# Patient Record
Sex: FEMALE | Race: WHITE | NOT HISPANIC OR LATINO | Employment: OTHER | URBAN - METROPOLITAN AREA
[De-identification: names, ages, dates, MRNs, and addresses within clinical notes are randomized per-mention and may not be internally consistent; named-entity substitution may affect disease eponyms.]

---

## 2017-01-02 ENCOUNTER — TELEPHONE (OUTPATIENT)
Dept: EDUCATION SERVICES | Facility: CLINIC | Age: 67
End: 2017-01-02

## 2017-01-02 DIAGNOSIS — E13.9 LADA (LATENT AUTOIMMUNE DIABETES MELLITUS IN ADULTS) (H): Primary | ICD-10-CM

## 2017-01-05 ENCOUNTER — VIRTUAL VISIT (OUTPATIENT)
Dept: EDUCATION SERVICES | Facility: CLINIC | Age: 67
End: 2017-01-05
Payer: COMMERCIAL

## 2017-01-05 DIAGNOSIS — E13.9 LADA (LATENT AUTOIMMUNE DIABETES MELLITUS IN ADULTS) (H): Primary | ICD-10-CM

## 2017-01-05 PROCEDURE — 99207 ZZC NO CHARGE LOS: CPT

## 2017-01-05 NOTE — PROGRESS NOTES
See my chart message for details.  1/5/2017    Paty Cooper RN/SRI  [place] Diabetes Educator

## 2017-01-27 DIAGNOSIS — N95.2 ATROPHIC VAGINITIS: Primary | ICD-10-CM

## 2017-01-27 RX ORDER — ESTRADIOL 0.1 MG/G
2 CREAM VAGINAL
Qty: 42.5 G | Refills: 11 | Status: SHIPPED | OUTPATIENT
Start: 2017-01-30 | End: 2017-03-13

## 2017-01-31 DIAGNOSIS — R39.15 URGENCY OF URINATION: Primary | ICD-10-CM

## 2017-02-01 ENCOUNTER — TELEPHONE (OUTPATIENT)
Dept: UROLOGY | Facility: CLINIC | Age: 67
End: 2017-02-01

## 2017-02-03 ENCOUNTER — TELEPHONE (OUTPATIENT)
Dept: EDUCATION SERVICES | Facility: CLINIC | Age: 67
End: 2017-02-03

## 2017-02-03 ENCOUNTER — ALLIED HEALTH/NURSE VISIT (OUTPATIENT)
Dept: EDUCATION SERVICES | Facility: CLINIC | Age: 67
End: 2017-02-03
Payer: COMMERCIAL

## 2017-02-03 DIAGNOSIS — L65.9 HAIR LOSS: Primary | ICD-10-CM

## 2017-02-03 DIAGNOSIS — E10.8 TYPE 1 DIABETES MELLITUS WITH COMPLICATIONS (H): Primary | ICD-10-CM

## 2017-02-03 PROCEDURE — G0108 DIAB MANAGE TRN  PER INDIV: HCPCS

## 2017-02-03 PROCEDURE — 99207 ZZC NO CHARGE LOS: CPT

## 2017-02-03 NOTE — PATIENT INSTRUCTIONS
My Diabetes Care Goals:    Taking Medication: I changed the settings to be 1 unit/18 gms of carbs, sensitivity to 68 units (106)  Tresiba 18 units daily.    Follow up:  Call or email me or my chart me to look at your numbers.  We will see how you do then.       Bring blood glucose meter and logbook with you to all doctor and follow-up appointments.     [place] [facility] for the [facility] Area:  For Your Diabetes Education and Nutrition Appointments Call:  [phone]   For Diabetes Education or Nutrition Related Questions:   Phone: [phone]  E-mail: [email or web address]  Fax: [phone]   If you need a medication refill please contact your pharmacy. Please allow 3 business days for your refills to be completed.    Instructions for emailing the Diabetes Educators    If you need to communicate a non-urgent message to a Diabetes Educator via email, please send to [email or web address].    Please follow the following email guidelines:    Subject line: Secure: your clinic name (example: Secure: [place])  In the email please include: First name, middle initial, last name and date of birth.    We will be in touch with you within one (1) business day.

## 2017-02-03 NOTE — TELEPHONE ENCOUNTER
Met with Latoya today.  Her BG are higher that should be. Anywhere from .  I went over her foods and she is clearly needing more insulin.  Did increase her Tresiba U200 to 18 (from 16)  Per her Accuchek connect meter programed for her to take 1 unit/18 gms of carbs and her sensitivity from 106 to 68.  I figured she uses about 25 units TDD, then used the formula for sensitivity and carb ratio.    We did discuss insulin pump therapy and would like to discuss with you    She has the following questions  1. She is hungary ALL of the time, never satisfied.  ? Due to BG being too high?  She read up on GLP-1, I know not recommended for T1 but would that be of benefit for the hunger?    2.and she is having hair loss and upset about this.    I told her I would send you this message.    Any help or ideas, would love some help.  Thank you    Paty Cooper RN/SRI  [place] Diabetes Educator

## 2017-02-03 NOTE — MR AVS SNAPSHOT
After Visit Summary   2/3/2017    Latoya Rodríguez    MRN: [id number]           Patient Information     Date Of Birth          1950        Visit Information        Provider Department      2/3/2017 3:00 PM AN DIABETIC ED RESOURCE [facility]        Care Instructions    My Diabetes Care Goals:    Taking Medication: I changed the settings to be 1 unit/18 gms of carbs, sensitivity to 68 units (106)  Tresiba 18 units daily.    Follow up:  Call or email me or my chart me to look at your numbers.  We will see how you do then.       Bring blood glucose meter and logbook with you to all doctor and follow-up appointments.     [place] [facility] for the [facility] Area:  For Your Diabetes Education and Nutrition Appointments Call:  [phone]   For Diabetes Education or Nutrition Related Questions:   Phone: [phone]  E-mail: [email or web address]  Fax: [phone]   If you need a medication refill please contact your pharmacy. Please allow 3 business days for your refills to be completed.    Instructions for emailing the Diabetes Educators    If you need to communicate a non-urgent message to a Diabetes Educator via email, please send to [email or web address].    Please follow the following email guidelines:    Subject line: Secure: your clinic name (example: Secure: [place])  In the email please include: First name, middle initial, last name and date of birth.    We will be in touch with you within one (1) business day.           Follow-ups after your visit        Your next 10 appointments already scheduled     Mar 13, 2017  4:30 PM   Return Visit with Edward Aguero MD   [facility]    [address] [place] [address] [phone]              Who to contact     If you have questions or need follow up information about today's clinic visit or your schedule please contact  [facility] directly at [phone].  Normal or non-critical lab and imaging results will be communicated to you by MyChart, letter or phone within 4 business days after the clinic has received the results. If you do not hear from us within 7 days, please contact the clinic through [employer] or phone. If you have a critical or abnormal lab result, we will notify you by phone as soon as possible.  Submit refill requests through [employer] or call your pharmacy and they will forward the refill request to us. Please allow 3 business days for your refill to be completed.          Additional Information About Your Visit        [employer] Information     [employer] gives you secure access to your electronic health record. If you see a primary care provider, you can also send messages to your care team and make appointments. If you have questions, please call your primary care clinic.  If you do not have a primary care provider, please call [phone] and they will assist you.        Care EveryWhere ID     This is your Care EveryWhere ID. This could be used by other organizations to access your [facility] medical records  [id number]         Blood Pressure from Last 3 Encounters:   11/11/16 115/70   09/13/16 100/60   08/29/16 101/59    Weight from Last 3 Encounters:   11/11/16 59.194 kg (130 lb 8 oz)   09/13/16 54.477 kg (120 lb 1.6 oz)   08/29/16 54.931 kg (121 lb 1.6 oz)              Today, you had the following     No orders found for display         Today's Medication Changes          These changes are accurate as of: 2/3/17  4:10 PM.  If you have any questions, ask your nurse or doctor.               These medicines have changed or have updated prescriptions.        Dose/Directions    * insulin aspart 100 UNIT/ML injection   Commonly known as:  NovoLOG FLEXPEN   This may have changed:  additional instructions   Used for:  Type 2 diabetes mellitus with hyperglycemia (H)        Inject 1 unit per 15 grams of  carbohydrate (3-4 units per meal) or as directed   Quantity:  1 Month   Refills:  1       * insulin aspart 100 UNIT/ML injection   Commonly known as:  NOVOPEN ECHO   This may have changed:  Another medication with the same name was changed. Make sure you understand how and when to take each.   Used for:  Type 1 diabetes mellitus without complication (H)        Dose:  3 Units   Inject 3 Units Subcutaneous 3 times daily (with meals)   Quantity:  15 mL   Refills:  3       * Notice:  This list has 2 medication(s) that are the same as other medications prescribed for you. Read the directions carefully, and ask your doctor or other care provider to review them with you.             Primary Care Provider Office Phone # Fax #    Pricila Naranjo [place], URVASHI [facility] [phone]       [facility] [address]  [place] [address]        Thank you!     Thank you for choosing [facility]  for your care. Our goal is always to provide you with excellent care. Hearing back from our patients is one way we can continue to improve our services. Please take a few minutes to complete the written survey that you may receive in the mail after your visit with us. Thank you!             Your Updated Medication List - Protect others around you: Learn how to safely use, store and throw away your medicines at www.disposemymeds.org.          This list is accurate as of: 2/3/17  4:10 PM.  Always use your most recent med list.                   Brand Name Dispense Instructions for use    ACCU-CHEK MICKY PLUS test strip   Generic drug:  blood glucose monitoring     400 each    TEST BLOOD SUGARS FOUR TIMES DAILY OR AS DIRECTED       alendronate 70 MG tablet    FOSAMAX    12 tablet    Take 1 tablet (70 mg) by mouth every 7 days Take with over 8 ounces water and stay upright for at least 30 minutes after dose.  Take at least 60 minutes before breakfast       atorvastatin 10 MG tablet    LIPITOR    90 tablet     TAKE 1 TABLET(10 MG) BY MOUTH DAILY       blood glucose monitoring lancets     3 Box    Use to test blood sugar 4 times daily or as directed.       calcium carbonate-vitamin D 500-400 MG-UNIT Tabs per tablet     180 tablet    Take 1 tablet by mouth 2 times daily       [employer] LEG CRAMPS PAIN RELIEF PO      Take 2 tablets by mouth as needed       DAILY MULTIVITAMIN PO      Take  by mouth.       estradiol 0.1 MG/GM cream    ESTRACE VAGINAL    42.5 g    Place 2 g vaginally twice a week       * insulin aspart 100 UNIT/ML injection    NovoLOG FLEXPEN    1 Month    Inject 1 unit per 15 grams of carbohydrate (3-4 units per meal) or as directed       * insulin aspart 100 UNIT/ML injection    NOVOPEN ECHO    15 mL    Inject 3 Units Subcutaneous 3 times daily (with meals)       insulin degludec 200 UNIT/ML pen    TRESIBA    18 mL    Inject 16 Units Subcutaneous daily       insulin pen needle 31G X 8 MM    B-D U/F    100 each    FOR USE DAILY WITH NOVOLOG AND LANTUS  FOUR TIMES DAILY or AS DIRECTED       KETO-DIASTIX Strp     1 each    1 strip by In Vitro route as needed       mirabegron 50 MG 24 hr tablet    MYRBETRIQ    90 tablet    Take 1 tablet (50 mg) by mouth daily       solifenacin 10 MG tablet    VESICARE    90 tablet    Take 1 tablet (10 mg) by mouth daily       VITAMIN D3 PO      Take by mouth daily       * Notice:  This list has 2 medication(s) that are the same as other medications prescribed for you. Read the directions carefully, and ask your doctor or other care provider to review them with you.

## 2017-02-03 NOTE — PROGRESS NOTES
Diabetes Self Management Training: Follow-up Visit    Latoya Rodríguez presents today for education, evaluation of glucose control and modification of medication(s) related to Type 1 diabetes.    She is accompanied by self    Patient's diabetes management related comments/concerns: having too many high bg    Patient would like this visit to be focused around the following diabetes-related behaviors and goals: Monitoring, Taking Medication and Problem Solving    ASSESSMENT:  Patient Problem List reviewed for relevant medical history and current medical status.    Current Diabetes Management per Patient:  Taking diabetes medications?   yes:     Diabetes Medication(s)     Insulin Sig    insulin degludec (TRESIBA) 200 UNIT/ML pen Inject 16 Units Subcutaneous daily    insulin aspart (NOVOPEN ECHO) 100 UNIT/ML Cartridge Inject 3 Units Subcutaneous 3 times daily (with meals)    insulin aspart (NOVOLOG FLEXPEN) 100 UNIT/ML soln Inject 1 unit per 15 grams of carbohydrate (3-4 units per meal) or as directed     Patient taking differently:  Inject 1 unit per 25 grams of carbohydrate (3-4 units per meal) or as directed      , Injectable Medications: NovoLog Insulin  Takes 1unit/15-25 gms of carbs, with 106 sensitivity.  Per her meter.  Will be changing her to 1 unit/18 gms of carbs and sensitivity 68.  Tresiba U200 will increase to 18 units daily    *Abbreviated insulin dose documentation key: Insulin Trade Name (wcfvhbvod-yonrd-otwbvv-bedtime) - i.e. Humalog 5-5-5-0 (Humalog 5 units at breakfast, 5 units at lunch, and 5 units at dinner).    Patient glucose self monitoring as follows: four times daily.   BG meter: Accu-Chek Shayla Connect meter  BG results: see blood glucose log attached to this encounter     BG values are: Not in goal  Patient's most recent A1C      7.1   9/16/2016    Nutrition:  Patient eats 3 meals per day        Cultural/Zoroastrianism diet restrictions: No     Biggest Challenge to Healthy Eating: none    Physical  "Activity:    Type: walks when she can    Diabetes Complications:  Not discussed today.    Vitals:  There were no vitals taken for this visit.  Estimated body mass index is 21.72 kg/(m^2) as calculated from the following:    Height as of 11/11/16: 1.651 m (5' 5\").    Weight as of 11/11/16: 59.194 kg (130 lb 8 oz).   Last 3 BP:   BP Readings from Last 3 Encounters:   11/11/16 115/70   09/13/16 100/60   08/29/16 101/59       History   Smoking status     Never Smoker    Smokeless tobacco     Never Used       Labs:  A1C      7.1   9/16/2016  GLC       63   9/16/2016  LDL      103   9/16/2016  HDL CHOLESTEROL   Date Value Ref Range Status   09/16/2016 62 >49 mg/dL Final   ]  GFR ESTIMATE   Date Value Ref Range Status   09/16/2016 >90  Non  GFR Calc   >60 mL/min/1.7m2 Final     GFR ESTIMATE IF BLACK   Date Value Ref Range Status   09/16/2016 >90   GFR Calc   >60 mL/min/1.7m2 Final     CR     0.65   9/16/2016  No results found for this basename: microalbumin    Health Beliefs and Attitudes:   Patient Activation Measure Survey Score:  LEX Score (Last Two) 6/24/2016   LEX Raw Score 43   Activation Score 68.5   LEX Level 4       Stage of Change: ACTION (Actively working towards change)    Progress toward meeting diabetes-related behavioral goals:    GOALS % Met Goal   Healthy Eating  100   Physical Activity  50   Monitoring  100   Medication Taking  100   Problem Solving  100   Healthy Coping     Risk Reduction           Diabetes knowledge and skills assessment:     Patient is knowledgeable in diabetes management concepts related to: Healthy Eating, Being Active, Monitoring, Taking Medication and Problem Solving    Patient needs further education on the following diabetes management concepts: Healthy Eating, Being Active, Monitoring and Taking Medication    Barriers to Learning Assessment: No Barriers identified    Based on learning assessment above, most appropriate setting for further diabetes " education would be: Individual setting.    INTERVENTION:    Education provided today on:  AADE Self-Care Behaviors:  Monitoring: purpose, proper technique, log and interpret results, individual blood glucose targets and frequency of monitoring  Taking Medication: action of prescribed medication, drawing up, administering and storing injectable diabetes medications, proper site selection and rotation for injections, side effects of prescribed medications and when to take medications  Problem Solving: high blood glucose - causes, signs/symptoms, treatment and prevention, low blood glucose - causes, signs/symptoms, treatment and prevention, carrying a carbohydrate source at all times and when to call health care provider    Opportunities for ongoing education and support in diabetes-self management were discussed.    Pt verbalized understanding of concepts discussed and recommendations provided today.       Education Materials Provided:  Pump information    PLAN:  See Patient Instructions for co-developed, patient-stated behavior change goals.  AVS printed and provided to patient today.    FOLLOW-UP:  Follow-up with PCP recommended.  Follow-up with endocrinology recommended.  Chart routed to referring provider.    Ongoing plan for education and support: Follow-up visit with diabetes educator in [place]    Paty Cooper RN/SRI [place] Diabetes Educator    Time Spent: 60 minutes  Encounter Type: Individual    Any diabetes medication dose changes were made via the CDE Protocol and Collaborative Practice Agreement with the patient's primary care provider and endocrinology provider. A copy of this encounter was shared with the provider.

## 2017-02-03 NOTE — TELEPHONE ENCOUNTER
Will do, that is what i told her about her hair loss and her hunger.  Thank you for your feedback, at least my hunches some times are good.  You are the best.  Thank you    Paty Cooper RN/SRI  [place] Diabetes Educator

## 2017-02-09 DIAGNOSIS — L65.9 HAIR LOSS: ICD-10-CM

## 2017-02-09 LAB
T4 FREE SERPL-MCNC: 1.53 NG/DL (ref 0.76–1.46)
TSH SERPL DL<=0.05 MIU/L-ACNC: 1.69 MU/L (ref 0.4–4)

## 2017-02-09 PROCEDURE — 84439 ASSAY OF FREE THYROXINE: CPT | Performed by: INTERNAL MEDICINE

## 2017-02-09 PROCEDURE — 86376 MICROSOMAL ANTIBODY EACH: CPT | Performed by: INTERNAL MEDICINE

## 2017-02-09 PROCEDURE — 36415 COLL VENOUS BLD VENIPUNCTURE: CPT | Performed by: INTERNAL MEDICINE

## 2017-02-09 PROCEDURE — 84443 ASSAY THYROID STIM HORMONE: CPT | Performed by: INTERNAL MEDICINE

## 2017-02-10 LAB — THYROPEROXIDASE AB SERPL-ACNC: <10 IU/ML

## 2017-03-12 NOTE — TELEPHONE ENCOUNTER
[facility] Prior Authorization Team   Phone: [phone]  Fax: [phone]      PA Initiation- pa not needed    Medication: estradiol (ESTRACE VAGINAL) 0.1 MG/GM cream- PA NOT NEEDED  Insurance Company: [employer] - Phone [phone] Fax [phone]  Pharmacy Filling the Rx: [facility] [id number] [place] [address] - [address] AT [facility] [address]  Filling Pharmacy Phone: [phone]  Filling Pharmacy Fax: [phone]  Start Date: 3/12/2017    Called pharmacy and patient has high deductible of $277. She will need to meet this before insurance will  co-pays. Thanks.

## 2017-03-13 ENCOUNTER — OFFICE VISIT (OUTPATIENT)
Dept: ENDOCRINOLOGY | Facility: CLINIC | Age: 67
End: 2017-03-13
Payer: COMMERCIAL

## 2017-03-13 VITALS
HEIGHT: 65 IN | WEIGHT: 134 LBS | DIASTOLIC BLOOD PRESSURE: 60 MMHG | HEART RATE: 65 BPM | BODY MASS INDEX: 22.33 KG/M2 | SYSTOLIC BLOOD PRESSURE: 98 MMHG

## 2017-03-13 DIAGNOSIS — R94.6 THYROID FUNCTION TEST ABNORMAL: ICD-10-CM

## 2017-03-13 DIAGNOSIS — E13.9 LADA (LATENT AUTOIMMUNE DIABETES MELLITUS IN ADULTS) (H): ICD-10-CM

## 2017-03-13 DIAGNOSIS — E10.9 TYPE 1 DIABETES MELLITUS WITHOUT COMPLICATION (H): Primary | ICD-10-CM

## 2017-03-13 LAB
HBA1C MFR BLD: 6 % (ref 0–5.7)
T3 SERPL-MCNC: 82 NG/DL (ref 60–181)
T4 FREE SERPL-MCNC: 1.4 NG/DL (ref 0.76–1.46)
TSH SERPL DL<=0.05 MIU/L-ACNC: 1.26 MU/L (ref 0.4–4)

## 2017-03-13 PROCEDURE — 84480 ASSAY TRIIODOTHYRONINE (T3): CPT | Performed by: INTERNAL MEDICINE

## 2017-03-13 PROCEDURE — 36415 COLL VENOUS BLD VENIPUNCTURE: CPT | Performed by: INTERNAL MEDICINE

## 2017-03-13 PROCEDURE — 84439 ASSAY OF FREE THYROXINE: CPT | Performed by: INTERNAL MEDICINE

## 2017-03-13 PROCEDURE — 84443 ASSAY THYROID STIM HORMONE: CPT | Performed by: INTERNAL MEDICINE

## 2017-03-13 PROCEDURE — 83036 HEMOGLOBIN GLYCOSYLATED A1C: CPT | Performed by: INTERNAL MEDICINE

## 2017-03-13 PROCEDURE — 99213 OFFICE O/P EST LOW 20 MIN: CPT | Performed by: INTERNAL MEDICINE

## 2017-03-13 NOTE — NURSING NOTE
"Latoya Rodríguez's goals for this visit include: Follow up Diabetes  She requests these members of her care team be copied on today's visit information: YES    PCP: Pricila Avila    Referring Provider:  Lisset Lovett PA-C  [address]  [place] [address]    Chief Complaint   Patient presents with     Diabetes       Initial Wt 60.8 kg (134 lb)  BMI 22.3 kg/m2 Estimated body mass index is 22.3 kg/(m^2) as calculated from the following:    Height as of 11/11/16: 1.651 m (5' 5\").    Weight as of this encounter: 60.8 kg (134 lb).  Medication Reconciliation: complete    Do you need any medication refills at today's visit? NO    "

## 2017-03-13 NOTE — PROGRESS NOTES
Endocrinology Note         Latoya is a 66 year old female presents today for type 1 diabetes.    HPI  Ms. Rodríguez is a 66 years old woman with hx of type 1 diabetes in June 2016. She is here for follow up. Last seen 11/2016.    She was diagnosed with type 1 diabetes when she continued losing weight unintentionally. She was found to have an A1c >14. She was initially diagnosed with type 2 diabetes and was put on insulin and Metformin.  She was later found to be TORRES antibody positive and c peptide negative.  She has since been working closely with a diabetes educator and has learned carb counting, and has generally adapted well to the diagnosis.      Interim history:  A1C is 6.0 % today which is significantly improved. She is currently taking Tresiba U-200 at 18 units at 10 pm, Novolog 1/18 gram.I reviewed her glucose meter, she has been testing 4 times a day with an overall average of 101 (SD 52) mg/dL over the couple of months. She did have frequent hypoglycemia every day in the morning. She did have hypoglycemia unawareness as she did not have symptoms of sweating or dizziness.     She is concerned that she gained weight back and lost a lot of hair since the diagnosis of diabetes although it has been slow down. She would like her weight to be around 125 lbs. She feels hungry all the time.     TFT on 2/9/2017 showed TSH 1.69, FT4 1.53 with negative TPO ab.    Past Medical History  Past Medical History   Diagnosis Date     Cataract      Type 2 diabetes mellitus with hyperglycemia (H) 6/24/2016   Type 1 diabetes, dx 6/2016  Hyperlipidemia  Osteoporosis     Allergies  No Known Allergies  Medications  Current Outpatient Prescriptions   Medication Sig Dispense Refill     estradiol (ESTRACE VAGINAL) 0.1 MG/GM cream Place 2 g vaginally twice a week 42.5 g 11     insulin degludec (TRESIBA) 200 UNIT/ML pen Inject 16 Units Subcutaneous daily 18 mL 3     mirabegron (MYRBETRIQ) 50 MG 24 hr tablet Take 1 tablet (50 mg) by  mouth daily 90 tablet 3     solifenacin (VESICARE) 10 MG tablet Take 1 tablet (10 mg) by mouth daily 90 tablet 4     atorvastatin (LIPITOR) 10 MG tablet TAKE 1 TABLET(10 MG) BY MOUTH DAILY 90 tablet 3     insulin aspart (NOVOPEN ECHO) 100 UNIT/ML Cartridge Inject 3 Units Subcutaneous 3 times daily (with meals) 15 mL 3     insulin pen needle (B-D U/F) 31G X 8 MM FOR USE DAILY WITH NOVOLOG AND LANTUS  FOUR TIMES DAILY or AS DIRECTED 100 each 11     ACCU-CHEK MICKY PLUS test strip TEST BLOOD SUGARS FOUR TIMES DAILY OR AS DIRECTED 400 each 1     alendronate (FOSAMAX) 70 MG tablet Take 1 tablet (70 mg) by mouth every 7 days Take with over 8 ounces water and stay upright for at least 30 minutes after dose.  Take at least 60 minutes before breakfast 12 tablet 3     calcium carbonate-vitamin D 500-400 MG-UNIT TABS tablt Take 1 tablet by mouth 2 times daily 180 tablet 3     Cholecalciferol (VITAMIN D3 PO) Take by mouth daily       Urine Glucose-Ketones Test (KETO-DIASTIX) STRP 1 strip by In Vitro route as needed 1 each 11     insulin aspart (NOVOLOG FLEXPEN) 100 UNIT/ML soln Inject 1 unit per 15 grams of carbohydrate (3-4 units per meal) or as directed (Patient taking differently: Inject 1 unit per 25 grams of carbohydrate (3-4 units per meal) or as directed) 1 Month 1     blood glucose monitoring (ACCU-CHEK FASTCLIX) lancets Use to test blood sugar 4 times daily or as directed. 3 Box prn     Homeopathic Products ([employer] LEG CRAMPS PAIN RELIEF PO) Take 2 tablets by mouth as needed       Multiple Vitamin (DAILY MULTIVITAMIN PO) Take  by mouth.       Family History  family history includes Brain Cancer in her maternal uncle; Breast Cancer in her mother; Coronary Artery Disease in her maternal uncle and maternal uncle; Hyperlipidemia in her mother; Leukemia in her father; Other Cancer in her father; Skin Cancer in her father; Stomach Cancer in her maternal aunt. There is no history of DIABETES, Hypertension, CEREBROVASCULAR  "DISEASE, Colon Cancer, Thyroid Disease, Depression, or Anxiety Disorder.   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    ROS  Constitutional: good energy, weight is up   Eyes: no vision change, diplopia or red eyes   Neck: no difficulty swallowing, no choking, no neck pain, no neck swelling  Cardiovascular: no chest pain, palpitations  Respiratory: no dyspnea, cough, shortness of breath or wheezing   GI: no nausea, vomiting, diarrhea or constipation, no abdominal pain   : no change in urine, no dysuria or hematuria  Musculoskeletal: no joint or muscle pain or swelling   Integumentary: +hair loss  Neuro: no loss of strength or sensation, no numbness or tingling, no tremor, no dizziness, no headache   Endo: no polyuria or polydipsia, no temperature intolerance   Heme/Lymph: no concerning bumps, no bleeding problems   Allergy: no environmental allergies   Psych: no depression or anxiety, no sleep problems.    Physical Exam  BP 98/60  Pulse 65  Ht 1.651 m (5' 5\")  Wt 60.8 kg (134 lb)  BMI 22.3 kg/m2  Body mass index is 22.3 kg/(m^2).  Constitutional: no distress, comfortable, pleasant   Eyes: anicteric  Musculoskeletal: no edema   Skin:  no jaundice   Neurological: normal gait, no tremor on outstretched hands bilaterally  Psychological: appropriate mood     RESULTS  Lab Results   Component Value Date    A1C 6.0 03/13/2017    A1C 7.1 09/16/2016    A1C 14.6 06/24/2016       ENDO DIABETES Latest Ref Rng 9/16/2016   GLUCOSE 70 - 99 mg/dL 63 (L)   CHOLESTEROL <200 mg/dL 174   LDL CHOLESTEROL, CALCULATED <100 mg/dL 103 (H)   HDL CHOLESTEROL >49 mg/dL 62   NON HDL CHOLESTEROL <130 mg/dL 112   TRIGLYCERIDES <150 mg/dL 43   CREATININE 0.52 - 1.04 mg/dL 0.65   POTASSIUM 3.4 - 5.3 " mmol/L 3.7   ALT 0 - 50 U/L 26   AST 0 - 45 U/L 21     ENDO THYROID LABS-[facility] Latest Ref Rng & Units 2/9/2017   TSH 0.40 - 4.00 mU/L 1.69   T4 FREE 0.76 - 1.46 ng/dL 1.53 (H)   THYR PEROXIDASE ALEX <35 IU/mL <10         ASSESSMENT:    Ms. Rodríguez is a 66 years old woman with hx of type 1 diabetes in June 2016. She is here for follow up.    1. Type 1 diabetes: diagnosed in June 2016. She is adapting quite well.  A1c is down to 6.0%. She has been having frequent hypoglycemia unawareness.   I will reduce Tresiba U-200 to 12 units daily  I will change Novolog to 1 unit per 25 gram.   Test 4 times daily and add correction if needed.  Do NOT correct between meals.     2) hair loss: unclear but likely from significant weight loss. TFT showed slightly high FT4 but normal TSH. Will repeat lab today.    PLAN:   - I will reduce Tresiba U-200 to 12 units daily  - change Novolog to 1 unit per 25 gram of CHO for all meal   - repeat TFT today  - RTC 3-4 months    ENDO THYROID LABS-[facility] Latest Ref Rng & Units 3/13/2017   TSH 0.40 - 4.00 mU/L 1.26   T4 FREE 0.76 - 1.46 ng/dL 1.40   TRIIODOTHYRONINE(T3) 60 - 181 ng/dL 82     Edward Aguero MD     Division of Diabetes and Endocrinology  Department of Medicine  [phone]

## 2017-03-13 NOTE — PATIENT INSTRUCTIONS
Reduce Tresiba to 12 units  Reduce Novolog to 1 unit per 25 gram  Send blood sugars in 1-2 weeks via My Chart.     Please allow 7-10 business days for your lab results. You will be notified by phone, letter, or My Chart after the provider has reviewed them.  Thank you.       Sending blood sugars to your provider at [facility]:  We want to help you with your diabetes management, which often requires frequent adjustments to your therapy. For your convenience, we have several ways to send your blood sugars to your doctor for review.    - Send message directly to your doctor through My Chart.  Please ask the rooming staff if you would like to sign up for My Chart.  This is a fast and confidential way to send your information and communicate directly with your provider.   - Record readings and fax to [phone].  We have a template for you to use for your convenience.  - If you have a [employer] pump, upload to [employer] and notify your provider of your username and password.   - Stop by the clinic with your meter for download.   - My Chart or call Linda Diego, Diabetes Educator at [phone]  - Call the clinic and speak to one of the endocrine nurses to relay information on the telephone.  Tasha Mckenna, or Lorrie at [phone].   -    Please call the on-call Endocrinologist at the [facility] for after       hours/weekend needs at [phone] Option #4.    Please note that you do not need to FAST if you are just having an A1C drawn. Please remember to ALWAYS bring your glucose meter with to your appointment. This data is very important for the management of your care.    Thank you!  Your [facility] Care Team

## 2017-03-13 NOTE — LETTER
3/13/2017      RE: Latoya Rodríguez  [address]  [place] [address]     Dear Colleague,    Thank you for referring your patient, Latoya Rodríguez, to the [facility]. Please see a copy of my visit note below.         Endocrinology Note         Latoya is a 66 year old female presents today for type 1 diabetes.    HPI  Ms. Rodríguez is a 66 years old woman with hx of type 1 diabetes in June 2016. She is here for follow up. Last seen 11/2016.    She was diagnosed with type 1 diabetes when she continued losing weight unintentionally. She was found to have an A1c >14. She was initially diagnosed with type 2 diabetes and was put on insulin and Metformin.  She was later found to be TORRES antibody positive and c peptide negative.  She has since been working closely with a diabetes educator and has learned carb counting, and has generally adapted well to the diagnosis.      Interim history:  A1C is 6.0 % today which is significantly improved. She is currently taking Tresiba U-200 at 18 units at 10 pm, Novolog 1/18 gram.I reviewed her glucose meter, she has been testing 4 times a day with an overall average of 101 (SD 52) mg/dL over the couple of months. She did have frequent hypoglycemia every day in the morning. She did have hypoglycemia unawareness as she did not have symptoms of sweating or dizziness.     She is concerned that she gained weight back and lost a lot of hair since the diagnosis of diabetes although it has been slow down. She would like her weight to be around 125 lbs. She feels hungry all the time.     TFT on 2/9/2017 showed TSH 1.69, FT4 1.53 with negative TPO ab.    Past Medical History  Past Medical History   Diagnosis Date     Cataract      Type 2 diabetes mellitus with hyperglycemia (H) 6/24/2016   Type 1 diabetes, dx 6/2016  Hyperlipidemia  Osteoporosis     Allergies  No Known Allergies  Medications  Current Outpatient Prescriptions   Medication Sig Dispense Refill      estradiol (ESTRACE VAGINAL) 0.1 MG/GM cream Place 2 g vaginally twice a week 42.5 g 11     insulin degludec (TRESIBA) 200 UNIT/ML pen Inject 16 Units Subcutaneous daily 18 mL 3     mirabegron (MYRBETRIQ) 50 MG 24 hr tablet Take 1 tablet (50 mg) by mouth daily 90 tablet 3     solifenacin (VESICARE) 10 MG tablet Take 1 tablet (10 mg) by mouth daily 90 tablet 4     atorvastatin (LIPITOR) 10 MG tablet TAKE 1 TABLET(10 MG) BY MOUTH DAILY 90 tablet 3     insulin aspart (NOVOPEN ECHO) 100 UNIT/ML Cartridge Inject 3 Units Subcutaneous 3 times daily (with meals) 15 mL 3     insulin pen needle (B-D U/F) 31G X 8 MM FOR USE DAILY WITH NOVOLOG AND LANTUS  FOUR TIMES DAILY or AS DIRECTED 100 each 11     ACCU-CHEK MICKY PLUS test strip TEST BLOOD SUGARS FOUR TIMES DAILY OR AS DIRECTED 400 each 1     alendronate (FOSAMAX) 70 MG tablet Take 1 tablet (70 mg) by mouth every 7 days Take with over 8 ounces water and stay upright for at least 30 minutes after dose.  Take at least 60 minutes before breakfast 12 tablet 3     calcium carbonate-vitamin D 500-400 MG-UNIT TABS tablt Take 1 tablet by mouth 2 times daily 180 tablet 3     Cholecalciferol (VITAMIN D3 PO) Take by mouth daily       Urine Glucose-Ketones Test (KETO-DIASTIX) STRP 1 strip by In Vitro route as needed 1 each 11     insulin aspart (NOVOLOG FLEXPEN) 100 UNIT/ML soln Inject 1 unit per 15 grams of carbohydrate (3-4 units per meal) or as directed (Patient taking differently: Inject 1 unit per 25 grams of carbohydrate (3-4 units per meal) or as directed) 1 Month 1     blood glucose monitoring (ACCU-CHEK FASTCLIX) lancets Use to test blood sugar 4 times daily or as directed. 3 Box prn     Homeopathic Products ([employer] LEG CRAMPS PAIN RELIEF PO) Take 2 tablets by mouth as needed       Multiple Vitamin (DAILY MULTIVITAMIN PO) Take  by mouth.       Family History  family history includes Brain Cancer in her maternal uncle; Breast Cancer in her mother; Coronary Artery Disease in her  "maternal uncle and maternal uncle; Hyperlipidemia in her mother; Leukemia in her father; Other Cancer in her father; Skin Cancer in her father; Stomach Cancer in her maternal aunt. There is no history of DIABETES, Hypertension, CEREBROVASCULAR DISEASE, Colon Cancer, Thyroid Disease, Depression, or Anxiety Disorder.   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    ROS  Constitutional: good energy, weight is up   Eyes: no vision change, diplopia or red eyes   Neck: no difficulty swallowing, no choking, no neck pain, no neck swelling  Cardiovascular: no chest pain, palpitations  Respiratory: no dyspnea, cough, shortness of breath or wheezing   GI: no nausea, vomiting, diarrhea or constipation, no abdominal pain   : no change in urine, no dysuria or hematuria  Musculoskeletal: no joint or muscle pain or swelling   Integumentary: +hair loss  Neuro: no loss of strength or sensation, no numbness or tingling, no tremor, no dizziness, no headache   Endo: no polyuria or polydipsia, no temperature intolerance   Heme/Lymph: no concerning bumps, no bleeding problems   Allergy: no environmental allergies   Psych: no depression or anxiety, no sleep problems.    Physical Exam  BP 98/60  Pulse 65  Ht 1.651 m (5' 5\")  Wt 60.8 kg (134 lb)  BMI 22.3 kg/m2  Body mass index is 22.3 kg/(m^2).  Constitutional: no distress, comfortable, pleasant   Eyes: anicteric  Musculoskeletal: no edema   Skin:  no jaundice   Neurological: normal gait, no tremor on outstretched hands bilaterally  Psychological: appropriate mood     RESULTS  Lab Results   Component Value Date    A1C 6.0 03/13/2017    A1C 7.1 09/16/2016    A1C 14.6 06/24/2016       ENDO DIABETES Latest Ref Rng 9/16/2016   GLUCOSE 70 - 99 mg/dL 63 " (L)   CHOLESTEROL <200 mg/dL 174   LDL CHOLESTEROL, CALCULATED <100 mg/dL 103 (H)   HDL CHOLESTEROL >49 mg/dL 62   NON HDL CHOLESTEROL <130 mg/dL 112   TRIGLYCERIDES <150 mg/dL 43   CREATININE 0.52 - 1.04 mg/dL 0.65   POTASSIUM 3.4 - 5.3 mmol/L 3.7   ALT 0 - 50 U/L 26   AST 0 - 45 U/L 21     ENDO THYROID LABS-[facility] Latest Ref Rng & Units 2/9/2017   TSH 0.40 - 4.00 mU/L 1.69   T4 FREE 0.76 - 1.46 ng/dL 1.53 (H)   THYR PEROXIDASE ALEX <35 IU/mL <10         ASSESSMENT:    Ms. Rodríguez is a 66 years old woman with hx of type 1 diabetes in June 2016. She is here for follow up.    1. Type 1 diabetes: diagnosed in June 2016. She is adapting quite well.  A1c is down to 6.0%. She has been having frequent hypoglycemia unawareness.   I will reduce Tresiba U-200 to 12 units daily  I will change Novolog to 1 unit per 25 gram.   Test 4 times daily and add correction if needed.  Do NOT correct between meals.     2) hair loss: unclear but likely from significant weight loss. TFT showed slightly high FT4 but normal TSH. Will repeat lab today.    PLAN:   - I will reduce Tresiba U-200 to 12 units daily  - change Novolog to 1 unit per 25 gram of CHO for all meal   - repeat TFT today  - RTC 3-4 months    ENDO THYROID LABS-[facility] Latest Ref Rng & Units 3/13/2017   TSH 0.40 - 4.00 mU/L 1.26   T4 FREE 0.76 - 1.46 ng/dL 1.40   TRIIODOTHYRONINE(T3) 60 - 181 ng/dL 82     Edward Aguero MD     Division of Diabetes and Endocrinology  Department of Medicine  [phone]      Again, thank you for allowing me to participate in the care of your patient.      Sincerely,    Edward Aguero MD

## 2017-03-13 NOTE — MR AVS SNAPSHOT
After Visit Summary   3/13/2017    Latoya Rodríguez    MRN: [id number]           Patient Information     Date Of Birth          1950        Visit Information        Provider Department      3/13/2017 4:15 PM Edward Aguero MD; MG ENDO NURSE 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     Please allow 7-10 business days for your lab results. You will be notified by phone, letter, or My Chart after the provider has reviewed them.  Thank you.       Sending blood sugars to your provider at [facility]:  We want to help you with your diabetes management, which often requires frequent adjustments to your therapy. For your convenience, we have several ways to send your blood sugars to your doctor for review.    - Send message directly to your doctor through My Chart.  Please ask the rooming staff if you would like to sign up for My Chart.  This is a fast and confidential way to send your information and communicate directly with your provider.   - Record readings and fax to [phone].  We have a template for you to use for your convenience.  - If you have a Medtronic pump, upload to [employer] and notify your provider of your username and password.   - Stop by the clinic with your meter for download.   - My Chart or call Linda Diego Diabetes Educator at [phone]  - Call the clinic and speak to one of the endocrine nurses to relay information on the telephone.  Tasha Mckenna, or Lorrie at [phone].   -    Please call the on-call Endocrinologist at the [facility] for after       hours/weekend needs at [phone] Option #4.    Please note that you do not need to FAST if you are  just having an A1C drawn. Please remember to ALWAYS bring your glucose meter with to your appointment. This data is very important for the management of your care.    Thank you!  Your [place] [facility] Team              Follow-ups after your visit        Your next 10 appointments already scheduled     Jul 17, 2017  7:45 AM CDT   Return Visit with Edward Aguero MD, MG ENDO NURSE   [facility]    [address] [place] [id number] [phone]              Future tests that were ordered for you today     Open Standing Orders        Priority Remaining Interval Expires Ordered    Hemoglobin A1c POCT Routine 3/4 Q 3 Mo 3/13/2018 3/13/2017            Who to contact     If you have questions or need follow up information about today's clinic visit or your schedule please contact [facility] directly at [phone].  Normal or non-critical lab and imaging results will be communicated to you by [employer]An[employer], letter or phone within 4 business days after the clinic has received the results. If you do not hear from us within 7 days, please contact the clinic through [employer] or phone. If you have a critical or abnormal lab result, we will notify you by phone as soon as possible.  Submit refill requests through [employer] or call your pharmacy and they will forward the refill request to us. Please allow 3 business days for your refill to be completed.          Additional Information About Your Visit        BitAnimatehart Information     [employer] gives you secure access to your electronic health record. If you see a primary care provider, you can also send messages to your care team and make appointments. If you have questions, please call your primary care clinic.  If you do not have a primary care provider, please call [phone] and they will assist you.      [employer] is an electronic gateway that provides easy, online access to your medical  "records. With [employer], you can request a clinic appointment, read your test results, renew a prescription or communicate with your care team.     To access your existing account, please contact your [facility] or call [phone] for assistance.        Care EveryWhere ID     This is your Care EveryWhere ID. This could be used by other organizations to access your [place] medical records  [id number]        Your Vitals Were     Pulse Height BMI (Body Mass Index)             65 1.651 m (5' 5\") 22.3 kg/m2          Blood Pressure from Last 3 Encounters:   03/13/17 98/60   11/11/16 115/70   09/13/16 100/60    Weight from Last 3 Encounters:   03/13/17 60.8 kg (134 lb)   11/11/16 59.2 kg (130 lb 8 oz)   09/13/16 54.5 kg (120 lb 1.6 oz)              We Performed the Following     Hemoglobin A1c POCT          Today's Medication Changes          These changes are accurate as of: 3/13/17  5:07 PM.  If you have any questions, ask your nurse or doctor.               These medicines have changed or have updated prescriptions.        Dose/Directions    * insulin aspart 100 UNIT/ML injection   Commonly known as:  NovoLOG FLEXPEN   This may have changed:  additional instructions   Used for:  Type 2 diabetes mellitus with hyperglycemia (H)   Changed by:  Catrachtia Chavez [facility]        Inject 1 unit per 15 grams of carbohydrate (3-4 units per meal) or as directed   Quantity:  1 Month   Refills:  1       * insulin aspart 100 UNIT/ML injection   Commonly known as:  NOVOPEN ECHO   This may have changed:  Another medication with the same name was changed. Make sure you understand how and when to take each.   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 Read the directions carefully, and ask your doctor or other care provider to review them with you.      Stop taking these medicines if you haven't already. Please contact your care team if you have questions.     estradiol 0.1 MG/GM cream   Commonly known as:  ESTRACE VAGINAL   Stopped by:  Edward Aguero MD                Where to get your medicines      Some of these will need a paper prescription and others can be bought over the counter.  Ask your nurse if you have questions.     Bring a paper prescription for each of these medications     insulin degludec 200 UNIT/ML pen                Primary Care Provider Office Phone # Fax #    Pricila JuarezURVASHI [facility] [phone]       [facility] [address]  [place] [address]        Thank you!     Thank you for choosing [facility]  for your care. Our goal is always to provide you with excellent care. Hearing back from our patients is one way we can continue to improve our services. Please take a few minutes to complete the written survey that you may receive in the mail after your visit with us. Thank you!             Your Updated Medication List - Protect others around you: Learn how to safely use, store and throw away your medicines at www.disposemymeds.org.          This list is accurate as of: 3/13/17  5:07 PM.  Always use your most recent med list.                   Brand Name Dispense Instructions for use    ACCU-CHEK MICKY PLUS test strip   Generic drug:  blood glucose monitoring     400 each    TEST BLOOD SUGARS FOUR TIMES DAILY OR AS DIRECTED       alendronate 70 MG tablet    FOSAMAX    12 tablet     Take 1 tablet (70 mg) by mouth every 7 days Take with over 8 ounces water and stay upright for at least 30 minutes after dose.  Take at least 60 minutes before breakfast       atorvastatin 10 MG tablet    LIPITOR    90 tablet    TAKE 1 TABLET(10 MG) BY MOUTH DAILY       blood glucose monitoring lancets     3 Box    Use to test blood sugar 4 times daily or as directed.       calcium carbonate-vitamin D 500-400 MG-UNIT Tabs per tablet     180 tablet    Take 1 tablet by mouth 2 times daily       * insulin aspart 100 UNIT/ML injection    NovoLOG FLEXPEN    1 Month    Inject 1 unit per 15 grams of carbohydrate (3-4 units per meal) or as directed       * insulin aspart 100 UNIT/ML injection    NOVOPEN ECHO    15 mL    Inject 3 Units Subcutaneous 3 times daily (with meals)       insulin degludec 200 UNIT/ML pen    TRESIBA    18 mL    Inject 12 Units Subcutaneous daily       insulin pen needle 31G X 8 MM    B-D U/F    100 each    FOR USE DAILY WITH NOVOLOG AND LANTUS  FOUR TIMES DAILY or AS DIRECTED       KETO-DIASTIX Strp     1 each    1 strip by In Vitro route as needed       mirabegron 50 MG 24 hr tablet    MYRBETRIQ    90 tablet    Take 1 tablet (50 mg) by mouth daily       solifenacin 10 MG tablet    VESICARE    90 tablet    Take 1 tablet (10 mg) by mouth daily       * Notice:  This list has 2 medication(s) that are the same as other medications prescribed for you. Read the directions carefully, and ask your doctor or other care provider to review them with you.

## 2017-03-14 NOTE — TELEPHONE ENCOUNTER
Left generic message with request for patient to return call back to clinic. When patient returns call, will discuss message below from prior authorization team.    Jess Hadley  General Surgery - Urology RN

## 2017-03-14 NOTE — TELEPHONE ENCOUNTER
"Received voicemail from patient requesting a return call to [phone].     Returned call to patient who is aware that PA is not needed and the deductible is $277. Patient stated, \"I am not going to pay $277 dollars for a medication that I don't really see a benefit with.\" Patient reports using 3-4 tubes of estrace over the course of time and has not noticed any benefit. Offered to look for more affordable alternatives and/or assist patient in scheduling follow up with Dr. Zhang. Patient wishes to have alternatives researched and informed patient that a message will be sent to Dr. Zhang for recommendations. Patient aware that Dr. Zhang is out of the office, but we will follow up with patient as soon as more information is known. Patient verbalized understanding and is comfortable with plan.    Jess Hadley  General Surgery - Urology RN    "

## 2017-03-15 NOTE — TELEPHONE ENCOUNTER
Received message from Dr. Zhang stating that patient could try premarin cream or vagifem if they are more affordable, otherwise, patient could disregard the cream all together.     Called and spoke with [facility] and premarin and vagifem tend to be more expensive than estrace cream.    Called and spoke to patient who is aware of the above information. Patient prefers to skip the cream for now and will call with any questions or concerns in the future. Patient plans to contact the clinic to schedule follow up with Dr. Zhang in the near future.     Jess Hadley  General Surgery - Urology RN

## 2017-04-05 ENCOUNTER — TELEPHONE (OUTPATIENT)
Dept: PEDIATRICS | Facility: CLINIC | Age: 67
End: 2017-04-05

## 2017-04-05 NOTE — TELEPHONE ENCOUNTER
4/5/2017    Call Regarding Preventive Health Screening Colonoscopy    Attempt 1    Message on voicemail     Comments:       Outreach   CC

## 2017-04-17 DIAGNOSIS — R39.15 URGENCY OF URINATION: ICD-10-CM

## 2017-04-17 DIAGNOSIS — N39.41 URGE INCONTINENCE: ICD-10-CM

## 2017-04-17 DIAGNOSIS — N95.2 ATROPHIC VAGINITIS: ICD-10-CM

## 2017-04-17 DIAGNOSIS — R35.0 URINARY FREQUENCY: ICD-10-CM

## 2017-04-17 RX ORDER — MIRABEGRON 50 MG/1
50 TABLET, EXTENDED RELEASE ORAL DAILY
Qty: 90 TABLET | Refills: 1 | Status: SHIPPED | OUTPATIENT
Start: 2017-04-17 | End: 2017-12-06

## 2017-04-17 RX ORDER — ESTRADIOL 0.1 MG/G
2 CREAM VAGINAL
Qty: 42.5 G | Refills: 3 | Status: SHIPPED | OUTPATIENT
Start: 2017-04-17 | End: 2017-12-06

## 2017-04-17 RX ORDER — SOLIFENACIN SUCCINATE 10 MG/1
10 TABLET, FILM COATED ORAL DAILY
Qty: 90 TABLET | Refills: 1 | Status: SHIPPED | OUTPATIENT
Start: 2017-04-17 | End: 2017-12-06

## 2017-04-17 NOTE — TELEPHONE ENCOUNTER
"Called and spoke to patient who stated, \"I was wondering if I could get the prescriptions for myrbetriq, vesicare, and estrace cream sent to me or my pharmacy.\" Patient aware that she originally declined the estrace cream prescription but may have changed her mind. Informed patient that the paper prescriptions could be mailed to the patient's home. Prescriptions to be signed by provider and mailed to patient's home.    Jess Hadley  General Surgery - Urology RN            "

## 2017-04-17 NOTE — TELEPHONE ENCOUNTER
Attempted to reach patient to discuss medication request below, but line was busy. Will continue to try to reach patient.     Jess Hadley  General Surgery - Urology RN

## 2017-04-17 NOTE — TELEPHONE ENCOUNTER
[facility] Call Center    Phone Message    Name of Caller: Latoya    Phone Number: Home number on file [phone] (home)    Best time to return call: any    May a detailed message be left on voicemail: yes    Relation to patient: Self    Reason for Call: Medication Refill Request    Has the patient contacted the pharmacy for the refill? Yes   Name of medication being requested: in med tab, also needs a cream from Dr Zhang.  Do not see it on medication list.   Provider who prescribed the medication: Vicente   Pharmacy: [address]  [id number]  (mail order)    Date medication is needed: asap   Please call patient if care team needs more information     Action Taken: Message routed to:  Adult Clinics: Urology p [phone]

## 2017-06-16 NOTE — TELEPHONE ENCOUNTER
6/16/2017    Call Regarding Preventive Health Screening Colonoscopy    Attempt 2    Message     Comments: per patient states that she will call back to schedule colonoscopy screening after checking her schedule.      Outreach   meme

## 2017-06-23 ENCOUNTER — OFFICE VISIT (OUTPATIENT)
Dept: PEDIATRICS | Facility: CLINIC | Age: 67
End: 2017-06-23
Payer: COMMERCIAL

## 2017-06-23 VITALS
DIASTOLIC BLOOD PRESSURE: 60 MMHG | OXYGEN SATURATION: 99 % | TEMPERATURE: 97.7 F | SYSTOLIC BLOOD PRESSURE: 115 MMHG | HEART RATE: 67 BPM | BODY MASS INDEX: 22.42 KG/M2 | WEIGHT: 134.7 LBS

## 2017-06-23 DIAGNOSIS — E78.5 HYPERLIPIDEMIA WITH TARGET LDL LESS THAN 100: ICD-10-CM

## 2017-06-23 DIAGNOSIS — Z13.29 SCREENING FOR THYROID DISORDER: ICD-10-CM

## 2017-06-23 DIAGNOSIS — Z00.00 ENCOUNTER FOR ROUTINE ADULT HEALTH EXAMINATION WITHOUT ABNORMAL FINDINGS: Primary | ICD-10-CM

## 2017-06-23 DIAGNOSIS — L65.9 HAIR LOSS: ICD-10-CM

## 2017-06-23 DIAGNOSIS — Z12.11 SCREENING FOR COLON CANCER: ICD-10-CM

## 2017-06-23 DIAGNOSIS — E10.9 TYPE 1 DIABETES MELLITUS WITHOUT COMPLICATION (H): ICD-10-CM

## 2017-06-23 DIAGNOSIS — Z71.89 ADVANCED DIRECTIVES, COUNSELING/DISCUSSION: ICD-10-CM

## 2017-06-23 DIAGNOSIS — Z12.31 ENCOUNTER FOR SCREENING MAMMOGRAM FOR BREAST CANCER: ICD-10-CM

## 2017-06-23 PROCEDURE — 99397 PER PM REEVAL EST PAT 65+ YR: CPT | Performed by: NURSE PRACTITIONER

## 2017-06-23 PROCEDURE — 99207 C FOOT EXAM  NO CHARGE: CPT | Mod: 25 | Performed by: NURSE PRACTITIONER

## 2017-06-23 NOTE — PROGRESS NOTES
SUBJECTIVE:     CC: Latoya Rodríguez is an 66 year old woman who presents for preventive health visit.     Physical   Annual:     Getting at least 3 servings of Calcium per day::  Yes    Bi-annual eye exam::  Yes    Dental care twice a year::  Yes    Sleep apnea or symptoms of sleep apnea::  None    Diet::  Diabetic, Carbohydrate counting and Vegetarian/vegan    Frequency of exercise::  None    Taking medications regularly::  Yes    Medication side effects::  None    Additional concerns today::  YES      Today's PHQ-2 Score: Answers for HPI/ROS submitted by the patient on 6/20/2017   PHQ-2 Score: 0    PHQ-2 ( 1999 [employer]) 6/20/2017   Q1: Little interest or pleasure in doing things Not at all   Q2: Feeling down, depressed or hopeless Not at all   PHQ-2 Score 0       Abuse: Current or Past(Physical, Sexual or Emotional)- No  Do you feel safe in your environment - Yes    Social History   Substance Use Topics     Smoking status: Never Smoker     Smokeless tobacco: Never Used     Alcohol use Yes      Comment: 1 to 2 beers or glasses of wine 1 to 2 times per month     The patient does not drink >3 drinks per day nor >7 drinks per week.    Recent Labs   Lab Test  09/16/16   0711  06/24/16   0941   CHOL  174  265*   HDL  62  43*   LDL  103*  195*   TRIG  43  136   NHDL  112  222*       Reviewed orders with patient.  Reviewed health maintenance and updated orders accordingly - Yes    Mammo Decision Support:  Patient over age 50, mutual decision to screen reflected in health maintenance.    Pertinent mammograms are reviewed under the imaging tab.  History of abnormal Pap smear: NO - age 65 - see link Cervical Cytology Screening Guidelines    Reviewed and updated as needed this visit by clinical staff  Tobacco  Allergies  Meds  Med Hx  Surg Hx  Fam Hx  Soc Hx        Reviewed and updated as needed this visit by Provider        Past Medical History:   Diagnosis Date     Cataract      Type 2 diabetes mellitus with  hyperglycemia (H) 6/24/2016      Past Surgical History:   Procedure Laterality Date     EYE SURGERY  9/04, 5/11    Retinal detachment, Cataract (both eyes)     VASCULAR SURGERY      Varicose Veins       ROS:  CONSTITUTIONAL:NEGATIVE for fever, chills, change in weight  INTEGUMENTARY/SKIN: NEGATIVE for worrisome rashes, moles or lesions  EYES: NEGATIVE for vision changes or irritation  ENT: NEGATIVE for ear, mouth and throat problems  RESP:NEGATIVE for significant cough or SOB  BREAST: NEGATIVE for masses, tenderness or discharge  CV: NEGATIVE for chest pain, palpitations or peripheral edema  GI: NEGATIVE for nausea, abdominal pain, heartburn, or change in bowel habits   menopausal female: amenorrhea, no unusual urinary symptoms and no unusual vaginal symptoms  MUSCULOSKELETAL:NEGATIVE for significant arthralgias or myalgia  NEURO: NEGATIVE for weakness, dizziness or paresthesias  ENDOCRINE: NEGATIVE for temperature intolerance, skin/hair changes and POSITIVE  for HX diabetes  HEME/ALLERGY/IMMUNE: NEGATIVE for bleeding problems  PSYCHIATRIC: NEGATIVE for changes in mood or affect     BP Readings from Last 3 Encounters:   06/23/17 115/60   03/13/17 98/60   11/11/16 115/70    Wt Readings from Last 3 Encounters:   06/23/17 134 lb 11.2 oz (61.1 kg)   03/13/17 134 lb (60.8 kg)   11/11/16 130 lb 8 oz (59.2 kg)                  Patient Active Problem List   Diagnosis     Urgency of urination     Urinary frequency     Urge incontinence     CARDIOVASCULAR SCREENING; LDL GOAL LESS THAN 160     Hyperlipidemia with target LDL less than 100     Osteoporosis     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)     Type 1 diabetes mellitus without complication (H)     Age-related osteoporosis without current pathological fracture     Past Surgical History:   Procedure Laterality Date     EYE SURGERY  9/04, 5/11    Retinal detachment, Cataract (both eyes)     VASCULAR SURGERY      Varicose Veins       Social History    Substance Use Topics     Smoking status: Never Smoker     Smokeless tobacco: Never Used     Alcohol use Yes      Comment: 1 to 2 beers or glasses of wine 1 to 2 times per month     Family History   Problem Relation Age of Onset     Hyperlipidemia Mother      Breast Cancer Mother      Other Cancer Father      Leukemia Father      Skin Cancer Father      Coronary Artery Disease Maternal Uncle      Brain Cancer Maternal Uncle      Coronary Artery Disease Maternal Uncle      Stomach Cancer Maternal Aunt      DIABETES No family hx of      Hypertension No family hx of      CEREBROVASCULAR DISEASE No family hx of      Colon Cancer No family hx of      Thyroid Disease No family hx of      Depression No family hx of      Anxiety Disorder No family hx of          Current Outpatient Prescriptions   Medication Sig Dispense Refill     mirabegron (MYRBETRIQ) 50 MG 24 hr tablet Take 1 tablet (50 mg) by mouth daily 90 tablet 1     solifenacin (VESICARE) 10 MG tablet Take 1 tablet (10 mg) by mouth daily 90 tablet 1     estradiol (ESTRACE VAGINAL) 0.1 MG/GM cream Place 2 g vaginally twice a week Apply dime-sized amount daily for the first 2 weeks, then place twice a week thereafter. 42.5 g 3     insulin degludec (TRESIBA) 200 UNIT/ML pen Inject 12 Units Subcutaneous daily 18 mL 3     atorvastatin (LIPITOR) 10 MG tablet TAKE 1 TABLET(10 MG) BY MOUTH DAILY 90 tablet 3     insulin aspart (NOVOPEN ECHO) 100 UNIT/ML Cartridge Inject 3 Units Subcutaneous 3 times daily (with meals) 15 mL 3     insulin pen needle (B-D U/F) 31G X 8 MM FOR USE DAILY WITH NOVOLOG AND LANTUS  FOUR TIMES DAILY or AS DIRECTED 100 each 11     ACCU-CHEK MICKY PLUS test strip TEST BLOOD SUGARS FOUR TIMES DAILY OR AS DIRECTED 400 each 1     alendronate (FOSAMAX) 70 MG tablet Take 1 tablet (70 mg) by mouth every 7 days Take with over 8 ounces water and stay upright for at least 30 minutes after dose.  Take at least 60 minutes before breakfast 12 tablet 3     calcium  carbonate-vitamin D 500-400 MG-UNIT TABS tablt Take 1 tablet by mouth 2 times daily 180 tablet 3     Urine Glucose-Ketones Test (KETO-DIASTIX) STRP 1 strip by In Vitro route as needed 1 each 11     insulin aspart (NOVOLOG FLEXPEN) 100 UNIT/ML soln Inject 1 unit per 15 grams of carbohydrate (3-4 units per meal) or as directed (Patient taking differently: Inject 1 unit per 25 grams of carbohydrate (3-4 units per meal) or as directed) 1 Month 1     blood glucose monitoring (ACCU-CHEK FASTCLIX) lancets Use to test blood sugar 4 times daily or as directed. 3 Box prn     No Known Allergies  OBJECTIVE:     /60 (BP Location: Right arm, Patient Position: Sitting, Cuff Size: Adult Regular)  Pulse 67  Temp 97.7  F (36.5  C) (Temporal)  Wt 134 lb 11.2 oz (61.1 kg)  SpO2 99%  Breastfeeding? No  BMI 22.42 kg/m2   Wt Readings from Last 4 Encounters:   06/23/17 134 lb 11.2 oz (61.1 kg)   03/13/17 134 lb (60.8 kg)   11/11/16 130 lb 8 oz (59.2 kg)   09/13/16 120 lb 1.6 oz (54.5 kg)       EXAM:  GENERAL APPEARANCE: healthy, alert and no distress  EYES: Eyes grossly normal to inspection, PERRL and conjunctivae and sclerae normal  HENT: ear canals and TM's normal, nose and mouth without ulcers or lesions, oropharynx clear and oral mucous membranes moist  NECK: no adenopathy, no asymmetry, masses, or scars and thyroid normal to palpation  RESP: lungs clear to auscultation - no rales, rhonchi or wheezes  BREAST: normal without masses, tenderness or nipple discharge and no palpable axillary masses or adenopathy  CV: regular rate and rhythm, normal S1 S2, no S3 or S4, no murmur, click or rub, no peripheral edema and peripheral pulses strong  ABDOMEN: soft, nontender, no hepatosplenomegaly, no masses and bowel sounds normal   (female): normal female external genitalia, normal urethral meatus, vaginal mucosal atrophy noted, normal cervix, adnexae, and uterus without masses or abnormal discharge  MS: no musculoskeletal defects  are noted and gait is age appropriate without ataxia  normal DP and PT pulses, no trophic changes or ulcerative lesions, normal sensory exam and normal monofilament exam  SKIN: no suspicious lesions or rashes  NEURO: Normal strength and tone, sensory exam grossly normal, mentation intact and speech normal  PSYCH: mentation appears normal and affect normal/bright    ASSESSMENT/PLAN:     Latoya was seen today for physical.    Diagnoses and all orders for this visit:    Encounter for routine adult health examination without abnormal findings  -     **Lipid panel reflex to direct LDL FUTURE anytime; Future    Hyperlipidemia with target LDL less than 100  -     **Lipid panel reflex to direct LDL FUTURE anytime; Future    Type 1 diabetes mellitus without complication (H)  -     **Albumin Random Urine Quant FUTURE anytime; Future  -     FOOT EXAM  -     NUTRITION REFERRAL  FOLLOW UP WITH SPECIALIST :Endocrinology    Advanced directives, counseling/discussion  Advance Care Planning: ACP Review of Chart / Resources Provided:  Reviewed chart for advance care plan.  Latoya Rodríguez has been provided information and resources to begin or update their advance care plan.  Added by Pricila Avila      Screening for colon cancer  -     GASTROENTEROLOGY ADULT REF PROCEDURE ONLY    Encounter for screening mammogram for breast cancer  -     MA Screening Digital Bilateral; Future    Hair loss  -     Thyroid peroxidase antibody; Future  -     TSH; Future  -     T4 free; Future    Screening for thyroid disorder  -     Thyroid peroxidase antibody; Future  -     TSH; Future  -     T4 free; Future      PLAN:   Patient needs to follow up in if no improvement,or sooner if worsening of symptoms or other symptoms develop.  FUTURE LABS:       - Schedule a fasting blood draw   Will follow up and/or notify patient of  results via My Chart to determine further need for followup  Follow up office visit in one year for annual health maintenance  "exam, sooner PRN.      COUNSELING:  Reviewed preventive health counseling, as reflected in patient instructions  Special attention given to:        Regular exercise       Healthy diet/nutrition       Vision screening       Aspirin Prophylaxsis       Osteoporosis Prevention/Bone Health       Colon cancer screening       Consider Hep C screening for patients born between 1945 and 1965       The 10-year ASCVD risk score (Emily ALVARES Jr, et al., 2013) is: 8.6%    Values used to calculate the score:      Age: 66 years      Sex: Female      Is Non- : No      Diabetic: Yes      Tobacco smoker: No      Systolic Blood Pressure: 115 mmHg      Is BP treated: No      HDL Cholesterol: 62 mg/dL      Total Cholesterol: 174 mg/dL         reports that she has never smoked. She has never used smokeless tobacco.    Estimated body mass index is 22.3 kg/(m^2) as calculated from the following:    Height as of 3/13/17: 5' 5\" (1.651 m).    Weight as of 3/13/17: 134 lb (60.8 kg).       Counseling Resources:  ATP IV Guidelines  Pooled Cohorts Equation Calculator  Breast Cancer Risk Calculator  FRAX Risk Assessment  ICSI Preventive Guidelines  Dietary Guidelines for Americans, 2010  USDA's MyPlate  ASA Prophylaxis  Lung CA Screening    Pricila Avila, URVASHI CNP  M [facility]  "

## 2017-06-23 NOTE — MR AVS SNAPSHOT
After Visit Summary   6/23/2017    Latoya Rodríguez    MRN: [id number]           Patient Information     Date Of Birth          1950        Visit Information        Provider Department      6/23/2017 2:20 PM Pricila Avila APRN CNP [facility]        Today's Diagnoses     Encounter for routine adult health examination without abnormal findings    -  1    Hyperlipidemia with target LDL less than 100        Type 1 diabetes mellitus without complication (H)        Advanced directives, counseling/discussion        Screening for colon cancer        Encounter for screening mammogram for breast cancer        Hair loss        Screening for thyroid disorder          Care Instructions    PLAN:   1.   Symptomatic therapy suggested: Continue current medications.    2.  Orders Placed This Encounter   Procedures     FOOT EXAM     MA Screening Digital Bilateral     **Lipid panel reflex to direct LDL FUTURE anytime     **Albumin Random Urine Quant FUTURE anytime     Thyroid peroxidase antibody     TSH     T4 free     GASTROENTEROLOGY ADULT REF PROCEDURE ONLY     NUTRITION REFERRAL       3. Patient needs to follow up in if no improvement,or sooner if worsening of symptoms or other symptoms develop.  FUTURE LABS:       - Schedule a fasting blood draw   Will follow up and/or notify patient of  results via My Chart to determine further need for followup  Follow up office visit in one year for annual health maintenance exam, sooner PRN.    Preventive Health Recommendations  Female Ages 65 +    Yearly exam:     See your health care provider every year in order to  o Review health changes.   o Discuss preventive care.    o Review your medicines if your doctor has prescribed any.      You no longer need a yearly Pap test unless you've had an abnormal Pap test in the past 10 years. If you have vaginal symptoms, such as bleeding or discharge, be sure to talk with your provider about a Pap  test.      Every 1 to 2 years, have a mammogram.  If you are over 69, talk with your health care provider about whether or not you want to continue having screening mammograms.      Every 10 years, have a colonoscopy. Or, have a yearly FIT test (stool test). These exams will check for colon cancer.       Have a cholesterol test every 5 years, or more often if your doctor advises it.       Have a diabetes test (fasting glucose) every three years. If you are at risk for diabetes, you should have this test more often.       At age 65, have a bone density scan (DEXA) to check for osteoporosis (brittle bone disease).    Shots:    Get a flu shot each year.    Get a tetanus shot every 10 years.    Talk to your doctor about your pneumonia vaccines. There are now two you should receive - Pneumovax (PPSV 23) and Prevnar (PCV 13).    Talk to your doctor about the shingles vaccine.    Talk to your doctor about the hepatitis B vaccine.    Nutrition:     Eat at least 5 servings of fruits and vegetables each day.      Eat whole-grain bread, whole-wheat pasta and brown rice instead of white grains and rice.      Talk to your provider about Calcium and Vitamin D.     Lifestyle    Exercise at least 150 minutes a week (30 minutes a day, 5 days a week). This will help you control your weight and prevent disease.      Limit alcohol to one drink per day.      No smoking.       Wear sunscreen to prevent skin cancer.       See your dentist twice a year for an exam and cleaning.      See your eye doctor every 1 to 2 years to screen for conditions such as glaucoma, macular degeneration, cataracts, etc   It was a pleasure seeing you today at the [facility]. Thank you for allowing us to care for you today. We truly hope we provided you with the excellent service you deserve. Please let us know if there is anything else we can do for you so we can be sure you are leaving completley satisfied with your care  experience.       General information about your clinic   Clinic Hours Lab Hours (Appointments are required)   Mon-Thurs: 7:30 AM - 7 PM Mon-Thurs: 7:30 AM - 7 PM   Fri: 7:30 AM - 5 PM Fri: 7:30 AM - 5 PM        After Hours Nurse Advise & Appts:  [facility] Nurse Advisors: [phone]  [facility] On Call: to make appointments anytime: [phone] On Call Physician: call [phone] and answering service will page the on call physician.        For urgent appointments, please call [phone] and ask for the triage nurse or your care team clinic nurse.  How to contact my care team:  MyChart: [email or web address]   Phone: [phone]   Fax: [phone]       [facility]:   Phone: [phone]  Hours: 8:00 AM - 6:00 PM  Medication Refills:  Call your pharmacy and they will forward the refill to us. Please allow 3 business days for your refills to be completed.       Normal or non-critical lab and imaging results will be communicated to you by MyChart, letter or phone within 7 days.  If you do not hear from us within 10 days, please call the clinic. If you have a critical or abnormal lab result, we will notify you by phone as soon as possible.       We now have PWIC (Pediatric Walk in Care)  Monday-Friday from 7:30-4. Simply walk in and be seen for your urgent needs like cough, fever, rash, diarrhea or vomiting, pink eye, UTI. No appointments needed. Ask one of the team for more information      -Your Care Team:    Dr. Matthew Hernandez - Internal Medicine  Dr. Farzana Benites - Internal Medicine/Pediatrics   Dr. Leonidas Hollingsworth - Family Medicine  Dr. Mona Myers - Pediatrics  Dr. Sushma Sewell - Pediatrics  Pricila Avila CNP - Family Practice Nurse Practitioner            Follow-ups after your visit        Additional Services     GASTROENTEROLOGY ADULT REF PROCEDURE ONLY           NUTRITION REFERRAL       Your provider has referred you to: [facility]: [facility] [phone]    [email or web address]    Please be aware that coverage of these services is subject to the terms and limitations of your health insurance plan.  Call member services at your health plan with any benefit or coverage questions.      Please bring the following with you to your appointment:    (1) This referral request  (2) Any documents given to you regarding this referral  (3) Any specific questions you have about diet and/or food choices                  Your next 10 appointments already scheduled     Jul 17, 2017  7:45 AM CDT   Return Visit with Edward Aguero MD, MG ENDO NURSE   [facility] ([facility])    [id number] [place] [id number] [phone]              Future tests that were ordered for you today     Open Future Orders        Priority Expected Expires Ordered    Thyroid peroxidase antibody Routine  12/23/2017 6/23/2017    TSH Routine  12/23/2017 6/23/2017    T4 free Routine  12/23/2017 6/23/2017    MA Screening Digital Bilateral Routine  6/23/2018 6/23/2017    **Lipid panel reflex to direct LDL FUTURE anytime Routine 6/23/2017 6/23/2018 6/23/2017    **Albumin Random Urine Quant FUTURE anytime Routine 6/23/2017 6/23/2018 6/23/2017            Who to contact     If you have questions or need follow up information about today's clinic visit or your schedule please contact [facility] directly at [phone].  Normal or non-critical lab and imaging results will be communicated to you by MyChart, letter or phone within 4 business days after the clinic has received the results. If you do not hear from us within 7 days, please contact the clinic through MyChart or phone. If you have a critical or abnormal lab result, we will notify you by phone as soon as possible.  Submit refill requests through [employer] or call your pharmacy and they will forward the refill request to us. Please allow 3 business days for  your refill to be completed.          Additional Information About Your Visit        [employer] Information     afterBOT gives you secure access to your electronic health record. If you see a primary care provider, you can also send messages to your care team and make appointments. If you have questions, please call your primary care clinic.  If you do not have a primary care provider, please call [phone] and they will assist you.      afterBOT is an electronic gateway that provides easy, online access to your medical records. With afterBOT, you can request a clinic appointment, read your test results, renew a prescription or communicate with your care team.     To access your existing account, please contact your [facility] or call [phone] for assistance.        Care EveryWhere ID     This is your Care EveryWhere ID. This could be used by other organizations to access your [place] medical records  [id number]        Your Vitals Were     Pulse Temperature Pulse Oximetry Breastfeeding? BMI (Body Mass Index)       67 97.7  F (36.5  C) (Temporal) 99% No 22.42 kg/m2        Blood Pressure from Last 3 Encounters:   06/23/17 115/60   03/13/17 98/60   11/11/16 115/70    Weight from Last 3 Encounters:   06/23/17 134 lb 11.2 oz (61.1 kg)   03/13/17 134 lb (60.8 kg)   11/11/16 130 lb 8 oz (59.2 kg)              We Performed the Following     FOOT EXAM     GASTROENTEROLOGY ADULT REF PROCEDURE ONLY     NUTRITION REFERRAL          Today's Medication Changes          These changes are accurate as of: 6/23/17  2:58 PM.  If you have any questions, ask your nurse or doctor.               These medicines have changed or have updated prescriptions.        Dose/Directions    * insulin aspart 100 UNIT/ML injection   Commonly known as:  NovoLOG FLEXPEN   This may have changed:  additional instructions   Used for:  Type 2 diabetes mellitus with hyperglycemia (H)   Changed by:  Catrachita Chavez,  RPH        Inject 1 unit per 15 grams of carbohydrate (3-4 units per meal) or as directed   Quantity:  1 Month   Refills:  1       * insulin aspart 100 UNIT/ML injection   Commonly known as:  NOVOPEN ECHO   This may have changed:  Another medication with the same name was changed. Make sure you understand how and when to take each.   Used for:  Type 1 diabetes mellitus without complication (H)        Dose:  3 Units   Inject 3 Units Subcutaneous 3 times daily (with meals)   Quantity:  15 mL   Refills:  3       * Notice:  This list has 2 medication(s) that are the same as other medications prescribed for you. Read the directions carefully, and ask your doctor or other care provider to review them with you.             Primary Care Provider Office Phone # Fax #    Pricila Naranjo Austin, APRN [facility] [phone]       [facility] [address]  [place] [address]        Equal Access to Services     ALTAF EDUARDO : Hadii dio montgomery hadasho Soomaali, waaxda luqadaha, qaybta kaalmada adeegyada, becka crane hayneena brewer . So [facility] [phone].    ATENCIÓN: Si habla español, tiene a tejada disposición servicios gratuitos de asistencia lingüística. Llame al [phone].    We comply with applicable federal civil rights laws and [place] laws. We do not discriminate on the basis of race, color, national origin, age, disability sex, sexual orientation or gender identity.            Thank you!     Thank you for choosing [facility]  for your care. Our goal is always to provide you with excellent care. Hearing back from our patients is one way we can continue to improve our services. Please take a few minutes to complete the written survey that you may receive in the mail after your visit with us. Thank you!             Your Updated Medication List - Protect others around you: Learn how to safely use, store and throw away your medicines at www.disposemymeds.org.           This list is accurate as of: 6/23/17  2:58 PM.  Always use your most recent med list.                   Brand Name Dispense Instructions for use Diagnosis    ACCU-CHEK MICKY PLUS test strip   Generic drug:  blood glucose monitoring     400 each    TEST BLOOD SUGARS FOUR TIMES DAILY OR AS DIRECTED    Type 1 diabetes mellitus without complication (H)       alendronate 70 MG tablet    FOSAMAX    12 tablet    Take 1 tablet (70 mg) by mouth every 7 days Take with over 8 ounces water and stay upright for at least 30 minutes after dose.  Take at least 60 minutes before breakfast    Age-related osteoporosis without current pathological fracture       atorvastatin 10 MG tablet    LIPITOR    90 tablet    TAKE 1 TABLET(10 MG) BY MOUTH DAILY    Hyperlipidemia with target LDL less than 100       blood glucose monitoring lancets     3 Box    Use to test blood sugar 4 times daily or as directed.    Type 2 diabetes mellitus with hyperglycemia (H)       calcium carbonate-vitamin D 500-400 MG-UNIT Tabs per tablet     180 tablet    Take 1 tablet by mouth 2 times daily    Age-related osteoporosis without current pathological fracture       estradiol 0.1 MG/GM cream    ESTRACE VAGINAL    42.5 g    Place 2 g vaginally twice a week Apply dime-sized amount daily for the first 2 weeks, then place twice a week thereafter.    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)       insulin pen needle 31G X 8 MM    B-D U/F    100 each     FOR USE DAILY WITH NOVOLOG AND LANTUS  FOUR TIMES DAILY or AS DIRECTED    Type I diabetes mellitus, well controlled (H)       KETO-DIASTIX Strp     1 each    1 strip by In Vitro route as needed    Type 2 diabetes mellitus with hyperglycemia (H)       mirabegron 50 MG 24 hr tablet    MYRBETRIQ    90 tablet    Take 1 tablet (50 mg) by mouth daily    Urinary frequency, Urgency of urination, Urge incontinence       solifenacin 10 MG tablet    VESICARE    90 tablet    Take 1 tablet (10 mg) by mouth daily    Urge incontinence, Urgency of urination, Urinary frequency       * Notice:  This list has 2 medication(s) that are the same as other medications prescribed for you. Read the directions carefully, and ask your doctor or other care provider to review them with you.

## 2017-06-23 NOTE — NURSING NOTE
"Chief Complaint   Patient presents with     Physical     AFE       Initial /60 (BP Location: Right arm, Patient Position: Sitting, Cuff Size: Adult Regular)  Pulse 67  Temp 97.7  F (36.5  C) (Temporal)  Wt 134 lb 11.2 oz (61.1 kg)  SpO2 99%  Breastfeeding? No  BMI 22.42 kg/m2 Estimated body mass index is 22.42 kg/(m^2) as calculated from the following:    Height as of 3/13/17: 5' 5\" (1.651 m).    Weight as of this encounter: 134 lb 11.2 oz (61.1 kg).  Medication Reconciliation: complete      PENELOPE Lopez      "

## 2017-06-23 NOTE — PATIENT INSTRUCTIONS
PLAN:   1.   Symptomatic therapy suggested: Continue current medications.    2.  Orders Placed This Encounter   Procedures     FOOT EXAM     MA Screening Digital Bilateral     **Lipid panel reflex to direct LDL FUTURE anytime     **Albumin Random Urine Quant FUTURE anytime     Thyroid peroxidase antibody     TSH     T4 free     GASTROENTEROLOGY ADULT REF PROCEDURE ONLY     NUTRITION REFERRAL       3. Patient needs to follow up in if no improvement,or sooner if worsening of symptoms or other symptoms develop.  FUTURE LABS:       - Schedule a fasting blood draw   Will follow up and/or notify patient of  results via My Chart to determine further need for followup  Follow up office visit in one year for annual health maintenance exam, sooner PRN.    Preventive Health Recommendations  Female Ages 65 +    Yearly exam:     See your health care provider every year in order to  o Review health changes.   o Discuss preventive care.    o Review your medicines if your doctor has prescribed any.      You no longer need a yearly Pap test unless you've had an abnormal Pap test in the past 10 years. If you have vaginal symptoms, such as bleeding or discharge, be sure to talk with your provider about a Pap test.      Every 1 to 2 years, have a mammogram.  If you are over 69, talk with your health care provider about whether or not you want to continue having screening mammograms.      Every 10 years, have a colonoscopy. Or, have a yearly FIT test (stool test). These exams will check for colon cancer.       Have a cholesterol test every 5 years, or more often if your doctor advises it.       Have a diabetes test (fasting glucose) every three years. If you are at risk for diabetes, you should have this test more often.       At age 65, have a bone density scan (DEXA) to check for osteoporosis (brittle bone disease).    Shots:    Get a flu shot each year.    Get a tetanus shot every 10 years.    Talk to your doctor about your pneumonia  vaccines. There are now two you should receive - Pneumovax (PPSV 23) and Prevnar (PCV 13).    Talk to your doctor about the shingles vaccine.    Talk to your doctor about the hepatitis B vaccine.    Nutrition:     Eat at least 5 servings of fruits and vegetables each day.      Eat whole-grain bread, whole-wheat pasta and brown rice instead of white grains and rice.      Talk to your provider about Calcium and Vitamin D.     Lifestyle    Exercise at least 150 minutes a week (30 minutes a day, 5 days a week). This will help you control your weight and prevent disease.      Limit alcohol to one drink per day.      No smoking.       Wear sunscreen to prevent skin cancer.       See your dentist twice a year for an exam and cleaning.      See your eye doctor every 1 to 2 years to screen for conditions such as glaucoma, macular degeneration, cataracts, etc   It was a pleasure seeing you today at the [facility]. Thank you for allowing us to care for you today. We truly hope we provided you with the excellent service you deserve. Please let us know if there is anything else we can do for you so we can be sure you are leaving completley satisfied with your care experience.       General information about your clinic   Clinic Hours Lab Hours (Appointments are required)   Mon-Thurs: 7:30 AM - 7 PM Mon-Thurs: 7:30 AM - 7 PM   Fri: 7:30 AM - 5 PM Fri: 7:30 AM - 5 PM        After Hours Nurse Advise & Appts:  [facility] Nurse Advisors: [phone]  [facility] On Call: to make appointments anytime: [phone] On Call Physician: call [phone] and answering service will page the on call physician.        For urgent appointments, please call [phone] and ask for the triage nurse or your care team clinic nurse.  How to contact my care team:  MyChart: [email or web address]   Phone: [phone]   Fax: [phone]       [facility]:   Phone: [phone]  Hours: 8:00 AM - 6:00 PM   Medication Refills:  Call your pharmacy and they will forward the refill to us. Please allow 3 business days for your refills to be completed.       Normal or non-critical lab and imaging results will be communicated to you by MyChart, letter or phone within 7 days.  If you do not hear from us within 10 days, please call the clinic. If you have a critical or abnormal lab result, we will notify you by phone as soon as possible.       We now have PWIC (Pediatric Walk in Care)  Monday-Friday from 7:30-4. Simply walk in and be seen for your urgent needs like cough, fever, rash, diarrhea or vomiting, pink eye, UTI. No appointments needed. Ask one of the team for more information      -Your Care Team:    Dr. Matthew Hernandez - Internal Medicine  Dr. Farzana Benites - Internal Medicine/Pediatrics   Dr. Leonidas Hollingsworth - Family Medicine  Dr. Mona Myers - Pediatrics  Dr. Sushma Sewell - Pediatrics  Pricila Avila CNP - Family Practice Nurse Practitioner

## 2017-07-03 ENCOUNTER — RADIANT APPOINTMENT (OUTPATIENT)
Dept: MAMMOGRAPHY | Facility: CLINIC | Age: 67
End: 2017-07-03
Attending: NURSE PRACTITIONER
Payer: COMMERCIAL

## 2017-07-03 DIAGNOSIS — L65.9 HAIR LOSS: ICD-10-CM

## 2017-07-03 DIAGNOSIS — E10.9 TYPE 1 DIABETES MELLITUS WITHOUT COMPLICATION (H): ICD-10-CM

## 2017-07-03 DIAGNOSIS — E78.5 HYPERLIPIDEMIA WITH TARGET LDL LESS THAN 100: ICD-10-CM

## 2017-07-03 DIAGNOSIS — Z12.31 ENCOUNTER FOR SCREENING MAMMOGRAM FOR BREAST CANCER: ICD-10-CM

## 2017-07-03 DIAGNOSIS — Z00.00 ENCOUNTER FOR ROUTINE ADULT HEALTH EXAMINATION WITHOUT ABNORMAL FINDINGS: ICD-10-CM

## 2017-07-03 DIAGNOSIS — Z13.29 SCREENING FOR THYROID DISORDER: ICD-10-CM

## 2017-07-03 LAB
CHOLEST SERPL-MCNC: 158 MG/DL
CREAT UR-MCNC: 68 MG/DL
HDLC SERPL-MCNC: 72 MG/DL
LDLC SERPL CALC-MCNC: 81 MG/DL
MICROALBUMIN UR-MCNC: 6 MG/L
MICROALBUMIN/CREAT UR: 9.43 MG/G CR (ref 0–25)
NONHDLC SERPL-MCNC: 86 MG/DL
T4 FREE SERPL-MCNC: 1.33 NG/DL (ref 0.76–1.46)
TRIGL SERPL-MCNC: 26 MG/DL
TSH SERPL DL<=0.05 MIU/L-ACNC: 1.77 MU/L (ref 0.4–4)

## 2017-07-03 PROCEDURE — 84439 ASSAY OF FREE THYROXINE: CPT | Performed by: NURSE PRACTITIONER

## 2017-07-03 PROCEDURE — 82043 UR ALBUMIN QUANTITATIVE: CPT | Performed by: NURSE PRACTITIONER

## 2017-07-03 PROCEDURE — 36415 COLL VENOUS BLD VENIPUNCTURE: CPT | Performed by: NURSE PRACTITIONER

## 2017-07-03 PROCEDURE — G0202 SCR MAMMO BI INCL CAD: HCPCS | Performed by: STUDENT IN AN ORGANIZED HEALTH CARE EDUCATION/TRAINING PROGRAM

## 2017-07-03 PROCEDURE — 80061 LIPID PANEL: CPT | Performed by: NURSE PRACTITIONER

## 2017-07-03 PROCEDURE — 84443 ASSAY THYROID STIM HORMONE: CPT | Performed by: NURSE PRACTITIONER

## 2017-07-03 PROCEDURE — 86376 MICROSOMAL ANTIBODY EACH: CPT | Performed by: NURSE PRACTITIONER

## 2017-07-05 LAB — THYROPEROXIDASE AB SERPL-ACNC: <10 IU/ML

## 2017-07-17 ENCOUNTER — OFFICE VISIT (OUTPATIENT)
Dept: ENDOCRINOLOGY | Facility: CLINIC | Age: 67
End: 2017-07-17
Payer: COMMERCIAL

## 2017-07-17 VITALS
DIASTOLIC BLOOD PRESSURE: 68 MMHG | HEIGHT: 65 IN | HEART RATE: 70 BPM | BODY MASS INDEX: 22.96 KG/M2 | WEIGHT: 137.79 LBS | SYSTOLIC BLOOD PRESSURE: 108 MMHG

## 2017-07-17 DIAGNOSIS — E10.9 TYPE 1 DIABETES MELLITUS WITHOUT COMPLICATION (H): ICD-10-CM

## 2017-07-17 DIAGNOSIS — R53.83 OTHER FATIGUE: Primary | ICD-10-CM

## 2017-07-17 LAB
CORTIS SERPL-MCNC: 13 UG/DL (ref 4–22)
HBA1C MFR BLD: 6.6 % (ref 0–5.7)

## 2017-07-17 PROCEDURE — 99213 OFFICE O/P EST LOW 20 MIN: CPT | Performed by: INTERNAL MEDICINE

## 2017-07-17 PROCEDURE — 83036 HEMOGLOBIN GLYCOSYLATED A1C: CPT | Performed by: INTERNAL MEDICINE

## 2017-07-17 PROCEDURE — 82533 TOTAL CORTISOL: CPT | Performed by: INTERNAL MEDICINE

## 2017-07-17 PROCEDURE — 36415 COLL VENOUS BLD VENIPUNCTURE: CPT | Performed by: INTERNAL MEDICINE

## 2017-07-17 NOTE — LETTER
7/17/2017      RE: Latoya Rodríguez  [address]  [facility] [address]     Dear Colleague,    Thank you for referring your patient, Latoya Rodríguez, to the [facility]. Please see a copy of my visit note below.         Endocrinology Note         Latoya is a 66 year old female presents today for type 1 diabetes.    HPI  Ms. Rodríguez is a 66 years old woman with hx of type 1 diabetes in June 2016. She is here for follow up. Last seen 3/2017.    She was diagnosed with type 1 diabetes when she continued losing weight unintentionally. She was found to have an A1c >14. She was initially diagnosed with type 2 diabetes and was put on insulin and Metformin.  She was later found to be TORRES antibody positive and c peptide negative.  She has since been working closely with a diabetes educator and has learned carb counting, and has generally adapted well to the diagnosis.      Interim history:  A1C is 6.6% today which is up slightly. I reduced Tresiba U-200 at 12 units at 10 pm, Novolog 1/25 gram CHO. I reviewed her glucose meter, she has been testing 4 times a day with an overall average of 131 (SD 67) mg/dL over the couple of months. Her lowest blood sugar was in 60s usually in the morning. However, she did have less frequent hypoglycemia but still have unawareness.     Her main concern is weight gain. She stated that she gained about 15-20 pounds after the diagnosis. Her highest weight was 180s about 2 years ago. She was intentionally losing weight with diet and exercise but regained after the diagnosis of type 1 diabetes. She would like her weight to be around 125 pounds. She feels hungry all the time.     TFT on 7/10/2017 showed TSH 1.77, FT4 1.33 with negative TPO ab.    Past Medical History  Past Medical History:   Diagnosis Date     Cataract      Type 2 diabetes mellitus with hyperglycemia (H) 6/24/2016   Type 1 diabetes, dx 6/2016  Hyperlipidemia  Osteoporosis     Allergies  No Known  Allergies     Medications  Current Outpatient Prescriptions   Medication Sig Dispense Refill     mirabegron (MYRBETRIQ) 50 MG 24 hr tablet Take 1 tablet (50 mg) by mouth daily 90 tablet 1     solifenacin (VESICARE) 10 MG tablet Take 1 tablet (10 mg) by mouth daily 90 tablet 1     estradiol (ESTRACE VAGINAL) 0.1 MG/GM cream Place 2 g vaginally twice a week Apply dime-sized amount daily for the first 2 weeks, then place twice a week thereafter. 42.5 g 3     insulin degludec (TRESIBA) 200 UNIT/ML pen Inject 12 Units Subcutaneous daily 18 mL 3     atorvastatin (LIPITOR) 10 MG tablet TAKE 1 TABLET(10 MG) BY MOUTH DAILY 90 tablet 3     insulin aspart (NOVOPEN ECHO) 100 UNIT/ML Cartridge Inject 3 Units Subcutaneous 3 times daily (with meals) 15 mL 3     insulin pen needle (B-D U/F) 31G X 8 MM FOR USE DAILY WITH NOVOLOG AND LANTUS  FOUR TIMES DAILY or AS DIRECTED 100 each 11     ACCU-CHEK MICKY PLUS test strip TEST BLOOD SUGARS FOUR TIMES DAILY OR AS DIRECTED 400 each 1     alendronate (FOSAMAX) 70 MG tablet Take 1 tablet (70 mg) by mouth every 7 days Take with over 8 ounces water and stay upright for at least 30 minutes after dose.  Take at least 60 minutes before breakfast 12 tablet 3     calcium carbonate-vitamin D 500-400 MG-UNIT TABS tablt Take 1 tablet by mouth 2 times daily 180 tablet 3     Urine Glucose-Ketones Test (KETO-DIASTIX) STRP 1 strip by In Vitro route as needed 1 each 11     insulin aspart (NOVOLOG FLEXPEN) 100 UNIT/ML soln Inject 1 unit per 15 grams of carbohydrate (3-4 units per meal) or as directed (Patient taking differently: Inject 1 unit per 25 grams of carbohydrate (3-4 units per meal) or as directed) 1 Month 1     blood glucose monitoring (ACCU-CHEK FASTCLIX) lancets Use to test blood sugar 4 times daily or as directed. 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    ROS  Constitutional: low energy, +fatigue,  weight is up to 138 lbs. She would like her weight to be around 125 lbs.  Eyes: no vision change, diplopia or red eyes   Neck: no difficulty swallowing, no choking, no neck pain, no neck swelling  Cardiovascular: no chest pain, palpitations  Respiratory: no dyspnea, cough, shortness of breath or wheezing   GI: no nausea, vomiting, diarrhea or constipation, no abdominal pain   : no change in urine, no dysuria or hematuria  Musculoskeletal: no joint or muscle pain or swelling   Integumentary: +hair loss  Neuro: no loss of strength or sensation, no numbness or tingling, no tremor, no dizziness, no headache   Endo: no polyuria or polydipsia, no temperature intolerance   Heme/Lymph: no concerning bumps, no bleeding problems   Allergy: no environmental allergies   Psych: frustrate about weight, no sleep problems.    Physical Exam  /68  Pulse 70  Ht 1.651 m (5' 5\")  Wt 62.5 kg (137 lb 12.6 oz)  BMI 22.93 kg/m2  Body mass index is 22.93 kg/(m^2).  Constitutional: no distress, comfortable, pleasant   Eyes: anicteric  Musculoskeletal: no edema   Skin:  no jaundice   Neurological: normal gait, no tremor on outstretched hands bilaterally  Psychological: appropriate mood     RESULTS  Lab Results   Component Value Date    A1C 6.6 07/17/2017    A1C 6.0 03/13/2017    A1C 7.1 09/16/2016    A1C 14.6 06/24/2016     ENDO DIABETES Latest Ref Rng 9/16/2016   GLUCOSE 70 - 99 mg/dL 63 (L)   CHOLESTEROL <200 mg/dL 174   LDL CHOLESTEROL, CALCULATED <100 mg/dL 103 (H)   HDL CHOLESTEROL >49 mg/dL 62   NON HDL CHOLESTEROL <130 mg/dL 112   TRIGLYCERIDES <150 mg/dL 43   CREATININE 0.52 - 1.04 mg/dL 0.65   POTASSIUM 3.4 - 5.3 mmol/L 3.7   ALT 0 - 50 U/L 26   AST 0 - 45 U/L 21       ASSESSMENT:    Ms. Rodríguez is a 66 years old woman with hx of type 1 " diabetes in June 2016. She is here for follow up.    1. Type 1 diabetes: diagnosed in June 2016. She is adapting quite well.  A1c is 6.6% today. She has had less hypoglycemia but still has hypoglycemia unawareness.   I will continue Tresiba U-200 12 units daily  I will continue Novolog 1 unit per 25 gram.   Test 4 times daily and add correction if needed.  Do NOT correct between meals.     2) extreme hunger and fatigue: unclear etiology. Less likely adrenal insufficiency but will check random cortisol this AM    3) hair loss: now stable. Multiple TFTs are normal.    PLAN:   - I will continue Tresiba U-200 12 units daily  - continue Novolog 1 unit per 25 gram of CHO for all meal   - check random AM cortisol  - RTC 4 months     Ref. Range 7/17/2017 08:39   Cortisol Serum Latest Ref Range: 4 - 22 ug/dL 13.0     Normal AM cortisol. [facility] message sent    Edward Aguero MD     Division of Diabetes and Endocrinology  Department of Medicine  [phone]    Again, thank you for allowing me to participate in the care of your patient.      Sincerely,    Edwadr Aguero MD

## 2017-07-17 NOTE — MR AVS SNAPSHOT
After Visit Summary   7/17/2017    Latoya Rodríguez    MRN: [id number]           Patient Information     Date Of Birth          1950        Visit Information        Provider Department      7/17/2017 7:45 AM Edward Aguero MD; MG ANN GARC[facility]        Today's Diagnoses     Other fatigue    -  1    Type 1 diabetes mellitus without complication (H)          Care Instructions      Sending blood sugars to your provider at [place]:  We want to help you with your diabetes management, which often requires frequent adjustments to your therapy. For your convenience, we have several ways to send your blood sugars to your doctor for review.    - Send message directly to your doctor through My Chart.  Please ask the rooming staff if you would like to sign up for My Chart.  This is a fast and confidential way to send your information and communicate directly with your provider.   - Record readings and fax to [phone].  We have a template for you to use for your convenience.  - If you have a Medtronic pump, upload to [email or web address] and notify your provider of your username and password.   - Stop by the clinic with your meter for download.   - My Chart or call Linda Diego, Diabetes Educator at [phone]  - Call the clinic and speak to one of the endocrine nurses to relay information on the telephone.  Tasha Mckenna or Lorrie at [phone].   -    Please call the on-call Endocrinologist at the [place] for after       hours/weekend needs at [phone] Option #4.    Please note that you do not need to FAST if you are just having an A1C drawn. Please remember to ALWAYS bring your glucose meter with to your appointment. This data is very important for the management of your care.    Thank you!  Your [place] Diabetes Care Team                Follow-ups after your visit        Your next 10 appointments already scheduled     Aug 18, 2017  1:00 PM CDT   [facility] Visit with   [facility] Provider   [facility]    [address] [place] [id number] [phone]            Aug 18, 2017  2:00 PM CDT   New Visit with Shannan Watters   [facility]    [address] [place] [id number] [phone]            Nov 27, 2017  7:45 AM CST   Return Visit with Edward Aguero MD, MG ENDO NURSE   [facility]    [address] [place] [address] [id number] [phone]              Future tests that were ordered for you today     Open Future Orders        Priority Expected Expires Ordered    Cortisol Routine 7/17/2017 7/17/2018 7/17/2017            Who to contact     If you have questions or need follow up information about today's clinic visit or your schedule please contact [facility] directly at [phone].  Normal or non-critical lab and imaging results will be communicated to you by [employer] [facility], letter or phone within 4 business days after the clinic has received the results. If you do not hear from us within 7 days, please contact the clinic through [employer] [facility] or phone. If you have a critical or abnormal lab result, we will notify you by phone as soon as possible.  Submit refill requests through [employer] or call your pharmacy and they will forward the refill request to us. Please allow 3 business days for your refill to be completed.          Additional Information About Your Visit        [employer] [facility] Information     [employer] gives you secure access to your electronic health record. If you see a primary care provider, you can also send messages to your care team and make appointments. If you have questions, please call your primary care clinic.  If you do not have a primary care provider, please call [phone] and they will assist you.      [employer] is an electronic gateway that provides easy, online access to your  "medical records. With [facility], you can request a clinic appointment, read your test results, renew a prescription or communicate with your care team.     To access your existing account, please contact your [facility] or call [phone] for assistance.        Care EveryWhere ID     This is your Care EveryWhere ID. This could be used by other organizations to access your [facility] medical records  [id number]        Your Vitals Were     Pulse Height BMI (Body Mass Index)             70 1.651 m (5' 5\") 22.93 kg/m2          Blood Pressure from Last 3 Encounters:   07/17/17 108/68   06/23/17 115/60   03/13/17 98/60    Weight from Last 3 Encounters:   07/17/17 62.5 kg (137 lb 12.6 oz)   06/23/17 61.1 kg (134 lb 11.2 oz)   03/13/17 60.8 kg (134 lb)              We Performed the Following     Hemoglobin A1c POCT          Today's Medication Changes          These changes are accurate as of: 7/17/17  8:35 AM.  If you have any questions, ask your nurse or doctor.               These medicines have changed or have updated prescriptions.        Dose/Directions    * insulin aspart 100 UNIT/ML injection   Commonly known as:  NovoLOG FLEXPEN   This may have changed:  additional instructions   Used for:  Type 2 diabetes mellitus with hyperglycemia (H)   Changed by:  Catr[facility]        Inject 1 unit per 15 grams of carbohydrate (3-4 units per meal) or as directed   Quantity:  1 Month   Refills:  1       * insulin aspart 100 UNIT/ML injection   Commonly known as:  NOVOPEN ECHO   This may have changed:  Another medication with the same name was changed. Make sure you understand how and when to take each.   Used for:  Type 1 diabetes mellitus without complication (H)        Dose:  3 Units   Inject 3 Units Subcutaneous 3 times daily (with meals)   Quantity:  15 mL   Refills:  3       * Notice:  This list has 2 medication(s) that are the same as other medications prescribed for you. Read " the directions carefully, and ask your doctor or other care provider to review them with you.             Primary Care Provider Office Phone # Fax #    URVASHI [facility] [phone]       [facility] [address]  [place] [address]        Equal Access to Services     ALTAF EDUARDO : Hadii aad ku hadasho Soomaali, waaxda luqadaha, qaybta kaalmada adeegyada, waxay idiin hayaan adediego khedenmorales laeddy . So [facility] [phone].    ATENCIÓN: Si habla español, tiene a tejada disposición servicios gratuitos de asistencia lingüística. Cesar al [phone].    We comply with applicable federal civil rights laws and [place] laws. We do not discriminate on the basis of race, color, national origin, age, disability sex, sexual orientation or gender identity.            Thank you!     Thank you for choosing [facility]  for your care. Our goal is always to provide you with excellent care. Hearing back from our patients is one way we can continue to improve our services. Please take a few minutes to complete the written survey that you may receive in the mail after your visit with us. Thank you!             Your Updated Medication List - Protect others around you: Learn how to safely use, store and throw away your medicines at www.disposemymeds.org.          This list is accurate as of: 7/17/17  8:35 AM.  Always use your most recent med list.                   Brand Name Dispense Instructions for use Diagnosis    ACCU-CHEK MICKY PLUS test strip   Generic drug:  blood glucose monitoring     400 each    TEST BLOOD SUGARS FOUR TIMES DAILY OR AS DIRECTED    Type 1 diabetes mellitus without complication (H)       alendronate 70 MG tablet    FOSAMAX    12 tablet    Take 1 tablet (70 mg) by mouth every 7 days Take with over 8 ounces water and stay upright for at least 30 minutes after dose.  Take at least 60 minutes before breakfast    Age-related osteoporosis without current  pathological fracture       atorvastatin 10 MG tablet    LIPITOR    90 tablet    TAKE 1 TABLET(10 MG) BY MOUTH DAILY    Hyperlipidemia with target LDL less than 100       blood glucose monitoring lancets     3 Box    Use to test blood sugar 4 times daily or as directed.    Type 2 diabetes mellitus with hyperglycemia (H)       calcium carbonate-vitamin D 500-400 MG-UNIT Tabs per tablet     180 tablet    Take 1 tablet by mouth 2 times daily    Age-related osteoporosis without current pathological fracture       estradiol 0.1 MG/GM cream    ESTRACE VAGINAL    42.5 g    Place 2 g vaginally twice a week Apply dime-sized amount daily for the first 2 weeks, then place twice a week thereafter.    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)       insulin pen needle 31G X 8 MM    B-D U/F    100 each    FOR USE DAILY WITH NOVOLOG AND LANTUS  FOUR TIMES DAILY or AS DIRECTED    Type I diabetes mellitus, well controlled (H)       KETO-DIASTIX Strp     1 each    1 strip by In Vitro route as needed    Type 2 diabetes mellitus with hyperglycemia (H)       mirabegron 50 MG 24 hr tablet    MYRBETRIQ    90 tablet    Take 1 tablet (50 mg) by mouth daily    Urinary frequency, Urgency of urination, Urge incontinence       solifenacin 10 MG tablet    VESICARE    90 tablet    Take 1 tablet (10 mg) by mouth daily    Urge incontinence, Urgency of urination, Urinary frequency       * Notice:  This list has 2 medication(s) that are  the same as other medications prescribed for you. Read the directions carefully, and ask your doctor or other care provider to review them with you.

## 2017-07-17 NOTE — NURSING NOTE
"Latoya Rodríguez's goals for this visit include: Follow up Diabetes  She requests these members of her care team be copied on today's visit information: YES    PCP: Pricila Avila    Referring Provider:  No referring provider defined for this encounter.    Chief Complaint   Patient presents with     Diabetes       Initial Ht 1.651 m (5' 5\")  Wt 62.5 kg (137 lb 12.6 oz)  BMI 22.93 kg/m2 Estimated body mass index is 22.93 kg/(m^2) as calculated from the following:    Height as of this encounter: 1.651 m (5' 5\").    Weight as of this encounter: 62.5 kg (137 lb 12.6 oz).  Medication Reconciliation: complete    Do you need any medication refills at today's visit? NO    "

## 2017-07-17 NOTE — PATIENT INSTRUCTIONS
Sending blood sugars to your provider at [place]:  We want to help you with your diabetes management, which often requires frequent adjustments to your therapy. For your convenience, we have several ways to send your blood sugars to your doctor for review.    - Send message directly to your doctor through My Chart.  Please ask the rooming staff if you would like to sign up for My Chart.  This is a fast and confidential way to send your information and communicate directly with your provider.   - Record readings and fax to [phone].  We have a template for you to use for your convenience.  - If you have a Medtronic pump, upload to [email or web address] and notify your provider of your username and password.   - Stop by the clinic with your meter for download.   - My Chart or call Linda Diego, Diabetes Educator at [phone]  - Call the clinic and speak to one of the endocrine nurses to relay information on the telephone.  Tasha Mckenna, or Lorrie at [phone].   -    Please call the on-call Endocrinologist at the [facility] for after       hours/weekend needs at [phone] Option #4.    Please note that you do not need to FAST if you are just having an A1C drawn. Please remember to ALWAYS bring your glucose meter with to your appointment. This data is very important for the management of your care.    Thank you!  Your [place] Diabetes Care Team

## 2017-07-17 NOTE — PROGRESS NOTES
Endocrinology Note         Latoya is a 66 year old female presents today for type 1 diabetes.    HPI  Ms. Rodríguez is a 66 years old woman with hx of type 1 diabetes in June 2016. She is here for follow up. Last seen 3/2017.    She was diagnosed with type 1 diabetes when she continued losing weight unintentionally. She was found to have an A1c >14. She was initially diagnosed with type 2 diabetes and was put on insulin and Metformin.  She was later found to be TORRES antibody positive and c peptide negative.  She has since been working closely with a diabetes educator and has learned carb counting, and has generally adapted well to the diagnosis.      Interim history:  A1C is 6.6% today which is up slightly. I reduced Tresiba U-200 at 12 units at 10 pm, Novolog 1/25 gram CHO. I reviewed her glucose meter, she has been testing 4 times a day with an overall average of 131 (SD 67) mg/dL over the couple of months. Her lowest blood sugar was in 60s usually in the morning. However, she did have less frequent hypoglycemia but still have unawareness.     Her main concern is weight gain. She stated that she gained about 15-20 pounds after the diagnosis. Her highest weight was 180s about 2 years ago. She was intentionally losing weight with diet and exercise but regained after the diagnosis of type 1 diabetes. She would like her weight to be around 125 pounds. She feels hungry all the time.     TFT on 7/10/2017 showed TSH 1.77, FT4 1.33 with negative TPO ab.    Past Medical History  Past Medical History:   Diagnosis Date     Cataract      Type 2 diabetes mellitus with hyperglycemia (H) 6/24/2016   Type 1 diabetes, dx 6/2016  Hyperlipidemia  Osteoporosis     Allergies  No Known Allergies     Medications  Current Outpatient Prescriptions   Medication Sig Dispense Refill     mirabegron (MYRBETRIQ) 50 MG 24 hr tablet Take 1 tablet (50 mg) by mouth daily 90 tablet 1     solifenacin (VESICARE) 10 MG tablet Take 1 tablet (10 mg) by  mouth daily 90 tablet 1     estradiol (ESTRACE VAGINAL) 0.1 MG/GM cream Place 2 g vaginally twice a week Apply dime-sized amount daily for the first 2 weeks, then place twice a week thereafter. 42.5 g 3     insulin degludec (TRESIBA) 200 UNIT/ML pen Inject 12 Units Subcutaneous daily 18 mL 3     atorvastatin (LIPITOR) 10 MG tablet TAKE 1 TABLET(10 MG) BY MOUTH DAILY 90 tablet 3     insulin aspart (NOVOPEN ECHO) 100 UNIT/ML Cartridge Inject 3 Units Subcutaneous 3 times daily (with meals) 15 mL 3     insulin pen needle (B-D U/F) 31G X 8 MM FOR USE DAILY WITH NOVOLOG AND LANTUS  FOUR TIMES DAILY or AS DIRECTED 100 each 11     ACCU-CHEK MICKY PLUS test strip TEST BLOOD SUGARS FOUR TIMES DAILY OR AS DIRECTED 400 each 1     alendronate (FOSAMAX) 70 MG tablet Take 1 tablet (70 mg) by mouth every 7 days Take with over 8 ounces water and stay upright for at least 30 minutes after dose.  Take at least 60 minutes before breakfast 12 tablet 3     calcium carbonate-vitamin D 500-400 MG-UNIT TABS tablt Take 1 tablet by mouth 2 times daily 180 tablet 3     Urine Glucose-Ketones Test (KETO-DIASTIX) STRP 1 strip by In Vitro route as needed 1 each 11     insulin aspart (NOVOLOG FLEXPEN) 100 UNIT/ML soln Inject 1 unit per 15 grams of carbohydrate (3-4 units per meal) or as directed (Patient taking differently: Inject 1 unit per 25 grams of carbohydrate (3-4 units per meal) or as directed) 1 Month 1     blood glucose monitoring (ACCU-CHEK FASTCLIX) lancets Use to test blood sugar 4 times daily or as directed. 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    ROS  Constitutional: low energy, +fatigue,  weight is up to 138 lbs. She would like her weight to be around 125  "lbs.  Eyes: no vision change, diplopia or red eyes   Neck: no difficulty swallowing, no choking, no neck pain, no neck swelling  Cardiovascular: no chest pain, palpitations  Respiratory: no dyspnea, cough, shortness of breath or wheezing   GI: no nausea, vomiting, diarrhea or constipation, no abdominal pain   : no change in urine, no dysuria or hematuria  Musculoskeletal: no joint or muscle pain or swelling   Integumentary: +hair loss  Neuro: no loss of strength or sensation, no numbness or tingling, no tremor, no dizziness, no headache   Endo: no polyuria or polydipsia, no temperature intolerance   Heme/Lymph: no concerning bumps, no bleeding problems   Allergy: no environmental allergies   Psych: frustrate about weight, no sleep problems.    Physical Exam  /68  Pulse 70  Ht 1.651 m (5' 5\")  Wt 62.5 kg (137 lb 12.6 oz)  BMI 22.93 kg/m2  Body mass index is 22.93 kg/(m^2).  Constitutional: no distress, comfortable, pleasant   Eyes: anicteric  Musculoskeletal: no edema   Skin:  no jaundice   Neurological: normal gait, no tremor on outstretched hands bilaterally  Psychological: appropriate mood     RESULTS  Lab Results   Component Value Date    A1C 6.6 07/17/2017    A1C 6.0 03/13/2017    A1C 7.1 09/16/2016    A1C 14.6 06/24/2016     ENDO DIABETES Latest Ref Rng 9/16/2016   GLUCOSE 70 - 99 mg/dL 63 (L)   CHOLESTEROL <200 mg/dL 174   LDL CHOLESTEROL, CALCULATED <100 mg/dL 103 (H)   HDL CHOLESTEROL >49 mg/dL 62   NON HDL CHOLESTEROL <130 mg/dL 112   TRIGLYCERIDES <150 mg/dL 43   CREATININE 0.52 - 1.04 mg/dL 0.65   POTASSIUM 3.4 - 5.3 mmol/L 3.7   ALT 0 - 50 U/L 26   AST 0 - 45 U/L 21       ASSESSMENT:    Ms. Rodríguez is a 66 years old woman with hx of type 1 diabetes in June 2016. She is here for follow up.    1. Type 1 diabetes: diagnosed in June 2016. She is adapting quite well.  A1c is 6.6% today. She has had less hypoglycemia but still has hypoglycemia unawareness.   I will continue Tresiba U-200 12 units " daily  I will continue Novolog 1 unit per 25 gram.   Test 4 times daily and add correction if needed.  Do NOT correct between meals.     2) extreme hunger and fatigue: unclear etiology. Less likely adrenal insufficiency but will check random cortisol this AM    3) hair loss: now stable. Multiple TFTs are normal.    PLAN:   - I will continue Tresiba U-200 12 units daily  - continue Novolog 1 unit per 25 gram of CHO for all meal   - check random AM cortisol  - RTC 4 months     Ref. Range 7/17/2017 08:39   Cortisol Serum Latest Ref Range: 4 - 22 ug/dL 13.0     Normal AM cortisol. gogamingo message sent    Edward Aguero MD     Division of Diabetes and Endocrinology  Department of Medicine  [phone]

## 2017-08-12 DIAGNOSIS — E10.9 TYPE I DIABETES MELLITUS, WELL CONTROLLED (H): ICD-10-CM

## 2017-08-14 RX ORDER — PEN NEEDLE, DIABETIC 31 GX5/16"
NEEDLE, DISPOSABLE MISCELLANEOUS
Qty: 100 EACH | Refills: 3 | Status: SHIPPED | OUTPATIENT
Start: 2017-08-14 | End: 2017-11-27

## 2017-08-21 DIAGNOSIS — E10.9 TYPE 1 DIABETES MELLITUS WITHOUT COMPLICATION (H): Primary | ICD-10-CM

## 2017-08-22 RX ORDER — LANCETS
EACH MISCELLANEOUS
Qty: 306 EACH | Refills: 0 | Status: SHIPPED | OUTPATIENT
Start: 2017-08-22 | End: 2017-10-29

## 2017-08-22 NOTE — TELEPHONE ENCOUNTER
Pending Prescriptions:                       Disp   Refills    blood glucose monitoring (ACCU-CHEK FASTC*306 ea*0            Sig: USE TO TEST BLOOD SUGARS FOUR TIMES DAILY OR AS           DIRECTED    Last Office Visit with [facility], [facility] or [facility] prescribing provider: 6/23/17  Future Office visit:       Routing refill request to provider for review/approval because:  Drug not active on patient's medication list

## 2017-08-23 DIAGNOSIS — M81.0 AGE-RELATED OSTEOPOROSIS WITHOUT CURRENT PATHOLOGICAL FRACTURE: ICD-10-CM

## 2017-08-24 RX ORDER — ALENDRONATE SODIUM 70 MG/1
TABLET ORAL
Qty: 12 TABLET | Refills: 0 | Status: SHIPPED | OUTPATIENT
Start: 2017-08-24 | End: 2017-12-02

## 2017-08-24 NOTE — TELEPHONE ENCOUNTER
alendronate (FOSAMAX) 70 MG tablet   Last Written Prescription Date: 9/13/2016  Last Fill Quantity: 12, # refills: 3  Last Office Visit with [facility] or [facility] prescribing provider: 6/23/2017       DEXA Scan:  Last order of DX HIP/PELVIS/SPINE was found on 6/24/2016 from [facility] Appointment on 6/24/2016     No order of DX HIP/PELVIS/SPINE W LAT FRACTION ANALYSIS is found.       Creatinine   Date Value Ref Range Status   09/16/2016 0.65 0.52 - 1.04 mg/dL Final     Refilled per [facility] protocol.    Annamaria Feldman RN

## 2017-09-14 DIAGNOSIS — E11.65 TYPE 2 DIABETES MELLITUS WITH HYPERGLYCEMIA (H): ICD-10-CM

## 2017-09-14 RX ORDER — BLOOD SUGAR DIAGNOSTIC
STRIP MISCELLANEOUS
Qty: 200 STRIP | Refills: 0 | Status: SHIPPED | OUTPATIENT
Start: 2017-09-14 | End: 2017-10-29

## 2017-09-14 NOTE — TELEPHONE ENCOUNTER
ACCU-CHEK MICKY PLUS test strip  Last Written Prescription Date: 9/27/2016  Last Fill Quantity: 400,  # refills: 1   Last Office Visit with [facility], P or [facility] prescribing provider: 6/23/2017                                         Next 5 appointments (look out 90 days)     Nov 27, 2017  7:45 AM CST   Return Visit with Edward Aguero MD, MG ENDO NURSE   [facility]    [address] [place] [id number] [phone]                 Refilled per [facility] protocol.    Annamaria Feldman RN

## 2017-10-27 ENCOUNTER — TELEPHONE (OUTPATIENT)
Dept: EDUCATION SERVICES | Facility: CLINIC | Age: 67
End: 2017-10-27

## 2017-10-27 ENCOUNTER — TELEPHONE (OUTPATIENT)
Dept: SURGERY | Facility: CLINIC | Age: 67
End: 2017-10-27

## 2017-10-27 ENCOUNTER — TELEPHONE (OUTPATIENT)
Dept: ENDOCRINOLOGY | Facility: CLINIC | Age: 67
End: 2017-10-27

## 2017-10-27 DIAGNOSIS — E10.9 TYPE 1 DIABETES MELLITUS WITHOUT COMPLICATION (H): Primary | ICD-10-CM

## 2017-10-27 RX ORDER — BLOOD SUGAR DIAGNOSTIC
STRIP MISCELLANEOUS
Qty: 100 EACH | Refills: 0 | Status: SHIPPED | OUTPATIENT
Start: 2017-10-27 | End: 2020-03-16

## 2017-10-27 NOTE — TELEPHONE ENCOUNTER
[facility] Call Center    Phone Message    Name of Caller: Latoya    Phone Number: Home number on file [phone] (home)    Best time to return call: Any    May a detailed message be left on voicemail: yes    Relation to patient: Self    Reason for Call: Latoya called and said she was having issues with her insulin pen.  She said the pen seems to be functioning ok now but would like to know what to do if it happens again and she can't reach anybody or get it functioning again.  Requesting a call to discuss.  Thank you.    Action Taken: [id number]

## 2017-10-27 NOTE — TELEPHONE ENCOUNTER
Left message for patient to return call.    Tasha Harley LPN  Adult Endocrinology  [facility]

## 2017-10-27 NOTE — TELEPHONE ENCOUNTER
Eduardo from [facility] calling regarding Rx for Novopen Echo. States that pt is having box of this sent to clinic and plans to pick it up from here. Box will come with return box and label to send back defective product. Pt should be contacted when this arrives so she can send defective product back. Zoila COLLINS LPN

## 2017-10-27 NOTE — TELEPHONE ENCOUNTER
Patient says insulin pen is not working so now sure what to do.  Not sure how to do insulin injections.    PHONE CALL TO PATIENT: Tried to call both work and mobile number without answer.  Left message on cell phone with contact number- wanted to know more about what happened and if able to get working and told her to let us know if better time/number to be reached.    Aida Tello RD, LD, CDE

## 2017-10-27 NOTE — TELEPHONE ENCOUNTER
Patient called Novolog (Echo) last evening for assistance as pharmacy was unable to assist patient. Patient will be receiving replacement pen (mailed to clinic). They were able to resolve issue and pen is currently working.     Advised patient that writer submitted prescription for syringes in event pen fails over weekend. Briefly instructed patient on how to draw from cartridge. Informed patient to have pharmacist review syringe.     On-call number given to patient if she does not like syringes; prescription for Humalog Jr can be sent to pharmacy.    Patient agreeable with plan.    Tasha Harley LPN  Adult Endocrinology  [facility]

## 2017-10-27 NOTE — TELEPHONE ENCOUNTER
10.27.17  Insulin pen is now working, but wants to talk to someone about it and if it doesn't work again.  [phone]

## 2017-10-29 DIAGNOSIS — E11.65 TYPE 2 DIABETES MELLITUS WITH HYPERGLYCEMIA (H): ICD-10-CM

## 2017-10-29 DIAGNOSIS — E10.9 TYPE 1 DIABETES MELLITUS WITHOUT COMPLICATION (H): ICD-10-CM

## 2017-10-30 ENCOUNTER — OFFICE VISIT (OUTPATIENT)
Dept: NURSING | Facility: CLINIC | Age: 67
End: 2017-10-30
Payer: COMMERCIAL

## 2017-10-30 DIAGNOSIS — E11.9 DIABETES MELLITUS WITHOUT COMPLICATION (H): Primary | ICD-10-CM

## 2017-10-30 PROCEDURE — G0108 DIAB MANAGE TRN  PER INDIV: HCPCS

## 2017-10-30 RX ORDER — BLOOD SUGAR DIAGNOSTIC
STRIP MISCELLANEOUS
Qty: 200 STRIP | Refills: 4 | Status: SHIPPED | OUTPATIENT
Start: 2017-10-30 | End: 2018-06-20

## 2017-10-30 RX ORDER — LANCETS
EACH MISCELLANEOUS
Qty: 306 EACH | Refills: 2 | Status: SHIPPED | OUTPATIENT
Start: 2017-10-30 | End: 2018-06-03

## 2017-10-30 NOTE — TELEPHONE ENCOUNTER
ACCU-CHEK MICKY PLUS test strip 200 strip 0 9/14/2017  No      Sig: TEST BLOOD SUGARS FOUR TIMES DAILY OR AS DIRECTED     blood glucose monitoring (ACCU-CHEK FASTCLIX) lancets 306 each 0 8/22/2017  No      Sig: USE TO TEST BLOOD SUGARS FOUR TIMES DAILY OR AS DIRECTED       Last Office Visit with [facility], P or [facility] prescribing provider: 6/23/17                                         Next 5 appointments (look out 90 days)     Nov 27, 2017  7:45 AM CST   Return Visit with Edward Aguero MD, MG ENDO NURSE   [facility]    [id number] [address] [place] [id number] [phone]                    Medication filled  per protocol.      Alma Delia Vail RN, [facility]

## 2017-10-30 NOTE — PATIENT INSTRUCTIONS
"1. Increase Tresiba from 12 to 13 units.   2. Keep Breakfast ratio at 1u:25g carbs. Change Lunch and Dinner ratio to 1u:20g carbs.   3. If Bedtime bg > 200 (starting at 201) give yourself 1 unit of Novolog.   4. Call reps for the Tandem/T Slim pump and the Dexcom Repr and ask for a \"Benefits Investigation\".     Linda Diego, RN, BSN, CDE   [facility]  "

## 2017-10-30 NOTE — MR AVS SNAPSHOT
"              After Visit Summary   10/30/2017    Latoya Rodríguez    MRN: [id number]           Patient Information     Date Of Birth          1950        Visit Information        Provider Department      10/30/2017 3:00 PM Provider, [facility]        Care Instructions    1. Increase Tresiba from 12 to 13 units.   2. Keep Breakfast ratio at 1u:25g carbs. Change Lunch and Dinner ratio to 1u:20g carbs.   3. If Bedtime bg > 200 (starting at 201) give yourself 1 unit of Novolog.   4. Call reps for the Tandem/T Slim pump and the Dexcom Repr and ask for a \"Benefits Investigation\".     Linda Diego, RN, BSN, CDE   [facility]          Follow-ups after your visit        Your next 10 appointments already scheduled     Nov 27, 2017  7:45 AM CST   Return Visit with Edward Aguero MD,  ENDO NURSE   [facility]    [address] [place] [address] [phone]              Who to contact     If you have questions or need follow up information about today's clinic visit or your schedule please contact [facility] directly at [phone].  Normal or non-critical lab and imaging results will be communicated to you by [employer], letter or phone within 4 business days after the clinic has received the results. If you do not hear from us within 7 days, please contact the clinic through [employer] or phone. If you have a critical or abnormal lab result, we will notify you by phone as soon as possible.  Submit refill requests through [employer] or call your pharmacy and they will forward the refill request to us. Please allow 3 business days for your refill to be completed.          Additional Information About Your Visit        [employer] Information     [employer] gives you secure access to your electronic health record. If you see a primary care provider, you can also send messages to your care team " and make appointments. If you have questions, please call your primary care clinic.  If you do not have a primary care provider, please call [phone] and they will assist you.      [employer] is an electronic gateway that provides easy, online access to your medical records. With [employer], you can request a clinic appointment, read your test results, renew a prescription or communicate with your care team.     To access your existing account, please contact your [facility] or call [phone] for assistance.        Care EveryWhere ID     This is your Care EveryWhere ID. This could be used by other organizations to access your [facility] medical records  [id number]         Blood Pressure from Last 3 Encounters:   07/17/17 108/68   06/23/17 115/60   03/13/17 98/60    Weight from Last 3 Encounters:   07/17/17 62.5 kg (137 lb 12.6 oz)   06/23/17 61.1 kg (134 lb 11.2 oz)   03/13/17 60.8 kg (134 lb)              Today, you had the following     No orders found for display         Today's Medication Changes          These changes are accurate as of: 10/30/17  4:51 PM.  If you have any questions, ask your nurse or doctor.               These medicines have changed or have updated prescriptions.        Dose/Directions    * insulin aspart 100 UNIT/ML injection   Commonly known as:  NovoLOG FLEXPEN   This may have changed:  additional instructions   Used for:  Type 2 diabetes mellitus with hyperglycemia (H)        Inject 1 unit per 15 grams of carbohydrate (3-4 units per meal) or as directed   Quantity:  1 Month   Refills:  1       * insulin aspart 100 UNIT/ML injection   Commonly known as:  NOVOPEN ECHO   This may have changed:  Another medication with the same name was changed. Make sure you understand how and when to take each.   Used for:  Type 1 diabetes mellitus without complication (H)        Dose:  3 Units   Inject 3 Units Subcutaneous 3 times daily (with meals)   Quantity:  15 mL    Refills:  3       * Notice:  This list has 2 medication(s) that are the same as other medications prescribed for you. Read the directions carefully, and ask your doctor or other care provider to review them with you.             Primary Care Provider Office Phone # Fax #    URVASHI Hunter [facility] [phone] [address]  [place] [address]        Equal Access to Services     [facility] Center[facility] : Hadii aad ku hadasho Soomaali, waaxda luqadaha, qaybta kaalmada adeegyada, waxay idiin hayaan adeeg kharash la'aan . So [facility] [phone].    ATENCIÓN: Si carolela espsherita, tiene a tejada disposición servicios gratuitos de asistencia lingüística. Llame al [phone].    We comply with applicable federal civil rights laws and [place] laws. We do not discriminate on the basis of race, color, national origin, age, disability, sex, sexual orientation, or gender identity.            Thank you!     Thank you for choosing [facility]  for your care. Our goal is always to provide you with excellent care. Hearing back from our patients is one way we can continue to improve our services. Please take a few minutes to complete the written survey that you may receive in the mail after your visit with us. Thank you!             Your Updated Medication List - Protect others around you: Learn how to safely use, store and throw away your medicines at www.disposemymeds.org.          This list is accurate as of: 10/30/17  4:51 PM.  Always use your most recent med list.                   Brand Name Dispense Instructions for use Diagnosis    * ACCU-CHEK MICKY PLUS test strip   Generic drug:  blood glucose monitoring     400 each    TEST BLOOD SUGARS FOUR TIMES DAILY OR AS DIRECTED    Type 1 diabetes mellitus without complication (H)       * ACCU-CHEK MICKY PLUS test strip   Generic drug:  blood glucose monitoring     200 strip    TEST BLOOD SUGARS FOUR TIMES DAILY OR AS DIRECTED    Type 2  "diabetes mellitus with hyperglycemia (H)       alendronate 70 MG tablet    FOSAMAX    12 tablet    SEE NOTES    Age-related osteoporosis without current pathological fracture       atorvastatin 10 MG tablet    LIPITOR    90 tablet    TAKE 1 TABLET(10 MG) BY MOUTH DAILY    Hyperlipidemia with target LDL less than 100       B-D U/F 31G X 8 MM   Generic drug:  insulin pen needle     100 each    USE WITH NOVOLOG AND LANTUS(FIVE TIMES DAILY)    Type I diabetes mellitus, well controlled (H)       blood glucose monitoring lancets     306 each    USE TO TEST BLOOD SUGARS FOUR TIMES DAILY OR AS DIRECTED    Type 1 diabetes mellitus without complication (H)       calcium carbonate-vitamin D 500-400 MG-UNIT Tabs per tablet     180 tablet    Take 1 tablet by mouth 2 times daily    Age-related osteoporosis without current pathological fracture       estradiol 0.1 MG/GM cream    ESTRACE VAGINAL    42.5 g    Place 2 g vaginally twice a week Apply dime-sized amount daily for the first 2 weeks, then place twice a week thereafter.    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    Type 1 diabetes mellitus without complication (H)       KETO-DIASTIX Strp     1 each    1 strip by In Vitro route as needed    Type 2 diabetes mellitus with hyperglycemia (H)       mirabegron 50 MG 24 hr tablet "    MYRBETRIQ    90 tablet    Take 1 tablet (50 mg) by mouth daily    Urinary frequency, Urgency of urination, Urge incontinence       solifenacin 10 MG tablet    VESICARE    90 tablet    Take 1 tablet (10 mg) by mouth daily    Urge incontinence, Urgency of urination, Urinary frequency       * Notice:  This list has 4 medication(s) that are the same as other medications prescribed for you. Read the directions carefully, and ask your doctor or other care provider to review them with you.

## 2017-10-31 ENCOUNTER — TELEPHONE (OUTPATIENT)
Dept: ENDOCRINOLOGY | Facility: CLINIC | Age: 67
End: 2017-10-31

## 2017-10-31 NOTE — TELEPHONE ENCOUNTER
Pt calling with a medication question. I met with pt yesterday. Pt was advised to increase Tresiba dose from 12 to 13 units. Pt statesTresiba pen does 2 units increments. Wondering if she should increase dose to 14 units. Pt advised that will be fine. Advised if begins to have multiple readings in the 60's, 70's and 80's, please call clinic to advise. Pt verbaliz understanding.    Linda Diego, RN, BSN, CDE   [facility]

## 2017-11-01 NOTE — TELEPHONE ENCOUNTER
Looks like patient also had spoken to someone with endocrinology on 10/27/17 to resolve issue and spoke with Linda Diego yesterday regarding insulin changes.  Did not see any documentation regarding continuing pen use issues.  No additional follow up appears needed and will close encounter.    Aida Tello RD, LD, CDE

## 2017-11-02 NOTE — PROGRESS NOTES
"  Diabetes Self Management Training: Individual Review Visit    Latoya Rodríguez presents today for education on insulin pumps and glucose sensors and evaluation of glucose control related to Type 1 diabetes.    She is accompanied by self    Patient's diabetes management related comments/concerns: none    Patient's emotional response to diabetes: expresses readiness to learn    Patient would like this visit to be focused around the following diabetes-related behaviors and goals: insulin pumps and glucose sensors.     ASSESSMENT:  Patient Problem List and Family Medical History reviewed for relevant medical history, current medical status, and diabetes risk factors.    Current Diabetes Management per Patient:  Taking diabetes medications?  Yes:  See below  Diabetes Medication(s)     Insulin Sig    insulin degludec (TRESIBA) 200 UNIT/ML pen Inject 12 Units Subcutaneous daily    insulin aspart (NOVOPEN ECHO) 100 UNIT/ML Cartridge Inject 3 Units Subcutaneous 3 times daily (with meals)    insulin aspart (NOVOLOG FLEXPEN) 100 UNIT/ML soln Inject 1 unit per 15 grams of carbohydrate (3-4 units per meal) or as directed     Patient taking differently:  Inject 1 unit per 25 grams of carbohydrate (3-4 units per meal) or as directed        Patient glucose self monitoring as follows: four times daily.   BG meter: Accu-Chek Shayla meter  BG results: Average bg per meter download = 220. Lowest reading = 84, highest reading = 502.    BG values are: In goal  Patient's most recent   Lab Results   Component Value Date    A1C 6.6 07/17/2017    is meeting goal of <7.0    Vitals:  There were no vitals taken for this visit.  Estimated body mass index is 22.93 kg/(m^2) as calculated from the following:    Height as of 7/17/17: 1.651 m (5' 5\").    Weight as of 7/17/17: 62.5 kg (137 lb 12.6 oz).   Last 3 BP:   BP Readings from Last 3 Encounters:   07/17/17 108/68   06/23/17 115/60   03/13/17 98/60       History   Smoking Status     Never Smoker "   Smokeless Tobacco     Never Used       Labs:  Lab Results   Component Value Date    A1C 6.6 07/17/2017     Lab Results   Component Value Date    GLC 63 09/16/2016     Lab Results   Component Value Date    LDL 81 07/03/2017     HDL Cholesterol   Date Value Ref Range Status   07/03/2017 72 >49 mg/dL Final   ]  GFR Estimate   Date Value Ref Range Status   09/16/2016 >90  Non  GFR Calc   >60 mL/min/1.7m2 Final     GFR Estimate If Black   Date Value Ref Range Status   09/16/2016 >90   GFR Calc   >60 mL/min/1.7m2 Final     Lab Results   Component Value Date    CR 0.65 09/16/2016     No results found for: MICROALBUMIN    Socio/Economic Considerations:    Support system: not assessed    Health Beliefs and Attitudes:   Patient Activation Measure Survey Score:  LEX Score (Last Two) 6/24/2016   LEX Raw Score 43   Activation Score 68.5   LEX Level 4       Stage of Change: ACTION (Actively working towards change)      Diabetes knowledge and skills assessment:     Patient is knowledgeable in diabetes management concepts related to: Healthy Eating, Being Active, Monitoring, Taking Medication, Problem Solving, Reducing Risks and Healthy Coping    Patient needs further education on the following diabetes management concepts: none    Barriers to Learning Assessment: No Barriers identified    Based on learning assessment above, most appropriate setting for further diabetes education would be: Individual setting.    INTERVENTION:   Education provided today on:  Insulin pump and continuous glucose sensor options. Discussed the basics of how insulin pumps and glucose sensors work.  Medication: bg download report reviewed. Insulin dose changes made.      Opportunities for ongoing education and support in diabetes-self management were discussed.    Pt verbalized understanding of concepts discussed and recommendations provided today.       Education Materials Provided:  Brochures on the T Slim pump and  "Dexcom sensor. These are the only pump and sensor options that are covered under Medicare. The Medtronic sensor is not covered under Medicare nor is the OmniPod insulin pump.     PLAN:  Patient encouraged to call the respective reps (contact info given) for both devices and request a \"benefits investigation\". Pt encouraged to call me when and if she decides to move forward with an order.  Increase Tresiba dose from 12 to 13  Units. Keep B ratio at 1:25. Change L and D ratio from 1:25 to 1:20.       FOLLOW-UP:.  Keep f/u appt with endo    Ongoing plan for education and support: call cde with questions or concerns if they arise.     Time Spent: 60 minutes  Encounter Type: Individual    Linda Diego, RN, BSN, CDE   [facility]  "

## 2017-11-19 DIAGNOSIS — E78.5 HYPERLIPIDEMIA WITH TARGET LDL LESS THAN 100: ICD-10-CM

## 2017-11-21 RX ORDER — ATORVASTATIN CALCIUM 10 MG/1
TABLET, FILM COATED ORAL
Qty: 90 TABLET | Refills: 1 | Status: SHIPPED | OUTPATIENT
Start: 2017-11-21 | End: 2018-06-17

## 2017-11-21 NOTE — TELEPHONE ENCOUNTER
atorvastatin (LIPITOR) 10 MG tablet  Last Written Prescription Date: 12/1/2016  Last Fill Quantity: 90, # refills: 3  Last Office Visit with [facility] or [facility] prescribing provider: 6/23/2017  Next 5 appointments (look out 90 days)     Nov 27, 2017  7:45 AM CST   Return Visit with Edward Aguero MD, MG ENDO NURSE   [facility]    [address] [place] [id number]   [phone]                   Lab Results   Component Value Date    CHOL 158 07/03/2017     Lab Results   Component Value Date    HDL 72 07/03/2017     Lab Results   Component Value Date    LDL 81 07/03/2017     Lab Results   Component Value Date    TRIG 26 07/03/2017     No results found for: CHOLHDLRATIO    Refilled per [facility] protocol.    Annamaria Feldman RN

## 2017-11-27 ENCOUNTER — OFFICE VISIT (OUTPATIENT)
Dept: ENDOCRINOLOGY | Facility: CLINIC | Age: 67
End: 2017-11-27
Payer: COMMERCIAL

## 2017-11-27 VITALS
BODY MASS INDEX: 24.79 KG/M2 | WEIGHT: 148.81 LBS | HEART RATE: 72 BPM | SYSTOLIC BLOOD PRESSURE: 123 MMHG | HEIGHT: 65 IN | DIASTOLIC BLOOD PRESSURE: 74 MMHG

## 2017-11-27 DIAGNOSIS — E10.9 TYPE 1 DIABETES MELLITUS WITHOUT COMPLICATION (H): ICD-10-CM

## 2017-11-27 DIAGNOSIS — E13.9 LADA (LATENT AUTOIMMUNE DIABETES MELLITUS IN ADULTS) (H): ICD-10-CM

## 2017-11-27 DIAGNOSIS — Z79.4 TYPE 2 DIABETES MELLITUS WITH HYPERGLYCEMIA, WITH LONG-TERM CURRENT USE OF INSULIN (H): ICD-10-CM

## 2017-11-27 DIAGNOSIS — Z23 FLU VACCINE NEED: Primary | ICD-10-CM

## 2017-11-27 DIAGNOSIS — E11.65 TYPE 2 DIABETES MELLITUS WITH HYPERGLYCEMIA, WITH LONG-TERM CURRENT USE OF INSULIN (H): ICD-10-CM

## 2017-11-27 DIAGNOSIS — E10.9 TYPE I DIABETES MELLITUS, WELL CONTROLLED (H): ICD-10-CM

## 2017-11-27 LAB — HBA1C MFR BLD: 9.3 % (ref 0–5.7)

## 2017-11-27 PROCEDURE — 83036 HEMOGLOBIN GLYCOSYLATED A1C: CPT | Performed by: INTERNAL MEDICINE

## 2017-11-27 PROCEDURE — 36415 COLL VENOUS BLD VENIPUNCTURE: CPT | Performed by: INTERNAL MEDICINE

## 2017-11-27 PROCEDURE — 99214 OFFICE O/P EST MOD 30 MIN: CPT | Mod: 25 | Performed by: INTERNAL MEDICINE

## 2017-11-27 PROCEDURE — 90662 IIV NO PRSV INCREASED AG IM: CPT | Performed by: INTERNAL MEDICINE

## 2017-11-27 PROCEDURE — 90471 IMMUNIZATION ADMIN: CPT | Performed by: INTERNAL MEDICINE

## 2017-11-27 NOTE — PATIENT INSTRUCTIONS
- increase Tresiba to 18 units daily  - Novolog 1 unit for 20 gram of carb at breakfast, lunch and dinner  - send blood glucose thru PayTango message in 2 weeks    If you have any questions, please do not hesitate to call [facility] at [phone] and ask for Endocrinology clinic.    Sincerely,    Edward Aguero MD  Endocrinology      Sending blood sugars to your provider at [facility]:  We want to help you with your diabetes management, which often requires frequent adjustments to your therapy. For your convenience, we have several ways to send your blood sugars to your doctor for review.    - Send message directly to your doctor through My Chart.  Please ask the rooming staff if you would like to sign up for My Chart.  This is a fast and confidential way to send your information and communicate directly with your provider.   - Record readings and fax to [phone].  We have a template for you to use for your convenience.  - Stop by the clinic with your meter for download if advised by provider.   - My Chart or call Linda Diego, Diabetes Educator at [phone]  - Call the clinic and speak to one of the endocrine nurses to relay information on the telephone.  Tasha Mckenna, or Lorrie at [phone].   -    Please call the on-call Endocrinologist at the [facility] for after       hours/weekend needs at [phone] Option #4.    Please note that you do not need to FAST if you are just having an A1C drawn. Please remember to ALWAYS bring your glucose meter with to your appointment. This data is very important for the management of your care.    Thank you!  Your [facility] Diabetes Care Team

## 2017-11-27 NOTE — MR AVS SNAPSHOT
After Visit Summary   11/27/2017    Latoya Rodríguez    MRN: [id number]           Patient Information     Date Of Birth          1950        Visit Information        Provider Department      11/27/2017 7:45 AM Edward Aguero MD; MG ENDO NURSE [facility]        Today's Diagnoses     Flu vaccine need    -  1    Type 1 diabetes mellitus without complication (H)          Care Instructions    - increase Tresiba to 18 units daily  - Novolog 1 unit for 20 gram of carb at breakfast, lunch and dinner  - send blood glucose thru Bridestory message in 2 weeks    If you have any questions, please do not hesitate to call [facility] at [phone] and ask for Endocrinology clinic.    Sincerely,    Edward Aguero MD  Endocrinology      Sending blood sugars to your provider at [facility]:  We want to help you with your diabetes management, which often requires frequent adjustments to your therapy. For your convenience, we have several ways to send your blood sugars to your doctor for review.    - Send message directly to your doctor through My Chart.  Please ask the rooming staff if you would like to sign up for My Chart.  This is a fast and confidential way to send your information and communicate directly with your provider.   - Record readings and fax to [phone].  We have a template for you to use for your convenience.  - Stop by the clinic with your meter for download if advised by provider.   - My Chart or call Linda Diego, Diabetes Educator at [phone]  - Call the clinic and speak to one of the endocrine nurses to relay information on the telephone.  Tasha Mckenna, or Lorrie at [phone].   -    Please call the on-call Endocrinologist at the [facility] for after       hours/weekend needs at [phone] Option #4.    Please note that you do not need to FAST if you are just having an A1C drawn. Please remember to ALWAYS bring your  glucose meter with to your appointment. This data is very important for the management of your care.    Thank you!  Your [facility] Diabetes Care Team                  Follow-ups after your visit        Follow-up notes from your care team     Return in about 4 months (around 3/27/2018).      Your next 10 appointments already scheduled     Mar 19, 2018  8:15 AM CDT   Return Visit with Edward Aguero MD, MG ENDO NURSE   [facility]    [address] [place] [id number] [phone]              Who to contact     If you have questions or need follow up information about today's clinic visit or your schedule please contact [facility] directly at [phone].  Normal or non-critical lab and imaging results will be communicated to you by MyChart, letter or phone within 4 business days after the clinic has received the results. If you do not hear from us within 7 days, please contact the clinic through orderTalkhart or phone. If you have a critical or abnormal lab result, we will notify you by phone as soon as possible.  Submit refill requests through Ciplex or call your pharmacy and they will forward the refill request to us. Please allow 3 business days for your refill to be completed.          Additional Information About Your Visit        Ciplex Information     Ciplex gives you secure access to your electronic health record. If you see a primary care provider, you can also send messages to your care team and make appointments. If you have questions, please call your primary care clinic.  If you do not have a primary care provider, please call [phone] and they will assist you.      Ciplex is an electronic gateway that provides easy, online access to your medical records. With Ciplex, you can request a clinic appointment, read your test results, renew a prescription or communicate with your care team.     To access your  "existing account, please contact your [facility] or call [phone] for assistance.        Care EveryWhere ID     This is your Care EveryWhere ID. This could be used by other organizations to access your [facility] medical records  [id number]        Your Vitals Were     Pulse Height BMI (Body Mass Index)             72 1.651 m (5' 5\") 24.76 kg/m2          Blood Pressure from Last 3 Encounters:   11/27/17 123/74   07/17/17 108/68   06/23/17 115/60    Weight from Last 3 Encounters:   11/27/17 67.5 kg (148 lb 13 oz)   07/17/17 62.5 kg (137 lb 12.6 oz)   06/23/17 61.1 kg (134 lb 11.2 oz)              We Performed the Following     HC FLU VACCINE, INCREASED ANTIGEN, PRESV FREE     Hemoglobin A1c POCT          Today's Medication Changes          These changes are accurate as of: 11/27/17  8:52 AM.  If you have any questions, ask your nurse or doctor.               These medicines have changed or have updated prescriptions.        Dose/Directions    * insulin aspart 100 UNIT/ML injection   Commonly known as:  NovoLOG FLEXPEN   This may have changed:  additional instructions   Used for:  Type 2 diabetes mellitus with hyperglycemia (H)   Changed by:  Catrachita Chavez [facility]        Inject 1 unit per 15 grams of carbohydrate (3-4 units per meal) or as directed   Quantity:  1 Month   Refills:  1       * insulin aspart 100 UNIT/ML injection   Commonly known as:  NOVOPEN ECHO   This may have changed:  Another medication with the same name was changed. Make sure you understand how and when to take each.   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 Read the directions carefully, and ask your doctor or other care provider to review them with you.             Primary Care Provider Office Phone # Fax #    Pricila Avila, URVASHI [facility] [phone]       [address]  [place] [address]        Equal Access to Services     [facility] Campus[facility]AM[facility] : Hadii aad ku hadasho Soomaali, waaxda luqadaha, qaybta kaalmada adeegyada, waxay idiin hayaan adeeg kharash la'aan . So [facility] [phone].    ATENCIÓN: Si carolela espsherita, tiene a tejada disposición servicios gratuitos de asistencia lingüística. Llame al [phone].    We comply with applicable federal civil rights laws and [place] laws. We do not discriminate on the basis of race, color, national origin, age, disability, sex, sexual orientation, or gender identity.            Thank you!     Thank you for choosing [facility]  for your care. Our goal is always to provide you with excellent care. Hearing back from our patients is one way we can continue to improve our services. Please take a few minutes to complete the written survey that you may receive in the mail after your visit with us. Thank you!             Your Updated Medication List - Protect others around you: Learn how to safely use, store and throw away your medicines at www.disposemymeds.org.          This list is accurate as of: 11/27/17  8:52 AM.  Always use your most recent med list.                   Brand Name Dispense Instructions for use Diagnosis    * ACCU-CHEK MICKY PLUS test strip   Generic drug:  blood glucose monitoring     400 each    TEST BLOOD SUGARS FOUR TIMES DAILY OR AS DIRECTED    Type 1 diabetes mellitus without complication (H)       * ACCU-CHEK MICKY PLUS test strip   Generic drug:  blood glucose monitoring     200 strip    TEST BLOOD SUGARS FOUR  "TIMES DAILY OR AS DIRECTED    Type 2 diabetes mellitus with hyperglycemia (H)       alendronate 70 MG tablet    FOSAMAX    12 tablet    SEE NOTES    Age-related osteoporosis without current pathological fracture       atorvastatin 10 MG tablet    LIPITOR    90 tablet    TAKE 1 TABLET(10 MG) BY MOUTH DAILY    Hyperlipidemia with target LDL less than 100       B-D U/F 31G X 8 MM   Generic drug:  insulin pen needle     100 each    USE WITH NOVOLOG AND LANTUS(FIVE TIMES DAILY)    Type I diabetes mellitus, well controlled (H)       blood glucose monitoring lancets     306 each    USE TO TEST BLOOD SUGARS FOUR TIMES DAILY OR AS DIRECTED    Type 1 diabetes mellitus without complication (H)       calcium carbonate-vitamin D 500-400 MG-UNIT Tabs per tablet     180 tablet    Take 1 tablet by mouth 2 times daily    Age-related osteoporosis without current pathological fracture       estradiol 0.1 MG/GM cream    ESTRACE VAGINAL    42.5 g    Place 2 g vaginally twice a week Apply dime-sized amount daily for the first 2 weeks, then place twice a week thereafter.    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    Type 1 diabetes mellitus without complication (H)       KETO-DIASTIX Strp     1 each    1 strip by In Vitro route as needed    Type 2 diabetes mellitus with hyperglycemia (H) "       mirabegron 50 MG 24 hr tablet    MYRBETRIQ    90 tablet    Take 1 tablet (50 mg) by mouth daily    Urinary frequency, Urgency of urination, Urge incontinence       solifenacin 10 MG tablet    VESICARE    90 tablet    Take 1 tablet (10 mg) by mouth daily    Urge incontinence, Urgency of urination, Urinary frequency       * Notice:  This list has 4 medication(s) that are the same as other medications prescribed for you. Read the directions carefully, and ask your doctor or other care provider to review them with you.

## 2017-11-27 NOTE — NURSING NOTE
"Latoya Rodríguez's goals for this visit include: Follow up Diabetes  She requests these members of her care team be copied on today's visit information: YES    PCP: Pricila Avila    Referring Provider:  No referring provider defined for this encounter.    Chief Complaint   Patient presents with     Diabetes       Initial Ht 1.651 m (5' 5\")  Wt 67.5 kg (148 lb 13 oz)  BMI 24.76 kg/m2 Estimated body mass index is 24.76 kg/(m^2) as calculated from the following:    Height as of this encounter: 1.651 m (5' 5\").    Weight as of this encounter: 67.5 kg (148 lb 13 oz).  Medication Reconciliation: complete    Do you need any medication refills at today's visit? NO    "

## 2017-11-27 NOTE — PROGRESS NOTES
Endocrinology Note         Latoya is a 67 year old female presents today for type 1 diabetes.    HPI  Ms. Latoya Rodríguez is a 67 years old woman with hx of type 1 diabetes in June 2016. She is here for follow up. Last seen 7/2017.    She was diagnosed with type 1 diabetes when she continued losing weight unintentionally. She was found to have an A1c >14. She was initially diagnosed with type 2 diabetes and was put on insulin and Metformin.  She was later found to be TORRES antibody positive and c peptide negative.  She has since been working closely with a diabetes educator and has learned carb counting, and has generally adapted well to the diagnosis.      Interim history:  A1C is up to 9.3% today. She is currently on Tresiba U-200 at 14 units at bedtime, Novolog 1 unit per 25 gram CHO for BF and 1 unit per 20 gram CHO for lunch and dinner. I reviewed her glucose meter, she has been testing 4 times a day with an overall average of 1240 (SD 98) mg/dL over the couple of months. Her lowest blood sugar was in 60s randomly and much less than before. Most of her glucoses were in 200-300 before meal.    Her main concern is weight gain. She said appetite has increased significantly.  She feels hungry all the time.She is vegetarian. She mainly eats fish, vegetable and fruit. she is very careful about her diet She gained about 15-20 pounds after the diagnosis. Her highest weight was 180s about 2 years ago. She was intentionally losing weight with diet and exercise but regained after the diagnosis of type 1 diabetes. She would like her weight to be around 125 pounds. AM cortisol was 13. TFT on 7/10/2017 showed TSH 1.77, FT4 1.33 with negative TPO ab.    Past Medical History  Past Medical History:   Diagnosis Date     Cataract      Type 2 diabetes mellitus with hyperglycemia (H) 6/24/2016   Type 1 diabetes, dx 6/2016  Hyperlipidemia  Osteoporosis     Allergies  No Known Allergies     Medications  Current Outpatient  "Prescriptions   Medication Sig Dispense Refill     atorvastatin (LIPITOR) 10 MG tablet TAKE 1 TABLET(10 MG) BY MOUTH DAILY 90 tablet 1     ACCU-CHEK MICKY PLUS test strip TEST BLOOD SUGARS FOUR TIMES DAILY OR AS DIRECTED 200 strip 4     blood glucose monitoring (ACCU-CHEK FASTCLIX) lancets USE TO TEST BLOOD SUGARS FOUR TIMES DAILY OR AS DIRECTED 306 each 2     insulin syringe-needle U-100 (B-D INSULIN SYRINGE HALF-UNIT) 31G X 5/16\" 0.3 ML Use 3 syringes daily or as directed. 100 each 0     alendronate (FOSAMAX) 70 MG tablet SEE NOTES 12 tablet 0     B-D U/F 31G X 8 MM insulin pen needle USE WITH NOVOLOG AND LANTUS(FIVE TIMES DAILY) 100 each 3     mirabegron (MYRBETRIQ) 50 MG 24 hr tablet Take 1 tablet (50 mg) by mouth daily 90 tablet 1     solifenacin (VESICARE) 10 MG tablet Take 1 tablet (10 mg) by mouth daily 90 tablet 1     estradiol (ESTRACE VAGINAL) 0.1 MG/GM cream Place 2 g vaginally twice a week Apply dime-sized amount daily for the first 2 weeks, then place twice a week thereafter. 42.5 g 3     insulin degludec (TRESIBA) 200 UNIT/ML pen Inject 12 Units Subcutaneous daily (Patient taking differently: Inject 14 Units Subcutaneous daily ) 18 mL 3     insulin aspart (NOVOPEN ECHO) 100 UNIT/ML Cartridge Inject 3 Units Subcutaneous 3 times daily (with meals) 15 mL 3     ACCU-CHEK MICKY PLUS test strip TEST BLOOD SUGARS FOUR TIMES DAILY OR AS DIRECTED 400 each 1     calcium carbonate-vitamin D 500-400 MG-UNIT TABS tablt Take 1 tablet by mouth 2 times daily 180 tablet 3     Urine Glucose-Ketones Test (KETO-DIASTIX) STRP 1 strip by In Vitro route as needed 1 each 11     insulin aspart (NOVOLOG FLEXPEN) 100 UNIT/ML soln Inject 1 unit per 15 grams of carbohydrate (3-4 units per meal) or as directed (Patient taking differently: Inject 1 unit per 25 grams of carbohydrate (3-4 units per meal) or as directed) 1 Month 1     Family History  No thyroid disease or type 1 diabetes in the family    Social History  Social History " "  Substance Use Topics     Smoking status: Never Smoker     Smokeless tobacco: Never Used     Alcohol use Yes      Comment: 1 to 2 beers or glasses of wine 1 to 2 times per month   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    ROS  Constitutional: low energy,  weight is up to 148 lbs. She would like to lose weight, +increased appetite.  Eyes: no vision change, diplopia or red eyes   Neck: no difficulty swallowing, no choking, no neck pain, no neck swelling  Cardiovascular: no chest pain, palpitations  Respiratory: no dyspnea, cough, shortness of breath or wheezing   GI: no nausea, vomiting, diarrhea or constipation, no abdominal pain   : no change in urine, no dysuria or hematuria  Musculoskeletal: no joint or muscle pain or swelling   Integumentary: +hair loss  Neuro: no loss of strength or sensation, no numbness or tingling, no tremor, no dizziness, no headache   Endo: no polyuria or polydipsia, no temperature intolerance   Heme/Lymph: no concerning bumps, no bleeding problems   Allergy: no environmental allergies   Psych: frustrate about weight, no sleep problems.    Physical Exam  /74  Pulse 72  Ht 1.651 m (5' 5\")  Wt 67.5 kg (148 lb 13 oz)  BMI 24.76 kg/m2  Body mass index is 24.76 kg/(m^2).  Constitutional: no distress, comfortable, pleasant   Eyes: anicteric  Musculoskeletal: no edema   Skin:  no jaundice   Neurological: normal gait, no tremor on outstretched hands bilaterally  Psychological: appropriate mood     RESULTS    Lab Results   Component Value Date    A1C 9.3 11/27/2017    A1C 6.6 07/17/2017    A1C 6.0 03/13/2017    A1C 7.1 09/16/2016    A1C 14.6 06/24/2016     ENDO DIABETES Latest Ref Rng & Units 7/3/2017   CHOLESTEROL <200 mg/dL 158   LDL CHOLESTEROL, CALCULATED <100 mg/dL 81   HDL CHOLESTEROL >49 mg/dL 72   NON HDL CHOLESTEROL <130 mg/dL 86   TRIGLYCERIDES <150 mg/dL 26   ALBUMIN URINE MG/L mg/L 6   ALBUMIN URINE MG/G CR 0 - 25 mg/g Cr 9.43        Ref. Range 7/17/2017 08:39   Cortisol Serum Latest Ref " Range: 4 - 22 ug/dL 13.0     ASSESSMENT:    Ms. Rodríguez is a 67 years old woman with hx of type 1 diabetes in June 2016. She is here for follow up.    1. Type 1 diabetes: diagnosed in June 2016. She is adapting quite well.  A1c is up to 9.3% today which is likely that she is out of honeymoon period.  I will increase Tresiba U-200 18 units daily  I will increase Novolog 1 unit per 20 gram CHO for all meal  She will send message thru [employer] in 2 weeks    2) DM complications:  Retinopathy:will remind her for eye exam at the next visit  Nephropathy: negative urine microalbumin 7/3/17  Neuropathy: none    PLAN:   I will increase Tresiba U-200 18 units daily  I will increase Novolog 1 unit per 20 gram CHO for all meal  RTC 4 months      Edward Aguero MD     Division of Diabetes and Endocrinology  Department of Medicine  [phone]

## 2017-11-27 NOTE — LETTER
11/27/2017      RE: Latoya Rodríguez  [address]  [place] [address]     Dear Colleague,    Thank you for referring your patient, Latoya Rodríguez, to the [facility]. Please see a copy of my visit note below.         Endocrinology Note         Latoya is a 67 year old female presents today for type 1 diabetes.    HPI  Ms. Latoya Rodríguez is a 67 years old woman with hx of type 1 diabetes in June 2016. She is here for follow up. Last seen 7/2017.    She was diagnosed with type 1 diabetes when she continued losing weight unintentionally. She was found to have an A1c >14. She was initially diagnosed with type 2 diabetes and was put on insulin and Metformin.  She was later found to be TORRES antibody positive and c peptide negative.  She has since been working closely with a diabetes educator and has learned carb counting, and has generally adapted well to the diagnosis.      Interim history:  A1C is up to 9.3% today. She is currently on Tresiba U-200 at 14 units at bedtime, Novolog 1 unit per 25 gram CHO for BF and 1 unit per 20 gram CHO for lunch and dinner. I reviewed her glucose meter, she has been testing 4 times a day with an overall average of 1240 (SD 98) mg/dL over the couple of months. Her lowest blood sugar was in 60s randomly and much less than before. Most of her glucoses were in 200-300 before meal.    Her main concern is weight gain. She said appetite has increased significantly.  She feels hungry all the time.She is vegetarian. She mainly eats fish, vegetable and fruit. she is very careful about her diet She gained about 15-20 pounds after the diagnosis. Her highest weight was 180s about 2 years ago. She was intentionally losing weight with diet and exercise but regained after the diagnosis of type 1 diabetes. She would like her weight to be around 125 pounds. AM cortisol was 13. TFT on 7/10/2017 showed TSH 1.77, FT4 1.33 with negative TPO ab.    Past Medical History  Past  "Medical History:   Diagnosis Date     Cataract      Type 2 diabetes mellitus with hyperglycemia (H) 6/24/2016   Type 1 diabetes, dx 6/2016  Hyperlipidemia  Osteoporosis     Allergies  No Known Allergies     Medications  Current Outpatient Prescriptions   Medication Sig Dispense Refill     atorvastatin (LIPITOR) 10 MG tablet TAKE 1 TABLET(10 MG) BY MOUTH DAILY 90 tablet 1     ACCU-CHEK MICKY PLUS test strip TEST BLOOD SUGARS FOUR TIMES DAILY OR AS DIRECTED 200 strip 4     blood glucose monitoring (ACCU-CHEK FASTCLIX) lancets USE TO TEST BLOOD SUGARS FOUR TIMES DAILY OR AS DIRECTED 306 each 2     insulin syringe-needle U-100 (B-D INSULIN SYRINGE HALF-UNIT) 31G X 5/16\" 0.3 ML Use 3 syringes daily or as directed. 100 each 0     alendronate (FOSAMAX) 70 MG tablet SEE NOTES 12 tablet 0     B-D U/F 31G X 8 MM insulin pen needle USE WITH NOVOLOG AND LANTUS(FIVE TIMES DAILY) 100 each 3     mirabegron (MYRBETRIQ) 50 MG 24 hr tablet Take 1 tablet (50 mg) by mouth daily 90 tablet 1     solifenacin (VESICARE) 10 MG tablet Take 1 tablet (10 mg) by mouth daily 90 tablet 1     estradiol (ESTRACE VAGINAL) 0.1 MG/GM cream Place 2 g vaginally twice a week Apply dime-sized amount daily for the first 2 weeks, then place twice a week thereafter. 42.5 g 3     insulin degludec (TRESIBA) 200 UNIT/ML pen Inject 12 Units Subcutaneous daily (Patient taking differently: Inject 14 Units Subcutaneous daily ) 18 mL 3     insulin aspart (NOVOPEN ECHO) 100 UNIT/ML Cartridge Inject 3 Units Subcutaneous 3 times daily (with meals) 15 mL 3     ACCU-CHEK MICKY PLUS test strip TEST BLOOD SUGARS FOUR TIMES DAILY OR AS DIRECTED 400 each 1     calcium carbonate-vitamin D 500-400 MG-UNIT TABS tablt Take 1 tablet by mouth 2 times daily 180 tablet 3     Urine Glucose-Ketones Test (KETO-DIASTIX) STRP 1 strip by In Vitro route as needed 1 each 11     insulin aspart (NOVOLOG FLEXPEN) 100 UNIT/ML soln Inject 1 unit per 15 grams of carbohydrate (3-4 units per meal) " "or as directed (Patient taking differently: Inject 1 unit per 25 grams of carbohydrate (3-4 units per meal) or as directed) 1 Month 1     Family History  No thyroid disease or type 1 diabetes in the family    Social History  Social History   Substance Use Topics     Smoking status: Never Smoker     Smokeless tobacco: Never Used     Alcohol use Yes      Comment: 1 to 2 beers or glasses of wine 1 to 2 times per month   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    ROS  Constitutional: low energy,  weight is up to 148 lbs. She would like to lose weight, +increased appetite.  Eyes: no vision change, diplopia or red eyes   Neck: no difficulty swallowing, no choking, no neck pain, no neck swelling  Cardiovascular: no chest pain, palpitations  Respiratory: no dyspnea, cough, shortness of breath or wheezing   GI: no nausea, vomiting, diarrhea or constipation, no abdominal pain   : no change in urine, no dysuria or hematuria  Musculoskeletal: no joint or muscle pain or swelling   Integumentary: +hair loss  Neuro: no loss of strength or sensation, no numbness or tingling, no tremor, no dizziness, no headache   Endo: no polyuria or polydipsia, no temperature intolerance   Heme/Lymph: no concerning bumps, no bleeding problems   Allergy: no environmental allergies   Psych: frustrate about weight, no sleep problems.    Physical Exam  /74  Pulse 72  Ht 1.651 m (5' 5\")  Wt 67.5 kg (148 lb 13 oz)  BMI 24.76 kg/m2  Body mass index is 24.76 kg/(m^2).  Constitutional: no distress, comfortable, pleasant   Eyes: anicteric  Musculoskeletal: no edema   Skin:  no jaundice   Neurological: normal gait, no tremor on outstretched hands bilaterally  Psychological: appropriate mood     RESULTS    Lab Results   Component Value Date    A1C 9.3 11/27/2017    A1C 6.6 07/17/2017    A1C 6.0 03/13/2017    A1C 7.1 09/16/2016    A1C 14.6 06/24/2016     ENDO DIABETES Latest Ref Rng & Units 7/3/2017   CHOLESTEROL <200 mg/dL 158   LDL CHOLESTEROL, CALCULATED <100 " mg/dL 81   HDL CHOLESTEROL >49 mg/dL 72   NON HDL CHOLESTEROL <130 mg/dL 86   TRIGLYCERIDES <150 mg/dL 26   ALBUMIN URINE MG/L mg/L 6   ALBUMIN URINE MG/G CR 0 - 25 mg/g Cr 9.43        Ref. Range 7/17/2017 08:39   Cortisol Serum Latest Ref Range: 4 - 22 ug/dL 13.0     ASSESSMENT:    Ms. Rodríguez is a 67 years old woman with hx of type 1 diabetes in June 2016. She is here for follow up.    1. Type 1 diabetes: diagnosed in June 2016. She is adapting quite well.  A1c is up to 9.3% today which is likely that she is out of honeymoon period.  I will increase Tresiba U-200 18 units daily  I will increase Novolog 1 unit per 20 gram CHO for all meal  She will send message thru [employer] in 2 weeks    2) DM complications:  Retinopathy:will remind her for eye exam at the next visit  Nephropathy: negative urine microalbumin 7/3/17  Neuropathy: none    PLAN:   I will increase Tresiba U-200 18 units daily  I will increase Novolog 1 unit per 20 gram CHO for all meal  RTC 4 months      Edward Aguero MD     Division of Diabetes and Endocrinology  Department of Medicine  [phone]    Again, thank you for allowing me to participate in the care of your patient.      Sincerely,    Edward Aguero MD

## 2017-12-02 DIAGNOSIS — M81.0 AGE-RELATED OSTEOPOROSIS WITHOUT CURRENT PATHOLOGICAL FRACTURE: ICD-10-CM

## 2017-12-04 RX ORDER — ALENDRONATE SODIUM 70 MG/1
TABLET ORAL
Qty: 12 TABLET | Refills: 3 | Status: SHIPPED | OUTPATIENT
Start: 2017-12-04 | End: 2018-10-25

## 2017-12-04 NOTE — TELEPHONE ENCOUNTER
Routing refill request to provider for review/approval because:  Bisphosphonates [id number] 7:54 AM   Normal Serum Creatinine on file within past 12 months     Creatinine   Date Value Ref Range Status   09/16/2016 0.65 0.52 - 1.04 mg/dL Final   ]      Last office visit:  6/23/17        Alma Delia Vial RN,   [facility]

## 2017-12-06 ENCOUNTER — TELEPHONE (OUTPATIENT)
Dept: UROLOGY | Facility: CLINIC | Age: 67
End: 2017-12-06

## 2017-12-06 DIAGNOSIS — R39.15 URGENCY OF URINATION: ICD-10-CM

## 2017-12-06 DIAGNOSIS — N39.41 URGE INCONTINENCE: ICD-10-CM

## 2017-12-06 DIAGNOSIS — N95.2 ATROPHIC VAGINITIS: ICD-10-CM

## 2017-12-06 DIAGNOSIS — R35.0 URINARY FREQUENCY: ICD-10-CM

## 2017-12-06 RX ORDER — MIRABEGRON 50 MG/1
50 TABLET, EXTENDED RELEASE ORAL DAILY
Qty: 90 TABLET | Refills: 0 | Status: SHIPPED | OUTPATIENT
Start: 2017-12-06 | End: 2018-04-23

## 2017-12-06 RX ORDER — ESTRADIOL 0.1 MG/G
2 CREAM VAGINAL
Qty: 42.5 G | Refills: 0 | Status: SHIPPED | OUTPATIENT
Start: 2017-12-07 | End: 2019-02-11

## 2017-12-06 RX ORDER — SOLIFENACIN SUCCINATE 10 MG/1
10 TABLET, FILM COATED ORAL DAILY
Qty: 90 TABLET | Refills: 0 | Status: SHIPPED | OUTPATIENT
Start: 2017-12-06 | End: 2018-04-23

## 2017-12-06 NOTE — TELEPHONE ENCOUNTER
[facility] Call Center    Phone Message    Name of Caller: RAPHAEL NARVAEZ    Phone Number: Cell number on file:    Telephone Information:   Mobile [phone]       Best time to return call: ANY    May a detailed message be left on voicemail: yes    Relation to patient: Self    Reason for Call: Medication Refill Request    Has the patient contacted the pharmacy for the refill? No - Patient has f/u appt with Dr Zhang on 1.22.18. Will need refills before the end of this year.  Mirabegron, solfenacin, estrace cream. Patient uses mail order pharmacy.   Action Taken: Message routed to:  Adult Clinics: Urology p [phone]

## 2017-12-06 NOTE — TELEPHONE ENCOUNTER
Chart reviewed. Patient's last office visit was 12/5/16 with plan for patient to continue vesicare, myrbetriq, and estrace. Patient has upcoming appointment with Dr. Zhang scheduled for 1/22/18. Okay to refill per protocol as patient has an upcoming appointment.    Returned call to patient but no answer. Left generic message with request for patient to return call back to clinic. When patient returns call, will encourage her to keep upcoming appointment with Dr. Zhang and that a refill of the requested medications can be sent. Will verify preferred pharmacy.    Jess Hadley RN, BSN

## 2017-12-12 ENCOUNTER — TELEPHONE (OUTPATIENT)
Dept: ENDOCRINOLOGY | Facility: CLINIC | Age: 67
End: 2017-12-12

## 2017-12-12 NOTE — TELEPHONE ENCOUNTER
"Spoke with pt. Advised bg log reviewed and would like to recommend dose change with meal-time novolog insulin. Continue to do a 1:20 ratio for all meals but add use of a sliding scale. Pt verbalized she has used a sliding scale in the past and is comfortable doing this. Suggested SS: add 1u:50>150mg/dl. As follows:  151-200 +1u  201-250 +2  251-300 +3  >300 +4  If bedtime bg > 200: take 1 unit correction \"novolog\" in addition to daily dose of 18 units Tresiba. Pt will fax in bg readngs in 2 weeks for review.     Linda Diego, RN, BSN, CDE   [facility]    "

## 2017-12-18 ENCOUNTER — TRANSFERRED RECORDS (OUTPATIENT)
Dept: HEALTH INFORMATION MANAGEMENT | Facility: CLINIC | Age: 67
End: 2017-12-18

## 2018-01-02 ENCOUNTER — TELEPHONE (OUTPATIENT)
Dept: ENDOCRINOLOGY | Facility: CLINIC | Age: 68
End: 2018-01-02

## 2018-01-02 NOTE — TELEPHONE ENCOUNTER
Spoke with pt. Advised bg log was not received on Dec 28th or today. Pt attempted to send thru [employer] portal both times. Pt advised there might be a software glitch as this has happened in the past. Pt asked if she would like to fax in readings to me directly. Pt stated that would be fine. Given fax number: [phone]. [facility]     Linda Diego, RN, BSN, CDE   [facility].

## 2018-01-22 ENCOUNTER — TELEPHONE (OUTPATIENT)
Dept: UROLOGY | Facility: CLINIC | Age: 68
End: 2018-01-22

## 2018-01-22 NOTE — TELEPHONE ENCOUNTER
This CMA left message for pt to call clinic back @ [phone], to see if they would like to move up appt due to possible weather.     Amorrteri Russell CMA

## 2018-01-22 NOTE — TELEPHONE ENCOUNTER
Patient returned call to call center and rescheduled appointment with Dr. Zhang for 3/15/18 at 8:30am.    Jess Hadley RN, BSN

## 2018-01-29 ENCOUNTER — MEDICAL CORRESPONDENCE (OUTPATIENT)
Dept: HEALTH INFORMATION MANAGEMENT | Facility: CLINIC | Age: 68
End: 2018-01-29

## 2018-01-29 ENCOUNTER — TELEPHONE (OUTPATIENT)
Dept: ENDOCRINOLOGY | Facility: CLINIC | Age: 68
End: 2018-01-29

## 2018-01-29 NOTE — TELEPHONE ENCOUNTER
Pt faxed in bg report. Left vm advising pt to do the following - change target bg to  - all day (12a-12a). Currently target bg is programmed as:  12am:   6am:   12pm:   7pm: .   Also confirmed pt is to be taking 20 units tresiba daily.     Linda Diego RN, BSN, CDE   [facility]

## 2018-02-12 ENCOUNTER — TELEPHONE (OUTPATIENT)
Dept: ENDOCRINOLOGY | Facility: CLINIC | Age: 68
End: 2018-02-12

## 2018-02-12 DIAGNOSIS — E13.9 LADA (LATENT AUTOIMMUNE DIABETES MELLITUS IN ADULTS) (H): ICD-10-CM

## 2018-02-12 NOTE — TELEPHONE ENCOUNTER
Patient unable to locate logbook in [facility] site. Blood sugars as follows:    Date Pre-B Post-B Pre-L Post-L Pre-D Post-D  HS/NOC   2/5/18 92  161  136  276   2/6/18 198  125  112  211   2/7/18 201 65 220  286  153   2/8/18 327 42,100 113  127  199   2/9/18 181  88  121  233   2/10/18 208  108  132  170   2/11/18 170  85  347  59,45,71   .        235        156    Current Diabetes Regimen:  -Tresiba 20 units @HS  -Novolog 1 unit per 20 gram carb + Sliding Scale: 151 to 200 +1  201 to 250 +2  251 to 300 +3  301 + 4     Of note: Patient ate 2 eggs with one slice of 12 grain bread on 2/7 and 2/8 for breakfast.      Will forward to Dr. Aguero to review.     Tasha Harley LPN  Adult Endocrinology  [facility]

## 2018-02-12 NOTE — TELEPHONE ENCOUNTER
Dr. Aguero is requesting 1-2 weeks of blood glucose numbers from patient. Patient will fax numbers directly to endocrine clinic.  Lorrie Tracey LPN

## 2018-02-13 NOTE — TELEPHONE ENCOUNTER
Patient notified of recommended change and is agreeable with plan.    Per Laci Message:  I recommend to the following regimen     -Tresiba 20 units @HS   - Novolog 1 unit per 25 gram carb for breakfast and 1 unit per 25 gram CHO for lunch and dinner.   - continue Sliding Scale: 151 to 200 +1  201 to 250 +2  251 to 300 +3  301 + 4     Thanks,   Edward Harley LPN  Adult Endocrinology  [facility]

## 2018-02-20 NOTE — TELEPHONE ENCOUNTER
Refill requested from [facility] for Tresiba 20 units at HS.    Last Office Visit: 11/27/17  Future Appt: 3/26/18    Medication not on Endocrine Refill Protocol. Will route to  for review.    Tasha Harley LPN  Adult Endocrinology  [facility]

## 2018-02-26 ENCOUNTER — TELEPHONE (OUTPATIENT)
Dept: ENDOCRINOLOGY | Facility: CLINIC | Age: 68
End: 2018-02-26

## 2018-02-26 DIAGNOSIS — E10.9 TYPE 1 DIABETES MELLITUS WITHOUT COMPLICATION (H): Primary | ICD-10-CM

## 2018-02-27 NOTE — TELEPHONE ENCOUNTER
"Cheated and ate a cinnamon roll in the afternoon. Blood sugar before dinner reads \"Hi\" x2. Urine ketones negative. She takes Tresiba 20 units daily and Novolog about 10-15 units daily with a slide of 1/50>150.    Instructed patient to take 5 units now (basing off of sensitivity of about 50). Repeat glucose in 1 hour and again in 2 hours to make sure it is coming down. She is going to eat a piece of salmon tonight but no carbs which I said was ok to do.    She is going to set her alarm for 2 am and check to make sure she has not gone too low. I told her a goal glucose for tonight of between 150-350 is ok.      She will call clinic in the morning if needs a more urgent appointment or further issues arise.       "

## 2018-03-07 ENCOUNTER — TELEPHONE (OUTPATIENT)
Dept: ENDOCRINOLOGY | Facility: CLINIC | Age: 68
End: 2018-03-07

## 2018-03-07 NOTE — TELEPHONE ENCOUNTER
Left pt a vm advising her I have reviewed bg logs. Advised to increase Tresiba from 20 to 22 units. Keep Novolog doses the same. Continue to focus on accurate carb counting so as to avoid significant high or low bg levels. Please call clinic with questions or concerns.     Linda Diego, RN, BSN, CDE   [facility]

## 2018-03-15 ENCOUNTER — OFFICE VISIT (OUTPATIENT)
Dept: UROLOGY | Facility: CLINIC | Age: 68
End: 2018-03-15
Payer: COMMERCIAL

## 2018-03-15 ENCOUNTER — TELEPHONE (OUTPATIENT)
Dept: UROLOGY | Facility: CLINIC | Age: 68
End: 2018-03-15

## 2018-03-15 VITALS
RESPIRATION RATE: 18 BRPM | HEART RATE: 71 BPM | OXYGEN SATURATION: 96 % | TEMPERATURE: 97.1 F | SYSTOLIC BLOOD PRESSURE: 138 MMHG | DIASTOLIC BLOOD PRESSURE: 74 MMHG

## 2018-03-15 DIAGNOSIS — R35.0 FREQUENCY OF URINATION: ICD-10-CM

## 2018-03-15 DIAGNOSIS — N39.41 URGE INCONTINENCE OF URINE: Primary | ICD-10-CM

## 2018-03-15 DIAGNOSIS — N95.2 ATROPHIC VAGINITIS: ICD-10-CM

## 2018-03-15 DIAGNOSIS — R39.15 URINARY URGENCY: ICD-10-CM

## 2018-03-15 PROCEDURE — 99214 OFFICE O/P EST MOD 30 MIN: CPT | Performed by: UROLOGY

## 2018-03-15 ASSESSMENT — PAIN SCALES - GENERAL: PAINLEVEL: NO PAIN (0)

## 2018-03-15 NOTE — MR AVS SNAPSHOT
After Visit Summary   3/15/2018    Latoya Rodríguez    MRN: [id number]           Patient Information     Date Of Birth          1950        Visit Information        Provider Department      3/15/2018 8:30 AM Alberto Zhang MD [facility]        Today's Diagnoses     Urge incontinence of urine    -  1    Atrophic vaginitis        Urinary urgency        Frequency of urination          Care Instructions    [phone] to call if you havent heard from us in 1 week to schedule PTNS               Follow-ups after your visit        Follow-up notes from your care team     Return in about 3 months (around 6/15/2018).      Your next 10 appointments already scheduled     Mar 26, 2018  3:30 PM CDT   (Arrive by 3:15 PM)   Return Visit with Edward Aguero MD, MG ENDO NURSE   [facility] ([facility])    [id number] [place] [id number] [phone]              Who to contact     If you have questions or need follow up information about today's clinic visit or your schedule please contact [facility] directly at [phone].  Normal or non-critical lab and imaging results will be communicated to you by MyChart, letter or phone within 4 business days after the clinic has received the results. If you do not hear from us within 7 days, please contact the clinic through [employer] or phone. If you have a critical or abnormal lab result, we will notify you by phone as soon as possible.  Submit refill requests through [employer] or call your pharmacy and they will forward the refill request to us. Please allow 3 business days for your refill to be completed.          Additional Information About Your Visit        MyChart Information     [employer] gives you secure access to your electronic health record. If you see a primary care provider, you can also send messages to your care team and make appointments. If you have questions,  please call your primary care clinic.  If you do not have a primary care provider, please call [phone] and they will assist you.      [employer] is an electronic gateway that provides easy, online access to your medical records. With [employer], you can request a clinic appointment, read your test results, renew a prescription or communicate with your care team.     To access your existing account, please contact your [facility] or call [phone] for assistance.        Care EveryWhere ID     This is your Care EveryWhere ID. This could be used by other organizations to access your [facility] medical records  [id number]        Your Vitals Were     Pulse Temperature Respirations Pulse Oximetry          71 97.1  F (36.2  C) (Oral) 18 96%         Blood Pressure from Last 3 Encounters:   03/15/18 138/74   11/27/17 123/74   07/17/17 108/68    Weight from Last 3 Encounters:   11/27/17 67.5 kg (148 lb 13 oz)   07/17/17 62.5 kg (137 lb 12.6 oz)   06/23/17 61.1 kg (134 lb 11.2 oz)              Today, you had the following     No orders found for display       Primary Care Provider Office Phone # Fax #    Pricila Juarezgael Avila, URVASHI [facility] [phone]       [address]  [place] [address]        Equal Access to Services     ADRIANA EDUARDO : Hadii aad ku hadasho Soomaali, waaxda luqadaha, qaybta kaalmada adeegyada, waxjuanjose crane hayneena brewer . So [facility] [phone].    ATENCIÓN: Si habla español, tiene a tejada disposición servicios gratuitos de asistencia lingüística. Llame al [phone].    We comply with applicable federal civil rights laws and [place] laws. We do not discriminate on the basis of race, color, national origin, age, disability, sex, sexual orientation, or gender identity.            Thank you!     Thank you for choosing [facility]  for your care. Our goal is always to provide you with excellent care. Hearing back from our  patients is one way we can continue to improve our services. Please take a few minutes to complete the written survey that you may receive in the mail after your visit with us. Thank you!             Your Updated Medication List - Protect others around you: Learn how to safely use, store and throw away your medicines at www.disposemymeds.org.          This list is accurate as of 3/15/18  9:03 AM.  Always use your most recent med list.                   Brand Name Dispense Instructions for use Diagnosis    * ACCU-CHEK MICKY PLUS test strip   Generic drug:  blood glucose monitoring     400 each    TEST BLOOD SUGARS FOUR TIMES DAILY OR AS DIRECTED    Type 1 diabetes mellitus without complication (H)       * ACCU-CHEK MICKY PLUS test strip   Generic drug:  blood glucose monitoring     200 strip    TEST BLOOD SUGARS FOUR TIMES DAILY OR AS DIRECTED    Type 2 diabetes mellitus with hyperglycemia (H)       alendronate 70 MG tablet    FOSAMAX    12 tablet    SEE NOTES    Age-related osteoporosis without current pathological fracture       atorvastatin 10 MG tablet    LIPITOR    90 tablet    TAKE 1 TABLET(10 MG) BY MOUTH DAILY    Hyperlipidemia with target LDL less than 100       blood glucose monitoring lancets     306 each    USE TO TEST BLOOD SUGARS FOUR TIMES DAILY OR AS DIRECTED    Type 1 diabetes mellitus without complication (H)       calcium carbonate-vitamin D 500-400 MG-UNIT Tabs per tablet     180 tablet    Take 1 tablet by mouth 2 times daily    Age-related osteoporosis without current pathological fracture       estradiol 0.1 MG/GM cream    ESTRACE VAGINAL    42.5 g    Place 2 g vaginally twice a week Apply dime-sized amount daily for the first 2 weeks, then place twice a week thereafter.    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    Type 1 diabetes mellitus without complication (H)       KETO-DIASTIX Strp     1 each    1 strip by In Vitro route as needed    Type 2 diabetes mellitus with hyperglycemia (H)       mirabegron 50 MG 24 hr tablet    MYRBETRIQ    90 tablet    Take 1 tablet (50 mg) by mouth daily    Urinary frequency, Urgency of urination, Urge incontinence       solifenacin 10 MG tablet    VESICARE    90 tablet    Take 1 tablet (10 mg) by mouth daily    Urge incontinence, Urgency of urination, Urinary frequency       * Notice:  This list has 2 medication(s) that are the same as other medications prescribed for you. Read the directions carefully, and ask your doctor or other care provider to review them with you.      "

## 2018-03-15 NOTE — TELEPHONE ENCOUNTER
Financial Counselor Review:    Procedure to be performed (include CPT code):Yes 83962    Diagnosis code (include ICD-10 code): Urinanry Urgency  R39.15    Medication order (include J code):NA    Medication dose and frequency:N/A    Cosmetic procedure/medication:NA    Coverage information only:YES    Coverage and patient financial responsibility information:YES    Does patient need to be contacted by Financial Counselor:YES    Note: Do not use abbreviations and route encounter to  urology shannon Russell CMA

## 2018-03-15 NOTE — NURSING NOTE
"Latoya Rodríguez's goals for this visit include:   Chief Complaint   Patient presents with     RECHECK     yearly f/u urgency and frequency       She requests these members of her care team be copied on today's visit information: yes, PCP    PCP: Pricila Avila    Referring Provider:  Alberto Zhang MD  [address]  [place] [address]    Chief Complaint   Patient presents with     RECHECK     yearly f/u urgency and frequency       Initial /74 (Cuff Size: Adult Regular)  Pulse 71  Temp 97.1  F (36.2  C) (Oral)  Resp 18  SpO2 96% Estimated body mass index is 24.76 kg/(m^2) as calculated from the following:    Height as of 11/27/17: 1.651 m (5' 5\").    Weight as of 11/27/17: 67.5 kg (148 lb 13 oz).  Medication Reconciliation: complete    Do you need any medication refills at today's visit? None    Jess Hadley RN, BSN      "

## 2018-03-15 NOTE — PROGRESS NOTES
Reason for visit: F/u on therapy for urgency and frequency   Clinical Data: Ms. Rodríguez is a 68 y/o female with the above. She was started on oxybutynin 15 mg xl as well as estrace cream and referred to pelvic floor PT. She did better on the anticholinergic however continued to have a lot of urgency and some urge incontinence. She was then switched to Detrol which did not work, then Sanctura which did but was too expensive and now is on Vesicare and Myrbetriq and that is working better than anything else but she continues to have some mild leakage every day.  She is also using estrogen cream.  She was also diagnosed with type I DM in June of 2016 and had been drinking a lot of water due to excessive thirst which resulted in frequency.  This has improved since drinking less.    /74 (Cuff Size: Adult Regular)  Pulse 71  Temp 97.1  F (36.2  C) (Oral)  Resp 18  SpO2 96%    ASSESSMENT and PLAN: This is a 67 year old female with urinary urgency, frequency and urge incontinence with improvement on anticholinergic and she notices it occurs mostly when up and walking around or getting up from a sitting position. She was doing well with the Myrbetriq and the Vesicare. She also has stress incontinence but is not bothered enough to proceed with any intervention.   We also discussed options are to try PTNS, interstim, and botox or even to try periurethral bulking. She would like to consider PTNS  -continue Vesicare 10 mg.  -continue Myrbetriq 50 mg    -continue estrace  -start PTNS  -f/u with me after she has been on PTNS for at least 6 weeks.    Thank you for allowing me to participate in the care of Ms. Latoya Rodríguez and I will keep you updated on her progress.   Alberto Zhang MD     25 min spent with patient,  >50% in discussion and coordination of care.

## 2018-03-23 NOTE — TELEPHONE ENCOUNTER
These are the things we need to add to our PA before they will look at it:    1. Insurance came back saying we can only ask for the first 12 treatments so see if they do work for the patient,.  2.There other request is that we need the patients Voiding diary showing continued finding of OBS despite medical treatment  3. beneficiary has documented a willingness to attend in office treatment sessions, to comply with behavioral therapies, and to continue to keep voiding diaries including documentation of behavioral therapy compliance     Can you help me out on getting this all put together so I can sent in? They send this last night to me and told me that they need it by the end of the day today 03/23/18

## 2018-03-26 ENCOUNTER — OFFICE VISIT (OUTPATIENT)
Dept: ENDOCRINOLOGY | Facility: CLINIC | Age: 68
End: 2018-03-26
Payer: COMMERCIAL

## 2018-03-26 VITALS
DIASTOLIC BLOOD PRESSURE: 76 MMHG | SYSTOLIC BLOOD PRESSURE: 122 MMHG | HEIGHT: 65 IN | WEIGHT: 155.87 LBS | HEART RATE: 68 BPM | BODY MASS INDEX: 25.97 KG/M2 | OXYGEN SATURATION: 97 %

## 2018-03-26 DIAGNOSIS — E10.9 TYPE 1 DIABETES MELLITUS WITHOUT COMPLICATION (H): ICD-10-CM

## 2018-03-26 LAB — HBA1C MFR BLD: 8.3 % (ref 0–5.7)

## 2018-03-26 PROCEDURE — 83036 HEMOGLOBIN GLYCOSYLATED A1C: CPT | Performed by: INTERNAL MEDICINE

## 2018-03-26 PROCEDURE — 36415 COLL VENOUS BLD VENIPUNCTURE: CPT | Performed by: INTERNAL MEDICINE

## 2018-03-26 PROCEDURE — 99214 OFFICE O/P EST MOD 30 MIN: CPT | Performed by: INTERNAL MEDICINE

## 2018-03-26 NOTE — PROGRESS NOTES
Endocrinology Note         Latoya is a 67 year old female presents today for type 1 diabetes.    HPI  Ms. Latoya Rodríguez is a 67 years old woman with hx of type 1 diabetes in June 2016. She is here for follow up. Last seen 11/2017.    She was diagnosed with type 1 diabetes when she continued losing weight unintentionally. She was found to have an A1c >14. She was initially diagnosed with type 2 diabetes and was put on insulin and Metformin.  She was later found to be TORRES antibody positive and c peptide negative.  She has since been working closely with a diabetes educator and has learned carb counting, and has generally adapted well to the diagnosis.      Interim history:  A1C is 8.3 today. She is currently on Tresiba U-200 at 22 units at bedtime, Novolog 1 unit per 25 gram CHO and sensitivity 1 unit per 65 above 150 mg/dl. I reviewed her glucose meter, she has been testing 4 times a day with an overall average of 175 (SD 86) mg/dL. Her lowest blood sugar was in 41 and the highest BG was 478. She used [employer] Expert meter. She did have high fluctuation of glucoses. Most of postprandial glucoses were in 200-300 before meal. She eats extra snack between meal often particularly at night. She did not cover insulin for snack.     Her main concern is weight gain. She said appetite has increased significantly. She has had craving on carbohydrate, peanut butter and cheese. She mainly eats fish, vegetable and fruit. She is very careful about her diet She gained about 15-20 pounds after the diagnosis. Her highest weight was 180s about 2 years ago. She was intentionally losing weight with diet and exercise but regained after the diagnosis of type 1 diabetes. She would like her weight to be around 125 pounds. AM cortisol was 13. TFT on 7/10/2017 showed TSH 1.77, FT4 1.33 with negative TPO ab.    Past Medical History  Past Medical History:   Diagnosis Date     Cataract      Type 2 diabetes mellitus with hyperglycemia (H)  "6/24/2016   Type 1 diabetes, dx 6/2016  Hyperlipidemia  Osteoporosis     Allergies  No Known Allergies     Medications  Current Outpatient Prescriptions   Medication Sig Dispense Refill     insulin degludec (TRESIBA) 200 UNIT/ML pen Inject 18 Units Subcutaneous daily 9 mL 3     estradiol (ESTRACE VAGINAL) 0.1 MG/GM cream Place 2 g vaginally twice a week Apply dime-sized amount daily for the first 2 weeks, then place twice a week thereafter. 42.5 g 0     mirabegron (MYRBETRIQ) 50 MG 24 hr tablet Take 1 tablet (50 mg) by mouth daily 90 tablet 0     solifenacin (VESICARE) 10 MG tablet Take 1 tablet (10 mg) by mouth daily 90 tablet 0     alendronate (FOSAMAX) 70 MG tablet SEE NOTES 12 tablet 3     insulin pen needle (B-D U/F) 31G X 8 MM USE WITH NOVOLOG AND LANTUS(FIVE TIMES DAILY) 450 each 3     insulin aspart (NOVOLOG FLEXPEN) 100 UNIT/ML injection Inject 1 unit per 20 grams of carbohydrate (3-4 units per meal) or as directed 9 mL 3     atorvastatin (LIPITOR) 10 MG tablet TAKE 1 TABLET(10 MG) BY MOUTH DAILY 90 tablet 1     ACCU-CHEK MICKY PLUS test strip TEST BLOOD SUGARS FOUR TIMES DAILY OR AS DIRECTED 200 strip 4     blood glucose monitoring (ACCU-CHEK FASTCLIX) lancets USE TO TEST BLOOD SUGARS FOUR TIMES DAILY OR AS DIRECTED 306 each 2     insulin syringe-needle U-100 (B-D INSULIN SYRINGE HALF-UNIT) 31G X 5/16\" 0.3 ML Use 3 syringes daily or as directed. 100 each 0     ACCU-CHEK MICKY PLUS test strip TEST BLOOD SUGARS FOUR TIMES DAILY OR AS DIRECTED 400 each 1     calcium carbonate-vitamin D 500-400 MG-UNIT TABS tablt Take 1 tablet by mouth 2 times daily 180 tablet 3     Urine Glucose-Ketones Test (KETO-DIASTIX) STRP 1 strip by In Vitro route as needed 1 each 11     Family History  No thyroid disease or type 1 diabetes in the family    Social History  Social History   Substance Use Topics     Smoking status: Never Smoker     Smokeless tobacco: Never Used     Alcohol use Yes      Comment: 1 to 2 beers or glasses of " "wine 1 to 2 times per month   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    ROS  Constitutional: weight is up to 150 lbs. She would like to lose weight, +increased craving  Eyes: no vision change, diplopia or red eyes   Neck: no difficulty swallowing, no choking, no neck pain, no neck swelling  Cardiovascular: no chest pain, palpitations  Respiratory: no dyspnea, cough, shortness of breath or wheezing   GI: no nausea, vomiting, diarrhea or constipation, no abdominal pain   : no change in urine, no dysuria or hematuria  Musculoskeletal: no joint or muscle pain or swelling   Integumentary: no complaints  Neuro: no loss of strength or sensation, no numbness or tingling, no tremor, no dizziness, no headache   Endo: no polyuria or polydipsia, no temperature intolerance   Heme/Lymph: no concerning bumps, no bleeding problems   Allergy: no environmental allergies   Psych: frustrate about weight, no sleep problems.    Physical Exam  /76  Pulse 68  Ht 1.651 m (5' 5\")  Wt 70.7 kg (155 lb 13.8 oz)  SpO2 97%  BMI 25.94 kg/m2  Body mass index is 25.94 kg/(m^2).  Constitutional: no distress, comfortable, pleasant   Eyes: anicteric  Musculoskeletal: no edema   Skin:  no jaundice   Neurological: normal gait, no tremor on outstretched hands bilaterally  Psychological: appropriate mood     RESULTS  Lab Results   Component Value Date    A1C 8.3 03/26/2018    A1C 9.3 11/27/2017    A1C 6.6 07/17/2017    A1C 6.0 03/13/2017    A1C 7.1 09/16/2016     ENDO DIABETES Latest Ref Rng & Units 7/3/2017   CHOLESTEROL <200 mg/dL 158   LDL CHOLESTEROL, CALCULATED <100 mg/dL 81   HDL CHOLESTEROL >49 mg/dL 72   NON HDL CHOLESTEROL <130 mg/dL 86   TRIGLYCERIDES <150 mg/dL 26   ALBUMIN URINE MG/L mg/L 6   ALBUMIN URINE MG/G CR 0 - 25 mg/g Cr 9.43        Ref. Range 7/17/2017 08:39   Cortisol Serum Latest Ref Range: 4 - 22 ug/dL 13.0     ASSESSMENT:    Ms. Rodríguez is a 67 years old woman with hx of type 1 diabetes in June 2016. She is here for follow " up.    1. Type 1 diabetes: diagnosed in June 2016. She is adapting quite well.  A1c is improving to 8.3% today but she still has fluctuation of BG with some hypoglycemia and postprandial hyperglycemia.  continue Tresiba U-200 22 units daily  increase Novolog 1 unit per 20 gram CHO for all meal  Sensitivity 1 unit per 55 mg/dl above 150  She will send message thru [employer] in 2 weeks    2) DM complications:  Retinopathy: normal exam done recently.   Nephropathy: negative urine microalbumin 7/3/17  Neuropathy: none    PLAN:   continue Tresiba U-200 22 units daily  increase Novolog 1 unit per 20 gram CHO for all meal  Sensitivity 1 unit per 55 mg/dl above 150  RTC 4-5 months      Edward Aguero MD     Division of Diabetes and Endocrinology  Department of Medicine  [phone]

## 2018-03-26 NOTE — MR AVS SNAPSHOT
After Visit Summary   3/26/2018    Latoya Rodríguez    MRN: [id number]           Patient Information     Date Of Birth          1950        Visit Information        Provider Department      3/26/2018 3:30 PM Edward Aguero MD; MG JUAREZ NURSE [facility]        Today's Diagnoses     Type 1 diabetes mellitus without complication (H)          Care Instructions    - Please take a look at this website for resources and recipes [email or web address]    If you have any questions, please do not hesitate to call [facility] at [phone] and ask for Endocrinology clinic.    Sincerely,    Edward Aguero MD  Endocrinology      [facility]-Department of Endocrinology  Linda Diego RN, Diabetes Educator: [phone]  Clinic Nurses Clarisa Cordova: [phone]  Clinic Fax: [phone]  On-Call Endocrine at [facility] (after hours/weekends): [phone] option 4  Scheduling Line: [phone]    Appointment Reminders:  * Please bring meter with for staff to download  * If you are due ONLY for an A1C, it is scheduled with the nurse and will be done in clinic. You do not need to schedule a lab appointment. Fasting is not required for an A1C.  * Refill request should be submitted to your pharmacy. They will contact clinic for approval.                    Follow-ups after your visit        Follow-up notes from your care team     Return in about 4 months (around 7/26/2018).      Your next 10 appointments already scheduled     Aug 13, 2018  7:45 AM CDT   Return Visit with Edward Aguero MD, MG ENDO NURSE   [facility] ([facility]    [address] [address] [place] [id number] [phone]              Future tests that were ordered for you today     Open Standing Orders        Priority Remaining Interval Expires Ordered    Hemoglobin A1c POCT Routine 4/4 Q 3 MO  "3/26/2019 3/26/2018            Who to contact     If you have questions or need follow up information about today's clinic visit or your schedule please contact [facility] directly at [phone].  Normal or non-critical lab and imaging results will be communicated to you by [employer], letter or phone within 4 business days after the clinic has received the results. If you do not hear from us within 7 days, please contact the clinic through [employer] or phone. If you have a critical or abnormal lab result, we will notify you by phone as soon as possible.  Submit refill requests through [employer] or call your pharmacy and they will forward the refill request to us. Please allow 3 business days for your refill to be completed.          Additional Information About Your Visit        [employer] Information     [employer] gives you secure access to your electronic health record. If you see a primary care provider, you can also send messages to your care team and make appointments. If you have questions, please call your primary care clinic.  If you do not have a primary care provider, please call [phone] and they will assist you.      [employer] is an electronic gateway that provides easy, online access to your medical records. With [employer], you can request a clinic appointment, read your test results, renew a prescription or communicate with your care team.     To access your existing account, please contact your [facility] or call [phone] for assistance.        Care EveryWhere ID     This is your Care EveryWhere ID. This could be used by other organizations to access your [facility] medical records  [id number]        Your Vitals Were     Pulse Height Pulse Oximetry BMI (Body Mass Index)          68 1.651 m (5' 5\") 97% 25.94 kg/m2         Blood Pressure from Last 3 Encounters:   03/26/18 122/76   03/15/18 138/74   11/27/17 123/74    Weight from Last 3 Encounters:   03/26/18 " 70.7 kg (155 lb 13.8 oz)   11/27/17 67.5 kg (148 lb 13 oz)   07/17/17 62.5 kg (137 lb 12.6 oz)              We Performed the Following     Hemoglobin A1c POCT          Today's Medication Changes          These changes are accurate as of 3/26/18  4:07 PM.  If you have any questions, ask your nurse or doctor.               These medicines have changed or have updated prescriptions.        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 : Hadii aad ku hadasho Soregina, waaxda luqadaha, qaybta kaalmada adeegyada, waxay abdoulin hayaan cas brewer . So [facility] [phone].    ATENCIÓN: Si habla español, tiene a tejada disposición servicios gratuitos de asistencia lingüística. Cesar al [phone].    We comply with applicable federal civil rights laws and [place] laws. We do not discriminate on the basis of race, color, national origin, age, disability, sex, sexual orientation, or gender identity.            Thank you!     Thank you for choosing [facility]  for your care. Our goal is always to provide you with excellent care. Hearing back from our patients is one way we can continue to improve our services. Please take a few minutes to complete the written survey that you may receive in the mail after your visit with us. Thank you!             Your Updated Medication List - Protect others around you: Learn how to safely use, store and throw away your medicines at www.disposemymeds.org.          This list is accurate as of 3/26/18  4:07 PM.  Always  "use your most recent med list.                   Brand Name Dispense Instructions for use Diagnosis    * ACCU-CHEK MICKY PLUS test strip   Generic drug:  blood glucose monitoring     400 each    TEST BLOOD SUGARS FOUR TIMES DAILY OR AS DIRECTED    Type 1 diabetes mellitus without complication (H)       * ACCU-CHEK MICKY PLUS test strip   Generic drug:  blood glucose monitoring     200 strip    TEST BLOOD SUGARS FOUR TIMES DAILY OR AS DIRECTED    Type 2 diabetes mellitus with hyperglycemia (H)       alendronate 70 MG tablet    FOSAMAX    12 tablet    SEE NOTES    Age-related osteoporosis without current pathological fracture       atorvastatin 10 MG tablet    LIPITOR    90 tablet    TAKE 1 TABLET(10 MG) BY MOUTH DAILY    Hyperlipidemia with target LDL less than 100       blood glucose monitoring lancets     306 each    USE TO TEST BLOOD SUGARS FOUR TIMES DAILY OR AS DIRECTED    Type 1 diabetes mellitus without complication (H)       calcium carbonate-vitamin D 500-400 MG-UNIT Tabs per tablet     180 tablet    Take 1 tablet by mouth 2 times daily    Age-related osteoporosis without current pathological fracture       estradiol 0.1 MG/GM cream    ESTRACE VAGINAL    42.5 g    Place 2 g vaginally twice a week Apply dime-sized amount daily for the first 2 weeks, then place twice a week thereafter.    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    Type 1 diabetes mellitus without complication (H)       KETO-DIASTIX Strp     1 each    1 strip by In Vitro route as needed    Type 2 diabetes mellitus with hyperglycemia (H)       mirabegron 50 MG 24 hr tablet    MYRBETRIQ    90 tablet    Take 1 tablet (50 mg) by mouth daily    Urinary frequency, Urgency of urination, Urge incontinence       MULTIVITAMIN ADULT PO      Take 1 tablet by mouth daily        solifenacin 10 MG tablet    VESICARE    90 tablet    Take 1 tablet (10 mg) by mouth daily    Urge incontinence, Urgency of urination, Urinary frequency       * Notice:  This list has 2 medication(s) that are the same as other medications prescribed for you. Read the directions carefully, and ask your doctor or other care provider to review them with you.

## 2018-03-26 NOTE — PATIENT INSTRUCTIONS
- Please take a look at this website for resources and recipes [email or web address]    If you have any questions, please do not hesitate to call [facility] at [phone] and ask for Endocrinology clinic.    Sincerely,    Edward Aguero MD  Endocrinology      [facility]-Department of Endocrinology  Linda Diego RN, Diabetes Educator: [phone]  Clinic Nurses Clarisa Cordova: [phone]  Clinic Fax: [phone]  On-Call Endocrine at [facility] (after hours/weekends): [phone] option 4  Scheduling Line: [phone]    Appointment Reminders:  * Please bring meter with for staff to download  * If you are due ONLY for an A1C, it is scheduled with the nurse and will be done in clinic. You do not need to schedule a lab appointment. Fasting is not required for an A1C.  * Refill request should be submitted to your pharmacy. They will contact clinic for approval.

## 2018-03-26 NOTE — LETTER
3/26/2018         RE: Latoya Rodríguez  [address]  [place] [address]        Dear Colleague,    Thank you for referring your patient, Latoya Rodríguez, to the [facility]. Please see a copy of my visit note below.         Endocrinology Note         Latoya is a 67 year old female presents today for type 1 diabetes.    HPI  Ms. Latoya Rodríguez is a 67 years old woman with hx of type 1 diabetes in June 2016. She is here for follow up. Last seen 11/2017.    She was diagnosed with type 1 diabetes when she continued losing weight unintentionally. She was found to have an A1c >14. She was initially diagnosed with type 2 diabetes and was put on insulin and Metformin.  She was later found to be TORRES antibody positive and c peptide negative.  She has since been working closely with a diabetes educator and has learned carb counting, and has generally adapted well to the diagnosis.      Interim history:  A1C is 8.3 today. She is currently on Tresiba U-200 at 22 units at bedtime, Novolog 1 unit per 25 gram CHO and sensitivity 1 unit per 65 above 150 mg/dl. I reviewed her glucose meter, she has been testing 4 times a day with an overall average of 175 (SD 86) mg/dL. Her lowest blood sugar was in 41 and the highest BG was 478. She used Accuchek Expert meter. She did have high fluctuation of glucoses. Most of postprandial glucoses were in 200-300 before meal. She eats extra snack between meal often particularly at night. She did not cover insulin for snack.     Her main concern is weight gain. She said appetite has increased significantly. She has had craving on carbohydrate, peanut butter and cheese. She mainly eats fish, vegetable and fruit. She is very careful about her diet She gained about 15-20 pounds after the diagnosis. Her highest weight was 180s about 2 years ago. She was intentionally losing weight with diet and exercise but regained after the diagnosis of type 1 diabetes. She would like  "her weight to be around 125 pounds. AM cortisol was 13. TFT on 7/10/2017 showed TSH 1.77, FT4 1.33 with negative TPO ab.    Past Medical History  Past Medical History:   Diagnosis Date     Cataract      Type 2 diabetes mellitus with hyperglycemia (H) 6/24/2016   Type 1 diabetes, dx 6/2016  Hyperlipidemia  Osteoporosis     Allergies  No Known Allergies     Medications  Current Outpatient Prescriptions   Medication Sig Dispense Refill     insulin degludec (TRESIBA) 200 UNIT/ML pen Inject 18 Units Subcutaneous daily 9 mL 3     estradiol (ESTRACE VAGINAL) 0.1 MG/GM cream Place 2 g vaginally twice a week Apply dime-sized amount daily for the first 2 weeks, then place twice a week thereafter. 42.5 g 0     mirabegron (MYRBETRIQ) 50 MG 24 hr tablet Take 1 tablet (50 mg) by mouth daily 90 tablet 0     solifenacin (VESICARE) 10 MG tablet Take 1 tablet (10 mg) by mouth daily 90 tablet 0     alendronate (FOSAMAX) 70 MG tablet SEE NOTES 12 tablet 3     insulin pen needle (B-D U/F) 31G X 8 MM USE WITH NOVOLOG AND LANTUS(FIVE TIMES DAILY) 450 each 3     insulin aspart (NOVOLOG FLEXPEN) 100 UNIT/ML injection Inject 1 unit per 20 grams of carbohydrate (3-4 units per meal) or as directed 9 mL 3     atorvastatin (LIPITOR) 10 MG tablet TAKE 1 TABLET(10 MG) BY MOUTH DAILY 90 tablet 1     ACCU-CHEK MICKY PLUS test strip TEST BLOOD SUGARS FOUR TIMES DAILY OR AS DIRECTED 200 strip 4     blood glucose monitoring (ACCU-CHEK FASTCLIX) lancets USE TO TEST BLOOD SUGARS FOUR TIMES DAILY OR AS DIRECTED 306 each 2     insulin syringe-needle U-100 (B-D INSULIN SYRINGE HALF-UNIT) 31G X 5/16\" 0.3 ML Use 3 syringes daily or as directed. 100 each 0     ACCU-CHEK MICKY PLUS test strip TEST BLOOD SUGARS FOUR TIMES DAILY OR AS DIRECTED 400 each 1     calcium carbonate-vitamin D 500-400 MG-UNIT TABS tablt Take 1 tablet by mouth 2 times daily 180 tablet 3     Urine Glucose-Ketones Test (KETO-DIASTIX) STRP 1 strip by In Vitro route as needed 1 each 11 " "    Family History  No thyroid disease or type 1 diabetes in the family    Social History  Social History   Substance Use Topics     Smoking status: Never Smoker     Smokeless tobacco: Never Used     Alcohol use Yes      Comment: 1 to 2 beers or glasses of wine 1 to 2 times per month   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    ROS  Constitutional: weight is up to 150 lbs. She would like to lose weight, +increased craving  Eyes: no vision change, diplopia or red eyes   Neck: no difficulty swallowing, no choking, no neck pain, no neck swelling  Cardiovascular: no chest pain, palpitations  Respiratory: no dyspnea, cough, shortness of breath or wheezing   GI: no nausea, vomiting, diarrhea or constipation, no abdominal pain   : no change in urine, no dysuria or hematuria  Musculoskeletal: no joint or muscle pain or swelling   Integumentary: no complaints  Neuro: no loss of strength or sensation, no numbness or tingling, no tremor, no dizziness, no headache   Endo: no polyuria or polydipsia, no temperature intolerance   Heme/Lymph: no concerning bumps, no bleeding problems   Allergy: no environmental allergies   Psych: frustrate about weight, no sleep problems.    Physical Exam  /76  Pulse 68  Ht 1.651 m (5' 5\")  Wt 70.7 kg (155 lb 13.8 oz)  SpO2 97%  BMI 25.94 kg/m2  Body mass index is 25.94 kg/(m^2).  Constitutional: no distress, comfortable, pleasant   Eyes: anicteric  Musculoskeletal: no edema   Skin:  no jaundice   Neurological: normal gait, no tremor on outstretched hands bilaterally  Psychological: appropriate mood     RESULTS  Lab Results   Component Value Date    A1C 8.3 03/26/2018    A1C 9.3 11/27/2017    A1C 6.6 07/17/2017    A1C 6.0 03/13/2017    A1C 7.1 09/16/2016     ENDO DIABETES Latest Ref Rng & Units 7/3/2017   CHOLESTEROL <200 mg/dL 158   LDL CHOLESTEROL, CALCULATED <100 mg/dL 81   HDL CHOLESTEROL >49 mg/dL 72   NON HDL CHOLESTEROL <130 mg/dL 86   TRIGLYCERIDES <150 mg/dL 26   ALBUMIN URINE MG/L mg/L 6 "   ALBUMIN URINE MG/G CR 0 - 25 mg/g Cr 9.43        Ref. Range 7/17/2017 08:39   Cortisol Serum Latest Ref Range: 4 - 22 ug/dL 13.0     ASSESSMENT:    Ms. Rodríguez is a 67 years old woman with hx of type 1 diabetes in June 2016. She is here for follow up.    1. Type 1 diabetes: diagnosed in June 2016. She is adapting quite well.  A1c is improving to 8.3% today but she still has fluctuation of BG with some hypoglycemia and postprandial hyperglycemia.  continue Tresiba U-200 22 units daily  increase Novolog 1 unit per 20 gram CHO for all meal  Sensitivity 1 unit per 55 mg/dl above 150  She will send message thru [employer] in 2 weeks    2) DM complications:  Retinopathy: normal exam done recently.   Nephropathy: negative urine microalbumin 7/3/17  Neuropathy: none    PLAN:   continue Tresiba U-200 22 units daily  increase Novolog 1 unit per 20 gram CHO for all meal  Sensitivity 1 unit per 55 mg/dl above 150  RTC 4-5 months      Edward Aguero MD     Division of Diabetes and Endocrinology  Department of Medicine  [phone]

## 2018-03-26 NOTE — NURSING NOTE
"Latoya Rodríguez's goals for this visit include: Follow up Diabetes  She requests these members of her care team be copied on today's visit information: YES    PCP: Pricila Avila    Referring Provider:  No referring provider defined for this encounter.    Chief Complaint   Patient presents with     Diabetes       Initial Ht 1.651 m (5' 5\")  Wt 70.7 kg (155 lb 13.8 oz)  BMI 25.94 kg/m2 Estimated body mass index is 25.94 kg/(m^2) as calculated from the following:    Height as of this encounter: 1.651 m (5' 5\").    Weight as of this encounter: 70.7 kg (155 lb 13.8 oz).  Medication Reconciliation: complete    Do you need any medication refills at today's visit? NO    "

## 2018-03-27 NOTE — TELEPHONE ENCOUNTER
Received return call from patient who is aware that the financial counseling team is in the process of reviewing the request for PTNS. Patient aware of the need to complete a bladder diary.  Patient address verified and bladder diary mailed to patient. Patient aware to mail completed form back to clinic. Informed patient that the insurance company also needs documented willingness to attend in office treatment sessions, but writer has sent a message to the financially counseling team for clarification on what is needed. Informed patient that she will be contacted with additional information. Patient verbalized understanding and was comfortable with plan.    Jess Hadley RN, BSN

## 2018-03-27 NOTE — TELEPHONE ENCOUNTER
Attempted to reach patient by phone, but no answer. Left generic message with request for patient to return call to clinic. When patient returns call, will discuss the information below from the financial counseling team. If patient has completed a bladder diary, will encourage patient to mail or fax the diary to clinic. If the bladder diary has not been completed, will send patient a diary.     Message sent to financial counseling team for clarification of #3 listed in note below.    Jess Hadley RN, BSN

## 2018-04-09 NOTE — TELEPHONE ENCOUNTER
Patient returned call and is aware that the requested medications can be refilled and that it is encouraged for her to keep her follow up with Dr. Zhang as scheduled. Patient verbalized understanding. Medications refilled per protocol. Patient would like paper copy of prescriptions mailed to her home. Patient address verified and prescriptions mailed to patient's address on file.    Jess Hadley RN, BSN     mildly reduced LV function

## 2018-04-17 ENCOUNTER — TELEPHONE (OUTPATIENT)
Dept: ENDOCRINOLOGY | Facility: CLINIC | Age: 68
End: 2018-04-17

## 2018-04-17 NOTE — TELEPHONE ENCOUNTER
Bg report faxed to clinic by pt. Reviewed. Per Dr Leon, no need for insulin dose changes at this time.     Linda Diego RN, BSN, CDE   [facility]

## 2018-04-23 ENCOUNTER — MYC MEDICAL ADVICE (OUTPATIENT)
Dept: ENDOCRINOLOGY | Facility: CLINIC | Age: 68
End: 2018-04-23

## 2018-04-23 ENCOUNTER — MYC REFILL (OUTPATIENT)
Dept: UROLOGY | Facility: CLINIC | Age: 68
End: 2018-04-23

## 2018-04-23 DIAGNOSIS — N39.41 URGE INCONTINENCE: ICD-10-CM

## 2018-04-23 DIAGNOSIS — R35.0 URINARY FREQUENCY: ICD-10-CM

## 2018-04-23 DIAGNOSIS — R39.15 URGENCY OF URINATION: ICD-10-CM

## 2018-04-23 RX ORDER — MIRABEGRON 50 MG/1
50 TABLET, EXTENDED RELEASE ORAL DAILY
Qty: 90 TABLET | Refills: 3 | Status: SHIPPED | OUTPATIENT
Start: 2018-04-23 | End: 2019-02-11

## 2018-04-23 RX ORDER — SOLIFENACIN SUCCINATE 10 MG/1
10 TABLET, FILM COATED ORAL DAILY
Qty: 90 TABLET | Refills: 3 | Status: SHIPPED | OUTPATIENT
Start: 2018-04-23 | End: 2019-02-11

## 2018-04-23 NOTE — TELEPHONE ENCOUNTER
Message from [employer]:  Nicky Martinez LPN Mon Apr 23, 2018 2:32 PM        ----- Message -----   From: Latoya Rodríguez   Sent: 4/23/2018 1:05 PM   To: Urology [facility] Triage-  Subject: Medication Renewal Request     Original authorizing provider: MD Latoya Jaramillo would like a refill of the following medications:  mirabegron (MYRBETRIQ) 50 MG 24 hr tablet [Alberto Zhang MD]  solifenacin (VESICARE) 10 MG tablet [Alberto Zhang MD]    Preferred pharmacy: Other - Fax prescriptions to [facility] [phone]    Comment:

## 2018-04-23 NOTE — TELEPHONE ENCOUNTER
Chart reviewed. Patient's last office visit 3/15/18 with Dr. Zhang. Refill for myrbetriq and vesicare approved per written order from Dr. Zhang. Called and spoke to patient who requests for the paper prescriptions to be mailed to her home. Patient address verified and prescriptions mailed.    Jess Hadley RN, BSN

## 2018-05-07 NOTE — TELEPHONE ENCOUNTER
Patient can write a letter so we can submit it with voiding diary and clinic notes. This PA will need to be restarted

## 2018-05-13 ENCOUNTER — MYC MEDICAL ADVICE (OUTPATIENT)
Dept: ENDOCRINOLOGY | Facility: CLINIC | Age: 68
End: 2018-05-13

## 2018-05-13 DIAGNOSIS — E13.9 LADA (LATENT AUTOIMMUNE DIABETES MELLITUS IN ADULTS) (H): ICD-10-CM

## 2018-05-14 NOTE — TELEPHONE ENCOUNTER
Rx for Tresiba sent to pharmacy, per pt's request. Updated to show pt is taking 22 units daily.    Linda Diego, RN, BSN, CDE   [facility]

## 2018-05-15 NOTE — TELEPHONE ENCOUNTER
Attempted to reach patient by phone, but no answer. Left generic message with request for patient to return call to clinic. When patient returns call, will inform her that we have received information from financial counseling. Per financial counseling, patient should complete bladder diaries and write a letter stating her willingness to complete weekly PTNS treatments in clinic. Once completed, will request for patient to fax to clinic or bring to clinic so a prior authorization for PTNS can be completed.    Jess Hadley RN, BSN

## 2018-05-15 NOTE — TELEPHONE ENCOUNTER
"Received return call from patient who reports that she did receive the bladder diaries and stated, \"I think I am going to put things on hold for now.\" Informed patient that if she'd like to proceed with PTNS in the future, insurance is requiring the bladder diaries to be completed as well as a letter stating that she would complete weekly treatments in clinic. Patient verbalized understanding and will contact the clinic with any questions or concerns in the future.    Jess Hadley RN, BSN    "

## 2018-06-03 DIAGNOSIS — E10.9 TYPE 1 DIABETES MELLITUS WITHOUT COMPLICATION (H): ICD-10-CM

## 2018-06-05 RX ORDER — LANCETS
EACH MISCELLANEOUS
Qty: 306 EACH | Refills: 0 | Status: SHIPPED | OUTPATIENT
Start: 2018-06-05 | End: 2018-08-27

## 2018-06-05 NOTE — TELEPHONE ENCOUNTER
blood glucose monitoring (ACCU-CHEK FASTCLIX) lancets 306 each 2 10/30/2017     Sig: USE TO TEST BLOOD SUGARS FOUR TIMES DAILY OR AS DIRECTED    Class: E-Prescribe      Last OV with Endo: 3/26/2018    Next 5 appointments (look out 90 days)     Jul 27, 2018  1:30 PM CDT   [facility]    [address] [place] [id number] [phone]            Aug 13, 2018  7:45 AM CDT   Return Visit with Edward Aguero MD, MG ENDO NURSE   [facility]    [address] [place] [id number] [phone]                Diabetic Supplies Protocol Failed6/3 2:47 PM   Recent (6 mo) or future (30 days) visit within the authorizing provider's specialty    Patient is 18 years of age or older     Annamaria Feldman RN

## 2018-06-17 DIAGNOSIS — E78.5 HYPERLIPIDEMIA WITH TARGET LDL LESS THAN 100: ICD-10-CM

## 2018-06-18 RX ORDER — ATORVASTATIN CALCIUM 10 MG/1
TABLET, FILM COATED ORAL
Qty: 90 TABLET | Refills: 0 | Status: SHIPPED | OUTPATIENT
Start: 2018-06-18 | End: 2018-07-27

## 2018-06-18 NOTE — TELEPHONE ENCOUNTER
atorvastatin (LIPITOR) 10 MG tablet 90 tablet 1 11/21/2017  No   Sig: TAKE 1 TABLET(10 MG) BY MOUTH DAILY     Last OV with Pricila Avila CNP: 6/23/2017    Next 5 appointments (look out 90 days)     Jul 27, 2018  1:30 PM CDT   PHYSICAL with URVASHI Hunter CNP   [facility]    [id number] [place] [id number] [phone]            Aug 13, 2018  7:45 AM CDT   Return Visit with Edward Aguero MD, MG ENDO NURSE   [facility]    [address] [place] [id number] [phone]                LDL Cholesterol Calculated   Date Value Ref Range Status   07/03/2017 81 <100 mg/dL Final     Comment:     Desirable:       <100 mg/dl     Creatinine   Date Value Ref Range Status   09/16/2016 0.65 0.52 - 1.04 mg/dL Final     Statins Protocol Passed6/17 4:59 PM   LDL on file in past 12 months    No abnormal creatine kinase in past 12 months    Recent (12 mo) or future (30 days) visit within the authorizing provider's specialty    Patient is age 18 or older    No active pregnancy on record    No positive pregnancy test in past 12 months     Refilled per [facility] protocol.    Annamaria Feldman RN

## 2018-06-20 DIAGNOSIS — E11.65 TYPE 2 DIABETES MELLITUS WITH HYPERGLYCEMIA (H): ICD-10-CM

## 2018-06-21 RX ORDER — BLOOD SUGAR DIAGNOSTIC
STRIP MISCELLANEOUS
Qty: 360 STRIP | Refills: 1 | Status: SHIPPED | OUTPATIENT
Start: 2018-06-21 | End: 2018-11-29

## 2018-06-21 NOTE — TELEPHONE ENCOUNTER
ACCU-CHEK MICKY PLUS test strip 200 strip 4 10/30/2017  No   Sig: TEST BLOOD SUGARS FOUR TIMES DAILY OR AS DIRECTED     Last OV with Endo: 3/26/2018    Next 5 appointments (look out 90 days)     Jul 27, 2018  1:30 PM CDT   PHYSICAL with URVASHI Hunter CNP   [facility])    [address] [place] [id number]   [phone]            Aug 13, 2018  7:45 AM CDT   Return Visit with Edward Aguero MD, MG ENDO NURSE   [facility])    [address] [place] [id number] [phone]                Diabetic Supplies Protocol Failed6/20 12:46 PM   Recent (6 mo) or future (30 days) visit within the authorizing provider's specialty    Patient is 18 years of age or older     Annamaria Feldman RN

## 2018-07-06 DIAGNOSIS — Z79.4 TYPE 2 DIABETES MELLITUS WITH HYPERGLYCEMIA, WITH LONG-TERM CURRENT USE OF INSULIN (H): ICD-10-CM

## 2018-07-06 DIAGNOSIS — E11.65 TYPE 2 DIABETES MELLITUS WITH HYPERGLYCEMIA, WITH LONG-TERM CURRENT USE OF INSULIN (H): ICD-10-CM

## 2018-07-06 NOTE — TELEPHONE ENCOUNTER
Patient requesting updated prescription for Novolog so can receive 90 day supply.    Patient confirmed dose of 1 unit per 20 gm carb for breakfast and lunch, 1 unit per 18 gm carb for dinner.    Prescription completed.    Tasha Harley LPN  Adult Endocrinology  [facility]

## 2018-07-09 ENCOUNTER — MEDICAL CORRESPONDENCE (OUTPATIENT)
Dept: HEALTH INFORMATION MANAGEMENT | Facility: CLINIC | Age: 68
End: 2018-07-09

## 2018-07-25 ENCOUNTER — TELEPHONE (OUTPATIENT)
Dept: ENDOCRINOLOGY | Facility: CLINIC | Age: 68
End: 2018-07-25

## 2018-07-25 NOTE — TELEPHONE ENCOUNTER
Left vm for pt on home phone. Advised bg log sheet reviewed. No need for insulin dose changes but please take insulin for snacks using a 1:25 ratio, same as for meals. Pt reminded of appt with Dr Leon in Aug.     Linda Diego, RN, BSN, CDE   [facility]

## 2018-07-27 ENCOUNTER — OFFICE VISIT (OUTPATIENT)
Dept: PEDIATRICS | Facility: CLINIC | Age: 68
End: 2018-07-27
Payer: COMMERCIAL

## 2018-07-27 VITALS
SYSTOLIC BLOOD PRESSURE: 118 MMHG | WEIGHT: 159.1 LBS | DIASTOLIC BLOOD PRESSURE: 61 MMHG | TEMPERATURE: 96.5 F | HEART RATE: 82 BPM | BODY MASS INDEX: 26.48 KG/M2 | OXYGEN SATURATION: 97 %

## 2018-07-27 DIAGNOSIS — Z12.31 ENCOUNTER FOR SCREENING MAMMOGRAM FOR BREAST CANCER: ICD-10-CM

## 2018-07-27 DIAGNOSIS — E78.5 HYPERLIPIDEMIA WITH TARGET LDL LESS THAN 100: ICD-10-CM

## 2018-07-27 DIAGNOSIS — M81.0 OSTEOPOROSIS WITHOUT CURRENT PATHOLOGICAL FRACTURE, UNSPECIFIED OSTEOPOROSIS TYPE: ICD-10-CM

## 2018-07-27 DIAGNOSIS — Z00.00 ENCOUNTER FOR ROUTINE ADULT HEALTH EXAMINATION WITHOUT ABNORMAL FINDINGS: Primary | ICD-10-CM

## 2018-07-27 DIAGNOSIS — Z78.0 POSTMENOPAUSAL STATUS: ICD-10-CM

## 2018-07-27 DIAGNOSIS — Z23 NEED FOR PROPHYLACTIC VACCINATION AGAINST STREPTOCOCCUS PNEUMONIAE (PNEUMOCOCCUS): ICD-10-CM

## 2018-07-27 DIAGNOSIS — Z13.29 SCREENING FOR THYROID DISORDER: ICD-10-CM

## 2018-07-27 DIAGNOSIS — Z12.11 SCREENING FOR COLON CANCER: ICD-10-CM

## 2018-07-27 DIAGNOSIS — M65.30 TRIGGER FINGER, ACQUIRED: ICD-10-CM

## 2018-07-27 DIAGNOSIS — E13.9 LADA (LATENT AUTOIMMUNE DIABETES MELLITUS IN ADULTS) (H): ICD-10-CM

## 2018-07-27 PROCEDURE — G0009 ADMIN PNEUMOCOCCAL VACCINE: HCPCS | Performed by: NURSE PRACTITIONER

## 2018-07-27 PROCEDURE — 90732 PPSV23 VACC 2 YRS+ SUBQ/IM: CPT | Performed by: NURSE PRACTITIONER

## 2018-07-27 PROCEDURE — G0439 PPPS, SUBSEQ VISIT: HCPCS | Performed by: NURSE PRACTITIONER

## 2018-07-27 RX ORDER — ATORVASTATIN CALCIUM 10 MG/1
TABLET, FILM COATED ORAL
Qty: 90 TABLET | Refills: 1 | Status: SHIPPED | OUTPATIENT
Start: 2018-07-27 | End: 2019-03-16

## 2018-07-27 NOTE — NURSING NOTE
Screening Questionnaire for Adult Immunization    Are you sick today?   No   Do you have allergies to medications, food, a vaccine component or latex?   No   Have you ever had a serious reaction after receiving a vaccination?   No   Do you have a long-term health problem with heart disease, lung disease, asthma, kidney disease, metabolic disease (e.g. diabetes), anemia, or other blood disorder?   Yes   Do you have cancer, leukemia, HIV/AIDS, or any other immune system problem?   No   In the past 3 months, have you taken medications that affect  your immune system, such as prednisone, other steroids, or anticancer drugs; drugs for the treatment of rheumatoid arthritis, Crohn s disease, or psoriasis; or have you had radiation treatments?   No   Have you had a seizure, or a brain or other nervous system problem?   No   During the past year, have you received a transfusion of blood or blood     products, or been given immune (gamma) globulin or antiviral drug?   No   For women: Are you pregnant or is there a chance you could become        pregnant during the next month?   No   Have you received any vaccinations in the past 4 weeks?   No     Immunization questionnaire was positive for at least one answer.  Notified of diabetes.        Per orders of URVASHI Gutierrez CNP, injection of pneumonia 23 given by Mary Aguilar. Patient instructed to remain in clinic for 15 minutes afterwards, and to report any adverse reaction to me immediately.       Screening performed by Mary Aguilar on 7/27/2018 at 2:15 PM.

## 2018-07-27 NOTE — PATIENT INSTRUCTIONS
PLAN:   1.   Symptomatic therapy suggested: Continue current medications.  2.  Orders Placed This Encounter   Medications     atorvastatin (LIPITOR) 10 MG tablet     Sig: TAKE 1 TABLET(10 MG) BY MOUTH DAILY     Dispense:  90 tablet     Refill:  1     Orders Placed This Encounter   Procedures     MA Screen Bilateral w/Yony     DX Hip/Pelvis/Spine     Pneumococcal vaccine 23 valent PPSV23  (Pneumovax) [13442]     ADMIN MEDICARE: Pneumococcal Vaccine ()     **Comprehensive metabolic panel FUTURE anytime     Lipid panel reflex to direct LDL Fasting     Albumin Random Urine Quantitative with Creat Ratio     **TSH with free T4 reflex FUTURE anytime     Thyroid peroxidase antibody     GASTROENTEROLOGY ADULT REF PROCEDURE ONLY [facility] [phone]; No Provider Preference     ORTHOPEDICS ADULT REFERRAL       3. Patient needs to follow up in if no improvement,or sooner if worsening of symptoms or other symptoms develop.  FURTHER TESTING:       - DXA (bone density) scan       - mammogram  CONSULTATION/REFERRAL to Colonoscopy   FUTURE LABS:       - Schedule a fasting blood draw   Will follow up and/or notify patient of  results via My Chart to determine further need for followup  Follow up office visit in one year for annual health maintenance exam, sooner PRN.    Preventive Health Recommendations    Female Ages 65 +    Yearly exam:     See your health care provider every year in order to  o Review health changes.   o Discuss preventive care.    o Review your medicines if your doctor has prescribed any.      You no longer need a yearly Pap test unless you've had an abnormal Pap test in the past 10 years. If you have vaginal symptoms, such as bleeding or discharge, be sure to talk with your provider about a Pap test.      Every 1 to 2 years, have a mammogram.  If you are over 69, talk with your health care provider about whether or not you want to continue having screening mammograms.      Every 10 years, have a  colonoscopy. Or, have a yearly FIT test (stool test). These exams will check for colon cancer.       Have a cholesterol test every 5 years, or more often if your doctor advises it.       Have a diabetes test (fasting glucose) every three years. If you are at risk for diabetes, you should have this test more often.       At age 65, have a bone density scan (DEXA) to check for osteoporosis (brittle bone disease).    Shots:    Get a flu shot each year.    Get a tetanus shot every 10 years.    Talk to your doctor about your pneumonia vaccines. There are now two you should receive - Pneumovax (PPSV 23) and Prevnar (PCV 13).    Talk to your pharmacist about the shingles vaccine.    Talk to your doctor about the hepatitis B vaccine.    Nutrition:     Eat at least 5 servings of fruits and vegetables each day.      Eat whole-grain bread, whole-wheat pasta and brown rice instead of white grains and rice.      Get adequate Calcium and Vitamin D.     Lifestyle    Exercise at least 150 minutes a week (30 minutes a day, 5 days a week). This will help you control your weight and prevent disease.      Limit alcohol to one drink per day.      No smoking.       Wear sunscreen to prevent skin cancer.       See your dentist twice a year for an exam and cleaning.      See your eye doctor every 1 to 2 years to screen for conditions such as glaucoma, macular degeneration and cataracts.  It was a pleasure seeing you today at the [facility]. Thank you for allowing us to care for you today. We truly hope we provided you with the excellent service you deserve. Please let us know if there is anything else we can do for you so we can be sure you are leaving completley satisfied with your care experience.       General information about your clinic   Clinic Hours Lab Hours (Appointments are required)   Mon-Thurs: 7:30 AM - 7 PM Mon-Thurs: 7:30 AM - 7 PM   Fri: 7:30 AM - 5 PM Fri: 7:30 AM - 5 PM        After Hours  Nurse Advise & Appts:  [facility]: [phone]  Annie On Call: to make appointments anytime: [phone] On Call Physician: call [phone] and answering service will page the on call physician.        For urgent appointments, please call [phone] and ask for the triage nurse or your care team clinic nurse.  How to contact my care team:  MyChart: [email or web address]   Phone: [phone]   Fax: [phone]       [facility]:   Phone: [phone]  Hours: 8:00 AM - 6:00 PM  Medication Refills:  Call your pharmacy and they will forward the refill to us. Please allow 3 business days for your refills to be completed.       Normal or non-critical lab and imaging results will be communicated to you by MyChart, letter or phone within 7 days.  If you do not hear from us within 10 days, please call the clinic. If you have a critical or abnormal lab result, we will notify you by phone as soon as possible.       We now have PWIC (Pediatric Walk in Care)  Monday-Friday from 7:30-4. Simply walk in and be seen for your urgent needs like cough, fever, rash, diarrhea or vomiting, pink eye, UTI. No appointments needed. Ask one of the team for more information      -Your Care Team:    Dr. Farzana Benites - Internal Medicine/Pediatrics   Dr. Leonidas Hollingsworth - Family Medicine  Dr. Mona Myers - Pediatrics  Dr. Sushma Sewell - Pediatrics  Pricila Avila CNP - Family Practice Nurse Practitioner

## 2018-07-27 NOTE — NURSING NOTE
"Chief Complaint   Patient presents with     Wellness Visit     Physical       Initial /61 (BP Location: Right arm, Patient Position: Sitting, Cuff Size: Adult Regular)  Pulse 82  Temp 96.5  F (35.8  C) (Temporal)  Wt 159 lb 1.6 oz (72.2 kg)  SpO2 97%  Breastfeeding? No  BMI 26.48 kg/m2 Estimated body mass index is 26.48 kg/(m^2) as calculated from the following:    Height as of 3/26/18: 5' 5\" (1.651 m).    Weight as of this encounter: 159 lb 1.6 oz (72.2 kg).  Medication Reconciliation: complete      PENELOPE Lopez      "

## 2018-07-27 NOTE — MR AVS SNAPSHOT
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   Symptomatic therapy suggested: Continue current medications.  2.  Orders Placed This Encounter   Medications     atorvastatin (LIPITOR) 10 MG tablet     Sig: TAKE 1 TABLET(10 MG) BY MOUTH DAILY     Dispense:  90 tablet     Refill:  1     Orders Placed This Encounter   Procedures     MA Screen Bilateral w/Yony     DX Hip/Pelvis/Spine     Pneumococcal vaccine 23 valent PPSV23  (Pneumovax) [01256]     ADMIN MEDICARE: Pneumococcal Vaccine ()     **Comprehensive metabolic panel FUTURE anytime     Lipid panel reflex to direct LDL Fasting     Albumin Random Urine Quantitative with Creat Ratio     **TSH with free T4 reflex FUTURE anytime     Thyroid peroxidase antibody     GASTROENTEROLOGY ADULT REF PROCEDURE ONLY [facility] [phone]; No Provider Preference     ORTHOPEDICS ADULT REFERRAL       3. Patient needs to follow up in if no improvement,or sooner if worsening of symptoms or other symptoms develop.  FURTHER TESTING:       - DXA (bone density) scan       - mammogram  CONSULTATION/REFERRAL to  Colonoscopy   FUTURE LABS:       - Schedule a fasting blood draw   Will follow up and/or notify patient of  results via My Chart to determine further need for followup  Follow up office visit in one year for annual health maintenance exam, sooner PRN.    Preventive Health Recommendations    Female Ages 65 +    Yearly exam:     See your health care provider every year in order to  o Review health changes.   o Discuss preventive care.    o Review your medicines if your doctor has prescribed any.      You no longer need a yearly Pap test unless you've had an abnormal Pap test in the past 10 years. If you have vaginal symptoms, such as bleeding or discharge, be sure to talk with your provider about a Pap test.      Every 1 to 2 years, have a mammogram.  If you are over 69, talk with your health care provider about whether or not you want to continue having screening mammograms.      Every 10 years, have a colonoscopy. Or, have a yearly FIT test (stool test). These exams will check for colon cancer.       Have a cholesterol test every 5 years, or more often if your doctor advises it.       Have a diabetes test (fasting glucose) every three years. If you are at risk for diabetes, you should have this test more often.       At age 65, have a bone density scan (DEXA) to check for osteoporosis (brittle bone disease).    Shots:    Get a flu shot each year.    Get a tetanus shot every 10 years.    Talk to your doctor about your pneumonia vaccines. There are now two you should receive - Pneumovax (PPSV 23) and Prevnar (PCV 13).    Talk to your pharmacist about the shingles vaccine.    Talk to your doctor about the hepatitis B vaccine.    Nutrition:     Eat at least 5 servings of fruits and vegetables each day.      Eat whole-grain bread, whole-wheat pasta and brown rice instead of white grains and rice.      Get adequate Calcium and Vitamin D.     Lifestyle    Exercise at least 150 minutes a week (30 minutes a day, 5 days a  week). This will help you control your weight and prevent disease.      Limit alcohol to one drink per day.      No smoking.       Wear sunscreen to prevent skin cancer.       See your dentist twice a year for an exam and cleaning.      See your eye doctor every 1 to 2 years to screen for conditions such as glaucoma, macular degeneration and cataracts.  It was a pleasure seeing you today at the [facility]. Thank you for allowing us to care for you today. We truly hope we provided you with the excellent service you deserve. Please let us know if there is anything else we can do for you so we can be sure you are leaving completley satisfied with your care experience.       General information about your clinic   Clinic Hours Lab Hours (Appointments are required)   Mon-Thurs: 7:30 AM - 7 PM Mon-Thurs: 7:30 AM - 7 PM   Fri: 7:30 AM - 5 PM Fri: 7:30 AM - 5 PM        After Hours Nurse Advise & Appts:  [facility]: [phone]  Annie On Call: to make appointments anytime: [phone] On Call Physician: call [phone] and answering service will page the on call physician.        For urgent appointments, please call [phone] and ask for the triage nurse or your care team clinic nurse.  How to contact my care team:  [place]: [email or web address]   Phone: [phone]   Fax: [phone]       [facility]:   Phone: [phone]  Hours: 8:00 AM - 6:00 PM  Medication Refills:  Call your pharmacy and they will forward the refill to us. Please allow 3 business days for your refills to be completed.       Normal or non-critical lab and imaging results will be communicated to you by MyChart, letter or phone within 7 days.  If you do not hear from us within 10 days, please call the clinic. If you have a critical or abnormal lab result, we will notify you by phone as soon as possible.       We now have PWIC (Pediatric Walk in Care)  Monday-Friday from 7:30-4. Simply  walk in and be seen for your urgent needs like cough, fever, rash, diarrhea or vomiting, pink eye, UTI. No appointments needed. Ask one of the team for more information      -Your Care Team:    Dr. Farzana Benites - Internal Medicine/Pediatrics   Dr. Leonidas Hollingsworth - Family Medicine  Dr. Mona Myers - Pediatrics  Dr. Sushma Sewell - Pediatrics  Pricila Avila CNP - Family Practice Nurse Practitioner                         Follow-ups after your visit        Additional Services     GASTROENTEROLOGY ADULT REF PROCEDURE ONLY [facility] [phone]; No Provider Preference           ORTHOPEDICS ADULT REFERRAL       Your provider has referred you to: [facility]: [facility] [phone]   [email or web address]    Please be aware that coverage of these services is subject to the terms and limitations of your health insurance plan.  Call member services at your health plan with any benefit or coverage questions.      Please bring the following to your appointment:    >>   Any x-rays, CTs or MRIs which have been performed.  Contact the facility where they were done to arrange for  prior to your scheduled appointment.    >>   List of current medications   >>   This referral request   >>   Any documents/labs given to you for this referral                  Your next 10 appointments already scheduled     Aug 13, 2018  7:45 AM CDT   Return Visit with Edward Aguero MD, MG ENDO NURSE   [facility] ([facility])    [address] [place] [id number] [phone]              Future tests that were ordered for you today     Open Future Orders        Priority Expected Expires Ordered    Thyroid peroxidase antibody Routine  7/27/2019 7/27/2018    DX Hip/Pelvis/Spine Routine  7/27/2019 7/27/2018    **TSH with free T4 reflex FUTURE anytime Routine  7/27/2018 7/27/2019 7/27/2018    **Comprehensive metabolic panel FUTURE anytime Routine 7/27/2018 7/27/2019 7/27/2018    Lipid panel reflex to direct LDL Fasting Routine 7/27/2018 7/27/2019 7/27/2018    Albumin Random Urine Quantitative with Creat Ratio Routine 7/27/2018 7/27/2019 7/27/2018    MA Screen Bilateral w/Yony Routine  7/27/2019 7/27/2018            Who to contact     If you have questions or need follow up information about today's clinic visit or your schedule please contact [facility] directly at [phone].  Normal or non-critical lab and imaging results will be communicated to you by Fizhart, letter or phone within 4 business days after the clinic has received the results. If you do not hear from us within 7 days, please contact the clinic through Fizhart or phone. If you have a critical or abnormal lab result, we will notify you by phone as soon as possible.  Submit refill requests through Mapiliary or call your pharmacy and they will forward the refill request to us. Please allow 3 business days for your refill to be completed.          Additional Information About Your Visit        FizharRent My Items Information     Mapiliary gives you secure access to your electronic health record. If you see a primary care provider, you can also send messages to your care team and make appointments. If you have questions, please call your primary care clinic.  If you do not have a primary care provider, please call [phone] and they will assist you.      Mapiliary is an electronic gateway that provides easy, online access to your medical records. With Mapiliary, you can request a clinic appointment, read your test results, renew a prescription or communicate with your care team.     To access your existing account, please contact your [facility] or call [phone] for assistance.        Care EveryWhere ID     This is your Care EveryWhere ID. This could be used by other  organizations to access your [place] medical records  [id number]        Your Vitals Were     Pulse Temperature Pulse Oximetry Breastfeeding? BMI (Body Mass Index)       82 96.5  F (35.8  C) (Temporal) 97% No 26.48 kg/m2        Blood Pressure from Last 3 Encounters:   07/27/18 118/61   03/26/18 122/76   03/15/18 138/74    Weight from Last 3 Encounters:   07/27/18 159 lb 1.6 oz (72.2 kg)   03/26/18 155 lb 13.8 oz (70.7 kg)   11/27/17 148 lb 13 oz (67.5 kg)              We Performed the Following     ADMIN MEDICARE: Pneumococcal Vaccine ()     GASTROENTEROLOGY ADULT REF PROCEDURE ONLY [facility] [phone]; No Provider Preference     ORTHOPEDICS ADULT REFERRAL     Pneumococcal vaccine 23 valent PPSV23  (Pneumovax) [49002]          Where to get your medicines      These medications were sent to [facility] [address] [place] [address] - [address] AT [facility] [address], [facility] [address]     Phone:  [phone]     atorvastatin 10 MG tablet          Primary Care Provider Office Phone # Fax #    Pricila URVASHI [facility] [phone]       [address]  [place] [address]        Equal Access to Services     ALTAF EDUARDO : Hadii aad ku hadasho Soomaali, waaxda luqadaha, qaybta kaalmada adeegyada, becka brewer . So [facility] [phone].    ATENCIÓN: Si habla español, tiene a tejada disposición servicios gratuitos de asistencia lingüística. Cesar al [phone].    We comply with applicable federal civil rights laws and [place] laws. We do not discriminate on the basis of race, color, national origin, age, disability, sex, sexual orientation, or gender identity.            Thank you!     Thank you for choosing [facility]  for your care. Our goal is always to provide you with excellent care. Hearing back from our patients is one way we can continue to improve our services. Please take a few  minutes to complete the written survey that you may receive in the mail after your visit with us. Thank you!             Your Updated Medication List - Protect others around you: Learn how to safely use, store and throw away your medicines at www.disposemymeds.org.          This list is accurate as of 7/27/18  2:06 PM.  Always use your most recent med list.                   Brand Name Dispense Instructions for use Diagnosis    * ACCU-CHEK MICKY PLUS test strip   Generic drug:  blood glucose monitoring     400 each    TEST BLOOD SUGARS FOUR TIMES DAILY OR AS DIRECTED    Type 1 diabetes mellitus without complication (H)       * ACCU-CHEK MICKY PLUS test strip   Generic drug:  blood glucose monitoring     360 strip    TEST BLOOD SUGARS FOUR TIMES DAILY OR AS DIRECTED    Type 2 diabetes mellitus with hyperglycemia (H)       alendronate 70 MG tablet    FOSAMAX    12 tablet    SEE NOTES    Age-related osteoporosis without current pathological fracture       atorvastatin 10 MG tablet    LIPITOR    90 tablet    TAKE 1 TABLET(10 MG) BY MOUTH DAILY    Hyperlipidemia with target LDL less than 100       blood glucose monitoring lancets     306 each    USE TO TEST BLOOD SUGARS FOUR TIMES DAILY OR AS DIRECTED    Type 1 diabetes mellitus without complication (H)       calcium carbonate-vitamin D 500-400 MG-UNIT Tabs per tablet     180 tablet    Take 1 tablet by mouth 2 times daily    Age-related osteoporosis without current pathological fracture       estradiol 0.1 MG/GM cream    ESTRACE VAGINAL    42.5 g    Place 2 g vaginally twice a week Apply dime-sized amount daily for the first 2 weeks, then place twice a week thereafter.    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    Type 1 diabetes mellitus without complication (H)       KETO-DIASTIX Strp     1 each    1 strip by In Vitro route as needed    Type 2 diabetes mellitus with hyperglycemia (H)       mirabegron 50 MG 24 hr tablet    MYRBETRIQ    90 tablet    Take 1 tablet (50 mg) by mouth daily    Urinary frequency, Urgency of urination, Urge incontinence       MULTIVITAMIN ADULT PO      Take 1 tablet by mouth daily        solifenacin 10 MG tablet    VESICARE    90 tablet    Take 1 tablet (10 mg) by mouth daily    Urge incontinence, Urgency of urination, Urinary frequency       * Notice:  This list has 2 medication(s) that are the same as other medications prescribed for you. Read the directions carefully, and ask your doctor or other care provider to review them with you.      "

## 2018-07-27 NOTE — PROGRESS NOTES
SUBJECTIVE:   Latoya Rodríguez is a 67 year old female who presents for Preventive Visit.    Are you in the first 12 months of your Medicare Part B coverage?  No    Healthy Habits:    Do you get at least three servings of calcium containing foods daily (dairy, green leafy vegetables, etc.)? yes    Amount of exercise or daily activities, outside of work: 2-3 day(s) per week    Problems taking medications regularly No    Medication side effects: No    Have you had an eye exam in the past two years? yes    Do you see a dentist twice per year? yes    Do you have sleep apnea, excessive snoring or daytime drowsiness?no      Ability to successfully perform activities of daily living: Yes, no assistance needed    Home safety:  none identified     Hearing impairment: No    Fall risk:  Fallen 2 or more times in the past year?: No  Any fall with injury in the past year?: No        COGNITIVE SCREEN  1) Repeat 3 items (Leader, Season, Table)    2) Clock draw: NORMAL  3) 3 item recall: Recalls 3 objects  Results: 3 items recalled: COGNITIVE IMPAIRMENT LESS LIKELY    Mini-CogTM Copyright YASIR Ni. Licensed by the author for use in [facility]; reprinted with permission (soob@[facility]). All rights reserved.        Reviewed and updated as needed this visit by clinical staff  Tobacco  Allergies  Meds  Med Hx  Surg Hx  Fam Hx  Soc Hx        Reviewed and updated as needed this visit by Provider        Social History   Substance Use Topics     Smoking status: Never Smoker     Smokeless tobacco: Never Used     Alcohol use Yes      Comment: 1 to 2 beers or glasses of wine 1 to 2 times per month       If you drink alcohol do you typically have >3 drinks per day or >7 drinks per week? No                        Today's PHQ-2 Score:   PHQ-2 ( 1999 [employer]) 7/27/2018 6/23/2017   Q1: Little interest or pleasure in doing things 0 0   Q2: Feeling down, depressed or hopeless 0 0   PHQ-2 Score 0 0   Q1: Little interest or pleasure  in doing things - -   Q2: Feeling down, depressed or hopeless - -   PHQ-2 Score - -       Do you feel safe in your environment - Yes    Do you have a Health Care Directive?: No: Advance care planning reviewed with patient; information given to patient to review.    Current providers sharing in care for this patient include:   Patient Care Team:  Pricila Avila APRN CNP as PCP - General (Family Practice)  Amy Cooper, RN as Registered Nurse  Celestino Cuevas MD as Resident (Student in organized health care education/training program)    The following health maintenance items are reviewed in Epic and correct as of today:  Health Maintenance   Topic Date Due     COLON CANCER SCREEN (SYSTEM ASSIGNED)  09/15/2000     PNEUMOCOCCAL (2 of 2 - PPSV23) 06/24/2017     CREATININE Q1 YEAR  09/16/2017     EYE EXAM Q1 YEAR  04/03/2018     FOOT EXAM Q1 YEAR  06/23/2018     FALL RISK ASSESSMENT  06/23/2018     PHQ-2 Q1 YR  06/23/2018     LIPID MONITORING Q1 YEAR  07/03/2018     MICROALBUMIN Q1 YEAR  07/03/2018     INFLUENZA VACCINE (1) 09/01/2018     A1C Q6 MO  09/26/2018     TSH W/ FREE T4 REFLEX Q2 YEAR  07/03/2019     MAMMO SCREEN Q2 YR (SYSTEM ASSIGNED)  07/03/2019     ADVANCE DIRECTIVE PLANNING Q5 YRS  06/23/2022     TETANUS IMMUNIZATION (SYSTEM ASSIGNED)  09/13/2026     DEXA SCAN SCREENING (SYSTEM ASSIGNED)  Completed     HEPATITIS C SCREENING  Completed     Labs reviewed in EPIC  BP Readings from Last 3 Encounters:   07/27/18 118/61   03/26/18 122/76   03/15/18 138/74    Wt Readings from Last 3 Encounters:   07/27/18 159 lb 1.6 oz (72.2 kg)   03/26/18 155 lb 13.8 oz (70.7 kg)   11/27/17 148 lb 13 oz (67.5 kg)                  Patient Active Problem List   Diagnosis     Urgency of urination     Urinary frequency     Urge incontinence     CARDIOVASCULAR SCREENING; LDL GOAL LESS THAN 160     Hyperlipidemia with target LDL less than 100     Osteoporosis     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)      Type 1 diabetes mellitus without complication (H)     Age-related osteoporosis without current pathological fracture     Past Surgical History:   Procedure Laterality Date     EYE SURGERY  9/04, 5/11    Retinal detachment, Cataract (both eyes)     VASCULAR SURGERY      Varicose Veins       Social History   Substance Use Topics     Smoking status: Never Smoker     Smokeless tobacco: Never Used     Alcohol use Yes      Comment: 1 to 2 beers or glasses of wine 1 to 2 times per month     Family History   Problem Relation Age of Onset     Hyperlipidemia Mother      Breast Cancer Mother      Other Cancer Father      Leukemia Father      Skin Cancer Father      Coronary Artery Disease Maternal Uncle      Brain Cancer Maternal Uncle      Coronary Artery Disease Maternal Uncle      Stomach Cancer Maternal Aunt      Diabetes No family hx of      Hypertension No family hx of      Cerebrovascular Disease No family hx of      Colon Cancer No family hx of      Thyroid Disease No family hx of      Depression No family hx of      Anxiety Disorder No family hx of          Current Outpatient Prescriptions   Medication Sig Dispense Refill     ACCU-CHEK MICKY PLUS test strip TEST BLOOD SUGARS FOUR TIMES DAILY OR AS DIRECTED 360 strip 1     ACCU-CHEK MICKY PLUS test strip TEST BLOOD SUGARS FOUR TIMES DAILY OR AS DIRECTED 400 each 1     alendronate (FOSAMAX) 70 MG tablet SEE NOTES 12 tablet 3     atorvastatin (LIPITOR) 10 MG tablet TAKE 1 TABLET(10 MG) BY MOUTH DAILY 90 tablet 0     blood glucose monitoring (ACCU-CHEK FASTCLIX) lancets USE TO TEST BLOOD SUGARS FOUR TIMES DAILY OR AS DIRECTED 306 each 0     calcium carbonate-vitamin D 500-400 MG-UNIT TABS tablt Take 1 tablet by mouth 2 times daily 180 tablet 3     estradiol (ESTRACE VAGINAL) 0.1 MG/GM cream Place 2 g vaginally twice a week Apply dime-sized amount daily for the first 2 weeks, then place twice a week thereafter. 42.5 g 0     insulin aspart (NOVOLOG FLEXPEN) 100 UNIT/ML  "injection Inject 1 unit per 20 grams of carbs for breakfast and lunch, 1 per 18 for dinner (3-4 units per meal) or as directed 15 mL 3     insulin degludec (TRESIBA) 200 UNIT/ML pen Inject 24 Units Subcutaneous daily 30 mL 3     insulin pen needle (B-D U/F) 31G X 8 MM USE WITH NOVOLOG AND LANTUS(FIVE TIMES DAILY) 450 each 3     insulin syringe-needle U-100 (B-D INSULIN SYRINGE HALF-UNIT) 31G X 5/16\" 0.3 ML Use 3 syringes daily or as directed. 100 each 0     mirabegron (MYRBETRIQ) 50 MG 24 hr tablet Take 1 tablet (50 mg) by mouth daily 90 tablet 3     Multiple Vitamins-Minerals (MULTIVITAMIN ADULT PO) Take 1 tablet by mouth daily       solifenacin (VESICARE) 10 MG tablet Take 1 tablet (10 mg) by mouth daily 90 tablet 3     Urine Glucose-Ketones Test (KETO-DIASTIX) STRP 1 strip by In Vitro route as needed 1 each 11     No Known Allergies  Recent Labs   Lab Test 03/26/18 11/27/17 07/17/17 07/03/17   0724  03/13/17   1710   09/16/16   0711   06/24/16   0941   A1C  8.3  9.3  6.6   --    --    < >  7.1*   < >   --    LDL   --    --    --   81   --    --   103*   --   195*   HDL   --    --    --   72   --    --   62   --   43*   TRIG   --    --    --   26   --    --   43   --   136   ALT   --    --    --    --    --    --   26   --   19   CR   --    --    --    --    --    --   0.65   --   0.52   GFRESTIMATED   --    --    --    --    --    --   >90  Non  GFR Calc     --   >90  Non  GFR Calc     GFRESTBLACK   --    --    --    --    --    --   >90   GFR Calc     --   >90   GFR Calc     POTASSIUM   --    --    --    --    --    --   3.7   --   3.7   TSH   --    --    --   1.77  1.26   < >   --    --    --     < > = values in this interval not displayed.        Pneumonia Vaccine:Adults age 65+ who received Pneumovax (PPSV23) at 65 years or older: Should be given PCV13 > 1 year after their most recent PPSV23  Mammogram Screening: Patient over age 50, mutual " "decision to screen reflected in health maintenance.    ROS:  CONSTITUTIONAL:POSITIVE  for weight gain and NEGATIVE  for sweats  INTEGUMENTARY/SKIN: NEGATIVE for worrisome rashes, moles or lesions  EYES: NEGATIVE for vision changes or irritation  ENT: NEGATIVE for ear, mouth and throat problems  RESP:NEGATIVE for significant cough or SOB  BREAST: NEGATIVE for masses, tenderness or discharge  CV: NEGATIVE for chest pain, palpitations or peripheral edema  GI: POSITIVE for heartburn or reflux and NEGATIVE for abdominal pain epigastric, nausea, vomiting and weight loss   menopausal female: amenorrhea, no unusual urinary symptoms and no unusual vaginal symptoms  MUSCULOSKELETAL:POSITIVE  For 3rd and 4 th  fingers locking on both hands  and NEGATIVE for joint swelling  and joint warmth   NEURO: NEGATIVE for weakness, dizziness or paresthesias  ENDOCRINE: NEGATIVE for temperature intolerance, skin/hair changes and POSITIVE  for HX diabetes  HEME/ALLERGY/IMMUNE: NEGATIVE for bleeding problems   PSYCHIATRIC: NEGATIVE for changes in mood or affect     OBJECTIVE:   /61 (BP Location: Right arm, Patient Position: Sitting, Cuff Size: Adult Regular)  Pulse 82  Temp 96.5  F (35.8  C) (Temporal)  Wt 159 lb 1.6 oz (72.2 kg)  SpO2 97%  Breastfeeding? No  BMI 26.48 kg/m2 Estimated body mass index is 25.94 kg/(m^2) as calculated from the following:    Height as of 3/26/18: 5' 5\" (1.651 m).    Weight as of 3/26/18: 155 lb 13.8 oz (70.7 kg).   Wt Readings from Last 4 Encounters:   07/27/18 159 lb 1.6 oz (72.2 kg)   03/26/18 155 lb 13.8 oz (70.7 kg)   11/27/17 148 lb 13 oz (67.5 kg)   07/17/17 137 lb 12.6 oz (62.5 kg)       EXAM:   GENERAL APPEARANCE: healthy, alert and no distress  EYES: Eyes grossly normal to inspection and conjunctivae and sclerae normal  HENT: ear canals and TM's normal, nose and mouth without ulcers or lesions, oropharynx clear and oral mucous membranes moist  NECK: no adenopathy, no asymmetry, masses, or " scars and thyroid normal to palpation  RESP: lungs clear to auscultation - no rales, rhonchi or wheezes  BREAST: normal without masses, tenderness or nipple discharge and no palpable axillary masses or adenopathy  CV: regular rates and rhythm, no murmur, click or rub, no peripheral edema and peripheral pulses strong  ABDOMEN: soft, nontender, no hepatosplenomegaly, no masses and bowel sounds normal   (female): normal female external genitalia, normal urethral meatus, vaginal mucosal atrophy noted, normal cervix, adnexae, and uterus without masses or abnormal discharge  MS: no musculoskeletal defects are noted and gait is age appropriate without ataxia  EXTREM: Locking crepitence at flexor tendon of 3rd and 4th  Finger bilaterally   Arthritic appearing  SKIN: no suspicious lesions or rashes  NEURO: Normal strength and tone, sensory exam grossly normal, mentation intact and speech normal  PSYCH: mentation appears normal and affect normal/bright    Diagnostic Test Results:  Pending orders and results       ASSESSMENT / PLAN:   Latoya was seen today for wellness visit and physical.    Diagnoses and all orders for this visit:    Encounter for routine adult health examination without abnormal findings  -     **Comprehensive metabolic panel FUTURE anytime; Future  -     Lipid panel reflex to direct LDL Fasting; Future  -     Albumin Random Urine Quantitative with Creat Ratio; Future    Hyperlipidemia with target LDL less than 100  -     Lipid panel reflex to direct LDL Fasting; Future  -     atorvastatin (LIPITOR) 10 MG tablet; TAKE 1 TABLET(10 MG) BY MOUTH DAILY  Continue current medications.    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)  -     **Comprehensive metabolic panel FUTURE anytime; Future  -     Albumin Random Urine Quantitative with Creat Ratio; Future  FOLLOW UP WITH SPECIALIST :Endocrinology    Need  for prophylactic vaccination against Streptococcus pneumoniae (pneumococcus)  -     Pneumococcal vaccine 23 valent PPSV23  (Pneumovax) [17307]  -     ADMIN MEDICARE: Pneumococcal Vaccine ()    Screening for colon cancer  -     GASTROENTEROLOGY ADULT REF PROCEDURE ONLY [facility] [phone]; No Provider Preference    Screening for thyroid disorder  -     **TSH with free T4 reflex FUTURE anytime; Future  -     Thyroid peroxidase antibody; Future    Trigger finger, acquired  -     ORTHOPEDICS ADULT REFERRAL    Osteoporosis without current pathological fracture, unspecified osteoporosis type    Postmenopausal status  -     DX Hip/Pelvis/Spine; Future      PLAN:   Continue current medications.  Patient needs to follow up in if no improvement,or sooner if worsening of symptoms or other symptoms develop.  FURTHER TESTING:       - DXA (bone density) scan       - mammogram  CONSULTATION/REFERRAL to Colonoscopy   FUTURE LABS:       - Schedule a fasting blood draw   Will follow up and/or notify patient of  results via My Chart to determine further need for followup  Follow up office visit in one year for annual health maintenance exam, sooner PRN.    End of Life Planning:  Patient currently has an advanced directive: No.  I have verified the patient's ablity to prepare an advanced directive/make health care decisions.  Literature was provided to assist patient in preparing an advanced directive.    COUNSELING:  Reviewed preventive health counseling, as reflected in patient instructions  Special attention given to:       Regular exercise       Healthy diet/nutrition       Vision screening       Hepatitis C screening       HIV screening for high risk patient       Colon cancer screening       Osteoporosis Prevention/Bone Health       The 10-year ASCVD risk score (Emily ALVARES Jr, et al., 2013) is: 9.2%    Values used to calculate the score:      Age: 67 years      Sex: Female      Is Non- : No     "  Diabetic: Yes      Tobacco smoker: No      Systolic Blood Pressure: 118 mmHg      Is BP treated: No      HDL Cholesterol: 72 mg/dL      Total Cholesterol: 158 mg/dL    BP Readings from Last 1 Encounters:   03/26/18 122/76     Estimated body mass index is 25.94 kg/(m^2) as calculated from the following:    Height as of 3/26/18: 5' 5\" (1.651 m).    Weight as of 3/26/18: 155 lb 13.8 oz (70.7 kg).           reports that she has never smoked. She has never used smokeless tobacco.      Appropriate preventive services were discussed with this patient, including applicable screening as appropriate for cardiovascular disease, diabetes, osteopenia/osteoporosis, and glaucoma.  As appropriate for age/gender, discussed screening for colorectal cancer, prostate cancer, breast cancer, and cervical cancer. Checklist reviewing preventive services available has been given to the patient.    Reviewed patients plan of care and provided an AVS. The Intermediate Care Plan ( asthma action plan, low back pain action plan, and migraine action plan) for Latoya meets the Care Plan requirement. This Care Plan has been established and reviewed with the Patient.    Counseling Resources:  ATP IV Guidelines  Pooled Cohorts Equation Calculator  Breast Cancer Risk Calculator  FRAX Risk Assessment  ICSI Preventive Guidelines  Dietary Guidelines for Americans, 2010  USDA's MyPlate  ASA Prophylaxis  Lung CA Screening    URVASHI [facility]  "

## 2018-07-31 ENCOUNTER — MEDICAL CORRESPONDENCE (OUTPATIENT)
Dept: HEALTH INFORMATION MANAGEMENT | Facility: CLINIC | Age: 68
End: 2018-07-31

## 2018-08-01 ENCOUNTER — TELEPHONE (OUTPATIENT)
Dept: ENDOCRINOLOGY | Facility: CLINIC | Age: 68
End: 2018-08-01

## 2018-08-01 NOTE — TELEPHONE ENCOUNTER
Pt returned call. Discussed current  insulin doses. They are as follows:   Tresiba u200: 24 units daily  Novolo:20 B, 1:20 L, 1:15 D, 1:25 Snacks  Sliding Scale: 1u:55 >120  Meter download shows fairly consistent pattern of post-dinner hyperglycemia. Pt advised to change dinner ratio from 1:15 to 1:12. Please send in more readings in 2 weeks, Pt praised for being pro-active with her diabetes care. Log sheet will be scanned into chart.      Linda Diego RN, BSN, CDE   [facility]

## 2018-08-24 ENCOUNTER — RADIANT APPOINTMENT (OUTPATIENT)
Dept: MAMMOGRAPHY | Facility: CLINIC | Age: 68
End: 2018-08-24
Attending: NURSE PRACTITIONER
Payer: COMMERCIAL

## 2018-08-24 ENCOUNTER — RADIANT APPOINTMENT (OUTPATIENT)
Dept: BONE DENSITY | Facility: CLINIC | Age: 68
End: 2018-08-24
Attending: NURSE PRACTITIONER
Payer: COMMERCIAL

## 2018-08-24 DIAGNOSIS — Z12.31 ENCOUNTER FOR SCREENING MAMMOGRAM FOR BREAST CANCER: ICD-10-CM

## 2018-08-24 DIAGNOSIS — Z78.0 POSTMENOPAUSAL STATUS: ICD-10-CM

## 2018-08-24 PROCEDURE — 77067 SCR MAMMO BI INCL CAD: CPT | Performed by: RADIOLOGY

## 2018-08-24 PROCEDURE — 77080 DXA BONE DENSITY AXIAL: CPT | Performed by: RADIOLOGY

## 2018-08-24 PROCEDURE — 77063 BREAST TOMOSYNTHESIS BI: CPT | Performed by: RADIOLOGY

## 2018-08-24 NOTE — PROGRESS NOTES
Concetta Rodríguez,    Attached are your test results.  Your Dexa Bone Density test showed:    Osteopenia - Some loss of bone mineral density is indicated but appears to be improved from last bone density   Will continue Fosamax for total of 5 years and then discontinue     Exercise and Calcium/Vitamin D supplements are generally recommended.    The bone density test shows osteopenia. This is an intermediate category that is in between normal and osteoporosis.  People with osteopenia should work on taking in 2744-0656 mg of calcium with vitamin D daily. They should also be getting daily weight bearing exercise (walking works)     Please contact us if you have any questions.    Pricila Avila, CNP

## 2018-08-27 DIAGNOSIS — E10.9 TYPE 1 DIABETES MELLITUS WITHOUT COMPLICATION (H): ICD-10-CM

## 2018-08-27 RX ORDER — LANCETS
EACH MISCELLANEOUS
Qty: 306 EACH | Refills: 0 | Status: SHIPPED | OUTPATIENT
Start: 2018-08-27 | End: 2018-10-25

## 2018-08-27 NOTE — TELEPHONE ENCOUNTER
Faxed refill request received from [facility] [facility].   Medication Requested: Accu-Chek fastclix lancets  Directions: use to test blood sugars four times daily or as directed   Quantity: 306  Last Office Visit: 3/26/18  Next Appointment Scheduled for: 12/10/18

## 2018-09-16 DIAGNOSIS — E78.5 HYPERLIPIDEMIA WITH TARGET LDL LESS THAN 100: ICD-10-CM

## 2018-09-17 ENCOUNTER — MEDICAL CORRESPONDENCE (OUTPATIENT)
Dept: HEALTH INFORMATION MANAGEMENT | Facility: CLINIC | Age: 68
End: 2018-09-17

## 2018-09-18 ENCOUNTER — TELEPHONE (OUTPATIENT)
Dept: ENDOCRINOLOGY | Facility: CLINIC | Age: 68
End: 2018-09-18

## 2018-09-18 RX ORDER — ATORVASTATIN CALCIUM 10 MG/1
TABLET, FILM COATED ORAL
Qty: 90 TABLET | Refills: 0 | OUTPATIENT
Start: 2018-09-18

## 2018-09-18 NOTE — TELEPHONE ENCOUNTER
atorvastatin (LIPITOR) 10 MG tablet 90 tablet 1 7/27/2018  No   Sig: TAKE 1 TABLET(10 MG) BY MOUTH DAILY       Statins Protocol Failed9/16 10:20 AM   LDL on file in past 12 months    No abnormal creatine kinase in past 12 months    Recent (12 mo) or future (30 days) visit within the authorizing provider's specialty    Patient is age 18 or older    No active pregnancy on record    No positive pregnancy test in past 12 months     Pharmacy   [id number] - [place], [place] - [address] AT [facility] [address]     Our records indicate duplicate request. Same requesting pharmacy. Annamaria Feldman RN;

## 2018-09-18 NOTE — TELEPHONE ENCOUNTER
Left  for pt advising her I have reviewed her bg log. Dose change recommendations made. Ave bg per download: 257. Elevated fasting bg levels. Post B and D hyperglycemia noted.   - Increase Tresiba from 24 to 28 units daily  - Change B and L ratio from 1:20 to 1:18. Keep D same at 1:12. Change Snack ratio from 1:25 to 1:20.    Call clinic if questions or concerns arise.     Linda Diego, RN, BSN, CDE   [facility]

## 2018-09-21 ENCOUNTER — MYC MEDICAL ADVICE (OUTPATIENT)
Dept: ENDOCRINOLOGY | Facility: CLINIC | Age: 68
End: 2018-09-21

## 2018-09-21 DIAGNOSIS — E13.9 LADA (LATENT AUTOIMMUNE DIABETES MELLITUS IN ADULTS) (H): ICD-10-CM

## 2018-09-24 DIAGNOSIS — Z79.4 TYPE 2 DIABETES MELLITUS WITH HYPERGLYCEMIA, WITH LONG-TERM CURRENT USE OF INSULIN (H): ICD-10-CM

## 2018-09-24 DIAGNOSIS — E13.9 LADA (LATENT AUTOIMMUNE DIABETES MELLITUS IN ADULTS) (H): ICD-10-CM

## 2018-09-24 DIAGNOSIS — E11.65 TYPE 2 DIABETES MELLITUS WITH HYPERGLYCEMIA, WITH LONG-TERM CURRENT USE OF INSULIN (H): ICD-10-CM

## 2018-10-25 DIAGNOSIS — M81.0 AGE-RELATED OSTEOPOROSIS WITHOUT CURRENT PATHOLOGICAL FRACTURE: ICD-10-CM

## 2018-10-25 DIAGNOSIS — E10.9 TYPE 1 DIABETES MELLITUS WITHOUT COMPLICATION (H): ICD-10-CM

## 2018-10-25 RX ORDER — LANCETS
EACH MISCELLANEOUS
Qty: 306 EACH | Refills: 1 | Status: SHIPPED | OUTPATIENT
Start: 2018-10-25 | End: 2019-03-21

## 2018-10-25 NOTE — TELEPHONE ENCOUNTER
Alendronate 70 mg      Last Written Prescription Date:  12/4/17  Last Fill Quantity: 12,   # refills: 3  Last Office Visit: 7/27/18  Future Office visit:    Next 5 appointments (look out 90 days)     Oct 31, 2018  7:30 AM CDT   [facility]urse [facility] with MG [facility] VISITS   [facility]    [address] [place] [id number]   [phone]            Dec 10, 2018  7:45 AM CST   Return Visit with Edward Aguero MD, MG ENDO NURSE   [facility]    [address] [place] [id number] [phone]                   Routing refill request to provider for review/approval because:  Creatinine not up to date.    Isabel Francis RN   [facility]

## 2018-10-26 RX ORDER — ALENDRONATE SODIUM 70 MG/1
TABLET ORAL
Qty: 12 TABLET | Refills: 0 | Status: SHIPPED | OUTPATIENT
Start: 2018-10-26 | End: 2019-01-16

## 2018-10-31 ENCOUNTER — ALLIED HEALTH/NURSE VISIT (OUTPATIENT)
Dept: PEDIATRICS | Facility: CLINIC | Age: 68
End: 2018-10-31
Payer: COMMERCIAL

## 2018-10-31 DIAGNOSIS — Z23 NEED FOR PROPHYLACTIC VACCINATION AND INOCULATION AGAINST INFLUENZA: Primary | ICD-10-CM

## 2018-10-31 PROCEDURE — 99207 ZZC NO CHARGE NURSE ONLY: CPT

## 2018-10-31 PROCEDURE — 90471 IMMUNIZATION ADMIN: CPT

## 2018-10-31 PROCEDURE — 90662 IIV NO PRSV INCREASED AG IM: CPT

## 2018-10-31 NOTE — PROGRESS NOTES

## 2018-10-31 NOTE — MR AVS SNAPSHOT
After Visit Summary   10/31/2018    Latoya Rodríguez    MRN: [id number]           Patient Information     Date Of Birth          1950        Visit Information        Provider Department      10/31/2018 7:30 AM MG RN VISITS [facility] [place]        Today's Diagnoses     Need for prophylactic vaccination and inoculation against influenza    -  1       Follow-ups after your visit        Your next 10 appointments already scheduled     Dec 10, 2018  7:45 AM CST   Return Visit with Edward Aguero MD, MG ENDO NURSE   [facility] [place] ([facility] [place])    [id number] [place] [id number] [phone]              Who to contact     If you have questions or need follow up information about today's clinic visit or your schedule please contact [facility] directly at [phone].  Normal or non-critical lab and imaging results will be communicated to you by [employer]Sp[employer], letter or phone within 4 business days after the clinic has received the results. If you do not hear from us within 7 days, please contact the clinic through [employer] or phone. If you have a critical or abnormal lab result, we will notify you by phone as soon as possible.  Submit refill requests through [employer] or call your pharmacy and they will forward the refill request to us. Please allow 3 business days for your refill to be completed.          Additional Information About Your Visit        MyChart Information     [employer] gives you secure access to your electronic health record. If you see a primary care provider, you can also send messages to your care team and make appointments. If you have questions, please call your primary care clinic.  If you do not have a primary care provider, please call [phone] and they will assist you.      [employer] is an electronic gateway that provides easy, online access to your medical records. With [employer], you can request a clinic  appointment, read your test results, renew a prescription or communicate with your care team.     To access your existing account, please contact your [facility] or call [phone] for assistance.        Care EveryWhere ID     This is your Care EveryWhere ID. This could be used by other organizations to access your [place] medical records  [id number]         Blood Pressure from Last 3 Encounters:   07/27/18 118/61   03/26/18 122/76   03/15/18 138/74    Weight from Last 3 Encounters:   07/27/18 159 lb 1.6 oz (72.2 kg)   03/26/18 155 lb 13.8 oz (70.7 kg)   11/27/17 148 lb 13 oz (67.5 kg)              We Performed the Following     FLU VACCINE, INCREASED ANTIGEN, PRESV FREE, AGE 65+ [95818]     Vaccine Administration, Initial [26118]        Primary Care Provider Office Phone # Fax #    Pricila Juarezgael Avila, APRN [facility] [phone]       [address]  [place] [address]        Equal Access to Services     [facility]: Hadii aad ku hadasho Soomaali, waaxda luqadaha, qaybta kaalmada adeegyada, waxay abdoulin hayneena brewer . So [facility] [phone].    ATENCIÓN: Si habla español, tiene a tejada disposición servicios gratuitos de asistencia lingüística. Llame al [phone].    We comply with applicable federal civil rights laws and [place] laws. We do not discriminate on the basis of race, color, national origin, age, disability, sex, sexual orientation, or gender identity.            Thank you!     Thank you for choosing [facility]  for your care. Our goal is always to provide you with excellent care. Hearing back from our patients is one way we can continue to improve our services. Please take a few minutes to complete the written survey that you may receive in the mail after your visit with us. Thank you!             Your Updated Medication List - Protect others around you: Learn how to safely use, store and throw away your  medicines at www.disposemymeds.org.          This list is accurate as of 10/31/18  7:50 AM.  Always use your most recent med list.                   Brand Name Dispense Instructions for use Diagnosis    * ACCU-CHEK MICKY PLUS test strip   Generic drug:  blood glucose monitoring     400 each    TEST BLOOD SUGARS FOUR TIMES DAILY OR AS DIRECTED    Type 1 diabetes mellitus without complication (H)       * ACCU-CHEK MICKY PLUS test strip   Generic drug:  blood glucose monitoring     360 strip    TEST BLOOD SUGARS FOUR TIMES DAILY OR AS DIRECTED    Type 2 diabetes mellitus with hyperglycemia (H)       alendronate 70 MG tablet    FOSAMAX    12 tablet    SEE NOTES    Age-related osteoporosis without current pathological fracture       atorvastatin 10 MG tablet    LIPITOR    90 tablet    TAKE 1 TABLET(10 MG) BY MOUTH DAILY    Hyperlipidemia with target LDL less than 100       blood glucose monitoring lancets     306 each    Use to test blood sugar 4 times daily or as directed.    Type 1 diabetes mellitus without complication (H)       calcium carbonate-vitamin D 500-400 MG-UNIT Tabs per tablet     180 tablet    Take 1 tablet by mouth 2 times daily    Age-related osteoporosis without current pathological fracture       estradiol 0.1 MG/GM cream    ESTRACE VAGINAL    42.5 g    Place 2 g vaginally twice a week Apply dime-sized amount daily for the first 2 weeks, then place twice a week thereafter.    Atrophic vaginitis       insulin aspart 100 UNIT/ML injection    NovoLOG FLEXPEN    30 mL    Take as directed with meals and snacks. Total daily dose approx 30 units.    Type 2 diabetes mellitus with hyperglycemia, with long-term current use of insulin (H)       insulin degludec 200 UNIT/ML pen    TRESIBA    30 mL    Inject 32 units subcutaneous daily    Type 2 diabetes mellitus with hyperglycemia, with long-term current use of insulin (H)       insulin pen needle 31G X 8 MM    B-D U/F    450 each    USE WITH NOVOLOG AND  "LANTUS(FIVE TIMES DAILY)    Type I diabetes mellitus, well controlled (H)       insulin syringe-needle U-100 31G X 5/16\" 0.3 ML    B-D INSULIN SYRINGE HALF-UNIT    100 each    Use 3 syringes daily or as directed.    Type 1 diabetes mellitus without complication (H)       KETO-DIASTIX Strp     1 each    1 strip by In Vitro route as needed    Type 2 diabetes mellitus with hyperglycemia (H)       mirabegron 50 MG 24 hr tablet    MYRBETRIQ    90 tablet    Take 1 tablet (50 mg) by mouth daily    Urinary frequency, Urgency of urination, Urge incontinence       MULTIVITAMIN ADULT PO      Take 1 tablet by mouth daily        solifenacin 10 MG tablet    VESICARE    90 tablet    Take 1 tablet (10 mg) by mouth daily    Urge incontinence, Urgency of urination, Urinary frequency       * Notice:  This list has 2 medication(s) that are the same as other medications prescribed for you. Read the directions carefully, and ask your doctor or other care provider to review them with you.      "

## 2018-10-31 NOTE — NURSING NOTE
Latoya Rodríguez comes into clinic today at the request of Madhavi Avila  Ordering Provider for flu shot.      This service provided today was under the supervising provider of the day Dr. Benites, who was available if needed.    Abad Simmons

## 2018-11-08 ENCOUNTER — TELEPHONE (OUTPATIENT)
Dept: PEDIATRICS | Facility: CLINIC | Age: 68
End: 2018-11-08

## 2018-11-08 NOTE — TELEPHONE ENCOUNTER
Left message for patient to return clinic call regarding colonoscopy order placed this summer.  Clinic number given either to schedule or to update our charts if done at another location.    Outreach letter created and sent.    Call Center OKAY TO SCHEDULE.    Thanks,   Jody Elaine  Primary Care   [facility] [place]

## 2018-11-12 ENCOUNTER — TELEPHONE (OUTPATIENT)
Dept: ENDOCRINOLOGY | Facility: CLINIC | Age: 68
End: 2018-11-12

## 2018-11-12 NOTE — TELEPHONE ENCOUNTER
Pt faxed in bg log. Having occasional hypoglycemia during the day with no set pattern. Occasional post dinner hyperglycmeia.  Advised to decrease Tresiba dose from 28 to 26 units. Increase D ratio from 1:12 to 1:10. Keep att wth Endo in Dec. Pt verbalized understanding of all the above.      Linda Diego, RN, BSN, CDE   [facility]

## 2018-11-29 DIAGNOSIS — E11.65 TYPE 2 DIABETES MELLITUS WITH HYPERGLYCEMIA (H): ICD-10-CM

## 2018-12-10 ENCOUNTER — TELEPHONE (OUTPATIENT)
Dept: ENDOCRINOLOGY | Facility: CLINIC | Age: 68
End: 2018-12-10

## 2018-12-10 ENCOUNTER — OFFICE VISIT (OUTPATIENT)
Dept: ENDOCRINOLOGY | Facility: CLINIC | Age: 68
End: 2018-12-10
Payer: COMMERCIAL

## 2018-12-10 VITALS
HEART RATE: 105 BPM | DIASTOLIC BLOOD PRESSURE: 62 MMHG | BODY MASS INDEX: 28.31 KG/M2 | OXYGEN SATURATION: 97 % | WEIGHT: 170.1 LBS | SYSTOLIC BLOOD PRESSURE: 124 MMHG

## 2018-12-10 DIAGNOSIS — M81.0 AGE-RELATED OSTEOPOROSIS WITHOUT CURRENT PATHOLOGICAL FRACTURE: ICD-10-CM

## 2018-12-10 DIAGNOSIS — E10.9 TYPE 1 DIABETES MELLITUS WITHOUT COMPLICATION (H): Primary | ICD-10-CM

## 2018-12-10 LAB
CREAT SERPL-MCNC: 0.7 MG/DL (ref 0.52–1.04)
CREAT UR-MCNC: 16 MG/DL
GFR SERPL CREATININE-BSD FRML MDRD: 83 ML/MIN/1.7M2
HBA1C MFR BLD: 9.1 % (ref 0–5.6)
LDLC SERPL DIRECT ASSAY-MCNC: 91 MG/DL
MICROALBUMIN UR-MCNC: <5 MG/L
MICROALBUMIN/CREAT UR: NORMAL MG/G CR (ref 0–25)

## 2018-12-10 PROCEDURE — 36415 COLL VENOUS BLD VENIPUNCTURE: CPT | Performed by: INTERNAL MEDICINE

## 2018-12-10 PROCEDURE — 82043 UR ALBUMIN QUANTITATIVE: CPT | Performed by: INTERNAL MEDICINE

## 2018-12-10 PROCEDURE — 83721 ASSAY OF BLOOD LIPOPROTEIN: CPT | Performed by: INTERNAL MEDICINE

## 2018-12-10 PROCEDURE — 99214 OFFICE O/P EST MOD 30 MIN: CPT | Performed by: INTERNAL MEDICINE

## 2018-12-10 PROCEDURE — 82565 ASSAY OF CREATININE: CPT | Performed by: INTERNAL MEDICINE

## 2018-12-10 PROCEDURE — 83036 HEMOGLOBIN GLYCOSYLATED A1C: CPT | Performed by: INTERNAL MEDICINE

## 2018-12-10 RX ORDER — ALENDRONATE SODIUM 70 MG/1
TABLET ORAL
Qty: 12 TABLET | Refills: 3 | Status: CANCELLED | OUTPATIENT
Start: 2018-12-10

## 2018-12-10 NOTE — TELEPHONE ENCOUNTER
----- Message from Annie Hawkins sent at 12/10/2018 12:23 PM CST -----      Topic: Procedural Question      Are there any diabetes classes offered? ([facility] has a class. I thought that [facility] would have something similar.)      Thanks,   Latoya

## 2018-12-10 NOTE — NURSING NOTE
Latoya Rodríguez's goals for this visit include: follow up diabetes  She requests these members of her care team be copied on today's visit information: Yes    PCP: Pricila Avila    Referring Provider:  No referring provider defined for this encounter.    /62 (BP Location: Left arm, Patient Position: Sitting, Cuff Size: Adult Regular)   Pulse 105   Wt 77.2 kg (170 lb 1.6 oz)   SpO2 97%   BMI 28.31 kg/m      Do you need any medication refills at today's visit? Yes

## 2018-12-10 NOTE — TELEPHONE ENCOUNTER
"\"Patient was here today and wanted classes through [facility]. Do you know the number to [facility] diabetes educator scheduling line? Can you provide her with that?     Thank you,   Clarisa (Routing comment)\"    Called patient and left the numbers below    To schedule an appointment with a diabetes educator or to learn more about diabetes care available at [facility], please call [phone].    To make an appointment with a diabetes educator or to learn more about diabetes care available at [facility], please call [phone].    And to request an appointment online, she can go to [email or web address] and search for Diabetes Care.      Kady    "

## 2018-12-10 NOTE — LETTER
12/10/2018         RE: Latoya Rodríguez  [address]  [place] [address]        Dear Colleague,    Thank you for referring your patient, Latoya Rodríguez, to the [facility]. Please see a copy of my visit note below.         Endocrinology Note         Latoya is a 68 year old female presents today for type 1 diabetes.    HPI  MsDaniel Rodríguez is a 68 years old woman with hx of type 1 diabetes in June 2016. She is here for follow up. Last seen 3/2018.    She was diagnosed with type 1 diabetes when she continued losing weight unintentionally. She was found to have an A1c >14. She was initially diagnosed with type 2 diabetes and was put on insulin and Metformin.  She was later found to be TORRES antibody positive and c peptide negative.  She has since been working closely with a diabetes educator and has learned carb counting, and has generally adapted well to the diagnosis.      Interim history:  A1C is up to 9.1 today. She is currently on Tresiba U-200 at 24 units at bedtime, Novolog 1 unit per 20 gram of carb for BF and lunch and 1 unit per 10 gram at dinner and additional Novolog sensitivity 1 unit per 55 above 150 mg/dl. I reviewed her glucose meter, she has been testing 4 times a day with an overall average of 223 () mg/dL. Her lowest blood sugar was in 47 and the highest BG was HI. She used [employer] Expert meter. She did have high fluctuation of glucoses. Most of postprandial glucoses were in 200-300 before dinner in particularly. She stated that she had extra snack between meal often which she did not cover insulin for snack.     Her main concern today is still weight gain. She gained 25 lbs since last visit but she said appetite has increased significantly after diagnosis of diabetes. She has had craving on carbohydrate, peanut butter and cheese.  She is very careful about her diet. She gained about 50 pounds after the diagnosis. She was intentionally losing weight with  "diet and exercise but regained after the diagnosis of type 1 diabetes. She would like her weight to be around 125 pounds. AM cortisol was 13. TFT on 7/10/2017 showed TSH 1.77, FT4 1.33 with negative TPO Ab.    Past Medical History  Past Medical History:   Diagnosis Date     Cataract      Type 2 diabetes mellitus with hyperglycemia (H) 6/24/2016   Type 1 diabetes, dx 6/2016  Hyperlipidemia  Osteoporosis     Allergies  No Known Allergies     Medications  Current Outpatient Medications   Medication Sig Dispense Refill     ACCU-CHEK MICKY PLUS test strip TEST BLOOD SUGARS FOUR TIMES DAILY OR AS DIRECTED 400 each 1     alendronate (FOSAMAX) 70 MG tablet SEE NOTES 12 tablet 0     atorvastatin (LIPITOR) 10 MG tablet TAKE 1 TABLET(10 MG) BY MOUTH DAILY 90 tablet 1     blood glucose monitoring (ACCU-CHEK MICKY PLUS) test strip TEST BLOOD SUGARS FOUR TIMES DAILY OR AS DIRECTED 350 strip 0     blood glucose monitoring (ACCU-CHEK FASTCLIX) lancets Use to test blood sugar 4 times daily or as directed. 306 each 1     calcium carbonate-vitamin D 500-400 MG-UNIT TABS tablt Take 1 tablet by mouth 2 times daily 180 tablet 3     estradiol (ESTRACE VAGINAL) 0.1 MG/GM cream Place 2 g vaginally twice a week Apply dime-sized amount daily for the first 2 weeks, then place twice a week thereafter. 42.5 g 0     insulin aspart (NOVOLOG FLEXPEN) 100 UNIT/ML injection Take as directed with meals and snacks. Total daily dose approx 30 units. 30 mL 3     insulin degludec (TRESIBA) 200 UNIT/ML pen Inject 32 units subcutaneous daily (Patient taking differently: Inject 24 Units Subcutaneous daily Inject 32 units subcutaneous daily) 30 mL 3     insulin pen needle (B-D U/F) 31G X 8 MM USE WITH NOVOLOG AND LANTUS(FIVE TIMES DAILY) 450 each 3     insulin syringe-needle U-100 (B-D INSULIN SYRINGE HALF-UNIT) 31G X 5/16\" 0.3 ML Use 3 syringes daily or as directed. 100 each 0     mirabegron (MYRBETRIQ) 50 MG 24 hr tablet Take 1 tablet (50 mg) by mouth daily " 90 tablet 3     Multiple Vitamins-Minerals (MULTIVITAMIN ADULT PO) Take 1 tablet by mouth daily       solifenacin (VESICARE) 10 MG tablet Take 1 tablet (10 mg) by mouth daily 90 tablet 3     Urine Glucose-Ketones Test (KETO-DIASTIX) STRP 1 strip by In Vitro route as needed 1 each 11     Family History  No thyroid disease or type 1 diabetes in the family    Social History  Social History     Tobacco Use     Smoking status: Never Smoker     Smokeless tobacco: Never Used   Substance Use Topics     Alcohol use: Yes     Comment: 1 to 2 beers or glasses of wine 1 to 2 times per month     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    ROS  Constitutional: weight is up to 170 lbs. She would like to lose weight, +increased craving, increased snacking  Eyes: no vision change, diplopia or red eyes   Neck: no difficulty swallowing, no choking, no neck pain, no neck swelling  Cardiovascular: no chest pain, palpitations  Respiratory: no dyspnea, cough, shortness of breath or wheezing   GI: no nausea, vomiting, diarrhea or constipation, no abdominal pain   : no change in urine, no dysuria or hematuria  Musculoskeletal: no joint or muscle pain or swelling   Integumentary: no complaints  Neuro: no loss of strength or sensation, no numbness or tingling, no tremor, no dizziness, no headache   Endo: no polyuria or polydipsia, no temperature intolerance   Heme/Lymph: no concerning bumps, no bleeding problems   Allergy: no environmental allergies   Psych: frustrate about weight gain, no sleep problems.    Physical Exam  /62 (BP Location: Left arm, Patient Position: Sitting, Cuff Size: Adult Regular)   Pulse 105   Wt 77.2 kg (170 lb 1.6 oz)   SpO2 97%   BMI 28.31 kg/m     Body mass index is 28.31 kg/m .  Constitutional: no distress, comfortable, pleasant   Eyes: anicteric  Thyroid: not enlarged  CVS: RRR, normal S1,S2, no murmur  Lungs: CTA B/L  Abd: soft, NT, ND, no hepatomegaly  Musculoskeletal: no edema   Skin:  no jaundice    Neurological: normal gait, 2+patella reflex, intact sensation per monofilament bilaterally, no tremor on outstretched hands bilaterally  Psychological: appropriate mood     RESULTS  Lab Results   Component Value Date    A1C 9.1 12/10/2018    A1C 8.3 03/26/2018    A1C 9.3 11/27/2017    A1C 6.6 07/17/2017    A1C 6.0 03/13/2017     ENDO DIABETES Latest Ref Rng & Units 7/3/2017   CHOLESTEROL <200 mg/dL 158   LDL CHOLESTEROL, CALCULATED <100 mg/dL 81   HDL CHOLESTEROL >49 mg/dL 72   NON HDL CHOLESTEROL <130 mg/dL 86   TRIGLYCERIDES <150 mg/dL 26   ALBUMIN URINE MG/L mg/L 6   ALBUMIN URINE MG/G CR 0 - 25 mg/g Cr 9.43        Ref. Range 7/17/2017 08:39   Cortisol Serum Latest Ref Range: 4 - 22 ug/dL 13.0     ASSESSMENT:    Ms. Rodríguez is a 68 years old woman with hx of type 1 diabetes in June 2016. She is here for follow up.    1. Type 1 diabetes: diagnosed in June 2016. She is adapting quite well.  A1c is up to 9.1% today but she still has fluctuation of BG with some hypoglycemia and postprandial hyperglycemia which is mostly likely explained by extra snack without insulin coverage.  She will advised to cover snack with Novolog  She will try to minimize high carb snack  continue Tresiba U-200 24 units daily  Novolog 1 unit per 20 gram CHO for BF and lunch, and 1 unit per 10 gram of   Sensitivity 1 unit per 55 mg/dl above 150  She will send message thru [employer] in 3-4 weeks  Discussed insulin pump and sensor today --- she is not interested in both technology at this time    2) DM complications:  Retinopathy: normal exam done 2018  Nephropathy: negative urine microalbumin 7/3/17  Neuropathy: none per exam today 10/10/18    PLAN:   continue Tresiba U-200 22 units daily  increase Novolog 1 unit per 20 gram CHO for all meal  Sensitivity 1 unit per 55 mg/dl above 150  RTC 4-5 months      Edward Aguero MD     Division of Diabetes and Endocrinology  Department of Medicine  [phone]    Again, thank  you for allowing me to participate in the care of your patient.        Sincerely,        Edward Aguero MD

## 2018-12-10 NOTE — PATIENT INSTRUCTIONS
- cover insulin with your carbohydrate snack  - take Tresiba 24 units daily  - Novolog 1 unit per 20 gram of carb for breakfast and lunch  - Novolog 1 unit per 10 gram of carb for dinner  - Additional Correction factor 1 unit per 55 mg/dl of glucose above 150 mg/dl at meal  - please send glucose log (number) next 3-4 weeks    If you have any questions, please do not hesitate to call [facility] at [phone] and ask for Endocrinology clinic.    Sincerely,    Edward Aguero MD  Endocrinology

## 2018-12-10 NOTE — PROGRESS NOTES
Endocrinology Note         Latoya is a 68 year old female presents today for type 1 diabetes.    HPI  Ms. Latoya Rodríguez is a 68 years old woman with hx of type 1 diabetes in June 2016. She is here for follow up. Last seen 3/2018.    She was diagnosed with type 1 diabetes when she continued losing weight unintentionally. She was found to have an A1c >14. She was initially diagnosed with type 2 diabetes and was put on insulin and Metformin.  She was later found to be TORRES antibody positive and c peptide negative.  She has since been working closely with a diabetes educator and has learned carb counting, and has generally adapted well to the diagnosis.      Interim history:  A1C is up to 9.1 today. She is currently on Tresiba U-200 at 24 units at bedtime, Novolog 1 unit per 20 gram of carb for BF and lunch and 1 unit per 10 gram at dinner and additional Novolog sensitivity 1 unit per 55 above 150 mg/dl. I reviewed her glucose meter, she has been testing 4 times a day with an overall average of 223 () mg/dL. Her lowest blood sugar was in 47 and the highest BG was HI. She used Accuchek Expert meter. She did have high fluctuation of glucoses. Most of postprandial glucoses were in 200-300 before dinner in particularly. She stated that she had extra snack between meal often which she did not cover insulin for snack.     Her main concern today is still weight gain. She gained 25 lbs since last visit but she said appetite has increased significantly after diagnosis of diabetes. She has had craving on carbohydrate, peanut butter and cheese.  She is very careful about her diet. She gained about 50 pounds after the diagnosis. She was intentionally losing weight with diet and exercise but regained after the diagnosis of type 1 diabetes. She would like her weight to be around 125 pounds. AM cortisol was 13. TFT on 7/10/2017 showed TSH 1.77, FT4 1.33 with negative TPO Ab.    Past Medical History  Past Medical History:  "  Diagnosis Date     Cataract      Type 2 diabetes mellitus with hyperglycemia (H) 6/24/2016   Type 1 diabetes, dx 6/2016  Hyperlipidemia  Osteoporosis     Allergies  No Known Allergies     Medications  Current Outpatient Medications   Medication Sig Dispense Refill     ACCU-CHEK MICKY PLUS test strip TEST BLOOD SUGARS FOUR TIMES DAILY OR AS DIRECTED 400 each 1     alendronate (FOSAMAX) 70 MG tablet SEE NOTES 12 tablet 0     atorvastatin (LIPITOR) 10 MG tablet TAKE 1 TABLET(10 MG) BY MOUTH DAILY 90 tablet 1     blood glucose monitoring (ACCU-CHEK MICKY PLUS) test strip TEST BLOOD SUGARS FOUR TIMES DAILY OR AS DIRECTED 350 strip 0     blood glucose monitoring (ACCU-CHEK FASTCLIX) lancets Use to test blood sugar 4 times daily or as directed. 306 each 1     calcium carbonate-vitamin D 500-400 MG-UNIT TABS tablt Take 1 tablet by mouth 2 times daily 180 tablet 3     estradiol (ESTRACE VAGINAL) 0.1 MG/GM cream Place 2 g vaginally twice a week Apply dime-sized amount daily for the first 2 weeks, then place twice a week thereafter. 42.5 g 0     insulin aspart (NOVOLOG FLEXPEN) 100 UNIT/ML injection Take as directed with meals and snacks. Total daily dose approx 30 units. 30 mL 3     insulin degludec (TRESIBA) 200 UNIT/ML pen Inject 32 units subcutaneous daily (Patient taking differently: Inject 24 Units Subcutaneous daily Inject 32 units subcutaneous daily) 30 mL 3     insulin pen needle (B-D U/F) 31G X 8 MM USE WITH NOVOLOG AND LANTUS(FIVE TIMES DAILY) 450 each 3     insulin syringe-needle U-100 (B-D INSULIN SYRINGE HALF-UNIT) 31G X 5/16\" 0.3 ML Use 3 syringes daily or as directed. 100 each 0     mirabegron (MYRBETRIQ) 50 MG 24 hr tablet Take 1 tablet (50 mg) by mouth daily 90 tablet 3     Multiple Vitamins-Minerals (MULTIVITAMIN ADULT PO) Take 1 tablet by mouth daily       solifenacin (VESICARE) 10 MG tablet Take 1 tablet (10 mg) by mouth daily 90 tablet 3     Urine Glucose-Ketones Test (KETO-DIASTIX) STRP 1 strip by In " Vitro route as needed 1 each 11     Family History  No thyroid disease or type 1 diabetes in the family    Social History  Social History     Tobacco Use     Smoking status: Never Smoker     Smokeless tobacco: Never Used   Substance Use Topics     Alcohol use: Yes     Comment: 1 to 2 beers or glasses of wine 1 to 2 times per month     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    ROS  Constitutional: weight is up to 170 lbs. She would like to lose weight, +increased craving, increased snacking  Eyes: no vision change, diplopia or red eyes   Neck: no difficulty swallowing, no choking, no neck pain, no neck swelling  Cardiovascular: no chest pain, palpitations  Respiratory: no dyspnea, cough, shortness of breath or wheezing   GI: no nausea, vomiting, diarrhea or constipation, no abdominal pain   : no change in urine, no dysuria or hematuria  Musculoskeletal: no joint or muscle pain or swelling   Integumentary: no complaints  Neuro: no loss of strength or sensation, no numbness or tingling, no tremor, no dizziness, no headache   Endo: no polyuria or polydipsia, no temperature intolerance   Heme/Lymph: no concerning bumps, no bleeding problems   Allergy: no environmental allergies   Psych: frustrate about weight gain, no sleep problems.    Physical Exam  /62 (BP Location: Left arm, Patient Position: Sitting, Cuff Size: Adult Regular)   Pulse 105   Wt 77.2 kg (170 lb 1.6 oz)   SpO2 97%   BMI 28.31 kg/m    Body mass index is 28.31 kg/m .  Constitutional: no distress, comfortable, pleasant   Eyes: anicteric  Thyroid: not enlarged  CVS: RRR, normal S1,S2, no murmur  Lungs: CTA B/L  Abd: soft, NT, ND, no hepatomegaly  Musculoskeletal: no edema   Skin:  no jaundice   Neurological: normal gait, 2+patella reflex, intact sensation per monofilament bilaterally, no tremor on outstretched hands bilaterally  Psychological: appropriate mood     RESULTS  Lab Results   Component Value Date    A1C 9.1 12/10/2018    A1C 8.3  03/26/2018    A1C 9.3 11/27/2017    A1C 6.6 07/17/2017    A1C 6.0 03/13/2017     ENDO DIABETES Latest Ref Rng & Units 7/3/2017   CHOLESTEROL <200 mg/dL 158   LDL CHOLESTEROL, CALCULATED <100 mg/dL 81   HDL CHOLESTEROL >49 mg/dL 72   NON HDL CHOLESTEROL <130 mg/dL 86   TRIGLYCERIDES <150 mg/dL 26   ALBUMIN URINE MG/L mg/L 6   ALBUMIN URINE MG/G CR 0 - 25 mg/g Cr 9.43        Ref. Range 7/17/2017 08:39   Cortisol Serum Latest Ref Range: 4 - 22 ug/dL 13.0     ASSESSMENT:    Ms. Rodríguez is a 68 years old woman with hx of type 1 diabetes in June 2016. She is here for follow up.    1. Type 1 diabetes: diagnosed in June 2016. She is adapting quite well.  A1c is up to 9.1% today but she still has fluctuation of BG with some hypoglycemia and postprandial hyperglycemia which is mostly likely explained by extra snack without insulin coverage.  She will advised to cover snack with Novolog  She will try to minimize high carb snack  continue Tresiba U-200 24 units daily  Novolog 1 unit per 20 gram CHO for BF and lunch, and 1 unit per 10 gram of   Sensitivity 1 unit per 55 mg/dl above 150  She will send message thru [employer] in 3-4 weeks  Discussed insulin pump and sensor today --- she is not interested in both technology at this time    2) DM complications:  Retinopathy: normal exam done 2018  Nephropathy: negative urine microalbumin 7/3/17  Neuropathy: none per exam today 10/10/18    PLAN:   continue Tresiba U-200 22 units daily  increase Novolog 1 unit per 20 gram CHO for all meal  Sensitivity 1 unit per 55 mg/dl above 150  RTC 4-5 months      Edward Aguero MD     Division of Diabetes and Endocrinology  Department of Medicine  [phone]

## 2018-12-12 ENCOUNTER — TELEPHONE (OUTPATIENT)
Dept: EDUCATION SERVICES | Facility: CLINIC | Age: 68
End: 2018-12-12

## 2018-12-12 NOTE — TELEPHONE ENCOUNTER
Patient called clinic back stating she deleted the message with the information and asked us to call her again and leave another message. Writer called and left the information below.     Heike Adams, [facility]  Adult Endocrinology  [facility]

## 2018-12-12 NOTE — TELEPHONE ENCOUNTER
[facility] Call Center    Phone Message    May a detailed message be left on voicemail: no    Reason for Call: Other: Pt requesting call back regarding her insulin monitor. Please call back. [phone]     Action Taken: Message routed to:  Adult Clinics: Endocrinology p [id number]

## 2018-12-12 NOTE — TELEPHONE ENCOUNTER
Will forward to SRI Hodge.    Clarisa Nunez, RN  Endocrine Care Coordinator  [facility]

## 2018-12-13 NOTE — TELEPHONE ENCOUNTER
Lft vm for pt. Advised HCP Activation Code for Connect meter is: [id number].  Please call clinic at, [phone], if you feel you need help setting up daniel in phone.     Linda Diego RN, BSN, CDE   [facility]

## 2018-12-20 NOTE — TELEPHONE ENCOUNTER
Spoke with patient.  She is unable to schedule a colonoscopy because she can not get a .    Jody Elaine  Primary Care   [facility] [place]

## 2019-01-16 DIAGNOSIS — M81.0 AGE-RELATED OSTEOPOROSIS WITHOUT CURRENT PATHOLOGICAL FRACTURE: ICD-10-CM

## 2019-01-21 RX ORDER — ALENDRONATE SODIUM 70 MG/1
TABLET ORAL
Qty: 12 TABLET | Refills: 1 | Status: SHIPPED | OUTPATIENT
Start: 2019-01-21 | End: 2019-05-01

## 2019-02-11 ENCOUNTER — OFFICE VISIT (OUTPATIENT)
Dept: UROLOGY | Facility: CLINIC | Age: 69
End: 2019-02-11
Payer: COMMERCIAL

## 2019-02-11 VITALS — DIASTOLIC BLOOD PRESSURE: 84 MMHG | HEART RATE: 72 BPM | SYSTOLIC BLOOD PRESSURE: 145 MMHG | OXYGEN SATURATION: 98 %

## 2019-02-11 DIAGNOSIS — R35.0 URINARY FREQUENCY: ICD-10-CM

## 2019-02-11 DIAGNOSIS — N95.2 ATROPHIC VAGINITIS: ICD-10-CM

## 2019-02-11 DIAGNOSIS — N32.81 OVERACTIVE BLADDER: ICD-10-CM

## 2019-02-11 DIAGNOSIS — R39.15 URGENCY OF URINATION: ICD-10-CM

## 2019-02-11 DIAGNOSIS — N39.41 URGE INCONTINENCE: Primary | ICD-10-CM

## 2019-02-11 LAB
ALBUMIN UR-MCNC: NEGATIVE MG/DL
APPEARANCE UR: CLEAR
BACTERIA #/AREA URNS HPF: ABNORMAL /HPF
BILIRUB UR QL STRIP: NEGATIVE
COLOR UR AUTO: ABNORMAL
GLUCOSE UR STRIP-MCNC: NEGATIVE MG/DL
HGB UR QL STRIP: NEGATIVE
KETONES UR STRIP-MCNC: NEGATIVE MG/DL
LEUKOCYTE ESTERASE UR QL STRIP: ABNORMAL
NITRATE UR QL: NEGATIVE
PH UR STRIP: 7 PH (ref 5–7)
RBC #/AREA URNS AUTO: ABNORMAL /HPF
SOURCE: ABNORMAL
SP GR UR STRIP: 1 (ref 1–1.03)
UROBILINOGEN UR STRIP-ACNC: NORMAL EU/DL (ref 0.2–1)
UROBILINOGEN UR STRIP-MCNC: NORMAL MG/DL (ref 0–2)
WBC #/AREA URNS AUTO: ABNORMAL /HPF

## 2019-02-11 PROCEDURE — 99213 OFFICE O/P EST LOW 20 MIN: CPT | Performed by: UROLOGY

## 2019-02-11 PROCEDURE — 81001 URINALYSIS AUTO W/SCOPE: CPT | Performed by: UROLOGY

## 2019-02-11 RX ORDER — MIRABEGRON 50 MG/1
50 TABLET, EXTENDED RELEASE ORAL DAILY
Qty: 90 TABLET | Refills: 3 | Status: SHIPPED | OUTPATIENT
Start: 2019-02-11 | End: 2020-08-17

## 2019-02-11 RX ORDER — ESTRADIOL 0.1 MG/G
2 CREAM VAGINAL
Qty: 42.5 G | Refills: 0 | Status: SHIPPED | OUTPATIENT
Start: 2019-02-11 | End: 2019-05-01

## 2019-02-11 RX ORDER — SOLIFENACIN SUCCINATE 10 MG/1
10 TABLET, FILM COATED ORAL DAILY
Qty: 90 TABLET | Refills: 3 | Status: SHIPPED | OUTPATIENT
Start: 2019-02-11 | End: 2020-02-03

## 2019-02-11 RX ORDER — SOLIFENACIN SUCCINATE 10 MG/1
10 TABLET, FILM COATED ORAL DAILY
Qty: 90 TABLET | Refills: 3 | Status: SHIPPED | OUTPATIENT
Start: 2019-02-11 | End: 2019-02-11

## 2019-02-11 RX ORDER — ESTRADIOL 0.1 MG/G
2 CREAM VAGINAL
Qty: 42.5 G | Refills: 0 | Status: SHIPPED | OUTPATIENT
Start: 2019-02-11 | End: 2019-02-11

## 2019-02-11 RX ORDER — MIRABEGRON 50 MG/1
50 TABLET, EXTENDED RELEASE ORAL DAILY
Qty: 90 TABLET | Refills: 3 | Status: SHIPPED | OUTPATIENT
Start: 2019-02-11 | End: 2019-02-11

## 2019-02-11 ASSESSMENT — PAIN SCALES - GENERAL: PAINLEVEL: NO PAIN (0)

## 2019-02-11 NOTE — LETTER
Patient:  Latoya Rodríguez  :   1950  MRN:     [id number]      Ms.Linda STEVO Rodríguez  [address] [facility] [address]      2019    Dear ,    We are writing to inform you of your recent test results which are essentially normal.  Resulted Orders   UA reflex to Microscopic and Culture   Result Value Ref Range    Color Urine Straw     Appearance Urine Clear     Glucose Urine Negative NEG^Negative mg/dL    Bilirubin Urine Negative NEG^Negative    Ketones Urine Negative NEG^Negative mg/dL    Specific Gravity Urine 1.005 1.003 - 1.035    Blood Urine Negative NEG^Negative    pH Urine 7.0 5.0 - 7.0 pH    Protein Albumin Urine Negative NEG^Negative mg/dL    Urobilinogen mg/dL Normal 0.0 - 2.0 mg/dL    Nitrite Urine Negative NEG^Negative    Leukocyte Esterase Urine Trace (A) NEG^Negative    Source Midstream Urine    Urobilinogen qualitative urine   Result Value Ref Range    Urobilinogen Urine Canceled, Test credited 0.2 - 1.0 EU/dL      Comment:      Duplicate request     Please contact the [facility] at [phone] with any questions or concerns.    Sincerely,    Dr. Zhang's Office

## 2019-02-11 NOTE — PROGRESS NOTES
Reason for visit: F/u on therapy for urgency and frequency   Clinical Data: Ms. Rodríguez is a 67 y/o female with the above. She was started on oxybutynin 15 mg xl as well as estrace cream and referred to pelvic floor PT. She did better on the anticholinergic however continued to have a lot of urgency and some urge incontinence. She was then switched to Detrol which did not work, then Sanctura which did but was too expensive and now is on Vesicare and Myrbetriq and that is working better than anything else.  For the most part she feels like she is able to control her incontinence.  This only occurs rarely.  She is also using estrogen cream.    /84   Pulse 72   SpO2 98%     ASSESSMENT and PLAN: This is a 68 year old female with urinary urgency, frequency and urge incontinence with improvement on anticholinergic and she notices it occurs mostly when up and walking around or getting up from a sitting position. She was doing well with the Myrbetriq and the Vesicare. She also has stress incontinence but is not bothered enough to proceed with any intervention.   We had previously discussed options including PTNS, interstim, and botox or even to try periurethral bulking.  She wanted to try PTNS but not at this time.  -continue Vesicare 10 mg.  -continue Myrbetriq 50 mg    -continue estrace  -consider PTNS in the future  -f/u with me in a year    Thank you for allowing me to participate in the care of Ms. Latoya Rodríguez and I will keep you updated on her progress.   Alberto Zhang MD

## 2019-02-11 NOTE — NURSING NOTE
Latoya Rodríguez's goals for this visit include:   Chief Complaint   Patient presents with     RECHECK     1 year follow up       She requests these members of her care team be copied on today's visit information: yes    PCP: Pricila Avila    Referring Provider:  No referring provider defined for this encounter.    /84   Pulse 72   SpO2 98%     Do you need any medication refills at today's visit? No    Morena Roca, CMA      post void residual  0ml

## 2019-02-12 ENCOUNTER — TELEPHONE (OUTPATIENT)
Dept: UROLOGY | Facility: CLINIC | Age: 69
End: 2019-02-12

## 2019-02-12 NOTE — TELEPHONE ENCOUNTER
Prior Authorization Retail Medication Request    Medication/Dose: Vesicare 10mg tablets  ICD code (if different than what is on RX):    Previously Tried and Failed:    Rationale:      Insurance Name:    Insurance ID:        Pharmacy Information (if different than what is on RX)  Name:    Phone:

## 2019-02-13 DIAGNOSIS — E10.9 TYPE I DIABETES MELLITUS, WELL CONTROLLED (H): ICD-10-CM

## 2019-02-15 ENCOUNTER — TELEPHONE (OUTPATIENT)
Dept: UROLOGY | Facility: CLINIC | Age: 69
End: 2019-02-15

## 2019-02-15 NOTE — TELEPHONE ENCOUNTER
[facility] Call Center    Phone Message    May a detailed message be left on voicemail: yes    Reason for Call: Pt states she was given a pamphlet awhile ago on a procedure that Dr. Zhang and pt discussed. Pt does not know the name of the procedure but is wondering if she can get a new pamphlet or the name of procedure so she can contact her insurance about this procedure. Please advise.     Action Taken: Message routed to:  Adult Clinics: Urology p [id number]

## 2019-02-16 NOTE — TELEPHONE ENCOUNTER
Central Prior Authorization Team   Phone: [phone]      PA Initiation    Medication: solifenacin (VESICARE) 10 MG tablet-PA Initiated  Insurance Company: [employer] - Phone [phone] Fax [phone]  Pharmacy Filling the Rx: [id number] - [place], [place] - [address] AT [facility] [address]  Filling Pharmacy Phone: [phone]  Filling Pharmacy Fax: [phone]  Start Date: 2/15/2019

## 2019-02-16 NOTE — TELEPHONE ENCOUNTER
Answered additional question and faxed back to [facility].

## 2019-02-18 NOTE — TELEPHONE ENCOUNTER
PRIOR AUTHORIZATION DENIED    Medication: solifenacin (VESICARE) 10 MG tablet-DENIED    Denial Date: 2/16/2019    Denial Rational: Preferred alternative: Toviaz ER.             Appeal Information:

## 2019-02-18 NOTE — TELEPHONE ENCOUNTER
Returned call to pt and advised PTNS was the procedure discussed during last visit. Pt stated understanding and will call insurance to check coverage.    Danni Brown LPN

## 2019-02-18 NOTE — TELEPHONE ENCOUNTER
Routing message to Dr. Zhang asking for recommendations. Pt is aware of denial and is open to trying a different medication if need be. Informed pt message would be sent to Dr. Zhang and patient will be contacted once update is available.    Danni Brown LPN

## 2019-02-20 DIAGNOSIS — N32.81 OVERACTIVE BLADDER: Primary | ICD-10-CM

## 2019-02-20 RX ORDER — FESOTERODINE FUMARATE 8 MG/1
8 TABLET, FILM COATED, EXTENDED RELEASE ORAL DAILY
Qty: 90 TABLET | Refills: 3 | Status: SHIPPED | OUTPATIENT
Start: 2019-02-20 | End: 2019-03-04

## 2019-02-20 NOTE — TELEPHONE ENCOUNTER
Dr. Zhang recommended pt try Toviaz. Rx has been ordered. Called pt to update. Pt stated understanding. Will  rx and call clinic with any concerns.    Danni Brown LPN

## 2019-03-01 ENCOUNTER — MYC MEDICAL ADVICE (OUTPATIENT)
Dept: UROLOGY | Facility: CLINIC | Age: 69
End: 2019-03-01

## 2019-03-01 DIAGNOSIS — N32.81 OVERACTIVE BLADDER: ICD-10-CM

## 2019-03-04 ENCOUNTER — TELEPHONE (OUTPATIENT)
Dept: UROLOGY | Facility: CLINIC | Age: 69
End: 2019-03-04

## 2019-03-04 RX ORDER — FESOTERODINE FUMARATE 8 MG/1
8 TABLET, FILM COATED, EXTENDED RELEASE ORAL DAILY
Qty: 90 TABLET | Refills: 3 | Status: SHIPPED | OUTPATIENT
Start: 2019-03-04 | End: 2020-02-03

## 2019-03-04 NOTE — TELEPHONE ENCOUNTER
Received message from Dr. Zhang who reports that either Toviaz or Vesicare is an appropriate medication and we would be happy to provide a prescription for whichever medication patient prefers. My chart message sent to patient.     Jess Hadley RN, BSN

## 2019-03-04 NOTE — TELEPHONE ENCOUNTER
Financial Counselor Review:    Procedure to be performed (include CPT code):Yes, CPT code: 42435 PTNS (Percutaneous Tibial Nerve Stimulation) to be done in clinic weekly for 12 weeks then every 4 weeks after for maintenance    Diagnosis code (include ICD-10 code): Urge incontinence N39.41    Coverage and patient financial responsibility information:YES    Does patient need to be contacted by Financial Counselor:NO, please send updates to Adult Urology pool.      Jess Hadley RN, BSN

## 2019-03-05 DIAGNOSIS — E11.65 TYPE 2 DIABETES MELLITUS WITH HYPERGLYCEMIA (H): ICD-10-CM

## 2019-03-05 NOTE — TELEPHONE ENCOUNTER
Faxed refill request received from   [id number] - [place], [place] - [address] [facility] [address]  [place] [address]  Phone: [phone] Fax: [phone].   Medication Requested: Accu-chek test strips  Directions: test 4 times daily as directed  Quantity: 350   Last Office Visit: 12/10/18  Next Appointment Scheduled for: 3/18/19    Heike Adams CMA  Adult Endocrinology  [facility]

## 2019-03-16 DIAGNOSIS — E78.5 HYPERLIPIDEMIA WITH TARGET LDL LESS THAN 100: ICD-10-CM

## 2019-03-18 ENCOUNTER — OFFICE VISIT (OUTPATIENT)
Dept: ENDOCRINOLOGY | Facility: CLINIC | Age: 69
End: 2019-03-18
Payer: COMMERCIAL

## 2019-03-18 VITALS
HEART RATE: 84 BPM | DIASTOLIC BLOOD PRESSURE: 75 MMHG | WEIGHT: 172.9 LBS | SYSTOLIC BLOOD PRESSURE: 114 MMHG | HEIGHT: 65 IN | OXYGEN SATURATION: 97 % | BODY MASS INDEX: 28.81 KG/M2

## 2019-03-18 DIAGNOSIS — E10.9 TYPE 1 DIABETES MELLITUS WITHOUT COMPLICATION (H): ICD-10-CM

## 2019-03-18 LAB — HBA1C MFR BLD: 9.2 % (ref 0–5.6)

## 2019-03-18 PROCEDURE — 83036 HEMOGLOBIN GLYCOSYLATED A1C: CPT | Performed by: INTERNAL MEDICINE

## 2019-03-18 PROCEDURE — 36415 COLL VENOUS BLD VENIPUNCTURE: CPT | Performed by: INTERNAL MEDICINE

## 2019-03-18 PROCEDURE — 99214 OFFICE O/P EST MOD 30 MIN: CPT | Performed by: INTERNAL MEDICINE

## 2019-03-18 ASSESSMENT — MIFFLIN-ST. JEOR: SCORE: 1312.65

## 2019-03-18 NOTE — PATIENT INSTRUCTIONS
increase Tresiba U-200 at 26 units daily  Take Novolog 1 unit per 15 gram for breakfast and lunch  Take Novolog 1 unit per 10 gram for dinner  Sensitivity 1 unit per 50 mg/dl above 150    See you back in 3-4 months    If you have any questions, please do not hesitate to call [facility] at [phone] and ask for Endocrinology clinic.    Sincerely,    Edward Aguero MD  Endocrinology

## 2019-03-18 NOTE — PROGRESS NOTES
Endocrinology Note         Latoya is a 68 year old female presents today for type 1 diabetes.    HPI  Ms. Latoya Rodríguez is a 68 years old woman with hx of type 1 diabetes in June 2016. She is here for follow up. Last seen 12/2018.    She was diagnosed with type 1 diabetes when she continued losing weight unintentionally. She was found to have an A1c >14. She was initially diagnosed with type 2 diabetes and was put on insulin and Metformin.  She was later found to be TORRES antibody positive and c peptide negative.  She has since been working closely with a diabetes educator and has learned carb counting, and has generally adapted well to the diagnosis.      Interim history:  A1C is up to 9.2 today. She is currently on Tresiba U-200 at 24 units at bedtime, Novolog 1 unit per 20 gram of carb for BF and lunch and 1 unit per 10 gram at dinner and additional Novolog sensitivity 1 unit per 55 above 150 mg/dl. I reviewed her glucose meter, she has been testing 4 times a day with an overall average of 236 () mg/dL. Her lowest blood sugar was in 54 and the highest BG was 443. She used Accuchek Expert meter. She did have high fluctuation of glucoses. Most of postprandial glucoses were in 200-300 after lunch and dinner in particularly. She stated that she had extra snack between meal often at work lately.    DM complications:  Retinopathy: normal exam done March 2018  Nephropathy: negative urine microalbumin 12/2018  Neuropathy: none per exam 10/10/18    LDL: 91  Hypertension: controlled    Past Medical History  Past Medical History:   Diagnosis Date     Cataract      Type 2 diabetes mellitus with hyperglycemia (H) 6/24/2016   Type 1 diabetes, diagnosed 6/2016  Hyperlipidemia  Osteoporosis     Allergies  No Known Allergies     Medications  Current Outpatient Medications   Medication Sig Dispense Refill     ACCU-CHEK MICKY PLUS test strip TEST BLOOD SUGARS FOUR TIMES DAILY OR AS DIRECTED 400 each 1     alendronate  "(FOSAMAX) 70 MG tablet SEE NOTES 12 tablet 1     atorvastatin (LIPITOR) 10 MG tablet TAKE 1 TABLET(10 MG) BY MOUTH DAILY 90 tablet 1     blood glucose (ACCU-CHEK MICKY PLUS) test strip TEST BLOOD SUGARS FOUR TIMES DAILY OR AS DIRECTED 350 strip 2     blood glucose monitoring (ACCU-CHEK FASTCLIX) lancets Use to test blood sugar 4 times daily or as directed. 306 each 1     calcium carbonate-vitamin D 500-400 MG-UNIT TABS tablt Take 1 tablet by mouth 2 times daily 180 tablet 3     estradiol (ESTRACE VAGINAL) 0.1 MG/GM vaginal cream Place 2 g vaginally twice a week Apply dime-sized amount daily for the first 2 weeks, then place twice a week thereafter. 42.5 g 0     Fesoterodine Fumarate (TOVIAZ) 8 MG TB24 Take 1 tablet (8 mg) by mouth daily 90 tablet 3     insulin aspart (NOVOLOG FLEXPEN) 100 UNIT/ML injection Take as directed with meals and snacks. Total daily dose approx 30 units. 30 mL 3     insulin degludec (TRESIBA) 200 UNIT/ML pen Inject 32 units subcutaneous daily (Patient taking differently: Inject 24 Units Subcutaneous daily Inject 32 units subcutaneous daily) 30 mL 3     insulin pen needle (B-D U/F) 31G X 8 MM miscellaneous USE WITH NOVOLOG AND LANTUS(FIVE TIMES DAILY) 500 each 3     insulin syringe-needle U-100 (B-D INSULIN SYRINGE HALF-UNIT) 31G X 5/16\" 0.3 ML Use 3 syringes daily or as directed. 100 each 0     mirabegron (MYRBETRIQ) 50 MG 24 hr tablet Take 1 tablet (50 mg) by mouth daily 90 tablet 3     Multiple Vitamins-Minerals (MULTIVITAMIN ADULT PO) Take 1 tablet by mouth daily       solifenacin (VESICARE) 10 MG tablet Take 1 tablet (10 mg) by mouth daily 90 tablet 3     Urine Glucose-Ketones Test (KETO-DIASTIX) STRP 1 strip by In Vitro route as needed 1 each 11     Family History  No thyroid disease or type 1 diabetes in the family    Social History  Social History     Tobacco Use     Smoking status: Never Smoker     Smokeless tobacco: Never Used   Substance Use Topics     Alcohol use: Yes     Comment: " "1 to 2 beers or glasses of wine 1 to 2 times per month     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    ROS  Constitutional: weight is up to 170 lbs. She would like to lose weight, +increased craving, increased snacking  Eyes: no vision change, diplopia or red eyes   Neck: no difficulty swallowing, no choking, no neck pain, no neck swelling  Cardiovascular: no chest pain, palpitations  Respiratory: no dyspnea, cough, shortness of breath or wheezing   GI: no nausea, vomiting, diarrhea or constipation, no abdominal pain   : no change in urine, no dysuria or hematuria  Musculoskeletal: no joint or muscle pain or swelling   Integumentary: no complaints  Neuro: no loss of strength or sensation, no numbness or tingling, no tremor, no dizziness, no headache   Endo: no polyuria or polydipsia, no temperature intolerance   Heme/Lymph: no concerning bumps, no bleeding problems   Allergy: no environmental allergies   Psych: no sleep problems.    Physical Exam  /75 (BP Location: Left arm, Patient Position: Sitting, Cuff Size: Adult Regular)   Pulse 84   Ht 1.647 m (5' 4.84\")   Wt 78.4 kg (172 lb 14.4 oz)   SpO2 97%   BMI 28.91 kg/m    Body mass index is 28.91 kg/m .  Constitutional: no distress, comfortable, pleasant   Eyes: anicteric  Thyroid: not enlarged  CVS: RRR, normal S1,S2, no murmur  Lungs: CTA B/L  Abd: soft, NT, ND, no hepatomegaly  Musculoskeletal: no edema   Skin:  no jaundice   Neurological: normal gait, 2+patella reflex, intact sensation per monofilament bilaterally 10/2018, no tremor on outstretched hands bilaterally  Psychological: appropriate mood     RESULTS    Lab Results   Component Value Date    A1C 9.2 03/18/2019    A1C 9.1 12/10/2018    A1C 8.3 03/26/2018    A1C 9.3 11/27/2017    A1C 6.6 07/17/2017     ENDO DIABETES Latest Ref Rng & Units 12/10/2018   LDL CHOLESTEROL DIRECT <100 mg/dL 91   HDL CHOLESTEROL >49 mg/dL    NON HDL CHOLESTEROL <130 mg/dL    TRIGLYCERIDES <150 mg/dL    ALBUMIN URINE " MG/L mg/L <5   ALBUMIN URINE MG/G CR 0 - 25 mg/g Cr Unable to calculate due to low value   CREATININE 0.52 - 1.04 mg/dL 0.70      Ref. Range 7/17/2017 08:39   Cortisol Serum Latest Ref Range: 4 - 22 ug/dL 13.0     ASSESSMENT:    Ms. Rodríguez is a 68 years old woman with hx of type 1 diabetes in June 2016. She is here for follow up.    1. Type 1 diabetes: diagnosed in June 2016. A1c is up to 9.2% today -- she has fluctuation of BG with rare hypoglycemia and postprandial hyperglycemia which is mostly likely explained by extra snack   She will try to minimize high carb snack  Increase Tresiba U-200 to 26 units daily  Increase Novolog 1 unit per 15 gram CHO for BF and lunch, and 1 unit per 10 gram of CHO for dinner  Change sensitivity 1 unit per 50 mg/dl above 150  She will send message thru [employer] in 3-4 weeks  Discussed insulin pump and sensor today --- she is not interested in both technology at this time    2) DM complications:  Retinopathy: normal exam done 2018  Nephropathy: negative urine microalbumin 12/2018  Neuropathy: none per exam 10/10/18    PLAN:   -increase Tresiba U-200 26 units daily  -change Novolog 1 unit per 15 gram CHO for BF and lunch, and 1 unit per 10 gram of CHO for dinner  -change sensitivity 1 unit per 50 mg/dl above 150  RTC 3-4 months      Edward Aguero MD     Division of Diabetes and Endocrinology  Department of Medicine  [phone]

## 2019-03-18 NOTE — LETTER
3/18/2019         RE: Latoya Rodríguez  [address]  [place] [address]        Dear Colleague,    Thank you for referring your patient, Latoya Rodríguez, to the [facility]. Please see a copy of my visit note below.         Endocrinology Note         Latoya is a 68 year old female presents today for type 1 diabetes.    HPI  MsDaniel Rodríguez is a 68 years old woman with hx of type 1 diabetes in June 2016. She is here for follow up. Last seen 12/2018.    She was diagnosed with type 1 diabetes when she continued losing weight unintentionally. She was found to have an A1c >14. She was initially diagnosed with type 2 diabetes and was put on insulin and Metformin.  She was later found to be TORRES antibody positive and c peptide negative.  She has since been working closely with a diabetes educator and has learned carb counting, and has generally adapted well to the diagnosis.      Interim history:  A1C is up to 9.2 today. She is currently on Tresiba U-200 at 24 units at bedtime, Novolog 1 unit per 20 gram of carb for BF and lunch and 1 unit per 10 gram at dinner and additional Novolog sensitivity 1 unit per 55 above 150 mg/dl. I reviewed her glucose meter, she has been testing 4 times a day with an overall average of 236 () mg/dL. Her lowest blood sugar was in 54 and the highest BG was 443. She used Accuchek Expert meter. She did have high fluctuation of glucoses. Most of postprandial glucoses were in 200-300 after lunch and dinner in particularly. She stated that she had extra snack between meal often at work lately.    DM complications:  Retinopathy: normal exam done March 2018  Nephropathy: negative urine microalbumin 12/2018  Neuropathy: none per exam 10/10/18    LDL: 91  Hypertension: controlled    Past Medical History  Past Medical History:   Diagnosis Date     Cataract      Type 2 diabetes mellitus with hyperglycemia (H) 6/24/2016   Type 1 diabetes, diagnosed  "6/2016  Hyperlipidemia  Osteoporosis     Allergies  No Known Allergies     Medications  Current Outpatient Medications   Medication Sig Dispense Refill     ACCU-CHEK MICKY PLUS test strip TEST BLOOD SUGARS FOUR TIMES DAILY OR AS DIRECTED 400 each 1     alendronate (FOSAMAX) 70 MG tablet SEE NOTES 12 tablet 1     atorvastatin (LIPITOR) 10 MG tablet TAKE 1 TABLET(10 MG) BY MOUTH DAILY 90 tablet 1     blood glucose (ACCU-CHEK MICKY PLUS) test strip TEST BLOOD SUGARS FOUR TIMES DAILY OR AS DIRECTED 350 strip 2     blood glucose monitoring (ACCU-CHEK FASTCLIX) lancets Use to test blood sugar 4 times daily or as directed. 306 each 1     calcium carbonate-vitamin D 500-400 MG-UNIT TABS tablt Take 1 tablet by mouth 2 times daily 180 tablet 3     estradiol (ESTRACE VAGINAL) 0.1 MG/GM vaginal cream Place 2 g vaginally twice a week Apply dime-sized amount daily for the first 2 weeks, then place twice a week thereafter. 42.5 g 0     Fesoterodine Fumarate (TOVIAZ) 8 MG TB24 Take 1 tablet (8 mg) by mouth daily 90 tablet 3     insulin aspart (NOVOLOG FLEXPEN) 100 UNIT/ML injection Take as directed with meals and snacks. Total daily dose approx 30 units. 30 mL 3     insulin degludec (TRESIBA) 200 UNIT/ML pen Inject 32 units subcutaneous daily (Patient taking differently: Inject 24 Units Subcutaneous daily Inject 32 units subcutaneous daily) 30 mL 3     insulin pen needle (B-D U/F) 31G X 8 MM miscellaneous USE WITH NOVOLOG AND LANTUS(FIVE TIMES DAILY) 500 each 3     insulin syringe-needle U-100 (B-D INSULIN SYRINGE HALF-UNIT) 31G X 5/16\" 0.3 ML Use 3 syringes daily or as directed. 100 each 0     mirabegron (MYRBETRIQ) 50 MG 24 hr tablet Take 1 tablet (50 mg) by mouth daily 90 tablet 3     Multiple Vitamins-Minerals (MULTIVITAMIN ADULT PO) Take 1 tablet by mouth daily       solifenacin (VESICARE) 10 MG tablet Take 1 tablet (10 mg) by mouth daily 90 tablet 3     Urine Glucose-Ketones Test (KETO-DIASTIX) STRP 1 strip by In Vitro route " "as needed 1 each 11     Family History  No thyroid disease or type 1 diabetes in the family    Social History  Social History     Tobacco Use     Smoking status: Never Smoker     Smokeless tobacco: Never Used   Substance Use Topics     Alcohol use: Yes     Comment: 1 to 2 beers or glasses of wine 1 to 2 times per month     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    ROS  Constitutional: weight is up to 170 lbs. She would like to lose weight, +increased craving, increased snacking  Eyes: no vision change, diplopia or red eyes   Neck: no difficulty swallowing, no choking, no neck pain, no neck swelling  Cardiovascular: no chest pain, palpitations  Respiratory: no dyspnea, cough, shortness of breath or wheezing   GI: no nausea, vomiting, diarrhea or constipation, no abdominal pain   : no change in urine, no dysuria or hematuria  Musculoskeletal: no joint or muscle pain or swelling   Integumentary: no complaints  Neuro: no loss of strength or sensation, no numbness or tingling, no tremor, no dizziness, no headache   Endo: no polyuria or polydipsia, no temperature intolerance   Heme/Lymph: no concerning bumps, no bleeding problems   Allergy: no environmental allergies   Psych: no sleep problems.    Physical Exam  /75 (BP Location: Left arm, Patient Position: Sitting, Cuff Size: Adult Regular)   Pulse 84   Ht 1.647 m (5' 4.84\")   Wt 78.4 kg (172 lb 14.4 oz)   SpO2 97%   BMI 28.91 kg/m     Body mass index is 28.91 kg/m .  Constitutional: no distress, comfortable, pleasant   Eyes: anicteric  Thyroid: not enlarged  CVS: RRR, normal S1,S2, no murmur  Lungs: CTA B/L  Abd: soft, NT, ND, no hepatomegaly  Musculoskeletal: no edema   Skin:  no jaundice   Neurological: normal gait, 2+patella reflex, intact sensation per monofilament bilaterally 10/2018, no tremor on outstretched hands bilaterally  Psychological: appropriate mood     RESULTS    Lab Results   Component Value Date    A1C 9.2 03/18/2019    A1C 9.1 12/10/2018 "    A1C 8.3 03/26/2018    A1C 9.3 11/27/2017    A1C 6.6 07/17/2017     ENDO DIABETES Latest Ref Rng & Units 12/10/2018   LDL CHOLESTEROL DIRECT <100 mg/dL 91   HDL CHOLESTEROL >49 mg/dL    NON HDL CHOLESTEROL <130 mg/dL    TRIGLYCERIDES <150 mg/dL    ALBUMIN URINE MG/L mg/L <5   ALBUMIN URINE MG/G CR 0 - 25 mg/g Cr Unable to calculate due to low value   CREATININE 0.52 - 1.04 mg/dL 0.70      Ref. Range 7/17/2017 08:39   Cortisol Serum Latest Ref Range: 4 - 22 ug/dL 13.0     ASSESSMENT:    Ms. Rodríguez is a 68 years old woman with hx of type 1 diabetes in June 2016. She is here for follow up.    1. Type 1 diabetes: diagnosed in June 2016. A1c is up to 9.2% today -- she has fluctuation of BG with rare hypoglycemia and postprandial hyperglycemia which is mostly likely explained by extra snack   She will try to minimize high carb snack  Increase Tresiba U-200 to 26 units daily  Increase Novolog 1 unit per 15 gram CHO for BF and lunch, and 1 unit per 10 gram of CHO for dinner  Change sensitivity 1 unit per 50 mg/dl above 150  She will send message thru [employer] in 3-4 weeks  Discussed insulin pump and sensor today --- she is not interested in both technology at this time    2) DM complications:  Retinopathy: normal exam done 2018  Nephropathy: negative urine microalbumin 12/2018  Neuropathy: none per exam 10/10/18    PLAN:   -increase Tresiba U-200 26 units daily  -change Novolog 1 unit per 15 gram CHO for BF and lunch, and 1 unit per 10 gram of CHO for dinner  -change sensitivity 1 unit per 50 mg/dl above 150  RTC 3-4 months      Edward Aguero MD     Division of Diabetes and Endocrinology  Department of Medicine  [phone]

## 2019-03-18 NOTE — NURSING NOTE
"Latoya Rodríguez's goals for this visit include:   Chief Complaint   Patient presents with     Diabetes     She requests these members of her care team be copied on today's visit information: Yes    PCP: Pricila Avila    Referring Provider:  Pricila Avila, APRN CNP  [address]  [place] [address]    /75 (BP Location: Left arm, Patient Position: Sitting, Cuff Size: Adult Regular)   Pulse 84   Ht 1.647 m (5' 4.84\")   Wt 78.4 kg (172 lb 14.4 oz)   SpO2 97%   BMI 28.91 kg/m      Do you need any medication refills at today's visit? No  "

## 2019-03-19 ENCOUNTER — TRANSFERRED RECORDS (OUTPATIENT)
Dept: HEALTH INFORMATION MANAGEMENT | Facility: CLINIC | Age: 69
End: 2019-03-19

## 2019-03-19 RX ORDER — ATORVASTATIN CALCIUM 10 MG/1
TABLET, FILM COATED ORAL
Qty: 90 TABLET | Refills: 1 | Status: SHIPPED | OUTPATIENT
Start: 2019-03-19 | End: 2019-08-02

## 2019-03-21 DIAGNOSIS — E10.9 TYPE 1 DIABETES MELLITUS WITHOUT COMPLICATION (H): ICD-10-CM

## 2019-03-21 RX ORDER — LANCETS
EACH MISCELLANEOUS
Qty: 306 EACH | Refills: 3 | Status: SHIPPED | OUTPATIENT
Start: 2019-03-21 | End: 2020-03-02

## 2019-03-21 NOTE — TELEPHONE ENCOUNTER
Faxed refill request received from: [facility]  Medication Requested: Accu-Chek Fastclix Lancets 102's  Directions:Test 4 times daily as directed  Quantity:306  Last Office Visit: 03/18/2019  Next Appointment Scheduled for: 07/12/2019  Last refill: 01/05/2019    Carlo Otto  Procedure , [place]  Peds Specialty and Adult Endocrinology

## 2019-03-22 ENCOUNTER — MYC MEDICAL ADVICE (OUTPATIENT)
Dept: UROLOGY | Facility: CLINIC | Age: 69
End: 2019-03-22

## 2019-03-26 ENCOUNTER — TELEPHONE (OUTPATIENT)
Dept: ORTHOPEDICS | Facility: CLINIC | Age: 69
End: 2019-03-26

## 2019-03-26 ENCOUNTER — OFFICE VISIT (OUTPATIENT)
Dept: ORTHOPEDICS | Facility: CLINIC | Age: 69
End: 2019-03-26
Payer: COMMERCIAL

## 2019-03-26 VITALS — HEIGHT: 65 IN | BODY MASS INDEX: 28.66 KG/M2 | WEIGHT: 172 LBS

## 2019-03-26 DIAGNOSIS — M65.30 TRIGGER FINGER, ACQUIRED: Primary | ICD-10-CM

## 2019-03-26 PROCEDURE — 99202 OFFICE O/P NEW SF 15 MIN: CPT | Performed by: PLASTIC SURGERY

## 2019-03-26 ASSESSMENT — MIFFLIN-ST. JEOR: SCORE: 1308.53

## 2019-03-26 NOTE — PROGRESS NOTES
REFERRING PROVIDER:  Pricila Avila NP      PRESENTING COMPLAINT:  Consultation for trigger finger.      HISTORY OF PRESENTING COMPLAINT:  Ms. Rodríguez is 68 years old.  She is right-hand dominant.  Both her long and ring fingers of both hands have been triggering for over 2 years.  She has never had them treated.  Here to have it looked at.  They trigger daily.      PAST MEDICAL HISTORY:  Diabetes.      PAST SURGICAL HISTORY:  Vein stripping.      MEDICATIONS:  Fosamax, Lipitor, insulin.      ALLERGIES:  Nil.      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      REVIEW OF SYSTEMS:  Denies chest pain, shortness of breath, MI, CVA, DVT and PE.      PHYSICAL EXAMINATION:  Vital signs are stable.  She is afebrile, in no obvious distress.  She is 5 feet 5 inches, 172 pounds, BMI 28 kg/m2.  On examination of her fingers, long and ring fingers bilaterally have palpable nodules and triggering.      ASSESSMENT AND PLAN:  Based on above findings, diagnosis of trigger fingers was made.  Discussed with the patient her findings.  Given the fact this has been going on for 2 years, this most likely represents a chronic synovitis with a palpable nodule that is scar.  I think A1 pulley releases is indicated.  I explained to her how this would be done, showed her where the scars would be, explained to her the risks, benefits and alternatives including pain, infection, bleeding, scarring, asymmetry, seromas, hematomas, wound breakdown, wound dehiscence, stiffness, chronic tenosynovitis, requirement of further surgeries, infections, stiffness, CRPS, DVT, PE, MI, CVA and death.  She understood them all and wants to proceed.  We will schedule her as soon as possible.  All questions were answered.  She was happy with the visit.      Total time spent with the patient was 20 minutes, more than half was counseling.      cc:   Pricila Avila NP    [address]  [address]    [place]  [address]

## 2019-03-26 NOTE — LETTER
3/26/2019         RE: Latoya Rodríguez  [address]  [facility] [address]        Dear Colleague,    Thank you for referring your patient, Latoya Rodríguez, to the [facility]. Please see a copy of my visit note below.    REFERRING PROVIDER:  Pricila Avila NP      PRESENTING COMPLAINT:  Consultation for trigger finger.      HISTORY OF PRESENTING COMPLAINT:  Ms. Rodríguez is 68 years old.  She is right-hand dominant.  Both her long and ring fingers of both hands have been triggering for over 2 years.  She has never had them treated.  Here to have it looked at.  They trigger daily.      PAST MEDICAL HISTORY:  Diabetes.      PAST SURGICAL HISTORY:  Vein stripping.      MEDICATIONS:  Fosamax, Lipitor, insulin.      ALLERGIES:  Nil.      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      REVIEW OF SYSTEMS:  Denies chest pain, shortness of breath, MI, CVA, DVT and PE.      PHYSICAL EXAMINATION:  Vital signs are stable.  She is afebrile, in no obvious distress.  She is 5 feet 5 inches, 172 pounds, BMI 28 kg/m2.  On examination of her fingers, long and ring fingers bilaterally have palpable nodules and triggering.      ASSESSMENT AND PLAN:  Based on above findings, diagnosis of trigger fingers was made.  Discussed with the patient her findings.  Given the fact this has been going on for 2 years, this most likely represents a chronic synovitis with a palpable nodule that is scar.  I think A1 pulley releases is indicated.  I explained to her how this would be done, showed her where the scars would be, explained to her the risks, benefits and alternatives including pain, infection, bleeding, scarring, asymmetry, seromas, hematomas, wound breakdown, wound dehiscence, stiffness, chronic tenosynovitis, requirement of further surgeries, infections, stiffness, CRPS, DVT, PE, MI, CVA and death.  She understood them all and wants to proceed.  We will schedule her  as soon as possible.  All questions were answered.  She was happy with the visit.      Total time spent with the patient was 20 minutes, more than half was counseling.      cc:   Pricila Avila NP    [address], [place] [address]         Again, thank you for allowing me to participate in the care of your patient.        Sincerely,        RADHA Snadoval MD

## 2019-03-26 NOTE — TELEPHONE ENCOUNTER
PA is required through [employer]- Does this patient have a bladder/voiding  diary completed? I couldn't find one and I know this will be required documentation for approval

## 2019-03-29 NOTE — TELEPHONE ENCOUNTER
Attempted to reach patient by phone, but no answer. Left generic message with request for patient to return call to clinic. When patient returns call, will discuss if she has completed a bladder diary recently and if not, will discuss mailing bladder diary to patient. Will also discuss if patient is able to drop completed diary off at clinic.    Jess Hadley RN, BSN

## 2019-04-01 NOTE — TELEPHONE ENCOUNTER
Procedure: Trigger release bilateral long and ring fingers  Facility: [facility]  Length: 30 minute(s)  Surgeon: Jaime PHIPPS  Anesthesia: Local  BMI: There is no height or weight on file to calculate BMI. (If over 43 for general or 45 for MAC cannot be scheduled at [facility])   Pre-op Appointments needed: None  Post-op appointments needed: 2.5 weeks provider only   needed:  No  Surgery packet/instructions given to patient?  Yes Mailed    Jeramie David RN

## 2019-04-01 NOTE — PATIENT INSTRUCTIONS
[facility]  [address] [place] [id number]  Phone [phone]  Fax [phone]    SURGICAL INFORMATION & INSTRUCTIONS  Dr. Sameer Sandoval  Name of Surgery: Trigger finger (A1 pulley) release bilateral long and ring fingers    Date of Surgery: 5/10/19    If you need to reschedule/schedule your surgery, please contact Giana, our surgery scheduler at [place], at [phone].    Arrival Time: 8:00 am    Time of Surgery:  9:00 am    Please arrive early so that we can prepare you for surgery. If you arrive later than your scheduled arrival time, your surgery may be cancelled.  Please note that scheduled times may change, but you will always be notified if there is a change.      Location of Surgery:     ? [facility]  [address] [address] [place] [id number]  2nd floor check-in  Phone [phone]  Fax [phone]  [email or web address]    Prior to surgery    ? Call your insurance company  Ask if you need pre-approval for your surgery.  If pre-approval is needed, please call our surgery scheduler for assistance with the pre-approval process.   If you do not have insurance, please let us know.     ? Pre-Op Phone Call  You will receive a pre-op phone call 1-3 days before surgery to review your eating and drinking restrictions, review medications, and confirm surgery times.      ? 7-10 days BEFORE surgery  ? Stop taking aspirin, Plavix or aspirin products 10 days before surgery or as directed by your doctor.  ? Stop taking non-steroidal anti-inflammatory medications (naproxen/Aleve, ibuprofen/Advil/Motrin, celecoxib/Celebrex, meloxicam/Mobic) 1 week before surgery or as directed by your surgeon.  This will reduce the risk of bleeding during surgery.  ? Stop taking fish oil (Omega-3-fatty acid) 1 week before surgery.  ? It is OK to take acetaminophen (Tylenol) up until 2 hours prior to surgery.  ? Take prescription medications as directed by your doctor. Discuss  which medications to take or hold prior to your surgery, with your primary care doctor.  ? If you have diabetes, ask your primary care doctor or endocrinologist how you should take your medication(s).    ? 24 hours BEFORE surgery  Stop drinking alcohol (beer, wine, liquor) at least 24-hours before and after your surgery.     ? Evening BEFORE surgery  - Take a shower   to help wash away bacteria (germs) from your skin.  It s normal to have bacteria on your skin and skin protects us from these germs.  When you have surgery, we cut the skin.  Sometimes germs get into the cuts and cause infection (illness caused by germs).  By following the showering instructions and using the special soap, you will lower the number of germs on your skin.  This decreases your chance of an infection.    - Buy or get 8 ounces of antiseptic surgical soap called 4% CHG.  Common brands of this soap are Hibiclens and Exidine.    - You can find it this soap at your local pharmacy, clinic or retail store.  If you have trouble finding it, ask your pharmacist to help you find the right substitute.    - Do not shave within 12 inches of your incision (surgical cut) area for at least 3 days before surgery.  Shaving can make small cuts in the skin. This puts you at a higher risk of infection.    Items you will need for each shower:   - Newly washed towel  - 4 ounces of one of the recommended soaps    Follow these instructions, the evening before surgery  - Wash your hair and body with your regular shampoo and soap. Make sure you rinse the shampoo and soap from your hair and body.  - Using clean hands, apply about 2 ounces of soap gently on your skin from the neck to your toes. Use on your groin area last. Do not use this soap on your face or head. If you get any soap in your eyes, ears or mouth, rinse right away.  - Once the soap has been on your skin for at least 1 minute, rinse off completely and repeat washing with the surgical soap one more time.   - Rinse well and dry off using a clean towel.  If you feel any tingling, itching or other irritation, rinse right away. It is normal to feel some coolness on the skin after using the antiseptic soap. Your skin may feel a bit dry after the shower, but do not use any lotions, creams or moisturizers. Do not use hair spray or other products in your hair.  - Dress in freshly washed clothes or pajamas. Use fresh pillowcases and sheets on your bed.    ? Day of Surgery  - Take another shower          Follow these instructions:      - Wash your hair and body with your regular shampoo and soap. Make sure you rinse the shampoo and soap from your hair and body.  - Using clean hands, apply about 2 ounces of soap gently on your skin from the neck to your toes. Use on your groin area last. Do not use this soap on your face or head. If you get any soap in your eyes, ears or mouth, rinse right away.  - Once the soap has been on your skin for at least 1 minute, rinse off completely and repeat washing with the surgical soap one more time.  - Rinse well and dry off using a clean towel.  If you feel any tingling, itching or other irritation, rinse right away. It is normal to feel some coolness on the skin after using the antiseptic soap. Your skin may feel a bit dry after the shower, but do not use any lotions, creams or moisturizers. Do not use hair spray or other products in your hair.  - Dress in freshly washed clothes.  - Do not wear deodorant, cologne, lotion, makeup, nail polish or jewelry to surgery.    ? If there is any change in your health PRIOR to your surgery, please contact your surgeon's office.  Such as a fever, body aches, fatigue, any flu-like symptoms, rash, or any new injury to related body part.    ? Arrange for someone to drive you home after surgery.    will need to be a responsible adult (18 years or older) that will provide transportation to and from surgery and stay in the waiting room during your  surgery. You may not drive yourself or take public transportation to and from surgery.    ? Arrange for someone to stay with you for 24 hours after you go home.   This person must be a responsible adult (18 years or older).    ? Bring these items to the hospital/surgery center:   ? Insurance card(s)  ? Money for parking and co-pays, if needed  ? A list of all the medications you take, including vitamins, minerals, herbs and over the counter medications.    ? A copy of your Advance Health Care Directive, if you have one.  This tells us what treatment(s) you would or would not want, and who would make health care decisions, if you could no longer speak for yourself.    ? A case for glasses, contact lenses, hearing aids or dentures.   ? Your inhaler or CPAP machine, if you use these at home    ? Leave extra cash, jewelry and other valuables at home.       ? Other information:   Sleep Apnea: Let your nurse know if you have a history of sleep apnea, only if you are having surgery at the [facility].    When you arrive for surgery  When you get to the surgery center/hospital, you will:  - Check in. If you are under age 18, you must be with a parent or legal guardian.  - Sign consent forms, if you haven t already. These forms state that you know the risks and benefits of surgery. When you sign the forms, you give us permission to do the surgery. Do not sign them unless you understand what will happen during and after your surgery. If you have any questions about your surgery, ask to speak with your doctor before you sign the forms. If you don t understand the answers, ask again.  - Receive a copy of the Patient s Bill of Rights. If you do not receive a copy, please ask for one.  - Change into hospital clothes. Your belongings will be placed in a bag. We will return them to you after surgery.  - Meet with the anesthesia provider. He or she will tell you what kind of anesthesia (medicine) will be used to keep  you comfortable during surgery.  - Remember: it s okay to remind doctors and nurses to wash their hands before touching you.  - In most cases, your surgeon will use a marker to write his or her initials on the surgery site. This ensures that the exact site is operated on.  - For safety reasons, we will ask you the same questions many times. For example, we may ask your name and birth date over and over again.  - Friends and family can stay with you until it s time for surgery. While you re in surgery, they will be in the waiting area. Please note that cell phones are not allowed in some patient care areas.  - If you have questions about what will happen in the operating room, talk to your care team.  - You will meet with an anesthesiologist, before your surgery.  He or she may reference types of anesthesia commonly used for surgeries:   o General:  This involves the use of an IV for injection of medication and anesthesia. You are put into a sleep and have a breathing tube to assist you with breathing.  o Sedation:  You are asleep, but not so deply that you need a breathing tube.   o Local or Regional: a nerve is injected to numb the surgical area.  o Spinal: you are numbed from the waist down with medicine injected into your back.  o Femoral Nerve Block:  Anesthesia injected into the groin of leg which you are having surgery on.      After surgery  We will move you to a recovery room, where we will watch you closely. If you have any pain or discomfort, tell your nurse. He or she will try to make you comfortable.    - If you are staying overnight, we will move you to your hospital room after you are awake.  - If you are going home, we will move you to another room. Friends and family may be able to join you. The length of time you spend in recovery depend on the type of medicine you received, your medical condition, the type of surgery you had, or your response to the anesthesia given during your procedure.  - When  you are discharged from the recovery room, the nurses will review instructions with you and your caregiver.  - Please wash your hands every time you touch the wound or change bandages or dressings.  - Do not submerge the wound in water.  You may not use a bathtub or hot tub until the wound is closed. The wait time frame is generally 2-3 weeks, but any open area can be a source of incoming bacteria, so it is better to be on the safe side and avoid water submersion until your wound is fully healed.  Keep all dressings clean and dry.   - If you had surgery on your arm or leg, elevate it as much as possible to help reduce swelling.  - You may put ice on the surgical area for comfort, keeping ice on area for up to 20 minutes then off for 40 minutes.  You may do this the first few days after surgery to help reduce pain and swelling.   - Many surgical wounds will have small white strips of tape on them called steri-strips. These are to help support the incision and surrounding skin. Do not remove these. The edges will curl and fall off on their own, typically within 7-10 days with normal showering and hygiene.   - Drink at least 8-10 glasses of liquid each day to help your body heal.  - Keep your lungs clear by coughing and taking deep breaths every couple hours.  This is especially important the first 48 hours after surgery.    - Notify your doctor if you have any of the following:   o Fever of 101 F or higher  o Numbness and/or tingling  o Increased pain, redness or swelling  o Drainage from wound  o Prolonged or uncontrolled bleeding  o Difficulty with movement    Follow-up Appointments, in Clinic  If you don't already have an appointment scheduled, please call to set up an appointment at [phone].      ? Post-Op appointments with provider (2.5 weeks post op)    Dealing with pain  A nurse will check your comfort level often during your stay. He or she will work with you to manage your pain.  It s expected that you  will have pain after surgery.  Our goal is to reduce or minimize pain by:   - Remember pain is real. There are many ways to control pain. We can help you decide what works best for you.  - Ask for pain medicine when you need it.  Don t try to  tough it out  this can make you feel worse. Always take your medicine as ordered.  - Medicine doesn t work the same for everyone. If your medicine isn t working, tell your nurse. There may be other medicines or treatments we can try.  - Medication Refills.  If you need refills on your pain medication, please call the clinic as soon as possible.  It may take 72-business hours to obtain a refill.  Refills must be picked up at check-in 2, [facility] or mailed to a pharmacy of your choice.    - It is expected that you will wean off the pain medications in a timely manner.   - Constipation is a common side effect of pain medication, decreased activity and anesthesia from surgery.  Take a stool softener as prescribed by your doctor at the time of discharge.  You may also use over the counter medications as needed.  Be sure to increase your fiber (fruits and vegetables) and your water intake.      Please call the clinic at [phone], if you experience any problems or have questions.  If you are having an emergency, always call [phone] or seek immediate evaluation at the Emergency Room.    Thank you for selecting the [facility] for your care!  ---------------------------------------------

## 2019-04-02 NOTE — TELEPHONE ENCOUNTER
Date Scheduled: 5-10-19  Facility: [facility]  Surgeon: Dr. Sandoval   Post-op appointment scheduled:   Next 5 appointments (look out 90 days)    May 28, 2019  4:20 PM CDT  (Arrive by 3:50 PM)  Return Visit with RADHA Sandoval MD  [facility] [address] [place] [id number] [phone]           scheduled?: No  Surgery packet/instructions confirmed received?  Yes  Special Considerations:   Giana Chou, Surgery Scheduling Coordinator

## 2019-04-03 ENCOUNTER — MYC MEDICAL ADVICE (OUTPATIENT)
Dept: ENDOCRINOLOGY | Facility: CLINIC | Age: 69
End: 2019-04-03

## 2019-04-03 DIAGNOSIS — E10.9 TYPE I DIABETES MELLITUS, WELL CONTROLLED (H): ICD-10-CM

## 2019-04-03 DIAGNOSIS — E10.9 TYPE 1 DIABETES MELLITUS (H): Primary | ICD-10-CM

## 2019-04-03 NOTE — TELEPHONE ENCOUNTER
Patient is on Tresiba U-200.     Will forward to [facility] to review in the absence of Dr. Aguero.    Tasha Harley LPN  Diabetes Clinic Coordinator   Adult Endocrinology and Pediatric Specialty Clinics  [facility]

## 2019-04-04 NOTE — TELEPHONE ENCOUNTER
Received message from Dr. Zhang who reports that if patient hasn't already tried oxybutynin patient could be prescribed oxybutynin XL 15mg daily in place of the Toviaz.    My chart message sent to patient updating her on the above information.    Jess Hadley RN, BSN

## 2019-04-09 NOTE — TELEPHONE ENCOUNTER
Spoke to patient regarding this PA and recommendation to complete bladder diary. Bladder diary mailed to patient's home and patient will work on completing. Per patient, as of April 1, 2019, she now has [facility] for insurance. Informed patient that once her chart shows her updated insurance, this will be sent to the financial counselors to submit PA for PTNS through to [facility]. Patient was transferred to the call center to update insurance information.    Jess Hadley RN, BSN

## 2019-04-09 NOTE — TELEPHONE ENCOUNTER
Called and spoke to patient who reports that she read the my chart message. Per patient, as of April 1, 2019 her insurance changed to [facility] and she will be contacting [facility] to get details about coverage for vesicare, toviaz, and myrbetriq. Patient will send a message or call with any questions or if a new prescription needs to be sent.    Jess Hadley RN, BSN

## 2019-04-10 ENCOUNTER — MYC MEDICAL ADVICE (OUTPATIENT)
Dept: UROLOGY | Facility: CLINIC | Age: 69
End: 2019-04-10

## 2019-04-17 NOTE — TELEPHONE ENCOUNTER
Spoke to Mary Anne in [facility] for seniors insurance, checked CPT code: 01573 with ICD10: N39.41 no pa or pre-d needed on this plan. No visit limit on this plan, based on medical necessity. This is a covered benefit and will have a $250 co-payment per visit since this is considered a out patient hospital benefit. Patient has a maximum out of pocket $3000. Call ref. [id number]

## 2019-04-18 NOTE — TELEPHONE ENCOUNTER
Called and spoke to patient who is aware of note below and information from financial counseling regarding PTNS treatments. Patient verbalized understanding and reports that she will think about it and contact the clinic at a later date to schedule if she would like to proceed with PTNS.     Jess Hadley RN, BSN

## 2019-05-01 DIAGNOSIS — M81.0 AGE-RELATED OSTEOPOROSIS WITHOUT CURRENT PATHOLOGICAL FRACTURE: ICD-10-CM

## 2019-05-01 DIAGNOSIS — N95.2 ATROPHIC VAGINITIS: ICD-10-CM

## 2019-05-01 RX ORDER — ESTRADIOL 0.1 MG/G
2 CREAM VAGINAL
Qty: 42.5 G | Refills: 3 | Status: SHIPPED | OUTPATIENT
Start: 2019-05-02 | End: 2020-02-05

## 2019-05-01 NOTE — TELEPHONE ENCOUNTER
Faxed refill request from: [facility]  Medication request: Estradiol 0.1mg/g vaginal cream      Pt's last office visit: 2/11/19  Next scheduled office visit: None    Rx pended and routed to [facility] for review and approval if appropriate.

## 2019-05-01 NOTE — TELEPHONE ENCOUNTER
Chart reviewed and refill approved per refill protocol. Prescription sent to [facility].     Jess Hadley RN, BSN

## 2019-05-02 RX ORDER — ALENDRONATE SODIUM 70 MG/1
70 TABLET ORAL
Qty: 12 TABLET | Refills: 0 | Status: SHIPPED | OUTPATIENT
Start: 2019-05-02 | End: 2019-08-02

## 2019-05-09 ENCOUNTER — TELEPHONE (OUTPATIENT)
Dept: ENDOCRINOLOGY | Facility: CLINIC | Age: 69
End: 2019-05-09

## 2019-05-09 NOTE — TELEPHONE ENCOUNTER
PA needed for Accu check Shayla plus  Test  strips that go with the  Accu check Shayla connect  Meter. This  meter connects with her phone  to enter her carbs eaten  in  to get an accurate dose of  Insulin to inject.  She has fluctuating  Blood sugars from 40- 400 and  needs this daniel for correct dosing insulin dosing. She is a type 1 diabetic   She has used  One touch  And other accuchecks, Ned brand that do not calculate insulin doses  for her .

## 2019-05-10 ENCOUNTER — HOSPITAL ENCOUNTER (OUTPATIENT)
Facility: AMBULATORY SURGERY CENTER | Age: 69
Discharge: HOME | End: 2019-05-10
Attending: PLASTIC SURGERY | Admitting: PLASTIC SURGERY
Payer: COMMERCIAL

## 2019-05-10 VITALS
DIASTOLIC BLOOD PRESSURE: 88 MMHG | OXYGEN SATURATION: 99 % | SYSTOLIC BLOOD PRESSURE: 148 MMHG | TEMPERATURE: 98.3 F | RESPIRATION RATE: 16 BRPM

## 2019-05-10 DIAGNOSIS — Z98.890 S/P TRIGGER FINGER RELEASE: Primary | ICD-10-CM

## 2019-05-10 PROCEDURE — G8918 PT W/O PREOP ORDER IV AB PRO: HCPCS

## 2019-05-10 PROCEDURE — 26055 INCISE FINGER TENDON SHEATH: CPT | Mod: F7

## 2019-05-10 PROCEDURE — 26055 INCISE FINGER TENDON SHEATH: CPT | Mod: F7 | Performed by: PLASTIC SURGERY

## 2019-05-10 PROCEDURE — G8907 PT DOC NO EVENTS ON DISCHARG: HCPCS

## 2019-05-10 RX ORDER — ONDANSETRON 4 MG/1
4-8 TABLET, ORALLY DISINTEGRATING ORAL EVERY 8 HOURS PRN
Qty: 4 TABLET | Refills: 0 | Status: SHIPPED | OUTPATIENT
Start: 2019-05-10 | End: 2020-08-17

## 2019-05-10 RX ORDER — AMOXICILLIN 250 MG
1-2 CAPSULE ORAL 2 TIMES DAILY
Qty: 30 TABLET | Refills: 0 | Status: SHIPPED | OUTPATIENT
Start: 2019-05-10 | End: 2020-08-17

## 2019-05-10 RX ORDER — HYDROCODONE BITARTRATE AND ACETAMINOPHEN 5; 325 MG/1; MG/1
1-2 TABLET ORAL EVERY 6 HOURS PRN
Qty: 8 TABLET | Refills: 0 | Status: SHIPPED | OUTPATIENT
Start: 2019-05-10 | End: 2020-08-17

## 2019-05-10 NOTE — OP NOTE
Procedure Date: 05/10/2019      PREOPERATIVE DIAGNOSIS:  Bilateral long and ring finger triggering.      POSTOPERATIVE DIAGNOSIS:  Bilateral long and ring finger triggering.      PROCEDURES:  Right side and left side, long finger and ring finger open A1 pulley releases.      SURGEON:  Sameer Sandoval MD.      ANESTHESIA:  Local.      COMPLICATIONS:  Nil.      DRAINS:  Nil.      SPECIMENS:  Nil.      BLOOD LOSS:  5 mL.      DESCRIPTION OF PROCEDURE:  After informed consent was obtained from the patient, the proper site and procedure was ascertained with her and she was appropriately marked and taken to the operating room.  She was placed in supine position with the knees comfortably flexed, pillows underneath them.  Forearm tourniquets were applied bilaterally.  Both hands were prepped and draped in standard surgical fashion.  Sterile gloves were placed over the hand to cover the nailbeds.  Appropriate markings were made over the A1 pulley in an axial direction.  Lidocaine 1% with 1:100,000 epinephrine mixed in a 1:1 ratio with 0.5% plain Marcaine was injected in all 4 fingers.  I waited 5 minutes for the medication to take effect.  I then carried out the similar procedures for all of the fingers.  I went ahead and exsanguinated the arms, elevated the tourniquet to 250 mmHg pressure, made my incisions, dissected down to the underlying tendon sheath, identified the A1 pulley, used fresh 15 blade to open the A1 pulley and then used scissors to open it proximally and distally, double checked that there was no more clicking.  This was done for all 4 fingers.  Tourniquets were allowed to deflate.  Ensured hemostasis and then closed all wounds with 4-0 nylon suture in an interrupted horizontal mattress fashion.  Sterile dressings, wrap and Coban were placed.  She tolerated the procedure well.  All counts were correct at the end of the case.  The patient was sent to Recovery Room in stable condition.         RADHA SIMPSON  MD CRISTINA             D: 05/10/2019   T: 05/10/2019   MT: PILAR      Name:     RAPHAEL NARVAEZ   MRN:      [id number]        Account:        [id number]   :      1950           Procedure Date: 05/10/2019      Document: [id number]

## 2019-05-10 NOTE — DISCHARGE INSTRUCTIONS
TRIGGER RELEASE POST-OPERATIVE INSTRUCTIONS    Instructions       Have someone drive you home after surgery and help you at home for 1-2      days.      Get plenty of rest.      Follow balanced diet.      Decreased activity may promote constipation, so you may want to add      more raw fruit to your diet, and be sure to increase fluid intake.      Take pain medication as prescribed. Do not take aspirin or any products      containing aspirin.      Do not drink alcohol when taking pain medications.      Even when not taking pain medications, no alcohol for 3 weeks as it      causes fluid retention.      If you are taking vitamins with iron, resume these as tolerated.      Do not smoke, as smoking delays healing and increases the risk of      complications.    Activities      Do not drive until you are no longer taking any pain medications      (narcotics).      Start walking as soon as possible, this helps to reduce swelling and       lowers the chance of blood clots.      Resume normal activities gradually.      Return to work in 1-2 days, depending upon extent of surgery      No strenuous work or stress on wound for 2-3 weeks.    Incision Care      Keep dressings on for 2-3 days. Then remove and keep open. May use Band Aids if desired. Keep dry for 3 days then may get wet. No soaks for 3 weeks. May shower with dressings with plastic bag in place starting tomorrow.       Avoid exposing scars to sun for at least 12 months.      Always use a strong sunblock, if sun exposure is unavoidable (SPF 50 or      greater).      Inspect daily for signs of infection.      No tub soaking, bathing, or swimming while sutures or drains are in place.      Keep area clean and dry for first 24 hours.     What to Expect      Some swelling and bruising.      May have slight bleeding from incision.  Apply 4 x 4 gauze with slight pressure to       control bleeding.      Skin grafts and flaps may take several weeks or months to heal; a  support garment or      bandage may be necessary for up to a year.    Appearance      Final results of surgery may take a year or more.    Follow-Up Care      If external sutures have been used, they will be removed in 5-14 days.    Please note my office will call you 1-2 business days after your procedure to check up on how you're doing and to schedule your post-operative appointment.        When to Call      If you have increased swelling or bruising.      If swelling and redness persist after a few days.      If you have increased redness along the incision.      If you have severe or increased pain not relieved by medication.      If you have any side effects to medications; such as, rash, nausea,      headache, vomiting.      If you have an oral temperature over 100.4 degrees.      If you have any yellowish or greenish drainage from the incisions or      notice a foul odor.      If you have bleeding from the incisions that is difficult to control with      light pressure.      If you have loss of feeling or motion.    For Medical Questions, Please Call:      [phone], Monday - Friday, 8 a.m. - 4:30 p.m.      After hours and on weekends, call Hospital Paging at [phone] and      ask for the Plastic Surgeon on call.

## 2019-05-10 NOTE — BRIEF OP NOTE
[facility] Brief Operative Note    Pre-operative diagnosis: Trigger fingers   Post-operative diagnosis As above   Procedure: Procedure(s):  RELEASE TRIGGER FINGER, BILATERAL LONG AND RING FINGERS   Surgeon(s): Surgeon(s) and Role:     * RADHA Sandoval MD - Primary   Estimated blood loss: 5 cc   Specimens: None   Findings: NA

## 2019-05-21 ENCOUNTER — OFFICE VISIT (OUTPATIENT)
Dept: ORTHOPEDICS | Facility: CLINIC | Age: 69
End: 2019-05-21
Payer: COMMERCIAL

## 2019-05-21 DIAGNOSIS — M65.30 TRIGGER FINGER, ACQUIRED: Primary | ICD-10-CM

## 2019-05-21 PROCEDURE — 99024 POSTOP FOLLOW-UP VISIT: CPT | Performed by: PLASTIC SURGERY

## 2019-05-21 RX ORDER — CEPHALEXIN 500 MG/1
500 CAPSULE ORAL 3 TIMES DAILY
Qty: 21 CAPSULE | Refills: 0 | Status: SHIPPED | OUTPATIENT
Start: 2019-05-21 | End: 2019-08-02

## 2019-05-21 NOTE — PROGRESS NOTES
POSTOPERATIVE VISIT      PRESENTING COMPLAINT:  Postoperative visit, status post bilateral long and ring finger trigger release done 05/10/2019.      HISTORY OF PRESENT COMPLAINT:  Ms. Rodríguez is 68 years old.  She is a couple weeks out from surgery, overall doing okay.  Has been noticing a little bit of swelling and tenderness around the surgical sites recently but not drinking anymore.      PHYSICAL EXAMINATION:  On exam vital signs are stable.  She is afebrile in no obvious distress.  She does not have any obvious cellulitis.  Sutures in place.  Incisions are closed.  They are a little swollen.  Range of motion is complete.      ASSESSMENT AND PLAN:  Based on above findings, a diagnosis of bilateral long and ring finger trigger releases were made.  There is a bit of swelling and tenderness in the area.  I am going to put her on a week's course of antibiotics just to be on the safe side.  I have given her 500 mg t.i.d. of Keflex.  I will see back in a week's time.  Sutures removed.  All questions were answered.  She was happy with the visit.

## 2019-05-21 NOTE — NURSING NOTE
Latoya Rodríguez's chief complaint for this visit includes:  Chief Complaint   Patient presents with     Follow Up     Incision check .      PCP: Pricila Avila    Referring Provider:  Referred Self, MD  No address on file    There were no vitals taken for this visit.  Data Unavailable

## 2019-05-21 NOTE — LETTER
5/21/2019         RE: Latoya Rodríguez  [address]  [place] [address]        Dear Colleague,    Thank you for referring your patient, Latoya Rodríguez, to the [facility]. Please see a copy of my visit note below.    POSTOPERATIVE VISIT      PRESENTING COMPLAINT:  Postoperative visit, status post bilateral long and ring finger trigger release done 05/10/2019.      HISTORY OF PRESENT COMPLAINT:  Ms. Rodríguez is 68 years old.  She is a couple weeks out from surgery, overall doing okay.  Has been noticing a little bit of swelling and tenderness around the surgical sites recently but not drinking anymore.      PHYSICAL EXAMINATION:  On exam vital signs are stable.  She is afebrile in no obvious distress.  She does not have any obvious cellulitis.  Sutures in place.  Incisions are closed.  They are a little swollen.  Range of motion is complete.      ASSESSMENT AND PLAN:  Based on above findings, a diagnosis of bilateral long and ring finger trigger releases were made.  There is a bit of swelling and tenderness in the area.  I am going to put her on a week's course of antibiotics just to be on the safe side.  I have given her 500 mg t.i.d. of Keflex.  I will see back in a week's time.  Sutures removed.  All questions were answered.  She was happy with the visit.         Again, thank you for allowing me to participate in the care of your patient.        Sincerely,        RADHA Sandoval MD

## 2019-05-28 ENCOUNTER — OFFICE VISIT (OUTPATIENT)
Dept: ORTHOPEDICS | Facility: CLINIC | Age: 69
End: 2019-05-28
Payer: COMMERCIAL

## 2019-05-28 VITALS — HEIGHT: 65 IN | WEIGHT: 172 LBS | BODY MASS INDEX: 28.66 KG/M2

## 2019-05-28 DIAGNOSIS — M65.30 TRIGGER FINGER, ACQUIRED: Primary | ICD-10-CM

## 2019-05-28 PROCEDURE — 99024 POSTOP FOLLOW-UP VISIT: CPT | Performed by: PLASTIC SURGERY

## 2019-05-28 ASSESSMENT — MIFFLIN-ST. JEOR: SCORE: 1308.53

## 2019-05-28 ASSESSMENT — PAIN SCALES - GENERAL: PAINLEVEL: MODERATE PAIN (5)

## 2019-05-28 NOTE — PROGRESS NOTES
PRESENTING COMPLAINT:  Postoperative visit, status post bilateral long and ring finger trigger release done 05/10/2019.      HISTORY OF PRESENTING COMPLAINT:  Ms. Rodríguez is 68 years old.  She is 3 weeks out from surgery, completely healed, happy with the results, no issues.      PHYSICAL EXAMINATION:  Vital signs are stable.  She is afebrile, in no obvious distress.  Everything is healing in well.  A couple of the scars are a little dry but no evidence of infection.      ASSESSMENT AND PLAN:  Based on above findings, a diagnosis of multiple trigger finger releases was made.  Advised aggressive moisturization. I will see her back on a p.r.n. basis.

## 2019-05-28 NOTE — PATIENT INSTRUCTIONS
Thanks for coming today.  [facility]  [address] [place] [address]    To schedule future appointments in Ortho Clinic, you may call [phone].    To schedule ordered imaging by your provider:   Call Central Imaging [phone]    To schedule an injection ordered by your provider:  Call Central Imaging Injection scheduling line: [phone]  [email or web address] available online at:  [email or web address]    Please call if any further questions or concerns ([phone]).  Clinic hours 8 am to 5 pm.    Return to clinic (call) if symptoms worsen or fail to improve.

## 2019-05-28 NOTE — PROGRESS NOTES
"Latoya Rodríguez's chief complaint for this visit includes:  Chief Complaint   Patient presents with     Follow Up     Follow up s/p bilateral long and ring trigger release     PCP: Pricila Avila    Referring Provider:  No referring provider defined for this encounter.    Ht 1.647 m (5' 4.84\")   Wt 78 kg (172 lb)   BMI 28.76 kg/m    Moderate Pain (5)       "

## 2019-05-28 NOTE — LETTER
"    5/28/2019         RE: Latoya Rodríguez  [address] [facility] [address]        Dear Colleague,    Thank you for referring your patient, Latoya Rodríguez, to the [email or web address] Please see a copy of my visit note below.    Latoya Rodríguez's chief complaint for this visit includes:  Chief Complaint   Patient presents with     Follow Up     Follow up s/p bilateral long and ring trigger release     PCP: Pricila Avila    Referring Provider:  No referring provider defined for this encounter.    Ht 1.647 m (5' 4.84\")   Wt 78 kg (172 lb)   BMI 28.76 kg/m     Moderate Pain (5)         PRESENTING COMPLAINT:  Postoperative visit, status post bilateral long and ring finger trigger release done 05/10/2019.      HISTORY OF PRESENTING COMPLAINT:  Ms. Rodríguez is 68 years old.  She is 3 weeks out from surgery, completely healed, happy with the results, no issues.      PHYSICAL EXAMINATION:  Vital signs are stable.  She is afebrile, in no obvious distress.  Everything is healing in well.  A couple of the scars are a little dry but no evidence of infection.      ASSESSMENT AND PLAN:  Based on above findings, a diagnosis of multiple trigger finger releases was made.  Advised aggressive moisturization. I will see her back on a p.r.n. basis.         Again, thank you for allowing me to participate in the care of your patient.        Sincerely,        RADHA Sandoval MD    "

## 2019-06-06 NOTE — TELEPHONE ENCOUNTER
Received response from PA team as follows:    The Central PA Team does not process any PAs or paperwork for Medicare Part B. We only process claims processed through prescription benefits. You will need to work directly with the pharmacy for Medicare Part B claims. Thank you.

## 2019-06-06 NOTE — TELEPHONE ENCOUNTER
Form received from [employer]. Form completed and faxed back to [employer] to review.       Contacted [facility] to review. They have been running the prescription through patient's Medicare Part D, not B. They would need patient to contact pharmacy to provide information. If and when they do run it through Part B, they will likely need further documentation from clinic due to testing frequency and Medicare requirements. Pharmacy technician wanted to further discuss with Pharmacist tomorrow and will update clinic. Also advised pharmacy technician that PA form was completed for Part B but uncertain if it will be approved.       Attempted to contact patient to review. Left voicemail for patient to contact our office.       Clarisa Nunez RN  Endocrine Care Coordinator  [facility]

## 2019-06-06 NOTE — TELEPHONE ENCOUNTER
Reviewed with patient. She [facility] that [facility] and she goes to [facility]. Patient states that she talked to [facility] last night who transferred her to [facility] and she was told a form does need to be completed.     Will send to [place], with PA team, to review.       Clarisa Nunez RN  Endocrine Care Coordinator  [facility]

## 2019-06-06 NOTE — TELEPHONE ENCOUNTER
Central Prior Authorization Team   Phone: [phone]    Prior Authorization Not Needed per Insurance    Medication: Accu-Chek test strips-PA Not Needed  Insurance Company:    Expected CoPay:      Pharmacy Filling the Rx: [facility] [id number] [place], [place] - [address] AT [address] Notified:  Yes   Patient Notified:  No    This is a Medicare patient. We do not process PAs for claims that will bill under Medicare Part B. This will be handled by the pharmacy.

## 2019-06-06 NOTE — TELEPHONE ENCOUNTER
Left voicemail for patient to contact our office.     Clarisa Nunez RN  Endocrine Care Coordinator  [facility]

## 2019-06-06 NOTE — TELEPHONE ENCOUNTER
Patient updated.       Clarisa Nunez, RN  Endocrine Care Coordinator  [facility]

## 2019-06-17 ENCOUNTER — DOCUMENTATION ONLY (OUTPATIENT)
Dept: CARE COORDINATION | Facility: CLINIC | Age: 69
End: 2019-06-17

## 2019-07-12 ENCOUNTER — OFFICE VISIT (OUTPATIENT)
Dept: ENDOCRINOLOGY | Facility: CLINIC | Age: 69
End: 2019-07-12
Payer: COMMERCIAL

## 2019-07-12 VITALS
SYSTOLIC BLOOD PRESSURE: 121 MMHG | BODY MASS INDEX: 28.43 KG/M2 | HEART RATE: 85 BPM | DIASTOLIC BLOOD PRESSURE: 82 MMHG | WEIGHT: 170 LBS

## 2019-07-12 DIAGNOSIS — E10.9 TYPE 1 DIABETES MELLITUS WITHOUT COMPLICATION (H): Primary | ICD-10-CM

## 2019-07-12 LAB — HBA1C MFR BLD: 8.7 % (ref 4.3–6)

## 2019-07-12 ASSESSMENT — PAIN SCALES - GENERAL: PAINLEVEL: NO PAIN (0)

## 2019-07-12 NOTE — PATIENT INSTRUCTIONS
- Increase Lantus 26 units daily  - Cover snack with 1 unit per 15 gram   - Novolog 1 unit per 15 gram CHO for BF and lunch, and 1 unit per 10 gram of CHO for dinner    If you have any questions, please do not hesitate to call [phone] and ask for Endocrinology clinic.      After clinic hours, please contact [phone] and ask for Endocrinologist-on call    Sincerely,    Edward Aguero MD  Endocrinology

## 2019-07-12 NOTE — LETTER
7/12/2019       RE: Latoya Rodríguez  [address] [facility] [address]     Dear Colleague,    Thank you for referring your patient, Latoya Rodríguez, to the [facility] ENDOCRINOLOGY at [facility]. Please see a copy of my visit note below.         Endocrinology Note         Latoya is a 68 year old female presents today for type 1 diabetes.    HPI  Ms. Latoya Rodríguez is a 68 years old woman with hx of type 1 diabetes in June 2016. She is here for follow up.    She was diagnosed with type 1 diabetes when she continued losing weight unintentionally. She was found to have an A1c >14. She was initially diagnosed with type 2 diabetes and was put on insulin and Metformin.  She was later found to be TORRES antibody positive and c peptide negative.  She has since been working closely with a diabetes educator and has learned carb counting, and has generally adapted well to the diagnosis.      Interim history:  A1C is down to 8.7 today. She is currently on Lantus 26 units at bedtime, Novolog 1 unit per 15 gram of carb for BF and lunch and 1 unit per 10 gram at dinner and additional Novolog sensitivity 1 unit per 50 above 150 mg/dl. I reviewed her glucose meter, she has been testing 4 times a day with an overall average of 234 () mg/dL. Her lowest blood sugar was in 54 and the highest BG was 549. She used Acc[employer]k Expert meter. She did have high fluctuation of glucoses. Most of postprandial glucoses were in 200-300 after lunch and dinner in particularly. She stated that she had extra snack at bedtime without insulin covering.    DM complications:  Retinopathy: normal exam done March 2019  Nephropathy: negative urine microalbumin 12/2018  Neuropathy: none per exam 10/10/18    LDL: 91  Hypertension: controlled    Past Medical History  Past Medical History:   Diagnosis Date     Cataract      Type 2 diabetes mellitus with hyperglycemia (H) 6/24/2016   Type 1 diabetes, diagnosed  "6/2016  Hyperlipidemia  Osteoporosis     Allergies  No Known Allergies     Medications  Current Outpatient Medications   Medication Sig Dispense Refill     ACCU-CHEK MICKY PLUS test strip TEST BLOOD SUGARS FOUR TIMES DAILY OR AS DIRECTED 400 each 1     alendronate (FOSAMAX) 70 MG tablet Take 1 tablet (70 mg) by mouth every 7 days 12 tablet 0     atorvastatin (LIPITOR) 10 MG tablet TAKE 1 TABLET(10 MG) BY MOUTH DAILY 90 tablet 1     blood glucose (ACCU-CHEK MICKY PLUS) test strip TEST BLOOD SUGARS FOUR TIMES DAILY OR AS DIRECTED 350 strip 2     blood glucose monitoring (ACCU-CHEK FASTCLIX) lancets Use to test blood sugar 4 times daily or as directed. 306 each 3     calcium carbonate-vitamin D 500-400 MG-UNIT TABS tablt Take 1 tablet by mouth 2 times daily 180 tablet 3     estradiol (ESTRACE VAGINAL) 0.1 MG/GM vaginal cream Place 2 g vaginally twice a week Apply dime-sized amount daily for the first 2 weeks, then place twice a week thereafter. 42.5 g 3     Fesoterodine Fumarate (TOVIAZ) 8 MG TB24 Take 1 tablet (8 mg) by mouth daily 90 tablet 3     insulin glargine (LANTUS SOLOSTAR PEN) 100 UNIT/ML pen Take 26 units daily. 15 mL 3     insulin pen needle (B-D U/F) 31G X 8 MM miscellaneous USE WITH NOVOLOG AND LANTUS(FIVE TIMES DAILY) 500 each 3     insulin syringe-needle U-100 (B-D INSULIN SYRINGE HALF-UNIT) 31G X 5/16\" 0.3 ML Use 3 syringes daily or as directed. 100 each 0     mirabegron (MYRBETRIQ) 50 MG 24 hr tablet Take 1 tablet (50 mg) by mouth daily 90 tablet 3     Multiple Vitamins-Minerals (MULTIVITAMIN ADULT PO) Take 1 tablet by mouth daily       solifenacin (VESICARE) 10 MG tablet Take 1 tablet (10 mg) by mouth daily 90 tablet 3     Urine Glucose-Ketones Test (KETO-DIASTIX) STRP 1 strip by In Vitro route as needed 1 each 11     HYDROcodone-acetaminophen (NORCO) 5-325 MG tablet Take 1-2 tablets by mouth every 6 hours as needed for moderate to severe pain (Patient not taking: Reported on 7/12/2019) 8 tablet 0 "     insulin aspart (NOVOLOG FLEXPEN) 100 UNIT/ML injection Take as directed with meals and snacks. Total daily dose approx 30 units. 30 mL 3     insulin degludec (TRESIBA) 200 UNIT/ML pen Inject 32 units subcutaneous daily (Patient taking differently: Inject 24 Units Subcutaneous daily Inject 32 units subcutaneous daily) 30 mL 3     ondansetron (ZOFRAN-ODT) 4 MG ODT tab Take 1-2 tablets (4-8 mg) by mouth every 8 hours as needed for nausea (Patient not taking: Reported on 7/12/2019) 4 tablet 0     senna-docusate (SENOKOT-S/PERICOLACE) 8.6-50 MG tablet Take 1-2 tablets by mouth 2 times daily (Patient not taking: Reported on 7/12/2019) 30 tablet 0     Family History  No thyroid disease or type 1 diabetes in the family    Social History  Social History     Tobacco Use     Smoking status: Never Smoker     Smokeless tobacco: Never Used   Substance Use Topics     Alcohol use: Yes     Comment: 1 to 2 beers or glasses of wine 1 to 2 times per month     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    ROS  Constitutional: weight is up to 170 lbs. She would like to lose weight, +increased craving, increased snacking  Eyes: no vision change, diplopia or red eyes   Neck: no difficulty swallowing, no choking, no neck pain, no neck swelling  Cardiovascular: no chest pain, palpitations  Respiratory: no dyspnea, cough, shortness of breath or wheezing   GI: no nausea, vomiting, diarrhea or constipation, no abdominal pain   : no change in urine, no dysuria or hematuria  Musculoskeletal: no joint or muscle pain or swelling   Integumentary: no complaints  Neuro: no loss of strength or sensation, no numbness or tingling, no tremor, no dizziness, no headache   Endo: no polyuria or polydipsia, no temperature intolerance   Heme/Lymph: no concerning bumps, no bleeding problems   Allergy: no environmental allergies   Psych: no sleep problems.    Physical Exam  /82   Pulse 85   Wt 77.1 kg (170 lb)   BMI 28.43 kg/m     Body mass index is  28.43 kg/m .  Constitutional: no distress, comfortable, pleasant   Eyes: anicteric  Thyroid: not enlarged  CVS: RRR, normal S1,S2, no murmur  Lungs: CTA B/L  Abd: soft, NT, ND, no hepatomegaly  Musculoskeletal: no edema   Skin:  no jaundice   Neurological: normal gait, 2+patella reflex, intact sensation per monofilament bilaterally 10/2018, no tremor on outstretched hands bilaterally  Psychological: appropriate mood     RESULTS  A1c 8.7 7/12/19  Lab Results   Component Value Date    A1C 9.2 03/18/2019    A1C 9.1 12/10/2018    A1C 8.3 03/26/2018    A1C 9.3 11/27/2017    A1C 6.6 07/17/2017       ENDO DIABETES Latest Ref Rng & Units 12/10/2018   LDL CHOLESTEROL DIRECT <100 mg/dL 91   HDL CHOLESTEROL >49 mg/dL    NON HDL CHOLESTEROL <130 mg/dL    TRIGLYCERIDES <150 mg/dL    ALBUMIN URINE MG/L mg/L <5   ALBUMIN URINE MG/G CR 0 - 25 mg/g Cr Unable to calculate due to low value   CREATININE 0.52 - 1.04 mg/dL 0.70      Ref. Range 7/17/2017 08:39   Cortisol Serum Latest Ref Range: 4 - 22 ug/dL 13.0     ASSESSMENT:    Ms. Rodríguez is a 68 years old woman with hx of type 1 diabetes in June 2016. She is here for follow up.    1. Type 1 diabetes: diagnosed in June 2016. A1c is up down to 8.7% today -- she has fluctuation of BG with rare hypoglycemia and postprandial hyperglycemia which is mostly likely explained by extra snack at bedtime without covering  Increase Lantus to 28 units daily  Continue Novolog 1 unit per 15 gram CHO for BF and lunch, and 1 unit per 10 gram of CHO for dinner  Continue sensitivity 1 unit per 50 mg/dl above 150  She will send message thru [employer] in 3-4 weeks  Discussed insulin pump and sensor today --- she is not interested in both technology at this time    2) DM complications:  Retinopathy: normal exam done march 2019  Nephropathy: negative urine microalbumin 12/2018  Neuropathy: none per exam 10/10/18    PLAN:   Increase Lantus to 28 units daily  Continue Novolog 1 unit per 15 gram CHO for BF and  lunch, and 1 unit per 10 gram of CHO for dinner  Continue sensitivity 1 unit per 50 mg/dl above 150  RTC 3-4 months      Edward Aguero MD     Division of Diabetes and Endocrinology  Department of Medicine  [phone]

## 2019-07-12 NOTE — PROGRESS NOTES
Endocrinology Note         Latoya is a 68 year old female presents today for type 1 diabetes.    HPI  Ms. Latoya Rodríguez is a 68 years old woman with hx of type 1 diabetes in June 2016. She is here for follow up.    She was diagnosed with type 1 diabetes when she continued losing weight unintentionally. She was found to have an A1c >14. She was initially diagnosed with type 2 diabetes and was put on insulin and Metformin.  She was later found to be TORRES antibody positive and c peptide negative.  She has since been working closely with a diabetes educator and has learned carb counting, and has generally adapted well to the diagnosis.      Interim history:  A1C is down to 8.7 today. She is currently on Lantus 26 units at bedtime, Novolog 1 unit per 15 gram of carb for BF and lunch and 1 unit per 10 gram at dinner and additional Novolog sensitivity 1 unit per 50 above 150 mg/dl. I reviewed her glucose meter, she has been testing 4 times a day with an overall average of 234 () mg/dL. Her lowest blood sugar was in 54 and the highest BG was 549. She used [employer] Expert meter. She did have high fluctuation of glucoses. Most of postprandial glucoses were in 200-300 after lunch and dinner in particularly. She stated that she had extra snack at bedtime without insulin covering.    DM complications:  Retinopathy: normal exam done March 2019  Nephropathy: negative urine microalbumin 12/2018  Neuropathy: none per exam 10/10/18    LDL: 91  Hypertension: controlled    Past Medical History  Past Medical History:   Diagnosis Date     Cataract      Type 2 diabetes mellitus with hyperglycemia (H) 6/24/2016   Type 1 diabetes, diagnosed 6/2016  Hyperlipidemia  Osteoporosis     Allergies  No Known Allergies     Medications  Current Outpatient Medications   Medication Sig Dispense Refill     ACCU-CHEK MICKY PLUS test strip TEST BLOOD SUGARS FOUR TIMES DAILY OR AS DIRECTED 400 each 1     alendronate (FOSAMAX) 70 MG tablet Take 1  "tablet (70 mg) by mouth every 7 days 12 tablet 0     atorvastatin (LIPITOR) 10 MG tablet TAKE 1 TABLET(10 MG) BY MOUTH DAILY 90 tablet 1     blood glucose (ACCU-CHEK MICKY PLUS) test strip TEST BLOOD SUGARS FOUR TIMES DAILY OR AS DIRECTED 350 strip 2     blood glucose monitoring (ACCU-CHEK FASTCLIX) lancets Use to test blood sugar 4 times daily or as directed. 306 each 3     calcium carbonate-vitamin D 500-400 MG-UNIT TABS tablt Take 1 tablet by mouth 2 times daily 180 tablet 3     estradiol (ESTRACE VAGINAL) 0.1 MG/GM vaginal cream Place 2 g vaginally twice a week Apply dime-sized amount daily for the first 2 weeks, then place twice a week thereafter. 42.5 g 3     Fesoterodine Fumarate (TOVIAZ) 8 MG TB24 Take 1 tablet (8 mg) by mouth daily 90 tablet 3     insulin glargine (LANTUS SOLOSTAR PEN) 100 UNIT/ML pen Take 26 units daily. 15 mL 3     insulin pen needle (B-D U/F) 31G X 8 MM miscellaneous USE WITH NOVOLOG AND LANTUS(FIVE TIMES DAILY) 500 each 3     insulin syringe-needle U-100 (B-D INSULIN SYRINGE HALF-UNIT) 31G X 5/16\" 0.3 ML Use 3 syringes daily or as directed. 100 each 0     mirabegron (MYRBETRIQ) 50 MG 24 hr tablet Take 1 tablet (50 mg) by mouth daily 90 tablet 3     Multiple Vitamins-Minerals (MULTIVITAMIN ADULT PO) Take 1 tablet by mouth daily       solifenacin (VESICARE) 10 MG tablet Take 1 tablet (10 mg) by mouth daily 90 tablet 3     Urine Glucose-Ketones Test (KETO-DIASTIX) STRP 1 strip by In Vitro route as needed 1 each 11     HYDROcodone-acetaminophen (NORCO) 5-325 MG tablet Take 1-2 tablets by mouth every 6 hours as needed for moderate to severe pain (Patient not taking: Reported on 7/12/2019) 8 tablet 0     insulin aspart (NOVOLOG FLEXPEN) 100 UNIT/ML injection Take as directed with meals and snacks. Total daily dose approx 30 units. 30 mL 3     insulin degludec (TRESIBA) 200 UNIT/ML pen Inject 32 units subcutaneous daily (Patient taking differently: Inject 24 Units Subcutaneous daily Inject 32 " units subcutaneous daily) 30 mL 3     ondansetron (ZOFRAN-ODT) 4 MG ODT tab Take 1-2 tablets (4-8 mg) by mouth every 8 hours as needed for nausea (Patient not taking: Reported on 7/12/2019) 4 tablet 0     senna-docusate (SENOKOT-S/PERICOLACE) 8.6-50 MG tablet Take 1-2 tablets by mouth 2 times daily (Patient not taking: Reported on 7/12/2019) 30 tablet 0     Family History  No thyroid disease or type 1 diabetes in the family    Social History  Social History     Tobacco Use     Smoking status: Never Smoker     Smokeless tobacco: Never Used   Substance Use Topics     Alcohol use: Yes     Comment: 1 to 2 beers or glasses of wine 1 to 2 times per month     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    ROS  Constitutional: weight is up to 170 lbs. She would like to lose weight, +increased craving, increased snacking  Eyes: no vision change, diplopia or red eyes   Neck: no difficulty swallowing, no choking, no neck pain, no neck swelling  Cardiovascular: no chest pain, palpitations  Respiratory: no dyspnea, cough, shortness of breath or wheezing   GI: no nausea, vomiting, diarrhea or constipation, no abdominal pain   : no change in urine, no dysuria or hematuria  Musculoskeletal: no joint or muscle pain or swelling   Integumentary: no complaints  Neuro: no loss of strength or sensation, no numbness or tingling, no tremor, no dizziness, no headache   Endo: no polyuria or polydipsia, no temperature intolerance   Heme/Lymph: no concerning bumps, no bleeding problems   Allergy: no environmental allergies   Psych: no sleep problems.    Physical Exam  /82   Pulse 85   Wt 77.1 kg (170 lb)   BMI 28.43 kg/m    Body mass index is 28.43 kg/m .  Constitutional: no distress, comfortable, pleasant   Eyes: anicteric  Thyroid: not enlarged  CVS: RRR, normal S1,S2, no murmur  Lungs: CTA B/L  Abd: soft, NT, ND, no hepatomegaly  Musculoskeletal: no edema   Skin:  no jaundice   Neurological: normal gait, 2+patella reflex, intact  sensation per monofilament bilaterally 10/2018, no tremor on outstretched hands bilaterally  Psychological: appropriate mood     RESULTS  A1c 8.7 7/12/19  Lab Results   Component Value Date    A1C 9.2 03/18/2019    A1C 9.1 12/10/2018    A1C 8.3 03/26/2018    A1C 9.3 11/27/2017    A1C 6.6 07/17/2017       ENDO DIABETES Latest Ref Rng & Units 12/10/2018   LDL CHOLESTEROL DIRECT <100 mg/dL 91   HDL CHOLESTEROL >49 mg/dL    NON HDL CHOLESTEROL <130 mg/dL    TRIGLYCERIDES <150 mg/dL    ALBUMIN URINE MG/L mg/L <5   ALBUMIN URINE MG/G CR 0 - 25 mg/g Cr Unable to calculate due to low value   CREATININE 0.52 - 1.04 mg/dL 0.70      Ref. Range 7/17/2017 08:39   Cortisol Serum Latest Ref Range: 4 - 22 ug/dL 13.0     ASSESSMENT:    Ms. Rodríguez is a 68 years old woman with hx of type 1 diabetes in June 2016. She is here for follow up.    1. Type 1 diabetes: diagnosed in June 2016. A1c is up down to 8.7% today -- she has fluctuation of BG with rare hypoglycemia and postprandial hyperglycemia which is mostly likely explained by extra snack at bedtime without covering  Increase Lantus to 28 units daily  Continue Novolog 1 unit per 15 gram CHO for BF and lunch, and 1 unit per 10 gram of CHO for dinner  Continue sensitivity 1 unit per 50 mg/dl above 150  She will send message thru [employer] in 3-4 weeks  Discussed insulin pump and sensor today --- she is not interested in both technology at this time    2) DM complications:  Retinopathy: normal exam done march 2019  Nephropathy: negative urine microalbumin 12/2018  Neuropathy: none per exam 10/10/18    PLAN:   Increase Lantus to 28 units daily  Continue Novolog 1 unit per 15 gram CHO for BF and lunch, and 1 unit per 10 gram of CHO for dinner  Continue sensitivity 1 unit per 50 mg/dl above 150  RTC 3-4 months      Edward Aguero MD     Division of Diabetes and Endocrinology  Department of Medicine  [phone]

## 2019-07-18 ENCOUNTER — TELEPHONE (OUTPATIENT)
Dept: SURGERY | Facility: CLINIC | Age: 69
End: 2019-07-18

## 2019-07-18 NOTE — TELEPHONE ENCOUNTER
[facility] Call Center    Phone Message    May a detailed message be left on voicemail: yes    Reason for Call: Symptoms or Concerns     If patient has red-flag symptoms, warm transfer to triage line    Current symptom or concern: Patient states she has a stitch that hasnt come out yet and it has become irritated and tender.     Symptoms have been present for:  2 week(s)          Action Taken: Message routed to:  Adult Clinics: Orthopedics p [phone]

## 2019-07-18 NOTE — TELEPHONE ENCOUNTER
S/p Bilateral long and ring trigger finger release, sx: 5/10/19  Pt was seen on 5/21/19 and put on Keflex for possible incision infection, seen on 5/28/19: incisions healing well, no sxs of infection.     Pt states at her last visit, one of the suture pieces did not come out, but was told that it would work its way out on its own. She is now 10 weeks post op and she says that the suture has not come out and the area is a little red and irritated, painful. Has not changed in last couple weeks. I advised coming in to discuss with Dr. Sandoval, but going in to UC if symptoms start getting worse. She is scheduled to come in on Tuesday next week.    Jeramie David RN

## 2019-07-30 ASSESSMENT — ACTIVITIES OF DAILY LIVING (ADL): CURRENT_FUNCTION: NO ASSISTANCE NEEDED

## 2019-08-02 ENCOUNTER — OFFICE VISIT (OUTPATIENT)
Dept: PEDIATRICS | Facility: CLINIC | Age: 69
End: 2019-08-02
Payer: COMMERCIAL

## 2019-08-02 VITALS
BODY MASS INDEX: 28.07 KG/M2 | SYSTOLIC BLOOD PRESSURE: 110 MMHG | HEIGHT: 65 IN | HEART RATE: 64 BPM | WEIGHT: 168.5 LBS | OXYGEN SATURATION: 98 % | TEMPERATURE: 97.6 F | DIASTOLIC BLOOD PRESSURE: 60 MMHG

## 2019-08-02 DIAGNOSIS — Z12.39 ENCOUNTER FOR SCREENING BREAST EXAMINATION: ICD-10-CM

## 2019-08-02 DIAGNOSIS — E10.9 TYPE 1 DIABETES MELLITUS WITHOUT COMPLICATION (H): ICD-10-CM

## 2019-08-02 DIAGNOSIS — Z00.00 ENCOUNTER FOR MEDICARE ANNUAL WELLNESS EXAM: Primary | ICD-10-CM

## 2019-08-02 DIAGNOSIS — E78.5 HYPERLIPIDEMIA WITH TARGET LDL LESS THAN 100: ICD-10-CM

## 2019-08-02 DIAGNOSIS — M81.0 AGE-RELATED OSTEOPOROSIS WITHOUT CURRENT PATHOLOGICAL FRACTURE: ICD-10-CM

## 2019-08-02 DIAGNOSIS — E78.5 HYPERLIPIDEMIA WITH TARGET LDL LESS THAN 100: Primary | ICD-10-CM

## 2019-08-02 DIAGNOSIS — Z13.29 SCREENING FOR THYROID DISORDER: ICD-10-CM

## 2019-08-02 LAB
ALBUMIN SERPL-MCNC: 3.7 G/DL (ref 3.4–5)
ALP SERPL-CCNC: 97 U/L (ref 40–150)
ALT SERPL W P-5'-P-CCNC: 24 U/L (ref 0–50)
ANION GAP SERPL CALCULATED.3IONS-SCNC: 8 MMOL/L (ref 3–14)
AST SERPL W P-5'-P-CCNC: 16 U/L (ref 0–45)
BILIRUB SERPL-MCNC: 0.7 MG/DL (ref 0.2–1.3)
BUN SERPL-MCNC: 16 MG/DL (ref 7–30)
CALCIUM SERPL-MCNC: 9 MG/DL (ref 8.5–10.1)
CHLORIDE SERPL-SCNC: 103 MMOL/L (ref 94–109)
CHOLEST SERPL-MCNC: 186 MG/DL
CO2 SERPL-SCNC: 24 MMOL/L (ref 20–32)
CREAT SERPL-MCNC: 0.62 MG/DL (ref 0.52–1.04)
GFR SERPL CREATININE-BSD FRML MDRD: >90 ML/MIN/{1.73_M2}
GLUCOSE SERPL-MCNC: 289 MG/DL (ref 70–99)
HDLC SERPL-MCNC: 76 MG/DL
LDLC SERPL CALC-MCNC: 99 MG/DL
NONHDLC SERPL-MCNC: 110 MG/DL
POTASSIUM SERPL-SCNC: 4 MMOL/L (ref 3.4–5.3)
PROT SERPL-MCNC: 6.7 G/DL (ref 6.8–8.8)
SODIUM SERPL-SCNC: 135 MMOL/L (ref 133–144)
TRIGL SERPL-MCNC: 57 MG/DL
TSH SERPL DL<=0.005 MIU/L-ACNC: 2.01 MU/L (ref 0.4–4)

## 2019-08-02 PROCEDURE — 80053 COMPREHEN METABOLIC PANEL: CPT | Performed by: NURSE PRACTITIONER

## 2019-08-02 PROCEDURE — G0438 PPPS, INITIAL VISIT: HCPCS | Performed by: NURSE PRACTITIONER

## 2019-08-02 PROCEDURE — 84443 ASSAY THYROID STIM HORMONE: CPT | Performed by: NURSE PRACTITIONER

## 2019-08-02 PROCEDURE — 80061 LIPID PANEL: CPT | Performed by: NURSE PRACTITIONER

## 2019-08-02 PROCEDURE — 36415 COLL VENOUS BLD VENIPUNCTURE: CPT | Performed by: NURSE PRACTITIONER

## 2019-08-02 RX ORDER — ATORVASTATIN CALCIUM 10 MG/1
10 TABLET, FILM COATED ORAL DAILY
Qty: 90 TABLET | Refills: 3 | Status: SHIPPED | OUTPATIENT
Start: 2019-08-02 | End: 2020-07-13

## 2019-08-02 RX ORDER — ALENDRONATE SODIUM 70 MG/1
70 TABLET ORAL
Qty: 12 TABLET | Refills: 0 | Status: CANCELLED | OUTPATIENT
Start: 2019-08-02

## 2019-08-02 ASSESSMENT — ACTIVITIES OF DAILY LIVING (ADL): CURRENT_FUNCTION: NO ASSISTANCE NEEDED

## 2019-08-02 ASSESSMENT — MIFFLIN-ST. JEOR: SCORE: 1295.19

## 2019-08-02 NOTE — NURSING NOTE
"Chief Complaint   Patient presents with     Physical     AFE     Wellness Visit       Initial /60 (BP Location: Right arm, Patient Position: Sitting, Cuff Size: Adult Regular)   Pulse 64   Temp 97.6  F (36.4  C) (Oral)   Ht 1.651 m (5' 5\")   Wt 76.4 kg (168 lb 8 oz)   SpO2 98%   Breastfeeding? No   BMI 28.04 kg/m   Estimated body mass index is 28.04 kg/m  as calculated from the following:    Height as of this encounter: 1.651 m (5' 5\").    Weight as of this encounter: 76.4 kg (168 lb 8 oz).  Medication Reconciliation: complete      PENELOPE Lopez      "

## 2019-08-02 NOTE — PATIENT INSTRUCTIONS
PLAN:   1.   Symptomatic therapy suggested: will stop the Fosamax this year after 3 years of treatment and will recheck DEXA next year.  Continue current medication regimen unchanged.    2.  Orders Placed This Encounter   Procedures     MA Screening Digital Bilateral     Lipid panel reflex to direct LDL Fasting     Comprehensive metabolic panel     TSH with free T4 reflex     Albumin Random Urine Quantitative with Creat Ratio     Orders Placed This Encounter   Medications     atorvastatin (LIPITOR) 10 MG tablet     Sig: Take 1 tablet (10 mg) by mouth daily     Dispense:  90 tablet     Refill:  3       3. Patient needs to follow up in if no improvement,or sooner if worsening of symptoms or other symptoms develop.  FURTHER TESTING:       - mammogram  Work on weight loss  Regular exercise  Will follow up and/or notify patient of  results via My Chart to determine further need for followup  Follow up office visit in one year for annual health maintenance exam, sooner PRN.    Patient Education   Personalized Prevention Plan  You are due for the preventive services outlined below.  Your care team is available to assist you in scheduling these services.  If you have already completed any of these items, please share that information with your care team to update in your medical record.  Health Maintenance Due   Topic Date Due     Colonscopy  09/15/1960     Zoster (Shingles) Vaccine (2 of 3) 11/08/2016     Basic Metabolic Panel  09/16/2017     Cholesterol Lab  07/03/2018     FALL RISK ASSESSMENT  07/27/2019     Thyroid Function Lab  07/03/2019     Annual Wellness Visit  07/27/2019

## 2019-08-02 NOTE — PROGRESS NOTES
"SUBJECTIVE:   Latoya Rodríguez is a 68 year old female who presents for Preventive Visit.    Are you in the first 12 months of your Medicare coverage?  No    Healthy Habits:     In general, how would you rate your overall health?  Good    Frequency of exercise:  2-3 days/week    Duration of exercise:  30-45 minutes    Do you usually eat at least 4 servings of fruit and vegetables a day, include whole grains    & fiber and avoid regularly eating high fat or \"junk\" foods?  Yes    Taking medications regularly:  Yes    Medication side effects:  None    Ability to successfully perform activities of daily living:  No assistance needed    Home Safety:  Throw rugs in the hallway and lack of grab bars in the bathroom    Hearing Impairment:  No hearing concerns    In the past 6 months, have you been bothered by leaking of urine? Yes    In general, how would you rate your overall mental or emotional health?  Good      PHQ-2 Total Score: 0    Additional concerns today:  No    Do you feel safe in your environment? Yes    Do you have a Health Care Directive? No: Advance care planning was reviewed with patient; patient declined at this time.    Fall risk  Fallen 2 or more times in the past year?: No  Any fall with injury in the past year?: No    Cognitive Screening   1) Repeat 3 items (Leader, Season, Table)    2) Clock draw: NORMAL  3) 3 item recall: Recalls 3 objects  Results: 3 items recalled: COGNITIVE IMPAIRMENT LESS LIKELY    Mini-CogTM Copyright S Last. Licensed by the author for use in [facility]; reprinted with permission (jose@.[facility]). All rights reserved.      Do you have sleep apnea, excessive snoring or daytime drowsiness?: no    Reviewed and updated as needed this visit by clinical staff  Allergies  Meds         Reviewed and updated as needed this visit by Provider        Social History     Tobacco Use     Smoking status: Never Smoker     Smokeless tobacco: Never Used   Substance Use Topics     " Alcohol use: Yes     Comment: 1 to 2 beers or glasses of wine 1 to 2 times per month     If you drink alcohol do you typically have >3 drinks per day or >7 drinks per week? No    Current providers sharing in care for this patient include:   Patient Care Team:  Pricila Avila APRN CNP as PCP - General (Family Practice)  Amy Cooper, RN as Registered Nurse    The following health maintenance items are reviewed in Epic and correct as of today:  Health Maintenance   Topic Date Due     COLONOSCOPY  09/15/1960     ZOSTER IMMUNIZATION (2 of 3) 11/08/2016     BMP  09/16/2017     LIPID  07/03/2018     FALL RISK ASSESSMENT  07/27/2019     TSH W/FREE T4 REFLEX  07/03/2019     MEDICARE ANNUAL WELLNESS VISIT  07/27/2019     INFLUENZA VACCINE (1) 09/01/2019     MICROALBUMIN  12/10/2019     A1C  01/12/2020     DIABETIC FOOT EXAM  03/18/2020     EYE EXAM  03/19/2020     MAMMO SCREENING  08/24/2020     ADVANCE CARE PLANNING  06/23/2022     DTAP/TDAP/TD IMMUNIZATION (2 - Td) 09/13/2026     DEXA  Completed     HEPATITIS C SCREENING  Completed     PHQ-2  Completed     PNEUMOCOCCAL IMMUNIZATION 65+ LOW/MEDIUM RISK  Completed     IPV IMMUNIZATION  Aged Out     MENINGITIS IMMUNIZATION  Aged Out     Lab work is in process  Labs reviewed in EPIC  BP Readings from Last 3 Encounters:   08/02/19 110/60   07/12/19 121/82   05/10/19 148/88    Wt Readings from Last 3 Encounters:   08/02/19 76.4 kg (168 lb 8 oz)   07/12/19 77.1 kg (170 lb)   05/28/19 78 kg (172 lb)              Patient Active Problem List   Diagnosis     Urgency of urination     Urinary frequency     Urge incontinence     Hyperlipidemia with target LDL less than 100     Osteoporosis     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)     Type 1 diabetes mellitus without complication (H)     Age-related osteoporosis without current pathological fracture     Past Surgical History:   Procedure Laterality Date     EYE SURGERY  9/04, 5/11    Retinal detachment,  Cataract (both eyes)     RELEASE TRIGGER FINGER Bilateral 5/10/2019    Procedure: RELEASE TRIGGER FINGER, BILATERAL LONG AND RING FINGERS;  Surgeon: RADHA Sandoval MD;  Location: [facility] OR     VASCULAR SURGERY      Varicose Veins       Social History     Tobacco Use     Smoking status: Never Smoker     Smokeless tobacco: Never Used   Substance Use Topics     Alcohol use: Yes     Comment: 1 to 2 beers or glasses of wine 1 to 2 times per month     Family History   Problem Relation Age of Onset     Hyperlipidemia Mother      Breast Cancer Mother      Other Cancer Father      Leukemia Father      Skin Cancer Father      Coronary Artery Disease Maternal Uncle      Brain Cancer Maternal Uncle      Coronary Artery Disease Maternal Uncle      Stomach Cancer Maternal Aunt      Diabetes No family hx of      Hypertension No family hx of      Cerebrovascular Disease No family hx of      Colon Cancer No family hx of      Thyroid Disease No family hx of      Depression No family hx of      Anxiety Disorder No family hx of          Current Outpatient Medications   Medication Sig Dispense Refill     ACCU-CHEK MICKY PLUS test strip TEST BLOOD SUGARS FOUR TIMES DAILY OR AS DIRECTED 400 each 1     alendronate (FOSAMAX) 70 MG tablet Take 1 tablet (70 mg) by mouth every 7 days 12 tablet 0     atorvastatin (LIPITOR) 10 MG tablet TAKE 1 TABLET(10 MG) BY MOUTH DAILY 90 tablet 1     blood glucose (ACCU-CHEK MICKY PLUS) test strip TEST BLOOD SUGARS FOUR TIMES DAILY OR AS DIRECTED 350 strip 2     blood glucose monitoring (ACCU-CHEK FASTCLIX) lancets Use to test blood sugar 4 times daily or as directed. 306 each 3     calcium carbonate-vitamin D 500-400 MG-UNIT TABS tablt Take 1 tablet by mouth 2 times daily 180 tablet 3     estradiol (ESTRACE VAGINAL) 0.1 MG/GM vaginal cream Place 2 g vaginally twice a week Apply dime-sized amount daily for the first 2 weeks, then place twice a week thereafter. 42.5 g 3     Fesoterodine Fumarate (TOVIAZ)  "8 MG TB24 Take 1 tablet (8 mg) by mouth daily 90 tablet 3     insulin aspart (NOVOLOG FLEXPEN) 100 UNIT/ML injection Take as directed with meals and snacks. Total daily dose approx 30 units. 30 mL 3     insulin degludec (TRESIBA) 200 UNIT/ML pen Inject 32 units subcutaneous daily (Patient taking differently: Inject 24 Units Subcutaneous daily Inject 32 units subcutaneous daily) 30 mL 3     insulin glargine (LANTUS SOLOSTAR PEN) 100 UNIT/ML pen Take 26 units daily. 15 mL 3     insulin pen needle (B-D U/F) 31G X 8 MM miscellaneous USE WITH NOVOLOG AND LANTUS(FIVE TIMES DAILY) 500 each 3     insulin syringe-needle U-100 (B-D INSULIN SYRINGE HALF-UNIT) 31G X 5/16\" 0.3 ML Use 3 syringes daily or as directed. 100 each 0     mirabegron (MYRBETRIQ) 50 MG 24 hr tablet Take 1 tablet (50 mg) by mouth daily 90 tablet 3     Multiple Vitamins-Minerals (MULTIVITAMIN ADULT PO) Take 1 tablet by mouth daily       solifenacin (VESICARE) 10 MG tablet Take 1 tablet (10 mg) by mouth daily 90 tablet 3     Urine Glucose-Ketones Test (KETO-DIASTIX) STRP 1 strip by In Vitro route as needed 1 each 11     HYDROcodone-acetaminophen (NORCO) 5-325 MG tablet Take 1-2 tablets by mouth every 6 hours as needed for moderate to severe pain (Patient not taking: Reported on 7/12/2019) 8 tablet 0     ondansetron (ZOFRAN-ODT) 4 MG ODT tab Take 1-2 tablets (4-8 mg) by mouth every 8 hours as needed for nausea (Patient not taking: Reported on 7/12/2019) 4 tablet 0     senna-docusate (SENOKOT-S/PERICOLACE) 8.6-50 MG tablet Take 1-2 tablets by mouth 2 times daily (Patient not taking: Reported on 7/12/2019) 30 tablet 0     No Known Allergies  Pneumonia Vaccine:Up to date    Mammogram Screening: Mammogram Screening: Patient over age 50, mutual decision to screen reflected in health maintenance.    Review of Systems  CONSTITUTIONAL:POSITIVE  for weight gain and struggle with weight gain  and NEGATIVE  for anorexia and sweats  INTEGUMENTARY/SKIN: NEGATIVE for " "worrisome rashes, moles or lesions  EYES: NEGATIVE for vision changes or irritation  ENT: NEGATIVE for ear, mouth and throat problems  RESP:NEGATIVE for significant cough or SOB  BREAST: NEGATIVE for masses, tenderness or discharge  CV: NEGATIVE for chest pain, palpitations or peripheral edema and POSITIVE for varicose veins  GI: NEGATIVE for nausea, abdominal pain, heartburn, or change in bowel habits   menopausal female: amenorrhea, no unusual urinary symptoms and no unusual vaginal symptoms  MUSCULOSKELETAL:NEGATIVE for significant arthralgias or myalgia  NEURO: NEGATIVE for weakness, dizziness or paresthesias  ENDOCRINE: NEGATIVE for temperature intolerance, skin/hair changes and POSITIVE  for HX diabetes  HEME/ALLERGY/IMMUNE: NEGATIVE for allergies and anemia  PSYCHIATRIC: NEGATIVE for changes in mood or affect     OBJECTIVE:   /60 (BP Location: Right arm, Patient Position: Sitting, Cuff Size: Adult Regular)   Pulse 64   Temp 97.6  F (36.4  C) (Oral)   Ht 1.651 m (5' 5\")   Wt 76.4 kg (168 lb 8 oz)   SpO2 98%   Breastfeeding? No   BMI 28.04 kg/m   Estimated body mass index is 28.43 kg/m  as calculated from the following:    Height as of 5/28/19: 1.647 m (5' 4.84\").    Weight as of 7/12/19: 77.1 kg (170 lb).   Wt Readings from Last 4 Encounters:   08/02/19 76.4 kg (168 lb 8 oz)   07/12/19 77.1 kg (170 lb)   05/28/19 78 kg (172 lb)   03/26/19 78 kg (172 lb)       Physical Exam  GENERAL APPEARANCE: healthy, alert and no distress  EYES: Eyes grossly normal to inspection, PERRL and conjunctivae and sclerae normal  HENT: ear canals and TM's normal, nose and mouth without ulcers or lesions, oropharynx clear and oral mucous membranes moist  NECK: no adenopathy, no asymmetry, masses, or scars and thyroid normal to palpation  RESP: lungs clear to auscultation - no rales, rhonchi or wheezes  BREAST: normal without masses, tenderness or nipple discharge and no palpable axillary masses or adenopathy  CV: " regular rates and rhythm, no murmur, click or rub, no peripheral edema and peripheral pulses strong  ABDOMEN: soft, nontender, no hepatosplenomegaly, no masses and bowel sounds normal   (female): deferred  MS: no musculoskeletal defects are noted and gait is age appropriate without ataxia  SKIN: no suspicious lesions or rashes  NEURO: Normal strength and tone, sensory exam grossly normal, mentation intact and speech normal  PSYCH: mentation appears normal and affect normal/bright    Diagnostic Test Results:  Results for orders placed or performed in visit on 08/02/19   Lipid panel reflex to direct LDL Fasting   Result Value Ref Range    Cholesterol 186 <200 mg/dL    Triglycerides 57 <150 mg/dL    HDL Cholesterol 76 >49 mg/dL    LDL Cholesterol Calculated 99 <100 mg/dL    Non HDL Cholesterol 110 <130 mg/dL   Comprehensive metabolic panel   Result Value Ref Range    Sodium 135 133 - 144 mmol/L    Potassium 4.0 3.4 - 5.3 mmol/L    Chloride 103 94 - 109 mmol/L    Carbon Dioxide 24 20 - 32 mmol/L    Anion Gap 8 3 - 14 mmol/L    Glucose 289 (H) 70 - 99 mg/dL    Urea Nitrogen 16 7 - 30 mg/dL    Creatinine 0.62 0.52 - 1.04 mg/dL    GFR Estimate >90 >60 mL/min/[1.73_m2]    GFR Estimate If Black >90 >60 mL/min/[1.73_m2]    Calcium 9.0 8.5 - 10.1 mg/dL    Bilirubin Total 0.7 0.2 - 1.3 mg/dL    Albumin 3.7 3.4 - 5.0 g/dL    Protein Total 6.7 (L) 6.8 - 8.8 g/dL    Alkaline Phosphatase 97 40 - 150 U/L    ALT 24 0 - 50 U/L    AST 16 0 - 45 U/L   TSH with free T4 reflex   Result Value Ref Range    TSH 2.01 0.40 - 4.00 mU/L   Albumin Random Urine Quantitative with Creat Ratio   Result Value Ref Range    Creatinine Urine Canceled, Test credited mg/dL    Albumin Urine mg/L Canceled, Test credited mg/L    Albumin Urine mg/g Cr Canceled, Test credited 0 - 25 mg/g Cr       ASSESSMENT / PLAN:   Latoya was seen today for physical and wellness visit.    Diagnoses and all orders for this visit:    Encounter for Medicare annual wellness  exam  -     Lipid panel reflex to direct LDL Fasting  -     Comprehensive metabolic panel  -     TSH with free T4 reflex  -     Albumin Random Urine Quantitative with Creat Ratio  -     MA Screening Digital Bilateral; Future    Hyperlipidemia with target LDL less than 100  -     Lipid panel reflex to direct LDL Fasting  -     Comprehensive metabolic panel  -     TSH with free T4 reflex  -     atorvastatin (LIPITOR) 10 MG tablet; Take 1 tablet (10 mg) by mouth daily    Type 1 diabetes mellitus without complication (H)  -     Albumin Random Urine Quantitative with Creat Ratio  FOLLOW UP WITH SPECIALIST :Endocrinology    Screening for thyroid disorder  -     TSH with free T4 reflex    Age-related osteoporosis without current pathological fracture  Symptomatic therapy suggested: will stop the Fosamax this year after 3 years of treatment and will recheck DEXA next year.    Encounter for screening breast examination  -     MA Screening Digital Bilateral; Future    PLAN:   1.   Symptomatic therapy suggested: will stop the Fosamax this year after 3 years of treatment and will recheck DEXA next year.  Continue current medication regimen unchanged.    2.  Orders Placed This Encounter   Procedures     MA Screening Digital Bilateral     Lipid panel reflex to direct LDL Fasting     Comprehensive metabolic panel     TSH with free T4 reflex     Albumin Random Urine Quantitative with Creat Ratio     Orders Placed This Encounter   Medications     atorvastatin (LIPITOR) 10 MG tablet     Sig: Take 1 tablet (10 mg) by mouth daily     Dispense:  90 tablet     Refill:  3       3. Patient needs to follow up in if no improvement,or sooner if worsening of symptoms or other symptoms develop.  FURTHER TESTING:       - mammogram  Work on weight loss  Regular exercise  Will follow up and/or notify patient of  results via My Chart to determine further need for followup  Follow up office visit in one year for annual health maintenance exam,  "sooner PRN.      End of Life Planning:  Patient currently has an advanced directive: No.  I have verified the patient's ablity to prepare an advanced directive/make health care decisions.  Literature was provided to assist patient in preparing an advanced directive.    COUNSELING:  Reviewed preventive health counseling, as reflected in patient instructions  Special attention given to:       Regular exercise       Healthy diet/nutrition       Vision screening       Osteoporosis Prevention/Bone Health       Colon cancer screening       Hepatitis C screening       The 10-year ASCVD risk score (Emily ALVARES Jr., et al., 2013) is: 9.7%    Values used to calculate the score:      Age: 68 years      Sex: Female      Is Non- : No      Diabetic: Yes      Tobacco smoker: No      Systolic Blood Pressure: 110 mmHg      Is BP treated: No      HDL Cholesterol: 76 mg/dL      Total Cholesterol: 186 mg/dL       Advanced Planning     Estimated body mass index is 28.43 kg/m  as calculated from the following:    Height as of 5/28/19: 1.647 m (5' 4.84\").    Weight as of 7/12/19: 77.1 kg (170 lb).         reports that she has never smoked. She has never used smokeless tobacco.      Appropriate preventive services were discussed with this patient, including applicable screening as appropriate for cardiovascular disease, diabetes, osteopenia/osteoporosis, and glaucoma.  As appropriate for age/gender, discussed screening for colorectal cancer, prostate cancer, breast cancer, and cervical cancer. Checklist reviewing preventive services available has been given to the patient.    Reviewed patients plan of care and provided an AVS. The Intermediate Care Plan ( asthma action plan, low back pain action plan, and migraine action plan) for Latoya meets the Care Plan requirement. This Care Plan has been established and reviewed with the Patient.    Counseling Resources:  ATP IV Guidelines  Pooled Cohorts Equation " Calculator  Breast Cancer Risk Calculator  FRAX Risk Assessment  ICSI Preventive Guidelines  Dietary Guidelines for Americans, 2010  USDA's MyPlate  ASA Prophylaxis  Lung CA Screening    URVASHI Hunter CNP  [facility]    Identified Health Risks:

## 2019-08-02 NOTE — RESULT ENCOUNTER NOTE
Concetta Rodríguez,    Attached are your test results.  -Cholesterol levels are at your goal levels.  ADVISE: continuing your medication, a regular exercise program with at least 150 minutes of aerobic exercise per week, and eating a low saturated fat/low carbohydrate diet.  Also, you should recheck this fasting cholesterol panel in 12 months.  -Liver and gallbladder tests (ALT,AST, Alk phos,bilirubin) are normal.  -Kidney function (GFR) is normal.  -Sodium is normal.  -Potassium is normal.  -Calcium is normal.  -Glucose is elevated due to your diabetes.  -TSH (thyroid stimulating hormone) level is normal which indicates normal thyroid function.   Please contact us if you have any questions.    Pricila Avila, CNP

## 2019-08-03 LAB
CREAT UR-MCNC: NORMAL MG/DL
MICROALBUMIN UR-MCNC: NORMAL MG/L
MICROALBUMIN/CREAT UR: NORMAL MG/G CR (ref 0–25)

## 2019-08-26 DIAGNOSIS — E10.9 TYPE 1 DIABETES MELLITUS WITHOUT COMPLICATION (H): Primary | ICD-10-CM

## 2019-10-03 ENCOUNTER — HEALTH MAINTENANCE LETTER (OUTPATIENT)
Age: 69
End: 2019-10-03

## 2019-10-11 DIAGNOSIS — E11.65 TYPE 2 DIABETES MELLITUS WITH HYPERGLYCEMIA (H): ICD-10-CM

## 2019-10-21 ENCOUNTER — TELEPHONE (OUTPATIENT)
Dept: ENDOCRINOLOGY | Facility: CLINIC | Age: 69
End: 2019-10-21

## 2019-10-21 NOTE — TELEPHONE ENCOUNTER
"Cde spoke with pt. States symptoms of vomiting, diarrhea and general malaise stated arrung 6-6:30am. Symptoms stopped about 1 hr ago. Pt sipping on 7 Up all day. Has mandi felt like eating, Has not taken Novolog. Bg levels \"running the 200's\" all day.     Pt advised to continue sipping on 7 Up but also sip on water. Try to drink every 15 minutes. No need to take Novolog this evening unless able to eat.Take prescribed dose Lantus (28 units) tonight.     [facility]un clinic tomorrow if symptoms persist. Pt verbalized understanding.      Linda Diego, RN, BSN, CDE   [facility]  "

## 2019-10-21 NOTE — TELEPHONE ENCOUNTER
Patient last seen at [facility]. Will switch back to [facility] after 11/15/19 appt.    Patient reports since 6:30 this morning she has had 3 episodes of vomiting and 5 episodes of diarrhea. No fever. Has not tolerated any solid food. Sipping on diet 7up. She has not had any Novolog today. Morning IU=950. BG at time of lpar=439. Babysat grandchildren over weekend who had GI illness. Patient states she has not had illness like this since being diagnosed so thought she should update clinic.    Informed patient to take Lantus at  as usual. Patient believes she is on Novolog 1 unit per 50>150.     Will forward to SRI Hodge for further recommendations.    Tasha Harley LPN  Diabetes Clinic Coordinator   Adult Endocrinology and Pediatric Specialty Clinics  [facility]

## 2019-10-21 NOTE — TELEPHONE ENCOUNTER
[facility] Call Center    Phone Message    May a detailed message be left on voicemail: yes    Reason for Call: Symptoms or Concerns     If patient has red-flag symptoms, warm transfer to triage line    Current symptom or concern: She states she is having vomiting and a lot of diarrhea. She has not done an insulin shot because she has not been able to eat anything.     Symptoms have been present for: 12 hour(s)    Has patient previously been seen for this? No    Please advise.       Action Taken: Message routed to:  Adult Clinics: Endocrinology p [phone]

## 2019-11-15 ENCOUNTER — OFFICE VISIT (OUTPATIENT)
Dept: ENDOCRINOLOGY | Facility: CLINIC | Age: 69
End: 2019-11-15
Payer: COMMERCIAL

## 2019-11-15 VITALS
SYSTOLIC BLOOD PRESSURE: 131 MMHG | HEIGHT: 65 IN | DIASTOLIC BLOOD PRESSURE: 80 MMHG | BODY MASS INDEX: 27.44 KG/M2 | HEART RATE: 82 BPM | WEIGHT: 164.7 LBS

## 2019-11-15 DIAGNOSIS — E10.9 TYPE 1 DIABETES MELLITUS WITHOUT COMPLICATION (H): Primary | ICD-10-CM

## 2019-11-15 LAB — HBA1C MFR BLD: 8 % (ref 4.3–6)

## 2019-11-15 ASSESSMENT — MIFFLIN-ST. JEOR: SCORE: 1272.95

## 2019-11-15 ASSESSMENT — PAIN SCALES - GENERAL: PAINLEVEL: NO PAIN (0)

## 2019-11-15 NOTE — PATIENT INSTRUCTIONS
- continue current plan  - return to [place] in 4 months    If you have any questions, please do not hesitate to call clinic line at [phone] and ask for Endocrinology clinic.  If you need to fax, please fax to clinic fax number at [phone]    After clinic hours or weekends, please contact [phone] and ask for Endocrinologist-on call      Sincerely,    Edward Aguero MD  Endocrinology

## 2019-11-15 NOTE — LETTER
11/15/2019       RE: Latoya Rodríguez  [address] [facility] [address]     Dear Colleague,    Thank you for referring your patient, Latoya Rodríguez, to the [facility] ENDOCRINOLOGY at [facility]. Please see a copy of my visit note below.         Endocrinology Note         Latoya is a 69 year old female presents today for type 1 diabetes.    HPI  Ms. Latoya Rodríguez is a 69 years old woman with hx of type 1 diabetes in June 2016. She is here for follow up.    She was diagnosed with type 1 diabetes when she continued losing weight unintentionally. She was found to have an A1c >14. She was initially diagnosed with type 2 diabetes and was put on insulin and Metformin.  She was later found to be TORRES antibody positive and c peptide negative.  She has since been working closely with a diabetes educator and has learned carb counting, and has generally adapted well to the diagnosis.      Interim history:  A1c is down to 8.0% today. She is currently on Lantus 28 units at bedtime, Novolog 1 unit per 15 gram of carb for BF and lunch and 1 unit per 10 gram at dinner and additional Novolog sensitivity 1 unit per 50 above 150 mg/dl. I reviewed her glucose meter, she has been testing 4 times a day with an overall average of 180 (SD 89) mg/dL. Her lowest blood sugar was in 44 and the highest BG was 464. She used Accuchek Expert meter. She did not have frequent hypoglycemia. Most of postprandial glucoses were in 200-300 after lunch and dinner in particularly. She stated that she had extra snack often at work.    She will be retiring by the end of December.     DM complications:  Retinopathy: normal exam done in March 2019  Nephropathy: negative urine microalbumin 12/2018  Neuropathy: none per exam 10/10/18    LDL: 99, currently on Lipitor 10 mg daily.  Hypertension: controlled    Past Medical History  Past Medical History:   Diagnosis Date     Cataract      Type 2 diabetes mellitus with  "hyperglycemia (H) 6/24/2016   Type 1 diabetes, diagnosed 6/2016  Hyperlipidemia  Osteoporosis     Allergies  No Known Allergies     Medications  Current Outpatient Medications   Medication Sig Dispense Refill     ACCU-CHEK MICKY PLUS test strip TEST BLOOD SUGARS FOUR TIMES DAILY OR AS DIRECTED 400 each 1     atorvastatin (LIPITOR) 10 MG tablet Take 1 tablet (10 mg) by mouth daily 90 tablet 3     blood glucose (ACCU-CHEK MICKY PLUS) test strip TEST BLOOD SUGARS FIVE TIMES DAILY OR AS DIRECTED 450 strip 2     blood glucose monitoring (ACCU-CHEK FASTCLIX) lancets Use to test blood sugar 4 times daily or as directed. 306 each 3     calcium carbonate-vitamin D 500-400 MG-UNIT TABS tablt Take 1 tablet by mouth 2 times daily 180 tablet 3     estradiol (ESTRACE VAGINAL) 0.1 MG/GM vaginal cream Place 2 g vaginally twice a week Apply dime-sized amount daily for the first 2 weeks, then place twice a week thereafter. 42.5 g 3     Fesoterodine Fumarate (TOVIAZ) 8 MG TB24 Take 1 tablet (8 mg) by mouth daily 90 tablet 3     HYDROcodone-acetaminophen (NORCO) 5-325 MG tablet Take 1-2 tablets by mouth every 6 hours as needed for moderate to severe pain (Patient not taking: Reported on 7/12/2019) 8 tablet 0     insulin aspart (NOVOLOG FLEXPEN) 100 UNIT/ML injection Take as directed with meals and snacks. Total daily dose approx 30 units. 30 mL 3     insulin degludec (TRESIBA) 200 UNIT/ML pen Inject 32 units subcutaneous daily (Patient taking differently: Inject 24 Units Subcutaneous daily Inject 32 units subcutaneous daily) 30 mL 3     insulin glargine (LANTUS SOLOSTAR) 100 UNIT/ML pen Take 28 units daily. 15 mL 3     insulin pen needle (B-D U/F) 31G X 8 MM miscellaneous USE WITH NOVOLOG AND LANTUS(FIVE TIMES DAILY) 500 each 3     insulin syringe-needle U-100 (B-D INSULIN SYRINGE HALF-UNIT) 31G X 5/16\" 0.3 ML Use 3 syringes daily or as directed. 100 each 0     mirabegron (MYRBETRIQ) 50 MG 24 hr tablet Take 1 tablet (50 mg) by mouth " "daily 90 tablet 3     Multiple Vitamins-Minerals (MULTIVITAMIN ADULT PO) Take 1 tablet by mouth daily       ondansetron (ZOFRAN-ODT) 4 MG ODT tab Take 1-2 tablets (4-8 mg) by mouth every 8 hours as needed for nausea (Patient not taking: Reported on 7/12/2019) 4 tablet 0     senna-docusate (SENOKOT-S/PERICOLACE) 8.6-50 MG tablet Take 1-2 tablets by mouth 2 times daily (Patient not taking: Reported on 7/12/2019) 30 tablet 0     solifenacin (VESICARE) 10 MG tablet Take 1 tablet (10 mg) by mouth daily 90 tablet 3     Urine Glucose-Ketones Test (KETO-DIASTIX) STRP 1 strip by In Vitro route as needed 1 each 11     Family History  No thyroid disease or type 1 diabetes in the family    Social History  Social History     Tobacco Use     Smoking status: Never Smoker     Smokeless tobacco: Never Used   Substance Use Topics     Alcohol use: Yes     Comment: 1 to 2 beers or glasses of wine 1 to 2 times per month     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    ROS  Constitutional: weight is down 6 lbs in 6 months, +increased craving, increased snacking  Eyes: no vision change, diplopia or red eyes   Neck: no difficulty swallowing, no choking, no neck pain, no neck swelling  Cardiovascular: no chest pain, palpitations  Respiratory: no dyspnea, cough, shortness of breath or wheezing   GI: no nausea, vomiting, diarrhea or constipation, no abdominal pain   : no change in urine, no dysuria or hematuria  Musculoskeletal: no joint or muscle pain or swelling   Integumentary: no complaints  Neuro: no loss of strength or sensation, no numbness or tingling, no tremor, no dizziness, no headache   Endo: no polyuria or polydipsia, no temperature intolerance   Heme/Lymph: no concerning bumps, no bleeding problems   Allergy: no environmental allergies   Psych: no sleep problems.    Physical Exam  /80   Pulse 82   Ht 1.651 m (5' 5\")   Wt 74.7 kg (164 lb 11.2 oz)   BMI 27.41 kg/m     Body mass index is 27.41 kg/m .  Constitutional: no " distress, comfortable, pleasant   Eyes: anicteric  Thyroid: not enlarged  CVS: RRR, normal S1,S2, no murmur  Lungs: CTA B/L  Abd: soft, NT, ND, no hepatomegaly  Musculoskeletal: no edema   Skin:  no jaundice   Neurological: normal gait, 2+patella reflex, intact sensation per monofilament bilaterally exam dated 11/2019, no tremor on outstretched hands bilaterally  Psychological: appropriate mood     RESULTS  A1c 8.0 11/15/2019  A1c 8.7 7/12/19  Lab Results   Component Value Date    A1C 9.2 03/18/2019    A1C 9.1 12/10/2018    A1C 8.3 03/26/2018    A1C 9.3 11/27/2017    A1C 6.6 07/17/2017     ENDO DIABETES Latest Ref Rng & Units 8/2/2019   CHOLESTEROL <200 mg/dL 186   LDL CHOLESTEROL, CALCULATED <100 mg/dL 99   LDL CHOLESTEROL DIRECT <100 mg/dL    HDL CHOLESTEROL >49 mg/dL 76   NON HDL CHOLESTEROL <130 mg/dL 110   TRIGLYCERIDES <150 mg/dL 57   ALBUMIN URINE MG/L mg/L Canceled, Test credited   ALBUMIN URINE MG/G CR 0 - 25 mg/g Cr Canceled, Test credited   CREATININE 0.52 - 1.04 mg/dL 0.62   POTASSIUM 3.4 - 5.3 mmol/L 4.0   ALT 0 - 50 U/L 24   AST 0 - 45 U/L 16     DXA 8/24/2018  Lumbar spine T-score in region of L2-L4 excluding L1= -2.3   L1-4 percent change: 12.5%      HIPS:  Mean total hip T-score: -1.0  Mean total hip percent change:4.2%      Left femoral neck T-score = -1.7  Right femoral neck T-score= -1.8      COMPARISON TO PRIOR:  Percent change in the spine and hips is significant accounting to the  precision errors for this facility.      FRAX:  10 year probability of major osteoporotic fracture: 10.9%  10 year probability of hip fracture: 1.7%  The 10 year probability of fracture may be lower than reported if the patient has received treatment. FRAX data should be disregarded in patient's taking bisphosphonates.                                               IMPRESSION:  Osteopenia    ASSESSMENT:    Ms. Rodríguez is a 69 years old woman with hx of type 1 diabetes in June 2016. She is here for follow up.    1. Type  1 diabetes: diagnosed in June 2016. A1c is 8.0% today -- she has fluctuation of BG with rare hypoglycemia and postprandial hyperglycemia which is mostly likely explained by extra snack   continue Lantus 28 units daily  Continue Novolog 1 unit per 15 gram CHO for BF and lunch, and 1 unit per 10 gram of CHO for dinner  Continue sensitivity 1 unit per 50 mg/dl above 150  She will work on healthier diet and exercise  Discussed insulin pump and sensor today --- she is not interested in both technology at this time    2) DM complications:  Retinopathy: normal exam done in March 2019  Nephropathy: negative urine microalbumin 12/2018  Neuropathy: none per exam 10/10/18    3) Hyperlipidemia: LDL 99, HDL 76, TG 57, total cholesterol 186 on 8/2/2019. Continue Lipitor 10 mg daily.    4) Osteopenia: she follows up with PCP. Currently on calcium and vitamin D    PLAN:   Continue Lantus 28 units daily  Continue Novolog 1 unit per 15 gram CHO for BF and lunch, and 1 unit per 10 gram of CHO for dinner  Continue sensitivity 1 unit per 50 mg/dl above 150  RTC 4 months    Edward Aguero MD     Division of Diabetes and Endocrinology  Department of Medicine  [phone]

## 2019-11-15 NOTE — PROGRESS NOTES
Endocrinology Note         Latoya is a 69 year old female presents today for type 1 diabetes.    HPI  Ms. Latoya Rodríguez is a 69 years old woman with hx of type 1 diabetes in June 2016. She is here for follow up.    She was diagnosed with type 1 diabetes when she continued losing weight unintentionally. She was found to have an A1c >14. She was initially diagnosed with type 2 diabetes and was put on insulin and Metformin.  She was later found to be TORRES antibody positive and c peptide negative.  She has since been working closely with a diabetes educator and has learned carb counting, and has generally adapted well to the diagnosis.      Interim history:  A1c is down to 8.0% today. She is currently on Lantus 28 units at bedtime, Novolog 1 unit per 15 gram of carb for BF and lunch and 1 unit per 10 gram at dinner and additional Novolog sensitivity 1 unit per 50 above 150 mg/dl. I reviewed her glucose meter, she has been testing 4 times a day with an overall average of 180 (SD 89) mg/dL. Her lowest blood sugar was in 44 and the highest BG was 464. She used Accuchek Expert meter. She did not have frequent hypoglycemia. Most of postprandial glucoses were in 200-300 after lunch and dinner in particularly. She stated that she had extra snack often at work.    She will be retiring by the end of December.     DM complications:  Retinopathy: normal exam done in March 2019  Nephropathy: negative urine microalbumin 12/2018  Neuropathy: none per exam 10/10/18    LDL: 99, currently on Lipitor 10 mg daily.  Hypertension: controlled    Past Medical History  Past Medical History:   Diagnosis Date     Cataract      Type 2 diabetes mellitus with hyperglycemia (H) 6/24/2016   Type 1 diabetes, diagnosed 6/2016  Hyperlipidemia  Osteoporosis     Allergies  No Known Allergies     Medications  Current Outpatient Medications   Medication Sig Dispense Refill     ACCU-CHEK MICKY PLUS test strip TEST BLOOD SUGARS FOUR TIMES DAILY OR AS  "DIRECTED 400 each 1     atorvastatin (LIPITOR) 10 MG tablet Take 1 tablet (10 mg) by mouth daily 90 tablet 3     blood glucose (ACCU-CHEK MICKY PLUS) test strip TEST BLOOD SUGARS FIVE TIMES DAILY OR AS DIRECTED 450 strip 2     blood glucose monitoring (ACCU-CHEK FASTCLIX) lancets Use to test blood sugar 4 times daily or as directed. 306 each 3     calcium carbonate-vitamin D 500-400 MG-UNIT TABS tablt Take 1 tablet by mouth 2 times daily 180 tablet 3     estradiol (ESTRACE VAGINAL) 0.1 MG/GM vaginal cream Place 2 g vaginally twice a week Apply dime-sized amount daily for the first 2 weeks, then place twice a week thereafter. 42.5 g 3     Fesoterodine Fumarate (TOVIAZ) 8 MG TB24 Take 1 tablet (8 mg) by mouth daily 90 tablet 3     HYDROcodone-acetaminophen (NORCO) 5-325 MG tablet Take 1-2 tablets by mouth every 6 hours as needed for moderate to severe pain (Patient not taking: Reported on 7/12/2019) 8 tablet 0     insulin aspart (NOVOLOG FLEXPEN) 100 UNIT/ML injection Take as directed with meals and snacks. Total daily dose approx 30 units. 30 mL 3     insulin degludec (TRESIBA) 200 UNIT/ML pen Inject 32 units subcutaneous daily (Patient taking differently: Inject 24 Units Subcutaneous daily Inject 32 units subcutaneous daily) 30 mL 3     insulin glargine (LANTUS SOLOSTAR) 100 UNIT/ML pen Take 28 units daily. 15 mL 3     insulin pen needle (B-D U/F) 31G X 8 MM miscellaneous USE WITH NOVOLOG AND LANTUS(FIVE TIMES DAILY) 500 each 3     insulin syringe-needle U-100 (B-D INSULIN SYRINGE HALF-UNIT) 31G X 5/16\" 0.3 ML Use 3 syringes daily or as directed. 100 each 0     mirabegron (MYRBETRIQ) 50 MG 24 hr tablet Take 1 tablet (50 mg) by mouth daily 90 tablet 3     Multiple Vitamins-Minerals (MULTIVITAMIN ADULT PO) Take 1 tablet by mouth daily       ondansetron (ZOFRAN-ODT) 4 MG ODT tab Take 1-2 tablets (4-8 mg) by mouth every 8 hours as needed for nausea (Patient not taking: Reported on 7/12/2019) 4 tablet 0     " "senna-docusate (SENOKOT-S/PERICOLACE) 8.6-50 MG tablet Take 1-2 tablets by mouth 2 times daily (Patient not taking: Reported on 7/12/2019) 30 tablet 0     solifenacin (VESICARE) 10 MG tablet Take 1 tablet (10 mg) by mouth daily 90 tablet 3     Urine Glucose-Ketones Test (KETO-DIASTIX) STRP 1 strip by In Vitro route as needed 1 each 11     Family History  No thyroid disease or type 1 diabetes in the family    Social History  Social History     Tobacco Use     Smoking status: Never Smoker     Smokeless tobacco: Never Used   Substance Use Topics     Alcohol use: Yes     Comment: 1 to 2 beers or glasses of wine 1 to 2 times per month     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    ROS  Constitutional: weight is down 6 lbs in 6 months, +increased craving, increased snacking  Eyes: no vision change, diplopia or red eyes   Neck: no difficulty swallowing, no choking, no neck pain, no neck swelling  Cardiovascular: no chest pain, palpitations  Respiratory: no dyspnea, cough, shortness of breath or wheezing   GI: no nausea, vomiting, diarrhea or constipation, no abdominal pain   : no change in urine, no dysuria or hematuria  Musculoskeletal: no joint or muscle pain or swelling   Integumentary: no complaints  Neuro: no loss of strength or sensation, no numbness or tingling, no tremor, no dizziness, no headache   Endo: no polyuria or polydipsia, no temperature intolerance   Heme/Lymph: no concerning bumps, no bleeding problems   Allergy: no environmental allergies   Psych: no sleep problems.    Physical Exam  /80   Pulse 82   Ht 1.651 m (5' 5\")   Wt 74.7 kg (164 lb 11.2 oz)   BMI 27.41 kg/m    Body mass index is 27.41 kg/m .  Constitutional: no distress, comfortable, pleasant   Eyes: anicteric  Thyroid: not enlarged  CVS: RRR, normal S1,S2, no murmur  Lungs: CTA B/L  Abd: soft, NT, ND, no hepatomegaly  Musculoskeletal: no edema   Skin:  no jaundice   Neurological: normal gait, 2+patella reflex, intact sensation per " monofilament bilaterally exam dated 11/2019, no tremor on outstretched hands bilaterally  Psychological: appropriate mood     RESULTS  A1c 8.0 11/15/2019  A1c 8.7 7/12/19  Lab Results   Component Value Date    A1C 9.2 03/18/2019    A1C 9.1 12/10/2018    A1C 8.3 03/26/2018    A1C 9.3 11/27/2017    A1C 6.6 07/17/2017     ENDO DIABETES Latest Ref Rng & Units 8/2/2019   CHOLESTEROL <200 mg/dL 186   LDL CHOLESTEROL, CALCULATED <100 mg/dL 99   LDL CHOLESTEROL DIRECT <100 mg/dL    HDL CHOLESTEROL >49 mg/dL 76   NON HDL CHOLESTEROL <130 mg/dL 110   TRIGLYCERIDES <150 mg/dL 57   ALBUMIN URINE MG/L mg/L Canceled, Test credited   ALBUMIN URINE MG/G CR 0 - 25 mg/g Cr Canceled, Test credited   CREATININE 0.52 - 1.04 mg/dL 0.62   POTASSIUM 3.4 - 5.3 mmol/L 4.0   ALT 0 - 50 U/L 24   AST 0 - 45 U/L 16     DXA 8/24/2018  Lumbar spine T-score in region of L2-L4 excluding L1= -2.3   L1-4 percent change: 12.5%      HIPS:  Mean total hip T-score: -1.0  Mean total hip percent change:4.2%      Left femoral neck T-score = -1.7  Right femoral neck T-score= -1.8      COMPARISON TO PRIOR:  Percent change in the spine and hips is significant accounting to the  precision errors for this facility.      FRAX:  10 year probability of major osteoporotic fracture: 10.9%  10 year probability of hip fracture: 1.7%  The 10 year probability of fracture may be lower than reported if the patient has received treatment. FRAX data should be disregarded in patient's taking bisphosphonates.                                               IMPRESSION:  Osteopenia    ASSESSMENT:    Ms. Rodríguez is a 69 years old woman with hx of type 1 diabetes in June 2016. She is here for follow up.    1. Type 1 diabetes: diagnosed in June 2016. A1c is 8.0% today -- she has fluctuation of BG with rare hypoglycemia and postprandial hyperglycemia which is mostly likely explained by extra snack   continue Lantus 28 units daily  Continue Novolog 1 unit per 15 gram CHO for BF and  lunch, and 1 unit per 10 gram of CHO for dinner  Continue sensitivity 1 unit per 50 mg/dl above 150  She will work on healthier diet and exercise  Discussed insulin pump and sensor today --- she is not interested in both technology at this time    2) DM complications:  Retinopathy: normal exam done in March 2019  Nephropathy: negative urine microalbumin 12/2018  Neuropathy: none per exam 10/10/18    3) Hyperlipidemia: LDL 99, HDL 76, TG 57, total cholesterol 186 on 8/2/2019. Continue Lipitor 10 mg daily.    4) Osteopenia: she follows up with PCP. Currently on calcium and vitamin D    PLAN:   Continue Lantus 28 units daily  Continue Novolog 1 unit per 15 gram CHO for BF and lunch, and 1 unit per 10 gram of CHO for dinner  Continue sensitivity 1 unit per 50 mg/dl above 150  RTC 4 months    Edward Aguero MD     Division of Diabetes and Endocrinology  Department of Medicine  [phone]

## 2020-02-03 ENCOUNTER — OFFICE VISIT (OUTPATIENT)
Dept: UROLOGY | Facility: CLINIC | Age: 70
End: 2020-02-03
Payer: COMMERCIAL

## 2020-02-03 VITALS
HEART RATE: 86 BPM | RESPIRATION RATE: 18 BRPM | OXYGEN SATURATION: 100 % | SYSTOLIC BLOOD PRESSURE: 124 MMHG | DIASTOLIC BLOOD PRESSURE: 72 MMHG

## 2020-02-03 DIAGNOSIS — N32.81 OVERACTIVE BLADDER: ICD-10-CM

## 2020-02-03 DIAGNOSIS — N95.2 ATROPHIC VAGINITIS: ICD-10-CM

## 2020-02-03 DIAGNOSIS — R39.15 URINARY URGENCY: ICD-10-CM

## 2020-02-03 DIAGNOSIS — R39.15 URGENCY OF URINATION: ICD-10-CM

## 2020-02-03 DIAGNOSIS — N39.41 URGE INCONTINENCE: Primary | ICD-10-CM

## 2020-02-03 DIAGNOSIS — R35.0 URINARY FREQUENCY: ICD-10-CM

## 2020-02-03 LAB
ALBUMIN UR-MCNC: NEGATIVE MG/DL
APPEARANCE UR: CLEAR
BILIRUB UR QL STRIP: NEGATIVE
COLOR UR AUTO: NORMAL
GLUCOSE UR STRIP-MCNC: NEGATIVE MG/DL
HGB UR QL STRIP: NEGATIVE
KETONES UR STRIP-MCNC: NEGATIVE MG/DL
LEUKOCYTE ESTERASE UR QL STRIP: NEGATIVE
NITRATE UR QL: NEGATIVE
PH UR STRIP: 6 PH (ref 5–7)
SOURCE: NORMAL
SP GR UR STRIP: 1 (ref 1–1.03)
UROBILINOGEN UR STRIP-MCNC: NORMAL MG/DL (ref 0–2)

## 2020-02-03 PROCEDURE — 99214 OFFICE O/P EST MOD 30 MIN: CPT | Performed by: UROLOGY

## 2020-02-03 PROCEDURE — 81003 URINALYSIS AUTO W/O SCOPE: CPT | Performed by: UROLOGY

## 2020-02-03 RX ORDER — FESOTERODINE FUMARATE 8 MG/1
8 TABLET, FILM COATED, EXTENDED RELEASE ORAL DAILY
Qty: 90 TABLET | Refills: 3 | Status: SHIPPED | OUTPATIENT
Start: 2020-02-03 | End: 2021-02-08

## 2020-02-03 RX ORDER — MIRABEGRON 50 MG/1
50 TABLET, EXTENDED RELEASE ORAL DAILY
Qty: 90 TABLET | Refills: 3 | Status: SHIPPED | OUTPATIENT
Start: 2020-02-03 | End: 2020-05-04

## 2020-02-03 ASSESSMENT — PAIN SCALES - GENERAL: PAINLEVEL: NO PAIN (0)

## 2020-02-03 NOTE — NURSING NOTE
Latoya Rodríguez's goals for this visit include:   Chief Complaint   Patient presents with     RECHECK     yearly follow up, urinary urgency, urge incontinence, overactive bladder       She requests these members of her care team be copied on today's visit information: yes, PCP    PCP: [facility] Avila    Referring Provider:  No referring provider defined for this encounter.    /72 (Cuff Size: Adult Regular)   Pulse 86   Resp 18   SpO2 100%     Do you need any medication refills at today's visit? None    post void residual: 35mL      Jess Hadley RN, BSN

## 2020-02-03 NOTE — PROGRESS NOTES
Reason for visit: F/u on therapy for urgency and frequency   Clinical Data: Ms. Rodríguez is a 70 y/o female with the above. She was started on oxybutynin 15 mg xl as well as estrace cream and referred to pelvic floor PT. She did better on the anticholinergic however continued to have a lot of urgency and some urge incontinence. She was then switched to Detrol which did not work, then Sanctura which did but was too expensive and tried Vesicare but then Toviaz was less expensive and using Myrbetriq and that is working better than anything else.  For the most part she feels like she is able to control her incontinence.  This only occurs rarely.  She is also using estrogen cream.    /72 (Cuff Size: Adult Regular)   Pulse 86   Resp 18   SpO2 100%   NAD  A/O X 3  EOMI  No increased resp effort  Skin warm to touch  Moving all extremities    Reviewed PMhx, SHx, medications, and Allergies.    ASSESSMENT and PLAN: This is a 69 year old female with urinary urgency, frequency and urge incontinence with improvement on anticholinergic and she notices it occurs mostly when up and walking around or getting up from a sitting position. She was doing well with the Myrbetriq and the Toviaz. She also has stress incontinence but is not bothered enough to proceed with any intervention.   We had previously discussed options including PTNS, interstim, and botox or even to try periurethral bulking.  She wanted to try PTNS but she found this to be expensive  -continue Toviaz 8 mg.  -continue Myrbetriq 50 mg    -continue estrace  -f/u with DEA Flores in a year    Thank you for allowing me to participate in the care of Ms. Latoya Rodríguez and I will keep you updated on her progress.   Alberto Zhang MD

## 2020-02-17 ENCOUNTER — TELEPHONE (OUTPATIENT)
Dept: ENDOCRINOLOGY | Facility: CLINIC | Age: 70
End: 2020-02-17

## 2020-02-17 DIAGNOSIS — Z79.4 TYPE 2 DIABETES MELLITUS WITH HYPERGLYCEMIA, WITH LONG-TERM CURRENT USE OF INSULIN (H): ICD-10-CM

## 2020-02-17 DIAGNOSIS — E11.65 TYPE 2 DIABETES MELLITUS WITH HYPERGLYCEMIA, WITH LONG-TERM CURRENT USE OF INSULIN (H): ICD-10-CM

## 2020-02-28 ENCOUNTER — TELEPHONE (OUTPATIENT)
Dept: EDUCATION SERVICES | Facility: CLINIC | Age: 70
End: 2020-02-28

## 2020-02-28 NOTE — TELEPHONE ENCOUNTER
[facility] Call Center    Phone Message    May a detailed message be left on voicemail: yes     Reason for Call: Symptoms or Concerns     If patient has red-flag symptoms, warm transfer to triage line    Current symptom or concern: blood sugar low    Symptoms have been present for:  3 week(s)    Has patient previously been seen for this? Yes    By  Edward Aguero MD/ Linda Diego    Date: 11/15/19    Are there any new or worsening symptoms? No      Action Taken: Message routed to:  Adult Clinics: Endocrinology p [id number]    Travel Screening: Not Applicable

## 2020-02-28 NOTE — TELEPHONE ENCOUNTER
Cde returned pt's call. No answer. Left vm. Cde advised patient to reduce Lantus dose from 25 to 28 units, if she is not able to call clinic before 5pm today, then call clinic on Monday to discuss low bg concerns. If able to call clinic today, before 5pm, please call [phone] to speak with diabetes educator.     Linda Diego, RN, BSN, CDE   [facility]

## 2020-03-02 DIAGNOSIS — E10.9 TYPE 1 DIABETES MELLITUS WITHOUT COMPLICATION (H): ICD-10-CM

## 2020-03-02 RX ORDER — LANCETS
EACH MISCELLANEOUS
Qty: 306 EACH | Refills: 3 | Status: SHIPPED | OUTPATIENT
Start: 2020-03-02 | End: 2021-02-21

## 2020-03-02 NOTE — TELEPHONE ENCOUNTER
Patient left voicemail requesting refill on her lancets.     Chart reviewed, refill previously completed under [facility]. Will send to Dr. Aguero to sign first prescription.       Clarisa Nunez RN  Endocrine Care Coordinator  [facility]

## 2020-03-15 NOTE — PROGRESS NOTES
Endocrinology Note         Latoya is a 69 year old female presents today for type 1 diabetes.    HPI  Ms. Latoya Rodríguez is a 69 years old woman with hx of type 1 diabetes in June 2016. She is here for follow up.    She was diagnosed with type 1 diabetes when she continued losing weight unintentionally. She was found to have an A1c >14. She was initially diagnosed with type 2 diabetes and was put on insulin and Metformin.  She was later found to be TORRES antibody positive and c peptide negative.  She has since been working closely with a diabetes educator and has learned carb counting, and has generally adapted well to the diagnosis.      Interim history:  A1c is 8.1% today. She is currently on Lantus 28 units at bedtime, Novolog 1 unit per 15 gram of carb for BF and lunch and 1 unit per 10 gram at dinner and additional Novolog sensitivity 1 unit per 50 above 150 mg/dl. I reviewed her glucose meter, she has been testing 4 times a day with an overall average of 210 () mg/dL. Her lowest blood sugar was in 37 and the highest BG was 528. She used Accuchek Expert meter. She did have frequent hypoglycemia about 2-3 times per week. Most of postprandial glucoses were in 200-300 after lunch and dinner in particularly.     She is now retired in December 2019.     DM complications:  Retinopathy: normal exam done in March 2019  Nephropathy: negative urine microalbumin 12/2018  Neuropathy: none per exam 10/10/18    LDL: 99, 8/2/2019. on Lipitor 10 mg daily.  Hypertension: controlled    Past Medical History  Past Medical History:   Diagnosis Date     Cataract      Type 2 diabetes mellitus with hyperglycemia (H) 6/24/2016   Type 1 diabetes, diagnosed 6/2016  Hyperlipidemia  Osteoporosis     Allergies  No Known Allergies     Medications  Current Outpatient Medications   Medication Sig Dispense Refill     ACCU-CHEK MICKY PLUS test strip TEST BLOOD SUGARS FOUR TIMES DAILY OR AS DIRECTED 400 each 1     atorvastatin (LIPITOR) 10  "MG tablet Take 1 tablet (10 mg) by mouth daily 90 tablet 3     blood glucose (ACCU-CHEK MICKY PLUS) test strip TEST BLOOD SUGARS FIVE TIMES DAILY OR AS DIRECTED 450 strip 2     blood glucose monitoring (ACCU-CHEK FASTCLIX) lancets Use to test blood sugar 4 times daily or as directed. 306 each 3     calcium carbonate-vitamin D 500-400 MG-UNIT TABS tablt Take 1 tablet by mouth 2 times daily 180 tablet 3     COMPOUNDED NON-CONTROLLED SUBSTANCE (CMPD RX) - PHARMACY TO MIX COMPOUNDED MEDICATION Estriol 0.1% cream (1mg/gram) in HRT base. Place 1 gram vaginally daily for 2 weeks, then vaginally twice weekly. 30 g 6     estradiol (ESTRACE VAGINAL) 0.1 MG/GM vaginal cream Place 2 g vaginally twice a week Apply dime-sized amount daily for the first 2 weeks, then place twice a week thereafter. 42.5 g 3     Fesoterodine Fumarate (TOVIAZ) 8 MG TB24 Take 1 tablet (8 mg) by mouth daily 90 tablet 3     HYDROcodone-acetaminophen (NORCO) 5-325 MG tablet Take 1-2 tablets by mouth every 6 hours as needed for moderate to severe pain 8 tablet 0     insulin aspart (NOVOLOG FLEXPEN) 100 UNIT/ML pen 1 unit per 15 gram of carb for breakfast and lunch, and 1 unit per 10 grams for dinner.Plus 1 unit per 50 mg/dl above 150. Total daily dose approx 30 units. 30 mL 3     insulin degludec (TRESIBA) 200 UNIT/ML pen Inject 32 units subcutaneous daily (Patient taking differently: Inject 24 Units Subcutaneous daily Inject 32 units subcutaneous daily) 30 mL 3     insulin glargine (LANTUS SOLOSTAR) 100 UNIT/ML pen Take 28 units daily. 15 mL 3     insulin pen needle (B-D U/F) 31G X 8 MM miscellaneous USE WITH NOVOLOG AND LANTUS(FIVE TIMES DAILY) 500 each 3     insulin syringe-needle U-100 (B-D INSULIN SYRINGE HALF-UNIT) 31G X 5/16\" 0.3 ML Use 3 syringes daily or as directed. 100 each 0     mirabegron (MYRBETRIQ) 50 MG 24 hr tablet Take 1 tablet (50 mg) by mouth daily 90 tablet 3     mirabegron (MYRBETRIQ) 50 MG 24 hr tablet Take 1 tablet (50 mg) by mouth " daily 90 tablet 3     Multiple Vitamins-Minerals (MULTIVITAMIN ADULT PO) Take 1 tablet by mouth daily       ondansetron (ZOFRAN-ODT) 4 MG ODT tab Take 1-2 tablets (4-8 mg) by mouth every 8 hours as needed for nausea 4 tablet 0     senna-docusate (SENOKOT-S/PERICOLACE) 8.6-50 MG tablet Take 1-2 tablets by mouth 2 times daily 30 tablet 0     Urine Glucose-Ketones Test (KETO-DIASTIX) STRP 1 strip by In Vitro route as needed 1 each 11     Family History  No thyroid disease or type 1 diabetes in the family    Social History  Social History     Tobacco Use     Smoking status: Never Smoker     Smokeless tobacco: Never Used   Substance Use Topics     Alcohol use: Yes     Comment: 1 to 2 beers or glasses of wine 1 to 2 times per month     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    ROS  Constitutional: weight is up1 lbs in 4 months  Eyes: no vision change, diplopia or red eyes   Neck: no difficulty swallowing, no choking, no neck pain, no neck swelling  Cardiovascular: no chest pain, palpitations  Respiratory: no dyspnea, cough, shortness of breath or wheezing   GI: no nausea, vomiting, diarrhea or constipation, no abdominal pain   : no change in urine, no dysuria or hematuria  Musculoskeletal: no joint or muscle pain or swelling   Integumentary: no complaints  Neuro: no loss of strength or sensation, no numbness or tingling, no tremor, no dizziness, no headache   Endo: no polyuria or polydipsia, no temperature intolerance   Heme/Lymph: no concerning bumps, no bleeding problems   Allergy: no environmental allergies   Psych: no sleep problems.    Physical Exam  /77 (BP Location: Left arm, Patient Position: Sitting, Cuff Size: Adult Regular)   Pulse 129   Wt 75.1 kg (165 lb 8 oz)   SpO2 96%   BMI 27.54 kg/m    Body mass index is 27.54 kg/m .  Constitutional: no distress, comfortable, pleasant   Eyes: anicteric  Musculoskeletal: no edema   Skin:  no jaundice   Neurological: normal gait, 2+patella reflex, intact  sensation per monofilament bilaterally exam dated 11/2019, no tremor on outstretched hands bilaterally  Psychological: appropriate mood     RESULTS  A1c 8.0 11/15/2019  A1c 8.7 7/12/19  Lab Results   Component Value Date    A1C 9.2 03/18/2019    A1C 9.1 12/10/2018    A1C 8.3 03/26/2018    A1C 9.3 11/27/2017    A1C 6.6 07/17/2017     ENDO DIABETES Latest Ref Rng & Units 8/2/2019   CHOLESTEROL <200 mg/dL 186   LDL CHOLESTEROL, CALCULATED <100 mg/dL 99   LDL CHOLESTEROL DIRECT <100 mg/dL    HDL CHOLESTEROL >49 mg/dL 76   NON HDL CHOLESTEROL <130 mg/dL 110   TRIGLYCERIDES <150 mg/dL 57   ALBUMIN URINE MG/L mg/L Canceled, Test credited   ALBUMIN URINE MG/G CR 0 - 25 mg/g Cr Canceled, Test credited   CREATININE 0.52 - 1.04 mg/dL 0.62   POTASSIUM 3.4 - 5.3 mmol/L 4.0   ALT 0 - 50 U/L 24   AST 0 - 45 U/L 16     DXA 8/24/2018  Lumbar spine T-score in region of L2-L4 excluding L1= -2.3   L1-4 percent change: 12.5%      HIPS:  Mean total hip T-score: -1.0  Mean total hip percent change:4.2%      Left femoral neck T-score = -1.7  Right femoral neck T-score= -1.8      COMPARISON TO PRIOR:  Percent change in the spine and hips is significant accounting to the  precision errors for this facility.      FRAX:  10 year probability of major osteoporotic fracture: 10.9%  10 year probability of hip fracture: 1.7%  The 10 year probability of fracture may be lower than reported if the patient has received treatment. FRAX data should be disregarded in patient's taking bisphosphonates.                                               IMPRESSION:  Osteopenia    ASSESSMENT:    Ms. Rodríguez is a 69 years old woman with hx of type 1 diabetes in June 2016. She is here for follow up.    1. Type 1 diabetes: diagnosed in June 2016. A1c is 8.1% today -- she has fluctuation of BG with nocturnal hypoglycemia and postprandial hyperglycemia.    Plan:  Reduce Lantus to 24 units and move to 6-7 pm.   Continue Novolog 1 unit per 15 gram CHO for BF and lunch,  and 1 unit per 10 gram of CHO for dinner  Continue sensitivity 1 unit per 50 mg/dl above 150  She will work on healthier diet and exercise  Discussed glucose sensor today --- she is interested in Dexcom and will start the process    2) DM complications:  Retinopathy: normal exam done in March 2019 -- she is due this year  Nephropathy: negative urine microalbumin 12/2018 -- due this year  Neuropathy: none per exam 11/2019    3) Hyperlipidemia: LDL 99, HDL 76, TG 57, total cholesterol 186 on 8/2/2019. Continue Lipitor 10 mg daily.    4) Osteopenia: she follows up with PCP. Currently on calcium and vitamin D    PLAN:   Continue Lantus 28 units daily  Continue Novolog 1 unit per 15 gram CHO for BF and lunch, and 1 unit per 10 gram of CHO for dinner  Continue sensitivity 1 unit per 50 mg/dl above 150  Lab at next visit   RTC 4 months    Edward Aguero MD     Division of Diabetes and Endocrinology  Department of Medicine  [phone]

## 2020-03-16 ENCOUNTER — OFFICE VISIT (OUTPATIENT)
Dept: ENDOCRINOLOGY | Facility: CLINIC | Age: 70
End: 2020-03-16
Payer: COMMERCIAL

## 2020-03-16 VITALS
DIASTOLIC BLOOD PRESSURE: 77 MMHG | OXYGEN SATURATION: 96 % | SYSTOLIC BLOOD PRESSURE: 139 MMHG | BODY MASS INDEX: 27.54 KG/M2 | HEART RATE: 129 BPM | WEIGHT: 165.5 LBS

## 2020-03-16 DIAGNOSIS — E10.9 TYPE 1 DIABETES MELLITUS WITHOUT COMPLICATION (H): ICD-10-CM

## 2020-03-16 LAB
CREAT UR-MCNC: 38 MG/DL
MICROALBUMIN UR-MCNC: <5 MG/L
MICROALBUMIN/CREAT UR: NORMAL MG/G CR (ref 0–25)

## 2020-03-16 PROCEDURE — 36415 COLL VENOUS BLD VENIPUNCTURE: CPT | Performed by: INTERNAL MEDICINE

## 2020-03-16 PROCEDURE — 83036 HEMOGLOBIN GLYCOSYLATED A1C: CPT | Performed by: INTERNAL MEDICINE

## 2020-03-16 PROCEDURE — 82043 UR ALBUMIN QUANTITATIVE: CPT | Performed by: INTERNAL MEDICINE

## 2020-03-16 PROCEDURE — 99214 OFFICE O/P EST MOD 30 MIN: CPT | Performed by: INTERNAL MEDICINE

## 2020-03-16 NOTE — PATIENT INSTRUCTIONS
- take Lantus 24 units at 6-7 pm  - continue current Novolog at meal time  - please let me know how your glucose is in the next few weeks  - please consider Dexcom sensor  - return in 3-4 months  -lab today (mircroalbumin)  If you have any questions, please do not hesitate to call [facility] at [phone] and ask for Endocrinology clinic.    Sincerely,    Edward Aguero MD  Endocrinology    [facility]-Department of Endocrinology  Linda Diego RN, Diabetes Educator: [phone]  Tasha Harley LPN Diabetes Clinic Coordinator: [phone]  Clinic Line:[phone]  Clinic Fax: [phone]  On-Call Endocrine Provider at the [facility] (after hours/weekends): [phone] option 4  Scheduling Line: [phone]    Appointment Reminders:  * Please bring meter and/or CGM device with for staff to download  * If you are due ONLY for an A1C, it is scheduled with the nurse and will be done in clinic. You do not need to schedule a lab appointment. Fasting is not required for an A1C.  * Refill request should be submitted to your pharmacy. They will contact clinic for approval.

## 2020-03-16 NOTE — NURSING NOTE
Latoya Rodríguez's goals for this visit include:   Chief Complaint   Patient presents with     Diabetes     She requests these members of her care team be copied on today's visit information: Yes    PCP: Pricila Avila    Referring Provider:  Pricila Avila, APRN CNP  [address]  [place] [address]    /77 (BP Location: Left arm, Patient Position: Sitting, Cuff Size: Adult Regular)   Pulse 129   Wt 75.1 kg (165 lb 8 oz)   SpO2 96%   BMI 27.54 kg/m      Do you need any medication refills at today's visit? No

## 2020-03-16 NOTE — LETTER
3/16/2020         RE: Latoya Rodríguez  [address]  [place] [address]        Dear Colleague,    Thank you for referring your patient, Latoya Rodríguez, to the [facility]. Please see a copy of my visit note below.         Endocrinology Note         Latoya is a 69 year old female presents today for type 1 diabetes.    HPI  MsDaniel Rodríguez is a 69 years old woman with hx of type 1 diabetes in June 2016. She is here for follow up.    She was diagnosed with type 1 diabetes when she continued losing weight unintentionally. She was found to have an A1c >14. She was initially diagnosed with type 2 diabetes and was put on insulin and Metformin.  She was later found to be TORRES antibody positive and c peptide negative.  She has since been working closely with a diabetes educator and has learned carb counting, and has generally adapted well to the diagnosis.      Interim history:  A1c is 8.1% today. She is currently on Lantus 28 units at bedtime, Novolog 1 unit per 15 gram of carb for BF and lunch and 1 unit per 10 gram at dinner and additional Novolog sensitivity 1 unit per 50 above 150 mg/dl. I reviewed her glucose meter, she has been testing 4 times a day with an overall average of 210 () mg/dL. Her lowest blood sugar was in 37 and the highest BG was 528. She used Accuchek Expert meter. She did have frequent hypoglycemia about 2-3 times per week. Most of postprandial glucoses were in 200-300 after lunch and dinner in particularly.     She is now retired in December 2019.     DM complications:  Retinopathy: normal exam done in March 2019  Nephropathy: negative urine microalbumin 12/2018  Neuropathy: none per exam 10/10/18    LDL: 99, 8/2/2019. on Lipitor 10 mg daily.  Hypertension: controlled    Past Medical History  Past Medical History:   Diagnosis Date     Cataract      Type 2 diabetes mellitus with hyperglycemia (H) 6/24/2016   Type 1 diabetes, diagnosed  6/2016  Hyperlipidemia  Osteoporosis     Allergies  No Known Allergies     Medications  Current Outpatient Medications   Medication Sig Dispense Refill     ACCU-CHEK MICKY PLUS test strip TEST BLOOD SUGARS FOUR TIMES DAILY OR AS DIRECTED 400 each 1     atorvastatin (LIPITOR) 10 MG tablet Take 1 tablet (10 mg) by mouth daily 90 tablet 3     blood glucose (ACCU-CHEK MICKY PLUS) test strip TEST BLOOD SUGARS FIVE TIMES DAILY OR AS DIRECTED 450 strip 2     blood glucose monitoring (ACCU-CHEK FASTCLIX) lancets Use to test blood sugar 4 times daily or as directed. 306 each 3     calcium carbonate-vitamin D 500-400 MG-UNIT TABS tablt Take 1 tablet by mouth 2 times daily 180 tablet 3     COMPOUNDED NON-CONTROLLED SUBSTANCE (CMPD RX) - PHARMACY TO MIX COMPOUNDED MEDICATION Estriol 0.1% cream (1mg/gram) in HRT base. Place 1 gram vaginally daily for 2 weeks, then vaginally twice weekly. 30 g 6     estradiol (ESTRACE VAGINAL) 0.1 MG/GM vaginal cream Place 2 g vaginally twice a week Apply dime-sized amount daily for the first 2 weeks, then place twice a week thereafter. 42.5 g 3     Fesoterodine Fumarate (TOVIAZ) 8 MG TB24 Take 1 tablet (8 mg) by mouth daily 90 tablet 3     HYDROcodone-acetaminophen (NORCO) 5-325 MG tablet Take 1-2 tablets by mouth every 6 hours as needed for moderate to severe pain 8 tablet 0     insulin aspart (NOVOLOG FLEXPEN) 100 UNIT/ML pen 1 unit per 15 gram of carb for breakfast and lunch, and 1 unit per 10 grams for dinner.Plus 1 unit per 50 mg/dl above 150. Total daily dose approx 30 units. 30 mL 3     insulin degludec (TRESIBA) 200 UNIT/ML pen Inject 32 units subcutaneous daily (Patient taking differently: Inject 24 Units Subcutaneous daily Inject 32 units subcutaneous daily) 30 mL 3     insulin glargine (LANTUS SOLOSTAR) 100 UNIT/ML pen Take 28 units daily. 15 mL 3     insulin pen needle (B-D U/F) 31G X 8 MM miscellaneous USE WITH NOVOLOG AND LANTUS(FIVE TIMES DAILY) 500 each 3     insulin  "syringe-needle U-100 (B-D INSULIN SYRINGE HALF-UNIT) 31G X 5/16\" 0.3 ML Use 3 syringes daily or as directed. 100 each 0     mirabegron (MYRBETRIQ) 50 MG 24 hr tablet Take 1 tablet (50 mg) by mouth daily 90 tablet 3     mirabegron (MYRBETRIQ) 50 MG 24 hr tablet Take 1 tablet (50 mg) by mouth daily 90 tablet 3     Multiple Vitamins-Minerals (MULTIVITAMIN ADULT PO) Take 1 tablet by mouth daily       ondansetron (ZOFRAN-ODT) 4 MG ODT tab Take 1-2 tablets (4-8 mg) by mouth every 8 hours as needed for nausea 4 tablet 0     senna-docusate (SENOKOT-S/PERICOLACE) 8.6-50 MG tablet Take 1-2 tablets by mouth 2 times daily 30 tablet 0     Urine Glucose-Ketones Test (KETO-DIASTIX) STRP 1 strip by In Vitro route as needed 1 each 11     Family History  No thyroid disease or type 1 diabetes in the family    Social History  Social History     Tobacco Use     Smoking status: Never Smoker     Smokeless tobacco: Never Used   Substance Use Topics     Alcohol use: Yes     Comment: 1 to 2 beers or glasses of wine 1 to 2 times per month     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    ROS  Constitutional: weight is up1 lbs in 4 months  Eyes: no vision change, diplopia or red eyes   Neck: no difficulty swallowing, no choking, no neck pain, no neck swelling  Cardiovascular: no chest pain, palpitations  Respiratory: no dyspnea, cough, shortness of breath or wheezing   GI: no nausea, vomiting, diarrhea or constipation, no abdominal pain   : no change in urine, no dysuria or hematuria  Musculoskeletal: no joint or muscle pain or swelling   Integumentary: no complaints  Neuro: no loss of strength or sensation, no numbness or tingling, no tremor, no dizziness, no headache   Endo: no polyuria or polydipsia, no temperature intolerance   Heme/Lymph: no concerning bumps, no bleeding problems   Allergy: no environmental allergies   Psych: no sleep problems.    Physical Exam  /77 (BP Location: Left arm, Patient Position: Sitting, Cuff Size: Adult " Regular)   Pulse 129   Wt 75.1 kg (165 lb 8 oz)   SpO2 96%   BMI 27.54 kg/m    Body mass index is 27.54 kg/m .  Constitutional: no distress, comfortable, pleasant   Eyes: anicteric  Musculoskeletal: no edema   Skin:  no jaundice   Neurological: normal gait, 2+patella reflex, intact sensation per monofilament bilaterally exam dated 11/2019, no tremor on outstretched hands bilaterally  Psychological: appropriate mood     RESULTS  A1c 8.0 11/15/2019  A1c 8.7 7/12/19  Lab Results   Component Value Date    A1C 9.2 03/18/2019    A1C 9.1 12/10/2018    A1C 8.3 03/26/2018    A1C 9.3 11/27/2017    A1C 6.6 07/17/2017     ENDO DIABETES Latest Ref Rng & Units 8/2/2019   CHOLESTEROL <200 mg/dL 186   LDL CHOLESTEROL, CALCULATED <100 mg/dL 99   LDL CHOLESTEROL DIRECT <100 mg/dL    HDL CHOLESTEROL >49 mg/dL 76   NON HDL CHOLESTEROL <130 mg/dL 110   TRIGLYCERIDES <150 mg/dL 57   ALBUMIN URINE MG/L mg/L Canceled, Test credited   ALBUMIN URINE MG/G CR 0 - 25 mg/g Cr Canceled, Test credited   CREATININE 0.52 - 1.04 mg/dL 0.62   POTASSIUM 3.4 - 5.3 mmol/L 4.0   ALT 0 - 50 U/L 24   AST 0 - 45 U/L 16     DXA 8/24/2018  Lumbar spine T-score in region of L2-L4 excluding L1= -2.3   L1-4 percent change: 12.5%      HIPS:  Mean total hip T-score: -1.0  Mean total hip percent change:4.2%      Left femoral neck T-score = -1.7  Right femoral neck T-score= -1.8      COMPARISON TO PRIOR:  Percent change in the spine and hips is significant accounting to the  precision errors for this facility.      FRAX:  10 year probability of major osteoporotic fracture: 10.9%  10 year probability of hip fracture: 1.7%  The 10 year probability of fracture may be lower than reported if the patient has received treatment. FRAX data should be disregarded in patient's taking bisphosphonates.                                               IMPRESSION:  Osteopenia    ASSESSMENT:    Ms. Rodríguez is a 69 years old woman with hx of type 1 diabetes in June 2016. She is here  for follow up.    1. Type 1 diabetes: diagnosed in June 2016. A1c is 8.1% today -- she has fluctuation of BG with nocturnal hypoglycemia and postprandial hyperglycemia.    Plan:  Reduce Lantus to 24 units and move to 6-7 pm.   Continue Novolog 1 unit per 15 gram CHO for BF and lunch, and 1 unit per 10 gram of CHO for dinner  Continue sensitivity 1 unit per 50 mg/dl above 150  She will work on healthier diet and exercise  Discussed glucose sensor today --- she is interested in Dexcom and will start the process    2) DM complications:  Retinopathy: normal exam done in March 2019 -- she is due this year  Nephropathy: negative urine microalbumin 12/2018 -- due this year  Neuropathy: none per exam 11/2019    3) Hyperlipidemia: LDL 99, HDL 76, TG 57, total cholesterol 186 on 8/2/2019. Continue Lipitor 10 mg daily.    4) Osteopenia: she follows up with PCP. Currently on calcium and vitamin D    PLAN:   Continue Lantus 28 units daily  Continue Novolog 1 unit per 15 gram CHO for BF and lunch, and 1 unit per 10 gram of CHO for dinner  Continue sensitivity 1 unit per 50 mg/dl above 150  Lab at next visit   RTC 4 months    Edward Aguero MD     Division of Diabetes and Endocrinology  Department of Medicine  [phone]

## 2020-04-08 ENCOUNTER — TELEPHONE (OUTPATIENT)
Dept: UROLOGY | Facility: CLINIC | Age: 70
End: 2020-04-08

## 2020-04-08 NOTE — TELEPHONE ENCOUNTER
Returned call to patient. Patient would like Dr. Zhang to prescribe oxybutynin XL 15mg now that insurance has changed. Patient would like it sent to [facility]. Patient will call her insurance to see if this will be covered before a note is sent to Dr. Zhang and will call clinic back. Patient had no further questions.    Sandra Shultz LPN

## 2020-04-08 NOTE — TELEPHONE ENCOUNTER
[facility] Call Center    Phone Message    May a detailed message be left on voicemail: yes     Reason for Call: Other: pt looking to switch medications back to her previous medication , she had to switch due to insurance and would like to go back now, please advise with her     Action Taken: Message routed to:  Adult Clinics: Urology p [id number]    Travel Screening: Not Applicable

## 2020-04-20 DIAGNOSIS — E11.65 TYPE 2 DIABETES MELLITUS WITH HYPERGLYCEMIA (H): ICD-10-CM

## 2020-04-21 RX ORDER — BLOOD SUGAR DIAGNOSTIC
STRIP MISCELLANEOUS
Qty: 500 EACH | Refills: 3 | Status: SHIPPED | OUTPATIENT
Start: 2020-04-21 | End: 2021-02-08

## 2020-04-22 NOTE — TELEPHONE ENCOUNTER
Called and spoke to patient. Per patient, she decided to stay on the toviaz instead of changing to the oxybutynin medication. Informed patient to contact the clinic with any questions or concerns in the future. Patient verbalized understanding.    Jess Hadley RN, BSN

## 2020-04-23 ENCOUNTER — MYC MEDICAL ADVICE (OUTPATIENT)
Dept: ENDOCRINOLOGY | Facility: CLINIC | Age: 70
End: 2020-04-23

## 2020-04-23 DIAGNOSIS — E10.9 TYPE I DIABETES MELLITUS, WELL CONTROLLED (H): ICD-10-CM

## 2020-04-23 RX ORDER — PEN NEEDLE, DIABETIC 31 GX5/16"
NEEDLE, DISPOSABLE MISCELLANEOUS
Qty: 500 EACH | Refills: 3 | Status: SHIPPED | OUTPATIENT
Start: 2020-04-23 | End: 2021-12-11

## 2020-05-04 ENCOUNTER — MYC REFILL (OUTPATIENT)
Dept: UROLOGY | Facility: CLINIC | Age: 70
End: 2020-05-04

## 2020-05-04 DIAGNOSIS — R35.0 URINARY FREQUENCY: ICD-10-CM

## 2020-05-04 DIAGNOSIS — N39.41 URGE INCONTINENCE: ICD-10-CM

## 2020-05-04 DIAGNOSIS — R39.15 URINARY URGENCY: ICD-10-CM

## 2020-05-05 RX ORDER — MIRABEGRON 50 MG/1
50 TABLET, EXTENDED RELEASE ORAL DAILY
Qty: 90 TABLET | Refills: 3 | Status: SHIPPED | OUTPATIENT
Start: 2020-05-05 | End: 2021-02-08

## 2020-05-11 ENCOUNTER — TRANSFERRED RECORDS (OUTPATIENT)
Dept: HEALTH INFORMATION MANAGEMENT | Facility: CLINIC | Age: 70
End: 2020-05-11

## 2020-05-11 LAB — RETINOPATHY: NEGATIVE

## 2020-07-13 ENCOUNTER — VIRTUAL VISIT (OUTPATIENT)
Dept: ENDOCRINOLOGY | Facility: CLINIC | Age: 70
End: 2020-07-13
Payer: COMMERCIAL

## 2020-07-13 DIAGNOSIS — E11.65 TYPE 2 DIABETES MELLITUS WITH HYPERGLYCEMIA, WITH LONG-TERM CURRENT USE OF INSULIN (H): ICD-10-CM

## 2020-07-13 DIAGNOSIS — E10.9 TYPE 1 DIABETES MELLITUS WITHOUT COMPLICATION (H): ICD-10-CM

## 2020-07-13 DIAGNOSIS — E78.5 HYPERLIPIDEMIA WITH TARGET LDL LESS THAN 100: ICD-10-CM

## 2020-07-13 DIAGNOSIS — Z79.4 TYPE 2 DIABETES MELLITUS WITH HYPERGLYCEMIA, WITH LONG-TERM CURRENT USE OF INSULIN (H): ICD-10-CM

## 2020-07-13 DIAGNOSIS — E10.9 TYPE I DIABETES MELLITUS, WELL CONTROLLED (H): Primary | ICD-10-CM

## 2020-07-13 PROCEDURE — 99214 OFFICE O/P EST MOD 30 MIN: CPT | Mod: 95 | Performed by: INTERNAL MEDICINE

## 2020-07-13 RX ORDER — ATORVASTATIN CALCIUM 10 MG/1
10 TABLET, FILM COATED ORAL DAILY
Qty: 90 TABLET | Refills: 3 | Status: SHIPPED | OUTPATIENT
Start: 2020-07-13 | End: 2021-07-15

## 2020-07-13 NOTE — PATIENT INSTRUCTIONS
- please send [employer] message on blood glucose in 2 weeks  - schedule lab this year  - return in 3-4 months    If you have any questions, please do not hesitate to call [facility] at [phone] and ask for Endocrinology clinic.    Sincerely,    Edward Aguero MD  Endocrinology

## 2020-07-13 NOTE — PROGRESS NOTES
"Latoya Rodríguez is a 69 year old female who is being evaluated via a billable video visit.      The patient has been notified of following:     \"This video visit will be conducted via a call between you and your physician/provider. We have found that certain health care needs can be provided without the need for an in-person physical exam.  This service lets us provide the care you need with a video conversation.  If a prescription is necessary we can send it directly to your pharmacy.  If lab work is needed we can place an order for that and you can then stop by our lab to have the test done at a later time.    Video visits are billed at different rates depending on your insurance coverage.  Please reach out to your insurance provider with any questions.    If during the course of the call the physician/provider feels a video visit is not appropriate, you will not be charged for this service.\"    Patient has given verbal consent for Video visit? Yes  How would you like to obtain your AVS? [employer]  Patient would like the video invitation sent by: [employer]  Will anyone else be joining your video visit? No  {If patient encounters technical issues they should call [phone] :[id number]}      Video-Visit Details    Type of service:  Video Visit    Video Start Time: {video visit start/end time:152948}  Video End Time: {video visit start/end time:152948}    Originating Location (pt. Location): {video visit patient location:922885::\"Home\"}    Distant Location (provider location):  [facility]     Platform used for Video Visit: {Virtual Visit Platforms:820396::\"[employer] (acquired by [employer] in 2014)\"}             Endocrinology Note         Latoya is a 69 year old female who has VDO visit today for type 1 diabetes.    HPI  Ms. Latoya Rodríguez is a 69 years old woman with hx of type 1 diabetes who has VDO visitfor follow up.    She was diagnosed with type 1 diabetes when she continued losing weight unintentionally. She was found to have an A1c " >14. She was initially diagnosed with type 2 diabetes and was put on insulin and Metformin.  She was later found to be TORRES antibody positive and c peptide negative.  She has since been working closely with a diabetes educator and has learned carb counting, and has generally adapted well to the diagnosis.      Interim history:  Last A1c was 8.1% (11/15/2019). She is currently on Lantus 28 units at bedtime, Novolog 1 unit per 15 gram of carb for BF and lunch and 1 unit per 10 gram at dinner and additional Novolog sensitivity 1 unit per 50 above 150 mg/dl. I reviewed her glucose meter as below. She has been testing 4 times a day with an overall average of 251 () mg/dL. Her lowest blood sugar was in 37 and the highest BG was 528. She used Accuchek Expert meter. She did have frequent hypoglycemia about 2-3 times per week. Most of postprandial glucoses were in 200-300 after lunch and dinner in particularly.         DM complications:  Retinopathy: normal exam done in March 2019  Nephropathy: negative urine microalbumin 12/2018  Neuropathy: none per exam 10/10/18    LDL: 99, 8/2/2019. on Lipitor 10 mg daily.  Hypertension: controlled    Past Medical History  Past Medical History:   Diagnosis Date     Cataract      Type 2 diabetes mellitus with hyperglycemia (H) 6/24/2016   Type 1 diabetes, diagnosed 6/2016  Hyperlipidemia  Osteoporosis     Allergies  No Known Allergies     Medications  Current Outpatient Medications   Medication Sig Dispense Refill     ACCU-CHEK MICKY PLUS test strip TEST BLOOD SUGARS FOUR TIMES DAILY OR AS DIRECTED 400 each 1     atorvastatin (LIPITOR) 10 MG tablet Take 1 tablet (10 mg) by mouth daily 90 tablet 3     blood glucose (ACCU-CHEK MICKY PLUS) test strip Use to test blood sugars 5 times daily or as directed. *Use brand compatible with patients insurance and device 500 each 3     blood glucose monitoring (ACCU-CHEK FASTCLIX) lancets Use to test blood sugar 4 times daily or as directed. 306  each 3     calcium carbonate-vitamin D 500-400 MG-UNIT TABS tablt Take 1 tablet by mouth 2 times daily 180 tablet 3     COMPOUNDED NON-CONTROLLED SUBSTANCE (CMPD RX) - PHARMACY TO MIX COMPOUNDED MEDICATION Estriol 0.1% cream (1mg/gram) in HRT base. Place 1 gram vaginally daily for 2 weeks, then vaginally twice weekly. 30 g 6     estradiol (ESTRACE VAGINAL) 0.1 MG/GM vaginal cream Place 2 g vaginally twice a week Apply dime-sized amount daily for the first 2 weeks, then place twice a week thereafter. 42.5 g 3     Fesoterodine Fumarate (TOVIAZ) 8 MG TB24 Take 1 tablet (8 mg) by mouth daily 90 tablet 3     HYDROcodone-acetaminophen (NORCO) 5-325 MG tablet Take 1-2 tablets by mouth every 6 hours as needed for moderate to severe pain 8 tablet 0     insulin aspart (NOVOLOG FLEXPEN) 100 UNIT/ML pen 1 unit per 15 gram of carb for breakfast and lunch, and 1 unit per 10 grams for dinner.Plus 1 unit per 50 mg/dl above 150. Total daily dose approx 30 units. 30 mL 3     insulin glargine (LANTUS SOLOSTAR) 100 UNIT/ML pen Take 24 units at 6-7 pm 15 mL 3     insulin pen needle (B-D U/F) 31G X 8 MM miscellaneous USE WITH NOVOLOG AND LANTUS(FIVE TIMES DAILY) 500 each 3     mirabegron (MYRBETRIQ) 50 MG 24 hr tablet Take 1 tablet (50 mg) by mouth daily 90 tablet 3     mirabegron (MYRBETRIQ) 50 MG 24 hr tablet Take 1 tablet (50 mg) by mouth daily 90 tablet 3     Multiple Vitamins-Minerals (MULTIVITAMIN ADULT PO) Take 1 tablet by mouth daily       ondansetron (ZOFRAN-ODT) 4 MG ODT tab Take 1-2 tablets (4-8 mg) by mouth every 8 hours as needed for nausea 4 tablet 0     senna-docusate (SENOKOT-S/PERICOLACE) 8.6-50 MG tablet Take 1-2 tablets by mouth 2 times daily 30 tablet 0     Urine Glucose-Ketones Test (KETO-DIASTIX) STRP 1 strip by In Vitro route as needed 1 each 11     Family History  No thyroid disease or type 1 diabetes in the family    Social History  Social History     Tobacco Use     Smoking status: Never Smoker     Smokeless  tobacco: Never Used   Substance Use Topics     Alcohol use: Yes     Comment: 1 to 2 beers or glasses of wine 1 to 2 times per month     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    ROS  Constitutional: weight is up1 lbs in 4 months  Eyes: no vision change, diplopia or red eyes   Neck: no difficulty swallowing, no choking, no neck pain, no neck swelling  Cardiovascular: no chest pain, palpitations  Respiratory: no dyspnea, cough, shortness of breath or wheezing   GI: no nausea, vomiting, diarrhea or constipation, no abdominal pain   : no change in urine, no dysuria or hematuria  Musculoskeletal: no joint or muscle pain or swelling   Integumentary: no complaints  Neuro: no loss of strength or sensation, no numbness or tingling, no tremor, no dizziness, no headache   Endo: no polyuria or polydipsia, no temperature intolerance   Heme/Lymph: no concerning bumps, no bleeding problems   Allergy: no environmental allergies   Psych: no sleep problems.    Physical Exam  There were no vitals taken for this visit.  There is no height or weight on file to calculate BMI.  Constitutional: no distress, comfortable, pleasant   Eyes: anicteric  Musculoskeletal: no edema   Skin:  no jaundice   Neurological: normal gait, 2+patella reflex, intact sensation per monofilament bilaterally exam dated 11/2019, no tremor on outstretched hands bilaterally  Psychological: appropriate mood     RESULTS  A1c 8.0 11/15/2019  A1c 8.7 7/12/19  Lab Results   Component Value Date    A1C 9.2 03/18/2019    A1C 9.1 12/10/2018    A1C 8.3 03/26/2018    A1C 9.3 11/27/2017    A1C 6.6 07/17/2017     ENDO DIABETES Latest Ref Rng & Units 8/2/2019   CHOLESTEROL <200 mg/dL 186   LDL CHOLESTEROL, CALCULATED <100 mg/dL 99   LDL CHOLESTEROL DIRECT <100 mg/dL    HDL CHOLESTEROL >49 mg/dL 76   NON HDL CHOLESTEROL <130 mg/dL 110   TRIGLYCERIDES <150 mg/dL 57   ALBUMIN URINE MG/L mg/L Canceled, Test credited   ALBUMIN URINE MG/G CR 0 - 25 mg/g Cr Canceled, Test credited    CREATININE 0.52 - 1.04 mg/dL 0.62   POTASSIUM 3.4 - 5.3 mmol/L 4.0   ALT 0 - 50 U/L 24   AST 0 - 45 U/L 16     DXA 8/24/2018  Lumbar spine T-score in region of L2-L4 excluding L1= -2.3   L1-4 percent change: 12.5%      HIPS:  Mean total hip T-score: -1.0  Mean total hip percent change:4.2%      Left femoral neck T-score = -1.7  Right femoral neck T-score= -1.8      COMPARISON TO PRIOR:  Percent change in the spine and hips is significant accounting to the  precision errors for this facility.      FRAX:  10 year probability of major osteoporotic fracture: 10.9%  10 year probability of hip fracture: 1.7%  The 10 year probability of fracture may be lower than reported if the patient has received treatment. FRAX data should be disregarded in patient's taking bisphosphonates.                                               IMPRESSION:  Osteopenia    ASSESSMENT:    Ms. Rodríguez is a 69 years old woman with hx of type 1 diabetes in June 2016 who has VDO visit for follow up.    1. Type 1 diabetes: diagnosed in June 2016. A1c is 8.1% today -- she has fluctuation of BG with nocturnal hypoglycemia and postprandial hyperglycemia.    Plan:  Reduce Lantus to 24 units and move to 6-7 pm.   Continue Novolog 1 unit per 15 gram CHO for BF and lunch, and 1 unit per 10 gram of CHO for dinner  Continue sensitivity 1 unit per 50 mg/dl above 150  She will work on healthier diet and exercise  Discussed glucose sensor today --- she is interested in Dexcom and will start the process    2) DM complications:  Retinopathy: normal exam done in March 2019 -- she is due this year  Nephropathy: negative urine microalbumin 12/2018 -- due this year  Neuropathy: none per exam 11/2019    3) Hyperlipidemia: LDL 99, HDL 76, TG 57, total cholesterol 186 on 8/2/2019. Continue Lipitor 10 mg daily.    4) Osteopenia: she follows up with PCP. Currently on calcium and vitamin D    PLAN:   Continue Lantus 28 units daily  Continue Novolog 1 unit per 15 gram CHO for  BF and lunch, and 1 unit per 10 gram of CHO for dinner  Continue sensitivity 1 unit per 50 mg/dl above 150  Lab at next visit   RTC 4 months    Edward Aguero MD  Division of Diabetes and Endocrinology  Department of Medicine  [phone]

## 2020-07-13 NOTE — PROGRESS NOTES
"Latoya Rodríguez is a 69 year old female who is being evaluated via a billable video visit.      The patient has been notified of following:     \"This video visit will be conducted via a call between you and your physician/provider. We have found that certain health care needs can be provided without the need for an in-person physical exam.  This service lets us provide the care you need with a video conversation.  If a prescription is necessary we can send it directly to your pharmacy.  If lab work is needed we can place an order for that and you can then stop by our lab to have the test done at a later time.    Video visits are billed at different rates depending on your insurance coverage.  Please reach out to your insurance provider with any questions.    If during the course of the call the physician/provider feels a video visit is not appropriate, you will not be charged for this service.\"    Patient has given verbal consent for Video visit? Yes  How would you like to obtain your AVS? [employer]  Patient would like the video invitation sent by: [employer]  Will anyone else be joining your video visit? No        Video-Visit Details    Type of service:  Video Visit    Video Start Time: 9:30 AM  Video End Time:  9:46 AM    Originating Location (pt. Location): Home    Distant Location (provider location):  [facility]     Platform used for Video Visit: [facility]             Endocrinology Note         Latoya is a 69 year old female who has VDO visit today for type 1 diabetes.    HPI  Ms. Latoya Rodríguez is a 69 years old woman with hx of type 1 diabetes who has VDO visitfor follow up.    She was diagnosed with type 1 diabetes when she continued losing weight unintentionally. She was found to have an A1c >14. She was initially diagnosed with type 2 diabetes and was put on insulin and Metformin.  She was later found to be TORRES antibody positive and c peptide negative.  She has since been working closely with a " diabetes educator and has learned carb counting, and has generally adapted well to the diagnosis.      Interim history:  Last A1c was 8.1% in March 2020. She is currently on Lantus 24 units at bedtime, Novolog 1 unit per 15 gram of carb for BF and lunch and 1 unit per 10 gram at dinner and additional Novolog sensitivity 1 unit per 50 above 150 mg/dl. I reviewed her glucose meter as below. She has been testing 4 times a day with an overall average of 251 () mg/dL. Her lowest blood sugar was in 37 and the highest BG was 528. She used Acc[employer]hek Expert meter. She has high fasting glucose and some postprandial glucoses were in 200 after lunch and dinner in particularly.         DM complications:  Retinopathy: normal exam done at [place] [facility] in 5/2020  Nephropathy: negative urine microalbumin 3/16/2020  Neuropathy: intact sensation per monofilament bilaterally exam dated 11/2019,    LDL: 99, 8/2/2019. on Lipitor 10 mg daily.  Hypertension: controlled    Past Medical History  Past Medical History:   Diagnosis Date     Cataract      Type 2 diabetes mellitus with hyperglycemia (H) 6/24/2016   Type 1 diabetes, diagnosed 6/2016  Hyperlipidemia  Osteoporosis     Allergies  No Known Allergies     Medications  Current Outpatient Medications   Medication Sig Dispense Refill     ACCU-CHEK MICYK PLUS test strip TEST BLOOD SUGARS FOUR TIMES DAILY OR AS DIRECTED 400 each 1     atorvastatin (LIPITOR) 10 MG tablet Take 1 tablet (10 mg) by mouth daily 90 tablet 3     blood glucose (ACCU-CHEK MICKY PLUS) test strip Use to test blood sugars 5 times daily or as directed. *Use brand compatible with patients insurance and device 500 each 3     blood glucose monitoring (ACCU-CHEK FASTCLIX) lancets Use to test blood sugar 4 times daily or as directed. 306 each 3     calcium carbonate-vitamin D 500-400 MG-UNIT TABS tablt Take 1 tablet by mouth 2 times daily 180 tablet 3     COMPOUNDED NON-CONTROLLED SUBSTANCE (CMPD RX) -  PHARMACY TO MIX COMPOUNDED MEDICATION Estriol 0.1% cream (1mg/gram) in HRT base. Place 1 gram vaginally daily for 2 weeks, then vaginally twice weekly. 30 g 6     estradiol (ESTRACE VAGINAL) 0.1 MG/GM vaginal cream Place 2 g vaginally twice a week Apply dime-sized amount daily for the first 2 weeks, then place twice a week thereafter. 42.5 g 3     Fesoterodine Fumarate (TOVIAZ) 8 MG TB24 Take 1 tablet (8 mg) by mouth daily 90 tablet 3     HYDROcodone-acetaminophen (NORCO) 5-325 MG tablet Take 1-2 tablets by mouth every 6 hours as needed for moderate to severe pain 8 tablet 0     insulin aspart (NOVOLOG FLEXPEN) 100 UNIT/ML pen 1 unit per 15 gram of carb for breakfast and lunch, and 1 unit per 10 grams for dinner.Plus 1 unit per 50 mg/dl above 150. Total daily dose approx 30 units. 30 mL 3     insulin glargine (LANTUS SOLOSTAR) 100 UNIT/ML pen Take 24 units at 6-7 pm 15 mL 3     insulin pen needle (B-D U/F) 31G X 8 MM miscellaneous USE WITH NOVOLOG AND LANTUS(FIVE TIMES DAILY) 500 each 3     mirabegron (MYRBETRIQ) 50 MG 24 hr tablet Take 1 tablet (50 mg) by mouth daily 90 tablet 3     mirabegron (MYRBETRIQ) 50 MG 24 hr tablet Take 1 tablet (50 mg) by mouth daily 90 tablet 3     Multiple Vitamins-Minerals (MULTIVITAMIN ADULT PO) Take 1 tablet by mouth daily       ondansetron (ZOFRAN-ODT) 4 MG ODT tab Take 1-2 tablets (4-8 mg) by mouth every 8 hours as needed for nausea 4 tablet 0     senna-docusate (SENOKOT-S/PERICOLACE) 8.6-50 MG tablet Take 1-2 tablets by mouth 2 times daily 30 tablet 0     Urine Glucose-Ketones Test (KETO-DIASTIX) STRP 1 strip by In Vitro route as needed 1 each 11     Family History  No thyroid disease or type 1 diabetes in the family    Social History  Social History     Tobacco Use     Smoking status: Never Smoker     Smokeless tobacco: Never Used   Substance Use Topics     Alcohol use: Yes     Comment: 1 to 2 beers or glasses of wine 1 to 2 times per month     Drug use: No   retired last  year  Lives alone.    ROS  10 points ROS were negative otherwise mentioned in HPI      Physical Exam  Constitutional: no distress, comfortable, pleasant   Psychological: appropriate mood     RESULTS  A1c 8.0 11/15/2019  A1c 8.7 7/12/19  Lab Results   Component Value Date    A1C 9.2 03/18/2019    A1C 9.1 12/10/2018    A1C 8.3 03/26/2018    A1C 9.3 11/27/2017    A1C 6.6 07/17/2017     ENDO DIABETES Latest Ref Rng & Units 8/2/2019   CHOLESTEROL <200 mg/dL 186   LDL CHOLESTEROL, CALCULATED <100 mg/dL 99   LDL CHOLESTEROL DIRECT <100 mg/dL    HDL CHOLESTEROL >49 mg/dL 76   NON HDL CHOLESTEROL <130 mg/dL 110   TRIGLYCERIDES <150 mg/dL 57   ALBUMIN URINE MG/L mg/L Canceled, Test credited   ALBUMIN URINE MG/G CR 0 - 25 mg/g Cr Canceled, Test credited   CREATININE 0.52 - 1.04 mg/dL 0.62   POTASSIUM 3.4 - 5.3 mmol/L 4.0   ALT 0 - 50 U/L 24   AST 0 - 45 U/L 16     DXA 8/24/2018  Lumbar spine T-score in region of L2-L4 excluding L1= -2.3   L1-4 percent change: 12.5%      HIPS:  Mean total hip T-score: -1.0  Mean total hip percent change:4.2%      Left femoral neck T-score = -1.7  Right femoral neck T-score= -1.8      COMPARISON TO PRIOR:  Percent change in the spine and hips is significant accounting to the  precision errors for this facility.      FRAX:  10 year probability of major osteoporotic fracture: 10.9%  10 year probability of hip fracture: 1.7%  The 10 year probability of fracture may be lower than reported if the patient has received treatment. FRAX data should be disregarded in patient's taking bisphosphonates.                                               IMPRESSION:  Osteopenia    ASSESSMENT:    Ms. Rodríguez is a 69 years old woman with hx of type 1 diabetes in June 2016 who has VDO visit for follow up.    1. Type 1 diabetes: diagnosed in June 2016. Last A1c was 8.1% in March-- mostly high fasting glucose and some postprandial glucoses    Plan:  Increase Lantus to 24 units and move to 6-7 pm.   Continue Novolog 1  unit per 15 gram CHO for BF and lunch, and 1 unit per 10 gram of CHO for dinner  Continue sensitivity 1 unit per 50 mg/dl above 150  She will work on healthier diet and exercise  Discussed glucose sensor today --- she is interested in Dexcom but has not decided yet. She will also check with insurance    2) DM complications:  Retinopathy: normal exam done at [place] [facility] in 5/2020  Nephropathy: negative urine microalbumin 3/2019  Neuropathy: none per exam 11/2019    3) Hyperlipidemia: LDL 99, HDL 76, TG 57, total cholesterol 186 on 8/2/2019. Continue Lipitor 10 mg daily.  Will check lab this year.    4) Osteopenia: she follows up with PCP. Currently on calcium and vitamin D    PLAN:   Increase Lantus 28 units daily  Continue Novolog 1 unit per 15 gram CHO for BF and lunch, and 1 unit per 10 gram of CHO for dinner  Continue sensitivity 1 unit per 50 mg/dl above 150  Send aWherehart message next 2 weeks  Lab this year  RTC 4 months    Edward Aguero MD  Division of Diabetes and Endocrinology  Department of Medicine  [phone]

## 2020-07-13 NOTE — LETTER
7/13/2020         RE: Latoya Rodríguez  [address]  [place] [address]        Dear Colleague,    Thank you for referring your patient, Latoya Rodríguez, to the [facility]. Please see a copy of my visit note below.    Latoya Rodríguez is a 69 year old female who is being evaluated via a billable video visit.            Video-Visit Details    Type of service:  Video Visit    Video Start Time: 9:30 AM  Video End Time:  9:46 AM    Originating Location (pt. Location): Home    Distant Location (provider location):  [facility]     Platform used for Video Visit: [employer]             Endocrinology Note         Latoya is a 69 year old female who has VDO visit today for type 1 diabetes.    HPI  Ms. Latoya Rodríguez is a 69 years old woman with hx of type 1 diabetes who has VDO visitfor follow up.    She was diagnosed with type 1 diabetes when she continued losing weight unintentionally. She was found to have an A1c >14. She was initially diagnosed with type 2 diabetes and was put on insulin and Metformin.  She was later found to be TORRES antibody positive and c peptide negative.  She has since been working closely with a diabetes educator and has learned carb counting, and has generally adapted well to the diagnosis.      Interim history:  Last A1c was 8.1% in March 2020. She is currently on Lantus 24 units at bedtime, Novolog 1 unit per 15 gram of carb for BF and lunch and 1 unit per 10 gram at dinner and additional Novolog sensitivity 1 unit per 50 above 150 mg/dl. I reviewed her glucose meter as below. She has been testing 4 times a day with an overall average of 251 () mg/dL. Her lowest blood sugar was in 37 and the highest BG was 528. She used [employer] Expert meter. She has high fasting glucose and some postprandial glucoses were in 200 after lunch and dinner in particularly.         DM complications:  Retinopathy: normal exam done at [place] [facility] in  5/2020  Nephropathy: negative urine microalbumin 3/16/2020  Neuropathy: intact sensation per monofilament bilaterally exam dated 11/2019,    LDL: 99, 8/2/2019. on Lipitor 10 mg daily.  Hypertension: controlled    Past Medical History  Past Medical History:   Diagnosis Date     Cataract      Type 2 diabetes mellitus with hyperglycemia (H) 6/24/2016   Type 1 diabetes, diagnosed 6/2016  Hyperlipidemia  Osteoporosis     Allergies  No Known Allergies     Medications  Current Outpatient Medications   Medication Sig Dispense Refill     ACCU-CHEK MICKY PLUS test strip TEST BLOOD SUGARS FOUR TIMES DAILY OR AS DIRECTED 400 each 1     atorvastatin (LIPITOR) 10 MG tablet Take 1 tablet (10 mg) by mouth daily 90 tablet 3     blood glucose (ACCU-CHEK MICKY PLUS) test strip Use to test blood sugars 5 times daily or as directed. *Use brand compatible with patients insurance and device 500 each 3     blood glucose monitoring (ACCU-CHEK FASTCLIX) lancets Use to test blood sugar 4 times daily or as directed. 306 each 3     calcium carbonate-vitamin D 500-400 MG-UNIT TABS tablt Take 1 tablet by mouth 2 times daily 180 tablet 3     COMPOUNDED NON-CONTROLLED SUBSTANCE (CMPD RX) - PHARMACY TO MIX COMPOUNDED MEDICATION Estriol 0.1% cream (1mg/gram) in HRT base. Place 1 gram vaginally daily for 2 weeks, then vaginally twice weekly. 30 g 6     estradiol (ESTRACE VAGINAL) 0.1 MG/GM vaginal cream Place 2 g vaginally twice a week Apply dime-sized amount daily for the first 2 weeks, then place twice a week thereafter. 42.5 g 3     Fesoterodine Fumarate (TOVIAZ) 8 MG TB24 Take 1 tablet (8 mg) by mouth daily 90 tablet 3     HYDROcodone-acetaminophen (NORCO) 5-325 MG tablet Take 1-2 tablets by mouth every 6 hours as needed for moderate to severe pain 8 tablet 0     insulin aspart (NOVOLOG FLEXPEN) 100 UNIT/ML pen 1 unit per 15 gram of carb for breakfast and lunch, and 1 unit per 10 grams for dinner.Plus 1 unit per 50 mg/dl above 150. Total daily  dose approx 30 units. 30 mL 3     insulin glargine (LANTUS SOLOSTAR) 100 UNIT/ML pen Take 24 units at 6-7 pm 15 mL 3     insulin pen needle (B-D U/F) 31G X 8 MM miscellaneous USE WITH NOVOLOG AND LANTUS(FIVE TIMES DAILY) 500 each 3     mirabegron (MYRBETRIQ) 50 MG 24 hr tablet Take 1 tablet (50 mg) by mouth daily 90 tablet 3     mirabegron (MYRBETRIQ) 50 MG 24 hr tablet Take 1 tablet (50 mg) by mouth daily 90 tablet 3     Multiple Vitamins-Minerals (MULTIVITAMIN ADULT PO) Take 1 tablet by mouth daily       ondansetron (ZOFRAN-ODT) 4 MG ODT tab Take 1-2 tablets (4-8 mg) by mouth every 8 hours as needed for nausea 4 tablet 0     senna-docusate (SENOKOT-S/PERICOLACE) 8.6-50 MG tablet Take 1-2 tablets by mouth 2 times daily 30 tablet 0     Urine Glucose-Ketones Test (KETO-DIASTIX) STRP 1 strip by In Vitro route as needed 1 each 11     Family History  No thyroid disease or type 1 diabetes in the family    Social History  Social History     Tobacco Use     Smoking status: Never Smoker     Smokeless tobacco: Never Used   Substance Use Topics     Alcohol use: Yes     Comment: 1 to 2 beers or glasses of wine 1 to 2 times per month     Drug use: No   retired last year  Lives alone.    ROS  10 points ROS were negative otherwise mentioned in HPI      Physical Exam  Constitutional: no distress, comfortable, pleasant   Psychological: appropriate mood     RESULTS  A1c 8.0 11/15/2019  A1c 8.7 7/12/19  Lab Results   Component Value Date    A1C 9.2 03/18/2019    A1C 9.1 12/10/2018    A1C 8.3 03/26/2018    A1C 9.3 11/27/2017    A1C 6.6 07/17/2017     ENDO DIABETES Latest Ref Rng & Units 8/2/2019   CHOLESTEROL <200 mg/dL 186   LDL CHOLESTEROL, CALCULATED <100 mg/dL 99   LDL CHOLESTEROL DIRECT <100 mg/dL    HDL CHOLESTEROL >49 mg/dL 76   NON HDL CHOLESTEROL <130 mg/dL 110   TRIGLYCERIDES <150 mg/dL 57   ALBUMIN URINE MG/L mg/L Canceled, Test credited   ALBUMIN URINE MG/G CR 0 - 25 mg/g Cr Canceled, Test credited   CREATININE 0.52 - 1.04  mg/dL 0.62   POTASSIUM 3.4 - 5.3 mmol/L 4.0   ALT 0 - 50 U/L 24   AST 0 - 45 U/L 16     DXA 8/24/2018  Lumbar spine T-score in region of L2-L4 excluding L1= -2.3   L1-4 percent change: 12.5%      HIPS:  Mean total hip T-score: -1.0  Mean total hip percent change:4.2%      Left femoral neck T-score = -1.7  Right femoral neck T-score= -1.8      COMPARISON TO PRIOR:  Percent change in the spine and hips is significant accounting to the  precision errors for this facility.      FRAX:  10 year probability of major osteoporotic fracture: 10.9%  10 year probability of hip fracture: 1.7%  The 10 year probability of fracture may be lower than reported if the patient has received treatment. FRAX data should be disregarded in patient's taking bisphosphonates.                                               IMPRESSION:  Osteopenia    ASSESSMENT:    Ms. Rodríguez is a 69 years old woman with hx of type 1 diabetes in June 2016 who has VDO visit for follow up.    1. Type 1 diabetes: diagnosed in June 2016. Last A1c was 8.1% in March-- mostly high fasting glucose and some postprandial glucoses    Plan:  Increase Lantus to 24 units and move to 6-7 pm.   Continue Novolog 1 unit per 15 gram CHO for BF and lunch, and 1 unit per 10 gram of CHO for dinner  Continue sensitivity 1 unit per 50 mg/dl above 150  She will work on healthier diet and exercise  Discussed glucose sensor today --- she is interested in Dexcom but has not decided yet. She will also check with insurance    2) DM complications:  Retinopathy: normal exam done at [place] [facility] in 5/2020  Nephropathy: negative urine microalbumin 3/2019  Neuropathy: none per exam 11/2019    3) Hyperlipidemia: LDL 99, HDL 76, TG 57, total cholesterol 186 on 8/2/2019. Continue Lipitor 10 mg daily.  Will check lab this year.    4) Osteopenia: she follows up with PCP. Currently on calcium and vitamin D    PLAN:   Increase Lantus 28 units daily  Continue Novolog 1 unit per 15 gram CHO for  BF and lunch, and 1 unit per 10 gram of CHO for dinner  Continue sensitivity 1 unit per 50 mg/dl above 150  Send myChart message next 2 weeks  Lab this year  RTC 4 months    Edward Aguero MD  Division of Diabetes and Endocrinology  Department of Medicine  [phone]

## 2020-07-20 DIAGNOSIS — E10.9 TYPE 1 DIABETES MELLITUS WITHOUT COMPLICATION (H): ICD-10-CM

## 2020-07-20 DIAGNOSIS — E78.5 HYPERLIPIDEMIA WITH TARGET LDL LESS THAN 100: ICD-10-CM

## 2020-07-20 LAB
CHOLEST SERPL-MCNC: 189 MG/DL
CREAT SERPL-MCNC: 0.67 MG/DL (ref 0.52–1.04)
GFR SERPL CREATININE-BSD FRML MDRD: 89 ML/MIN/{1.73_M2}
HBA1C MFR BLD: 7.8 % (ref 0–5.6)
HDLC SERPL-MCNC: 71 MG/DL
LDLC SERPL CALC-MCNC: 107 MG/DL
NONHDLC SERPL-MCNC: 118 MG/DL
TRIGL SERPL-MCNC: 56 MG/DL

## 2020-07-20 PROCEDURE — 82565 ASSAY OF CREATININE: CPT | Performed by: INTERNAL MEDICINE

## 2020-07-20 PROCEDURE — 83036 HEMOGLOBIN GLYCOSYLATED A1C: CPT

## 2020-07-20 PROCEDURE — 80061 LIPID PANEL: CPT | Performed by: INTERNAL MEDICINE

## 2020-07-20 PROCEDURE — 36415 COLL VENOUS BLD VENIPUNCTURE: CPT

## 2020-07-21 ENCOUNTER — TELEPHONE (OUTPATIENT)
Dept: ENDOCRINOLOGY | Facility: CLINIC | Age: 70
End: 2020-07-21

## 2020-07-21 DIAGNOSIS — E10.9 TYPE 1 DIABETES MELLITUS WITHOUT COMPLICATION (H): ICD-10-CM

## 2020-07-21 NOTE — TELEPHONE ENCOUNTER
Plan:   Increase Lantus 30 units daily  Novolog 1 unit per 15 gram carbohydrate for BF   Novolog 1 unit per 12 gram for carbohydrate at lunch  Novolog 1 unit per 10 gram of carbohydrate for dinner    Communicate with patient via [employer] message    Edward Aguero MD  Division of Diabetes and Endocrinology  Department of Medicine

## 2020-08-03 ENCOUNTER — TELEPHONE (OUTPATIENT)
Dept: UROLOGY | Facility: CLINIC | Age: 70
End: 2020-08-03

## 2020-08-03 NOTE — TELEPHONE ENCOUNTER
8/3 Provided phone number [phone] to scheduled in about 1 year (around 2/3/2021) for with Sandra Garibay.     Estrella Melendez   Procedure    Ortho/Sports Med/Pod/Ent/Eye/Surgical Specialties  [facility]   [phone]

## 2020-08-04 ENCOUNTER — OFFICE VISIT (OUTPATIENT)
Dept: PEDIATRICS | Facility: CLINIC | Age: 70
End: 2020-08-04
Payer: COMMERCIAL

## 2020-08-04 VITALS
HEART RATE: 84 BPM | TEMPERATURE: 98.4 F | DIASTOLIC BLOOD PRESSURE: 60 MMHG | WEIGHT: 157.3 LBS | BODY MASS INDEX: 26.21 KG/M2 | HEIGHT: 65 IN | SYSTOLIC BLOOD PRESSURE: 120 MMHG | OXYGEN SATURATION: 99 %

## 2020-08-04 DIAGNOSIS — E78.5 HYPERLIPIDEMIA WITH TARGET LDL LESS THAN 100: ICD-10-CM

## 2020-08-04 DIAGNOSIS — Z00.00 ROUTINE GENERAL MEDICAL EXAMINATION AT A HEALTH CARE FACILITY: Primary | ICD-10-CM

## 2020-08-04 DIAGNOSIS — Z12.31 ENCOUNTER FOR SCREENING MAMMOGRAM FOR BREAST CANCER: ICD-10-CM

## 2020-08-04 DIAGNOSIS — G56.01 CARPAL TUNNEL SYNDROME, RIGHT: ICD-10-CM

## 2020-08-04 DIAGNOSIS — E28.39 MENOPAUSE OVARIAN FAILURE: ICD-10-CM

## 2020-08-04 DIAGNOSIS — Z13.0 SCREENING FOR DISORDER OF BLOOD AND BLOOD-FORMING ORGANS: ICD-10-CM

## 2020-08-04 DIAGNOSIS — Z13.29 SCREENING FOR THYROID DISORDER: ICD-10-CM

## 2020-08-04 DIAGNOSIS — Z00.00 ENCOUNTER FOR MEDICARE ANNUAL WELLNESS EXAM: ICD-10-CM

## 2020-08-04 DIAGNOSIS — Z12.11 SCREEN FOR COLON CANCER: ICD-10-CM

## 2020-08-04 PROCEDURE — G0439 PPPS, SUBSEQ VISIT: HCPCS | Performed by: NURSE PRACTITIONER

## 2020-08-04 ASSESSMENT — MIFFLIN-ST. JEOR: SCORE: 1239.39

## 2020-08-04 NOTE — PATIENT INSTRUCTIONS
PLAN:   1.   Symptomatic therapy suggested: Continue current medication regimen unchanged.  Increase calcium to 1000mg and 800iu Vit D  Can try wrist splint at night for carpal tunnel like symptoms  2.  Orders Placed This Encounter   Procedures     *MA Screening Digital Bilateral     DX Hip/Pelvis/Spine     Comprehensive metabolic panel     TSH with free T4 reflex     CBC with platelets     GASTROENTEROLOGY ADULT REF PROCEDURE ONLY       3. Patient needs to follow up in if no improvement,or sooner if worsening of symptoms or other symptoms develop.  FURTHER TESTING:       - DXA (bone density) scan       - mammogram  CONSULTATION/REFERRAL to Colonoscopy   Will follow up and/or notify patient of  results via My Chart to determine further need for followup  Follow up office visit in one year for annual health maintenance exam, sooner PRN.    Patient Education   Personalized Prevention Plan  You are due for the preventive services outlined below.  Your care team is available to assist you in scheduling these services.  If you have already completed any of these items, please share that information with your care team to update in your medical record.  Health Maintenance Due   Topic Date Due     Colorectal Cancer Screening  09/15/1960     Zoster (Shingles) Vaccine (2 of 3) 11/08/2016     PHQ-2  01/01/2020     Diabetic Foot Exam  03/18/2020     Basic Metabolic Panel  08/02/2020     FALL RISK ASSESSMENT  08/02/2020     Annual Wellness Visit  08/02/2020     Mammogram  08/24/2020        Patient Education     Carpal Tunnel Syndrome    Carpal tunnel syndrome is a painful condition of the wrist and arm. It is caused by pressure on the median nerve. The median nerve is one of the nerves that give feeling and movement to the hand. It passes through a tunnel in the wrist called the carpal tunnel. This tunnel is made up of bones and ligaments. Narrowing of this tunnel or swelling of the tissues inside the tunnel puts pressure on  the median nerve. This causes numbness, pins and needles, or electric shooting pains in your hand and forearm. Often the pain is worse at night and may wake you when you are asleep.  Carpal tunnel syndrome may occur during pregnancy and with use of birth control pills. It is more common in workers who must often bend their wrists. It is also common in people who work with power tools that cause strong vibrations.  Home care    Rest the painful wrist. Avoid repeated bending of the wrist back and forth. This puts pressure on the median nerve. Avoid using power tools with strong vibrations.    If you were given a splint, wear it at night while you sleep. You may also wear it during the day for comfort.    Move your fingers and wrists often to prevent stiffness.    Elevate your arms on pillows when you lie down.    Try using the unaffected hand more.    Try not to hold your wrists in a bent, downward position.    Sometimes changes in the work place may ease symptoms. If you type most of the day, it may help to change the position of your keyboard or add a wrist support. Your wrist should be in a neutral position and not bent back when typing.    You may use over-the-counter pain medicine to treat pain and inflammation, unless another medicine was prescribed. Anti-inflammatory pain medicines, such as ibuprofen or naproxen may be more effective than acetaminophen, which treats pain, but not inflammation. If you have chronic liver or kidney disease or ever had a stomach ulcer or gastrointestinal bleeding, talk with your healthcare provider before using these medicines.    Opioid pain medicine will only give temporary relief and does not treat the problem. If pain continues, you may need a shot of a steroid drug into your wrist.    If the above methods fail, you may need surgery. This will open the carpal tunnel and release the pressure on the trapped nerve.  Follow-up care  Follow up with your healthcare provider, or as  advised. If X-rays were taken, you will be notified of any new findings that may affect your care.  When to seek medical advice  Call your healthcare provider right away if any of these occur:    Pain not improving with the above treatment    Fingers or hand become cold, blue, numb, or tingly    Your whole arm becomes swollen or weak  Date Last Reviewed: 5/1/2018 2000-2019 [employer]. [address] [place] [address]. All rights reserved. This information is not intended as a substitute for professional medical care. Always follow your healthcare professional's instructions.           Orders Placed This Encounter   Procedures     *MA Screening Digital Bilateral     DX Hip/Pelvis/Spine     Comprehensive metabolic panel     TSH with free T4 reflex     CBC with platelets     GASTROENTEROLOGY ADULT REF PROCEDURE ONLY

## 2020-08-04 NOTE — NURSING NOTE
"Chief Complaint   Patient presents with     Physical     Wellness Visit       Initial /60 (BP Location: Right arm, Patient Position: Sitting, Cuff Size: Adult Regular)   Pulse 84   Temp 98.4  F (36.9  C) (Temporal)   Ht 1.651 m (5' 5\")   Wt 71.4 kg (157 lb 4.8 oz)   SpO2 99%   Breastfeeding No   BMI 26.18 kg/m   Estimated body mass index is 26.18 kg/m  as calculated from the following:    Height as of this encounter: 1.651 m (5' 5\").    Weight as of this encounter: 71.4 kg (157 lb 4.8 oz).  Medication Reconciliation: complete      PENELOPE Lopez      "

## 2020-08-04 NOTE — PROGRESS NOTES
"  SUBJECTIVE:   Latoya Rodríguez is a 69 year old female who presents for Preventive Visit.    Are you in the first 12 months of your Medicare Part B coverage?  No    Physical Health:    In general, how would you rate your overall physical health? good    Outside of work, how many days during the week do you exercise? 6-7 days/week walking daily    Outside of work, approximately how many minutes a day do you exercise?30-45 minutes    If you drink alcohol do you typically have >3 drinks per day or >7 drinks per week? No    Do you usually eat at least 4 servings of fruit and vegetables a day, include whole grains & fiber and avoid regularly eating high fat or \"junk\" foods? Yes    Do you have any problems taking medications regularly?  No    Do you have any side effects from medications? none    Needs assistance for the following daily activities: no assistance needed    Which of the following safety concerns are present in your home?  none identified     Hearing impairment: No    In the past 6 months, have you been bothered by leaking of urine? yes    Mental Health:    In general, how would you rate your overall mental or emotional health? good  PHQ-2 Score:      Do you feel safe in your environment? Yes    Have you ever done Advance Care Planning? (For example, a Health Directive, POLST, or a discussion with a medical provider or your loved ones about your wishes): No, advance care planning information given to patient to review.  Patient plans to discuss their wishes with loved ones or provider.      Additional concerns to address?  No    Fall risk:  Fallen 2 or more times in the past year?: No  Any fall with injury in the past year?: No    Cognitive Screenin) Repeat 3 items (Leader, Season, Table)    2) Clock draw: NORMAL  3) 3 item recall: Recalls 3 objects  Results: 3 items recalled: COGNITIVE IMPAIRMENT LESS LIKELY    Mini-CogTM Copyright YASIR Ni. Licensed by the author for use in [facility]; " reprinted with permission (soob@.[facility]). All rights reserved.      Do you have sleep apnea, excessive snoring or daytime drowsiness?: no    Reviewed and updated as needed this visit by clinical staff         Reviewed and updated as needed this visit by Provider        Social History     Tobacco Use     Smoking status: Never Smoker     Smokeless tobacco: Never Used   Substance Use Topics     Alcohol use: Yes     Comment: 1 to 2 beers or glasses of wine 1 to 2 times per month                           Current providers sharing in care for this patient include:   Patient Care Team:  Pricila Avila APRN CNP as PCP - General (Family Practice)  Amy Cooper, RN as Registered Nurse  Pricila Avila APRN CNP as Assigned PCP    The following health maintenance items are reviewed in Epic and correct as of today:  Health Maintenance   Topic Date Due     COLORECTAL CANCER SCREENING  09/15/1960     HEPATITIS B IMMUNIZATION (1 of 3 - Risk 3-dose series) 09/15/1969     ZOSTER IMMUNIZATION (2 of 3) 11/08/2016     DIABETIC FOOT EXAM  03/18/2020     BMP  08/02/2020     MAMMO SCREENING  08/24/2020     INFLUENZA VACCINE (1) 09/01/2020     A1C  01/20/2021     MICROALBUMIN  03/16/2021     EYE EXAM  05/11/2021     LIPID  07/20/2021     MEDICARE ANNUAL WELLNESS VISIT  08/04/2021     FALL RISK ASSESSMENT  08/04/2021     ADVANCE CARE PLANNING  08/04/2025     DTAP/TDAP/TD IMMUNIZATION (2 - Td) 09/13/2026     DEXA  Completed     HEPATITIS C SCREENING  Completed     PHQ-2  Completed     PNEUMOCOCCAL IMMUNIZATION 65+ LOW/MEDIUM RISK  Completed     IPV IMMUNIZATION  Aged Out     MENINGITIS IMMUNIZATION  Aged Out     Lab work is in process  Labs reviewed in EPIC  BP Readings from Last 3 Encounters:   08/04/20 120/60   03/16/20 139/77   02/03/20 124/72    Wt Readings from Last 3 Encounters:   08/04/20 71.4 kg (157 lb 4.8 oz)   03/16/20 75.1 kg (165 lb 8 oz)   11/15/19 74.7 kg (164 lb 11.2 oz)                  Patient Active Problem  List   Diagnosis     Urgency of urination     Urinary frequency     Urge incontinence     Hyperlipidemia with target LDL less than 100     Osteoporosis     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)     Type 1 diabetes mellitus without complication (H)     Age-related osteoporosis without current pathological fracture     Past Surgical History:   Procedure Laterality Date     EYE SURGERY  9/04, 5/11    Retinal detachment, Cataract (both eyes)     RELEASE TRIGGER FINGER Bilateral 5/10/2019    Procedure: RELEASE TRIGGER FINGER, BILATERAL LONG AND RING FINGERS;  Surgeon: RADHA Sandoval MD;  Location: [facility] OR     VASCULAR SURGERY      Varicose Veins       Social History     Tobacco Use     Smoking status: Never Smoker     Smokeless tobacco: Never Used   Substance Use Topics     Alcohol use: Yes     Comment: 1 to 2 beers or glasses of wine 1 to 2 times per month     Family History   Problem Relation Age of Onset     Hyperlipidemia Mother      Breast Cancer Mother      Other Cancer Father      Leukemia Father      Skin Cancer Father      Coronary Artery Disease Maternal Uncle      Brain Cancer Maternal Uncle      Coronary Artery Disease Maternal Uncle      Stomach Cancer Maternal Aunt      Diabetes No family hx of      Hypertension No family hx of      Cerebrovascular Disease No family hx of      Colon Cancer No family hx of      Thyroid Disease No family hx of      Depression No family hx of      Anxiety Disorder No family hx of          Current Outpatient Medications   Medication Sig Dispense Refill     ACCU-CHEK MICKY PLUS test strip TEST BLOOD SUGARS FOUR TIMES DAILY OR AS DIRECTED 400 each 1     atorvastatin (LIPITOR) 10 MG tablet Take 1 tablet (10 mg) by mouth daily 90 tablet 3     blood glucose monitoring (ACCU-CHEK FASTCLIX) lancets Use to test blood sugar 4 times daily or as directed. 306 each 3     calcium carbonate-vitamin D 500-400 MG-UNIT TABS tablt Take 1 tablet by mouth 2 times daily 180  tablet 3     estradiol (ESTRACE VAGINAL) 0.1 MG/GM vaginal cream Place 2 g vaginally twice a week Apply dime-sized amount daily for the first 2 weeks, then place twice a week thereafter. 42.5 g 3     Fesoterodine Fumarate (TOVIAZ) 8 MG TB24 Take 1 tablet (8 mg) by mouth daily 90 tablet 3     insulin aspart (NOVOLOG FLEXPEN) 100 UNIT/ML pen 1 unit per 15 gram of carb for breakfast and lunch, and 1 unit per 10 grams for dinner.Plus 1 unit per 50 mg/dl above 150. Total daily dose approx 30 units. 30 mL 3     insulin glargine (LANTUS SOLOSTAR) 100 UNIT/ML pen Take 30 units at 6-7 pm 45 mL 3     insulin pen needle (B-D U/F) 31G X 8 MM miscellaneous USE WITH NOVOLOG AND LANTUS(FIVE TIMES DAILY) 500 each 3     mirabegron (MYRBETRIQ) 50 MG 24 hr tablet Take 1 tablet (50 mg) by mouth daily 90 tablet 3     Multiple Vitamins-Minerals (MULTIVITAMIN ADULT PO) Take 1 tablet by mouth daily       Urine Glucose-Ketones Test (KETO-DIASTIX) STRP 1 strip by In Vitro route as needed 1 each 11     blood glucose (ACCU-CHEK MICKY PLUS) test strip Use to test blood sugars 5 times daily or as directed. *Use brand compatible with patients insurance and device 500 each 3     No Known Allergies  Pneumonia Vaccine:Up to date    Mammogram Screening: Mammogram Screening: Patient over age 50, mutual decision to screen reflected in health maintenance.    ROS:  CONSTITUTIONAL:NEGATIVE for fever, chills, change in weight  INTEGUMENTARY/SKIN: NEGATIVE for worrisome rashes, moles or lesions  EYES: NEGATIVE for vision changes or irritation  ENT: NEGATIVE for ear, mouth and throat problems  RESP:NEGATIVE for significant cough or SOB  BREAST: NEGATIVE for masses, tenderness or discharge  CV: NEGATIVE for chest pain, palpitations or peripheral edema  GI: NEGATIVE for nausea, abdominal pain, heartburn, or change in bowel habits   menopausal female: amenorrhea, no unusual urinary symptoms and no unusual vaginal symptoms  MUSCULOSKELETAL:POSITIVE  for  "arthralgias but does not her from being active. Right hip has been hurting her for several months. Will consider xray but does not do that now. Also legs will bother her intermittently at night  And intermittent symptoms of numbness in the right arm at night and using her elbow wrap and that has helped.  NEGATIVE for joint swelling  and joint warmth   NEURO: NEGATIVE for weakness, dizziness or paresthesias  ENDOCRINE: NEGATIVE for temperature intolerance, skin/hair changes  HEME/ALLERGY/IMMUNE: NEGATIVE for bleeding problems  PSYCHIATRIC: NEGATIVE for changes in mood or affect. Does have some intermittent difficult time sleeping as has gotten up 3 times at night and needs to follow up with Dr Zhang       OBJECTIVE:   /60 (BP Location: Right arm, Patient Position: Sitting, Cuff Size: Adult Regular)   Pulse 84   Temp 98.4  F (36.9  C) (Temporal)   Ht 1.651 m (5' 5\")   Wt 71.4 kg (157 lb 4.8 oz)   SpO2 99%   Breastfeeding No   BMI 26.18 kg/m   Estimated body mass index is 27.54 kg/m  as calculated from the following:    Height as of 11/15/19: 1.651 m (5' 5\").    Weight as of 3/16/20: 75.1 kg (165 lb 8 oz).   Wt Readings from Last 4 Encounters:   08/04/20 71.4 kg (157 lb 4.8 oz)   03/16/20 75.1 kg (165 lb 8 oz)   11/15/19 74.7 kg (164 lb 11.2 oz)   08/02/19 76.4 kg (168 lb 8 oz)       EXAM:   GENERAL APPEARANCE: healthy, alert and no distress  EYES: Eyes grossly normal to inspection, PERRL and conjunctivae and sclerae normal  HENT: ear canals and TM's normal, nose and mouth without ulcers or lesions, oropharynx clear and oral mucous membranes moist  NECK: no adenopathy, no asymmetry, masses, or scars and thyroid normal to palpation  RESP: lungs clear to auscultation - no rales, rhonchi or wheezes  BREAST: normal without masses, tenderness or nipple discharge and no palpable axillary masses or adenopathy  CV: regular rates and rhythm, no murmur, click or rub, no peripheral edema and peripheral pulses " strong  ABDOMEN: soft, nontender, no hepatosplenomegaly, no masses and bowel sounds normal   (female): deferred  MS: no musculoskeletal defects are noted and gait is age appropriate without ataxia  SKIN: no suspicious lesions or rashes  NEURO: Normal strength and tone, sensory exam grossly normal, mentation intact and speech normal  PSYCH: mentation appears normal and affect normal/bright    Diagnostic Test Results:  Labs reviewed in Epic  Pending orders and results       ASSESSMENT / PLAN:   Latoya was seen today for physical and wellness visit.    Diagnoses and all orders for this visit:    Routine general medical examination at a health care facility  -     Comprehensive metabolic panel; Future  -     TSH with free T4 reflex; Future  -     CBC with platelets; Future    Encounter for Medicare annual wellness exam    Screening for thyroid disorder  -     TSH with free T4 reflex; Future    Hyperlipidemia with target LDL less than 100  -     Comprehensive metabolic panel; Future    Screen for colon cancer  -     GASTROENTEROLOGY ADULT REF PROCEDURE ONLY; Future    Encounter for screening mammogram for breast cancer  -     *MA Screening Digital Bilateral; Future    Menopause ovarian failure  -     DX Hip/Pelvis/Spine; Future    Screening for disorder of blood and blood-forming organs  -     CBC with platelets; Future    Carpal tunnel syndrome, right  Discussed anatomy and pathophysiology of carpal tunnel. Discussed avoidance of exasserbating activities and use of braces.  If ineffective, consider hand therapy and, if necessary EMG/NCS in preparation for surgical intervention    PLAN:   1.   Symptomatic therapy suggested: Continue current medication regimen unchanged.  Increase calcium to 1000mg and 800iu Vit D  Can try wrist splint at night for carpal tunnel like symptoms  2.  Orders Placed This Encounter   Procedures     *MA Screening Digital Bilateral     DX Hip/Pelvis/Spine     Comprehensive metabolic panel      "TSH with free T4 reflex     CBC with platelets     GASTROENTEROLOGY ADULT REF PROCEDURE ONLY       3. Patient needs to follow up in if no improvement,or sooner if worsening of symptoms or other symptoms develop.  FURTHER TESTING:       - DXA (bone density) scan       - mammogram  CONSULTATION/REFERRAL to Colonoscopy   Will follow up and/or notify patient of  results via My Chart to determine further need for followup  Follow up office visit in one year for annual health maintenance exam, sooner PRN.      COUNSELING:  Reviewed preventive health counseling, as reflected in patient instructions  Special attention given to:       Regular exercise       Healthy diet/nutrition       Vision screening       Osteoporosis Prevention/Bone Health       Colon cancer screening       Hepatitis C screening       The 10-year ASCVD risk score (Emily ALVARES Jr., et al., 2013) is: 13.1%    Values used to calculate the score:      Age: 69 years      Sex: Female      Is Non- : No      Diabetic: Yes      Tobacco smoker: No      Systolic Blood Pressure: 120 mmHg      Is BP treated: No      HDL Cholesterol: 71 mg/dL      Total Cholesterol: 189 mg/dL       Advanced Planning     Estimated body mass index is 26.18 kg/m  as calculated from the following:    Height as of this encounter: 1.651 m (5' 5\").    Weight as of this encounter: 71.4 kg (157 lb 4.8 oz).         reports that she has never smoked. She has never used smokeless tobacco.      Appropriate preventive services were discussed with this patient, including applicable screening as appropriate for cardiovascular disease, diabetes, osteopenia/osteoporosis, and glaucoma.  As appropriate for age/gender, discussed screening for colorectal cancer, prostate cancer, breast cancer, and cervical cancer. Checklist reviewing preventive services available has been given to the patient.    Reviewed patients plan of care and provided an AVS. The Intermediate Care Plan ( asthma " action plan, low back pain action plan, and migraine action plan) for Latoya meets the Care Plan requirement. This Care Plan has been established and reviewed with the Patient.    Counseling Resources:  ATP IV Guidelines  Pooled Cohorts Equation Calculator  Breast Cancer Risk Calculator  FRAX Risk Assessment  ICSI Preventive Guidelines  Dietary Guidelines for Americans, 2010  USDA's MyPlate  ASA Prophylaxis  Lung CA Screening    URVASHI Hunter [facility]

## 2020-08-06 ENCOUNTER — MYC MEDICAL ADVICE (OUTPATIENT)
Dept: UROLOGY | Facility: CLINIC | Age: 70
End: 2020-08-06

## 2020-08-11 NOTE — TELEPHONE ENCOUNTER
Received message from Dr. Zhang who recommends for patient to come in to clinic for a nurse visit for PVR. Per Dr. Zhang, if patient is emptying well, a follow up visit should be scheduled to discuss further and virtual visit is okay.    My chart message sent to patient.    Jess Hadley RN, BSN

## 2020-08-17 ENCOUNTER — ALLIED HEALTH/NURSE VISIT (OUTPATIENT)
Dept: NURSING | Facility: CLINIC | Age: 70
End: 2020-08-17
Payer: COMMERCIAL

## 2020-08-17 DIAGNOSIS — R39.15 URGENCY OF URINATION: ICD-10-CM

## 2020-08-17 DIAGNOSIS — N39.41 URGE INCONTINENCE: Primary | ICD-10-CM

## 2020-08-17 NOTE — TELEPHONE ENCOUNTER
Received note below per Dr. Zhang after patients PVR was 169 ml.    Alberto Zhang MD Babcock, Rachel, LPN               Let's have her stop one or the other.  Whichever she thinks helps less.          Sent mychart to patient.    Sandra Shultz LPN

## 2020-08-17 NOTE — PROGRESS NOTES
Latoya STEVO Rodríguez comes into clinic today at the request of Dr. Zhang Ordering Provider for PVR.    post void residual = 169 ml    This service provided today was under the supervising provider of the day Dr. Zhang, who was available if needed.    Sandra Shultz LPN

## 2020-08-28 ENCOUNTER — ANCILLARY PROCEDURE (OUTPATIENT)
Dept: BONE DENSITY | Facility: CLINIC | Age: 70
End: 2020-08-28
Attending: NURSE PRACTITIONER
Payer: COMMERCIAL

## 2020-08-28 ENCOUNTER — ANCILLARY PROCEDURE (OUTPATIENT)
Dept: MAMMOGRAPHY | Facility: CLINIC | Age: 70
End: 2020-08-28
Attending: NURSE PRACTITIONER
Payer: COMMERCIAL

## 2020-08-28 DIAGNOSIS — M81.8 OTHER OSTEOPOROSIS WITHOUT CURRENT PATHOLOGICAL FRACTURE: ICD-10-CM

## 2020-08-28 DIAGNOSIS — Z12.31 ENCOUNTER FOR SCREENING MAMMOGRAM FOR BREAST CANCER: ICD-10-CM

## 2020-08-28 DIAGNOSIS — E28.39 MENOPAUSE OVARIAN FAILURE: ICD-10-CM

## 2020-08-28 PROCEDURE — 77067 SCR MAMMO BI INCL CAD: CPT | Performed by: STUDENT IN AN ORGANIZED HEALTH CARE EDUCATION/TRAINING PROGRAM

## 2020-08-28 PROCEDURE — 77063 BREAST TOMOSYNTHESIS BI: CPT | Performed by: STUDENT IN AN ORGANIZED HEALTH CARE EDUCATION/TRAINING PROGRAM

## 2020-08-28 PROCEDURE — 77081 DXA BONE DENSITY APPENDICULR: CPT | Mod: 59 | Performed by: RADIOLOGY

## 2020-08-28 PROCEDURE — 77080 DXA BONE DENSITY AXIAL: CPT | Performed by: RADIOLOGY

## 2020-08-28 NOTE — RESULT ENCOUNTER NOTE
Concetta Rodríguez,    Attached are your test results.  Your Dexa Bone Density test showed:  Osteopenia - Some loss of bone mineral density is indicated.  Exercise and Calcium/Vitamin D supplements are generally recommended.    The bone density test shows osteopenia. This is an intermediate category that is in between normal and osteoporosis.  People with osteopenia should work on taking in 8756-5042 mg of calcium with vitamin D daily. They should also be getting daily weight bearing exercise (walking works)     Please contact us if you have any questions.    Pricila Avila, CNP

## 2020-10-19 ENCOUNTER — VIRTUAL VISIT (OUTPATIENT)
Dept: ENDOCRINOLOGY | Facility: CLINIC | Age: 70
End: 2020-10-19
Payer: COMMERCIAL

## 2020-10-19 ENCOUNTER — TELEPHONE (OUTPATIENT)
Dept: ENDOCRINOLOGY | Facility: CLINIC | Age: 70
End: 2020-10-19

## 2020-10-19 DIAGNOSIS — M85.80 OSTEOPENIA, UNSPECIFIED LOCATION: ICD-10-CM

## 2020-10-19 DIAGNOSIS — E10.9 TYPE 1 DIABETES MELLITUS WITHOUT COMPLICATION (H): Primary | ICD-10-CM

## 2020-10-19 PROCEDURE — 99214 OFFICE O/P EST MOD 30 MIN: CPT | Mod: 95 | Performed by: INTERNAL MEDICINE

## 2020-10-19 NOTE — LETTER
"    10/19/2020         RE: Latoya Rodríguez  [address] [facility] [address]        Dear Colleague,    Thank you for referring your patient, Latoya Rodríguez, to the [facility]. Please see a copy of my visit note below.    Patient blood sugar in HiConversiont message from 10/18/2020.    Latoya Rodríguez is a 70 year old female who is being evaluated via a billable video visit.      The patient has been notified of following:     \"This video visit will be conducted via a call between you and your physician/provider. We have found that certain health care needs can be provided without the need for an in-person physical exam.  This service lets us provide the care you need with a video conversation.  If a prescription is necessary we can send it directly to your pharmacy.  If lab work is needed we can place an order for that and you can then stop by our lab to have the test done at a later time.    Video visits are billed at different rates depending on your insurance coverage.  Please reach out to your insurance provider with any questions.    If during the course of the call the physician/provider feels a video visit is not appropriate, you will not be charged for this service.\"    Patient has given verbal consent for Video visit? Yes  How would you like to obtain your AVS? Around [employer]  If you are dropped from the video visit, the video invite should be resent to: Text to cell phone: [phone]  Will anyone else be joining your video visit? No        Video-Visit Details    Type of service:  Video Visit    Video Start Time: 10:13 AM  Video End Time: 10:43 am    Originating Location (pt. Location): Home    Distant Location (provider location):  [facility]     Platform used for Video Visit: Dany            Endocrinology Medical Student/ Resident / Fellow note    Chief complaint:  Latoya is a 70 year old female seen in follow up of Type 1 Diabetes.  HISTORY OF " "PRESENT ILLNESS    Latoya is a 70-year-old retiree with a history of type 1 diabetes who has a visit today for a follow-up.  She says that she was diagnosed 4-1/2 years ago on a routine physical where she was found to have high blood glucose levels.  At that time she was diagnosed and treated as a type II diabetic but it was later determined that she had positive TORRES antibodies and low C-peptide.      Her current regimen consist of  Novolog 1:13 g carbohydrate coverage with breakfast, lunch, and dinner, Novolog correction 1:50 if BG is >150 at meal time, Novolog correction 1:50 if BG is >200 at bedtime, and Lantus 26 units at 6pm.      Patient Blood Glucose data 10/5-10/18 was shared with provider through message. Average  (SD 87). 12% hypoglycemia readings, 39% within range, 49% above range of readings. Several hypoglycemia readings 10/11 following high values on 10/10. Additional lows on Monday 10/12 following breakfast and bedtime.  Additional highs on Tuesday 10/13 and 1 low at bedtime on Wednesday 10/14 following a high value at dinner.    She walks her dog 4 to 5 miles a day and keeps busy with yard work.  She is a pescatarian and eats fish 1-2 times per week.  For breakfast she eats 2 eggs with cheese, for lunch she eats salad with eggs and cheese, for dinner she will eat a black bean burger with coleslaw for example. Latoya reports that sometimes she has rasian bran cereal to change it up, but knows this has more carbohydrates and adjusts insulin accordingly. She also sometimes consumes a nighttime snack out of hunger rather than hypoglycemia and administers insulin accordingly.   She does not smoke or use tobacco products and reports consuming 1 beer once a month with a friend.  She is able to feel symptoms of hypoglycemia when this happens she describes feeling dizzy, lightheaded, and \"almost like being drunk\".  If theses episodes happen at night then she is unable to sleep, and feels a light cold " sweat.  She is then able to recognize these episodes in eat a snack until she feels better.  She reports feeling well overall but wakes up 3-4 times a night to urinate.  She says she has lost 11 pounds since her last physical and often weighs herself.    Past Medical History  Type 1 diabetes, diagnosed 6/2016  Hyperlipidemia  Osteoporosis     Medications  Current Outpatient Medications   Medication Sig Dispense Refill     ACCU-CHEK MICKY PLUS test strip TEST BLOOD SUGARS FOUR TIMES DAILY OR AS DIRECTED 400 each 1     atorvastatin (LIPITOR) 10 MG tablet Take 1 tablet (10 mg) by mouth daily 90 tablet 3     blood glucose (ACCU-CHEK MICKY PLUS) test strip Use to test blood sugars 5 times daily or as directed. *Use brand compatible with patients insurance and device 500 each 3     blood glucose monitoring (ACCU-CHEK FASTCLIX) lancets Use to test blood sugar 4 times daily or as directed. 306 each 3     calcium carbonate-vitamin D 500-400 MG-UNIT TABS tablt Take 1 tablet by mouth 2 times daily 180 tablet 3     estradiol (ESTRACE VAGINAL) 0.1 MG/GM vaginal cream Place 2 g vaginally twice a week Apply dime-sized amount daily for the first 2 weeks, then place twice a week thereafter. 42.5 g 3     Fesoterodine Fumarate (TOVIAZ) 8 MG TB24 Take 1 tablet (8 mg) by mouth daily 90 tablet 3     insulin aspart (NOVOLOG FLEXPEN) 100 UNIT/ML pen 1 unit per 15 gram of carb for breakfast and lunch, and 1 unit per 10 grams for dinner.Plus 1 unit per 50 mg/dl above 150. Total daily dose approx 30 units. 30 mL 3     insulin glargine (LANTUS SOLOSTAR) 100 UNIT/ML pen Take 30 units at 6-7 pm 45 mL 3     insulin pen needle (B-D U/F) 31G X 8 MM miscellaneous USE WITH NOVOLOG AND LANTUS(FIVE TIMES DAILY) 500 each 3     mirabegron (MYRBETRIQ) 50 MG 24 hr tablet Take 1 tablet (50 mg) by mouth daily 90 tablet 3     Multiple Vitamins-Minerals (MULTIVITAMIN ADULT PO) Take 1 tablet by mouth daily       Urine Glucose-Ketones Test (KETO-DIASTIX) STRP 1  strip by In Vitro route as needed 1 each 11     Allergies  No Known Allergies      Family History  family history includes Brain Cancer in her maternal uncle; Breast Cancer in her mother; Coronary Artery Disease in her maternal uncle and maternal uncle; Hyperlipidemia in her mother; Leukemia in her father; Other Cancer in her father; Skin Cancer in her father; Stomach Cancer in her maternal aunt.  No thyroid disease or type 1 diabetes in the family    Social History  Social History     Socioeconomic History     Marital status: Single     Spouse name: Not on file     Number of children: Not on file     Years of education: Not on file     Highest education level: Not on file   Occupational History     Not on file   Social Needs     Financial resource strain: Not on file     Food insecurity     Worry: Not on file     Inability: Not on file     Transportation needs     Medical: Not on file     Non-medical: Not on file   Tobacco Use     Smoking status: Never Smoker     Smokeless tobacco: Never Used   Substance and Sexual Activity     Alcohol use: Yes     Comment: 1 to 2 beers or glasses of wine 1 to 2 times per month     Drug use: No     Sexual activity: Never   Lifestyle     Physical activity     Days per week: Not on file     Minutes per session: Not on file     Stress: Not on file   Relationships     Social connections     Talks on phone: Not on file     Gets together: Not on file     Attends Hoahaoism service: Not on file     Active member of club or organization: Not on file     Attends meetings of clubs or organizations: Not on file     Relationship status: Not on file     Intimate partner violence     Fear of current or ex partner: Not on file     Emotionally abused: Not on file     Physically abused: Not on file     Forced sexual activity: Not on file   Other Topics Concern     Parent/sibling w/ CABG, MI or angioplasty before 65F 55M? No   Social History Narrative     Not on file       REVIEW OF SYSTEMS  Skin:  negative  Eyes: negative  Ears/Nose/Throat: negative  Respiratory: No shortness of breath, dyspnea on exertion, cough, or hemoptysis  Cardiovascular: negative  Gastrointestinal: negative  Genitourinary: incontinence that is followed by her urologist, Dr. Zhang. She reported being on toviaz, but stopped this medication because she has not noticed a difference. Latoya stated she will follow up with Dr. Zhang.   Musculoskeletal: negative  Neurologic: negative  Psychiatric: negative  Hematologic/Lymphatic/Immunologic: negative  Endocrine: negative    Physical Exam  Limited due to VDO visit  GENERAL :  In no apparent distress   RESP: Patient able to speak in complete sentences.  NEURO: awake, alert, responds appropriately to questions.    Remainder of physical exam not completed due to COVID-19 precautions.  This was a telemedicine visit.    DATA REVIEW  Lab Results   Component Value Date    A1C 7.8 07/20/2020    A1C 9.2 03/18/2019    A1C 9.1 12/10/2018    A1C 8.3 03/26/2018    A1C 9.3 11/27/2017     DXA 8/28/2020  Lumbar spine T-score in region of L2-L4 = -2.4   L1-4 percent change: Not significant%      HIPS:  Mean total hip T-score: -1  Mean total hip percent change: Not significant%      Left femoral neck T-score = -1.9  Right femoral neck T-score = -1.6      Radius 33% T-score = -1.6     FRAX:  10 year probability of major osteoporotic fracture: 11.3%  10 year probability of hip fracture: 2.1%  The 10 year probability of fracture may be lower than reported if the  patient has received treatment. FRAX data should be disregarded in patient's taking bisphosphonates.                                                                 IMPRESSION:    Low bone mass (osteopenia).    ASSESSMENT/PLAN:   Latoya is a 70-year-old retiree with a history of type 1 diabetes managed with an insulin basal bolus regimen.    1.  Type 1 diabetes    Latoya's blood glucose readings over the last 2 weeks were reviewed.  She has episodes of  hypoglycemia following prior hyperglycemia and correction at mealtime.  She reports that she has been trying to get better at carb counting so it is likely that her frequent lows were due to carb correction.  She has not noted morning highs and her snacks are due to her being hungry and not hypoglycemic.  Latoya would benefit from continuous glucose monitoring that will alert her when her blood sugar goes low.  She still remains hesitant about this option but it something we will continue to revisit.  We will continue her current regimen today and follow-up in 2 weeks.    Recommendations:  -recommend Dexcom continuous glucose monitor -- patient is hesitant to try at this time  -Continue Lantus 26 units at bedtime, Novolog 1:13g carb coverage with meals and snacks  -Continue 1:50 Novolog correction if over 150 at breakfast, lunch and dinner, continue 1:50 Novolog correction if BG is >200 at bedtime  -Recheck hemoglobin A1c      2) DM complications:  Retinopathy: normal exam - no DR May 2020   Nephropathy: negative urine microalbumin 3/2020   Neuropathy: none per exam 11/2019     3) Hyperlipidemia: , HDL 71, TG 56, total cholesterol 189 on 7/20/2020. Continue Lipitor 10 mg daily.     4) Osteopenia: DXA 2020 showed the lowest T-score of -1.9 at the left femoral neck. She follows up with PCP. Currently on calcium and vitamin D    -Follow-up in 3 months with Dr. Edward Treviño, MS3    I was present with medical student who participated in the service and in the documentation of the note. I have verified the history and personally performed limited physical exam and medical decision making. I agree with the assessment and plan of care as documented in the note. Medical student acts as a scribe.    Edward Aguero MD  Division of Diabetes and Endocrinology  Department of Medicine  [phone]

## 2020-10-19 NOTE — PROGRESS NOTES
"Patient blood sugar in v2 Ratingshart message from 10/18/2020.    Latoya Rodríguez is a 70 year old female who is being evaluated via a billable video visit.      The patient has been notified of following:     \"This video visit will be conducted via a call between you and your physician/provider. We have found that certain health care needs can be provided without the need for an in-person physical exam.  This service lets us provide the care you need with a video conversation.  If a prescription is necessary we can send it directly to your pharmacy.  If lab work is needed we can place an order for that and you can then stop by our lab to have the test done at a later time.    Video visits are billed at different rates depending on your insurance coverage.  Please reach out to your insurance provider with any questions.    If during the course of the call the physician/provider feels a video visit is not appropriate, you will not be charged for this service.\"    Patient has given verbal consent for Video visit? Yes  How would you like to obtain your AVS? [employer]  If you are dropped from the video visit, the video invite should be resent to: Text to cell phone: [phone]  Will anyone else be joining your video visit? No        Video-Visit Details    Type of service:  Video Visit    Video Start Time: 10:13 AM  Video End Time: 10:43 am    Originating Location (pt. Location): Home    Distant Location (provider location):  [facility]     Platform used for Video Visit: Dany          "

## 2020-10-19 NOTE — PATIENT INSTRUCTIONS
A1c this time  Return in 3-4 months.    If you have any questions, please do not hesitate to call [facility] at [phone] and ask for Endocrinology clinic.    Sincerely,    Edward Aguero MD  Endocrinology

## 2020-10-19 NOTE — PROGRESS NOTES
Endocrinology Medical Student/ Resident / Fellow note    Chief complaint:  Latoya is a 70 year old female seen in follow up of Type 1 Diabetes.  HISTORY OF PRESENT ILLNESS    Latoya is a 70-year-old retiree with a history of type 1 diabetes who has a visit today for a follow-up.  She says that she was diagnosed 4-1/2 years ago on a routine physical where she was found to have high blood glucose levels.  At that time she was diagnosed and treated as a type II diabetic but it was later determined that she had positive TORRES antibodies and low C-peptide.      Her current regimen consist of  Novolog 1:13 g carbohydrate coverage with breakfast, lunch, and dinner, Novolog correction 1:50 if BG is >150 at meal time, Novolog correction 1:50 if BG is >200 at bedtime, and Lantus 26 units at 6pm.      Patient Blood Glucose data 10/5-10/18 was shared with provider through message. Average  (SD 87). 12% hypoglycemia readings, 39% within range, 49% above range of readings. Several hypoglycemia readings 10/11 following high values on 10/10. Additional lows on Monday 10/12 following breakfast and bedtime.  Additional highs on Tuesday 10/13 and 1 low at bedtime on Wednesday 10/14 following a high value at dinner.    She walks her dog 4 to 5 miles a day and keeps busy with yard work.  She is a pescatarian and eats fish 1-2 times per week.  For breakfast she eats 2 eggs with cheese, for lunch she eats salad with eggs and cheese, for dinner she will eat a black bean burger with coleslaw for example. Latoya reports that sometimes she has rasian bran cereal to change it up, but knows this has more carbohydrates and adjusts insulin accordingly. She also sometimes consumes a nighttime snack out of hunger rather than hypoglycemia and administers insulin accordingly.   She does not smoke or use tobacco products and reports consuming 1 beer once a month with a friend.  She is able to feel symptoms of hypoglycemia when this happens she  "describes feeling dizzy, lightheaded, and \"almost like being drunk\".  If theses episodes happen at night then she is unable to sleep, and feels a light cold sweat.  She is then able to recognize these episodes in eat a snack until she feels better.  She reports feeling well overall but wakes up 3-4 times a night to urinate.  She says she has lost 11 pounds since her last physical and often weighs herself.    Past Medical History  Type 1 diabetes, diagnosed 6/2016  Hyperlipidemia  Osteoporosis     Medications  Current Outpatient Medications   Medication Sig Dispense Refill     ACCU-CHEK MICKY PLUS test strip TEST BLOOD SUGARS FOUR TIMES DAILY OR AS DIRECTED 400 each 1     atorvastatin (LIPITOR) 10 MG tablet Take 1 tablet (10 mg) by mouth daily 90 tablet 3     blood glucose (ACCU-CHEK MICKY PLUS) test strip Use to test blood sugars 5 times daily or as directed. *Use brand compatible with patients insurance and device 500 each 3     blood glucose monitoring (ACCU-CHEK FASTCLIX) lancets Use to test blood sugar 4 times daily or as directed. 306 each 3     calcium carbonate-vitamin D 500-400 MG-UNIT TABS tablt Take 1 tablet by mouth 2 times daily 180 tablet 3     estradiol (ESTRACE VAGINAL) 0.1 MG/GM vaginal cream Place 2 g vaginally twice a week Apply dime-sized amount daily for the first 2 weeks, then place twice a week thereafter. 42.5 g 3     Fesoterodine Fumarate (TOVIAZ) 8 MG TB24 Take 1 tablet (8 mg) by mouth daily 90 tablet 3     insulin aspart (NOVOLOG FLEXPEN) 100 UNIT/ML pen 1 unit per 15 gram of carb for breakfast and lunch, and 1 unit per 10 grams for dinner.Plus 1 unit per 50 mg/dl above 150. Total daily dose approx 30 units. 30 mL 3     insulin glargine (LANTUS SOLOSTAR) 100 UNIT/ML pen Take 30 units at 6-7 pm 45 mL 3     insulin pen needle (B-D U/F) 31G X 8 MM miscellaneous USE WITH NOVOLOG AND LANTUS(FIVE TIMES DAILY) 500 each 3     mirabegron (MYRBETRIQ) 50 MG 24 hr tablet Take 1 tablet (50 mg) by mouth " daily 90 tablet 3     Multiple Vitamins-Minerals (MULTIVITAMIN ADULT PO) Take 1 tablet by mouth daily       Urine Glucose-Ketones Test (KETO-DIASTIX) STRP 1 strip by In Vitro route as needed 1 each 11     Allergies  No Known Allergies      Family History  family history includes Brain Cancer in her maternal uncle; Breast Cancer in her mother; Coronary Artery Disease in her maternal uncle and maternal uncle; Hyperlipidemia in her mother; Leukemia in her father; Other Cancer in her father; Skin Cancer in her father; Stomach Cancer in her maternal aunt.  No thyroid disease or type 1 diabetes in the family    Social History  Social History     Socioeconomic History     Marital status: Single     Spouse name: Not on file     Number of children: Not on file     Years of education: Not on file     Highest education level: Not on file   Occupational History     Not on file   Social Needs     Financial resource strain: Not on file     Food insecurity     Worry: Not on file     Inability: Not on file     Transportation needs     Medical: Not on file     Non-medical: Not on file   Tobacco Use     Smoking status: Never Smoker     Smokeless tobacco: Never Used   Substance and Sexual Activity     Alcohol use: Yes     Comment: 1 to 2 beers or glasses of wine 1 to 2 times per month     Drug use: No     Sexual activity: Never   Lifestyle     Physical activity     Days per week: Not on file     Minutes per session: Not on file     Stress: Not on file   Relationships     Social connections     Talks on phone: Not on file     Gets together: Not on file     Attends Sikh service: Not on file     Active member of club or organization: Not on file     Attends meetings of clubs or organizations: Not on file     Relationship status: Not on file     Intimate partner violence     Fear of current or ex partner: Not on file     Emotionally abused: Not on file     Physically abused: Not on file     Forced sexual activity: Not on file   Other  Topics Concern     Parent/sibling w/ CABG, MI or angioplasty before 65F 55M? No   Social History Narrative     Not on file       REVIEW OF SYSTEMS  Skin: negative  Eyes: negative  Ears/Nose/Throat: negative  Respiratory: No shortness of breath, dyspnea on exertion, cough, or hemoptysis  Cardiovascular: negative  Gastrointestinal: negative  Genitourinary: incontinence that is followed by her urologist, Dr. Zhang. She reported being on toviaz, but stopped this medication because she has not noticed a difference. Latoya stated she will follow up with Dr. Zhang.   Musculoskeletal: negative  Neurologic: negative  Psychiatric: negative  Hematologic/Lymphatic/Immunologic: negative  Endocrine: negative    Physical Exam  Limited due to VDO visit  GENERAL :  In no apparent distress   RESP: Patient able to speak in complete sentences.  NEURO: awake, alert, responds appropriately to questions.    Remainder of physical exam not completed due to COVID-19 precautions.  This was a telemedicine visit.    DATA REVIEW  Lab Results   Component Value Date    A1C 7.8 07/20/2020    A1C 9.2 03/18/2019    A1C 9.1 12/10/2018    A1C 8.3 03/26/2018    A1C 9.3 11/27/2017     DXA 8/28/2020  Lumbar spine T-score in region of L2-L4 = -2.4   L1-4 percent change: Not significant%      HIPS:  Mean total hip T-score: -1  Mean total hip percent change: Not significant%      Left femoral neck T-score = -1.9  Right femoral neck T-score = -1.6      Radius 33% T-score = -1.6     FRAX:  10 year probability of major osteoporotic fracture: 11.3%  10 year probability of hip fracture: 2.1%  The 10 year probability of fracture may be lower than reported if the  patient has received treatment. FRAX data should be disregarded in patient's taking bisphosphonates.                                                                 IMPRESSION:    Low bone mass (osteopenia).    ASSESSMENT/PLAN:   Latoya is a 70-year-old retiree with a history of type 1 diabetes managed with  an insulin basal bolus regimen.    1.  Type 1 diabetes    Latoya's blood glucose readings over the last 2 weeks were reviewed.  She has episodes of hypoglycemia following prior hyperglycemia and correction at mealtime.  She reports that she has been trying to get better at carb counting so it is likely that her frequent lows were due to carb correction.  She has not noted morning highs and her snacks are due to her being hungry and not hypoglycemic.  Latoya would benefit from continuous glucose monitoring that will alert her when her blood sugar goes low.  She still remains hesitant about this option but it something we will continue to revisit.  We will continue her current regimen today and follow-up in 2 weeks.    Recommendations:  -recommend Dexcom continuous glucose monitor -- patient is hesitant to try at this time  -Continue Lantus 26 units at bedtime, Novolog 1:13g carb coverage with meals and snacks  -Continue 1:50 Novolog correction if over 150 at breakfast, lunch and dinner, continue 1:50 Novolog correction if BG is >200 at bedtime  -Recheck hemoglobin A1c      2) DM complications:  Retinopathy: normal exam - no DR May 2020   Nephropathy: negative urine microalbumin 3/2020   Neuropathy: none per exam 11/2019     3) Hyperlipidemia: , HDL 71, TG 56, total cholesterol 189 on 7/20/2020. Continue Lipitor 10 mg daily.     4) Osteopenia: DXA 2020 showed the lowest T-score of -1.9 at the left femoral neck. She follows up with PCP. Currently on calcium and vitamin D    -Follow-up in 3 months with Dr. Edward Treviño, MS3    I was present with medical student who participated in the service and in the documentation of the note. I have verified the history and personally performed limited physical exam and medical decision making. I agree with the assessment and plan of care as documented in the note. Medical student acts as a scribe.    Edward Aguero MD  Division of  Diabetes and Endocrinology  Department of Medicine  [phone]

## 2020-10-19 NOTE — TELEPHONE ENCOUNTER
1st Attempt: LVM for Latoya to call back to schedule their Diabetes follow up appointment with Dr. Leon.  Latoya is due to be seen in 3-4 months.  I asked the patient to call [phone] to schedule this appointment.     Heike Adams, [facility]  Adult Endocrinology  [facility]

## 2020-10-20 ENCOUNTER — DOCUMENTATION ONLY (OUTPATIENT)
Dept: LAB | Facility: CLINIC | Age: 70
End: 2020-10-20

## 2020-10-20 NOTE — PROGRESS NOTES
Hi, patient has lab appointment 10/21/2020 with no orders, can you please place orders.    Thank You Cassandra DUMONT

## 2020-10-21 DIAGNOSIS — M85.80 OSTEOPENIA, UNSPECIFIED LOCATION: ICD-10-CM

## 2020-10-21 DIAGNOSIS — E10.9 TYPE 1 DIABETES MELLITUS WITHOUT COMPLICATION (H): ICD-10-CM

## 2020-10-21 LAB — HBA1C MFR BLD: 7.2 % (ref 0–5.6)

## 2020-10-21 PROCEDURE — 36415 COLL VENOUS BLD VENIPUNCTURE: CPT | Performed by: INTERNAL MEDICINE

## 2020-10-21 PROCEDURE — 82306 VITAMIN D 25 HYDROXY: CPT | Performed by: INTERNAL MEDICINE

## 2020-10-21 PROCEDURE — 83036 HEMOGLOBIN GLYCOSYLATED A1C: CPT | Performed by: INTERNAL MEDICINE

## 2020-10-22 LAB — DEPRECATED CALCIDIOL+CALCIFEROL SERPL-MC: 45 UG/L (ref 20–75)

## 2020-11-03 ENCOUNTER — TELEPHONE (OUTPATIENT)
Dept: UROLOGY | Facility: CLINIC | Age: 70
End: 2020-11-03

## 2020-11-03 NOTE — TELEPHONE ENCOUNTER
2nd attempt. Called patient to schedule annual follow up with Snadra Garibay in urology.  Patient is declining to schedule at this time.  She states the medications she was prescribed by Dr Zhang were not working and she has stopped taking them.  Asked patient if she would like to be scheduled sooner to discuss medication concerns.  Pt states she would like to think about it and will call back if she decides to schedule.      Sherrell Seo  Surgical Specialties Procedure   [facility] [place]  11/3/2020 10:56 AM

## 2021-01-11 ENCOUNTER — MYC MEDICAL ADVICE (OUTPATIENT)
Dept: UROLOGY | Facility: CLINIC | Age: 71
End: 2021-01-11

## 2021-01-11 NOTE — TELEPHONE ENCOUNTER
Patient call and spoke to call center. Appointment was rescheduled to 1/25/21.    Jess Hadley RN, BSN

## 2021-01-12 ENCOUNTER — MYC MEDICAL ADVICE (OUTPATIENT)
Dept: ENDOCRINOLOGY | Facility: CLINIC | Age: 71
End: 2021-01-12

## 2021-01-12 DIAGNOSIS — E10.9 TYPE 1 DIABETES MELLITUS WITHOUT COMPLICATION (H): ICD-10-CM

## 2021-01-13 NOTE — TELEPHONE ENCOUNTER
Requesting a PA for Accu-Chek Shayla Connect blood glucose test strips. Patient has used this meter for years as it has the capability to help calculate insulin doses thus helping patienta have better blood glucose control. Patient to check blood glucose four times a day. Needs 200 strips for a 30 day period. Allow 11 refills.

## 2021-01-13 NOTE — TELEPHONE ENCOUNTER
PA Initiation    Medication: Accu-Chek Shayla test strips -   Insurance Company: [employer] [facility] - Phone [phone] Fax [phone]  Pharmacy Filling the Rx: [id number] - [place], [place] - [address]  Filling Pharmacy Phone: [phone]  Filling Pharmacy Fax: [phone]  Start Date: 1/13/2021

## 2021-01-14 NOTE — TELEPHONE ENCOUNTER
Prior Authorization Approval    Medication: Accu-Chek Shayla test strips - APPROVED was approved on 1/14/2021  Effective: 12/30/2021 to 1/14/2022  Reference #:    Approved Dose/Quantity:   Insurance Company: [employer] - Phone [phone] Fax [phone]  Expected CoPay:    Pharmacy Filling the Rx: [id number] - [place], [place] - [address]  Pharmacy Notified: Yes  Patient Notified: Comment:  **Instructed pharmacy to notify patient when script is ready to /ship.**

## 2021-01-15 RX ORDER — BLOOD SUGAR DIAGNOSTIC
STRIP MISCELLANEOUS
Qty: 400 EACH | Refills: 1 | Status: SHIPPED | OUTPATIENT
Start: 2021-01-15 | End: 2021-05-26

## 2021-01-25 ENCOUNTER — VIRTUAL VISIT (OUTPATIENT)
Dept: UROLOGY | Facility: CLINIC | Age: 71
End: 2021-01-25
Payer: COMMERCIAL

## 2021-01-25 DIAGNOSIS — N39.41 URGE INCONTINENCE: Primary | ICD-10-CM

## 2021-01-25 DIAGNOSIS — R39.15 URINARY URGENCY: ICD-10-CM

## 2021-01-25 DIAGNOSIS — Z79.4 TYPE 2 DIABETES MELLITUS WITH HYPERGLYCEMIA, WITH LONG-TERM CURRENT USE OF INSULIN (H): ICD-10-CM

## 2021-01-25 DIAGNOSIS — E11.65 TYPE 2 DIABETES MELLITUS WITH HYPERGLYCEMIA, WITH LONG-TERM CURRENT USE OF INSULIN (H): ICD-10-CM

## 2021-01-25 DIAGNOSIS — N39.3 STRESS INCONTINENCE: ICD-10-CM

## 2021-01-25 DIAGNOSIS — N95.2 ATROPHIC VAGINITIS: ICD-10-CM

## 2021-01-25 PROCEDURE — 99213 OFFICE O/P EST LOW 20 MIN: CPT | Mod: 95 | Performed by: UROLOGY

## 2021-01-25 NOTE — PROGRESS NOTES
Latoya is a 70 year old who is being evaluated via a billable telephone visit.      What phone number would you like to be contacted at? [phone]  How would you like to obtain your AVS? Hay Shultz LPN on 1/25/2021 at 12:59 PM      Phone call duration: 14 minutes

## 2021-01-25 NOTE — PATIENT INSTRUCTIONS
-resume estrogen cream (she needs a new RX)  -may stay off the Toviaz and Myrbetriq since stopped helping  -schedule UDS for further evaluation  -schedule apt with me virtually to discuss results and further treatment.

## 2021-01-25 NOTE — NURSING NOTE
Compounded vaginal estrogen cream ordered per Dr. Zhang and sent for Dr. Zhang to review and sign. Prescription sent to [facility] who will contact patient to discuss delivery.    Jess Hadley RN, BSN

## 2021-02-03 ENCOUNTER — PRE VISIT (OUTPATIENT)
Dept: UROLOGY | Facility: CLINIC | Age: 71
End: 2021-02-03

## 2021-02-08 ENCOUNTER — VIRTUAL VISIT (OUTPATIENT)
Dept: ENDOCRINOLOGY | Facility: CLINIC | Age: 71
End: 2021-02-08
Payer: COMMERCIAL

## 2021-02-08 DIAGNOSIS — E11.65 TYPE 2 DIABETES MELLITUS WITH HYPERGLYCEMIA, WITH LONG-TERM CURRENT USE OF INSULIN (H): ICD-10-CM

## 2021-02-08 DIAGNOSIS — Z79.4 TYPE 2 DIABETES MELLITUS WITH HYPERGLYCEMIA, WITH LONG-TERM CURRENT USE OF INSULIN (H): ICD-10-CM

## 2021-02-08 DIAGNOSIS — E10.9 TYPE 1 DIABETES MELLITUS WITHOUT COMPLICATION (H): Primary | ICD-10-CM

## 2021-02-08 PROCEDURE — 99214 OFFICE O/P EST MOD 30 MIN: CPT | Mod: 95 | Performed by: INTERNAL MEDICINE

## 2021-02-08 RX ORDER — INSULIN ASPART 100 [IU]/ML
INJECTION, SOLUTION INTRAVENOUS; SUBCUTANEOUS
Qty: 30 ML | Refills: 3 | Status: SHIPPED | OUTPATIENT
Start: 2021-02-08 | End: 2022-05-02

## 2021-02-08 NOTE — LETTER
2/8/2021         RE: Latoya Rodríguez  [address]  [place] [address]        Dear Colleague,    Thank you for referring your patient, Latoya Rodríugez, to the [facility]. Please see a copy of my visit note below.    Blood Sugar readings in 2/7 AmSafe message.  Latoya is a 70 year old who is being evaluated via a billable video visit.      How would you like to obtain your AVS? MyChart  If the video visit is dropped, the invitation should be resent by: Send to e-mail at: [email or web address]  Will anyone else be joining your video visit? No    Heike Adams CMA  Adult Endocrinology  [facility]    Chief complaint:  Latoya is a 70 year old female seen in follow up of type 1 Diabetes.  HISTORY OF PRESENT ILLNESS  Latoya is a 70-year-old retiree with a history of type 1 diabetes who has a VDO visit today for a follow-up type 1 diabetes.  She was diagnosed with type 1 diabetes 2016 during routine physical where she was found to have high blood glucose levels.  At that time she was diagnosed and treated as a type II diabetic but it was later determined that she had positive TORRES antibodies and low C-peptide.      Her current regimen consist of Lantus 26 units AM, Novolog 1:15 g carbohydrate coverage with breakfast, lunch, and dinner, Novolog correction 1:50 if BG is >150 at meal time, Novolog correction 1:50 if BG is >200 at bedtime    A1c 7.2 in October 2020. Reviewed BG, average 238 (). She has hypoglycemia with BG in 40-60 in the middle of the day particularly when she goes for a walk. She usually walks about 5 miles per day. She is longer having low BG at night. She eats 3 meals per day with some snack at bedtime. She is planning to meet with SRI Hodge for Dexcom sensor initiation process.            Past Medical History  Type 1 diabetes, diagnosed 6/2016  Hyperlipidemia  Osteoporosis     Medications  Current Outpatient Medications    Medication Sig Dispense Refill     atorvastatin (LIPITOR) 10 MG tablet Take 1 tablet (10 mg) by mouth daily 90 tablet 3     blood glucose (ACCU-CHEK MICKY PLUS) test strip Use to test blood sugar 4 times daily or as directed. 400 each 1     blood glucose monitoring (ACCU-CHEK FASTCLIX) lancets Use to test blood sugar 4 times daily or as directed. 306 each 3     calcium carbonate-vitamin D 500-400 MG-UNIT TABS tablt Take 1 tablet by mouth 2 times daily 180 tablet 3     COMPOUNDED NON-CONTROLLED SUBSTANCE (CMPD RX) - PHARMACY TO MIX COMPOUNDED MEDICATION Estriol 0.1% cream (1mg/gram) in HRT base. Place 1 gram vaginally daily for 2 weeks, then vaginally twice weekly. 30 g 6     insulin aspart (NOVOLOG FLEXPEN) 100 UNIT/ML pen 1 unit per 15 gram of carb for breakfast and lunch, and 1 unit per 10 grams for dinner.Plus 1 unit per 50 mg/dl above 150. Total daily dose approx 30 units. (Patient taking differently: Novlog to 1 unit per 15 gram with meals and snacks and continue Lantus 26 units daily.) 30 mL 3     insulin glargine (LANTUS SOLOSTAR) 100 UNIT/ML pen Take 30 units at 6-7 pm (Patient taking differently: Take 26 units in the morning) 45 mL 3     insulin pen needle (B-D U/F) 31G X 8 MM miscellaneous USE WITH NOVOLOG AND LANTUS(FIVE TIMES DAILY) 500 each 3     Multiple Vitamins-Minerals (MULTIVITAMIN ADULT PO) Take 1 tablet by mouth daily       Urine Glucose-Ketones Test (KETO-DIASTIX) STRP 1 strip by In Vitro route as needed 1 each 11     blood glucose (ACCU-CHEK MICKY PLUS) test strip Use to test blood sugars 5 times daily or as directed. *Use brand compatible with patients insurance and device 500 each 3     estradiol (ESTRACE VAGINAL) 0.1 MG/GM vaginal cream Place 2 g vaginally twice a week Apply dime-sized amount daily for the first 2 weeks, then place twice a week thereafter. (Patient not taking: Reported on 1/11/2021) 42.5 g 3     Fesoterodine Fumarate (TOVIAZ) 8 MG TB24 Take 1 tablet (8 mg) by mouth daily  (Patient not taking: Reported on 1/11/2021) 90 tablet 3     mirabegron (MYRBETRIQ) 50 MG 24 hr tablet Take 1 tablet (50 mg) by mouth daily (Patient not taking: Reported on 1/11/2021) 90 tablet 3     Allergies  No Known Allergies    Family History  family history includes Brain Cancer in her maternal uncle; Breast Cancer in her mother; Coronary Artery Disease in her maternal uncle and maternal uncle; Hyperlipidemia in her mother; Leukemia in her father; Other Cancer in her father; Skin Cancer in her father; Stomach Cancer in her maternal aunt.  No thyroid disease or type 1 diabetes in the family    Social History  Social History     Socioeconomic History     Marital status: Single     Spouse name: Not on file     Number of children: Not on file     Years of education: Not on file     Highest education level: Not on file   Occupational History     Not on file   Social Needs     Financial resource strain: Not on file     Food insecurity     Worry: Not on file     Inability: Not on file     Transportation needs     Medical: Not on file     Non-medical: Not on file   Tobacco Use     Smoking status: Never Smoker     Smokeless tobacco: Never Used   Substance and Sexual Activity     Alcohol use: Yes     Comment: 1 to 2 beers or glasses of wine 1 to 2 times per month     Drug use: No     Sexual activity: Never   Lifestyle     Physical activity     Days per week: Not on file     Minutes per session: Not on file     Stress: Not on file   Relationships     Social connections     Talks on phone: Not on file     Gets together: Not on file     Attends Moravian service: Not on file     Active member of club or organization: Not on file     Attends meetings of clubs or organizations: Not on file     Relationship status: Not on file     Intimate partner violence     Fear of current or ex partner: Not on file     Emotionally abused: Not on file     Physically abused: Not on file     Forced sexual activity: Not on file   Other Topics  Concern     Parent/sibling w/ CABG, MI or angioplasty before 65F 55M? No   Social History Narrative     Not on file       REVIEW OF SYSTEMS  10 points ROS were negative otherwise mentioned in HPI    Physical Exam  Limited due to VDO visit  GENERAL :  In no apparent distress   RESP: Patient able to speak in complete sentences.  NEURO: awake, alert, responds appropriately to questions.    Remainder of physical exam not completed due to COVID-19 precautions.  This was a telemedicine visit.    DATA REVIEW    Lab Results   Component Value Date    A1C 7.2 10/21/2020    A1C 7.8 07/20/2020    A1C 9.2 03/18/2019    A1C 9.1 12/10/2018    A1C 8.3 03/26/2018     DXA 8/28/2020  Lumbar spine T-score in region of L2-L4 = -2.4   L1-4 percent change: Not significant%      HIPS:  Mean total hip T-score: -1  Mean total hip percent change: Not significant%      Left femoral neck T-score = -1.9  Right femoral neck T-score = -1.6      Radius 33% T-score = -1.6     FRAX:  10 year probability of major osteoporotic fracture: 11.3%  10 year probability of hip fracture: 2.1%  The 10 year probability of fracture may be lower than reported if the  patient has received treatment. FRAX data should be disregarded in patient's taking bisphosphonates.                                                                 IMPRESSION:    Low bone mass (osteopenia).    ASSESSMENT/PLAN:   Latoya is a 70-year-old retiree with a history of type 1 diabetes who is here for diabetes management.    1.  Type 1 diabetes: A1c 7.2 in October 2020.  Latoya's blood glucose readings over the last 2 weeks were reviewed.  She has episodes of hypoglycemia around midday followed by hyperglycemia. No hypoglycemia at night. She is now taking Lantus in the morning.    Recommendations:  -recommend Dexcom continuous glucose monitor   -Reduce Lantus 26 units at bedtime  -Continue Novolog 1:15g carb coverage with meals and snacks  -Continue 1:50 Novolog correction if over 150 at  breakfast, lunch and dinner, continue 1:50 Novolog correction if BG is >200 at bedtime  -Recheck hemoglobin A1c      2) DM complications:  Retinopathy: normal exam - no DR May 2020   Nephropathy: negative urine microalbumin 3/2020   Neuropathy: none per exam 11/2019     3) Hyperlipidemia: , HDL 71, TG 56, total cholesterol 189 on 7/20/2020. Continue Lipitor 10 mg daily.     4) Osteopenia: DXA 2020 showed the lowest T-score of -1.9 at the left femoral neck. She follows up with PCP. Currently on calcium and vitamin D    -Follow-up in 3 months     VDO start time: 0939 AM  VDO end time:   0954 AM  VDO duration: 15 minutes    External notes/medical records independently reviewed, labs and imaging independently reviewed, medical management and tests to be discussed/communicated to patient.    Time: I spent 30 minutes spent on the date of the encounter preparing to see patient (including chart review and preparation), obtaining and or reviewing additional medical history, performing a limited physical exam and evaluation, documenting clinical information in the electronic health record, independently interpreting results, communicating results to the patient and coordinating care.    Edward Aguero MD  Division of Diabetes and Endocrinology  Department of Medicine  [phone]

## 2021-02-08 NOTE — PROGRESS NOTES
Blood Sugar readings in 2/7 mSpoke message.  Latoya is a 70 year old who is being evaluated via a billable video visit.      How would you like to obtain your AVS? [employer]  If the video visit is dropped, the invitation should be resent by: Send to e-mail at: [email or web address]  Will anyone else be joining your video visit? No    Heike Adams [facility]  Adult Endocrinology  [facility]

## 2021-02-08 NOTE — PROGRESS NOTES
Chief complaint:  Latoya is a 70 year old female seen in follow up of type 1 Diabetes.  HISTORY OF PRESENT ILLNESS  Latoya is a 70-year-old retiree with a history of type 1 diabetes who has a VDO visit today for a follow-up type 1 diabetes.  She was diagnosed with type 1 diabetes 2016 during routine physical where she was found to have high blood glucose levels.  At that time she was diagnosed and treated as a type II diabetic but it was later determined that she had positive TORRES antibodies and low C-peptide.      Her current regimen consist of Lantus 26 units AM, Novolog 1:15 g carbohydrate coverage with breakfast, lunch, and dinner, Novolog correction 1:50 if BG is >150 at meal time, Novolog correction 1:50 if BG is >200 at bedtime    A1c 7.2 in October 2020. Reviewed BG, average 238 (). She has hypoglycemia with BG in 40-60 in the middle of the day particularly when she goes for a walk. She usually walks about 5 miles per day. She is longer having low BG at night. She eats 3 meals per day with some snack at bedtime. She is planning to meet with SRI Hodge for Dexcom sensor initiation process.            Past Medical History  Type 1 diabetes, diagnosed 6/2016  Hyperlipidemia  Osteoporosis     Medications  Current Outpatient Medications   Medication Sig Dispense Refill     atorvastatin (LIPITOR) 10 MG tablet Take 1 tablet (10 mg) by mouth daily 90 tablet 3     blood glucose (ACCU-CHEK MICKY PLUS) test strip Use to test blood sugar 4 times daily or as directed. 400 each 1     blood glucose monitoring (ACCU-CHEK FASTCLIX) lancets Use to test blood sugar 4 times daily or as directed. 306 each 3     calcium carbonate-vitamin D 500-400 MG-UNIT TABS tablt Take 1 tablet by mouth 2 times daily 180 tablet 3     COMPOUNDED NON-CONTROLLED SUBSTANCE (CMPD RX) - PHARMACY TO MIX COMPOUNDED MEDICATION Estriol 0.1% cream (1mg/gram) in HRT base. Place 1 gram vaginally daily for 2 weeks, then vaginally twice weekly. 30 g 6      insulin aspart (NOVOLOG FLEXPEN) 100 UNIT/ML pen 1 unit per 15 gram of carb for breakfast and lunch, and 1 unit per 10 grams for dinner.Plus 1 unit per 50 mg/dl above 150. Total daily dose approx 30 units. (Patient taking differently: Novlog to 1 unit per 15 gram with meals and snacks and continue Lantus 26 units daily.) 30 mL 3     insulin glargine (LANTUS SOLOSTAR) 100 UNIT/ML pen Take 30 units at 6-7 pm (Patient taking differently: Take 26 units in the morning) 45 mL 3     insulin pen needle (B-D U/F) 31G X 8 MM miscellaneous USE WITH NOVOLOG AND LANTUS(FIVE TIMES DAILY) 500 each 3     Multiple Vitamins-Minerals (MULTIVITAMIN ADULT PO) Take 1 tablet by mouth daily       Urine Glucose-Ketones Test (KETO-DIASTIX) STRP 1 strip by In Vitro route as needed 1 each 11     blood glucose (ACCU-CHEK MICKY PLUS) test strip Use to test blood sugars 5 times daily or as directed. *Use brand compatible with patients insurance and device 500 each 3     estradiol (ESTRACE VAGINAL) 0.1 MG/GM vaginal cream Place 2 g vaginally twice a week Apply dime-sized amount daily for the first 2 weeks, then place twice a week thereafter. (Patient not taking: Reported on 1/11/2021) 42.5 g 3     Fesoterodine Fumarate (TOVIAZ) 8 MG TB24 Take 1 tablet (8 mg) by mouth daily (Patient not taking: Reported on 1/11/2021) 90 tablet 3     mirabegron (MYRBETRIQ) 50 MG 24 hr tablet Take 1 tablet (50 mg) by mouth daily (Patient not taking: Reported on 1/11/2021) 90 tablet 3     Allergies  No Known Allergies    Family History  family history includes Brain Cancer in her maternal uncle; Breast Cancer in her mother; Coronary Artery Disease in her maternal uncle and maternal uncle; Hyperlipidemia in her mother; Leukemia in her father; Other Cancer in her father; Skin Cancer in her father; Stomach Cancer in her maternal aunt.  No thyroid disease or type 1 diabetes in the family    Social History  Social History     Socioeconomic History     Marital status:  Single     Spouse name: Not on file     Number of children: Not on file     Years of education: Not on file     Highest education level: Not on file   Occupational History     Not on file   Social Needs     Financial resource strain: Not on file     Food insecurity     Worry: Not on file     Inability: Not on file     Transportation needs     Medical: Not on file     Non-medical: Not on file   Tobacco Use     Smoking status: Never Smoker     Smokeless tobacco: Never Used   Substance and Sexual Activity     Alcohol use: Yes     Comment: 1 to 2 beers or glasses of wine 1 to 2 times per month     Drug use: No     Sexual activity: Never   Lifestyle     Physical activity     Days per week: Not on file     Minutes per session: Not on file     Stress: Not on file   Relationships     Social connections     Talks on phone: Not on file     Gets together: Not on file     Attends Bahai service: Not on file     Active member of club or organization: Not on file     Attends meetings of clubs or organizations: Not on file     Relationship status: Not on file     Intimate partner violence     Fear of current or ex partner: Not on file     Emotionally abused: Not on file     Physically abused: Not on file     Forced sexual activity: Not on file   Other Topics Concern     Parent/sibling w/ CABG, MI or angioplasty before 65F 55M? No   Social History Narrative     Not on file       REVIEW OF SYSTEMS  10 points ROS were negative otherwise mentioned in HPI    Physical Exam  Limited due to VDO visit  GENERAL :  In no apparent distress   RESP: Patient able to speak in complete sentences.  NEURO: awake, alert, responds appropriately to questions.    Remainder of physical exam not completed due to COVID-19 precautions.  This was a telemedicine visit.    DATA REVIEW    Lab Results   Component Value Date    A1C 7.2 10/21/2020    A1C 7.8 07/20/2020    A1C 9.2 03/18/2019    A1C 9.1 12/10/2018    A1C 8.3 03/26/2018     DXA 8/28/2020  Lumbar  spine T-score in region of L2-L4 = -2.4   L1-4 percent change: Not significant%      HIPS:  Mean total hip T-score: -1  Mean total hip percent change: Not significant%      Left femoral neck T-score = -1.9  Right femoral neck T-score = -1.6      Radius 33% T-score = -1.6     FRAX:  10 year probability of major osteoporotic fracture: 11.3%  10 year probability of hip fracture: 2.1%  The 10 year probability of fracture may be lower than reported if the  patient has received treatment. FRAX data should be disregarded in patient's taking bisphosphonates.                                                                 IMPRESSION:    Low bone mass (osteopenia).    ASSESSMENT/PLAN:   Latoya is a 70-year-old retiree with a history of type 1 diabetes who is here for diabetes management.    1.  Type 1 diabetes: A1c 7.2 in October 2020.  Latoya's blood glucose readings over the last 2 weeks were reviewed.  She has episodes of hypoglycemia around midday followed by hyperglycemia. No hypoglycemia at night. She is now taking Lantus in the morning.    Recommendations:  -recommend Dexcom continuous glucose monitor   -Reduce Lantus 26 units at bedtime  -Continue Novolog 1:15g carb coverage with meals and snacks  -Continue 1:50 Novolog correction if over 150 at breakfast, lunch and dinner, continue 1:50 Novolog correction if BG is >200 at bedtime  -Recheck hemoglobin A1c      2) DM complications:  Retinopathy: normal exam - no DR May 2020   Nephropathy: negative urine microalbumin 3/2020   Neuropathy: none per exam 11/2019     3) Hyperlipidemia: , HDL 71, TG 56, total cholesterol 189 on 7/20/2020. Continue Lipitor 10 mg daily.     4) Osteopenia: DXA 2020 showed the lowest T-score of -1.9 at the left femoral neck. She follows up with PCP. Currently on calcium and vitamin D    -Follow-up in 3 months     VDO start time: 0939 AM  VDO end time:   0954 AM  VDO duration: 15 minutes    External notes/medical records independently  reviewed, labs and imaging independently reviewed, medical management and tests to be discussed/communicated to patient.    Time: I spent 30 minutes spent on the date of the encounter preparing to see patient (including chart review and preparation), obtaining and or reviewing additional medical history, performing a limited physical exam and evaluation, documenting clinical information in the electronic health record, independently interpreting results, communicating results to the patient and coordinating care.    Edward Aguero MD  Division of Diabetes and Endocrinology  Department of Medicine  [phone]

## 2021-02-08 NOTE — PATIENT INSTRUCTIONS
- reduce Lantus to 24 units in the morning  - continue Novolog 1 unit per 15 gram of carb  - continue 1:50 Novolog correction if over 150 at breakfast, lunch and dinner  - continue 1:50 Novolog correction if BG is >200 at bedtime    Update glucose next week  Schedule with Linda Diego for glucose sensor  Return in 3-4 months    If you have any questions, please do not hesitate to call [facility] at [phone] and ask for Endocrinology clinic.    Sincerely,    Edward Aguero MD  Endocrinology

## 2021-02-10 ENCOUNTER — ALLIED HEALTH/NURSE VISIT (OUTPATIENT)
Dept: EDUCATION SERVICES | Facility: CLINIC | Age: 71
End: 2021-02-10
Payer: COMMERCIAL

## 2021-02-10 DIAGNOSIS — E11.9 DIABETES MELLITUS WITHOUT COMPLICATION (H): Primary | ICD-10-CM

## 2021-02-10 DIAGNOSIS — E10.9 TYPE 1 DIABETES MELLITUS WITHOUT COMPLICATION (H): ICD-10-CM

## 2021-02-10 LAB — HBA1C MFR BLD: 8.4 % (ref 0–5.6)

## 2021-02-10 PROCEDURE — 83036 HEMOGLOBIN GLYCOSYLATED A1C: CPT | Performed by: INTERNAL MEDICINE

## 2021-02-10 PROCEDURE — 36415 COLL VENOUS BLD VENIPUNCTURE: CPT | Performed by: INTERNAL MEDICINE

## 2021-02-10 PROCEDURE — G0108 DIAB MANAGE TRN  PER INDIV: HCPCS

## 2021-02-10 NOTE — PROGRESS NOTES
"Diabetes Self Management Training: Individual Review Visit    Latoya Rodríguez presents today for education related to Type 1 diabetes.    She is accompanied by self    Patient's diabetes management related comments/concerns: None at this time.     Patient's emotional response to diabetes: expresses readiness to learn    Patient would like this visit to be focused around the following diabetes-related behaviors and goals: Monitoring    ASSESSMENT:  Patient presents to clinic per recommendation of her Endocrinologist, for education regarding continuous glucose monitoring systems, in particular the Dexcom G6. Pt diagnosed with Type 1 Diabetes in 2016. Has been doing multiple daily injection therapy since diagnosis. Single. Retired.    Current Diabetes Management per Patient:  Taking diabetes medications? Yes: See below.    Diabetes Medication(s)     Insulin       insulin aspart (NOVOLOG FLEXPEN) 100 UNIT/ML pen    1 unit per 15 gram of carb for breakfast and lunch, and 1 unit per 10 grams for dinner.Plus 1 unit per 50 mg/dl above 150. Total daily dose approx 30 units.     insulin glargine (LANTUS SOLOSTAR) 100 UNIT/ML pen    Inject 24 Units Subcutaneous every morning          Do you have any difficulty affording your medications or glucose monitoring supplies?  No.      BG values are: Not in goal  Patient's most recent   Lab Results   Component Value Date    A1C 8.4 02/10/2021    is not meeting goal of <7.0    Nutrition:  Not discussed today.     Physical Activity:    Walks five miles most days of the week.     Diabetes Risk Factors:  No Hx Type 1 Diabetes in family.    Diabetes Complications:  Not discussed today.    Recent health service and resource utilization related to diabetes (hyperglycemia, hypoglycemia, etc):   None    Vitals:  There were no vitals taken for this visit.  Estimated body mass index is 26.18 kg/m  as calculated from the following:    Height as of 8/4/20: 1.651 m (5' 5\").    Weight as of 8/4/20: " "71.4 kg (157 lb 4.8 oz).   Last 3 BP:   BP Readings from Last 3 Encounters:   08/04/20 120/60   03/16/20 139/77   02/03/20 124/72       History   Smoking Status     Never Smoker   Smokeless Tobacco     Never Used       Labs:  Lab Results   Component Value Date    A1C 8.4 02/10/2021     Lab Results   Component Value Date     08/02/2019     Lab Results   Component Value Date     07/20/2020     HDL Cholesterol   Date Value Ref Range Status   07/20/2020 71 >49 mg/dL Final   ]  GFR Estimate   Date Value Ref Range Status   07/20/2020 89 >60 mL/min/[1.73_m2] Final     Comment:     Non  GFR Calc  Starting 12/18/2018, serum creatinine based estimated GFR (eGFR) will be   calculated using the Chronic Kidney Disease Epidemiology Collaboration   (CKD-EPI) equation.       GFR Estimate If Black   Date Value Ref Range Status   07/20/2020 >90 >60 mL/min/[1.73_m2] Final     Comment:      GFR Calc  Starting 12/18/2018, serum creatinine based estimated GFR (eGFR) will be   calculated using the Chronic Kidney Disease Epidemiology Collaboration   (CKD-EPI) equation.       Lab Results   Component Value Date    CR 0.67 07/20/2020       Socio/Economic/Cultural Considerations:    Support system: Not assessed    Cultural Influences/Ethnic Background:  Other    Health Literacy/Numeracy:  \"With diabetes, it's helpful to use forms and log books to write down blood sugars and what you're eating at times to help understand how foods affect your blood sugars. With this in mind how confident are you at filling out medical forms, such as these, by yourself?  Not Assessed    Health Beliefs and Attitudes:   Patient Activation Measure Survey Score:  LEX Score (Last Two) 6/24/2016   LEX Raw Score 43   Activation Score 68.5   LEX Level 4       Barriers to Learning: None.     INTERVENTION:   Education provided today on:  Monitoring:   Benefits of using a continuous glucose monitoring system to aid in bg mgmt. "     Problem Solving:   High blood glucose - proper treatment. Low blood glucose - proper treatment.     Opportunities for ongoing education and support in diabetes-self management were discussed.    Pt verbalized understanding of concepts discussed and recommendations provided today.       Education Materials Provided:  Dexcom brochure.     PLAN:  Monitoring:   -Reviewed how Cgms works. Problem Solving:     Problem Solving:  High Bg: Can take correction dose insulin 2 hours or greater post meal.   Low Bg: treat with 15g of a rapid acting carb followed by 15 grams of a complex carb.  If pre-meal Bg is < 80, subtract 15 grams carb from meal dose.  If aware you will be physically active post meal, either decrease meal insulin dose by 1-2 units or change meal ratio from 1:15 to 1:25.       FOLLOW-UP:  Ongoing plan for education and support: Pt plans to think about moving forward with Dexcom G6 Cgms. Knows to call Cde if she decides to do so.      Time Spent: 60 minutes  Encounter Type: Individual    Any diabetes medication dose changes were made via the CDE Protocol and Collaborative Practice Agreement with the patient's endocrinology provider. A copy of this encounter was shared with the provider.    Latoya Rodríguez comes into clinic today at the request of Dr Leon, Ordering Provider for Pt Teaching on use of Cgms.     This service provided today was under the supervising provider of the day Dr Coronel, who was available if needed.    Linda Diego, RN, BSN, CDE   [facility]

## 2021-02-20 DIAGNOSIS — E10.9 TYPE 1 DIABETES MELLITUS WITHOUT COMPLICATION (H): ICD-10-CM

## 2021-02-21 RX ORDER — LANCETS
EACH MISCELLANEOUS
Qty: 306 EACH | Refills: 3 | Status: SHIPPED | OUTPATIENT
Start: 2021-02-21 | End: 2022-01-31

## 2021-02-21 NOTE — TELEPHONE ENCOUNTER
2/8/2021  [facility]   Edward Aguero MD  Endocrinology, Diabetes, and Metabolism    lancets

## 2021-02-23 ENCOUNTER — MYC MEDICAL ADVICE (OUTPATIENT)
Dept: FAMILY MEDICINE | Facility: CLINIC | Age: 71
End: 2021-02-23

## 2021-02-24 ENCOUNTER — MEDICAL CORRESPONDENCE (OUTPATIENT)
Dept: UROLOGY | Facility: CLINIC | Age: 71
End: 2021-02-24

## 2021-02-24 ENCOUNTER — ALLIED HEALTH/NURSE VISIT (OUTPATIENT)
Dept: UROLOGY | Facility: CLINIC | Age: 71
End: 2021-02-24
Payer: COMMERCIAL

## 2021-02-24 ENCOUNTER — ANCILLARY PROCEDURE (OUTPATIENT)
Dept: RADIOLOGY | Facility: AMBULATORY SURGERY CENTER | Age: 71
End: 2021-02-24
Attending: PHYSICIAN ASSISTANT
Payer: COMMERCIAL

## 2021-02-24 VITALS — HEART RATE: 61 BPM | SYSTOLIC BLOOD PRESSURE: 122 MMHG | DIASTOLIC BLOOD PRESSURE: 91 MMHG

## 2021-02-24 DIAGNOSIS — N39.46 MIXED INCONTINENCE: ICD-10-CM

## 2021-02-24 DIAGNOSIS — R35.0 URINARY FREQUENCY: Primary | ICD-10-CM

## 2021-02-24 DIAGNOSIS — R39.15 URINARY URGENCY: ICD-10-CM

## 2021-02-24 DIAGNOSIS — R52 PAIN: ICD-10-CM

## 2021-02-24 LAB
ALBUMIN UR-MCNC: NEGATIVE MG/DL
APPEARANCE UR: CLEAR
BILIRUB UR QL STRIP: NEGATIVE
COLOR UR AUTO: YELLOW
GLUCOSE UR STRIP-MCNC: 250 MG/DL
HGB UR QL STRIP: ABNORMAL
KETONES UR STRIP-MCNC: NEGATIVE MG/DL
LEUKOCYTE ESTERASE UR QL STRIP: NEGATIVE
NITRATE UR QL: NEGATIVE
PH UR STRIP: 5 PH (ref 5–7)
SP GR UR STRIP: <1.005 (ref 1–1.03)
UROBILINOGEN UR STRIP-ACNC: 0.2 EU/DL (ref 0.2–1)

## 2021-02-24 PROCEDURE — 51728 CYSTOMETROGRAM W/VP: CPT | Performed by: PHYSICIAN ASSISTANT

## 2021-02-24 PROCEDURE — 51797 INTRAABDOMINAL PRESSURE TEST: CPT | Performed by: PHYSICIAN ASSISTANT

## 2021-02-24 PROCEDURE — 81003 URINALYSIS AUTO W/O SCOPE: CPT | Performed by: PATHOLOGY

## 2021-02-24 PROCEDURE — 74430 CONTRAST X-RAY BLADDER: CPT | Performed by: PHYSICIAN ASSISTANT

## 2021-02-24 PROCEDURE — 51741 ELECTRO-UROFLOWMETRY FIRST: CPT | Mod: 51 | Performed by: PHYSICIAN ASSISTANT

## 2021-02-24 PROCEDURE — 51784 ANAL/URINARY MUSCLE STUDY: CPT | Mod: 51 | Performed by: PHYSICIAN ASSISTANT

## 2021-02-24 PROCEDURE — 51600 INJECTION FOR BLADDER X-RAY: CPT | Mod: 51 | Performed by: PHYSICIAN ASSISTANT

## 2021-02-24 PROCEDURE — 81003 URINALYSIS AUTO W/O SCOPE: CPT | Performed by: PHYSICIAN ASSISTANT

## 2021-02-24 RX ORDER — SULFAMETHOXAZOLE/TRIMETHOPRIM 800-160 MG
1 TABLET ORAL ONCE
Status: COMPLETED | OUTPATIENT
Start: 2021-02-24 | End: 2021-02-24

## 2021-02-24 RX ORDER — INFLUENZA A VIRUS A/MICHIGAN/45/2015 X-275 (H1N1) ANTIGEN (FORMALDEHYDE INACTIVATED), INFLUENZA A VIRUS A/SINGAPORE/INFIMH-16-0019/2016 IVR-186 (H3N2) ANTIGEN (FORMALDEHYDE INACTIVATED), INFLUENZA B VIRUS B/PHUKET/3073/2013 ANTIGEN (FORMALDEHYDE INACTIVATED), AND INFLUENZA B VIRUS B/MARYLAND/15/2016 BX-69A ANTIGEN (FORMALDEHYDE INACTIVATED) 60; 60; 60; 60 UG/.7ML; UG/.7ML; UG/.7ML; UG/.7ML
INJECTION, SUSPENSION INTRAMUSCULAR SEE ADMIN INSTRUCTIONS
COMMUNITY
Start: 2020-08-29 | End: 2021-06-21

## 2021-02-24 RX ORDER — MIRABEGRON 50 MG/1
TABLET, FILM COATED, EXTENDED RELEASE ORAL
COMMUNITY
Start: 2020-05-05 | End: 2021-06-15

## 2021-02-24 RX ORDER — ZOSTER VACCINE RECOMBINANT, ADJUVANTED 50 MCG/0.5
KIT INTRAMUSCULAR SEE ADMIN INSTRUCTIONS
COMMUNITY
Start: 2020-10-30 | End: 2021-06-21

## 2021-02-24 RX ADMIN — SULFAMETHOXAZOLE AND TRIMETHOPRIM 1 TABLET: 800; 160 TABLET ORAL at 15:31

## 2021-02-24 ASSESSMENT — PAIN SCALES - GENERAL: PAINLEVEL: NO PAIN (0)

## 2021-02-24 NOTE — PROGRESS NOTES
PREPROCEDURE DIAGNOSES:    1. Urinary frequency, urgency  2. Mixed urinary incontinence     POSTPROCEDURE DIAGNOSES:  -Normal bladder capacity (635 mL) with slightly delayed filling sensations (FS: 328 mL, FD: 333 mL, SD: 452 mL).  -Good bladder compliance.  -No DO or stress urinary incontinence while seated despite max Pabd 99 cm H2O at a volume of 349 mL.  -Patient is then asked to stand. There is no incontinence with the motion of standing. However, after a few seconds of standing in place, she then starts to experience mild terminal DO with incontinence when Pdet reaches 4.4 cm H2O. This leakage starts as just a trickle but quickly progresses to more of a gush at which point she is given permission to void.  -Good detrusor contraction during voiding to max Pdet 43 cm H2O with a brisk flow rate (Qmax 32 ml/s), bell-shape flow curve, and complete bladder emptying (PVR 0 mL).  -Mildly increased EMG activity during voiding.  -No evidence for bladder outlet obstruction.  -Fluoroscopy reveals a mildly trabeculated bladder wall without diverticuli or VUR. The bladder neck is closed during filling. Voiding images unavailable as patient transferred to [facility] to void.    PROCEDURE:    1. Uroflowmetry.  2. Sterile urethral catheterization for measurement of postvoid residual urine volume.  3. Complex filling cystometrogram with measurement of bladder and rectal pressures.  4. Complex voiding cystometrogram with measurement of bladder and rectal pressures.  5. Electromyography of the pelvic floor during urodynamics.  6. Fluoroscopic imaging of the bladder during urodynamics, at least 3 views.    7. Interpretation of urodynamics and flouroscopic imaging.      INDICATIONS FOR PROCEDURE:  Ms. Latoya Rodríguez is a pleasant 70 year old female with urinary frequency, urgency, and mixed urinary incontinence. Baseline video urodynamic assessment is requested today by Dr. Zhang to better characterize Ms. Latoya Rodríguez's voiding  dysfunction.      VOIDING DIARY:  Voids 9-10 times per day, nocturia x 1-4.  Episodes of incontinence: several per day - goes through 5-10 pads per day.  Incontinence associated with: did not record.  Total Volume Intake: 2485 mL; 8-12 oz of coffee in the mornings, the rest Crystal [place]  Total Volume Output: 3550 mL; average voided volume 272 mL, largest voided volume 590 mL.    DESCRIPTION OF PROCEDURE:  Risks, benefits, and alternatives to urodynamics were discussed with the patient and she wished to proceed.  Urodynamics are planned to better assess the primary etiology for Ms. Rodríguez's urologic dysfunction.  The patient does not currently take anticholinergic medications or mirabegron for her bladder.  After informed consent was obtained, the patient was taken to the procedure room where uroflowmetry was performed. Findings below.     PRE-STUDY UROFLOWMETRY:  Voided volume: 633 mL.  Maximum flow rate: 21.6 mL/sec.  Average flow rate: 10.9 mL/sec.  Character of the curve: bell-shape.  Postvoid residual by catheter: 220 mL.  Pretest urine dipstick was negative for leukocytes and nitrites.    Next a 7F double-lumen urodynamics catheter was inserted into the bladder under sterile technique via urethra.  A 7F abdominal manometry catheter was [id number] in the rectum.  EMG pads were placed on both sides of the anal verge.  The bladder was filled with 200 mL of Iohexol at 50 mL/minute and serial pressures were recorded.  With coughing there was an appropriate rise in vesical and abdominal pressures with no change in detrusor pressure, confirming good study catheter placement.    DURING THE FILLING PHASE:  First sensation: 328 mL.  First Desire: 333 mL.  Strong Desire: 452 mL.  Maximum Capacity: filled to 455 mL; true MCFP 636 mL based on final voided volume + PVR.    Uninhibited detrusor contractions: mild terminal detrusor overactivity when she stands up from a seated position. There is no leakage associated with the  standing motion itself. However, she then starts to dribble and gush when Pdet reaches 4.4 cm H2O while standing with a full bladder.  Compliance: good. PDet essentially 0 cm H2O throughout filling.   Continence: terminal DOI as described above. No stress leaks despite multiple stress maneuvers while seated with max Pabd 99 cm H2O at a volume of 349 mL.  EMG: concordant during filling.    DURING THE VOIDING PHASE:  Maximum detrusor contraction with void: 43 cm of H2O pressure (Pves catheter then falls out toward the end of voiding).  Voided volume: 636 mL.  Maximum flow rate: 32 mL/sec.  Average flow rate: 12 mL/sec.  Postvoid Residual: 0 mL.  EMG activity: mildly increased.  Character of voiding curve: bell-shape.  BOOI: -70.5 (suggesting no obstruction - see key below)  [obstructed (HARRELL index [BOOI] ? 40); equivocal (no definite   obstruction; BOOI 20-40); and no obstruction (BOOI ? 20)]    FLUOROSCOPIC IMAGING OF THE BLADDER DURING URODYNAMICS:  Please note, image numbers on UDS tracings correlate with iSite series numbers on PACS images. Fluoroscopy during today's procedure demonstrated a mildly trabeculated bladder wall without diverticulae or cellules.  No vesicoureteral reflux was observed.  The bladder neck was closed during filling. No voiding images available as patient transferred to [facility] to void.  After voiding to completion, all catheters were removed and the patient was brought back into the consultation room to further discuss today's study results.      ASSESSMENT/PLAN:  Ms. Latoya Rodríguez is a pleasant 70 year old female with urinary frequency, urgency, and mixed incontinence who demonstrated the following findings today on urodynamic evaluation:    -Normal bladder capacity (635 mL) with slightly delayed filling sensations (FS: 328 mL, FD: 333 mL, SD: 452 mL).  -Good bladder compliance.  -No DO or stress urinary incontinence while seated despite max Pabd 99 cm H2O at a volume of 349 mL.  -Patient  is then asked to stand. There is no incontinence with the motion of standing. However, after a few seconds of standing in place, she then starts to experience mild terminal DO with incontinence when Pdet reaches 4.4 cm H2O. This leakage starts as just a trickle but quickly progresses to more of a gush at which point she is given permission to void.  -Good detrusor contraction during voiding to max Pdet 43 cm H2O with a brisk flow rate (Qmax 32 ml/s), bell-shape flow curve, and complete bladder emptying (PVR 0 mL).  -Mildly increased EMG activity during voiding.  -No evidence for bladder outlet obstruction.  -Fluoroscopy reveals a mildly trabeculated bladder wall without diverticuli or VUR. The bladder neck is closed during filling. Voiding images unavailable as patient transferred to [facility] to void.    The patient will follow up as scheduled with Dr. Zhang to further discuss today's study results and make plans for how best to proceed.      - A single Bactrim antibiotic was provided for UTI prophylaxis following completion of today's study per department protocol.  The risk of UTI with VUDS is low at ~2.5-3%.      Thank you for allowing me to participate in the care of Ms. Latoya Rodríguez and please don't hesitate to contact me with any questions or concerns.      Sandra Garibay PA-C  Urology Physician Assistant

## 2021-02-24 NOTE — NURSING NOTE
Chief Complaint   Patient presents with     Urodynamics Study       Blood pressure (!) 122/91, pulse 61, not currently breastfeeding.     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 400 each 1     blood glucose monitoring (ACCU-CHEK FASTCLIX) lancets USE TO TEST BLOOD SUGAR FOUR TIMES A DAY OR AS DIRECTED 306 each 3     calcium carbonate-vitamin D 500-400 MG-UNIT TABS tablt Take 1 tablet by mouth 2 times daily 180 tablet 3     FLUZONE HIGH-DOSE QUADRIVALENT 0.7 ML REYMUNDO injection See Admin Instructions       insulin aspart (NOVOLOG FLEXPEN) 100 UNIT/ML pen 1 unit per 15 gram of carb for breakfast and lunch, and 1 unit per 10 grams for dinner.Plus 1 unit per 50 mg/dl above 150. Total daily dose approx 30 units. 30 mL 3     insulin glargine (LANTUS SOLOSTAR) 100 UNIT/ML pen Inject 24 Units Subcutaneous every morning 30 mL 3     insulin pen needle (B-D U/F) 31G X 8 MM miscellaneous USE WITH NOVOLOG AND LANTUS(FIVE TIMES DAILY) 500 each 3     Multiple Vitamins-Minerals (MULTIVITAMIN ADULT PO) Take 1 tablet by mouth daily       SHINGRIX injection See Admin Instructions       Urine Glucose-Ketones Test (KETO-DIASTIX) STRP 1 strip by In Vitro route as needed 1 each 11     COMPOUNDED NON-CONTROLLED  SUBSTANCE (CMPD RX) - PHARMACY TO MIX COMPOUNDED MEDICATION Estriol 0.1% cream (1mg/gram) in HRT base. Place 1 gram vaginally daily for 2 weeks, then vaginally twice weekly. (Patient not taking: Reported on 2021) 30 g 6     MYRBETRIQ 50 MG 24 hr tablet          Social History     Tobacco Use     Smoking status: Never Smoker     Smokeless tobacco: Never Used   Substance Use Topics     Alcohol use: Yes     Comment: 1 to 2 beers or glasses of wine 1 to 2 times per month     Drug use: No       Invasive Procedure Safety Checklist:    Procedure: Urodynamics    Action: Complete sections and checkboxes as appropriate.  Pre-procedure:  1. Patient ID Verified with 2 identifiers (Ronna and  or MRN) : YES    2. Procedure and site verified with patient/designee (when able) : YES    3. Accurate consent documentation in medical record : YES    4. H&P (or appropriate assessment) documented in medical record : N/A  H&P must be up to 30 days prior to procedure an updated within 24 hours of Procedure as applicable.     5. Relevant diagnostic and radiology test results appropriately labeled and displayed as applicable : YES    6. Blood products, implants, devices, and/or special equipment available for the procedure as applicable : YES    7. Procedure site(s) marked with provider initials [Exclusions: none] : NO    8. Marking not required. Reason : Yes  Procedure does not require site marking    Time Out:     Time-Out performed immediately prior to starting procedure, including verbal and active participation of all team members addressing: YES    1. Correct patient identity.  2. Confirmed that the correct side and site are marked.  3. An accurate procedure to be done.  4. Agreement on the procedure to be done.  5. Correct patient position.  6. Relevant images and results are properly labeled and appropriately displayed.  7. The need to administer antibiotics or fluids for irrigation purposes during the procedure as  applicable.  8. Safety precautions based on patient history or medication use.    During Procedure: Verification of correct person, site, and procedure occurs any time the responsibility for care of the patient is transferred to another member of the care team.    The following medication was given:    MEDICATION:  Bactrim  ROUTE: PO  SITE: Orally  DOSE: 800/160mg  LOT #: [id number]  : [employer]  EXPIRATION DATE: 10/2021  NDC#: 4795-0090-20   Was there drug waste? No        The following medication was given:     MEDICATION:  Omnipaque (Iohexol Injection) (240mgI/mL)  ROUTE: Provider Administered  SITE: Provider Administered via catheter  DOSE: 200mL  LOT #: [id number]   : [employer]  EXPIRATION DATE: 7/27/2023  NDC#: [address]-88   Was there drug waste? No      Amy Loza CMA  2/24/2021  2:58 PM

## 2021-02-24 NOTE — PATIENT INSTRUCTIONS
Follow up with Dr. Zhang to discuss the results of your Urodynamics Study, and further treatment options.    It was a pleasure meeting with you today.  Thank you for allowing me and my team the privilege of caring for you today.  YOU are the reason we are here, and I truly hope we provided you with the excellent service you deserve.  Please let us know if there is anything else we can do for you so that we can be sure you are leaving completely satisfied with your care experience.        Amy Loza, CMA

## 2021-04-05 ENCOUNTER — VIRTUAL VISIT (OUTPATIENT)
Dept: UROLOGY | Facility: CLINIC | Age: 71
End: 2021-04-05
Payer: COMMERCIAL

## 2021-04-05 DIAGNOSIS — N32.81 OVERACTIVE BLADDER: ICD-10-CM

## 2021-04-05 DIAGNOSIS — N95.2 ATROPHIC VAGINITIS: ICD-10-CM

## 2021-04-05 DIAGNOSIS — N39.3 STRESS INCONTINENCE: ICD-10-CM

## 2021-04-05 DIAGNOSIS — N36.42 INTRINSIC SPHINCTER DEFICIENCY: Primary | ICD-10-CM

## 2021-04-05 PROCEDURE — 99214 OFFICE O/P EST MOD 30 MIN: CPT | Mod: 95 | Performed by: UROLOGY

## 2021-04-05 NOTE — NURSING NOTE
Compounded vaginal estrogen cream ordered per Dr. Zhang. Prescription sent to [facility] pharmacy with request to contact patient to discuss prescription.     Jess Hadley RN, BSN

## 2021-04-05 NOTE — PATIENT INSTRUCTIONS
-continue estrogen cream (she needs a new RX)  -may stay off the Toviaz and Myrbetriq since stopped helping  -schedule periurethral bulking

## 2021-04-05 NOTE — PROGRESS NOTES
Latoya Rodríguez  who is being evaluated via a billable video visit.      How would you like to obtain your AVS? MyChart  If the video visit is dropped, the invitation should be resent by: Send to e-mail at: [email or web address]  Will anyone else be joining your video visit? No    Video-Visit Details    Type of service:  Video Visit    Video Start Time: 12:18 PM    Video End Time:12:38 PM    Originating Location (pt. Location): Home    Distant Location (provider location):  [facility]     Platform used for Video Visit: [employer]

## 2021-04-05 NOTE — PROGRESS NOTES
Reason for visit: F/u on therapy for urgency and frequency   Clinical Data: Ms. Rodríguez is a 69 y/o female with the above. She was started on oxybutynin 15 mg xl as well as estrace cream and referred to pelvic floor PT. She did better on the anticholinergic however continued to have a lot of urgency and some urge incontinence. She was then switched to Detrol which did not work, then Sanctura which did but was too expensive and tried Vesicare but then Toviaz was less expensive and using Myrbetriq and that was working better than anything else but then it was not.  She would go for a walk with her dog and after an hour she would start to leak and after 1.5 hours she would just gush.  Another time that she will gush is when she sits up from a seated position.  This was confirmed by UDS.      HgA1 C is 7.2 but on a daily basis her sugars fluctuate quite a bit.  Of note she has been using her estrogen cream.    UDS on 2/24/21 reviewed:  -Normal bladder capacity (635 mL) with slightly delayed filling sensations (FS: 328 mL, FD: 333 mL, SD: 452 mL).  -Good bladder compliance.  -No DO or stress urinary incontinence while seated despite max Pabd 99 cm H2O at a volume of 349 mL.  -Patient is then asked to stand. There is no incontinence with the motion of standing. However, after a few seconds of standing in place, she then starts to experience mild terminal DO with incontinence when Pdet reaches 4.4 cm H2O. This leakage starts as just a trickle but quickly progresses to more of a gush at which point she is given permission to void.  -Good detrusor contraction during voiding to max Pdet 43 cm H2O with a brisk flow rate (Qmax 32 ml/s), bell-shape flow curve, and complete bladder emptying (PVR 0 mL).  -Mildly increased EMG activity during voiding.  -No evidence for bladder outlet obstruction.  -Fluoroscopy reveals a mildly trabeculated bladder wall without diverticuli or VUR. The bladder neck is closed during filling. Voiding  images unavailable as patient transferred to [facility] to void.    ASSESSMENT and PLAN: This is a 70 year old female with urinary urgency, frequency and urge incontinence as well as a possible stress incontinence component.  She is frustrated and has not noticed a difference with the use of estrogen cream and medication.   She also has DM which is poorly controlled. We discussed continued use of estrogen cream as well as observation and periurethral bulking and she would like to proceed with periurethral bulking.  -continue estrogen cream (she needs a new RX)  -may stay off the Toviaz and Myrbetriq since stopped helping  -schedule periurethral bulking    Thank you for allowing me to participate in the care of Ms. Latoya Rodríguez and I will keep you updated on her progress.   Alberto Zhang MD     30 minutes spent on the date of the encounter doing chart review, history and exam, documentation and further activities per the note

## 2021-04-07 ENCOUNTER — TELEPHONE (OUTPATIENT)
Dept: UROLOGY | Facility: CLINIC | Age: 71
End: 2021-04-07

## 2021-04-07 NOTE — LETTER
April 15, 2021        RE: Latoya Rodríguez  [address]  [place] [address]  : 1950  MRN: [id number]      To Whom It May Concern,      I am writing this letter of medical necessity in regards to the recent denial of Estriol 0.1% cream (1mg/gram) in HRT base- Compound. Latoya Rodríguez is a pleasant female who unfortunately suffers from atrophic vaginitis, urinary urgency, and urinary frequency. The atrophic vaginitis, urinary urgency, and urinary frequency are negatively impacting her quality of life.    The Estriol 0.1% cream (1mg/gram) in HRT base- Compound works to balance and maintain normal vaginal tissue. The Estriol 0.1% cream (1mg/gram) in HRT base- Compound is recommended for the treatment of atrophic vaginitis. The treatment of atrophic vaginitis also works to improve symptoms of urinary urgency and urinary frequency.     We feel that Estriol 0.1% cream (1mg/gram) in HRT base- Compound is the best choice of therapy for Latoya Rodríguez. We would like to pursue this course of therapy and are requesting that you approve our decision to provide Estriol 0.1% cream (1mg/gram) in HRT base- Compound to our patient, allowing us to provide the best treatment option available. We thank you for your immediate attention to this issue and we would appreciate peri in your determination.             Sincerely,        Alberto Zhang MD

## 2021-04-07 NOTE — TELEPHONE ENCOUNTER
"[facility] Call Center    Phone Message    May a detailed message be left on voicemail: yes     Reason for Call: Medication Question or concern regarding medication   Prescription Clarification  Name of Medication: Compounded Medication  Prescribing Provider: Vicente   Pharmacy: [facility]   What on the order needs clarification? Pharmacy is requesting prior auth on \"compounded medication\".    Thanks    Action Taken: Message routed to:  Adult Clinics: Urology p [id number]    Travel Screening: Not Applicable                                                                      "

## 2021-04-08 NOTE — TELEPHONE ENCOUNTER
Prior Authorization Retail Medication Request  Medication/Dose: Compounded non-controlled substance Estriol 0.1% cream (1mg/gram) in HRT base.  ICD code (if different than what is on RX):    Previously Tried and Failed:    Rationale:      Insurance Name:    Insurance ID:        Pharmacy Information (if different than what is on RX)  Name:    Phone:      Jess Hadley RN, BSN

## 2021-04-08 NOTE — TELEPHONE ENCOUNTER
Central Prior Authorization Team   Phone: [phone]      PA Initiation    Medication: Estriol 0.1% cream (1mg/gram) in HRT base (COMPOUND)  Insurance Company: [employer] - Phone [phone] Fax [phone]  Pharmacy Filling the Rx: [facility] - [place], [place] - [address]  Filling Pharmacy Phone: [phone]  Filling Pharmacy Fax:    Start Date: 4/8/2021

## 2021-04-12 ENCOUNTER — TELEPHONE (OUTPATIENT)
Dept: UROLOGY | Facility: CLINIC | Age: 71
End: 2021-04-12

## 2021-04-12 NOTE — TELEPHONE ENCOUNTER
Called and spoke to patient who is aware that her surgery is not officially scheduled yet. Informed patient that the surgery will be taking place at the [facility] and she will be contacted by the [facility] . Patient had questions regarding the surgery at the [facility] and her Medicare. Patient was transferred to the [place] surgery scheduler to discuss questions about her insurance plan and having the surgery at the [facility].    Jess Hadley RN, BSN

## 2021-04-12 NOTE — TELEPHONE ENCOUNTER
Left message for Jennifer at [facility] appeals [phone] to call back. She has questions regarding compoun Estriol.

## 2021-04-14 NOTE — TELEPHONE ENCOUNTER
PRIOR AUTHORIZATION DENIED    Medication: Estriol 0.1% cream (1mg/gram) in HRT base (COMPOUND)-DENIED    Denial Date: 4/8/2021    Denial Rational:           Appeal Information:

## 2021-04-14 NOTE — TELEPHONE ENCOUNTER
Jennifer from [facility] appeals called back to state that PA reviewed from [facility] was denied and forwarded to their appeals department. Jennifer will be faxing denial letter to follow appeal process if provider would like to start process.

## 2021-04-15 NOTE — TELEPHONE ENCOUNTER
Medication Appeal Initiation    We have initiated an appeal for the requested medication:  Medication: Estriol 0.1% cream (1mg/gram) in HRT base (COMPOUND)-APPEAL Pending  Appeal Start Date:  4/15/2021  Insurance Company: [employer] - Phone [phone] Fax [phone]  Comments:  Appeal letter faxed with denial letter

## 2021-04-19 NOTE — TELEPHONE ENCOUNTER
Attempted to reach patient by phone, but no answer and line was busy. Will continue to try to reach patient to inform her of the PA denial below. Will encourage patient to discuss the prescription with [facility] as she could consider option of out of pocket pay.    Jess Hadley RN, BSN

## 2021-04-19 NOTE — TELEPHONE ENCOUNTER
MEDICATION APPEAL DENIED    Medication: Estriol 0.1% cream (1mg/gram) in HRT base (COMPOUND)-APPEAL DENIED    Denial Date: 4/17/2021    Denial Rational:           Second Level Appeal Information: Second level appeals will be managed by the clinic staff and provider.

## 2021-04-22 NOTE — TELEPHONE ENCOUNTER
Called and spoke to patient who is aware of the denial of the compounded estrogen cream. Per patient, she did some research through the [facility] site and the estradiol (generic of estrace) may be covered through [facility]. Per patient, she ended up picking up the compounded estrogen cream through [facility] and has been using that. Per patient, she may consider a prescription for estradiol to [facility] for her next fill and will contact the clinic if she would like to do that.    Jess Hadley RN, BSN

## 2021-04-26 ENCOUNTER — TELEPHONE (OUTPATIENT)
Dept: UROLOGY | Facility: CLINIC | Age: 71
End: 2021-04-26

## 2021-04-26 ENCOUNTER — PATIENT OUTREACH (OUTPATIENT)
Dept: UROLOGY | Facility: CLINIC | Age: 71
End: 2021-04-26

## 2021-04-26 DIAGNOSIS — R35.0 URINARY FREQUENCY: Primary | ICD-10-CM

## 2021-04-26 NOTE — CONFIDENTIAL NOTE
Patient is scheduled for surgery with Dr. Zhang    Spoke with: Latoya    Date of Surgery: 5/11/21    Location: [facility] OR    Informed patient they will need an adult  yes    Pre-op with surgeon (if applicable): n/a    H&P: Scheduled with PAC 5/3/21    Pre-procedure COVID-19 Test: 5/7/21    Additional imaging/appointments: n/a    Surgery packet: to be mailed by 4/29/21     Additional comments: n/a

## 2021-04-26 NOTE — TELEPHONE ENCOUNTER
Sent [employer] message to the patient to remind her what is needed for prior to surgery with Dr. Zhang

## 2021-04-27 NOTE — TELEPHONE ENCOUNTER
FUTURE VISIT INFORMATION      SURGERY INFORMATION:    Date: 5/11/21    Location: [facility] OR    Surgeon:  Alberto Zhang MD    Anesthesia Type:  MAC    Procedure: CYSTOSCOPY, WITH PERIURETHRAL BULKING AGENT INJECTION (Look in the bladder and urethra and inject filler into walls of the urethra)     Consult: virtual visit 4/5    RECORDS REQUESTED FROM:       Primary Care Provider: Pricila Avila APRN [facility]

## 2021-04-28 DIAGNOSIS — Z11.59 ENCOUNTER FOR SCREENING FOR OTHER VIRAL DISEASES: ICD-10-CM

## 2021-05-03 ENCOUNTER — PRE VISIT (OUTPATIENT)
Dept: SURGERY | Facility: CLINIC | Age: 71
End: 2021-05-03

## 2021-05-03 ENCOUNTER — ANESTHESIA EVENT (OUTPATIENT)
Dept: SURGERY | Facility: AMBULATORY SURGERY CENTER | Age: 71
End: 2021-05-03

## 2021-05-03 ENCOUNTER — VIRTUAL VISIT (OUTPATIENT)
Dept: SURGERY | Facility: CLINIC | Age: 71
End: 2021-05-03
Payer: COMMERCIAL

## 2021-05-03 DIAGNOSIS — Z01.818 PREOP EXAMINATION: Primary | ICD-10-CM

## 2021-05-03 PROCEDURE — 99204 OFFICE O/P NEW MOD 45 MIN: CPT | Mod: 95 | Performed by: PHYSICIAN ASSISTANT

## 2021-05-03 ASSESSMENT — PAIN SCALES - GENERAL: PAINLEVEL: NO PAIN (0)

## 2021-05-03 ASSESSMENT — ENCOUNTER SYMPTOMS: SEIZURES: 0

## 2021-05-03 ASSESSMENT — LIFESTYLE VARIABLES: TOBACCO_USE: 0

## 2021-05-03 NOTE — PATIENT INSTRUCTIONS
Preparing for Your Surgery      Name:  Latoya Rodríguez   MRN:  [id number]   :  1950   Today's Date:  5/3/2021       Arriving for surgery:  Surgery date:  21  Arrival time:  10:30 am    Restrictions due to COVID 19:  One consistent visitor per patient is allowed.  The visitor will be allowed in the pre-op area.  Visitors are asked to leave the building during the surgery.  No ill visitors.  All visitors must wear face mask.    [employer] parking is available for anyone with mobility limitations or disabilities.  ([place]  24 hours/ 7 days a week; [place]  7 am- 3:30 pm, Mon- Fri)    Please come to:     [facility] [address] [address]     Parking is available in front of the [facility] building from 5:30AM to 8:00PM.  -  Proceed to the 5th floor to check into the Ambulatory Surgery Center.              >> There will be patient concierges on the 1st and 5th floor, for assistance or an escort, if you would like.              >> Please call [phone] with any questions.    What can I eat or drink?  -  You may eat and drink normally for up to 8 hours before your surgery.   -  You may have clear liquids until 4 hours before surgery.     Examples of clear liquids:  Water  Clear broth  Juices (apple, white grape, white cranberry  and cider) without pulp  Noncarbonated, powder based beverages  (lemonade and Isaiah-Aid)  Sodas (Sprite, 7-Up, ginger ale and seltzer)  Coffee or tea (without milk or cream)  Gatorade    -  No Alcohol for at least 24 hours before surgery     Which medicines can I take?  Hold Aspirin for 7 days before surgery.   Hold Multivitamins for 7 days before surgery.  Hold Supplements for 7 days before surgery.  Hold Ibuprofen (Advil, Motrin) for 1 day before surgery--unless otherwise directed by surgeon.  Hold Naproxen (Aleve) for 4 days before surgery.  Take 24 units of glargine insulin the night  before surgery.  -  DO NOT take these medications the day of surgery:  novolog insulin.  -  PLEASE TAKE these medications the day of surgery:  Tylenol if needed; take other morning medications.    How do I prepare myself?  - Please take 2 showers before surgery using Scrubcare or Hibiclens soap.    Use this soap only from the neck to your toes.     Leave the soap on your skin for one minute--then rinse thoroughly.      You may use your own shampoo and conditioner; no other hair products.   - Please remove all jewelry and body piercings.  - No lotions, deodorants or fragrance.  - No makeup or fingernail polish.   - Bring your ID and insurance card.    - All patients are required to have a Covid-19 test within 4 days of surgery/procedure.      -Patients will be contacted by the [facility] scheduling team within 1 week of surgery to make an appointment.      - Patients may call the Scheduling team at [phone] if they have not been scheduled within 4 days of  surgery.      ALL PATIENTS GOING HOME THE SAME DAY OF SURGERY ARE REQUIRED TO HAVE A RESPONSIBLE ADULT TO DRIVE AND BE IN ATTENDANCE WITH THEM FOR 24 HOURS FOLLOWING SURGERY.    IF THE RESPONSIBLE ADULT IS REQUIRED FOR POST OP TEACHING THE POST OP RN WILL ASK THEM TO COME BACK TO THE RECOVERY AREA.    Questions or Concerns:    - For any questions regarding the day of surgery or your hospital stay, please contact the Pre Admission Nursing Office at [phone].       - If you have health changes between today and your surgery please call your surgeon.       For questions after surgery please call your surgeons office.

## 2021-05-03 NOTE — ANESTHESIA PREPROCEDURE EVALUATION
Anesthesia Pre-Procedure Evaluation    Patient: Latoya Rodríguez   MRN: [id number] : 1950        Preoperative Diagnosis: * No surgery found *   Procedure :      Past Medical History:   Diagnosis Date     Cataract      Type 2 diabetes mellitus with hyperglycemia (H) 2016      Past Surgical History:   Procedure Laterality Date     EYE SURGERY  ,     Retinal detachment, Cataract (both eyes)     RELEASE TRIGGER FINGER Bilateral 5/10/2019    Procedure: RELEASE TRIGGER FINGER, BILATERAL LONG AND RING FINGERS;  Surgeon: RADHA Sandoval MD;  Location: [facility] OR     VASCULAR SURGERY      Varicose Veins      No Known Allergies   Social History     Tobacco Use     Smoking status: Never Smoker     Smokeless tobacco: Never Used   Substance Use Topics     Alcohol use: Yes     Comment: 1 to 2 beers or glasses of wine 1 to 2 times per month      Wt Readings from Last 1 Encounters:   20 71.4 kg (157 lb 4.8 oz)        Anesthesia Evaluation   Pt has had prior anesthetic.     No history of anesthetic complications       ROS/MED HX  ENT/Pulmonary:    (-) tobacco use, asthma and sleep apnea   Neurologic:  - neg neurologic ROS  (-) no seizures, no CVA and migraines   Cardiovascular:  - neg cardiovascular ROS     METS/Exercise Tolerance: >4 METS Comment: Walks 4-5 miles per day   Hematologic: Comments: Prior varicose vein surgeries   (-) history of blood clots and history of blood transfusion   Musculoskeletal:   (+) arthritis,     GI/Hepatic:  - neg GI/hepatic ROS     Renal/Genitourinary:  - neg Renal ROS     Endo:     (+) type II DM, Last HgA1c: 8.4, date: 2/10/21, Using insulin, Normal glucose range: 105-220,  (-) thyroid disease and chronic steroid usage   Psychiatric/Substance Use:  - neg psychiatric ROS     Infectious Disease:       Malignancy:  - neg malignancy ROS     Other:  - neg other ROS          Physical Exam    Airway  airway exam normal           Respiratory Devices and Support         Dental  no  notable dental history         Cardiovascular   cardiovascular exam normal          Pulmonary   pulmonary exam normal                OUTSIDE LABS:  CBC:   Lab Results   Component Value Date    WBC 3.8 (L) 06/24/2016    HGB 14.4 06/24/2016    HCT 40.7 06/24/2016     06/24/2016     BMP:   Lab Results   Component Value Date     08/02/2019     (H) 09/16/2016    POTASSIUM 4.0 08/02/2019    POTASSIUM 3.7 09/16/2016    CHLORIDE 103 08/02/2019    CHLORIDE 108 09/16/2016    CO2 24 08/02/2019    CO2 29 09/16/2016    BUN 16 08/02/2019    BUN 12 09/16/2016    CR 0.67 07/20/2020    CR 0.62 08/02/2019     (H) 08/02/2019    GLC 63 (L) 09/16/2016     COAGS: No results found for: PTT, INR, FIBR  POC: No results found for: BGM, HCG, HCGS  HEPATIC:   Lab Results   Component Value Date    ALBUMIN 3.7 08/02/2019    PROTTOTAL 6.7 (L) 08/02/2019    ALT 24 08/02/2019    AST 16 08/02/2019    ALKPHOS 97 08/02/2019    BILITOTAL 0.7 08/02/2019     OTHER:   Lab Results   Component Value Date    A1C 8.4 (H) 02/10/2021    RENATO 9.0 08/02/2019    TSH 2.01 08/02/2019    T4 1.33 07/03/2017    T3 82 03/13/2017       Anesthesia Plan    ASA Status:  2   NPO Status:  NPO Appropriate    Anesthesia Type: MAC.     - Reason for MAC: straight local not clinically adequate   Induction: Intravenous, Propofol.   Maintenance: Balanced.        Consents    Anesthesia Plan(s) and associated risks, benefits, and realistic alternatives discussed. Questions answered and patient/representative(s) expressed understanding.     - Discussed with:  Patient      - Extended Intubation/Ventilatory Support Discussed: No.      - Patient is DNR/DNI Status: No    Use of blood products discussed: No .     Postoperative Care    Pain management: IV analgesics, Oral pain medications, Multi-modal analgesia.   PONV prophylaxis: Dexamethasone or Solumedrol, Ondansetron (or other 5HT-3)     Comments:              PAC Discussion and Assessment    ASA Classification:  2  Case is suitable for: [facility]  Anesthetic techniques and relevant risks discussed: MAC with GA as backup  Invasive monitoring and risk discussed: No    Possibility and Risk of blood transfusion discussed: No            PAC Resident/NP Anesthesia Assessment: Latoya Rodríguez is a 70 year old female scheduled for CYSTOSCOPY, WITH PERIURETHRAL BULKING AGENT INJECTION on 5/11/21 by Dr. Zhang in treatment of intrinsic sphincter deficiency, urinary urgency and frequency.  PAC referral for risk assessment and optimization for anesthesia with comorbid conditions of dyslipidemia and diabetes:    Pre-operative considerations:  1.  Cardiac:  Functional status- METS >4.  Low risk surgery with 0.9% (RCRI #) risk of major adverse cardiac event. Denies cardiac history. Denies any chest pain, SOB, CERDA, palpitations, orthopnea, edema.  --continue Lipitor    2.  Pulm:  Airway feasible.  GUERRERO risk: Low  --never smoker    3.  GI:  Risk of PONV score = 2.  If > 2, anti-emetic intervention recommended.    4.  Endo:  --IDDM, will hold Novolog DOS, will adjust Lantus to 80% of usual dose evening before surgery.  --A1c 8.4%, 2/10/21.  Pt is working with endocrinology.  Blood sugars run 105-220.    VTE risk: 0.5%    Patient is optimized and is acceptable candidate for the proposed procedure.  No further diagnostic evaluation is needed.         **For further details of assessment, testing, and physical exam please see H and P completed on same date.          Maria Esther Sena PA-C, [facility]    Reviewed and Signed by PAC Mid-Level Provider/Resident  Mid-Level Provider/Resident: Maria Esther Sena  Date: 5/3/21                                 Maria Esther Sena PA-C

## 2021-05-03 NOTE — H&P
Pre-Operative H & P     CC:  Preoperative exam to assess for increased cardiopulmonary risk while undergoing surgery and anesthesia.    Date of Encounter: 5/3/2021  Primary Care Physician:  Pricila Avila  Associated Diagnosis: intrinsic sphincter deficiency        Video-Visit Details    Type of service:  Video Visit    Patient verbally consented to video service today: YES      Video Call length: 10 minutes    Originating Location (pt. Location): Home    Distant Location (provider location):  home    Mode of Communication:  Video Conference [facility]  Latoya Rodríguez is a 70 year old female who presents for pre-operative H & P in preparation for CYSTOSCOPY, WITH PERIURETHRAL BULKING AGENT INJECTION with Dr. Zhang on 5/11/21 at [facility].     This is a 70-year-old female with past medical history significant for insulin-dependent diabetes and urinary urgency and frequency.  Patient has been followed by urology for her urinary symptoms.  She has tried oxybutynin as well as Estrace cream, Detrol, Sanctura, Myrbetriq among others.  Patient is physically active she walks her dog 4 to 5 miles a day and notices that about after an hour into her walk she would start to leak and then she would gush.  She has been referred to pelvic floor physical therapy.  She has undergone urodynamic studies and now the above procedure is planned.    History is obtained from the patient and the medical record.     Past Medical History  Past Medical History:   Diagnosis Date     Cataract      Type 2 diabetes mellitus with hyperglycemia (H) 6/24/2016       Past Surgical History  Past Surgical History:   Procedure Laterality Date     EYE SURGERY  9/04, 5/11    Retinal detachment, Cataract (both eyes)     RELEASE TRIGGER FINGER Bilateral 5/10/2019    Procedure: RELEASE TRIGGER FINGER, BILATERAL LONG AND RING FINGERS;  Surgeon: RADHA Sandoval MD;  Location:  OR     VASCULAR SURGERY       Varicose Veins       Hx of Blood transfusions/reactions: denies     Hx of abnormal bleeding or anti-platelet use: denies    Menstrual history: No LMP recorded. Patient is postmenopausal.:     Steroid use in the last year: denies    Personal or FH with difficulty with Anesthesia:  denies    Prior to Admission Medications  Current Outpatient Medications   Medication Sig Dispense Refill     APPLE CIDER VINEGAR PO Take by mouth every morning       atorvastatin (LIPITOR) 10 MG tablet Take 1 tablet (10 mg) by mouth daily (Patient taking differently: Take 10 mg by mouth every morning ) 90 tablet 3     calcium carbonate-vitamin D 500-400 MG-UNIT TABS tablt Take 1 tablet by mouth 2 times daily 180 tablet 3     CINNAMON PO Take by mouth every morning       COMPOUNDED NON-CONTROLLED SUBSTANCE (CMPD RX) - PHARMACY TO MIX COMPOUNDED MEDICATION Estriol 0.1% cream (1mg/gram) in HRT base. Place 1 gram vaginally daily for 2 weeks, then vaginally twice weekly. (Patient taking differently: twice a week Estriol 0.1% cream (1mg/gram) in HRT base. Place 1 gram vaginally daily for 2 weeks, then vaginally twice weekly.) 30 g 6     COMPOUNDED NON-CONTROLLED SUBSTANCE (CMPD RX) - PHARMACY TO MIX COMPOUNDED MEDICATION Estriol 0.1% cream (1mg/gram) in HRT base. Place 1 gram vaginally daily for 2 weeks, then vaginally twice weekly. (Patient taking differently: twice a week Estriol 0.1% cream (1mg/gram) in HRT base. Place 1 gram vaginally daily for 2 weeks, then vaginally twice weekly.) 30 g 6     insulin aspart (NOVOLOG FLEXPEN) 100 UNIT/ML pen 1 unit per 15 gram of carb for breakfast and lunch, and 1 unit per 10 grams for dinner.Plus 1 unit per 50 mg/dl above 150. Total daily dose approx 30 units. 30 mL 3     insulin glargine (LANTUS SOLOSTAR) 100 UNIT/ML pen Inject 24 Units Subcutaneous every morning (Patient taking differently: Inject 28 Units Subcutaneous At Bedtime ) 30 mL 3     Multiple Vitamins-Minerals (MULTIVITAMIN ADULT PO) Take 1  tablet by mouth every morning        blood glucose (ACCU-CHEK MICKY PLUS) test strip Use to test blood sugar 4 times daily or as directed. 400 each 1     blood glucose monitoring (ACCU-CHEK FASTCLIX) lancets USE TO TEST BLOOD SUGAR FOUR TIMES A DAY OR AS DIRECTED 306 each 3     FLUZONE HIGH-DOSE QUADRIVALENT 0.7 ML REYMUNDO injection See Admin Instructions       insulin pen needle (B-D U/F) 31G X 8 MM miscellaneous USE WITH NOVOLOG AND LANTUS(FIVE TIMES DAILY) 500 each 3     MYRBETRIQ 50 MG 24 hr tablet        SHINGRIX injection See Admin Instructions       Urine Glucose-Ketones Test (KETO-DIASTIX) STRP 1 strip by In Vitro route as needed 1 each 11       Allergies  No Known Allergies    Social History  Social History     Socioeconomic History     Marital status: Single     Spouse name: Not on file     Number of children: Not on file     Years of education: Not on file     Highest education level: Not on file   Occupational History     Not on file   Social Needs     Financial resource strain: Not on file     Food insecurity     Worry: Not on file     Inability: Not on file     Transportation needs     Medical: Not on file     Non-medical: Not on file   Tobacco Use     Smoking status: Never Smoker     Smokeless tobacco: Never Used   Substance and Sexual Activity     Alcohol use: Yes     Comment: 1 to 2 beers or glasses of wine 1 to 2 times per month     Drug use: No     Sexual activity: Never   Lifestyle     Physical activity     Days per week: Not on file     Minutes per session: Not on file     Stress: Not on file   Relationships     Social connections     Talks on phone: Not on file     Gets together: Not on file     Attends Roman [employer] service: Not on file     Active member of club or organization: Not on file     Attends meetings of clubs or organizations: Not on file     Relationship status: Not on file     Intimate partner violence     Fear of current or ex partner: Not on file     Emotionally abused: Not on file      Physically abused: Not on file     Forced sexual activity: Not on file   Other Topics Concern     Parent/sibling w/ CABG, MI or angioplasty before 65F 55M? No   Social History Narrative     Not on file       Family History  Family History   Problem Relation Age of Onset     Hyperlipidemia Mother      Breast Cancer Mother      Other Cancer Father      Leukemia Father      Skin Cancer Father      Coronary Artery Disease Maternal Uncle      Brain Cancer Maternal Uncle      Coronary Artery Disease Maternal Uncle      Stomach Cancer Maternal Aunt      Diabetes No family hx of      Hypertension No family hx of      Cerebrovascular Disease No family hx of      Colon Cancer No family hx of      Thyroid Disease No family hx of      Depression No family hx of      Anxiety Disorder No family hx of            Anesthesia Evaluation   Pt has had prior anesthetic.     No history of anesthetic complications       ROS/MED HX  ENT/Pulmonary:    (-) tobacco use, asthma and sleep apnea   Neurologic:  - neg neurologic ROS  (-) no seizures, no CVA and migraines   Cardiovascular:  - neg cardiovascular ROS     METS/Exercise Tolerance: >4 METS Comment: Walks 4-5 miles per day   Hematologic: Comments: Prior varicose vein surgeries   (-) history of blood clots and history of blood transfusion   Musculoskeletal:   (+) arthritis,     GI/Hepatic:  - neg GI/hepatic ROS     Renal/Genitourinary:  - neg Renal ROS     Endo:     (+) type II DM, Last HgA1c: 8.4, date: 2/10/21, Using insulin, Normal glucose range: 105-220,  (-) thyroid disease and chronic steroid usage   Psychiatric/Substance Use:  - neg psychiatric ROS     Infectious Disease:       Malignancy:  - neg malignancy ROS     Other:  - neg other ROS          The complete review of systems is negative other than noted in the HPI or here.       Physical Exam    Please refer to the physical examination documented by the anesthesiologist in the anesthesia record on the day of  surgery    Constitutional: Awake, alert, cooperative, no apparent distress, and appears stated age.  Respiratory: non labored breathing  Neurologic: Awake, alert, oriented to name, place and time.   Neuropsychiatric: Calm, cooperative. Normal affect.     PRIOR LABS/DIAGNOSTIC STUDIES:  All labs and imaging personally reviewed        Component      Latest Ref Rng & Units 2/10/2021   Hemoglobin A1C      0 - 5.6 % 8.4 (H)     Component      Latest Ref Rng & Units 10/21/2020   Hemoglobin A1C      0 - 5.6 % 7.2 (H)         Outside records reviewed from: care everywhere      ASSESSMENT and PLAN  Latoya Rodríguez is a 70 year old female scheduled for CYSTOSCOPY, WITH PERIURETHRAL BULKING AGENT INJECTION on 5/11/21 by Dr. Zhang in treatment of intrinsic sphincter deficiency, urinary urgency and frequency.  PAC referral for risk assessment and optimization for anesthesia with comorbid conditions of dyslipidemia and diabetes:    Pre-operative considerations:  1.  Cardiac:  Functional status- METS >4.  Low risk surgery with 0.9% (RCRI #) risk of major adverse cardiac event. Denies cardiac history. Denies any chest pain, SOB, CERDA, palpitations, orthopnea, edema.  --continue Lipitor    2.  Pulm:  Airway feasible.  GUERRERO risk: Low  --never smoker    3.  GI:  Risk of PONV score = 2.  If > 2, anti-emetic intervention recommended.    4.  Endo:  --IDDM, will hold Novolog DOS, will adjust Lantus to 80% of usual dose evening before surgery.  --A1c 8.4%, 2/10/21.  Pt is working with endocrinology.  Blood sugars run 105-220.    VTE risk: 0.5%    Patient is optimized and is acceptable candidate for the proposed procedure.  No further diagnostic evaluation is needed.       Maria Esther Sena PA-C  Preoperative Assessment Center  [facility]  Phone: [phone]  Fax: [phone]

## 2021-05-03 NOTE — H&P (VIEW-ONLY)
Pre-Operative H & P     CC:  Preoperative exam to assess for increased cardiopulmonary risk while undergoing surgery and anesthesia.    Date of Encounter: 5/3/2021  Primary Care Physician:  Pricila Avila  Associated Diagnosis: intrinsic sphincter deficiency        Video-Visit Details    Type of service:  Video Visit    Patient verbally consented to video service today: YES      Video Call length: 10 minutes    Originating Location (pt. Location): Home    Distant Location (provider location):  home    Mode of Communication:  [facility] Conference [facility]  Latoya Rodríguez is a 70 year old female who presents for pre-operative H & P in preparation for CYSTOSCOPY, WITH PERIURETHRAL BULKING AGENT INJECTION with Dr. Zhang on 5/11/21 at [facility].     This is a 70-year-old female with past medical history significant for insulin-dependent diabetes and urinary urgency and frequency.  Patient has been followed by urology for her urinary symptoms.  She has tried oxybutynin as well as Estrace cream, Detrol, Sanctura, Myrbetriq among others.  Patient is physically active she walks her dog 4 to 5 miles a day and notices that about after an hour into her walk she would start to leak and then she would gush.  She has been referred to pelvic floor physical therapy.  She has undergone urodynamic studies and now the above procedure is planned.    History is obtained from the patient and the medical record.     Past Medical History  Past Medical History:   Diagnosis Date     Cataract      Type 2 diabetes mellitus with hyperglycemia (H) 6/24/2016       Past Surgical History  Past Surgical History:   Procedure Laterality Date     EYE SURGERY  9/04, 5/11    Retinal detachment, Cataract (both eyes)     RELEASE TRIGGER FINGER Bilateral 5/10/2019    Procedure: RELEASE TRIGGER FINGER, BILATERAL LONG AND RING FINGERS;  Surgeon: RADHA Sandoval MD;  Location:  OR     VASCULAR SURGERY       Varicose Veins       Hx of Blood transfusions/reactions: denies     Hx of abnormal bleeding or anti-platelet use: denies    Menstrual history: No LMP recorded. Patient is postmenopausal.:     Steroid use in the last year: denies    Personal or FH with difficulty with Anesthesia:  denies    Prior to Admission Medications  Current Outpatient Medications   Medication Sig Dispense Refill     APPLE CIDER VINEGAR PO Take by mouth every morning       atorvastatin (LIPITOR) 10 MG tablet Take 1 tablet (10 mg) by mouth daily (Patient taking differently: Take 10 mg by mouth every morning ) 90 tablet 3     calcium carbonate-vitamin D 500-400 MG-UNIT TABS tablt Take 1 tablet by mouth 2 times daily 180 tablet 3     CINNAMON PO Take by mouth every morning       COMPOUNDED NON-CONTROLLED SUBSTANCE (CMPD RX) - PHARMACY TO MIX COMPOUNDED MEDICATION Estriol 0.1% cream (1mg/gram) in HRT base. Place 1 gram vaginally daily for 2 weeks, then vaginally twice weekly. (Patient taking differently: twice a week Estriol 0.1% cream (1mg/gram) in HRT base. Place 1 gram vaginally daily for 2 weeks, then vaginally twice weekly.) 30 g 6     COMPOUNDED NON-CONTROLLED SUBSTANCE (CMPD RX) - PHARMACY TO MIX COMPOUNDED MEDICATION Estriol 0.1% cream (1mg/gram) in HRT base. Place 1 gram vaginally daily for 2 weeks, then vaginally twice weekly. (Patient taking differently: twice a week Estriol 0.1% cream (1mg/gram) in HRT base. Place 1 gram vaginally daily for 2 weeks, then vaginally twice weekly.) 30 g 6     insulin aspart (NOVOLOG FLEXPEN) 100 UNIT/ML pen 1 unit per 15 gram of carb for breakfast and lunch, and 1 unit per 10 grams for dinner.Plus 1 unit per 50 mg/dl above 150. Total daily dose approx 30 units. 30 mL 3     insulin glargine (LANTUS SOLOSTAR) 100 UNIT/ML pen Inject 24 Units Subcutaneous every morning (Patient taking differently: Inject 28 Units Subcutaneous At Bedtime ) 30 mL 3     Multiple Vitamins-Minerals (MULTIVITAMIN ADULT PO) Take 1  tablet by mouth every morning        blood glucose (ACCU-CHEK MICKY PLUS) test strip Use to test blood sugar 4 times daily or as directed. 400 each 1     blood glucose monitoring (ACCU-CHEK FASTCLIX) lancets USE TO TEST BLOOD SUGAR FOUR TIMES A DAY OR AS DIRECTED 306 each 3     FLUZONE HIGH-DOSE QUADRIVALENT 0.7 ML REYMUNDO injection See Admin Instructions       insulin pen needle (B-D U/F) 31G X 8 MM miscellaneous USE WITH NOVOLOG AND LANTUS(FIVE TIMES DAILY) 500 each 3     MYRBETRIQ 50 MG 24 hr tablet        SHINGRIX injection See Admin Instructions       Urine Glucose-Ketones Test (KETO-DIASTIX) STRP 1 strip by In Vitro route as needed 1 each 11       Allergies  No Known Allergies    Social History  Social History     Socioeconomic History     Marital status: Single     Spouse name: Not on file     Number of children: Not on file     Years of education: Not on file     Highest education level: Not on file   Occupational History     Not on file   Social Needs     Financial resource strain: Not on file     Food insecurity     Worry: Not on file     Inability: Not on file     Transportation needs     Medical: Not on file     Non-medical: Not on file   Tobacco Use     Smoking status: Never Smoker     Smokeless tobacco: Never Used   Substance and Sexual Activity     Alcohol use: Yes     Comment: 1 to 2 beers or glasses of wine 1 to 2 times per month     Drug use: No     Sexual activity: Never   Lifestyle     Physical activity     Days per week: Not on file     Minutes per session: Not on file     Stress: Not on file   Relationships     Social connections     Talks on phone: Not on file     Gets together: Not on file     Attends Sabianist service: Not on file     Active member of club or organization: Not on file     Attends meetings of clubs or organizations: Not on file     Relationship status: Not on file     Intimate partner violence     Fear of current or ex partner: Not on file     Emotionally abused: Not on file      Physically abused: Not on file     Forced sexual activity: Not on file   Other Topics Concern     Parent/sibling w/ CABG, MI or angioplasty before 65F 55M? No   Social History Narrative     Not on file       Family History  Family History   Problem Relation Age of Onset     Hyperlipidemia Mother      Breast Cancer Mother      Other Cancer Father      Leukemia Father      Skin Cancer Father      Coronary Artery Disease Maternal Uncle      Brain Cancer Maternal Uncle      Coronary Artery Disease Maternal Uncle      Stomach Cancer Maternal Aunt      Diabetes No family hx of      Hypertension No family hx of      Cerebrovascular Disease No family hx of      Colon Cancer No family hx of      Thyroid Disease No family hx of      Depression No family hx of      Anxiety Disorder No family hx of            Anesthesia Evaluation   Pt has had prior anesthetic.     No history of anesthetic complications       ROS/MED HX  ENT/Pulmonary:    (-) tobacco use, asthma and sleep apnea   Neurologic:  - neg neurologic ROS  (-) no seizures, no CVA and migraines   Cardiovascular:  - neg cardiovascular ROS     METS/Exercise Tolerance: >4 METS Comment: Walks 4-5 miles per day   Hematologic: Comments: Prior varicose vein surgeries   (-) history of blood clots and history of blood transfusion   Musculoskeletal:   (+) arthritis,     GI/Hepatic:  - neg GI/hepatic ROS     Renal/Genitourinary:  - neg Renal ROS     Endo:     (+) type II DM, Last HgA1c: 8.4, date: 2/10/21, Using insulin, Normal glucose range: 105-220,  (-) thyroid disease and chronic steroid usage   Psychiatric/Substance Use:  - neg psychiatric ROS     Infectious Disease:       Malignancy:  - neg malignancy ROS     Other:  - neg other ROS          The complete review of systems is negative other than noted in the HPI or here.       Physical Exam    Please refer to the physical examination documented by the anesthesiologist in the anesthesia record on the day of  surgery    Constitutional: Awake, alert, cooperative, no apparent distress, and appears stated age.  Respiratory: non labored breathing  Neurologic: Awake, alert, oriented to name, place and time.   Neuropsychiatric: Calm, cooperative. Normal affect.     PRIOR LABS/DIAGNOSTIC STUDIES:  All labs and imaging personally reviewed        Component      Latest Ref Rng & Units 2/10/2021   Hemoglobin A1C      0 - 5.6 % 8.4 (H)     Component      Latest Ref Rng & Units 10/21/2020   Hemoglobin A1C      0 - 5.6 % 7.2 (H)         Outside records reviewed from: care everywhere      ASSESSMENT and PLAN  Latoya Rodríguez is a 70 year old female scheduled for CYSTOSCOPY, WITH PERIURETHRAL BULKING AGENT INJECTION on 5/11/21 by Dr. Zhang in treatment of intrinsic sphincter deficiency, urinary urgency and frequency.  PAC referral for risk assessment and optimization for anesthesia with comorbid conditions of dyslipidemia and diabetes:    Pre-operative considerations:  1.  Cardiac:  Functional status- METS >4.  Low risk surgery with 0.9% (RCRI #) risk of major adverse cardiac event. Denies cardiac history. Denies any chest pain, SOB, CERDA, palpitations, orthopnea, edema.  --continue Lipitor    2.  Pulm:  Airway feasible.  GUERRERO risk: Low  --never smoker    3.  GI:  Risk of PONV score = 2.  If > 2, anti-emetic intervention recommended.    4.  Endo:  --IDDM, will hold Novolog DOS, will adjust Lantus to 80% of usual dose evening before surgery.  --A1c 8.4%, 2/10/21.  Pt is working with endocrinology.  Blood sugars run 105-220.    VTE risk: 0.5%    Patient is optimized and is acceptable candidate for the proposed procedure.  No further diagnostic evaluation is needed.       Maria Esther Sena PA-C  Preoperative Assessment Center  [facility]  Phone: [phone]  Fax: [phone]

## 2021-05-03 NOTE — PROGRESS NOTES
Ltaoya is a 70 year old who is being evaluated via a billable video visit.      How would you like to obtain your AVS? MyChart    HPI         Review of Systems         Objective    Vitals - Patient Reported  Pain Score: No Pain (0)        Physical Exam     LYNETTE Gonzalez LPN

## 2021-05-04 DIAGNOSIS — R35.0 URINARY FREQUENCY: ICD-10-CM

## 2021-05-04 PROCEDURE — 87086 URINE CULTURE/COLONY COUNT: CPT | Performed by: UROLOGY

## 2021-05-05 LAB
BACTERIA SPEC CULT: NORMAL
SPECIMEN SOURCE: NORMAL

## 2021-05-07 DIAGNOSIS — Z11.59 ENCOUNTER FOR SCREENING FOR OTHER VIRAL DISEASES: ICD-10-CM

## 2021-05-07 LAB
LABORATORY COMMENT REPORT: NORMAL
SARS-COV-2 RNA RESP QL NAA+PROBE: NEGATIVE
SARS-COV-2 RNA RESP QL NAA+PROBE: NORMAL
SPECIMEN SOURCE: NORMAL
SPECIMEN SOURCE: NORMAL

## 2021-05-07 PROCEDURE — U0003 INFECTIOUS AGENT DETECTION BY NUCLEIC ACID (DNA OR RNA); SEVERE ACUTE RESPIRATORY SYNDROME CORONAVIRUS 2 (SARS-COV-2) (CORONAVIRUS DISEASE [COVID-19]), AMPLIFIED PROBE TECHNIQUE, MAKING USE OF HIGH THROUGHPUT TECHNOLOGIES AS DESCRIBED BY CMS-2020-01-R: HCPCS | Performed by: UROLOGY

## 2021-05-07 PROCEDURE — U0005 INFEC AGEN DETEC AMPLI PROBE: HCPCS | Performed by: UROLOGY

## 2021-05-11 ENCOUNTER — ANESTHESIA (OUTPATIENT)
Dept: SURGERY | Facility: AMBULATORY SURGERY CENTER | Age: 71
End: 2021-05-11

## 2021-05-11 ENCOUNTER — HOSPITAL ENCOUNTER (OUTPATIENT)
Facility: AMBULATORY SURGERY CENTER | Age: 71
Discharge: HOME | End: 2021-05-11
Attending: UROLOGY | Admitting: UROLOGY
Payer: COMMERCIAL

## 2021-05-11 VITALS
SYSTOLIC BLOOD PRESSURE: 102 MMHG | TEMPERATURE: 97 F | BODY MASS INDEX: 26.82 KG/M2 | DIASTOLIC BLOOD PRESSURE: 71 MMHG | RESPIRATION RATE: 16 BRPM | WEIGHT: 161 LBS | HEART RATE: 103 BPM | OXYGEN SATURATION: 100 % | HEIGHT: 65 IN

## 2021-05-11 DIAGNOSIS — N36.42 INTRINSIC SPHINCTER DEFICIENCY: ICD-10-CM

## 2021-05-11 LAB
GLUCOSE BLDC GLUCOMTR-MCNC: 161 MG/DL (ref 70–99)
GLUCOSE BLDC GLUCOMTR-MCNC: 207 MG/DL (ref 70–99)

## 2021-05-11 PROCEDURE — 51715 ENDOSCOPIC INJECTION/IMPLANT: CPT

## 2021-05-11 DEVICE — SOFT TISSUE BULKING AGENT INDICATED FOR TRANSURETHRAL INJECTION IN THE TREATMENT OF ADULT WOMEN DIAGNOSED WITH STRESS URINARY INCONTINENCE PRIMARILY DUE TO INTRINSIC SPHINCTER DEFICIENCY.
Type: IMPLANTABLE DEVICE | Site: URETHRA | Status: FUNCTIONAL
Brand: MACROPLASTIQUE IMPLANTS

## 2021-05-11 RX ORDER — NALOXONE HYDROCHLORIDE 0.4 MG/ML
0.2 INJECTION, SOLUTION INTRAMUSCULAR; INTRAVENOUS; SUBCUTANEOUS
Status: DISCONTINUED | OUTPATIENT
Start: 2021-05-11 | End: 2021-05-12 | Stop reason: HOSPADM

## 2021-05-11 RX ORDER — GABAPENTIN 300 MG/1
300 CAPSULE ORAL ONCE
Status: DISCONTINUED | OUTPATIENT
Start: 2021-05-11 | End: 2021-05-11 | Stop reason: HOSPADM

## 2021-05-11 RX ORDER — ONDANSETRON 4 MG/1
4 TABLET, ORALLY DISINTEGRATING ORAL EVERY 30 MIN PRN
Status: DISCONTINUED | OUTPATIENT
Start: 2021-05-11 | End: 2021-05-12 | Stop reason: HOSPADM

## 2021-05-11 RX ORDER — LIDOCAINE HYDROCHLORIDE 20 MG/ML
INJECTION, SOLUTION INFILTRATION; PERINEURAL PRN
Status: DISCONTINUED | OUTPATIENT
Start: 2021-05-11 | End: 2021-05-11

## 2021-05-11 RX ORDER — NALOXONE HYDROCHLORIDE 0.4 MG/ML
0.4 INJECTION, SOLUTION INTRAMUSCULAR; INTRAVENOUS; SUBCUTANEOUS
Status: DISCONTINUED | OUTPATIENT
Start: 2021-05-11 | End: 2021-05-12 | Stop reason: HOSPADM

## 2021-05-11 RX ORDER — PROPOFOL 10 MG/ML
INJECTION, EMULSION INTRAVENOUS CONTINUOUS PRN
Status: DISCONTINUED | OUTPATIENT
Start: 2021-05-11 | End: 2021-05-11

## 2021-05-11 RX ORDER — SODIUM CHLORIDE, SODIUM LACTATE, POTASSIUM CHLORIDE, CALCIUM CHLORIDE 600; 310; 30; 20 MG/100ML; MG/100ML; MG/100ML; MG/100ML
INJECTION, SOLUTION INTRAVENOUS CONTINUOUS PRN
Status: DISCONTINUED | OUTPATIENT
Start: 2021-05-11 | End: 2021-05-11

## 2021-05-11 RX ORDER — DEXAMETHASONE SODIUM PHOSPHATE 4 MG/ML
INJECTION, SOLUTION INTRA-ARTICULAR; INTRALESIONAL; INTRAMUSCULAR; INTRAVENOUS; SOFT TISSUE PRN
Status: DISCONTINUED | OUTPATIENT
Start: 2021-05-11 | End: 2021-05-11

## 2021-05-11 RX ORDER — SODIUM CHLORIDE, SODIUM LACTATE, POTASSIUM CHLORIDE, CALCIUM CHLORIDE 600; 310; 30; 20 MG/100ML; MG/100ML; MG/100ML; MG/100ML
INJECTION, SOLUTION INTRAVENOUS CONTINUOUS
Status: DISCONTINUED | OUTPATIENT
Start: 2021-05-11 | End: 2021-05-11 | Stop reason: HOSPADM

## 2021-05-11 RX ORDER — MEPERIDINE HYDROCHLORIDE 25 MG/ML
12.5 INJECTION INTRAMUSCULAR; INTRAVENOUS; SUBCUTANEOUS
Status: DISCONTINUED | OUTPATIENT
Start: 2021-05-11 | End: 2021-05-12 | Stop reason: HOSPADM

## 2021-05-11 RX ORDER — HYDROMORPHONE HYDROCHLORIDE 1 MG/ML
.3-.5 INJECTION, SOLUTION INTRAMUSCULAR; INTRAVENOUS; SUBCUTANEOUS EVERY 10 MIN PRN
Status: DISCONTINUED | OUTPATIENT
Start: 2021-05-11 | End: 2021-05-12 | Stop reason: HOSPADM

## 2021-05-11 RX ORDER — LIDOCAINE 40 MG/G
CREAM TOPICAL
Status: DISCONTINUED | OUTPATIENT
Start: 2021-05-11 | End: 2021-05-11 | Stop reason: HOSPADM

## 2021-05-11 RX ORDER — ONDANSETRON 2 MG/ML
INJECTION INTRAMUSCULAR; INTRAVENOUS PRN
Status: DISCONTINUED | OUTPATIENT
Start: 2021-05-11 | End: 2021-05-11

## 2021-05-11 RX ORDER — CEFAZOLIN SODIUM 2 G/50ML
2 SOLUTION INTRAVENOUS SEE ADMIN INSTRUCTIONS
Status: DISCONTINUED | OUTPATIENT
Start: 2021-05-11 | End: 2021-05-11 | Stop reason: HOSPADM

## 2021-05-11 RX ORDER — ACETAMINOPHEN 325 MG/1
975 TABLET ORAL ONCE
Status: COMPLETED | OUTPATIENT
Start: 2021-05-11 | End: 2021-05-11

## 2021-05-11 RX ORDER — ONDANSETRON 2 MG/ML
4 INJECTION INTRAMUSCULAR; INTRAVENOUS EVERY 30 MIN PRN
Status: DISCONTINUED | OUTPATIENT
Start: 2021-05-11 | End: 2021-05-12 | Stop reason: HOSPADM

## 2021-05-11 RX ORDER — OXYCODONE HYDROCHLORIDE 5 MG/1
5 TABLET ORAL EVERY 4 HOURS PRN
Status: DISCONTINUED | OUTPATIENT
Start: 2021-05-11 | End: 2021-05-12 | Stop reason: HOSPADM

## 2021-05-11 RX ORDER — FENTANYL CITRATE 50 UG/ML
25-50 INJECTION, SOLUTION INTRAMUSCULAR; INTRAVENOUS
Status: DISCONTINUED | OUTPATIENT
Start: 2021-05-11 | End: 2021-05-11 | Stop reason: HOSPADM

## 2021-05-11 RX ORDER — SODIUM CHLORIDE, SODIUM LACTATE, POTASSIUM CHLORIDE, CALCIUM CHLORIDE 600; 310; 30; 20 MG/100ML; MG/100ML; MG/100ML; MG/100ML
INJECTION, SOLUTION INTRAVENOUS CONTINUOUS
Status: DISCONTINUED | OUTPATIENT
Start: 2021-05-11 | End: 2021-05-12 | Stop reason: HOSPADM

## 2021-05-11 RX ORDER — PROPOFOL 10 MG/ML
INJECTION, EMULSION INTRAVENOUS PRN
Status: DISCONTINUED | OUTPATIENT
Start: 2021-05-11 | End: 2021-05-11

## 2021-05-11 RX ORDER — CEFAZOLIN SODIUM 2 G/50ML
2 SOLUTION INTRAVENOUS
Status: DISCONTINUED | OUTPATIENT
Start: 2021-05-11 | End: 2021-05-11 | Stop reason: HOSPADM

## 2021-05-11 RX ADMIN — PROPOFOL 30 MG: 10 INJECTION, EMULSION INTRAVENOUS at 09:26

## 2021-05-11 RX ADMIN — CEFAZOLIN SODIUM 2 G: 2 SOLUTION INTRAVENOUS at 09:16

## 2021-05-11 RX ADMIN — ACETAMINOPHEN 975 MG: 325 TABLET ORAL at 08:13

## 2021-05-11 RX ADMIN — SODIUM CHLORIDE, SODIUM LACTATE, POTASSIUM CHLORIDE, CALCIUM CHLORIDE: 600; 310; 30; 20 INJECTION, SOLUTION INTRAVENOUS at 08:53

## 2021-05-11 RX ADMIN — ONDANSETRON 4 MG: 2 INJECTION INTRAMUSCULAR; INTRAVENOUS at 09:20

## 2021-05-11 RX ADMIN — PROPOFOL 150 MCG/KG/MIN: 10 INJECTION, EMULSION INTRAVENOUS at 09:10

## 2021-05-11 RX ADMIN — PROPOFOL 20 MG: 10 INJECTION, EMULSION INTRAVENOUS at 09:09

## 2021-05-11 RX ADMIN — LIDOCAINE HYDROCHLORIDE 60 MG: 20 INJECTION, SOLUTION INFILTRATION; PERINEURAL at 09:08

## 2021-05-11 RX ADMIN — DEXAMETHASONE SODIUM PHOSPHATE 4 MG: 4 INJECTION, SOLUTION INTRA-ARTICULAR; INTRALESIONAL; INTRAMUSCULAR; INTRAVENOUS; SOFT TISSUE at 09:20

## 2021-05-11 ASSESSMENT — MIFFLIN-ST. JEOR: SCORE: 1251.17

## 2021-05-11 NOTE — ANESTHESIA CARE TRANSFER NOTE
Patient: Latoya Rodríguez    Procedure(s):  CYSTOSCOPY, WITH PERIURETHRAL BULKING AGENT INJECTION    Diagnosis: Intrinsic sphincter deficiency [N36.42]  Diagnosis Additional Information: No value filed.    Anesthesia Type:   MAC     Note:    Oropharynx: oropharynx clear of all foreign objects and spontaneously breathing  Level of Consciousness: awake  Oxygen Supplementation: room air    Independent Airway: airway patency satisfactory and stable  Dentition: dentition unchanged  Vital Signs Stable: post-procedure vital signs reviewed and stable  Report to RN Given: handoff report given  Patient transferred to: Phase II  Comments: 97.3- 98  103/65  98%  Handoff Report: Identifed the Patient, Identified the Reponsible Provider, Reviewed the pertinent medical history, Discussed the surgical course, Reviewed Intra-OP anesthesia mangement and issues during anesthesia, Set expectations for post-procedure period and Allowed opportunity for questions and acknowledgement of understanding      Vitals: (Last set prior to Anesthesia Care Transfer)  CRNA VITALS  5/11/2021 0911 - 5/11/2021 0947      5/11/2021             Resp Rate (set):  10        Electronically Signed By: URVASHI Ruiz CRNA  May 11, 2021  9:47 AM

## 2021-05-11 NOTE — OP NOTE
OPERATIVE REPORT    PREOPERATIVE DIAGNOSIS:  Stress urinary incontinence, intrinsic sphincter deficiency    POSTOPERATIVE DIAGNOSIS: Same    PROCEDURES PERFORMED:   1. Cystourethroscopy with injection of bulking agent    STAFF SURGEON: Dr. Alberto Zhang MD    RESIDENT(S):  Marni Haji MD    ANESTHESIA: MAC    ESTIMATED BLOOD LOSS: 0 mL.     OPERATIVE INDICATIONS:   Latoya Rodríguez is a(n) 70 year old female with a history of urinary urgency and frequency improved with oxybutynin and estrogen however not resolved. Also has bothersome ESTEBAN and intrinsic urethral deficiency. The patient was counseled on the alternatives, risks, and benefits and elected to proceed with the above stated procedure.    DESCRIPTION OF PROCEDURE:   After verification of informed consent was obtained, the patient was brought to the operating room, and moved to the operating table. After adequate anesthesia was induced, the patient was repositioned in the lithotomy position and prepped and draped in the usual sterile fashion. A formal timeout was performed to confirm the correct patient, procedure and operative site.     A 21-Maori Eldridge injection cystoscope was placed into the bladder.  The bladder mucosa appeared to be normal without stones, tumors or diverticulum on 360 degree inspection. The ureteral orifices were in the normal orthotopic position bilaterally.  6cc of Macroplastique was then injected at the urethral sphincter - 3cc at 6:00, 1.5cc at 2:00, and 1.5cc at 10:00. The cystoscope was then removed. The patient was returned to supine position and transported to recovery in stable condition.  There was good coaptation seen.    The procedure was then concluded. The patient was transferred to the postanesthesia care unit in stable condition and tolerated the procedure well.    POSTOP PLAN:  1. Follow up appt on 6/14    Patient was seen, evaluated and plan was formulated in conjunction with me and I agree with the above.  I was  present for the entire procedure.  Alberto Zhang MD

## 2021-05-11 NOTE — OR NURSING
Patient presented with atrial flutter in OR. 12 leak EKG done and Dr. More at bedside to review. VSS with heart rate in the 70's-80's. Patient instructed to follow up with cardiology and is ok to discharge home per Dr. More.     Kirstie Gutiérrez RN

## 2021-05-12 ENCOUNTER — PATIENT OUTREACH (OUTPATIENT)
Dept: UROLOGY | Facility: CLINIC | Age: 71
End: 2021-05-12

## 2021-05-12 DIAGNOSIS — E78.5 HYPERLIPIDEMIA WITH TARGET LDL LESS THAN 100: Primary | ICD-10-CM

## 2021-05-12 LAB — INTERPRETATION ECG - MUSE: NORMAL

## 2021-05-12 NOTE — TELEPHONE ENCOUNTER
Sent [employer] message to see how the patient is doing since her procedure with Dr Zhang 5/11/21 for periurethral bulking agent. Scheduled post 6/14/21 virtual visit    Karina Hoskins RN   Care Coordinator Urology

## 2021-05-13 DIAGNOSIS — E78.5 HYPERLIPIDEMIA WITH TARGET LDL LESS THAN 100: ICD-10-CM

## 2021-05-13 PROCEDURE — 93000 ELECTROCARDIOGRAM COMPLETE: CPT | Performed by: INTERNAL MEDICINE

## 2021-05-26 ENCOUNTER — MYC MEDICAL ADVICE (OUTPATIENT)
Dept: ENDOCRINOLOGY | Facility: CLINIC | Age: 71
End: 2021-05-26

## 2021-05-26 DIAGNOSIS — E10.9 TYPE 1 DIABETES MELLITUS WITHOUT COMPLICATION (H): ICD-10-CM

## 2021-05-26 RX ORDER — BLOOD SUGAR DIAGNOSTIC
STRIP MISCELLANEOUS
Qty: 550 STRIP | Refills: 3 | Status: SHIPPED | OUTPATIENT
Start: 2021-05-26 | End: 2022-05-02

## 2021-06-02 ENCOUNTER — TRANSFERRED RECORDS (OUTPATIENT)
Dept: HEALTH INFORMATION MANAGEMENT | Facility: CLINIC | Age: 71
End: 2021-06-02

## 2021-06-02 LAB — RETINOPATHY: NEGATIVE

## 2021-06-07 ENCOUNTER — TELEPHONE (OUTPATIENT)
Dept: EDUCATION SERVICES | Facility: CLINIC | Age: 71
End: 2021-06-07

## 2021-06-07 NOTE — TELEPHONE ENCOUNTER
"Cde spoke with pt. Pt states vomiting started after dinner last night. Pt vomited 4x over the course of several hours. Last vomiting episode was around 3am. Pt thinks she ate \"bad\" food. States had a temp of 101.3 last night. Temp now is 99. Bg at 8am: 78. Pt verbalizes she took Lantus last night: 28 units. Has been sipping on water this morning and able to keep it down. Cde asked pt to check for ketones. Pt unable to void.  Denies nausea. No apparent breathing difficulty. Able to talk without issue. Recommendations:  - Sip on water consistenlty thruought the day.   - Eat something bland when feeling better  - Check Bg every 2 hours  - Call cristi burks vomiting reoccurs.    Linda Diego, RN, BSN, CDE   [facility]  "

## 2021-06-07 NOTE — TELEPHONE ENCOUNTER
[facility] Call Center    Phone Message    May a detailed message be left on voicemail: yes     Reason for Call: Patient called wanting to know how to control her blood sugars if she is unable to keep food down. Please advise. Thank you.    Action Taken: Message routed to:  Adult Clinics: Endocrinology p [phone]    Travel Screening: Not Applicable

## 2021-06-08 NOTE — PROGRESS NOTES
Chief Complaint: Ashwini-operative atrial flutter    HPI: Latoya Rodríguez is a 70 year old female with a past medical history of type II diabetes mellitus who was referred to me for evaluation of ashwini-operative atrial flutter by the [facility] Anesthesia team.     Briefly, Mrs. Rodríguez underwent cystourethroscopy on 05/11/2021 under MAC. She was noted to have ashwini-operative atrial flutter. This was documented on an EKG on 05/11, as noted below. She reverted to NSR without any intervention from what it seems. Her procedure was completed uneventfully. She was referred to me for further evaluation.    At the time of the consultation, she notes an absence of chest pain at rest, dyspnea at rest or with exertion, PND, orthopnea, peripheral edema, palpitations, lightheadedness or syncope. She has never had any symptoms suggestive of atrial flutter, such as exertional dyspnea or dyspnea at rest. Mrs. Rodríguez walks 4-5 miles daily.     A comprehensive ROS was done and the details are included above in the HPI.    Past Medical History:  - T2DM, IDDM  - No prior history of hypertension, dyslipidemia, CAD, TIA/stroke, vascular aneurysms, PAD  Past Medical History:   Diagnosis Date     Cataract      Type 2 diabetes mellitus with hyperglycemia (H) 6/24/2016       Past Surgical History:    Past Surgical History:   Procedure Laterality Date     CYSTOSCOPY, INJECT COLLAGEN, COMBINED N/A 5/11/2021    Procedure: CYSTOSCOPY, WITH PERIURETHRAL BULKING AGENT INJECTION;  Surgeon: Alberto Zhang MD;  [place] [facility] [place]     EYE SURGERY  9/04, 5/11    Retinal detachment, Cataract (both eyes)     RELEASE TRIGGER FINGER Bilateral 5/10/2019    Procedure: RELEASE TRIGGER FINGER, BILATERAL LONG AND RING FINGERS;  Surgeon: RADHA Sandoval MD;  Location:  OR     VASCULAR SURGERY      Varicose Veins       Drug History:  Home cardiac meds: Atorvastatin 10 mg q24h   Current Outpatient Medications   Medication Sig Dispense Refill     APPLE  CIDER VINEGAR PO Take by mouth every morning       atorvastatin (LIPITOR) 10 MG tablet Take 1 tablet (10 mg) by mouth daily (Patient taking differently: Take 10 mg by mouth every morning ) 90 tablet 3     blood glucose (ACCU-CHEK MICKY PLUS) test strip Use to test blood sugar 6 times daily 550 strip 3     blood glucose monitoring (ACCU-CHEK FASTCLIX) lancets USE TO TEST BLOOD SUGAR FOUR TIMES A DAY OR AS DIRECTED 306 each 3     calcium carbonate-vitamin D 500-400 MG-UNIT TABS tablt Take 1 tablet by mouth 2 times daily 180 tablet 3     CINNAMON PO Take by mouth every morning       COMPOUNDED NON-CONTROLLED SUBSTANCE (CMPD RX) - PHARMACY TO MIX COMPOUNDED MEDICATION Estriol 0.1% cream (1mg/gram) in HRT base. Place 1 gram vaginally daily for 2 weeks, then vaginally twice weekly. (Patient taking differently: twice a week Estriol 0.1% cream (1mg/gram) in HRT base. Place 1 gram vaginally daily for 2 weeks, then vaginally twice weekly.) 30 g 6     insulin aspart (NOVOLOG FLEXPEN) 100 UNIT/ML pen 1 unit per 15 gram of carb for breakfast and lunch, and 1 unit per 10 grams for dinner.Plus 1 unit per 50 mg/dl above 150. Total daily dose approx 30 units. 30 mL 3     insulin glargine (LANTUS SOLOSTAR) 100 UNIT/ML pen Inject 24 Units Subcutaneous every morning (Patient taking differently: Inject 28 Units Subcutaneous At Bedtime ) 30 mL 3     insulin pen needle (B-D U/F) 31G X 8 MM miscellaneous USE WITH NOVOLOG AND LANTUS(FIVE TIMES DAILY) 500 each 3     Multiple Vitamins-Minerals (MULTIVITAMIN ADULT PO) Take 1 tablet by mouth every morning        Urine Glucose-Ketones Test (KETO-DIASTIX) STRP 1 strip by In Vitro route as needed 1 each 11     COMPOUNDED NON-CONTROLLED SUBSTANCE (CMPD RX) - PHARMACY TO MIX COMPOUNDED MEDICATION Estriol 0.1% cream (1mg/gram) in HRT base. Place 1 gram vaginally daily for 2 weeks, then vaginally twice weekly. (Patient taking differently: twice a week Estriol 0.1% cream (1mg/gram) in HRT base. Place 1  gram vaginally daily for 2 weeks, then vaginally twice weekly.) 30 g 6     FLUZONE HIGH-DOSE QUADRIVALENT 0.7 ML REYMUNDO injection See Admin Instructions       MYRBETRIQ 50 MG 24 hr tablet        SHINGRIX injection See Admin Instructions           Family History:  - No family history of premature CAD, valvular disorders, dysrhythmias  - Mother's brother:  in late 20s of unclear cause, ?'heart attack'  - Mother's second brother:  in late 40s of unclear cause, no autopsy done  Family History   Problem Relation Age of Onset     Hyperlipidemia Mother      Breast Cancer Mother      Other Cancer Father      Leukemia Father      Skin Cancer Father      Coronary Artery Disease Maternal Uncle      Brain Cancer Maternal Uncle      Coronary Artery Disease Maternal Uncle      Stomach Cancer Maternal Aunt      Diabetes No family hx of      Hypertension No family hx of      Cerebrovascular Disease No family hx of      Colon Cancer No family hx of      Thyroid Disease No family hx of      Depression No family hx of      Anxiety Disorder No family hx of        Social History:  Used to work in Management.  Social History     Tobacco Use     Smoking status: Never Smoker     Smokeless tobacco: Never Used   Substance Use Topics     Alcohol use: Yes     Comment: 1 to 2 beers or glasses of wine 1 to 2 times per month       No Known Allergies      Physical Examination:  Vitals: /61 (BP Location: Left arm, Patient Position: Sitting)   Pulse 118   Wt 73.8 kg (162 lb 12.8 oz)   SpO2 98%   Breastfeeding No   BMI 27.09 kg/m    BMI= Body mass index is 27.09 kg/m .    GENERAL: Healthy, alert and no distress  Eyes: No xanthelasmas.  NECK: No palpable neck masses/goitre and no evidence of retrosternal goitre.   ENT: Moist mucosal membranes.  RESPIRATORY: No signs of resp distress. Lungs CTAB.  CARDIOVASCULAR:  No JVD, irregularly irregular, normal S1+S2 without added sounds or murmurs.  ABDOMEN: ND, soft, non-tender, normal bowel  sounds.  EXTREMITIES: Warm, well-perfused, no edema.   NEUROLOGY: GCS 15/15, no focal deficits.  VASC: No carotid bruits. Carotid, radial, brachial, popliteal, dorsalis pedis and posterior tibialis pulses are normal in volumes and symmetric bilaterally.   SKIN: No ecchymoses, no rashes.  PSYCH: Cooperative, pleasant affect.       Investigations:  I personally viewed and interpreted the following investigations:    Labs:    CBC RESULTS:  Lab Results   Component Value Date    WBC 3.8 (L) 06/24/2016    RBC 4.31 06/24/2016    HGB 14.4 06/24/2016    HCT 40.7 06/24/2016    MCV 94 06/24/2016    MCH 33.4 (H) 06/24/2016    MCHC 35.4 06/24/2016    RDW 13.3 06/24/2016     06/24/2016       CMP RESULTS:  Lab Results   Component Value Date     08/02/2019    POTASSIUM 4.0 08/02/2019    CHLORIDE 103 08/02/2019    CO2 24 08/02/2019    ANIONGAP 8 08/02/2019     (H) 08/02/2019    BUN 16 08/02/2019    CR 0.67 07/20/2020    GFRESTIMATED 89 07/20/2020    GFRESTBLACK >90 07/20/2020    RENATO 9.0 08/02/2019    BILITOTAL 0.7 08/02/2019    ALBUMIN 3.7 08/02/2019    ALKPHOS 97 08/02/2019    ALT 24 08/02/2019    AST 16 08/02/2019      LIPIDS:  Lab Results   Component Value Date    CHOL 189 07/20/2020     Lab Results   Component Value Date    HDL 71 07/20/2020     Lab Results   Component Value Date     07/20/2020     Lab Results   Component Value Date    TRIG 56 07/20/2020       Recent Tests:    Electrocardiogram (05/11/2021):  Typical, counter-clockwise atrial flutter with variable AV-block. Incomplete RBBB.     Electrocardiogram (06/03/2021):  Reviewed with patient. Atrial fibrillation with HR of 104 bpm. Incomplete RBBB noted.     Assessment and Plan:   Latoya Rodríguez is a 70 year old female with a past medical history of type II diabetes mellitus (insulin dependent) who presented to me for evaluation of atrial flutter.    I had a lengthy discussion Ms. Rodríguez about the implication of atrial fibrillation/atrial  flutter.  Given her ventricular rate is in the low 100 today, we elected to start metoprolol succinate 50 mg once daily.  I also discussed the concept of stroke thromboprophylaxis for nonvalvular atrial fibrillation and she wished to pursue this.  However, it is unclear to her as to whether warfarin or a direct oral anticoagulant (preferably, apixaban) is more preferable to her.  In view of this, she will have a discussion with the pharmacy team about the optimal strategy.    Finally, I discussed rhythm control strategies with her.  It seems reasonable to try direct current cardioversion at least once with her given that she has had no prior rhythm management strategies applied.  Once has been attempted, I will seek an ischemic evaluation, as conversion to sinus rhythm will undoubtedly increase the quality of any TTE/CTA risk stratification strategy that we seek. If this is unsuccessful, stress CMR is also reasonable. She is not having ischemic symptoms at the moment so I am not particularly concerned about her having critical coronary lesions as the  for her atrial flutter/atrial fibrillation.  Once the aforementioned has been done, I will send her to EP for evaluation for CTI/flutter ablation, given that she had typical counterclockwise flutter on the perioperative EKG and cavotricuspid isthmus ablation has the potential to reduce the long-term recurrence risk of atrial fibrillation.    Problems:  1. Atrial fibrillation   2. Atrial flutter, paroxysmal (CHADS2-VASC of 3)   3. T2DM, NIDDM   4. ASCVD 12.3%     Plan:  - Start metoprolol succinate 50 mg q24h   - Refer to Pharmacy to determine costs of warfarin vs. apixaban   - Refer for ANNABELLE and DCCV  - Ischemic evaluation after DCCV  - Refer to EP after above to consider ablation for AFlutter  - Continue atorvastatin 10 mg q24h for now given that no risk enhancers are present to warrant high-intensity statin usage and duration of DM <10 years     Chart review  time: 20  Visit time: 40  Total time spent: 60  >50% of this 60-minute visit were spent with the patient on in-person counseling and discussion regarding atrial fibrillation and atrial flutter.     Amadeo Whipple [facility], [place]    Cardiovascular Division  Pager [phone]    CC  Patient Care Team:  Pricila Avila APRN CNP as PCP - General (Family Practice)  Amy Cooper, RN as Registered Nurse  Pricila Avila APRN CNP as Assigned PCP  Edward Aguero MD as Assigned Endocrinology Provider  Alberto Zhang MD as Assigned Surgical Provider  Alberto Zhang MD as MD (Urology)  Karina Hoskins, RN as Specialty Care Coordinator (Urology)  Pricila Avila APRN CNP as Referring Physician (Internal Medicine - Pediatrics)

## 2021-06-09 ENCOUNTER — OFFICE VISIT (OUTPATIENT)
Dept: CARDIOLOGY | Facility: CLINIC | Age: 71
End: 2021-06-09
Payer: COMMERCIAL

## 2021-06-09 ENCOUNTER — APPOINTMENT (OUTPATIENT)
Dept: CARDIOLOGY | Facility: CLINIC | Age: 71
End: 2021-06-09
Attending: STUDENT IN AN ORGANIZED HEALTH CARE EDUCATION/TRAINING PROGRAM
Payer: COMMERCIAL

## 2021-06-09 VITALS
OXYGEN SATURATION: 98 % | DIASTOLIC BLOOD PRESSURE: 61 MMHG | SYSTOLIC BLOOD PRESSURE: 109 MMHG | HEART RATE: 118 BPM | BODY MASS INDEX: 27.09 KG/M2 | WEIGHT: 162.8 LBS

## 2021-06-09 DIAGNOSIS — I48.20 CHRONIC ATRIAL FIBRILLATION (H): Primary | ICD-10-CM

## 2021-06-09 PROCEDURE — 99205 OFFICE O/P NEW HI 60 MIN: CPT | Performed by: STUDENT IN AN ORGANIZED HEALTH CARE EDUCATION/TRAINING PROGRAM

## 2021-06-09 RX ORDER — LIDOCAINE 40 MG/G
CREAM TOPICAL
Status: CANCELLED | OUTPATIENT
Start: 2021-06-09

## 2021-06-09 RX ORDER — POTASSIUM CHLORIDE 750 MG/1
20 TABLET, EXTENDED RELEASE ORAL
Status: CANCELLED | OUTPATIENT
Start: 2021-06-09

## 2021-06-09 RX ORDER — METOPROLOL SUCCINATE 50 MG/1
50 TABLET, EXTENDED RELEASE ORAL DAILY
Qty: 90 TABLET | Refills: 3 | Status: SHIPPED | OUTPATIENT
Start: 2021-06-09 | End: 2021-08-05

## 2021-06-09 RX ORDER — POTASSIUM CHLORIDE 750 MG/1
40 TABLET, EXTENDED RELEASE ORAL
Status: CANCELLED | OUTPATIENT
Start: 2021-06-09

## 2021-06-09 NOTE — NURSING NOTE
Latoya Rodríguez's goals for this visit include: treva egk completed 6/9/2021  She requests these members of her care team be copied on today's visit information:     PCP: Pricila Avila    Referring Provider:  No referring provider defined for this encounter.    /61 (BP Location: Left arm, Patient Position: Sitting)   Pulse 118   Wt 73.8 kg (162 lb 12.8 oz)   SpO2 98%   Breastfeeding No   BMI 27.09 kg/m      Do you need any medication refills at today's visit? None      PENELOPE Qiu [facility]

## 2021-06-09 NOTE — NURSING NOTE
Cardiac Testing: Patient given instructions regarding  ANNABELLE and Cardioversion. Discussed purpose, preparation, procedure and when to expect results reported back to the patient. Patient demonstrated understanding of this information and agreed to call with further questions or concerns.    Medication Change: Patient was educated regarding prescribed medication change, including discussion of the indication, administration, side effects, and when to report to MD or RN. Patient demonstrated understanding of this information and agreed to call with further questions or concerns.    Patient stated she understood all health information given and agreed to call with further questions or concerns.    Qi Villasenor RN   Medical Specialty Clinic Care Coordinator  [facility]   Phone: [phone]  Fax: [phone]

## 2021-06-09 NOTE — PATIENT INSTRUCTIONS
You were seen at the [facility] Cardiology clinic today.   You saw Dr. Amadeo Whipple, [facility], [place].    The following is a summary of your office visit today:    1. Start metoprolol succinate 50 mg daily  2. Talk to Pharmacist about starting warfarin versus direct oral anticoagulants  3. Consider transesophageal echocardiogram and direct current cardioversion (reverting heart to normal rhythm)  4. Follow-up with me in one month    Nurse contact information:    Cardiology Care Coordinators: Oksana Ott RN and Qi Villasenor RN      Phone: [phone]   Fax: [phone]      HOW TO CHECK YOUR BLOOD PRESSURE AT HOME:     Avoid eating, smoking, and exercising for at least 30 minutes before taking a reading.    Be sure you have taken your BP medication at least 2-3 hours before you check it.     Sit quietly for 10 minutes before a reading.     Sit in a chair with your feet flat on the floor. Rest your  arm on a table so that the arm cuff is at the same level as your heart.    Remain still during the reading.    Record your blood pressure and pulse in a log and bring to your next appointment.     If you have had any blood work, imaging or other testing completed we will be in touch within 1-2 weeks regarding the results. If you have any questions, concerns or need to schedule a follow up, please contact us at [phone]. If you are needing refills please contact your pharmacy. For urgent after hour care please call the [facility] at [phone] or the [facility] at [phone] and ask to speak to the on-call Cardiologist.    It was a pleasure meeting with you today. Please let us know if there is anything else we can do for you so that we can be sure you are leaving completely satisfied with your care experience.     Your Cardiology Team at [facility]  RN Care Coordinators: Nic STEEL: Anjana Hagen  Echocardiography (ANNABELLE)  Why do I need this test?  This test is known as a ANNABELLE. Your doctor has  ordered this test to check your heart.  We will give you medicine to relax. Then, we will  guide a long, thin tube (about the width of an  adult s little finger) down your throat. Before the  tube reaches your stomach, it will stop at a place  behind your heart.  On the tip of the tube is a device that sends out  sound waves. You won t feel the sound waves.  As they pass through your body, they will create  pictures of your heart on a video screen.  How do I get ready for the test?    Plan to have someone drive you home after  the test.  ? Someone should also stay with you for  6 hours after your test.    No food or drink for:   ? 6 hours before the test    If you take antacids or water pills (diuretics): Do  not take them until after your test. You may take  blood pressure medicine with a few sips of water.    If you have diabetes:  - If you take insulin, call your diabetes care  team. Ask if you should take a 1/2 dose the  morning of your test.  - If you take diabetes medicine by mouth, don t  take it on the morning of your test. Bring it  with you to take after the test. (If you have  questions, call your diabetes care team.)    Bring a list of any medicines you are taking.  What happens during this test?  The exam itself takes about 15 to 30 minutes. Plan  to spend about 4 hours in the hospital or clinic.  If you wear glasses, dentures or dental bridges, you  will need to remove them.  1. We will place an IV (small needle) into your  hand or arm. You will receive medicine through  the IV to help you relax. The medicine also  makes it easier to swallow the tube. It will not  put you to sleep or change your breathing.  2. We may inject special fluid through your IV.  This will help us get a clear picture of your heart.    If we use saline (salt water), this is called a   bubble study.  We will mix the salt water  until  small bubbles form, then inject it  through your IV. The bubbles will help us  track blood flow through the heart.    Optison and Definity are medicines that  contain tiny gas bubbles. These reflect sound  waves, giving a clearer picture of the heart.    3. We will spray a numbing medicine onto your  throat. This helps prevent pain and gagging.  4. We will ask you to either gargle a small amount  of numbing solution, or swallow a teaspoon of  thick, flavored jelly.  5. You will lie on your back or on your left side.  6. The doctor will help you swallow the tube. You  will hold a plastic mouthpiece between your teeth  to protect the tip of the tube.  7. Sound waves will pass from the tube through  your body. Pictures of your heart will appear on  the video screen.  The test is not painful, but you may feel some  discomfort when you start to swallow the tube.  To help yourself relax, breathe at a slow, even pace.  Try to relax the muscles in your neck and shoulders.  While the tube is in your throat, you will be able to  breathe normally. You will not be able to talk.  What are the risks and benefits of this test?  This test will tell us more about your heart. It helps  your doctor decide what kind of treatment you may  need (if any). Risks include:    A bad reaction to the medicines you receive    Bruising or infection at the needle site    Gagging and vomiting (throwing up)    Damage to your esophagus (the tube that  connects your mouth to your stomach).  How will I feel after the test?  You will rest in the exam area until you feel ready to  leave. Plan to spend at least one hour resting. You  will need someone to take you home and stay with  you for at least six hours.    You will feel drowsy or a little dizzy.    Your throat may feel numb.    If your mouth is dry, rinse your mouth with water.  Do not eat or drink for at least 30 minutes. After  that, you may try a little water. If you can swallow,  you may go  back to your normal eating, drinking  and medicines.  Do not drive for 24 hours.  You may have a mild sore throat for the rest of the  day. Throat lozenges or ice chips will help. Call your  clinic right away if you have:    New pain (including chest or belly pain) or  trouble swallowing.    Vomit that looks like coffee grounds or  contains blood.    Black or bloody stools (bowel movements).    A fever above 100.6 F (37.5 C), taken under  the tongue.      CARDIOVERSION    Follow these instructions:     1. Report to the G[facility] waiting room in the [facility] on:               Please arrive at the time listed on this letter and disregard any phone reminder times.     2. Please do not eat anything for 8 hours prior to your procedure. You may have sips of water up until 2 hours prior to your arrival.     3. The morning of your procedure you may take your scheduled medications with a SIP of water. If you take diabetic medications or a diuretic, you may hold these.     4. You will receive medication that makes you sleepy; you will need a  and someone to stay with you for 24 hours following this procedure.     You should not make any legal decisions for 24 hours following discharge.

## 2021-06-10 ENCOUNTER — TELEPHONE (OUTPATIENT)
Dept: ENDOCRINOLOGY | Facility: CLINIC | Age: 71
End: 2021-06-10

## 2021-06-10 NOTE — TELEPHONE ENCOUNTER
[facility] Call Center    Phone Message    May a detailed message be left on voicemail: yes     Reason for Call: re patient calling to sched diabetic follow up.    Patient insisting on in person visit but protocols show virtual only unless patient doesn't have capability/technology to complete video visit but patient states she does have capability but refuses.      1) no virtual appts showing avail under auto scheduling now through year of 2022  2) Next in person avail in 7/1 - is this going to be too long of a wait? This is NOT a diagnosis listed in protocols that needs to be seen within a certain time frame so [facility] Sending to [facility] pool. Patient had to cancel appt 6/7 .     Please reach out to patient to advise and give appvl for in person.          Action Taken: Message routed to:  Adult Clinics: Endocrinology p [phone]    Travel Screening: Not Applicable

## 2021-06-11 ENCOUNTER — TELEPHONE (OUTPATIENT)
Dept: ENDOCRINOLOGY | Facility: CLINIC | Age: 71
End: 2021-06-11

## 2021-06-11 NOTE — TELEPHONE ENCOUNTER
6/11 [facility] to get patient scheduled for an in person visit with Dr. Leon.  Patient has requested in person.      There is an option for 6/16 @ 9:00  If she's interested.    Please teams message me when/if this patient calls back.    Thanks    Monica Anderson  Pediatric Specialty /Adult Endocrinology  [facility]

## 2021-06-11 NOTE — TELEPHONE ENCOUNTER
Per Endocrine Clinic Coordinator:    We have an appointment for 6/16.  I will reach out to patient to see if that works.     Thanks     Monica Anderson   Pediatric Specialty /Adult Endocrinology   [facility]

## 2021-06-14 ENCOUNTER — VIRTUAL VISIT (OUTPATIENT)
Dept: UROLOGY | Facility: CLINIC | Age: 71
End: 2021-06-14
Payer: COMMERCIAL

## 2021-06-14 DIAGNOSIS — R39.15 URINARY URGENCY: ICD-10-CM

## 2021-06-14 DIAGNOSIS — N39.41 URGE INCONTINENCE: Primary | ICD-10-CM

## 2021-06-14 DIAGNOSIS — N95.2 ATROPHIC VAGINITIS: ICD-10-CM

## 2021-06-14 DIAGNOSIS — R35.0 FREQUENCY OF URINATION: ICD-10-CM

## 2021-06-14 PROCEDURE — 99214 OFFICE O/P EST MOD 30 MIN: CPT | Mod: 95 | Performed by: UROLOGY

## 2021-06-14 NOTE — PROGRESS NOTES
Reason for visit: F/u on therapy for urgency and frequency and urinary incontinence.  Clinical Data: Ms. Rodríguez is a 69 y/o female with the above. She was started on oxybutynin 15 mg xl as well as estrace cream and referred to pelvic floor PT. She did better on the anticholinergic however continued to have a lot of urgency and some urge incontinence. She was then switched to Detrol which did not work, then Sanctura which did but was too expensive and tried Vesicare but then Toviaz was less expensive and using Myrbetriq and that was working better than anything else but then it was not.  She would go for a walk with her dog and after an hour she would start to leak and after 1.5 hours she would just gush.  Another time that she will gush is when she sits up from a seated position.  This was confirmed by UDS.    She then underwent periurethral bulking for ISD on 5/11/21 and notes that her urinary frequency and incontinence have improved but she continues to have a little leakage associated with urgency but not as bad as the previous gushes.    HgA1 C is 7.2 but on a daily basis her sugars fluctuate quite a bit.  Of note she has been using her estrogen cream.    UDS on 2/24/21 reviewed:  -Normal bladder capacity (635 mL) with slightly delayed filling sensations (FS: 328 mL, FD: 333 mL, SD: 452 mL).  -Good bladder compliance.  -No DO or stress urinary incontinence while seated despite max Pabd 99 cm H2O at a volume of 349 mL.  -Patient is then asked to stand. There is no incontinence with the motion of standing. However, after a few seconds of standing in place, she then starts to experience mild terminal DO with incontinence when Pdet reaches 4.4 cm H2O. This leakage starts as just a trickle but quickly progresses to more of a gush at which point she is given permission to void.  -Good detrusor contraction during voiding to max Pdet 43 cm H2O with a brisk flow rate (Qmax 32 ml/s), bell-shape flow curve, and complete  bladder emptying (PVR 0 mL).  -Mildly increased EMG activity during voiding.  -No evidence for bladder outlet obstruction.  -Fluoroscopy reveals a mildly trabeculated bladder wall without diverticuli or VUR. The bladder neck is closed during filling. Voiding images unavailable as patient transferred to [facility] to void.    ASSESSMENT and PLAN: This is a 70 year old female with urinary urgency, frequency and urge incontinence as well as a possible stress incontinence component.   She was helped to some degree with the periurthral bulking but she continues to have urinary urgency and is frustrated.  We discussed PTNS, SNS, and botox as options in detail.  She is interested in PTNS or SNS but would like to discuss options and coverage with her insurance company. She also has DM which is poorly controlled. We discussed continued use of estrogen cream.  -continue estrogen cream (she needs a new RX)  -may stay off the Toviaz and Myrbetriq since stopped helping  -send information on PTNS, SNS, both medtronic and axonics.  -pt. Will check with her insurance company about coverage for procedures.    Thank you for allowing me to participate in the care of Ms. Latoya Rodríguez and I will keep you updated on her progress.   Alberto Zhang MD     31 minutes spent on the date of the encounter doing chart review, history and exam, documentation and further activities per the note

## 2021-06-14 NOTE — NURSING NOTE
Compounded vaginal estrogen cream ordered per Dr. Zhang and sent for Dr. Zhang to review and sign. [facility] who will contact patient when ready for .    Jess Hadley RN, BSN

## 2021-06-14 NOTE — PATIENT INSTRUCTIONS
-continue estrogen cream (she needs a new RX)  -may stay off the Toviaz and Myrbetriq since stopped helping  -send information on PTNS, SNS, both medtronic and axonics.  -pt. Will check with her insurance company about coverage for procedures.

## 2021-06-14 NOTE — PROGRESS NOTES
Latoya Rodríguez  who is being evaluated via a billable video visit.      How would you like to obtain your AVS? MyChart  If the video visit is dropped, the invitation should be resent by: Text to cell phone: [phone]  Will anyone else be joining your video visit? No    Video-Visit Details    Type of service:  Video Visit    Video Start Time: 9:10 AM    Video End Time:9:26 AM    Originating Location (pt. Location): Home    Distant Location (provider location):  [facility]     Platform used for Video Visit: [employer]

## 2021-06-15 ENCOUNTER — OFFICE VISIT (OUTPATIENT)
Dept: ORTHOPEDICS | Facility: CLINIC | Age: 71
End: 2021-06-15
Payer: COMMERCIAL

## 2021-06-15 DIAGNOSIS — G56.01 CARPAL TUNNEL SYNDROME OF RIGHT WRIST: Primary | ICD-10-CM

## 2021-06-15 DIAGNOSIS — Z11.59 ENCOUNTER FOR SCREENING FOR OTHER VIRAL DISEASES: ICD-10-CM

## 2021-06-15 PROCEDURE — 99204 OFFICE O/P NEW MOD 45 MIN: CPT | Performed by: PLASTIC SURGERY

## 2021-06-15 ASSESSMENT — PAIN SCALES - GENERAL: PAINLEVEL: NO PAIN (0)

## 2021-06-15 NOTE — PATIENT INSTRUCTIONS
[facility]  [id number] [place] [address]  Phone [phone]  Fax [phone]    SURGICAL INFORMATION & INSTRUCTIONS  Dr. Sameer Sandoval  Name of Surgery: Right open carpal tunnel release    Date of Surgery: 8/6/21    If you need to reschedule/schedule your surgery, please contact Giana, our surgery scheduler at [place], at [phone].    Arrival Time: 10:30am     Time of Surgery:  11:30am    Please arrive early so that we can prepare you for surgery. If you arrive later than your scheduled arrival time, your surgery may be cancelled.  Please note that scheduled times may change, but you will always be notified if there is a change.      Location of Surgery:     [facility]  [id number] [place] [address]  2nd floor check-in  Phone [phone]  Fax [phone]  [email or web address]    Prior to surgery    ? Medical Leave Forms  Please fax any medical leave forms from your employer/school to [phone] (if seen in [place]). It can take up to 5 business days to complete the forms. We will fax them back to the number listed on the forms, if you would like a copy, please let us know and we will mail a copy to you. Do not bring with on day of surgery as the forms may get lost.    ? Call your insurance company  Ask if you need pre-approval for your surgery.  If pre-approval is needed, please call our surgery scheduler for assistance with the pre-approval process.   If you do not have insurance, please let us know.     ? Pre-Op Phone Call  You will receive a pre-op phone call 1-3 days before surgery to review your eating and drinking restrictions, review medications, and confirm surgery times.      ? 7-10 days BEFORE surgery  ? Stop taking aspirin, Plavix or aspirin products 10 days before surgery or as directed by your doctor.  ? Stop taking non-steroidal anti-inflammatory medications (naproxen/Aleve, ibuprofen/Advil/Motrin,  celecoxib/Celebrex, meloxicam/Mobic) 3 days before surgery or as directed by your surgeon.  This will reduce the risk of bleeding during surgery.  ? Stop taking fish oil (Omega-3-fatty acid) 1 week before surgery.  ? It is OK to take acetaminophen (Tylenol) up until 2 hours prior to surgery.  ? Take prescription medications as directed by your doctor. Discuss which medications to take or hold prior to your surgery, with your primary care doctor.  ? If you have diabetes, ask your primary care doctor or endocrinologist how you should take your medication(s).    ? COVID-19 Testing Prior to Surgery (see included handout)  o 3-4 days prior to surgery  o Call [phone] or [phone] to schedule  o Please stay at home and out of contact with other people after your COVID test    ? 24 hours BEFORE surgery  Stop drinking alcohol (beer, wine, liquor) at least 24-hours before and after your surgery.     ? Evening BEFORE surgery  - Take a shower - to help wash away bacteria (germs) from your skin.  It s normal to have bacteria on your skin and skin protects us from these germs.  When you have surgery, we cut the skin.  Sometimes germs get into the cuts and cause infection (illness caused by germs).  By following the showering instructions and using the special soap, you will lower the number of germs on your skin.  This decreases your chance of an infection.    - Buy or get 8 ounces of antiseptic surgical soap called 4% CHG.  Common brands of this soap are Hibiclens and Exidine.    - You can find it this soap at your local pharmacy, clinic or retail store.  If you have trouble finding it, ask your pharmacist to help you find the right substitute.  OK to use generic unscented soap if not able to purchase surgical soap.  - Do not shave within 12 inches of your incision (surgical cut) area for at least 3 days before surgery.  Shaving can make small cuts in the skin. This puts you at a higher risk of infection.    Items you  will need for each shower:   - Newly washed towel  - 4 ounces of one of the recommended soaps    Follow these instructions, the evening before surgery  - Wash your hair and body with your regular shampoo and soap. Make sure you rinse the shampoo and soap from your hair and body.  - Using clean hands, apply about 2 ounces of soap gently on your skin from the neck to your toes. Use on your groin area last. Do not use this soap on your face or head. If you get any soap in your eyes, ears or mouth, rinse right away.  - Once the soap has been on your skin for at least 1 minute, rinse off completely and repeat washing with the surgical soap one more time.  - Rinse well and dry off using a clean towel.  If you feel any tingling, itching or other irritation, rinse right away. It is normal to feel some coolness on the skin after using the antiseptic soap. Your skin may feel a bit dry after the shower, but do not use any lotions, creams or moisturizers. Do not use hair spray or other products in your hair.  - Dress in freshly washed clothes or pajamas. Use fresh pillowcases and sheets on your bed.    ? Day of Surgery  - Take another shower          Follow these instructions:      - Wash your hair and body with your regular shampoo and soap. Make sure you rinse the shampoo and soap from your hair and body.  - Using clean hands, apply about 2 ounces of soap gently on your skin from the neck to your toes. Use on your groin area last. Do not use this soap on your face or head. If you get any soap in your eyes, ears or mouth, rinse right away.  - Once the soap has been on your skin for at least 1 minute, rinse off completely and repeat washing with the surgical soap one more time.  - Rinse well and dry off using a clean towel.  If you feel any tingling, itching or other irritation, rinse right away. It is normal to feel some coolness on the skin after using the antiseptic soap. Your skin may feel a bit dry after the shower, but do  not use any lotions, creams or moisturizers. Do not use hair spray or other products in your hair.  - Dress in freshly washed clothes.  - Do not wear deodorant, cologne, lotion, makeup, nail polish or jewelry to surgery.    ? If there is any change in your health PRIOR to your surgery, please contact your surgeon's office.  Such as a fever, body aches, fatigue, any flu-like symptoms, rash, or any new injury to related body part.    ? Bring these items to the hospital/surgery center:   ? Insurance card(s)  ? Money for parking and co-pays, if needed  ? A list of all the medications you take, including vitamins, minerals, herbs and over the counter medications.    ? A copy of your Advance Health Care Directive, if you have one.  This tells us what treatment(s) you would or would not want, and who would make health care decisions, if you could no longer speak for yourself.    ? A case for glasses, contact lenses, hearing aids or dentures.   ? Your inhaler or CPAP machine, if you use these at home    ? Leave extra cash, jewelry and other valuables at home.       ? Other information:   Sleep Apnea: Let your nurse know if you have a history of sleep apnea, only if you are having surgery at the [facility].    When you arrive for surgery  When you get to the surgery center/hospital, you will:  - Check in. If you are under age 18, you must be with a parent or legal guardian.  - Sign consent forms, if you haven t already. These forms state that you know the risks and benefits of surgery. When you sign the forms, you give us permission to do the surgery. Do not sign them unless you understand what will happen during and after your surgery. If you have any questions about your surgery, ask to speak with your doctor before you sign the forms. If you don t understand the answers, ask again.  - Receive a copy of the Patient s Bill of Rights. If you do not receive a copy, please ask for one.  - Change into hospital  clothes. Your belongings will be placed in a bag. We will return them to you after surgery.  - Meet with the anesthesia provider. He or she will tell you what kind of anesthesia (medicine) will be used to keep you comfortable during surgery.  - Remember: it s okay to remind doctors and nurses to wash their hands before touching you.  - In most cases, your surgeon will use a marker to write his or her initials on the surgery site. This ensures that the exact site is operated on.  - For safety reasons, we will ask you the same questions many times. For example, we may ask your name and birth date over and over again.  - Friends and family can stay with you until it s time for surgery. While you re in surgery, they will be in the waiting area. Please note that cell phones are not allowed in some patient care areas.  - If you have questions about what will happen in the operating room, talk to your care team.  - You will meet with an anesthesiologist, before your surgery.  He or she may reference types of anesthesia commonly used for surgeries:   o General:  This involves the use of an IV for injection of medication and anesthesia. You are put into a sleep and have a breathing tube to assist you with breathing.  o Sedation:  You are asleep, but not so deply that you need a breathing tube.   o Local or Regional: a nerve is injected to numb the surgical area.  o Spinal: you are numbed from the waist down with medicine injected into your back.  o Femoral Nerve Block:  Anesthesia injected into the groin of leg which you are having surgery on.      After surgery  We will move you to a recovery room, where we will watch you closely. If you have any pain or discomfort, tell your nurse. He or she will try to make you comfortable.    - If you are staying overnight, we will move you to your hospital room after you are awake.  - If you are going home, we will move you to another room. Friends and family may be able to join you.  The length of time you spend in recovery depend on the type of medicine you received, your medical condition, the type of surgery you had, or your response to the anesthesia given during your procedure.  - When you are discharged from the recovery room, the nurses will review instructions with you and your caregiver.  - Please wash your hands every time you touch the wound or change bandages or dressings.  - Do not submerge the wound in water.  You may not use a bathtub or hot tub until the wound is closed. The wait time frame is generally 2-3 weeks, but any open area can be a source of incoming bacteria, so it is better to be on the safe side and avoid water submersion until your wound is fully healed.  Keep all dressings clean and dry.   - If you had surgery on your arm or leg, elevate it as much as possible to help reduce swelling.  - You may put ice on the surgical area for comfort, keeping ice on area for up to 20 minutes then off for 40 minutes.  You may do this the first few days after surgery to help reduce pain and swelling.   - Many surgical wounds will have small white strips of tape on them called steri-strips. These are to help support the incision and surrounding skin. Do not remove these. The edges will curl and fall off on their own, typically within 7-10 days with normal showering and hygiene.   - Drink at least 8-10 glasses of liquid each day to help your body heal.  - Keep your lungs clear by coughing and taking deep breaths every couple hours.  This is especially important the first 48 hours after surgery.    - Restrictions:  - Carpal Tunnel Release  o Limited use of right hand for 2-3 weeks after surgery.  o OK for range of motion of fingers, wrist, and elbow  o No lifting more than 5 pounds until your stitches have been removed and incision is healed (about 3 weeks post op). You will then progress to activity as tolerated, but try to avoid any heavy lifting or repetitive motions.    - Notify  your doctor if you have any of the following:   o Fever of 101 F or higher  o Numbness and/or tingling  o Increased pain, redness or swelling  o Drainage from wound  o Prolonged or uncontrolled bleeding  o Difficulty with movement    Follow-up Appointments, in Clinic  If you don't already have an appointment scheduled, please call to set up an appointment at [phone].      ? Post-Op appointments with provider (2.5 and 6 weeks post op)    Dealing with pain  A nurse will check your comfort level often during your stay. He or she will work with you to manage your pain.  It s expected that you will have pain after surgery.  Our goal is to reduce or minimize pain by:   - Medicine doesn t work the same for everyone. If your medicine isn t working, tell your nurse. There may be other medicines or treatments we can try.  - Medication Refills.  If you need refills on your pain medication, please call the clinic as soon as possible.  It may take 72-business hours to obtain a refill.  Refills must be picked up at check-in 2, [facility] or mailed to a pharmacy of your choice.    - It is expected that you will wean off the pain medications in a timely manner.   - Constipation is a common side effect of pain medication, decreased activity and anesthesia from surgery.  Take a stool softener as prescribed by your doctor at the time of discharge.  You may also use over the counter medications as needed.  Be sure to increase your fiber (fruits and vegetables) and your water intake.      Please call the clinic at [phone], if you experience any problems or have questions.  If you are having an emergency, always call [phone] or seek immediate evaluation at the Emergency Room.    Thank you for selecting the [facility] for your care!  ---------------------------------------------

## 2021-06-15 NOTE — PROGRESS NOTES
REFERRING PROVIDER:  Pricila Avila CNP, MS    PRESENTING COMPLAINT:  Consultation for numbness, tingling in the right hand.    HISTORY OF PRESENTING COMPLAINT:  Ms. Rodríguez is 70 years old.  She is right-hand dominant.  She has been having numbness, tingling in the median innervated fingers for about 9 months to a year.  It is now constantly a little numb in the median innervated fingers and worsening week by week and month by month.  She denies any trauma.  No neck pain, no radiculopathy, no lower extremity problems.  Left hand has not got any issues.  She gets night symptoms.  She has used a brace with some relief.  She is here to have it looked at.    PAST MEDICAL HISTORY:  Diabetes and atrial flutter.    PAST SURGICAL HISTORY:  Trigger release done in the past and bladder surgery.    MEDICATIONS:  Insulin, atorvastatin, and metoprolol.    ALLERGIES:  Nil.    SOCIAL HISTORY:  Does not smoke, socially drinks.  Lives in [place], is retired.    REVIEW OF SYSTEMS:  Denies chest pain, shortness of breath, MI, CVA, DVT and PE.    PHYSICAL EXAMINATION:  Vital signs stable.  She is afebrile, in no obvious distress.  On examination of her hand, she has no thenar atrophy.  Full range of motion.  She is dull in the median innervated fingers, sharp in the ulnar innervated finger, thenar eminence is sharp.  Tinel's is negative.  Phalen's is positive.  Carpal compression is positive.    ASSESSMENT AND PLAN:  Based on the above findings, a diagnosis of carpal tunnel syndrome was made.  Given the fact it has been going on for about a year or so, she has constant numbness and her Tinel's is negative, it is probably moderately-severely involved.  In my opinion, she needs surgical release.  Options of steroid injection and splinting were given.  She wants to proceed with the surgery.  Risks of pain, infection, bleeding, scarring, asymmetry, seromas, hematomas, wound breakdown, wound dehiscence, injury to deeper structures,  recurrence, requirement of further surgeries in the future, DVT, PE, MI, CVA, pneumonia, pillar pain, CRPS, and death were explained.  She understood them all and wants to proceed.  I look forward to helping her out in the near future.    Total time spent with chart review, visit itself and post-visit paperwork was 30 minutes.    cc:  Pricila Avila CNP, MS  [place] [address] [address]

## 2021-06-15 NOTE — LETTER
6/15/2021         RE: Latoya Rodríguez  [address]  [place] [address]        Dear Colleague,    Thank you for referring your patient, Latoya Rodríguez, to the [facility]. Please see a copy of my visit note below.    REFERRING PROVIDER:  Pricila Avila CNP, MS    PRESENTING COMPLAINT:  Consultation for numbness, tingling in the right hand.    HISTORY OF PRESENTING COMPLAINT:  Ms. Rodríguez is 70 years old.  She is right-hand dominant.  She has been having numbness, tingling in the median innervated fingers for about 9 months to a year.  It is now constantly a little numb in the median innervated fingers and worsening week by week and month by month.  She denies any trauma.  No neck pain, no radiculopathy, no lower extremity problems.  Left hand has not got any issues.  She gets night symptoms.  She has used a brace with some relief.  She is here to have it looked at.    PAST MEDICAL HISTORY:  Diabetes and atrial flutter.    PAST SURGICAL HISTORY:  Trigger release done in the past and bladder surgery.    MEDICATIONS:  Insulin, atorvastatin, and metoprolol.    ALLERGIES:  Nil.    SOCIAL HISTORY:  Does not smoke, socially drinks.  Lives in [place], is retired.    REVIEW OF SYSTEMS:  Denies chest pain, shortness of breath, MI, CVA, DVT and PE.    PHYSICAL EXAMINATION:  Vital signs stable.  She is afebrile, in no obvious distress.  On examination of her hand, she has no thenar atrophy.  Full range of motion.  She is dull in the median innervated fingers, sharp in the ulnar innervated finger, thenar eminence is sharp.  Tinel's is negative.  Phalen's is positive.  Carpal compression is positive.    ASSESSMENT AND PLAN:  Based on the above findings, a diagnosis of carpal tunnel syndrome was made.  Given the fact it has been going on for about a year or so, she has constant numbness and her Tinel's is negative, it is probably moderately-severely involved.  In my opinion, she  needs surgical release.  Options of steroid injection and splinting were given.  She wants to proceed with the surgery.  Risks of pain, infection, bleeding, scarring, asymmetry, seromas, hematomas, wound breakdown, wound dehiscence, injury to deeper structures, recurrence, requirement of further surgeries in the future, DVT, PE, MI, CVA, pneumonia, pillar pain, CRPS, and death were explained.  She understood them all and wants to proceed.  I look forward to helping her out in the near future.    Total time spent with chart review, visit itself and post-visit paperwork was 30 minutes.    cc:  Pricila Avila [place]NP[place] [address]          Again, thank you for allowing me to participate in the care of your patient.        Sincerely,        RADHA Sandoval MD

## 2021-06-15 NOTE — NURSING NOTE
Pre-Operative Teaching Flowsheet     Person(s) involved in teaching: Patient     Motivation Level:  Receptive (willing/able to accept information) and asks appropriate questions where applicable: Yes  Any cultural factors/Confucianist beliefs that may influence understanding or compliance? No     Patient demonstrates understanding of the following:  Pre-operative planning, including the necessary appointments and preparation needed prior to surgery: Yes  Which situations necessitate calling provider and whom to contact: Yes  Pain management techniques pre and post op: Yes  How, and when, to access community resources: Yes    Additional Teaching Concerns Addressed:  Post-operative living arrangements and necessary adaptations to living environment.     Instructional Materials Used/Given: Yes, pre-op packet printed from AVS with additional system forms added (COVID Testing Handout, Map, etc). Pre-op soap given (if in clinic).     Time spent with patient: 20 minutes.    Jeramie David RN

## 2021-06-16 ENCOUNTER — TELEPHONE (OUTPATIENT)
Dept: CARDIOLOGY | Facility: CLINIC | Age: 71
End: 2021-06-16

## 2021-06-16 NOTE — TELEPHONE ENCOUNTER
EP Scheduling called the patient to schedule cardioversion. The number [phone] was left for the patient to return the call and schedule the procedure.    Raine Marks  Periop Electrophysiology   [phone]

## 2021-06-16 NOTE — TELEPHONE ENCOUNTER
----- Message from Catalina Villasenor RN sent at 6/10/2021 10:18 AM CDT -----  Regarding: ANNABELLE and Cardioversion  Hi Raine,     This patient was seen by Dr. Whipple yesterday and he would like the patient to get set up for a ANNABELLE and Cardioversion in about 2 weeks. The patient can do any day of the week except Tuesdays. Admit orders have been placed. Please let me know if you have any questions.     Thank you,     Qi Villasenor RN   Medical [facility]ty Clinic Care Coordinator  [facility]   Phone: [phone]  Fax: [phone]

## 2021-06-17 DIAGNOSIS — Z11.59 ENCOUNTER FOR SCREENING FOR OTHER VIRAL DISEASES: ICD-10-CM

## 2021-06-18 ENCOUNTER — HOSPITAL ENCOUNTER (OUTPATIENT)
Facility: CLINIC | Age: 71
End: 2021-06-18
Attending: INTERNAL MEDICINE | Admitting: INTERNAL MEDICINE
Payer: COMMERCIAL

## 2021-06-18 DIAGNOSIS — Z11.59 ENCOUNTER FOR SCREENING FOR OTHER VIRAL DISEASES: ICD-10-CM

## 2021-06-21 ENCOUNTER — VIRTUAL VISIT (OUTPATIENT)
Dept: PHARMACY | Facility: CLINIC | Age: 71
End: 2021-06-21
Payer: COMMERCIAL

## 2021-06-21 DIAGNOSIS — E10.9 TYPE 1 DIABETES MELLITUS WITHOUT COMPLICATION (H): ICD-10-CM

## 2021-06-21 DIAGNOSIS — I48.91 ATRIAL FIBRILLATION, UNSPECIFIED TYPE (H): Primary | ICD-10-CM

## 2021-06-21 DIAGNOSIS — Z78.9 TAKES DIETARY SUPPLEMENTS: ICD-10-CM

## 2021-06-21 PROCEDURE — 99605 MTMS BY PHARM NP 15 MIN: CPT | Performed by: PHARMACIST

## 2021-06-21 PROCEDURE — 99607 MTMS BY PHARM ADDL 15 MIN: CPT | Performed by: PHARMACIST

## 2021-06-21 NOTE — PROGRESS NOTES
Medication Therapy Management (MTM) Encounter    ASSESSMENT:                            Medication Adherence/Access: No issues identified    Type 1 Diabetes: Patient is not meeting A1c goal of < 7%. Patient's daily average is 120mg/dL and would anticipate next A1c to be closer to goal. Patient has follow up with endocrinology in 2 weeks.    Afib: Discussed benefit and risks of warfarin and apixaban. DOACs are preferred over warfarin for atrial fibrillation-they reduce risk of stroke as well as warfarin, cause slightly less intracranially bleeding and require less frequent monitoring. Apixiban is preferred over other DOACs as it causes less significant bleeding. Patient is going to double check with [employer] about cost. Also, provided patient with patient assistance program information. Patient will check on cost and then let Dr. Whipple know what she decides. May benefit from checking blood pressure and heart rate at home.    Supplements: Stable. Effectiveness not proven with ACV or cinnamon but could continue to take.    PLAN:                            Call [employer] and Patient Assistance Program for cost of Eliquis.  Notify Dr. Whipple about decision on anticoagulation.  Recommend checking blood pressure and heart rate at home.     Follow-up: As needed; patient to follow up with cardiology.    SUBJECTIVE/OBJECTIVE:                          Latoya Rodríguez is a 70 year old female called for an initial visit. She was referred to me from cardiology.      Reason for visit: NOAC vs. warfarin.    Allergies/ADRs: Reviewed in chart  Tobacco: She reports that she has never smoked. She has never used smokeless tobacco.  Alcohol: Less than 1 beverage / month  Caffeine: 3-4 cups/day of coffee; has cut down, when she was working she would drink 6-8 cups a day   Activity: walks 4-5 miles a day 7 days a week; walks her dog except on hot days might not due the whole route  Past Medical History: Reviewed in  chart    Medication Adherence/Access: Patient uses pill box(es).  Per patient, misses medication 0 times per week.   Medication barriers: none.   The patient fills medications at [place]: NO, fills medications at [facility] or [facility].    Type 1 Diabetes:  Currently taking Lantus 28 units daily, Novolog 1 unit per 15gm CHO breakfast and lunch and supper plus sliding scale. Patient is not experiencing side effects.  Blood sugar monitorin time(s) daily or as needed with symptoms. Daily Average has been 120mg/dL   Symptoms of low blood sugar? Light headed, Frequency of lows- twice in the last 2 days. Will treat with grape juice and CHO if low, or piece of fruit.  Symptoms of high blood sugar? none  Eye exam: due  Foot exam: due  Diet/Exercise: Has been watching what she eats. Pescatarian  Breakfast: couple of eggs with cheese, cup of coffee or cup of raisin bran (loves raisin bran but increases her blood sugars) with an egg  Lunch: couple of eggs & salad with diet dressing or 1/2 can Tuna with low carb wrap, 1 Tbls light mcbride  Dinner:protein, veggie and salad  Snack: will snack depending on where her blood sugars are  Aspirin: Not taking due to starting anticoagulation.  Statin: Yes: Atorvastatin   ACEi/ARB: No.   Urine Albumin:   Lab Results   Component Value Date    UMALCR Unable to calculate due to low value 2020      Lab Results   Component Value Date    A1C 8.4 02/10/2021    A1C 7.2 10/21/2020    A1C 7.8 2020    A1C 9.2 2019    A1C 9.1 12/10/2018       Afib: current medications include metoprolol XL 50mg daily. Patient denies side effects. Patient called [facility] and was told that her copay would be $50 a month for Eliquis. Patient reports having to weigh the cost of Eliquis with the cost of lab draws for warfarin.  Patient does not check blood pressure at home. Patient denies shortness of breath or rapid heart rate.  CHADSVASC2=3    Supplements: current therapy  includes Apple Cider Vinegar tablet daily, cinnamon daily    Today's Vitals: There were no vitals taken for this visit.  ----------------    I spent 43 minutes with this patient today (an extra 15 minutes was spent creating the Medication Action Plan). A copy of the visit note was provided to the patient's primary care and referring provider.    The patient was sent via [employer] a summary of these recommendations.     Catrachita Chavez, Pharm.D, [facility]  Medication Therapy Management Pharmacist      Telemedicine Visit Details  Type of service:  Telephone visit  Start Time: 2:32 PM  End Time: 3:15 PM  Originating Location (patient location): Home  Distant Location (provider location):  [facility]      Medication Therapy Recommendations  Atrial fibrillation, unspecified type (H)    Current Medication: metoprolol succinate ER (TOPROL-XL) 50 MG 24 hr tablet   Rationale: Medication requires monitoring - Needs additional monitoring   Recommendation: Self-Monitoring   Status: Patient Agreed - Adherence/Education

## 2021-06-21 NOTE — LETTER
"        Date: 2021    Latoya Rodríguez  [address] [place] [place] [address]    Dear Ms. Rodríguez,    Thank you for talking with me on 2021 about your health and medications. Medicare s MTM (Medication Therapy Management) program helps you understand your medications and use them safely.      This letter includes an action plan (Medication Action Plan) and medication list (Personal Medication List). The action plan has steps you should take to help you get the best results from your medications. The medication list will help you keep track of your medications and how to use them the right way.       Have your action plan and medication list with you when you talk with your doctors, pharmacists, and other healthcare providers in your care team.     Ask your doctors, pharmacists, and other healthcare providers to update the action plan and medication list at every visit.     Take your medication list with you if you go to the hospital or emergency room.     Give a copy of the action plan and medication list to your family or caregivers.     If you want to talk about this letter or any of the papers with it, please call   [phone].We look forward to working with you, your doctors, and other healthcare providers to help you stay healthy through the [facility] MTM program.    Sincerely,  Catrachita Chavez [facility]    Enclosed: Medication Action Plan and Personal Medication List    MEDICATION ACTION PLAN FOR Latoya Rodríguez,  1950     This action plan will help you get the best results from your medications if you:   1. Read \"What we talked about.\"   2. Take the steps listed in the \"What I need to do\" boxes.   3. Fill in \"What I did and when I did it.\"   4. Fill in \"My follow-up plan\" and \"Questions I want to ask.\"     Have this action plan with you when you talk with your doctors, pharmacists, and other healthcare providers in your care team. Share this with your family or caregivers " too.  DATE PREPARED: 2021  What we talked about: warfarin vs. Eliquis                                                  What I need to do: Call [employer] to verify cost and patient assistance program. Then let Dr. Whipple know what you decide.       What I did and when I did it:                                              What we talked about: monitoring your heart rate and blood pressure                                                  What I need to do: Monitor your heart rate and blood pressure at home.       What I did and when I did it:                                                 My follow-up plan:                 Questions I want to ask:              If you have any questions about your action plan, call Catrachita Chavez [facility], Phone: [phone] , Monday-Friday 8-4:30pm.           PERSONAL MEDICATION LIST FOR Latoya RodríguezCANDACE 1950     This medication list was made for you after we talked. We also used information from your doctor's chart.      Use blank rows to add new medications. Then fill in the dates you started using them.    Cross out medications when you no longer use them. Then write the date and why you stopped using them.    Ask your doctors, pharmacists, and other healthcare providers to update this list at every visit. Keep this list up-to-date with:       Prescription medications    Over the counter drugs     Herbals    Vitamins    Minerals      If you go to the hospital or emergency room, take this list with you. Share this with your family or caregivers too.     DATE PREPARED: 2021  Allergies or side effects: Patient has no known allergies.     Medication:  ACCU-CHEK MICKY PLUS VI STRP      How I use it:  Use to test blood sugar 6 times daily      Why I use it: Type 1 diabetes mellitus without complication (H)    Prescriber:  Edward Aguero MD      Date I started using it:       Date I stopped using it:         Why I stopped using it:             Medication:  ACCU-CHEK FASTCLIX LANCETS MISC      How I use it:  USE TO TEST BLOOD SUGAR FOUR TIMES A DAY OR AS DIRECTED      Why I use it: Type 1 diabetes mellitus without complication (H)    Prescriber:  Edward Aguero MD      Date I started using it:       Date I stopped using it:         Why I stopped using it:            Medication:  APPLE CIDER VINEGAR PO      How I use it:  Take by mouth every morning      Why I use it: Weight Loss      Prescriber:  OTC      Date I started using it:       Date I stopped using it:         Why I stopped using it:            Medication:  ATORVASTATIN CALCIUM 10 MG PO TABS      How I use it:  Take 1 tablet (10 mg) by mouth daily      Why I use it: Hyperlipidemia with target LDL less than 100    Prescriber:  Edward Aguero MD      Date I started using it:       Date I stopped using it:         Why I stopped using it:            Medication:  BD PEN NEEDLE SHORT U/F 31G X 8 MM MISC      How I use it:  USE WITH NOVOLOG AND LANTUS(FIVE TIMES DAILY)      Why I use it: Type I diabetes mellitus, well controlled (H)    Prescriber:  Edward Aguero MD      Date I started using it:       Date I stopped using it:         Why I stopped using it:            Medication:  CALCIUM CARBONATE-VITAMIN D 500-400 MG-UNIT PO TABS      How I use it:  Take 1 tablet by mouth 2 times daily      Why I use it: Age-related osteoporosis without current pathological fracture    Prescriber:  URVASHI Hunter CNP      Date I started using it:       Date I stopped using it:         Why I stopped using it:            Medication:  CINNAMON PO      How I use it:  Take by mouth every morning      Why I use it: Diabetes      Prescriber:  OTC      Date I started using it:       Date I stopped using it:         Why I stopped using it:            Medication:  COMPOUNDED NON-CONTROLLED (CMPD RX) - PHARMACY TO MIX COMPOUNDED MEDICATION      How I use it:  Estriol 0.1% cream (1mg/gram) in  HRT base. Place 1 gram vaginally daily for 2 weeks, then vaginally twice weekly.      Why I use it: Atrophic vaginitis    Prescriber:  Alberto Zhang MD      Date I started using it:       Date I stopped using it:         Why I stopped using it:            Medication:  INSULIN GLARGINE  UNIT/ML SC SOPN      How I use it:  Inject 28 Units Subcutaneous At Bedtime      Why I use it: Diabetes      Prescriber:  Dr. Leon      Date I started using it:       Date I stopped using it:         Why I stopped using it:            Medication:  KETO-DIASTIX VI STRP      How I use it:  1 strip by In Vitro route as needed      Why I use it: Type 2 diabetes mellitus with hyperglycemia (H)    Prescriber:  URVASHI Hunter CNP      Date I started using it:       Date I stopped using it:         Why I stopped using it:            Medication:  METOPROLOL SUCCINATE ER 50 MG PO TB24      How I use it:  Take 1 tablet (50 mg) by mouth daily      Why I use it: Chronic atrial fibrillation (H)    Prescriber:  Amadeo Whipple MD      Date I started using it:       Date I stopped using it:         Why I stopped using it:            Medication:  MULTIVITAMIN ADULT PO      How I use it:  Take 1 tablet by mouth every morning       Why I use it: General Health      Prescriber:  OTC      Date I started using it:       Date I stopped using it:         Why I stopped using it:            Medication:  NOVOLOG FLEXPEN 100 UNIT/ML SC SOPN      How I use it:  1 unit per 15 gram of carb for breakfast and lunch, and 1 unit per 10 grams for dinner.Plus 1 unit per 50 mg/dl above 150. Total daily dose approx 30 units.      Why I use it: Type 2 diabetes mellitus with hyperglycemia, with long-term current use of insulin (H)    Prescriber:  Edward Aguero MD      Date I started using it:       Date I stopped using it:         Why I stopped using it:            Medication:         How I use it:         Why I use it:      Prescriber:          Date I started using it:       Date I stopped using it:         Why I stopped using it:            Medication:         How I use it:         Why I use it:      Prescriber:         Date I started using it:       Date I stopped using it:         Why I stopped using it:            Medication:         How I use it:         Why I use it:      Prescriber:         Date I started using it:       Date I stopped using it:         Why I stopped using it:              Other Information:     If you have any questions about your medication list, call Catrachita Chavez [facility], Phone: [phone] , Monday-Friday 8-4:30pm.    According to the Paperwork Reduction Act of 1995, no persons are required to respond to a collection of information unless it displays a valid OMB control number. The valid OMB number for this information collection is 0380-2037. The time required to complete this information collection is estimated to average 40 minutes per response, including the time to review instructions, searching existing data resources, gather the data needed, and complete and review the information collection. If you have any comments concerning the accuracy of the time estimate(s) or suggestions for improving this form, please write to: CMS, Attn: MIGUEL Reports Clearance Officer, [address] [place] [phone].

## 2021-06-21 NOTE — PATIENT INSTRUCTIONS
Recommendations from today's MTM visit:                                                    MTM (medication therapy management) is a service provided by a clinical pharmacist designed to help you get the most of out of your medicines.   Today we reviewed what your medicines are for, how to know if they are working, that your medicines are safe and how to make your medicine regimen as easy as possible.      Call [employer] and Patient Assistance Program for cost of Eliquis.  Notify Dr. Whipple about decision on anticoagulation.  Recommend checking blood pressure and heart rate at home.       Follow-up: As needed    It was great to speak with you today.  I value your experience and would be very thankful for your time with providing feedback on our clinic survey. You may receive a survey via email or text message in the next few days.     To schedule another MTM appointment, please call the clinic directly or you may call the MTM scheduling line at [phone] or toll-free at [phone].     My Clinical Pharmacist's contact information:                                                      Please feel free to contact me with any questions or concerns you have.      Catrachita Chavez, Pharm.D, BCACP  Medication Therapy Management Pharmacist

## 2021-06-24 ENCOUNTER — TELEPHONE (OUTPATIENT)
Dept: FAMILY MEDICINE | Facility: CLINIC | Age: 71
End: 2021-06-24

## 2021-06-24 NOTE — TELEPHONE ENCOUNTER
MORGANI~ June 24, 2021 I spoke with Latoya, she is in need of financial assistance for medication.    We reviewed the Prescription Assistance Program for manfacturer assistance programs, gross income, insurance and Rx list.     assistance applications will be completed for: Novolog pens, needles, Lantus/Basaglar & Eliquis. When approved, will receive this medication at no cost for the remainder of 2021.    I will work with Dr Aguero and Dr. Whipple directly on those programs.    Jennifer Suarez  Prescription

## 2021-06-30 DIAGNOSIS — Z11.59 ENCOUNTER FOR SCREENING FOR OTHER VIRAL DISEASES: ICD-10-CM

## 2021-06-30 PROCEDURE — U0003 INFECTIOUS AGENT DETECTION BY NUCLEIC ACID (DNA OR RNA); SEVERE ACUTE RESPIRATORY SYNDROME CORONAVIRUS 2 (SARS-COV-2) (CORONAVIRUS DISEASE [COVID-19]), AMPLIFIED PROBE TECHNIQUE, MAKING USE OF HIGH THROUGHPUT TECHNOLOGIES AS DESCRIBED BY CMS-2020-01-R: HCPCS | Performed by: INTERNAL MEDICINE

## 2021-06-30 PROCEDURE — U0005 INFEC AGEN DETEC AMPLI PROBE: HCPCS | Performed by: INTERNAL MEDICINE

## 2021-06-30 NOTE — PROGRESS NOTES
Chief complaint:  Latoya is a 70 year old female seen in follow up of type 1 Diabetes.  HISTORY OF PRESENT ILLNESS  Latoya is a 70-year-old retiree with a history of type 1 diabetes who has a visit today for a follow-up type 1 diabetes.  She was diagnosed with type 1 diabetes 2016 during routine physical where she was found to have high blood glucose levels.  At that time she was diagnosed and treated as a type II diabetic but it was later determined that she had positive TORRES antibodies and low C-peptide.      Her current regimen consist of Lantus 28 units AM, Novolog 1:15 g carbohydrate coverage with breakfast, lunch, and dinner, Novolog correction 1:50 if BG is >150 at meal time, Novolog correction 1:50 if BG is >200 at bedtime    A1c 6.7 today. Reviewed BG in the past 3 weeks, average glucose 134. Highest , lowest BG 40 She has hypoglycemia with BG in 40-60 in the middle of the day particularly when she went for a walk or at night. She eats 3 meals per day with some snack at bedtime. She is not yet interested in Dexcom sensor.    Past Medical History  Type 1 diabetes, diagnosed 6/2016  Hyperlipidemia  Osteoporosis     Medications  Current Outpatient Medications   Medication Sig Dispense Refill     APPLE CIDER VINEGAR PO Take by mouth every morning       atorvastatin (LIPITOR) 10 MG tablet Take 1 tablet (10 mg) by mouth daily (Patient taking differently: Take 10 mg by mouth every morning ) 90 tablet 3     blood glucose (ACCU-CHEK MICKY PLUS) test strip Use to test blood sugar 6 times daily 550 strip 3     blood glucose monitoring (ACCU-CHEK FASTCLIX) lancets USE TO TEST BLOOD SUGAR FOUR TIMES A DAY OR AS DIRECTED 306 each 3     calcium carbonate-vitamin D 500-400 MG-UNIT TABS tablt Take 1 tablet by mouth 2 times daily 180 tablet 3     CINNAMON PO Take by mouth every morning       COMPOUNDED NON-CONTROLLED SUBSTANCE (CMPD RX) - PHARMACY TO MIX COMPOUNDED MEDICATION Estriol 0.1% cream (1mg/gram) in HRT base.  Place 1 gram vaginally daily for 2 weeks, then vaginally twice weekly. 30 g 6     insulin aspart (NOVOLOG FLEXPEN) 100 UNIT/ML pen 1 unit per 15 gram of carb for breakfast and lunch, and 1 unit per 10 grams for dinner.Plus 1 unit per 50 mg/dl above 150. Total daily dose approx 30 units. 30 mL 3     insulin glargine (LANTUS PEN) 100 UNIT/ML pen Inject 28 Units Subcutaneous At Bedtime       insulin pen needle (B-D U/F) 31G X 8 MM miscellaneous USE WITH NOVOLOG AND LANTUS(FIVE TIMES DAILY) 500 each 3     metoprolol succinate ER (TOPROL-XL) 50 MG 24 hr tablet Take 1 tablet (50 mg) by mouth daily 90 tablet 3     Multiple Vitamins-Minerals (MULTIVITAMIN ADULT PO) Take 1 tablet by mouth every morning        Urine Glucose-Ketones Test (KETO-DIASTIX) STRP 1 strip by In Vitro route as needed 1 each 11     Allergies  No Known Allergies    Family History  family history includes Brain Cancer in her maternal uncle; Breast Cancer in her mother; Coronary Artery Disease in her maternal uncle and maternal uncle; Hyperlipidemia in her mother; Leukemia in her father; Other Cancer in her father; Skin Cancer in her father; Stomach Cancer in her maternal aunt.  No thyroid disease or type 1 diabetes in the family    Social History  Social History     Socioeconomic History     Marital status: Single     Spouse name: Not on file     Number of children: Not on file     Years of education: Not on file     Highest education level: Not on file   Occupational History     Not on file   Social Needs     Financial resource strain: Not on file     Food insecurity     Worry: Not on file     Inability: Not on file     Transportation needs     Medical: Not on file     Non-medical: Not on file   Tobacco Use     Smoking status: Never Smoker     Smokeless tobacco: Never Used   Substance and Sexual Activity     Alcohol use: Yes     Comment: 1 to 2 beers or glasses of wine 1 to 2 times per month     Drug use: No     Sexual activity: Never   Lifestyle      "Physical activity     Days per week: Not on file     Minutes per session: Not on file     Stress: Not on file   Relationships     Social connections     Talks on phone: Not on file     Gets together: Not on file     Attends An[employer] service: Not on file     Active member of club or organization: Not on file     Attends meetings of clubs or organizations: Not on file     Relationship status: Not on file     Intimate partner violence     Fear of current or ex partner: Not on file     Emotionally abused: Not on file     Physically abused: Not on file     Forced sexual activity: Not on file   Other Topics Concern     Parent/sibling w/ CABG, MI or angioplasty before 65F 55M? No   Social History Narrative     Not on file       REVIEW OF SYSTEMS  10 points ROS were negative otherwise mentioned in HPI    Physical Exam  Vital signs:   /80   Pulse 104   Ht 1.651 m (5' 5\")   Wt 75 kg (165 lb 5.5 oz)   SpO2 96%   BMI 27.51 kg/m    Estimated body mass index is 27.51 kg/m  as calculated from the following:    Height as of this encounter: 1.651 m (5' 5\").    Weight as of this encounter: 75 kg (165 lb 5.5 oz).  Constitutional: no distress, comfortable, pleasant   Eyes: anicteric  CVS: RRR, normal S1,S2, no murmur  Lungs: CTA B/L  Abd: soft, NT, ND, no hepatomegaly  Musculoskeletal: no edema   Skin:  no jaundice   Neurological: normal gait, 2+patella reflex, intact sensation per monofilament bilaterally exam dated 7/1/2021, no tremor on outstretched hands bilaterally  Psychological: appropriate mood       DATA REVIEW    Lab Results   Component Value Date    A1C 6.7 07/01/2021    A1C 8.4 02/10/2021    A1C 7.2 10/21/2020    A1C 7.8 07/20/2020    A1C 9.2 03/18/2019     ENDO DIABETES Latest Ref Rng & Units 7/20/2020   CHOLESTEROL <200 mg/dL 189   LDL CHOLESTEROL, CALCULATED <100 mg/dL 107 (H)   LDL CHOLESTEROL DIRECT <100 mg/dL    HDL CHOLESTEROL >49 mg/dL 71   NON HDL CHOLESTEROL <130 mg/dL 118   TRIGLYCERIDES <150 mg/dL 56 "     DXA 8/28/2020  Lumbar spine T-score in region of L2-L4 = -2.4   L1-4 percent change: Not significant%      HIPS:  Mean total hip T-score: -1  Mean total hip percent change: Not significant%      Left femoral neck T-score = -1.9  Right femoral neck T-score = -1.6      Radius 33% T-score = -1.6     FRAX:  10 year probability of major osteoporotic fracture: 11.3%  10 year probability of hip fracture: 2.1%  The 10 year probability of fracture may be lower than reported if the  patient has received treatment. FRAX data should be disregarded in patient's taking bisphosphonates.                                                                 IMPRESSION:    Low bone mass (osteopenia).    ASSESSMENT/PLAN:   Latoya is a 70-year-old retiree with a history of type 1 diabetes who is here for diabetes management.    1.  Type 1 diabetes: A1c 6.7 in 7/1/2021  Latoya's blood glucose readings over the last 2 weeks were reviewed.  She has episodes of hypoglycemia around midday and at night followed by hyperglycemia.   - discussed Dexcom continuous glucose monitor but she would like to think about it.    Recommendations:  -Reduce Lantus 26 units at bedtime  -Change Novolog 1:18g carb coverage with meals and snacks  -Continue 1:50 Novolog correction if over 150 at breakfast, lunch and dinner, continue 1:50 Novolog correction if BG is >200 at bedtime      2) DM complications:  Retinopathy: normal exam - no DR May 2021  Nephropathy: negative urine microalbumin 3/2020 -- will check today  Neuropathy: none per exam 7/2021     3) Hyperlipidemia: , HDL 71, TG 56, total cholesterol 189 on 7/20/2020. Continue Lipitor 10 mg daily.     4) Osteopenia: DXA 2020 showed the lowest T-score of -1.9 at the left femoral neck. She follows up with PCP. Currently on calcium and vitamin D    - check lab lipid, a1c, urine microalbumin today  -Follow-up in 4 months     External notes/medical records independently reviewed, labs and imaging  independently reviewed, medical management and tests to be discussed/communicated to patient.    Time: I spent 40 minutes spent on the date of the encounter preparing to see patient (including chart review and preparation), obtaining and or reviewing additional medical history, performing a physical exam and evaluation, documenting clinical information in the electronic health record, independently interpreting results, communicating results to the patient and coordinating care.    Edward Aguero MD  Division of Diabetes and Endocrinology  Department of Medicine  [phone]

## 2021-07-01 ENCOUNTER — OFFICE VISIT (OUTPATIENT)
Dept: ENDOCRINOLOGY | Facility: CLINIC | Age: 71
End: 2021-07-01
Payer: COMMERCIAL

## 2021-07-01 ENCOUNTER — TELEPHONE (OUTPATIENT)
Dept: NEPHROLOGY | Facility: CLINIC | Age: 71
End: 2021-07-01

## 2021-07-01 ENCOUNTER — MYC MEDICAL ADVICE (OUTPATIENT)
Dept: ENDOCRINOLOGY | Facility: CLINIC | Age: 71
End: 2021-07-01

## 2021-07-01 VITALS
SYSTOLIC BLOOD PRESSURE: 127 MMHG | DIASTOLIC BLOOD PRESSURE: 80 MMHG | HEIGHT: 65 IN | WEIGHT: 165.34 LBS | HEART RATE: 104 BPM | BODY MASS INDEX: 27.55 KG/M2 | OXYGEN SATURATION: 96 %

## 2021-07-01 DIAGNOSIS — E11.65 TYPE 2 DIABETES MELLITUS WITH HYPERGLYCEMIA, WITH LONG-TERM CURRENT USE OF INSULIN (H): Primary | ICD-10-CM

## 2021-07-01 DIAGNOSIS — Z79.4 TYPE 2 DIABETES MELLITUS WITH HYPERGLYCEMIA, WITH LONG-TERM CURRENT USE OF INSULIN (H): Primary | ICD-10-CM

## 2021-07-01 DIAGNOSIS — I48.91 A-FIB (H): Primary | ICD-10-CM

## 2021-07-01 LAB
CHOLEST SERPL-MCNC: 172 MG/DL
CREAT SERPL-MCNC: 0.74 MG/DL (ref 0.52–1.04)
CREAT UR-MCNC: 26 MG/DL
GFR SERPL CREATININE-BSD FRML MDRD: 82 ML/MIN/{1.73_M2}
HBA1C MFR BLD: 6.7 % (ref 0–5.7)
HDLC SERPL-MCNC: 70 MG/DL
LDLC SERPL CALC-MCNC: 90 MG/DL
MICROALBUMIN UR-MCNC: <5 MG/L
MICROALBUMIN/CREAT UR: NORMAL MG/G CR (ref 0–25)
NONHDLC SERPL-MCNC: 102 MG/DL
TRIGL SERPL-MCNC: 58 MG/DL

## 2021-07-01 PROCEDURE — 80061 LIPID PANEL: CPT | Performed by: INTERNAL MEDICINE

## 2021-07-01 PROCEDURE — 36415 COLL VENOUS BLD VENIPUNCTURE: CPT | Performed by: INTERNAL MEDICINE

## 2021-07-01 PROCEDURE — 82306 VITAMIN D 25 HYDROXY: CPT | Performed by: INTERNAL MEDICINE

## 2021-07-01 PROCEDURE — 82565 ASSAY OF CREATININE: CPT | Performed by: INTERNAL MEDICINE

## 2021-07-01 PROCEDURE — 99215 OFFICE O/P EST HI 40 MIN: CPT | Performed by: INTERNAL MEDICINE

## 2021-07-01 PROCEDURE — 83036 HEMOGLOBIN GLYCOSYLATED A1C: CPT | Performed by: INTERNAL MEDICINE

## 2021-07-01 PROCEDURE — 82043 UR ALBUMIN QUANTITATIVE: CPT | Performed by: INTERNAL MEDICINE

## 2021-07-01 ASSESSMENT — MIFFLIN-ST. JEOR: SCORE: 1270.88

## 2021-07-01 NOTE — PROGRESS NOTES
Patient was on our schedule for 7/2/21 for a ANNABELLE + DCCV. Patient has not been on AC due to difficulty finding an affordable option. Per Dr. Amadeo Whipple, patient should cancel appointment for 7/2. MD will reach out to patient regarding AC and rescheduling procedures.       Zoila Bello RN

## 2021-07-01 NOTE — TELEPHONE ENCOUNTER
Received the following message from Dr. Whipple.    Amadeo Whipple MD Wells, Alison M, RN    Cc: Oksana Ott RN Hi Ali,     This lady was supposed to be anticoagulated before her DCCV, which is scheduled for July 2nd. However, she has not started her anticoagulant yet. Can you please call her to coordinate a later date for the ANNABELLE/DCCV? It can be done without a ANNABELLE after she has had INRs between 2-3 for 4 weeks (documented with INRs on the chart) or sooner if she has a ANNABELLE.     The Echo Lab Nurse has already called her and told her not to show up on July 2nd.     Thanks.     Amadeo Whipple      RN contacted patient. She has not started anticoagulation yet as it sounds like there is ongoing conversations happening between pharmacy, financial coverage and cardiology. Advised that the procedure for 7/2 will be cancelled for now. Clinic will call patient back next week to determine if any decisions have been made regarding which med will be available for patient to determine timing of the rescheduled cardioversion.    Telephone message left with Reyna Salcido, procedure  to cancel 7/2 cardioversion.    Lorena Marroquin, RN, BSN  [facility]

## 2021-07-01 NOTE — NURSING NOTE
Latoya Rodríguez's goals for this visit include: Follow up Diabetes  She requests these members of her care team be copied on today's visit information: YES    PCP: Pricila Avila    Referring Provider:  URVASHI Hunter CNP  [address] [place] [address]    There were no vitals taken for this visit.    Do you need any medication refills at today's visit? NO

## 2021-07-01 NOTE — PATIENT INSTRUCTIONS
-Reduce Lantus 26 units at bedtime  -Change Novolog 1:18g carb coverage with meals and snacks  -Continue 1:50 Novolog correction if over 150 at breakfast, lunch and dinner, continue 1:50 Novolog correction if BG is >200 at bedtime    - update blood glucose next week  - consider Dexcom sensor    - return in 3-4 months    If you have any questions, please do not hesitate to call [facility] at [phone] and ask for Endocrinology clinic.    Sincerely,    Edward Aguero MD  Endocrinology        Please allow 7-10 business days for your lab results. You will be notified by phone, letter, or My Chart after the provider has reviewed them.  Thank you.

## 2021-07-01 NOTE — LETTER
7/1/2021         RE: Latoya Rodríguez  [address]  [place] [address]        Dear Colleague,    Thank you for referring your patient, Latoya Rodríguez, to the [facility]. Please see a copy of my visit note below.    Chief complaint:  Latoya is a 70 year old female seen in follow up of type 1 Diabetes.  HISTORY OF PRESENT ILLNESS  Latoya is a 70-year-old retiree with a history of type 1 diabetes who has a visit today for a follow-up type 1 diabetes.  She was diagnosed with type 1 diabetes 2016 during routine physical where she was found to have high blood glucose levels.  At that time she was diagnosed and treated as a type II diabetic but it was later determined that she had positive TORRES antibodies and low C-peptide.      Her current regimen consist of Lantus 28 units AM, Novolog 1:15 g carbohydrate coverage with breakfast, lunch, and dinner, Novolog correction 1:50 if BG is >150 at meal time, Novolog correction 1:50 if BG is >200 at bedtime    A1c 6.7 today. Reviewed BG in the past 3 weeks, average glucose 134. Highest , lowest BG 40 She has hypoglycemia with BG in 40-60 in the middle of the day particularly when she went for a walk or at night. She eats 3 meals per day with some snack at bedtime. She is not yet interested in Dexcom sensor.    Past Medical History  Type 1 diabetes, diagnosed 6/2016  Hyperlipidemia  Osteoporosis     Medications  Current Outpatient Medications   Medication Sig Dispense Refill     APPLE CIDER VINEGAR PO Take by mouth every morning       atorvastatin (LIPITOR) 10 MG tablet Take 1 tablet (10 mg) by mouth daily (Patient taking differently: Take 10 mg by mouth every morning ) 90 tablet 3     blood glucose (ACCU-CHEK MICKY PLUS) test strip Use to test blood sugar 6 times daily 550 strip 3     blood glucose monitoring (ACCU-CHEK FASTCLIX) lancets USE TO TEST BLOOD SUGAR FOUR TIMES A DAY OR AS DIRECTED 306 each 3     calcium carbonate-vitamin  D 500-400 MG-UNIT TABS tablt Take 1 tablet by mouth 2 times daily 180 tablet 3     CINNAMON PO Take by mouth every morning       COMPOUNDED NON-CONTROLLED SUBSTANCE (CMPD RX) - PHARMACY TO MIX COMPOUNDED MEDICATION Estriol 0.1% cream (1mg/gram) in HRT base. Place 1 gram vaginally daily for 2 weeks, then vaginally twice weekly. 30 g 6     insulin aspart (NOVOLOG FLEXPEN) 100 UNIT/ML pen 1 unit per 15 gram of carb for breakfast and lunch, and 1 unit per 10 grams for dinner.Plus 1 unit per 50 mg/dl above 150. Total daily dose approx 30 units. 30 mL 3     insulin glargine (LANTUS PEN) 100 UNIT/ML pen Inject 28 Units Subcutaneous At Bedtime       insulin pen needle (B-D U/F) 31G X 8 MM miscellaneous USE WITH NOVOLOG AND LANTUS(FIVE TIMES DAILY) 500 each 3     metoprolol succinate ER (TOPROL-XL) 50 MG 24 hr tablet Take 1 tablet (50 mg) by mouth daily 90 tablet 3     Multiple Vitamins-Minerals (MULTIVITAMIN ADULT PO) Take 1 tablet by mouth every morning        Urine Glucose-Ketones Test (KETO-DIASTIX) STRP 1 strip by In Vitro route as needed 1 each 11     Allergies  No Known Allergies    Family History  family history includes Brain Cancer in her maternal uncle; Breast Cancer in her mother; Coronary Artery Disease in her maternal uncle and maternal uncle; Hyperlipidemia in her mother; Leukemia in her father; Other Cancer in her father; Skin Cancer in her father; Stomach Cancer in her maternal aunt.  No thyroid disease or type 1 diabetes in the family    Social History  Social History     Socioeconomic History     Marital status: Single     Spouse name: Not on file     Number of children: Not on file     Years of education: Not on file     Highest education level: Not on file   Occupational History     Not on file   Social Needs     Financial resource strain: Not on file     Food insecurity     Worry: Not on file     Inability: Not on file     Transportation needs     Medical: Not on file     Non-medical: Not on file  "  Tobacco Use     Smoking status: Never Smoker     Smokeless tobacco: Never Used   Substance and Sexual Activity     Alcohol use: Yes     Comment: 1 to 2 beers or glasses of wine 1 to 2 times per month     Drug use: No     Sexual activity: Never   Lifestyle     Physical activity     Days per week: Not on file     Minutes per session: Not on file     Stress: Not on file   Relationships     Social connections     Talks on phone: Not on file     Gets together: Not on file     Attends [employer] service: Not on file     Active member of club or organization: Not on file     Attends meetings of clubs or organizations: Not on file     Relationship status: Not on file     Intimate partner violence     Fear of current or ex partner: Not on file     Emotionally abused: Not on file     Physically abused: Not on file     Forced sexual activity: Not on file   Other Topics Concern     Parent/sibling w/ CABG, MI or angioplasty before 65F 55M? No   Social History Narrative     Not on file       REVIEW OF SYSTEMS  10 points ROS were negative otherwise mentioned in HPI    Physical Exam  Vital signs:   /80   Pulse 104   Ht 1.651 m (5' 5\")   Wt 75 kg (165 lb 5.5 oz)   SpO2 96%   BMI 27.51 kg/m    Estimated body mass index is 27.51 kg/m  as calculated from the following:    Height as of this encounter: 1.651 m (5' 5\").    Weight as of this encounter: 75 kg (165 lb 5.5 oz).  Constitutional: no distress, comfortable, pleasant   Eyes: anicteric  CVS: RRR, normal S1,S2, no murmur  Lungs: CTA B/L  Abd: soft, NT, ND, no hepatomegaly  Musculoskeletal: no edema   Skin:  no jaundice   Neurological: normal gait, 2+patella reflex, intact sensation per monofilament bilaterally exam dated 7/1/2021, no tremor on outstretched hands bilaterally  Psychological: appropriate mood       DATA REVIEW    Lab Results   Component Value Date    A1C 6.7 07/01/2021    A1C 8.4 02/10/2021    A1C 7.2 10/21/2020    A1C 7.8 07/20/2020    A1C 9.2 03/18/2019 "     ENDO DIABETES Latest Ref Rng & Units 7/20/2020   CHOLESTEROL <200 mg/dL 189   LDL CHOLESTEROL, CALCULATED <100 mg/dL 107 (H)   LDL CHOLESTEROL DIRECT <100 mg/dL    HDL CHOLESTEROL >49 mg/dL 71   NON HDL CHOLESTEROL <130 mg/dL 118   TRIGLYCERIDES <150 mg/dL 56     DXA 8/28/2020  Lumbar spine T-score in region of L2-L4 = -2.4   L1-4 percent change: Not significant%      HIPS:  Mean total hip T-score: -1  Mean total hip percent change: Not significant%      Left femoral neck T-score = -1.9  Right femoral neck T-score = -1.6      Radius 33% T-score = -1.6     FRAX:  10 year probability of major osteoporotic fracture: 11.3%  10 year probability of hip fracture: 2.1%  The 10 year probability of fracture may be lower than reported if the  patient has received treatment. FRAX data should be disregarded in patient's taking bisphosphonates.                                                                 IMPRESSION:    Low bone mass (osteopenia).    ASSESSMENT/PLAN:   Latoya is a 70-year-old retiree with a history of type 1 diabetes who is here for diabetes management.    1.  Type 1 diabetes: A1c 6.7 in 7/1/2021  Latoya's blood glucose readings over the last 2 weeks were reviewed.  She has episodes of hypoglycemia around midday and at night followed by hyperglycemia.   - discussed Dexcom continuous glucose monitor but she would like to think about it.    Recommendations:  -Reduce Lantus 26 units at bedtime  -Change Novolog 1:18g carb coverage with meals and snacks  -Continue 1:50 Novolog correction if over 150 at breakfast, lunch and dinner, continue 1:50 Novolog correction if BG is >200 at bedtime      2) DM complications:  Retinopathy: normal exam - no DR May 2021  Nephropathy: negative urine microalbumin 3/2020 -- will check today  Neuropathy: none per exam 7/2021     3) Hyperlipidemia: , HDL 71, TG 56, total cholesterol 189 on 7/20/2020. Continue Lipitor 10 mg daily.     4) Osteopenia: DXA 2020 showed the lowest  T-score of -1.9 at the left femoral neck. She follows up with PCP. Currently on calcium and vitamin D    - check lab lipid, a1c, urine microalbumin today  -Follow-up in 4 months     External notes/medical records independently reviewed, labs and imaging independently reviewed, medical management and tests to be discussed/communicated to patient.    Time: I spent 40 minutes spent on the date of the encounter preparing to see patient (including chart review and preparation), obtaining and or reviewing additional medical history, performing a physical exam and evaluation, documenting clinical information in the electronic health record, independently interpreting results, communicating results to the patient and coordinating care.    Edward Aguero MD  Division of Diabetes and Endocrinology  Department of Medicine  [phone]              Again, thank you for allowing me to participate in the care of your patient.        Sincerely,        Edward Aguero MD

## 2021-07-02 ENCOUNTER — MYC MEDICAL ADVICE (OUTPATIENT)
Dept: ENDOCRINOLOGY | Facility: CLINIC | Age: 71
End: 2021-07-02

## 2021-07-02 LAB — DEPRECATED CALCIDIOL+CALCIFEROL SERPL-MC: 38 UG/L (ref 20–75)

## 2021-07-06 ENCOUNTER — MYC MEDICAL ADVICE (OUTPATIENT)
Dept: UROLOGY | Facility: CLINIC | Age: 71
End: 2021-07-06

## 2021-07-07 ENCOUNTER — MYC MEDICAL ADVICE (OUTPATIENT)
Dept: UROLOGY | Facility: CLINIC | Age: 71
End: 2021-07-07

## 2021-07-07 NOTE — TELEPHONE ENCOUNTER
Patient is waiting to hear back from the pharmacy, she believes she is going to go on Eliquis but she will wait until she hears from them. I will call patient tomorrow.     Qi Villasenor RN   Medical Specialty Clinic Care Coordinator  [facility]   Phone: [phone]  Fax: [phone]

## 2021-07-08 NOTE — TELEPHONE ENCOUNTER
I spoke with patient and she would like to start Apixiban and would like to get it through [facility]. Prescription pended. Will also determine from Dr. Whipple when patient's ANNABELLE/DCCV can be rescheduled.     Qi Villasenor RN   Cardiology Care Coordinator  [facility]   Phone: [phone]  Fax: [phone]

## 2021-07-08 NOTE — TELEPHONE ENCOUNTER
I updated the patient with the plan. She is agreeable to the plan. I will have Raine Marks reach out to the patient next week to schedule her cardioversion, and no ANNABELLE per Dr. Whipple's instructions.     Qi Villasenor RN   Cardiology Care Coordinator  [facility]   Phone: [phone]  Fax: [phone]        Amadeo Whipple MD  You; [facility] Cardiology Adult [facility] 23 minutes ago (2:18 PM)     Gonzales Busby,     Thanks for confirming this.     It would be excellent if we proceeded with apixaban. The 5 mg BID dose is correct.     She can have her DCCV scheduled for six weeks after she starts taking the anticoagulant. If she doesn't skip any doses of apixaban in that six week stretch, then we don't need to do the ANNABELLE and we can just cardiovert her.     Thanks.     Amadeo Whipple

## 2021-07-13 DIAGNOSIS — E78.5 HYPERLIPIDEMIA WITH TARGET LDL LESS THAN 100: ICD-10-CM

## 2021-07-14 ENCOUNTER — TELEPHONE (OUTPATIENT)
Dept: CARDIOLOGY | Facility: CLINIC | Age: 71
End: 2021-07-14

## 2021-07-14 NOTE — TELEPHONE ENCOUNTER
EP Scheduling called the patient to schedule cardioversion. The number [phone] was left for the patient to return the call and schedule the procedure.    Raine Marks  Periop Electrophysiology   [phone]

## 2021-07-15 RX ORDER — ATORVASTATIN CALCIUM 10 MG/1
10 TABLET, FILM COATED ORAL DAILY
Qty: 90 TABLET | Refills: 3 | Status: SHIPPED | OUTPATIENT
Start: 2021-07-15 | End: 2022-07-16

## 2021-07-19 ENCOUNTER — MYC MEDICAL ADVICE (OUTPATIENT)
Dept: PHARMACY | Facility: CLINIC | Age: 71
End: 2021-07-19

## 2021-07-27 ENCOUNTER — TELEPHONE (OUTPATIENT)
Dept: ENDOCRINOLOGY | Facility: CLINIC | Age: 71
End: 2021-07-27

## 2021-07-27 DIAGNOSIS — E10.9 TYPE 1 DIABETES MELLITUS WITHOUT COMPLICATION (H): Primary | ICD-10-CM

## 2021-07-27 NOTE — TELEPHONE ENCOUNTER
Adalgisa from [employer] calling with Approval for patient assistance application on medication Novolog Flexpen and needles. Patient will received the shipment in 10-14 days.    Approval effective 7/27/2021-11/30/2021. Patient will need to reapply new application for assistance again 1/1/2022 if needed.    Ivy Aguilar [facility]  Adult Endocrinology  [facility]

## 2021-07-28 ENCOUNTER — TELEPHONE (OUTPATIENT)
Dept: FAMILY MEDICINE | Facility: CLINIC | Age: 71
End: 2021-07-28

## 2021-07-28 ENCOUNTER — TELEPHONE (OUTPATIENT)
Dept: ENDOCRINOLOGY | Facility: CLINIC | Age: 71
End: 2021-07-28

## 2021-07-28 ENCOUNTER — MYC MEDICAL ADVICE (OUTPATIENT)
Dept: FAMILY MEDICINE | Facility: CLINIC | Age: 71
End: 2021-07-28

## 2021-07-28 DIAGNOSIS — E10.9 TYPE 1 DIABETES MELLITUS WITHOUT COMPLICATION (H): ICD-10-CM

## 2021-07-28 DIAGNOSIS — E10.9 TYPE 1 DIABETES MELLITUS WITHOUT COMPLICATION (H): Primary | ICD-10-CM

## 2021-07-28 NOTE — TELEPHONE ENCOUNTER
Patient Quality Outreach      Summary:    Patient has the following on her problem list/HM:     Diabetes    Last A1C:  Lab Results   Component Value Date    A1C 6.7 07/01/2021    A1C 8.4 02/10/2021       Last LDL:    Lab Results   Component Value Date    LDL 90 07/01/2021       Is the patient on a Statin? Yes          Is the patient on Aspirin? No    Medications     HMG CoA Reductase Inhibitors     atorvastatin (LIPITOR) 10 MG tablet             Last three blood pressure readings:  BP Readings from Last 3 Encounters:   07/01/21 127/80   06/09/21 109/61   05/11/21 102/71            Tobacco Use      Smoking status: Never Smoker      Smokeless tobacco: Never Used          Patient is due/failing the following:   Colonoscopy    Type of outreach:    Sent [employer] message.    Questions for provider review:    None                                                                                                                                     Sarah Llamas

## 2021-07-28 NOTE — TELEPHONE ENCOUNTER
I am in process of applying to the Basaglar kwik pen assistance program.  [facility] requires a hand signed, brand name, hard copy script be submitted with their application.    Please hand sign a BRAND name, hard copy script for:    BASAGLAR KWIK PEN u100, inj 30u every day, max 30u daily, #3 boxes/45mL, 2 refills    Please send the HARD COPY script to me     via interoffice mail  Jennifer Valerio     or via US mail  at:   [facility]  Jennifer Suarez  [address] [place]  [address]    Thanks so much for your help!    Jennifer Suarez  Prescription

## 2021-07-29 ENCOUNTER — TELEPHONE (OUTPATIENT)
Dept: FAMILY MEDICINE | Facility: CLINIC | Age: 71
End: 2021-07-29

## 2021-07-29 DIAGNOSIS — E10.9 TYPE 1 DIABETES MELLITUS WITHOUT COMPLICATION (H): ICD-10-CM

## 2021-07-29 RX ORDER — INSULIN GLARGINE 100 [IU]/ML
30 INJECTION, SOLUTION SUBCUTANEOUS DAILY
Qty: 45 ML | Refills: 2 | Status: SHIPPED | OUTPATIENT
Start: 2021-07-29 | End: 2021-07-30

## 2021-07-29 RX ORDER — INSULIN GLARGINE 100 [IU]/ML
30 INJECTION, SOLUTION SUBCUTANEOUS DAILY
Qty: 45 ML | Refills: 2 | Status: SHIPPED | OUTPATIENT
Start: 2021-07-29 | End: 2021-07-29

## 2021-07-29 NOTE — TELEPHONE ENCOUNTER
Latoya has been approved to the [employer] assistance program for   Novolog & Pen tip needles 32g through December 31, 2021. She will receive this medication at no cost through the enrollment period.    A 120 day supply of  Novolog & Pen tip needles 32g will be delivered to Latoya's Home. [employer] will contact Latoya to set up delivery. She has been informed of this approval.    She will contact my office for refills as we must work directly with the .  I will note EPIC as each refill is requested.    Thank you,    Maren Pratt  Prescription Assistance

## 2021-07-30 RX ORDER — INSULIN GLARGINE 100 [IU]/ML
30 INJECTION, SOLUTION SUBCUTANEOUS DAILY
Qty: 45 ML | Refills: 2 | Status: SHIPPED | OUTPATIENT
Start: 2021-07-30 | End: 2021-07-30

## 2021-07-30 RX ORDER — INSULIN GLARGINE 100 [IU]/ML
30 INJECTION, SOLUTION SUBCUTANEOUS DAILY
Qty: 45 ML | Refills: 2 | OUTPATIENT
Start: 2021-07-30 | End: 2021-07-30

## 2021-07-30 RX ORDER — INSULIN GLARGINE 100 [IU]/ML
30 INJECTION, SOLUTION SUBCUTANEOUS DAILY
Qty: 45 ML | Refills: 2 | Status: SHIPPED | OUTPATIENT
Start: 2021-07-30 | End: 2021-08-02

## 2021-08-02 ENCOUNTER — TELEPHONE (OUTPATIENT)
Dept: CARDIOLOGY | Facility: CLINIC | Age: 71
End: 2021-08-02

## 2021-08-02 DIAGNOSIS — E10.9 TYPE 1 DIABETES MELLITUS WITHOUT COMPLICATION (H): ICD-10-CM

## 2021-08-02 RX ORDER — INSULIN GLARGINE 100 [IU]/ML
30 INJECTION, SOLUTION SUBCUTANEOUS DAILY
Qty: 45 ML | Refills: 2 | Status: SHIPPED | OUTPATIENT
Start: 2021-08-02 | End: 2021-08-09

## 2021-08-02 ASSESSMENT — ENCOUNTER SYMPTOMS
FREQUENCY: 1
HEARTBURN: 0
PALPITATIONS: 0
WEAKNESS: 0
ABDOMINAL PAIN: 0
DYSURIA: 0
EYE PAIN: 0
CONSTIPATION: 1
BREAST MASS: 0
SHORTNESS OF BREATH: 1
DIZZINESS: 0
NAUSEA: 0
PARESTHESIAS: 0
FEVER: 0
HEMATOCHEZIA: 0
SORE THROAT: 0
CHILLS: 0
HEMATURIA: 0
MYALGIAS: 1
NERVOUS/ANXIOUS: 0
HEADACHES: 0
JOINT SWELLING: 0
ARTHRALGIAS: 1
DIARRHEA: 1

## 2021-08-02 ASSESSMENT — ACTIVITIES OF DAILY LIVING (ADL): CURRENT_FUNCTION: NO ASSISTANCE NEEDED

## 2021-08-02 NOTE — TELEPHONE ENCOUNTER
[facility] Call Center    Phone Message    May a detailed message be left on voicemail: yes     Reason for Call: Patient calling regarding having a question about stopping a medication before her surgery on Friday. Please advise. Thank you    Action Taken: Message routed to:  Adult Clinics: Cardiology p [phone]    Travel Screening: Not Applicable

## 2021-08-02 NOTE — TELEPHONE ENCOUNTER
I am in process of applying to the Basag[employer] pen assistance program.  [employer] requires a hand signed, brand name, hard copy script be submitted with their application.    The script I requested on July 28 was sent to [facility] at the [facility] pharmacy.  Latoya left us a message stating they called her.    Please hand sign a BRAND name, hard copy script for:     SULTANA HICKS PEN    Please send the HARD COPY script to me at-     via interoffice mail  [facility] Jennifer Jones     or via US mail  at:   [facility]  Jennifer Suarez  [address] [place]  [address]    Thanks so much for your help!    Jennifer Suarez  Prescription

## 2021-08-02 NOTE — TELEPHONE ENCOUNTER
Per Dr. Sole estrada for patient to stop apixiban for this surgery. Patient will have to be on apixiban for 6 weeks prior to cardioversion, so this will reset after her surgery. Patient is aware of this.     Qi Villasenor RN   Cardiology Care Coordinator  [facility]   Phone: [phone]  Fax: [phone]    Amadeo Whipple MD  [facility] Cardiology Adult [place] 53 minutes ago (1:16 PM)   NAVI Busby,     It is okay for her to come off of the apixaban for the carpal tunnel release surgery since she does not have any high-risk features (e.g., a PE or left atrial appendage thrombus), but she will need to restart the clock for the anticoagulation. This means that she will need to start six weeks of anticoagulation from scratch, thus leading the DCCV to be pushed back.     Amadeo Whipple

## 2021-08-03 ENCOUNTER — LAB (OUTPATIENT)
Dept: LAB | Facility: CLINIC | Age: 71
End: 2021-08-03
Attending: PLASTIC SURGERY
Payer: COMMERCIAL

## 2021-08-03 DIAGNOSIS — Z11.59 ENCOUNTER FOR SCREENING FOR OTHER VIRAL DISEASES: ICD-10-CM

## 2021-08-03 LAB — SARS-COV-2 RNA RESP QL NAA+PROBE: NEGATIVE

## 2021-08-03 PROCEDURE — U0003 INFECTIOUS AGENT DETECTION BY NUCLEIC ACID (DNA OR RNA); SEVERE ACUTE RESPIRATORY SYNDROME CORONAVIRUS 2 (SARS-COV-2) (CORONAVIRUS DISEASE [COVID-19]), AMPLIFIED PROBE TECHNIQUE, MAKING USE OF HIGH THROUGHPUT TECHNOLOGIES AS DESCRIBED BY CMS-2020-01-R: HCPCS

## 2021-08-03 PROCEDURE — U0005 INFEC AGEN DETEC AMPLI PROBE: HCPCS

## 2021-08-04 ASSESSMENT — ENCOUNTER SYMPTOMS
NAUSEA: 0
FEVER: 0
SORE THROAT: 0
WEAKNESS: 0
EYE PAIN: 0
NERVOUS/ANXIOUS: 0
HEMATURIA: 0
HEMATOCHEZIA: 0
BREAST MASS: 0
HEARTBURN: 0
HEADACHES: 0
CONSTIPATION: 1
SHORTNESS OF BREATH: 1
JOINT SWELLING: 0
DYSURIA: 0
FREQUENCY: 1
ABDOMINAL PAIN: 0
CHILLS: 0
ARTHRALGIAS: 1
MYALGIAS: 1
PALPITATIONS: 0
DIZZINESS: 0
DIARRHEA: 1
PARESTHESIAS: 0

## 2021-08-04 ASSESSMENT — ACTIVITIES OF DAILY LIVING (ADL): CURRENT_FUNCTION: NO ASSISTANCE NEEDED

## 2021-08-04 NOTE — PROGRESS NOTES
"SUBJECTIVE:   Latoya Rodríguez is a 70 year old female who presents for Preventive Visit.    Patient has been advised of split billing requirements and indicates understanding: Yes   Are you in the first 12 months of your Medicare coverage?  No    Healthy Habits:     In general, how would you rate your overall health?  Fair    Frequency of exercise:  6-7 days/week    Duration of exercise:  Greater than 60 minutes    Do you usually eat at least 4 servings of fruit and vegetables a day, include whole grains    & fiber and avoid regularly eating high fat or \"junk\" foods?  Yes    Medication side effects:  Not applicable    Ability to successfully perform activities of daily living:  No assistance needed    Home Safety:  No safety concerns identified    Hearing Impairment:  No hearing concerns    In the past 6 months, have you been bothered by leaking of urine? Yes    In general, how would you rate your overall mental or emotional health?  Excellent      PHQ-2 Total Score: 0    Do you feel safe in your environment? Yes    Have you ever done Advance Care Planning? (For example, a Health Directive, POLST, or a discussion with a medical provider or your loved ones about your wishes): No, advance care planning information given to patient to review.  Patient plans to discuss their wishes with loved ones or provider.         Fall risk  Fallen 2 or more times in the past year?: No  Any fall with injury in the past year?: No    Cognitive Screening   1) Repeat 3 items (Leader, Season, Table)    2) Clock draw: NORMAL  3) 3 item recall: Recalls 3 objects  Results: 3 items recalled: COGNITIVE IMPAIRMENT LESS LIKELY    Mini-CogTM Copyright YASIR Ni. Licensed by the author for use in [facility]; reprinted with permission (jose@.[facility]). All rights reserved.      Do you have sleep apnea, excessive snoring or daytime drowsiness?: no    Reviewed and updated as needed this visit by clinical staff                 Reviewed and " updated as needed this visit by Provider                Social History     Tobacco Use     Smoking status: Never Smoker     Smokeless tobacco: Never Used   Substance Use Topics     Alcohol use: Yes     Comment: 1 to 2 beers or glasses of wine 1 to 2 times per month     If you drink alcohol do you typically have >3 drinks per day or >7 drinks per week? No    Alcohol Use 8/2/2021   Prescreen: >3 drinks/day or >7 drinks/week? No   Prescreen: >3 drinks/day or >7 drinks/week? -   No flowsheet data found.      Current providers sharing in care for this patient include:   Patient Care Team:  Pricila Avila APRN CNP as PCP - General (Family Practice)  Amy Cooper, RN as Registered Nurse  Pricila Avila APRN CNP as Assigned PCP  Edward Aguero MD as Assigned Endocrinology Provider  Alberto Zhang MD as Assigned Surgical Provider  Alberto Zhang MD as MD (Urology)  Karina Hoskins, RN as Specialty Care Coordinator (Urology)  Pricila Avila APRN CNP as Referring Physician (Internal Medicine - Pediatrics)  Amadeo Whipple MD as Assigned Heart and Vascular Provider    The following health maintenance items are reviewed in Epic and correct as of today:  Health Maintenance Due   Topic Date Due     ANNUAL REVIEW OF HM ORDERS  Never done     COLORECTAL CANCER SCREENING  Never done     BMP  08/02/2020     MEDICARE ANNUAL WELLNESS VISIT  08/04/2021     FALL RISK ASSESSMENT  08/04/2021     Lab work is in process  Labs reviewed in EPIC  BP Readings from Last 3 Encounters:   08/05/21 118/84   07/01/21 127/80   06/09/21 109/61    Wt Readings from Last 3 Encounters:   08/05/21 73.5 kg (162 lb)   07/01/21 75 kg (165 lb 5.5 oz)   06/09/21 73.8 kg (162 lb 12.8 oz)                  Patient Active Problem List   Diagnosis     Urgency of urination     Urinary frequency     Urge incontinence     Type 2 diabetes mellitus with hyperglycemia, with long-term current use of insulin (H)     Hyperlipidemia with  target LDL less than 100     Osteoporosis     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)     Type 1 diabetes mellitus without complication (H)     Age-related osteoporosis without current pathological fracture     Stress incontinence     Atrophic vaginitis     Intrinsic sphincter deficiency     Overactive bladder     Carpal tunnel syndrome of right wrist     Past Surgical History:   Procedure Laterality Date     CYSTOSCOPY, INJECT COLLAGEN, COMBINED N/A 5/11/2021    Procedure: CYSTOSCOPY, WITH PERIURETHRAL BULKING AGENT INJECTION;  Surgeon: Alberto Zhang MD;  Location: [facility] OR     EYE SURGERY  9/04, 5/11    Retinal detachment, Cataract (both eyes)     RELEASE TRIGGER FINGER Bilateral 5/10/2019    Procedure: RELEASE TRIGGER FINGER, BILATERAL LONG AND RING FINGERS;  Surgeon: RADHA Sandoval MD;  Location: [facility] OR     VASCULAR SURGERY      Varicose Veins       Social History     Tobacco Use     Smoking status: Never Smoker     Smokeless tobacco: Never Used   Substance Use Topics     Alcohol use: Yes     Comment: 1 to 2 beers or glasses of wine 1 to 2 times per month     Family History   Problem Relation Age of Onset     Hyperlipidemia Mother      Breast Cancer Mother      Other Cancer Father      Leukemia Father      Skin Cancer Father      Coronary Artery Disease Maternal Uncle      Brain Cancer Maternal Uncle      Coronary Artery Disease Maternal Uncle      Stomach Cancer Maternal Aunt      Diabetes No family hx of      Hypertension No family hx of      Cerebrovascular Disease No family hx of      Colon Cancer No family hx of      Thyroid Disease No family hx of      Depression No family hx of      Anxiety Disorder No family hx of          Current Outpatient Medications   Medication Sig Dispense Refill     apixaban ANTICOAGULANT (ELIQUIS) 5 MG tablet Take 1 tablet (5 mg) by mouth 2 times daily 180 tablet 3     APPLE CIDER VINEGAR PO Take by mouth every morning       atorvastatin (LIPITOR) 10  MG tablet Take 1 tablet (10 mg) by mouth daily 90 tablet 3     blood glucose (ACCU-CHEK MICKY PLUS) test strip Use to test blood sugar 6 times daily 550 strip 3     blood glucose monitoring (ACCU-CHEK FASTCLIX) lancets USE TO TEST BLOOD SUGAR FOUR TIMES A DAY OR AS DIRECTED 306 each 3     calcium carbonate-vitamin D 500-400 MG-UNIT TABS tablt Take 1 tablet by mouth 2 times daily 180 tablet 3     CINNAMON PO Take by mouth every morning       COMPOUNDED NON-CONTROLLED SUBSTANCE (CMPD RX) - PHARMACY TO MIX COMPOUNDED MEDICATION Estriol 0.1% cream (1mg/gram) in HRT base. Place 1 gram vaginally daily for 2 weeks, then vaginally twice weekly. 30 g 6     insulin aspart (NOVOLOG FLEXPEN) 100 UNIT/ML pen 1 unit per 15 gram of carb for breakfast and lunch, and 1 unit per 10 grams for dinner.Plus 1 unit per 50 mg/dl above 150. Total daily dose approx 30 units. (Patient taking differently: 1 unit per 15 gram of carb for all meals. Plus 1 unit per 50 mg/dl above 150. Total daily dose approx 30 units.) 30 mL 3     insulin glargine (BASAGLAR KWIKPEN) 100 UNIT/ML pen Inject 30 Units Subcutaneous daily Max, 30 units daily, #3 boxes/45mL, 2 refills. 45 mL 2     insulin glargine (LANTUS SOLOSTAR) 100 UNIT/ML pen Inject 30 units everyday. Max: 30 units daily,, #3boxes/45ml. 2 refills. 45 mL 2     insulin pen needle (B-D U/F) 31G X 8 MM miscellaneous USE WITH NOVOLOG AND LANTUS(FIVE TIMES DAILY) 500 each 3     metoprolol succinate ER (TOPROL-XL) 50 MG 24 hr tablet Take 1 tablet (50 mg) by mouth daily 90 tablet 3     Multiple Vitamins-Minerals (MULTIVITAMIN ADULT PO) Take 1 tablet by mouth every morning        Urine Glucose-Ketones Test (KETO-DIASTIX) STRP 1 strip by In Vitro route as needed 1 each 11     No Known Allergies  Recent Labs   Lab Test 07/01/21  1054 07/01/21  0000 02/10/21  1017 10/21/20  1251 07/20/20  0817 08/02/19  0802 03/18/19  0000 07/17/17  0000 07/03/17  0724 02/09/17  0713 09/16/16  0711 06/24/16  0942  06/24/16  0941   A1C  --  6.7* 8.4* 7.2* 7.8*  --   --   --   --    < > 7.1*  --   --    LDL 90  --   --   --  107* 99  --    < > 81  --  103*  --  195*   HDL 70  --   --   --  71 76  --   --  72  --  62  --  43*   TRIG 58  --   --   --  56 57  --   --  26  --  43  --  136   ALT  --   --   --   --   --  24  --   --   --   --  26  --  19   CR 0.74  --   --   --  0.67 0.62   < >   < >  --   --  0.65  --  0.52   GFRESTIMATED 82  --   --   --  89 >90   < >   < >  --   --  >90  Non  GFR Calc    --  >90  Non  GFR Calc     GFRESTBLACK >90  --   --   --  >90 >90   < >   < >  --   --  >90  African American GFR Calc    --  >90   GFR Calc     POTASSIUM  --   --   --   --   --  4.0  --   --   --   --  3.7  --  3.7   TSH  --   --   --   --   --  2.01  --   --  1.77  --   --    < >  --     < > = values in this interval not displayed.      Pneumonia Vaccine:Up to date    Mammogram Screening: Mammogram Screening: Recommended mammography every 1-2 years with patient discussion and risk factor consideration        Review of Systems   Constitutional: Negative for chills and fever.   HENT: Negative for congestion, ear pain, hearing loss and sore throat.    Eyes: Negative for pain and visual disturbance.   Respiratory: Positive for shortness of breath.         Has noted in the last year will have intermittent shortness of breath and her stamina has decreased.   Keeps a brisk pace but stamina has decreased.   Had an appointment with cardiology and was just started on Eliquis   Has a follow up with cardiology related to this with the new atrial fib    Cardiovascular: Negative for chest pain, palpitations and peripheral edema.   Gastrointestinal: Positive for constipation and diarrhea. Negative for abdominal pain, heartburn, hematochezia and nausea.        This is not new and will  Restart her Metamucil as was out    Breasts:  Negative for tenderness, breast mass and discharge.  "  Genitourinary: Positive for frequency and urgency. Negative for dysuria, genital sores, hematuria, pelvic pain, vaginal bleeding and vaginal discharge.        Just had a bulking of her urethra with limited results   Sees Dr Zhang    Musculoskeletal: Positive for arthralgias and myalgias. Negative for joint swelling.        Is having some procedure on her carpal tunnel tomorrow under local anesthesia     Also noted right hip pain for at least a year    Skin: Negative for rash.   Neurological: Negative for dizziness, weakness, headaches and paresthesias.   Psychiatric/Behavioral: Negative for mood changes. The patient is not nervous/anxious.        OBJECTIVE:   /84   Pulse 86   Temp 98.6  F (37  C) (Tympanic)   Resp 14   Ht 1.638 m (5' 4.5\")   Wt 73.5 kg (162 lb)   SpO2 98%   BMI 27.38 kg/m   Estimated body mass index is 27.38 kg/m  as calculated from the following:    Height as of this encounter: 1.638 m (5' 4.5\").    Weight as of this encounter: 73.5 kg (162 lb).\.  Wt Readings from Last 4 Encounters:   08/05/21 73.5 kg (162 lb)   07/01/21 75 kg (165 lb 5.5 oz)   06/09/21 73.8 kg (162 lb 12.8 oz)   05/11/21 73 kg (161 lb)       Physical Exam  GENERAL APPEARANCE: healthy, alert and no distress  EYES: Eyes grossly normal to inspection and conjunctivae and sclerae normal  HENT: ear canals and TM's normal, nose and mouth without ulcers or lesions, oropharynx clear and oral mucous membranes moist  NECK: no adenopathy, no asymmetry, masses, or scars and thyroid normal to palpation  RESP: lungs clear to auscultation - no rales, rhonchi or wheezes  BREAST: normal without masses, tenderness or nipple discharge and no palpable axillary masses or adenopathy  CV: regular rates and rhythm, no murmur, click or rub, no peripheral edema and peripheral pulses strong  ABDOMEN: soft, nontender, no hepatosplenomegaly, no masses and bowel sounds normal   (female): deferred  MS: no musculoskeletal defects are noted and " gait is age appropriate without ataxia  EXTREMITIES: No palpable pain. ROM limited in effected hip with external rotation. No lateral/gluteal pain. Good pulses. No edema. Normal reflexes.  SKIN: no suspicious lesions or rashes  NEURO: Normal strength and tone, sensory exam grossly normal, mentation intact and speech normal  PSYCH: mentation appears normal and affect normal/bright    Diagnostic Test Results:  Labs reviewed in Epic  Results for orders placed or performed in visit on 08/05/21   XR Hip Right 2-3 Views     Status: None    Narrative    RIGHT HIP TWO TO THREE VIEWS   8/5/2021 10:15 AM     HISTORY: Hip pain, right.    COMPARISON: None.      Impression    IMPRESSION: Moderate right hip degenerative changes with joint space  narrowing and marginal osteophyte formation. No evidence of fracture  or AVN.     QUINCY FLORES MD         SYSTEM ID:  [id number]   Results for orders placed or performed in visit on 08/05/21   CBC with platelets     Status: Normal   Result Value Ref Range    WBC Count 5.1 4.0 - 11.0 10e3/uL    RBC Count 4.37 3.80 - 5.20 10e6/uL    Hemoglobin 14.2 11.7 - 15.7 g/dL    Hematocrit 41.9 35.0 - 47.0 %    MCV 96 78 - 100 fL    MCH 32.5 26.5 - 33.0 pg    MCHC 33.9 31.5 - 36.5 g/dL    RDW 12.7 10.0 - 15.0 %    Platelet Count 209 150 - 450 10e3/uL   TSH with free T4 reflex     Status: Normal   Result Value Ref Range    TSH 1.42 0.40 - 4.00 mU/L   Comprehensive metabolic panel     Status: Abnormal   Result Value Ref Range    Sodium 140 133 - 144 mmol/L    Potassium 4.2 3.4 - 5.3 mmol/L    Chloride 109 94 - 109 mmol/L    Carbon Dioxide (CO2) 28 20 - 32 mmol/L    Anion Gap 3 3 - 14 mmol/L    Urea Nitrogen 24 7 - 30 mg/dL    Creatinine 0.76 0.52 - 1.04 mg/dL    Calcium 9.2 8.5 - 10.1 mg/dL    Glucose 80 70 - 99 mg/dL    Alkaline Phosphatase 77 40 - 150 U/L    AST 26 0 - 45 U/L    ALT 40 0 - 50 U/L    Protein Total 6.6 (L) 6.8 - 8.8 g/dL    Albumin 3.9 3.4 - 5.0 g/dL    Bilirubin Total 0.8 0.2 - 1.3  mg/dL    GFR Estimate 80 >60 mL/min/1.73m2     Recent Results (from the past 744 hour(s))   XR Hip Right 2-3 Views    Narrative    RIGHT HIP TWO TO THREE VIEWS   8/5/2021 10:15 AM     HISTORY: Hip pain, right.    COMPARISON: None.      Impression    IMPRESSION: Moderate right hip degenerative changes with joint space  narrowing and marginal osteophyte formation. No evidence of fracture  or AVN.     QUINCY FLORES MD         SYSTEM ID:  [id number]       ASSESSMENT / PLAN:   Latoya was seen today for physical.    Diagnoses and all orders for this visit:    Encounter for Medicare annual wellness exam    Screen for colon cancer  -     COLOGGOLDIE([employer])    Type 1 diabetes mellitus without complication (H)  Continue current medications as prescribed.   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   FOLLOW UP WITH SPECIALIST :Endocrinology    Hyperlipidemia with target LDL less than 100  -     Comprehensive metabolic panel; Future  -     Comprehensive metabolic panel    Chronic atrial fibrillation (H)  -     metoprolol succinate ER (TOPROL-XL) 50 MG 24 hr tablet; Take 1 tablet (50 mg) by mouth daily  Continue current medications as prescribed.   FOLLOW UP WITH SPECIALIST :Cardiology    Screening for thyroid disorder  -     TSH with free T4 reflex; Future  -     TSH with free T4 reflex    Encounter for screening mammogram for breast cancer  -     *MA Screening Digital Bilateral; Future    Intrinsic sphincter deficiency  FOLLOW UP WITH SPECIALIST :Urology    Overactive bladder  FOLLOW UP WITH SPECIALIST :Urology      Stress incontinence  FOLLOW UP WITH SPECIALIST :Urology      Screening for disorder of blood and blood-forming organs  -     CBC with platelets; Future  -     CBC with platelets    Hip pain, right  -     XR Hip Right 2-3 Views; Future  -     Orthopedic  Referral; Future  Will follow up and/or notify  patient of  results via My Chart to determine further need for followup  Consider referral to orthopedics for injection     Other orders  -     REVIEW OF HEALTH MAINTENANCE PROTOCOL ORDERS    PLAN:   PLAN:   1.   Symptomatic therapy suggested: Continue current medications as prescribed.   Continue calcium and vitamin d daily   2.  Orders Placed This Encounter   Medications     metoprolol succinate ER (TOPROL-XL) 50 MG 24 hr tablet     Sig: Take 1 tablet (50 mg) by mouth daily     Dispense:  90 tablet     Refill:  3     Orders Placed This Encounter   Procedures     REVIEW OF HEALTH MAINTENANCE PROTOCOL ORDERS     *MA Screening Digital Bilateral     XR Hip Right 2-3 Views     Comprehensive metabolic panel     TSH with free T4 reflex     COLOGUARD([employer])     CBC with platelets       3. Patient needs to follow up in if no improvement,or sooner if worsening of symptoms or other symptoms develop.  FURTHER TESTING:       - mammogram  Xray right hip        - Will check out [facility] for possible second opinion let me know what site you want to go to   Will follow up and/or notify patient of  results via My Chart to determine further need for followup  Follow up office visit in one year for annual health maintenance exam, sooner PRN.      Patient has been advised of split billing requirements and indicates understanding: Yes  COUNSELING:  Reviewed preventive health counseling, as reflected in patient instructions  Special attention given to:       Regular exercise       Healthy diet/nutrition       Vision screening       Hearing screening       Osteoporosis prevention/bone health       Colon cancer screening       The 10-year ASCVD risk score (Emily ALVARES Jr., et al., 2013) is: 11.4%    Values used to calculate the score:      Age: 70 years      Sex: Female      Is Non- : No      Diabetic: Yes      Tobacco smoker: No      Systolic Blood Pressure: 106 mmHg      Is BP treated: No      HDL  "Cholesterol: 70 mg/dL      Total Cholesterol: 172 mg/dL    Estimated body mass index is 27.51 kg/m  as calculated from the following:    Height as of 7/1/21: 1.651 m (5' 5\").    Weight as of 7/1/21: 75 kg (165 lb 5.5 oz).        She reports that she has never smoked. She has never used smokeless tobacco.      Appropriate preventive services were discussed with this patient, including applicable screening as appropriate for cardiovascular disease, diabetes, osteopenia/osteoporosis, and glaucoma.  As appropriate for age/gender, discussed screening for colorectal cancer, prostate cancer, breast cancer, and cervical cancer. Checklist reviewing preventive services available has been given to the patient.    Reviewed patients plan of care and provided an AVS. The Intermediate Care Plan ( asthma action plan, low back pain action plan, and migraine action plan) for Latoya meets the Care Plan requirement. This Care Plan has been established and reviewed with the Patient.    Counseling Resources:  ATP IV Guidelines  Pooled Cohorts Equation Calculator  Breast Cancer Risk Calculator  Breast Cancer: Medication to Reduce Risk  FRAX Risk Assessment  ICSI Preventive Guidelines  Dietary Guidelines for Americans, 2010  USDA's MyPlate  ASA Prophylaxis  Lung CA Screening    URVASHI Hunter CNP  [facility]    Identified Health Risks:  "

## 2021-08-04 NOTE — PATIENT INSTRUCTIONS
PLAN:   1.   Symptomatic therapy suggested: Continue current medications as prescribed.   Continue calcium and vitamin d daily   2.  Orders Placed This Encounter   Medications     metoprolol succinate ER (TOPROL-XL) 50 MG 24 hr tablet     Sig: Take 1 tablet (50 mg) by mouth daily     Dispense:  90 tablet     Refill:  3     Orders Placed This Encounter   Procedures     REVIEW OF HEALTH MAINTENANCE PROTOCOL ORDERS     *MA Screening Digital Bilateral     XR Hip Right 2-3 Views     Comprehensive metabolic panel     TSH with free T4 reflex     COLOGUARD([employer])     CBC with platelets       3. Patient needs to follow up in if no improvement,or sooner if worsening of symptoms or other symptoms develop.  FURTHER TESTING:       - mammogram  Xray right hip        - Will check out [facility] for possible second opinion let me know what site you want to go to   Will follow up and/or notify patient of  results via My Chart to determine further need for followup  Follow up office visit in one year for annual health maintenance exam, sooner PRN.    Patient Education   Personalized Prevention Plan  You are due for the preventive services outlined below.  Your care team is available to assist you in scheduling these services.  If you have already completed any of these items, please share that information with your care team to update in your medical record.  Health Maintenance Due   Topic Date Due     ANNUAL REVIEW OF HM ORDERS  Never done     Colorectal Cancer Screening  Never done     Basic Metabolic Panel  08/02/2020     Annual Wellness Visit  08/04/2021     FALL RISK ASSESSMENT  08/04/2021

## 2021-08-05 ENCOUNTER — OFFICE VISIT (OUTPATIENT)
Dept: FAMILY MEDICINE | Facility: CLINIC | Age: 71
End: 2021-08-05
Payer: COMMERCIAL

## 2021-08-05 ENCOUNTER — ANCILLARY PROCEDURE (OUTPATIENT)
Dept: GENERAL RADIOLOGY | Facility: CLINIC | Age: 71
End: 2021-08-05
Attending: NURSE PRACTITIONER
Payer: COMMERCIAL

## 2021-08-05 VITALS
RESPIRATION RATE: 14 BRPM | HEIGHT: 65 IN | OXYGEN SATURATION: 98 % | HEART RATE: 86 BPM | WEIGHT: 162 LBS | BODY MASS INDEX: 26.99 KG/M2 | TEMPERATURE: 98.6 F | DIASTOLIC BLOOD PRESSURE: 84 MMHG | SYSTOLIC BLOOD PRESSURE: 118 MMHG

## 2021-08-05 DIAGNOSIS — Z00.00 ENCOUNTER FOR MEDICARE ANNUAL WELLNESS EXAM: Primary | ICD-10-CM

## 2021-08-05 DIAGNOSIS — Z13.0 SCREENING FOR DISORDER OF BLOOD AND BLOOD-FORMING ORGANS: ICD-10-CM

## 2021-08-05 DIAGNOSIS — Z12.31 ENCOUNTER FOR SCREENING MAMMOGRAM FOR BREAST CANCER: ICD-10-CM

## 2021-08-05 DIAGNOSIS — Z12.11 SCREEN FOR COLON CANCER: ICD-10-CM

## 2021-08-05 DIAGNOSIS — E78.5 HYPERLIPIDEMIA WITH TARGET LDL LESS THAN 100: ICD-10-CM

## 2021-08-05 DIAGNOSIS — E10.9 TYPE 1 DIABETES MELLITUS WITHOUT COMPLICATION (H): ICD-10-CM

## 2021-08-05 DIAGNOSIS — M25.551 HIP PAIN, RIGHT: ICD-10-CM

## 2021-08-05 DIAGNOSIS — I48.20 CHRONIC ATRIAL FIBRILLATION (H): ICD-10-CM

## 2021-08-05 DIAGNOSIS — N39.3 STRESS INCONTINENCE: ICD-10-CM

## 2021-08-05 DIAGNOSIS — E13.9 LADA (LATENT AUTOIMMUNE DIABETES MELLITUS IN ADULTS) (H): ICD-10-CM

## 2021-08-05 DIAGNOSIS — N32.81 OVERACTIVE BLADDER: ICD-10-CM

## 2021-08-05 DIAGNOSIS — N36.42 INTRINSIC SPHINCTER DEFICIENCY: ICD-10-CM

## 2021-08-05 DIAGNOSIS — Z13.29 SCREENING FOR THYROID DISORDER: ICD-10-CM

## 2021-08-05 LAB
ALBUMIN SERPL-MCNC: 3.9 G/DL (ref 3.4–5)
ALP SERPL-CCNC: 77 U/L (ref 40–150)
ALT SERPL W P-5'-P-CCNC: 40 U/L (ref 0–50)
ANION GAP SERPL CALCULATED.3IONS-SCNC: 3 MMOL/L (ref 3–14)
AST SERPL W P-5'-P-CCNC: 26 U/L (ref 0–45)
BILIRUB SERPL-MCNC: 0.8 MG/DL (ref 0.2–1.3)
BUN SERPL-MCNC: 24 MG/DL (ref 7–30)
CALCIUM SERPL-MCNC: 9.2 MG/DL (ref 8.5–10.1)
CHLORIDE BLD-SCNC: 109 MMOL/L (ref 94–109)
CO2 SERPL-SCNC: 28 MMOL/L (ref 20–32)
CREAT SERPL-MCNC: 0.76 MG/DL (ref 0.52–1.04)
ERYTHROCYTE [DISTWIDTH] IN BLOOD BY AUTOMATED COUNT: 12.7 % (ref 10–15)
GFR SERPL CREATININE-BSD FRML MDRD: 80 ML/MIN/1.73M2
GLUCOSE BLD-MCNC: 80 MG/DL (ref 70–99)
HCT VFR BLD AUTO: 41.9 % (ref 35–47)
HGB BLD-MCNC: 14.2 G/DL (ref 11.7–15.7)
MCH RBC QN AUTO: 32.5 PG (ref 26.5–33)
MCHC RBC AUTO-ENTMCNC: 33.9 G/DL (ref 31.5–36.5)
MCV RBC AUTO: 96 FL (ref 78–100)
PLATELET # BLD AUTO: 209 10E3/UL (ref 150–450)
POTASSIUM BLD-SCNC: 4.2 MMOL/L (ref 3.4–5.3)
PROT SERPL-MCNC: 6.6 G/DL (ref 6.8–8.8)
RBC # BLD AUTO: 4.37 10E6/UL (ref 3.8–5.2)
SODIUM SERPL-SCNC: 140 MMOL/L (ref 133–144)
TSH SERPL DL<=0.005 MIU/L-ACNC: 1.42 MU/L (ref 0.4–4)
WBC # BLD AUTO: 5.1 10E3/UL (ref 4–11)

## 2021-08-05 PROCEDURE — 73502 X-RAY EXAM HIP UNI 2-3 VIEWS: CPT | Mod: RT | Performed by: FAMILY MEDICINE

## 2021-08-05 PROCEDURE — 85027 COMPLETE CBC AUTOMATED: CPT | Performed by: NURSE PRACTITIONER

## 2021-08-05 PROCEDURE — 36415 COLL VENOUS BLD VENIPUNCTURE: CPT | Performed by: NURSE PRACTITIONER

## 2021-08-05 PROCEDURE — 99213 OFFICE O/P EST LOW 20 MIN: CPT | Mod: 25 | Performed by: NURSE PRACTITIONER

## 2021-08-05 PROCEDURE — 84443 ASSAY THYROID STIM HORMONE: CPT | Performed by: NURSE PRACTITIONER

## 2021-08-05 PROCEDURE — 80053 COMPREHEN METABOLIC PANEL: CPT | Performed by: NURSE PRACTITIONER

## 2021-08-05 PROCEDURE — G0439 PPPS, SUBSEQ VISIT: HCPCS | Performed by: NURSE PRACTITIONER

## 2021-08-05 RX ORDER — METOPROLOL SUCCINATE 50 MG/1
50 TABLET, EXTENDED RELEASE ORAL DAILY
Qty: 90 TABLET | Refills: 3 | Status: SHIPPED | OUTPATIENT
Start: 2021-08-05 | End: 2022-08-31

## 2021-08-05 ASSESSMENT — MIFFLIN-ST. JEOR: SCORE: 1247.77

## 2021-08-05 NOTE — RESULT ENCOUNTER NOTE
Concetta Rodríguez,    Attached are your test results.  -Normal red blood cell (hgb) levels, normal white blood cell count and normal platelet levels.   Please contact us if you have any questions.    Pricila Avila, CNP

## 2021-08-05 NOTE — RESULT ENCOUNTER NOTE
Concetta Rodríguez,    Attached are your test results.  -Liver and gallbladder tests are normal (ALT,AST, Alk phos, bilirubin), kidney function is normal (Cr, GFR), sodium is normal, potassium is normal, calcium is normal, glucose is normal.  -TSH (thyroid stimulating hormone) level is normal which indicates normal thyroid function.   Please contact us if you have any questions.    Pricila Avila, CNP

## 2021-08-06 ENCOUNTER — HOSPITAL ENCOUNTER (OUTPATIENT)
Facility: AMBULATORY SURGERY CENTER | Age: 71
Discharge: HOME | End: 2021-08-06
Attending: PLASTIC SURGERY | Admitting: PLASTIC SURGERY
Payer: COMMERCIAL

## 2021-08-06 ENCOUNTER — ANESTHESIA (OUTPATIENT)
Dept: SURGERY | Facility: AMBULATORY SURGERY CENTER | Age: 71
End: 2021-08-06

## 2021-08-06 ENCOUNTER — ANESTHESIA EVENT (OUTPATIENT)
Dept: SURGERY | Facility: AMBULATORY SURGERY CENTER | Age: 71
End: 2021-08-06

## 2021-08-06 VITALS
HEART RATE: 106 BPM | DIASTOLIC BLOOD PRESSURE: 75 MMHG | TEMPERATURE: 98 F | SYSTOLIC BLOOD PRESSURE: 106 MMHG | OXYGEN SATURATION: 98 % | RESPIRATION RATE: 16 BRPM

## 2021-08-06 DIAGNOSIS — G56.01 CARPAL TUNNEL SYNDROME OF RIGHT WRIST: ICD-10-CM

## 2021-08-06 PROCEDURE — 64721 CARPAL TUNNEL SURGERY: CPT | Mod: RT

## 2021-08-06 PROCEDURE — G8918 PT W/O PREOP ORDER IV AB PRO: HCPCS

## 2021-08-06 PROCEDURE — G8907 PT DOC NO EVENTS ON DISCHARG: HCPCS

## 2021-08-06 PROCEDURE — 64721 CARPAL TUNNEL SURGERY: CPT | Mod: RT | Performed by: PLASTIC SURGERY

## 2021-08-06 RX ORDER — ONDANSETRON 4 MG/1
4-8 TABLET, ORALLY DISINTEGRATING ORAL EVERY 8 HOURS PRN
Qty: 4 TABLET | Refills: 0 | Status: SHIPPED | OUTPATIENT
Start: 2021-08-06 | End: 2021-11-15

## 2021-08-06 RX ORDER — OXYCODONE HYDROCHLORIDE 5 MG/1
5-10 TABLET ORAL EVERY 6 HOURS PRN
Qty: 8 TABLET | Refills: 0 | Status: SHIPPED | OUTPATIENT
Start: 2021-08-06 | End: 2021-08-09

## 2021-08-06 RX ORDER — AMOXICILLIN 250 MG
1-2 CAPSULE ORAL 2 TIMES DAILY
Qty: 30 TABLET | Refills: 0 | Status: SHIPPED | OUTPATIENT
Start: 2021-08-06 | End: 2021-11-15

## 2021-08-06 NOTE — DISCHARGE INSTRUCTIONS
CARPAL AND ULNAR NERVE RELEASE POST-OPERATIVE INSTRUCTIONS    Instructions       Have someone drive you home after surgery and help you at home for 1-2      days.      Get plenty of rest.      Follow balanced diet.      Decreased activity may promote constipation, so you may want to add      more raw fruit to your diet, and be sure to increase fluid intake.      Take pain medication as prescribed. Do not take aspirin or any products      containing aspirin.      Do not drink alcohol when taking pain medications.      Even when not taking pain medications, no alcohol for 3 weeks as it      causes fluid retention.      If you are taking vitamins with iron, resume these as tolerated.      Do not smoke, as smoking delays healing and increases the risk of      complications.    Activities      Do not drive until you are no longer taking any pain medications      (narcotics).      Start walking as soon as possible, this helps to reduce swelling and       lowers the chance of blood clots.      Resume normal activities gradually.      Return to work in 1-2 days, depending upon extent of surgery      No strenuous work or stress on wound for 2-3 weeks.    Incision Care      Keep ACE wrap on for 3 days then discontinue. Keep dry until then with plastic bag. After 3 days may get wet. Every hour raise your hand above your head and pump your fist 10 times. Rewrap the ACE if it gets loose or too tight.       Avoid exposing scars to sun for at least 12 months.      Always use a strong sunblock, if sun exposure is unavoidable (SPF 50 or      greater).      Inspect daily for signs of infection.      No tub soaking, bathing, or swimming while sutures or drains are in place.      Keep area clean and dry for first 24 hours.     What to Expect      Some swelling and bruising.      May have slight bleeding from incision.  Apply 4 x 4 gauze with slight pressure to       control bleeding.      Skin grafts and flaps may take several weeks or  months to heal; a support garment or      bandage may be necessary for up to a year.    Appearance      Final results of surgery may take a year or more.    Follow-Up Care      If external sutures have been used, they will be removed in 5-14 days.    Please note my office will call you 1-2 business days after your procedure to check up on how you're doing and to schedule your post-operative appointment.        When to Call      If you have increased swelling or bruising.      If swelling and redness persist after a few days.      If you have increased redness along the incision.      If you have severe or increased pain not relieved by medication.      If you have any side effects to medications; such as, rash, nausea,      headache, vomiting.      If you have an oral temperature over 100.4 degrees.      If you have any yellowish or greenish drainage from the incisions or      notice a foul odor.      If you have bleeding from the incisions that is difficult to control with      light pressure.      If you have loss of feeling or motion.    For Medical Questions, Please Call:      [phone], Monday - Friday, 8 a.m. - 4:30 p.m.      After hours and on weekends, call Hospital Paging at [phone] and      ask for the Plastic Surgeon on call.    Restart you eliquis on Monday

## 2021-08-06 NOTE — OP NOTE
PREOPERATIVE DIAGNOSIS:  Right carpal tunnel syndrome.       POSTOPERATIVE DIAGNOSIS:  Right carpal tunnel syndrome.       PROCEDURES:  Right open carpal tunnel release.       SURGEON:  Sameer Sandoval MD.       ANESTHESIA:  Local.       COMPLICATIONS:  Nil.       DRAINS:  Nil.       SPECIMENS:  Nil.       BLOOD LOSS:  1 mL.       DESCRIPTION OF PROCEDURE:   After informed consent was obtained from the patient, the proper site was discussed with him and appropriately marked in the procedure room.  She was placed in supine position with her knees comfortably flexed and pillows underneath them.  Preoperative antibiotics were given in the OR.  Her right hand was then surgically prepped and draped in a standard surgical fashion. A sterile glove was placed over the hand. The glove was then cut to expose the volar wrist. I injected 1% lidocaine mixed 1:100,000 epinephrine 20 mL in the wrist area in the holding room. An Esmarch was used to exsanguinate the hand and forearm and the tourniquet was elevated to 250 mmHg pressure.  I then made an incision over the proximal palm in line with the radial aspect of the ring finger.  I then bluntly dissected down to the palmar fascia, opened the palmar fascia and then identified the transverse carpal ligament.  I looked for any nerve-like structure passing superficial to the transcarpal ligament, which I did not see.  I then made an incision with a 15 blade through the transcarpal ligament, again in line with the ulnar aspect of the ring finger, in an effort to leave part of the transcarpal ligament over the underlying median nerve.  I then went ahead and distally cut the transcarpal ligament with a sharp pair of scissors until the deep fat of the hand was identified and the entire transcarpal ligament distally was released.  Then proximally under direct vision, I released the transcarpal ligament and the flexor retinaculum, again releasing the entire median nerve in the process.   Within the carpal tunnel,  I did not see any masses.  I then let the tourniquet down.  Minimal bleeding was seen superficially.  No deep bleeding was seen.  It was controlled with bipolar cautery.  I then closed the incision with 3-0 nylon suture in an interrupted horizontal mattress fashion.  A sterile dressing and an ACE wrap were placed.  The patient tolerated the procedure well.  All counts were correct at the end of the case.  The patient was sent to recovery room in stable condition.

## 2021-08-06 NOTE — RESULT ENCOUNTER NOTE
Concetta Rodríguez,    Attached are your test results.  You have a fair amount of arthritis in your hip   I am going to refer you orthopedics and see what they recommend     Please call [phone] to make appointment  if you do not hear from referrals in the next few days.      Please contact us if you have any questions.    Pricila Avila, CNP

## 2021-08-09 ENCOUNTER — PATIENT OUTREACH (OUTPATIENT)
Dept: CARDIOLOGY | Facility: CLINIC | Age: 71
End: 2021-08-09

## 2021-08-09 NOTE — TELEPHONE ENCOUNTER
Patient restarted apixiban today. I informed her that Raine Marks will be calling her to schedule her cardioversion. We will schedule it on or after 9/20/21.     Qi Villasenor RN   Cardiology Care Coordinator  [facility]   Phone: [phone]  Fax: [phone]

## 2021-08-12 ENCOUNTER — TELEPHONE (OUTPATIENT)
Dept: CARDIOLOGY | Facility: CLINIC | Age: 71
End: 2021-08-12

## 2021-08-12 RX ORDER — INSULIN GLARGINE 100 [IU]/ML
30 INJECTION, SOLUTION SUBCUTANEOUS DAILY
Qty: 45 ML | Refills: 2 | Status: SHIPPED | OUTPATIENT
Start: 2021-08-12 | End: 2022-07-18

## 2021-08-16 ENCOUNTER — OFFICE VISIT (OUTPATIENT)
Dept: ORTHOPEDICS | Facility: CLINIC | Age: 71
End: 2021-08-16
Attending: NURSE PRACTITIONER
Payer: COMMERCIAL

## 2021-08-16 DIAGNOSIS — M25.551 HIP PAIN, RIGHT: ICD-10-CM

## 2021-08-16 PROCEDURE — 99203 OFFICE O/P NEW LOW 30 MIN: CPT | Performed by: PREVENTIVE MEDICINE

## 2021-08-16 ASSESSMENT — PAIN SCALES - GENERAL: PAINLEVEL: NO PAIN (0)

## 2021-08-16 NOTE — PROGRESS NOTES
HISTORY OF PRESENT ILLNESS  Ms. Rodríguez is a pleasant 70 year old year old female who presents to clinic today with right hip pain  Latoya explains that her hip has bothered her for the past several months  Location: right hip  Quality:  achy pain    Severity:6/10 at worst    Duration: worse over past few months  Timing: occurs intermittently  Context: occurs while sitting for long periods and walking a lot  Modifying factors:  resting and non-use makes it better, movement and use makes it worse  Associated signs & symptoms: no radiation down leg    MEDICAL HISTORY  Patient Active Problem List   Diagnosis     Urgency of urination     Urinary frequency     Urge incontinence     Type 2 diabetes mellitus with hyperglycemia, with long-term current use of insulin (H)     Hyperlipidemia with target LDL less than 100     Osteoporosis     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)     Type 1 diabetes mellitus without complication (H)     Age-related osteoporosis without current pathological fracture     Stress incontinence     Atrophic vaginitis     Intrinsic sphincter deficiency     Overactive bladder     Carpal tunnel syndrome of right wrist       Current Outpatient Medications   Medication Sig Dispense Refill     apixaban ANTICOAGULANT (ELIQUIS) 5 MG tablet Take 1 tablet (5 mg) by mouth 2 times daily 180 tablet 3     APPLE CIDER VINEGAR PO Take by mouth every morning       atorvastatin (LIPITOR) 10 MG tablet Take 1 tablet (10 mg) by mouth daily 90 tablet 3     blood glucose (ACCU-CHEK MICKY PLUS) test strip Use to test blood sugar 6 times daily 550 strip 3     blood glucose monitoring (ACCU-CHEK FASTCLIX) lancets USE TO TEST BLOOD SUGAR FOUR TIMES A DAY OR AS DIRECTED 306 each 3     calcium carbonate-vitamin D 500-400 MG-UNIT TABS tablt Take 1 tablet by mouth 2 times daily 180 tablet 3     CINNAMON PO Take by mouth every morning       COMPOUNDED NON-CONTROLLED SUBSTANCE (CMPD RX) - PHARMACY TO MIX COMPOUNDED  MEDICATION Estriol 0.1% cream (1mg/gram) in HRT base. Place 1 gram vaginally daily for 2 weeks, then vaginally twice weekly. 30 g 6     insulin aspart (NOVOLOG FLEXPEN) 100 UNIT/ML pen 1 unit per 15 gram of carb for breakfast and lunch, and 1 unit per 10 grams for dinner.Plus 1 unit per 50 mg/dl above 150. Total daily dose approx 30 units. (Patient taking differently: 1 unit per 15 gram of carb for all meals. Plus 1 unit per 50 mg/dl above 150. Total daily dose approx 30 units.) 30 mL 3     insulin glargine (BASAGLAR KWIKPEN) 100 UNIT/ML pen Inject 30 Units Subcutaneous daily Max, 30 units daily, #3 boxes/45mL, 2 refills. 45 mL 2     insulin glargine (LANTUS SOLOSTAR) 100 UNIT/ML pen Inject 30 units everyday. Max: 30 units daily,, #3boxes/45ml. 2 refills. 45 mL 2     insulin pen needle (B-D U/F) 31G X 8 MM miscellaneous USE WITH NOVOLOG AND LANTUS(FIVE TIMES DAILY) 500 each 3     metoprolol succinate ER (TOPROL-XL) 50 MG 24 hr tablet Take 1 tablet (50 mg) by mouth daily 90 tablet 3     Multiple Vitamins-Minerals (MULTIVITAMIN ADULT PO) Take 1 tablet by mouth every morning        ondansetron (ZOFRAN-ODT) 4 MG ODT tab Take 1-2 tablets (4-8 mg) by mouth every 8 hours as needed for nausea 4 tablet 0     senna-docusate (SENOKOT-S/PERICOLACE) 8.6-50 MG tablet Take 1-2 tablets by mouth 2 times daily 30 tablet 0     Urine Glucose-Ketones Test (KETO-DIASTIX) STRP 1 strip by In Vitro route as needed 1 each 11       No Known Allergies    Family History   Problem Relation Age of Onset     Hyperlipidemia Mother      Breast Cancer Mother      Other Cancer Father      Leukemia Father      Skin Cancer Father      Coronary Artery Disease Maternal Uncle      Brain Cancer Maternal Uncle      Coronary Artery Disease Maternal Uncle      Stomach Cancer Maternal Aunt      Diabetes No family hx of      Hypertension No family hx of      Cerebrovascular Disease No family hx of      Colon Cancer No family hx of      Thyroid Disease No family  hx of      Depression No family hx of      Anxiety Disorder No family hx of      Social History     Socioeconomic History     Marital status: Single     Spouse name: Not on file     Number of children: Not on file     Years of education: Not on file     Highest education level: Not on file   Occupational History     Not on file   Tobacco Use     Smoking status: Never Smoker     Smokeless tobacco: Never Used   Substance and Sexual Activity     Alcohol use: Yes     Comment: 1 to 2 beers or glasses of wine 1 to 2 times per month     Drug use: No     Sexual activity: Never   Other Topics Concern     Parent/sibling w/ CABG, MI or angioplasty before 65F 55M? No   Social History Narrative     Not on file     Social Determinants of Health     Financial Resource Strain:      Difficulty of Paying Living Expenses:    Food Insecurity:      Worried About Running Out of Food in the Last Year:      Ran Out of Food in the Last Year:    Transportation Needs:      Lack of Transportation (Medical):      Lack of Transportation (Non-Medical):    Physical Activity:      Days of Exercise per Week:      Minutes of Exercise per Session:    Stress:      Feeling of Stress :    Social Connections:      Frequency of Communication with Friends and Family:      Frequency of Social Gatherings with Friends and Family:      Attends L[employer] Services:      Active Member of Clubs or Organizations:      Attends Club or Organization Meetings:      Marital Status:    Intimate Partner Violence:      Fear of Current or Ex-Partner:      Emotionally Abused:      Physically Abused:      Sexually Abused:        Additional medical/Social/Surgical histories reviewed in [employer] and updated as appropriate.     REVIEW OF SYSTEMS (8/16/2021)  10 point ROS of systems including Constitutional, Eyes, Respiratory, Cardiovascular, Gastroenterology, Genitourinary, Integumentary, Musculoskeletal, Psychiatric, Allergic/Immunologic were all negative except for pertinent  positives noted in my HPI.     PHYSICAL EXAM  VSS  General  - normal appearance, in no obvious distress  HEENT  - conjunctivae not injected, moist mucous membranes, normocephalic/atraumatic head, ears normal appearance, no lesions, mouth normal appearance, no scars, normal dentition and teeth present  CV  - normal femoral pulse  Pulm  - normal respiratory pattern, non-labored  Musculoskeletal - right hip  - stance: normal gait without limp,  no obvious leg length discrepancy, normal heel and toe walk, single leg squat displays knee valgus, contralateral hip drop, internal rotation of the hip   - inspection: no swelling or effusion,  normal bone and joint alignment, no obvious deformity  - palpation: tender over the greater trochanter  - ROM: pain with active abduction, normal and painless flexion, extension, IR, ER, adduction  - strength: 5/5 in all planes  - special tests:  (+) AUSTYN  (+) FADIR  no pain with axial femoral load  Neuro  - no sensory or motor deficit, grossly normal coordination, normal muscle tone  Skin  - no ecchymosis, erythema, warmth, or induration, no obvious rash  Psych  - interactive, appropriate, normal mood and affect  ASSESSMENT & PLAN  69 yo female with right hip arthritis and trochanteric bursitis    I independently reviewed the following imaging studies:  xrays of hip: shows arthritis  Discussed and considered hip injection  Will f/u with her within 1 month and consider injection  Recommend lidocaine patches and voltaren gel  Given HEP  Appropriate PPE was utilized for prevention of spread of Covid-19.  Major Slade MD, CAQSM

## 2021-08-16 NOTE — LETTER
8/16/2021         RE: Latoya Rodríguez  [address]  [place] [address]        Dear Colleague,    Thank you for referring your patient, Latoya Rodríguez, to the [facility] [place]. Please see a copy of my visit note below.    HISTORY OF PRESENT ILLNESS  Ms. Rodríguez is a pleasant 70 year old year old female who presents to clinic today with right hip pain  Latoya explains that her hip has bothered her for the past several months  Location: right hip  Quality:  achy pain    Severity:6/10 at worst    Duration: worse over past few months  Timing: occurs intermittently  Context: occurs while sitting for long periods and walking a lot  Modifying factors:  resting and non-use makes it better, movement and use makes it worse  Associated signs & symptoms: no radiation down leg    MEDICAL HISTORY  Patient Active Problem List   Diagnosis     Urgency of urination     Urinary frequency     Urge incontinence     Type 2 diabetes mellitus with hyperglycemia, with long-term current use of insulin (H)     Hyperlipidemia with target LDL less than 100     Osteoporosis     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)     Type 1 diabetes mellitus without complication (H)     Age-related osteoporosis without current pathological fracture     Stress incontinence     Atrophic vaginitis     Intrinsic sphincter deficiency     Overactive bladder     Carpal tunnel syndrome of right wrist       Current Outpatient Medications   Medication Sig Dispense Refill     apixaban ANTICOAGULANT (ELIQUIS) 5 MG tablet Take 1 tablet (5 mg) by mouth 2 times daily 180 tablet 3     APPLE CIDER VINEGAR PO Take by mouth every morning       atorvastatin (LIPITOR) 10 MG tablet Take 1 tablet (10 mg) by mouth daily 90 tablet 3     blood glucose (ACCU-CHEK MICKY PLUS) test strip Use to test blood sugar 6 times daily 550 strip 3     blood glucose monitoring (ACCU-CHEK FASTCLIX) lancets USE TO TEST BLOOD  SUGAR FOUR TIMES A DAY OR AS DIRECTED 306 each 3     calcium carbonate-vitamin D 500-400 MG-UNIT TABS tablt Take 1 tablet by mouth 2 times daily 180 tablet 3     CINNAMON PO Take by mouth every morning       COMPOUNDED NON-CONTROLLED SUBSTANCE (CMPD RX) - PHARMACY TO MIX COMPOUNDED MEDICATION Estriol 0.1% cream (1mg/gram) in HRT base. Place 1 gram vaginally daily for 2 weeks, then vaginally twice weekly. 30 g 6     insulin aspart (NOVOLOG FLEXPEN) 100 UNIT/ML pen 1 unit per 15 gram of carb for breakfast and lunch, and 1 unit per 10 grams for dinner.Plus 1 unit per 50 mg/dl above 150. Total daily dose approx 30 units. (Patient taking differently: 1 unit per 15 gram of carb for all meals. Plus 1 unit per 50 mg/dl above 150. Total daily dose approx 30 units.) 30 mL 3     insulin glargine (BASAGLAR KWIKPEN) 100 UNIT/ML pen Inject 30 Units Subcutaneous daily Max, 30 units daily, #3 boxes/45mL, 2 refills. 45 mL 2     insulin glargine (LANTUS SOLOSTAR) 100 UNIT/ML pen Inject 30 units everyday. Max: 30 units daily,, #3boxes/45ml. 2 refills. 45 mL 2     insulin pen needle (B-D U/F) 31G X 8 MM miscellaneous USE WITH NOVOLOG AND LANTUS(FIVE TIMES DAILY) 500 each 3     metoprolol succinate ER (TOPROL-XL) 50 MG 24 hr tablet Take 1 tablet (50 mg) by mouth daily 90 tablet 3     Multiple Vitamins-Minerals (MULTIVITAMIN ADULT PO) Take 1 tablet by mouth every morning        ondansetron (ZOFRAN-ODT) 4 MG ODT tab Take 1-2 tablets (4-8 mg) by mouth every 8 hours as needed for nausea 4 tablet 0     senna-docusate (SENOKOT-S/PERICOLACE) 8.6-50 MG tablet Take 1-2 tablets by mouth 2 times daily 30 tablet 0     Urine Glucose-Ketones Test (KETO-DIASTIX) STRP 1 strip by In Vitro route as needed 1 each 11       No Known Allergies    Family History   Problem Relation Age of Onset     Hyperlipidemia Mother      Breast Cancer Mother      Other Cancer Father      Leukemia Father      Skin Cancer Father      Coronary Artery Disease Maternal Uncle       Brain Cancer Maternal Uncle      Coronary Artery Disease Maternal Uncle      Stomach Cancer Maternal Aunt      Diabetes No family hx of      Hypertension No family hx of      Cerebrovascular Disease No family hx of      Colon Cancer No family hx of      Thyroid Disease No family hx of      Depression No family hx of      Anxiety Disorder No family hx of      Social History     Socioeconomic History     Marital status: Single     Spouse name: Not on file     Number of children: Not on file     Years of education: Not on file     Highest education level: Not on file   Occupational History     Not on file   Tobacco Use     Smoking status: Never Smoker     Smokeless tobacco: Never Used   Substance and Sexual Activity     Alcohol use: Yes     Comment: 1 to 2 beers or glasses of wine 1 to 2 times per month     Drug use: No     Sexual activity: Never   Other Topics Concern     Parent/sibling w/ CABG, MI or angioplasty before 65F 55M? No   Social History Narrative     Not on file     Social Determinants of Health     Financial Resource Strain:      Difficulty of Paying Living Expenses:    Food Insecurity:      Worried About Running Out of Food in the Last Year:      Ran Out of Food in the Last Year:    Transportation Needs:      Lack of Transportation (Medical):      Lack of Transportation (Non-Medical):    Physical Activity:      Days of Exercise per Week:      Minutes of Exercise per Session:    Stress:      Feeling of Stress :    Social Connections:      Frequency of Communication with Friends and Family:      Frequency of Social Gatherings with Friends and Family:      Attends Jew Services:      Active Member of Clubs or Organizations:      Attends Club or Organization Meetings:      Marital Status:    Intimate Partner Violence:      Fear of Current or Ex-Partner:      Emotionally Abused:      Physically Abused:      Sexually Abused:        Additional medical/Social/Surgical histories reviewed in [facility] and  updated as appropriate.     REVIEW OF SYSTEMS (8/16/2021)  10 point ROS of systems including Constitutional, Eyes, Respiratory, Cardiovascular, Gastroenterology, Genitourinary, Integumentary, Musculoskeletal, Psychiatric, Allergic/Immunologic were all negative except for pertinent positives noted in my HPI.     PHYSICAL EXAM  VSS  General  - normal appearance, in no obvious distress  HEENT  - conjunctivae not injected, moist mucous membranes, normocephalic/atraumatic head, ears normal appearance, no lesions, mouth normal appearance, no scars, normal dentition and teeth present  CV  - normal femoral pulse  Pulm  - normal respiratory pattern, non-labored  Musculoskeletal - right hip  - stance: normal gait without limp,  no obvious leg length discrepancy, normal heel and toe walk, single leg squat displays knee valgus, contralateral hip drop, internal rotation of the hip   - inspection: no swelling or effusion,  normal bone and joint alignment, no obvious deformity  - palpation: tender over the greater trochanter  - ROM: pain with active abduction, normal and painless flexion, extension, IR, ER, adduction  - strength: 5/5 in all planes  - special tests:  (+) AUSTYN  (+) FADIR  no pain with axial femoral load  Neuro  - no sensory or motor deficit, grossly normal coordination, normal muscle tone  Skin  - no ecchymosis, erythema, warmth, or induration, no obvious rash  Psych  - interactive, appropriate, normal mood and affect  ASSESSMENT & PLAN  69 yo female with right hip arthritis and trochanteric bursitis    I independently reviewed the following imaging studies:  xrays of hip: shows arthritis  Discussed and considered hip injection  Will f/u with her within 1 month and consider injection  Recommend lidocaine patches and voltaren gel  Given HEP  Appropriate PPE was utilized for prevention of spread of Covid-19.  Major Slade MD, CAQSM        Again, thank you for allowing me to participate in the care of your patient.         Sincerely,        Major Slade MD

## 2021-08-24 ENCOUNTER — TELEPHONE (OUTPATIENT)
Dept: ENDOCRINOLOGY | Facility: CLINIC | Age: 71
End: 2021-08-24

## 2021-08-24 NOTE — TELEPHONE ENCOUNTER
Latoya has been approved to the [facility] assistance program for Basaglar  Through December 31, 2021. She  will receive this medication at no cost through the enrollment period.    A 120 day supply of Basaglar will be delivered to Latoya's home within 7-10 business days. She has been informed of this approval.    She will contact my office for refills as we must work directly with the .  I will note EPIC as each refill is requested.    Thank you,    Maren Pratt  Prescription Assistance

## 2021-08-29 DIAGNOSIS — Z11.59 ENCOUNTER FOR SCREENING FOR OTHER VIRAL DISEASES: ICD-10-CM

## 2021-09-05 ENCOUNTER — HEALTH MAINTENANCE LETTER (OUTPATIENT)
Age: 71
End: 2021-09-05

## 2021-09-07 ENCOUNTER — OFFICE VISIT (OUTPATIENT)
Dept: ORTHOPEDICS | Facility: CLINIC | Age: 71
End: 2021-09-07
Payer: COMMERCIAL

## 2021-09-07 DIAGNOSIS — G56.01 CARPAL TUNNEL SYNDROME OF RIGHT WRIST: Primary | ICD-10-CM

## 2021-09-07 PROCEDURE — 99024 POSTOP FOLLOW-UP VISIT: CPT | Performed by: PLASTIC SURGERY

## 2021-09-07 NOTE — LETTER
9/7/2021         RE: Latoya Rodríguez  [address]  [place] [address]        Dear Colleague,    Thank you for referring your patient, Latoya Rodríguez, to the [facility]. Please see a copy of my visit note below.    PRESENTING COMPLAINT:  Postoperative visit status post right carpal tunnel release done 08/06/2021.      HISTORY OF PRESENTING COMPLAINT:  Ms. Rodríguez is 70 years old.  She is about 3 weeks out from surgery.  Symptoms have improved.  No issues.    PHYSICAL EXAMINATION:  Vital signs stable.  She is afebrile, in no obvious distress.  Everything is healing in well.    ASSESSMENT AND PLAN:  Based on above findings, a diagnosis of right carpal tunnel release was made.  No sutures were removed.  Advised being careful for another couple of weeks.  I will see her back on a p.r.n. basis.  She has no symptoms in the left side.          Again, thank you for allowing me to participate in the care of your patient.        Sincerely,        RADHA Sandoval MD

## 2021-09-07 NOTE — PROGRESS NOTES
PRESENTING COMPLAINT:  Postoperative visit status post right carpal tunnel release done 08/06/2021.      HISTORY OF PRESENTING COMPLAINT:  Ms. Rodríguez is 70 years old.  She is about 3 weeks out from surgery.  Symptoms have improved.  No issues.    PHYSICAL EXAMINATION:  Vital signs stable.  She is afebrile, in no obvious distress.  Everything is healing in well.    ASSESSMENT AND PLAN:  Based on above findings, a diagnosis of right carpal tunnel release was made.  No sutures were removed.  Advised being careful for another couple of weeks.  I will see her back on a p.r.n. basis.  She has no symptoms in the left side.

## 2021-09-13 LAB — COLOGUARD-ABSTRACT: NEGATIVE

## 2021-09-19 ENCOUNTER — LAB (OUTPATIENT)
Dept: URGENT CARE | Facility: URGENT CARE | Age: 71
End: 2021-09-19
Payer: COMMERCIAL

## 2021-09-19 DIAGNOSIS — Z11.59 ENCOUNTER FOR SCREENING FOR OTHER VIRAL DISEASES: ICD-10-CM

## 2021-09-19 PROCEDURE — U0003 INFECTIOUS AGENT DETECTION BY NUCLEIC ACID (DNA OR RNA); SEVERE ACUTE RESPIRATORY SYNDROME CORONAVIRUS 2 (SARS-COV-2) (CORONAVIRUS DISEASE [COVID-19]), AMPLIFIED PROBE TECHNIQUE, MAKING USE OF HIGH THROUGHPUT TECHNOLOGIES AS DESCRIBED BY CMS-2020-01-R: HCPCS

## 2021-09-19 PROCEDURE — U0005 INFEC AGEN DETEC AMPLI PROBE: HCPCS

## 2021-09-20 LAB — SARS-COV-2 RNA RESP QL NAA+PROBE: NEGATIVE

## 2021-09-22 NOTE — RESULT ENCOUNTER NOTE
Concetta Rodríguez,    Attached are your test results.  Cologuad is negative    Please contact us if you have any questions.    Pricila Avila, CNP

## 2021-09-23 ENCOUNTER — HOSPITAL ENCOUNTER (OUTPATIENT)
Facility: CLINIC | Age: 71
Discharge: HOME | End: 2021-09-23
Attending: STUDENT IN AN ORGANIZED HEALTH CARE EDUCATION/TRAINING PROGRAM | Admitting: STUDENT IN AN ORGANIZED HEALTH CARE EDUCATION/TRAINING PROGRAM
Payer: COMMERCIAL

## 2021-09-23 ENCOUNTER — ANESTHESIA (OUTPATIENT)
Dept: SURGERY | Facility: CLINIC | Age: 71
End: 2021-09-23
Payer: COMMERCIAL

## 2021-09-23 ENCOUNTER — HOSPITAL ENCOUNTER (OUTPATIENT)
Dept: CARDIOLOGY | Facility: CLINIC | Age: 71
End: 2021-09-23
Attending: NURSE PRACTITIONER | Admitting: STUDENT IN AN ORGANIZED HEALTH CARE EDUCATION/TRAINING PROGRAM
Payer: COMMERCIAL

## 2021-09-23 ENCOUNTER — APPOINTMENT (OUTPATIENT)
Dept: MEDSURG UNIT | Facility: CLINIC | Age: 71
End: 2021-09-23
Attending: STUDENT IN AN ORGANIZED HEALTH CARE EDUCATION/TRAINING PROGRAM
Payer: COMMERCIAL

## 2021-09-23 ENCOUNTER — ANESTHESIA EVENT (OUTPATIENT)
Dept: SURGERY | Facility: CLINIC | Age: 71
End: 2021-09-23
Payer: COMMERCIAL

## 2021-09-23 ENCOUNTER — APPOINTMENT (OUTPATIENT)
Dept: LAB | Facility: CLINIC | Age: 71
End: 2021-09-23
Attending: STUDENT IN AN ORGANIZED HEALTH CARE EDUCATION/TRAINING PROGRAM
Payer: COMMERCIAL

## 2021-09-23 VITALS
RESPIRATION RATE: 16 BRPM | DIASTOLIC BLOOD PRESSURE: 62 MMHG | HEART RATE: 75 BPM | OXYGEN SATURATION: 97 % | SYSTOLIC BLOOD PRESSURE: 131 MMHG

## 2021-09-23 VITALS
SYSTOLIC BLOOD PRESSURE: 123 MMHG | OXYGEN SATURATION: 99 % | DIASTOLIC BLOOD PRESSURE: 67 MMHG | HEART RATE: 75 BPM | RESPIRATION RATE: 16 BRPM

## 2021-09-23 LAB
HOLD SPECIMEN: NORMAL
MAGNESIUM SERPL-MCNC: 2.1 MG/DL (ref 1.6–2.3)
POTASSIUM BLD-SCNC: 3.9 MMOL/L (ref 3.4–5.3)

## 2021-09-23 PROCEDURE — 83735 ASSAY OF MAGNESIUM: CPT | Performed by: STUDENT IN AN ORGANIZED HEALTH CARE EDUCATION/TRAINING PROGRAM

## 2021-09-23 PROCEDURE — 36415 COLL VENOUS BLD VENIPUNCTURE: CPT | Performed by: STUDENT IN AN ORGANIZED HEALTH CARE EDUCATION/TRAINING PROGRAM

## 2021-09-23 PROCEDURE — 250N000009 HC RX 250: Performed by: NURSE ANESTHETIST, CERTIFIED REGISTERED

## 2021-09-23 PROCEDURE — 999N000054 HC STATISTIC EKG NON-CHARGEABLE

## 2021-09-23 PROCEDURE — 93010 ELECTROCARDIOGRAM REPORT: CPT | Performed by: INTERNAL MEDICINE

## 2021-09-23 PROCEDURE — 999N000132 HC STATISTIC PP CARE STAGE 1

## 2021-09-23 PROCEDURE — 250N000013 HC RX MED GY IP 250 OP 250 PS 637: Performed by: NURSE PRACTITIONER

## 2021-09-23 PROCEDURE — 92960 CARDIOVERSION ELECTRIC EXT: CPT

## 2021-09-23 PROCEDURE — 84132 ASSAY OF SERUM POTASSIUM: CPT | Performed by: STUDENT IN AN ORGANIZED HEALTH CARE EDUCATION/TRAINING PROGRAM

## 2021-09-23 PROCEDURE — 370N000017 HC ANESTHESIA TECHNICAL FEE, PER MIN

## 2021-09-23 PROCEDURE — 92960 CARDIOVERSION ELECTRIC EXT: CPT | Performed by: NURSE PRACTITIONER

## 2021-09-23 RX ADMIN — LIDOCAINE HYDROCHLORIDE 15 MG: 10 INJECTION, SOLUTION EPIDURAL; INFILTRATION; INTRACAUDAL; PERINEURAL at 11:27

## 2021-09-23 RX ADMIN — LIDOCAINE HYDROCHLORIDE 35 MG: 10 INJECTION, SOLUTION EPIDURAL; INFILTRATION; INTRACAUDAL; PERINEURAL at 11:21

## 2021-09-23 RX ADMIN — APIXABAN 5 MG: 5 TABLET, FILM COATED ORAL at 11:03

## 2021-09-23 ASSESSMENT — ENCOUNTER SYMPTOMS: DYSRHYTHMIAS: 1

## 2021-09-23 NOTE — ANESTHESIA POSTPROCEDURE EVALUATION
Patient: Latoya Rodríguez    Procedure(s):  ANESTHESIA, FOR CARDIOVERSION@1100    Diagnosis:Chronic atrial fibrillation (H) [I48.20]  Diagnosis Additional Information: No value filed.    Anesthesia Type:  General    Note:  Disposition: Outpatient   Postop Pain Control: Uneventful            Sign Out: Well controlled pain   PONV: No   Neuro/Psych: Uneventful            Sign Out: Acceptable/Baseline neuro status   Airway/Respiratory: Uneventful            Sign Out: Acceptable/Baseline resp. status   CV/Hemodynamics: Uneventful            Sign Out: Acceptable CV status   Other NRE: NONE   DID A NON-ROUTINE EVENT OCCUR? No           Last vitals:  Vitals Value Taken Time   BP     Temp     Pulse     Resp     SpO2         Electronically Signed By: Christopher J. Behrens, MD  September 23, 2021  2:00 PM

## 2021-09-23 NOTE — DISCHARGE INSTRUCTIONS
GOING HOME AFTER YOUR CARDIOVERSION    FOR NEXT 24 HOURS:    An adult should stay with you.    Relax and take it easy.    DO NOT make any important legal decisions.    DO NOT drive or operate machines at home or at work.    Resume your regular diet and drink plenty of fluids.    If you have any redness/skin sorness where the patches were placed, you may use aloe vera gel or 1% hydrocortisone cream to the skin (sold at drug stores)    Take Tylenol (Acetaminophen) per your provider's recommendations as needed to help relieve local pain.     CALL YOUR HEALTHCARE PROVIDER IF:    You develop nausea or vomiting    You develop hives or a rash or any unexplained itching    Have irregular heartbeat or fast pulse    Feel faint, dizzy, or lightheaded    Have chest pain with increased activity    Have bleeding issues from blood-thinning medicines    CALL 911 RIGHT AWAY IF YOU HAVE:    Pain in your chest, arm, shoulder, neck, or upper back    Shortness of breath    Loss of vision, speech, or strength or coordination in any body part    Weakness in your arm or leg    Uncontrolled bleeding    Feel unstable in any way     FOLLOW UP APPOINTMENT:      Follow up as scheduled with EP/Cardiology Provider in 4 weeks    ADDITIONAL INFORMATION:  Cardiovascular Clinic:   [address] [place] [address]. [place] [address]  Your Care Team:  EP Cardiology   Telephone Number     Carla Junior NP [phone]   Chani Cuevas RN  [phone]     For scheduling appts or procedures:    Raine Marks   [phone]   For the Device Clinic (Pacemakers and ICD's)   RN's :   Kelsie Del Rosario  During business hours: [phone]     After business hours:   [phone]- select option 4 and ask for job code [id number].       As always, Thank you for trusting us with your health care needs!

## 2021-09-23 NOTE — PROGRESS NOTES
Patient tolerated recovery stage well. VSS, denies pain. Patient tolerated PO food and fluids. Teaching was done and discharge instructions were given. Patient ambulated, voided, and PIV was removed. Patient discharged from the hospital via wheel chair to home with family.

## 2021-09-23 NOTE — ANESTHESIA CARE TRANSFER NOTE
Patient: Latoya Rodríguez    Procedure(s):  ANESTHESIA, FOR CARDIOVERSION@1100    Diagnosis: Chronic atrial fibrillation (H) [I48.20]  Diagnosis Additional Information: No value filed.    Anesthesia Type:   General     Note:      Level of Consciousness: awake  Oxygen Supplementation: nasal cannula    Independent Airway: airway patency satisfactory and stable  Dentition: dentition unchanged  Vital Signs Stable: post-procedure vital signs reviewed and stable  Report to RN Given: handoff report given  Patient transferred to: PACU  Comments: Pt remains stable, monitors on alarms in place, report to cardiac echo  RN, no complications  Handoff Report: Identifed the Patient, Identified the Reponsible Provider, Reviewed the pertinent medical history, Discussed the surgical course, Reviewed Intra-OP anesthesia mangement and issues during anesthesia, Set expectations for post-procedure period and Allowed opportunity for questions and acknowledgement of understanding      Vitals:  Vitals Value Taken Time   /76 09/23/21 1143   Temp     Pulse 76 09/23/21 1143   Resp 16 09/23/21 1143   SpO2 100 % 09/23/21 1143       Electronically Signed By: URVASHI Martinez CRNA  September 23, 2021  11:44 AM

## 2021-09-23 NOTE — PROGRESS NOTES
Pt arrived in ECHO department for scheduled Cardioversion.   Procedure explained, questions answered and consent signed. Discharge instructions discussed with patient and given written copy.  Anesthesia gave pt 50 mg IV brevital for sedation and pt was DCCV X3 at 150, 150, 200 Joules but remained in atrial fibrillation.   Pt denied chest or throat pain after procedure and was monitored until awake and VSS. Escorted to 2A for further recovery.   Report given to NESTOR Eldridge.

## 2021-09-23 NOTE — ANESTHESIA PREPROCEDURE EVALUATION
Anesthesia Pre-Procedure Evaluation    Patient: Latoya Rodríguez   MRN: [id number] : 1950        Preoperative Diagnosis: Chronic atrial fibrillation (H) [I48.20]   Procedure : Procedure(s):  ANESTHESIA, FOR CARDIOVERSION@1100     Past Medical History:   Diagnosis Date     Cataract      Type 2 diabetes mellitus with hyperglycemia (H) 2016      Past Surgical History:   Procedure Laterality Date     CYSTOSCOPY, INJECT COLLAGEN, COMBINED N/A 2021    Procedure: CYSTOSCOPY, WITH PERIURETHRAL BULKING AGENT INJECTION;  Surgeon: Alberto Zhang MD;  Location: [facility] OR     EYE SURGERY  ,     Retinal detachment, Cataract (both eyes)     RELEASE CARPAL TUNNEL Right 2021    Procedure: RELEASE, CARPAL TUNNEL, Right;  Surgeon: RADHA Sandoval MD;  Location: [facility] OR     RELEASE TRIGGER FINGER Bilateral 5/10/2019    Procedure: RELEASE TRIGGER FINGER, BILATERAL LONG AND RING FINGERS;  Surgeon: RADHA Sandoval MD;  Location: [facility] OR     VASCULAR SURGERY      Varicose Veins      No Known Allergies   Social History     Tobacco Use     Smoking status: Never Smoker     Smokeless tobacco: Never Used   Substance Use Topics     Alcohol use: Yes     Comment: 1 to 2 beers or glasses of wine 1 to 2 times per month      Wt Readings from Last 1 Encounters:   21 73.5 kg (162 lb)        Anesthesia Evaluation            ROS/MED HX  ENT/Pulmonary:       Neurologic:       Cardiovascular:     (+) -----dysrhythmias, a-fib,  (-) murmur and wheezes   METS/Exercise Tolerance:     Hematologic:       Musculoskeletal:       GI/Hepatic:       Renal/Genitourinary:       Endo:     (+) type II DM,     Psychiatric/Substance Use:       Infectious Disease:       Malignancy:       Other:            Physical Exam    Airway        Mallampati: II   TM distance: > 3 FB   Neck ROM: full   Mouth opening: > 3 cm    Respiratory Devices and Support         Dental  no notable dental history         Cardiovascular          Rhythm  and rate: irregular and normal (-) no systolic click and no murmur    Pulmonary   pulmonary exam normal        breath sounds clear to auscultation   (-) no wheezes        OUTSIDE LABS:  CBC:   Lab Results   Component Value Date    WBC 5.1 08/05/2021    WBC 3.8 (L) 06/24/2016    HGB 14.2 08/05/2021    HGB 14.4 06/24/2016    HCT 41.9 08/05/2021    HCT 40.7 06/24/2016     08/05/2021     06/24/2016     BMP:   Lab Results   Component Value Date     08/05/2021     08/02/2019    POTASSIUM 3.9 09/23/2021    POTASSIUM 4.2 08/05/2021    CHLORIDE 109 08/05/2021    CHLORIDE 103 08/02/2019    CO2 28 08/05/2021    CO2 24 08/02/2019    BUN 24 08/05/2021    BUN 16 08/02/2019    CR 0.76 08/05/2021    CR 0.74 07/01/2021    GLC 80 08/05/2021     (H) 08/02/2019     COAGS: No results found for: PTT, INR, FIBR  POC:   Lab Results   Component Value Date     (H) 05/11/2021     HEPATIC:   Lab Results   Component Value Date    ALBUMIN 3.9 08/05/2021    PROTTOTAL 6.6 (L) 08/05/2021    ALT 40 08/05/2021    AST 26 08/05/2021    ALKPHOS 77 08/05/2021    BILITOTAL 0.8 08/05/2021     OTHER:   Lab Results   Component Value Date    A1C 6.7 (A) 07/01/2021    RENATO 9.2 08/05/2021    MAG 2.1 09/23/2021    TSH 1.42 08/05/2021    T4 1.33 07/03/2017    T3 82 03/13/2017       Anesthesia Plan    ASA Status:  3   NPO Status:  NPO Appropriate    Anesthesia Type: General.     - Airway: Native airway   Induction: Intravenous.   Maintenance: Inhalation.        Consents    Anesthesia Plan(s) and associated risks, benefits, and realistic alternatives discussed. Questions answered and patient/representative(s) expressed understanding.     - Discussed with:  Patient      - Extended Intubation/Ventilatory Support Discussed: Yes.      - Patient is DNR/DNI Status: No    Use of blood products discussed: Yes.     - Discussed with: Patient.     Postoperative Care    Pain management: IV analgesics.        Comments:                 Christopher J. Behrens, MD

## 2021-09-24 LAB
ATRIAL RATE - MUSE: 375 BPM
DIASTOLIC BLOOD PRESSURE - MUSE: NORMAL MMHG
INTERPRETATION ECG - MUSE: NORMAL
P AXIS - MUSE: NORMAL DEGREES
PR INTERVAL - MUSE: NORMAL MS
QRS DURATION - MUSE: 114 MS
QT - MUSE: 372 MS
QTC - MUSE: 487 MS
R AXIS - MUSE: 23 DEGREES
SYSTOLIC BLOOD PRESSURE - MUSE: NORMAL MMHG
T AXIS - MUSE: 63 DEGREES
VENTRICULAR RATE- MUSE: 103 BPM

## 2021-10-12 ENCOUNTER — TELEPHONE (OUTPATIENT)
Dept: CARDIOLOGY | Facility: CLINIC | Age: 71
End: 2021-10-12

## 2021-10-12 NOTE — TELEPHONE ENCOUNTER
Called patient and discussed follow up. She does not need to be seen by Carla at the [facility].  She is aware that she will see Dr Whipple in [place] on Nov 10 th. She had no questions     Oksana Ott RN  Medical Speciality Care Coordinator  [facility] [place] [place]  Phone: [phone]

## 2021-11-10 ENCOUNTER — OFFICE VISIT (OUTPATIENT)
Dept: CARDIOLOGY | Facility: CLINIC | Age: 71
End: 2021-11-10
Payer: COMMERCIAL

## 2021-11-10 VITALS
OXYGEN SATURATION: 99 % | HEART RATE: 99 BPM | HEIGHT: 65 IN | WEIGHT: 163.3 LBS | SYSTOLIC BLOOD PRESSURE: 118 MMHG | BODY MASS INDEX: 27.21 KG/M2 | DIASTOLIC BLOOD PRESSURE: 89 MMHG

## 2021-11-10 DIAGNOSIS — I48.20 CHRONIC ATRIAL FIBRILLATION (H): ICD-10-CM

## 2021-11-10 DIAGNOSIS — E11.65 TYPE 2 DIABETES MELLITUS WITH HYPERGLYCEMIA, WITH LONG-TERM CURRENT USE OF INSULIN (H): Primary | ICD-10-CM

## 2021-11-10 DIAGNOSIS — Z79.4 TYPE 2 DIABETES MELLITUS WITH HYPERGLYCEMIA, WITH LONG-TERM CURRENT USE OF INSULIN (H): Primary | ICD-10-CM

## 2021-11-10 PROCEDURE — 99215 OFFICE O/P EST HI 40 MIN: CPT | Performed by: STUDENT IN AN ORGANIZED HEALTH CARE EDUCATION/TRAINING PROGRAM

## 2021-11-10 ASSESSMENT — MIFFLIN-ST. JEOR: SCORE: 1256.6

## 2021-11-10 ASSESSMENT — PAIN SCALES - GENERAL: PAINLEVEL: NO PAIN (0)

## 2021-11-10 NOTE — PROGRESS NOTES
"Latoya Rodríguez's goals for this visit include:   Chief Complaint   Patient presents with     Follow Up     [facility]       She requests these members of her care team be copied on today's visit information: yes    PCP: Pricila Avila    Referring Provider:  Amadeo Whipple MD  [address] [place] [address] [address] [place] [id number]    /89 (BP Location: Left arm, Patient Position: Sitting, Cuff Size: Adult Regular)   Pulse 99   Ht 1.651 m (5' 5\")   Wt 74.1 kg (163 lb 4.8 oz)   SpO2 99%   BMI 27.17 kg/m      Do you need any medication refills at today's visit? No  Vic Marcus CMA      "

## 2021-11-10 NOTE — PROGRESS NOTES
Outcome for 11/10/21 1:14 PM :[employer] message sent to the patient requesting blood sugar readings prior to visit.    Heike Adams [facility]  Adult Endocrinology  [facility]

## 2021-11-10 NOTE — PATIENT INSTRUCTIONS
The following is a summary of your office visit today:    1. Coronary CTA and Echo  2. Follow up with Dr. Whipple in 3-6 months        Nurse contact information:  Cardiology Care Coordinators:  Oksana Ott RN and Qi Villasenor RN    [phone] Phone      [phone] Fax      HOW TO CHECK YOUR BLOOD PRESSURE AT HOME:     Avoid eating, smoking, and exercising for at least 30 minutes before taking a reading.    Be sure you have taken your BP medication at least 2-3 hours before you check it.     Sit quietly for 10 minutes before a reading.     Sit in a chair with your feet flat on the floor. Rest your  arm on a table so that the arm cuff is at the same level as your heart.    Remain still during the reading.    Record your blood pressure and pulse in a log and bring to your next appointment.     If you have had any blood work, imaging or other testing completed we will be in touch within 1-2 weeks regarding the results. If you have any questions, concerns or need to schedule a follow up, please contact us at [phone]. If you are needing refills please contact your pharmacy. For urgent after hour care please call the [facility] at [phone] or the [facility] at [phone] and ask to speak to the cardiologist on call.    It was a pleasure meeting with you today. Please let us know if there is anything else we can do for you so that we can be sure you are leaving completely satisfied with your care experience.     Your Cardiology Team at [facility]  RN Care Coordinators: Nic  Support Staff: [place]    Instructions for Coronary CTA:     Report to the Gold waiting room at [facility].        The day before the test drink extra water and also on the day of the test.  Nothing to eat or drink except water 3 hours prior.  No caffeine, smoking, or strenuous activity before test.    You will need pre-treatment if  you have allergies to contrast dye or shellfish.   You will need to have a current creatinine level within 30 days if you are over the age of 69, diabetic, or have a history of kidney problems.   Testing takes about 15 min. Please allow 2 hours for test as you may need mediations to slow your heart rate for the test.  Your head is not enclosed.

## 2021-11-10 NOTE — PROGRESS NOTES
Chief Complaint: Mala-operative atrial flutter    HPI: Latoya Rodríguez is a 70 year old female with a past medical history of type II diabetes mellitus who was referred to me for evaluation of mala-operative atrial flutter by the [facility] Anesthesia team.     Briefly, Mrs. Rodríguez underwent cystourethroscopy on 05/11/2021 under [facility]. She was noted to have mala-operative atrial flutter. This was documented on an EKG on 05/11, as noted below. She reverted to NSR without any intervention from what it seems. Her procedure was completed uneventfully. She was referred to me for further evaluation.    At the time of the consultation, she notes an absence of chest pain at rest, dyspnea at rest or with exertion, PND, orthopnea, peripheral edema, palpitations, lightheadedness or syncope. She has never had any symptoms suggestive of atrial flutter, such as exertional dyspnea or dyspnea at rest. Mrs. Rodríguez walks 4-5 miles daily.     A comprehensive ROS was done and the details are included above in the HPI.    Interval History 11/10/2021:  Since her last visit, Mrs. Rodríguez underwent DCCV. She had three attempts that were unsuccessful. Since then, she notes complete absence of symptoms consistent with atrial fibrillation, such as palpitations, chest pain, exertional dyspnea, or fatigue.  She has continued to take her apixaban in uninterrupted fashion without any sequelae of bleeding.    A comprehensive ROS was done and the details are included above in the HPI.      Past Medical History:  - T2DM, IDDM  - No prior history of hypertension, dyslipidemia, CAD, TIA/stroke, vascular aneurysms, PAD  Past Medical History:   Diagnosis Date     Cataract      Type 2 diabetes mellitus with hyperglycemia (H) 6/24/2016       Past Surgical History:    Past Surgical History:   Procedure Laterality Date     ANESTHESIA CARDIOVERSION N/A 9/23/2021    Procedure: ANESTHESIA, FOR CARDIOVERSION@1100;  Surgeon: GENERIC ANESTHESIA PROVIDER;  Location:  [facility] OR     CYSTOSCOPY, INJECT COLLAGEN, COMBINED N/A 5/11/2021    Procedure: CYSTOSCOPY, WITH PERIURETHRAL BULKING AGENT INJECTION;  Surgeon: Alberto Zhang MD;  Location: [facility] OR     EYE SURGERY  9/04, 5/11    Retinal detachment, Cataract (both eyes)     RELEASE CARPAL TUNNEL Right 8/6/2021    Procedure: RELEASE, CARPAL TUNNEL, Right;  Surgeon: RADHA Sandoval MD;  Location: [facility] OR     RELEASE TRIGGER FINGER Bilateral 5/10/2019    Procedure: RELEASE TRIGGER FINGER, BILATERAL LONG AND RING FINGERS;  Surgeon: RADHA Sandoval MD;  Location: [facility] OR     VASCULAR SURGERY      Varicose Veins       Drug History:  Home cardiac meds: Apixaban 5 mg twice daily, atorvastatin 10 mg q24h, metoprolol succinate 50 mg q24h.  Current Outpatient Medications   Medication Sig Dispense Refill     apixaban ANTICOAGULANT (ELIQUIS) 5 MG tablet Take 1 tablet (5 mg) by mouth 2 times daily 180 tablet 3     atorvastatin (LIPITOR) 10 MG tablet Take 1 tablet (10 mg) by mouth daily 90 tablet 3     blood glucose (ACCU-CHEK MICKY PLUS) test strip Use to test blood sugar 6 times daily 550 strip 3     blood glucose monitoring (ACCU-CHEK FASTCLIX) lancets USE TO TEST BLOOD SUGAR FOUR TIMES A DAY OR AS DIRECTED 306 each 3     calcium carbonate-vitamin D 500-400 MG-UNIT TABS tablt Take 1 tablet by mouth 2 times daily 180 tablet 3     CINNAMON PO Take by mouth every morning       COMPOUNDED NON-CONTROLLED SUBSTANCE (CMPD RX) - PHARMACY TO MIX COMPOUNDED MEDICATION Estriol 0.1% cream (1mg/gram) in HRT base. Place 1 gram vaginally daily for 2 weeks, then vaginally twice weekly. 30 g 6     insulin aspart (NOVOLOG FLEXPEN) 100 UNIT/ML pen 1 unit per 15 gram of carb for breakfast and lunch, and 1 unit per 10 grams for dinner.Plus 1 unit per 50 mg/dl above 150. Total daily dose approx 30 units. (Patient taking differently: 1 unit per 15 gram of carb for all meals. Plus 1 unit per 50 mg/dl above 150. Total daily dose approx 30 units.) 30 mL 3      insulin glargine (BASAGLAR KWIKPEN) 100 UNIT/ML pen Inject 30 Units Subcutaneous daily Max, 30 units daily, #3 boxes/45mL, 2 refills. 45 mL 2     insulin glargine (LANTUS SOLOSTAR) 100 UNIT/ML pen Inject 30 units everyday. Max: 30 units daily,, #3boxes/45ml. 2 refills. 45 mL 2     insulin pen needle (B-D U/F) 31G X 8 MM miscellaneous USE WITH NOVOLOG AND LANTUS(FIVE TIMES DAILY) 500 each 3     metoprolol succinate ER (TOPROL-XL) 50 MG 24 hr tablet Take 1 tablet (50 mg) by mouth daily 90 tablet 3     Multiple Vitamins-Minerals (MULTIVITAMIN ADULT PO) Take 1 tablet by mouth every morning        ondansetron (ZOFRAN-ODT) 4 MG ODT tab Take 1-2 tablets (4-8 mg) by mouth every 8 hours as needed for nausea 4 tablet 0     senna-docusate (SENOKOT-S/PERICOLACE) 8.6-50 MG tablet Take 1-2 tablets by mouth 2 times daily 30 tablet 0     Urine Glucose-Ketones Test (KETO-DIASTIX) STRP 1 strip by In Vitro route as needed 1 each 11     APPLE CIDER VINEGAR PO Take by mouth every morning           Family History:  - No family history of premature CAD, valvular disorders, dysrhythmias  - Mother's brother:  in late 20s of unclear cause, ?'heart attack'  - Mother's second brother:  in late 40s of unclear cause, no autopsy done  Family History   Problem Relation Age of Onset     Hyperlipidemia Mother      Breast Cancer Mother      Other Cancer Father      Leukemia Father      Skin Cancer Father      Coronary Artery Disease Maternal Uncle      Brain Cancer Maternal Uncle      Coronary Artery Disease Maternal Uncle      Stomach Cancer Maternal Aunt      Diabetes No family hx of      Hypertension No family hx of      Cerebrovascular Disease No family hx of      Colon Cancer No family hx of      Thyroid Disease No family hx of      Depression No family hx of      Anxiety Disorder No family hx of        Social History:  Used to work in Management.  Social History     Tobacco Use     Smoking status: Never Smoker     Smokeless tobacco:  "Never Used   Substance Use Topics     Alcohol use: Yes     Comment: 1 to 2 beers or glasses of wine 1 to 2 times per month       No Known Allergies      Physical Examination:  Vitals: /89 (BP Location: Left arm, Patient Position: Sitting, Cuff Size: Adult Regular)   Pulse 99   Ht 1.651 m (5' 5\")   Wt 74.1 kg (163 lb 4.8 oz)   SpO2 99%   BMI 27.17 kg/m    BMI= Body mass index is 27.17 kg/m .    GENERAL: Healthy, alert and no distress  Eyes: No xanthelasmas.  ENT: Moist mucosal membranes.  RESPIRATORY: No signs of resp distress.   CARDIOVASCULAR:  No JVD, irregularly irregular.  EXTREMITIES: Warm, well-perfused, no edema.   NEUROLOGY: GCS 15/15, no focal deficits.  SKIN: No ecchymoses, no rashes.  PSYCH: Cooperative, pleasant affect.       Investigations:  I personally viewed and interpreted the following investigations:    Labs:    CBC RESULTS:  Lab Results   Component Value Date    WBC 5.1 08/05/2021    WBC 3.8 (L) 06/24/2016    RBC 4.37 08/05/2021    RBC 4.31 06/24/2016    HGB 14.2 08/05/2021    HGB 14.4 06/24/2016    HCT 41.9 08/05/2021    HCT 40.7 06/24/2016    MCV 96 08/05/2021    MCV 94 06/24/2016    MCH 32.5 08/05/2021    MCH 33.4 (H) 06/24/2016    MCHC 33.9 08/05/2021    MCHC 35.4 06/24/2016    RDW 12.7 08/05/2021    RDW 13.3 06/24/2016     08/05/2021     06/24/2016       CMP RESULTS:  Lab Results   Component Value Date     08/05/2021     08/02/2019    POTASSIUM 3.9 09/23/2021    POTASSIUM 4.0 08/02/2019    CHLORIDE 109 08/05/2021    CHLORIDE 103 08/02/2019    CO2 28 08/05/2021    CO2 24 08/02/2019    ANIONGAP 3 08/05/2021    ANIONGAP 8 08/02/2019    GLC 80 08/05/2021     (H) 08/02/2019    BUN 24 08/05/2021    BUN 16 08/02/2019    CR 0.76 08/05/2021    CR 0.74 07/01/2021    GFRESTIMATED 80 08/05/2021    GFRESTIMATED 82 07/01/2021    GFRESTBLACK >90 07/01/2021    RENATO 9.2 08/05/2021    RENATO 9.0 08/02/2019    BILITOTAL 0.8 08/05/2021    BILITOTAL 0.7 08/02/2019    " ALBUMIN 3.9 08/05/2021    ALBUMIN 3.7 08/02/2019    ALKPHOS 77 08/05/2021    ALKPHOS 97 08/02/2019    ALT 40 08/05/2021    ALT 24 08/02/2019    AST 26 08/05/2021    AST 16 08/02/2019      LIPIDS:  Lab Results   Component Value Date    CHOL 189 07/20/2020     Lab Results   Component Value Date    HDL 71 07/20/2020     Lab Results   Component Value Date     07/20/2020     Lab Results   Component Value Date    TRIG 56 07/20/2020       Recent Tests:    Electrocardiogram (05/11/2021):  Typical, counter-clockwise atrial flutter with variable AV-block. Incomplete RBBB.     Electrocardiogram (06/03/2021):  Reviewed with patient. Atrial fibrillation with HR of 104 bpm. Incomplete RBBB noted.     Assessment and Plan:   Latoya Rodríguez is a 70 year old female with a past medical history of type II diabetes mellitus (insulin dependent) who presented to me for evaluation of atrial flutter.    Despite multiple unsuccessful prior attempts at rhythm control, Mrs. Rodríguez remains completely symptomatic. Her pulse confirms that she is still in AF today. We also discussed other rhythm control strategies previously, such as ablation, but she was not interested in pursuing these at the moment given that she feels relatively well. However, we have not completely ruled this out for the future.  In view of this, I elected to continue rate control strategy with her chronic anticoagulation.  However, it is also unclear to me as to what the underlying trigger of the atrial fibrillation is.  In order to work this up, I would rule out structural heart disease and echocardiogram and subclinical coronary disease with a coronary CTA.  I explained both of the procedures to her in detail, including the need for an intravenous line, radiation/contrast exposure, and the need to lie flat in a lifesaver-like machine for the coronary CTA.  Ms. Rodríguez was happy to continue.  After her testing, she will follow up with me for further evaluation.        Problems:  1. Atrial fibrillation, persistent (CHADS2-VASC of 3)   2. Atrial flutter, persistent   3. T2DM, NIDDM   4. ASCVD 12.3%     Plan:  - Continue apixaban 5 mg twice daily and metoprolol succinate 50 mg once daily for atrial fibrillation thromboprophylaxis and rate control, respectively  - Continue atorvastatin 10 mg for primary prevention    Chart review time: 20  Visit time: 20  Total time spent: 20  >50% of this 40-minute visit were spent with the patient on in-person counseling and discussion regarding AF, T2DM, CAD risk stratification .      Amadeo Whipple [facility], [place]    Cardiovascular Division  Pager [phone]    CC  Patient Care Team:  Pricila Avila APRN CNP as PCP - General (Family Practice)  Amy Cooper, NESTOR as Registered Nurse  Edward Aguero MD as Assigned Endocrinology Provider  Alberto Zhang MD as Assigned Surgical Provider  Alberto Zhang MD as MD (Urology)  Karina Hoskins, RN as Specialty Care Coordinator (Urology)  Pricila Avila APRN CNP as Referring Physician (Internal Medicine - Pediatrics)  Amadeo Whipple MD as Assigned Heart and Vascular Provider  Pricila Avila APRN CNP as Assigned PCP  Major Slade MD as Assigned Musculoskeletal Provider

## 2021-11-15 ENCOUNTER — OFFICE VISIT (OUTPATIENT)
Dept: ENDOCRINOLOGY | Facility: CLINIC | Age: 71
End: 2021-11-15
Payer: COMMERCIAL

## 2021-11-15 VITALS
WEIGHT: 164.5 LBS | SYSTOLIC BLOOD PRESSURE: 130 MMHG | BODY MASS INDEX: 27.37 KG/M2 | OXYGEN SATURATION: 99 % | DIASTOLIC BLOOD PRESSURE: 83 MMHG | HEART RATE: 76 BPM

## 2021-11-15 DIAGNOSIS — E11.65 TYPE 2 DIABETES MELLITUS WITH HYPERGLYCEMIA, WITH LONG-TERM CURRENT USE OF INSULIN (H): ICD-10-CM

## 2021-11-15 DIAGNOSIS — Z79.4 TYPE 2 DIABETES MELLITUS WITH HYPERGLYCEMIA, WITH LONG-TERM CURRENT USE OF INSULIN (H): ICD-10-CM

## 2021-11-15 LAB — HBA1C MFR BLD: 7.6 % (ref 0–5.7)

## 2021-11-15 PROCEDURE — 99214 OFFICE O/P EST MOD 30 MIN: CPT | Performed by: INTERNAL MEDICINE

## 2021-11-15 PROCEDURE — 83036 HEMOGLOBIN GLYCOSYLATED A1C: CPT | Performed by: INTERNAL MEDICINE

## 2021-11-15 NOTE — PROGRESS NOTES
Chief complaint:  Latoya is a 71 year old female seen in follow up of type 1 Diabetes.  HISTORY OF PRESENT ILLNESS  Latoya is a 71-year-old retiree with a history of type 1 diabetes who has a visit today for a follow-up type 1 diabetes.  She was diagnosed with type 1 diabetes 2016 during routine physical where she was found to have high blood glucose levels.  At that time she was diagnosed and treated as a type II diabetic but it was later determined that she had positive TORRES antibodies and low C-peptide.      Her current regimen consist of Lantus 24 units AM, Novolog 1:18 g carbohydrate coverage with breakfast, lunch, and dinner, Novolog correction 1:50 if BG is >150 at meal time, Novolog correction 1:50 if BG is >200 at bedtime    A1c 7.6% today. Reviewed BG in the past 2 weeks, average glucose 206. Highest , lowest BG 45. She notices that her BGs have been high most of the time. She has not had frequent low BG anymore. She feels that her carb counting is quite accurate. She eats 3 meals per day with some snack at bedtime. She is not yet interested in Dexcom sensor.    Past Medical History  Type 1 diabetes, diagnosed 6/2016  Hyperlipidemia  Osteoporosis     Medications  Current Outpatient Medications   Medication Sig Dispense Refill     apixaban ANTICOAGULANT (ELIQUIS) 5 MG tablet Take 1 tablet (5 mg) by mouth 2 times daily 180 tablet 3     atorvastatin (LIPITOR) 10 MG tablet Take 1 tablet (10 mg) by mouth daily 90 tablet 3     blood glucose (ACCU-CHEK MICKY PLUS) test strip Use to test blood sugar 6 times daily 550 strip 3     blood glucose monitoring (ACCU-CHEK FASTCLIX) lancets USE TO TEST BLOOD SUGAR FOUR TIMES A DAY OR AS DIRECTED 306 each 3     calcium carbonate-vitamin D 500-400 MG-UNIT TABS tablt Take 1 tablet by mouth 2 times daily 180 tablet 3     COMPOUNDED NON-CONTROLLED SUBSTANCE (CMPD RX) - PHARMACY TO MIX COMPOUNDED MEDICATION Estriol 0.1% cream (1mg/gram) in HRT base. Place 1 gram vaginally  daily for 2 weeks, then vaginally twice weekly. 30 g 6     insulin aspart (NOVOLOG FLEXPEN) 100 UNIT/ML pen 1 unit per 15 gram of carb for breakfast and lunch, and 1 unit per 10 grams for dinner.Plus 1 unit per 50 mg/dl above 150. Total daily dose approx 30 units. (Patient taking differently: 1 unit per 15 gram of carb for all meals. Plus 1 unit per 50 mg/dl above 150. Total daily dose approx 30 units.) 30 mL 3     insulin glargine (BASAGLAR KWIKPEN) 100 UNIT/ML pen Inject 30 Units Subcutaneous daily Max, 30 units daily, #3 boxes/45mL, 2 refills. 45 mL 2     insulin pen needle (B-D U/F) 31G X 8 MM miscellaneous USE WITH NOVOLOG AND LANTUS(FIVE TIMES DAILY) 500 each 3     metoprolol succinate ER (TOPROL-XL) 50 MG 24 hr tablet Take 1 tablet (50 mg) by mouth daily 90 tablet 3     Multiple Vitamins-Minerals (MULTIVITAMIN ADULT PO) Take 1 tablet by mouth every morning        Urine Glucose-Ketones Test (KETO-DIASTIX) STRP 1 strip by In Vitro route as needed 1 each 11     APPLE CIDER VINEGAR PO Take by mouth every morning       CINNAMON PO Take by mouth every morning       insulin glargine (LANTUS SOLOSTAR) 100 UNIT/ML pen Inject 30 units everyday. Max: 30 units daily,, #3boxes/45ml. 2 refills. 45 mL 2     ondansetron (ZOFRAN-ODT) 4 MG ODT tab Take 1-2 tablets (4-8 mg) by mouth every 8 hours as needed for nausea 4 tablet 0     senna-docusate (SENOKOT-S/PERICOLACE) 8.6-50 MG tablet Take 1-2 tablets by mouth 2 times daily 30 tablet 0     Allergies  No Known Allergies    Family History  family history includes Brain Cancer in her maternal uncle; Breast Cancer in her mother; Coronary Artery Disease in her maternal uncle and maternal uncle; Hyperlipidemia in her mother; Leukemia in her father; Other Cancer in her father; Skin Cancer in her father; Stomach Cancer in her maternal aunt.  No thyroid disease or type 1 diabetes in the family    Social History  Social History     Socioeconomic History     Marital status: Single      "Spouse name: Not on file     Number of children: Not on file     Years of education: Not on file     Highest education level: Not on file   Occupational History     Not on file   Tobacco Use     Smoking status: Never Smoker     Smokeless tobacco: Never Used   Substance and Sexual Activity     Alcohol use: Yes     Comment: 1 to 2 beers or glasses of wine 1 to 2 times per month     Drug use: No     Sexual activity: Never   Other Topics Concern     Parent/sibling w/ CABG, MI or angioplasty before 65F 55M? No   Social History Narrative     Not on file     Social Determinants of Health     Financial Resource Strain: Not on file   Food Insecurity: Not on file   Transportation Needs: Not on file   Physical Activity: Not on file   Stress: Not on file   Social Connections: Not on file   Intimate Partner Violence: Not on file   Housing Stability: Not on file       REVIEW OF SYSTEMS  10 points ROS were negative otherwise mentioned in HPI    Physical Exam  Vital signs:   /83 (BP Location: Left arm, Patient Position: Chair, Cuff Size: Adult Regular)   Pulse 76   Wt 74.6 kg (164 lb 8 oz)   SpO2 99%   BMI 27.37 kg/m    Estimated body mass index is 27.37 kg/m  as calculated from the following:    Height as of 11/10/21: 1.651 m (5' 5\").    Weight as of this encounter: 74.6 kg (164 lb 8 oz).  Constitutional: no distress, comfortable, pleasant   Eyes: anicteric  Musculoskeletal: no edema   Skin:  no jaundice   Neurological: normal gait, intact sensation per monofilament bilaterally exam dated 7/1/2021, no tremor on outstretched hands bilaterally  Psychological: appropriate mood       DATA REVIEW    Lab Results   Component Value Date    A1C 7.6 11/15/2021    A1C 6.7 07/01/2021    A1C 8.4 02/10/2021    A1C 7.2 10/21/2020    A1C 7.8 07/20/2020     ENDO DIABETES Latest Ref Rng & Units 7/1/2021   CHOL <200 mg/dL 172   LDL <100 mg/dL 90   HDL >49 mg/dL 70   NHDL <130 mg/dL 102   TRIGLYCERIDES <150 mg/dL 58     ENDO DIABETES Latest " Ref Rng & Units 8/5/2021   CREATININE 0.52 - 1.04 mg/dL 0.76     ENDO DIABETES Latest Ref Rng & Units 7/1/2021   MICROL mg/L <5   UMALCR 0 - 25 mg/g Cr Unable to calculate due to low value     DXA 8/28/2020  Lumbar spine T-score in region of L2-L4 = -2.4   L1-4 percent change: Not significant%      HIPS:  Mean total hip T-score: -1  Mean total hip percent change: Not significant%      Left femoral neck T-score = -1.9  Right femoral neck T-score = -1.6      Radius 33% T-score = -1.6     FRAX:  10 year probability of major osteoporotic fracture: 11.3%  10 year probability of hip fracture: 2.1%  The 10 year probability of fracture may be lower than reported if the  patient has received treatment. FRAX data should be disregarded in patient's taking bisphosphonates.                                                                 IMPRESSION:    Low bone mass (osteopenia).    ASSESSMENT/PLAN:   Latoya is a 71-year-old retiree with a history of type 1 diabetes who is here for diabetes management.    1.  Type 1 diabetes: A1c 7.6 today  Latoya's blood glucose readings over the last 2 weeks were reviewed.  She has not had hypoglycemia anymore but most of her BGs were in higher range most of the time.   - discussed Dexcom continuous glucose monitor but she is not interested in it at this time    Recommendations:  -Coninue Lantus 24 units at bedtime  -Change Novolog 1:15g carb coverage with meals and snacks  -Continue 1:50 Novolog correction if over 150 at breakfast, lunch and dinner, continue 1:50 Novolog correction if BG is >200 at bedtime      2) DM complications:  Retinopathy: normal exam - no DR 6/2021  Nephropathy: negative urine microalbumin 7/2021   Neuropathy: none per exam 7/2021     3) Hyperlipidemia: , HDL 71, TG 56, total cholesterol 189 on 7/20/2020. Continue Lipitor 10 mg daily.     4) Osteopenia: DXA 2020 showed the lowest T-score of -1.9 at the left femoral neck. She follows up with PCP. Currently on  calcium and vitamin D    -Follow-up in 4 months     External notes/medical records independently reviewed, labs and imaging independently reviewed, medical management and tests to be discussed/communicated to patient.    Time: I spent 30 minutes spent on the date of the encounter preparing to see patient (including chart review and preparation), obtaining and or reviewing additional medical history, performing a physical exam and evaluation, documenting clinical information in the electronic health record, independently interpreting results, communicating results to the patient and coordinating care.    Edward Aguero MD  Division of Diabetes and Endocrinology  Department of Medicine  [phone]

## 2021-11-15 NOTE — LETTER
11/15/2021         RE: Latoya Rodríguez  [address] [facility] [address]        Dear Colleague,    Thank you for referring your patient, Latoya Rodríguez, to the [facility]. Please see a copy of my visit note below.    Outcome for 11/10/21 1:14 PM :[facility] message sent to the patient requesting blood sugar readings prior to visit.    Heike Adams [facility]  Adult Endocrinology  [facility]      Chief complaint:  Latoya is a 71 year old female seen in follow up of type 1 Diabetes.  HISTORY OF PRESENT ILLNESS  Latoya is a 71-year-old retiree with a history of type 1 diabetes who has a visit today for a follow-up type 1 diabetes.  She was diagnosed with type 1 diabetes 2016 during routine physical where she was found to have high blood glucose levels.  At that time she was diagnosed and treated as a type II diabetic but it was later determined that she had positive TORRES antibodies and low C-peptide.      Her current regimen consist of Lantus 24 units AM, Novolog 1:18 g carbohydrate coverage with breakfast, lunch, and dinner, Novolog correction 1:50 if BG is >150 at meal time, Novolog correction 1:50 if BG is >200 at bedtime    A1c 7.6% today. Reviewed BG in the past 2 weeks, average glucose 206. Highest , lowest BG 45. She notices that her BGs have been high most of the time. She has not had frequent low BG anymore. She feels that her carb counting is quite accurate. She eats 3 meals per day with some snack at bedtime. She is not yet interested in Dexcom sensor.    Past Medical History  Type 1 diabetes, diagnosed 6/2016  Hyperlipidemia  Osteoporosis     Medications  Current Outpatient Medications   Medication Sig Dispense Refill     apixaban ANTICOAGULANT (ELIQUIS) 5 MG tablet Take 1 tablet (5 mg) by mouth 2 times daily 180 tablet 3     atorvastatin (LIPITOR) 10 MG tablet Take 1 tablet (10 mg) by mouth daily 90 tablet 3     blood glucose  (ACCU-CHEK MICKY PLUS) test strip Use to test blood sugar 6 times daily 550 strip 3     blood glucose monitoring (ACCU-CHEK FASTCLIX) lancets USE TO TEST BLOOD SUGAR FOUR TIMES A DAY OR AS DIRECTED 306 each 3     calcium carbonate-vitamin D 500-400 MG-UNIT TABS tablt Take 1 tablet by mouth 2 times daily 180 tablet 3     COMPOUNDED NON-CONTROLLED SUBSTANCE (CMPD RX) - PHARMACY TO MIX COMPOUNDED MEDICATION Estriol 0.1% cream (1mg/gram) in HRT base. Place 1 gram vaginally daily for 2 weeks, then vaginally twice weekly. 30 g 6     insulin aspart (NOVOLOG FLEXPEN) 100 UNIT/ML pen 1 unit per 15 gram of carb for breakfast and lunch, and 1 unit per 10 grams for dinner.Plus 1 unit per 50 mg/dl above 150. Total daily dose approx 30 units. (Patient taking differently: 1 unit per 15 gram of carb for all meals. Plus 1 unit per 50 mg/dl above 150. Total daily dose approx 30 units.) 30 mL 3     insulin glargine (BASAGLAR KWIKPEN) 100 UNIT/ML pen Inject 30 Units Subcutaneous daily Max, 30 units daily, #3 boxes/45mL, 2 refills. 45 mL 2     insulin pen needle (B-D U/F) 31G X 8 MM miscellaneous USE WITH NOVOLOG AND LANTUS(FIVE TIMES DAILY) 500 each 3     metoprolol succinate ER (TOPROL-XL) 50 MG 24 hr tablet Take 1 tablet (50 mg) by mouth daily 90 tablet 3     Multiple Vitamins-Minerals (MULTIVITAMIN ADULT PO) Take 1 tablet by mouth every morning        Urine Glucose-Ketones Test (KETO-DIASTIX) STRP 1 strip by In Vitro route as needed 1 each 11     APPLE CIDER VINEGAR PO Take by mouth every morning       CINNAMON PO Take by mouth every morning       insulin glargine (LANTUS SOLOSTAR) 100 UNIT/ML pen Inject 30 units everyday. Max: 30 units daily,, #3boxes/45ml. 2 refills. 45 mL 2     ondansetron (ZOFRAN-ODT) 4 MG ODT tab Take 1-2 tablets (4-8 mg) by mouth every 8 hours as needed for nausea 4 tablet 0     senna-docusate (SENOKOT-S/PERICOLACE) 8.6-50 MG tablet Take 1-2 tablets by mouth 2 times daily 30 tablet 0     Allergies  No Known  "Allergies    Family History  family history includes Brain Cancer in her maternal uncle; Breast Cancer in her mother; Coronary Artery Disease in her maternal uncle and maternal uncle; Hyperlipidemia in her mother; Leukemia in her father; Other Cancer in her father; Skin Cancer in her father; Stomach Cancer in her maternal aunt.  No thyroid disease or type 1 diabetes in the family    Social History  Social History     Socioeconomic History     Marital status: Single     Spouse name: Not on file     Number of children: Not on file     Years of education: Not on file     Highest education level: Not on file   Occupational History     Not on file   Tobacco Use     Smoking status: Never Smoker     Smokeless tobacco: Never Used   Substance and Sexual Activity     Alcohol use: Yes     Comment: 1 to 2 beers or glasses of wine 1 to 2 times per month     Drug use: No     Sexual activity: Never   Other Topics Concern     Parent/sibling w/ CABG, MI or angioplasty before 65F 55M? No   Social History Narrative     Not on file     Social Determinants of Health     Financial Resource Strain: Not on file   Food Insecurity: Not on file   Transportation Needs: Not on file   Physical Activity: Not on file   Stress: Not on file   Social Connections: Not on file   Intimate Partner Violence: Not on file   Housing Stability: Not on file       REVIEW OF SYSTEMS  10 points ROS were negative otherwise mentioned in HPI    Physical Exam  Vital signs:   /83 (BP Location: Left arm, Patient Position: Chair, Cuff Size: Adult Regular)   Pulse 76   Wt 74.6 kg (164 lb 8 oz)   SpO2 99%   BMI 27.37 kg/m    Estimated body mass index is 27.37 kg/m  as calculated from the following:    Height as of 11/10/21: 1.651 m (5' 5\").    Weight as of this encounter: 74.6 kg (164 lb 8 oz).  Constitutional: no distress, comfortable, pleasant   Eyes: anicteric  Musculoskeletal: no edema   Skin:  no jaundice   Neurological: normal gait, intact sensation per " monofilament bilaterally exam dated 7/1/2021, no tremor on outstretched hands bilaterally  Psychological: appropriate mood       DATA REVIEW    Lab Results   Component Value Date    A1C 7.6 11/15/2021    A1C 6.7 07/01/2021    A1C 8.4 02/10/2021    A1C 7.2 10/21/2020    A1C 7.8 07/20/2020     ENDO DIABETES Latest Ref Rng & Units 7/1/2021   CHOL <200 mg/dL 172   LDL <100 mg/dL 90   HDL >49 mg/dL 70   NHDL <130 mg/dL 102   TRIGLYCERIDES <150 mg/dL 58     ENDO DIABETES Latest Ref Rng & Units 8/5/2021   CREATININE 0.52 - 1.04 mg/dL 0.76     ENDO DIABETES Latest Ref Rng & Units 7/1/2021   MICROL mg/L <5   UMALCR 0 - 25 mg/g Cr Unable to calculate due to low value     DXA 8/28/2020  Lumbar spine T-score in region of L2-L4 = -2.4   L1-4 percent change: Not significant%      HIPS:  Mean total hip T-score: -1  Mean total hip percent change: Not significant%      Left femoral neck T-score = -1.9  Right femoral neck T-score = -1.6      Radius 33% T-score = -1.6     FRAX:  10 year probability of major osteoporotic fracture: 11.3%  10 year probability of hip fracture: 2.1%  The 10 year probability of fracture may be lower than reported if the  patient has received treatment. FRAX data should be disregarded in patient's taking bisphosphonates.                                                                 IMPRESSION:    Low bone mass (osteopenia).    ASSESSMENT/PLAN:   Latoya is a 71-year-old retiree with a history of type 1 diabetes who is here for diabetes management.    1.  Type 1 diabetes: A1c 7.6 today  Latoya's blood glucose readings over the last 2 weeks were reviewed.  She has not had hypoglycemia anymore but most of her BGs were in higher range most of the time.   - discussed Dexcom continuous glucose monitor but she is not interested in it at this time    Recommendations:  -Coninue Lantus 24 units at bedtime  -Change Novolog 1:15g carb coverage with meals and snacks  -Continue 1:50 Novolog correction if over 150 at  breakfast, lunch and dinner, continue 1:50 Novolog correction if BG is >200 at bedtime      2) DM complications:  Retinopathy: normal exam - no DR 6/2021  Nephropathy: negative urine microalbumin 7/2021   Neuropathy: none per exam 7/2021     3) Hyperlipidemia: , HDL 71, TG 56, total cholesterol 189 on 7/20/2020. Continue Lipitor 10 mg daily.     4) Osteopenia: DXA 2020 showed the lowest T-score of -1.9 at the left femoral neck. She follows up with PCP. Currently on calcium and vitamin D    -Follow-up in 4 months     External notes/medical records independently reviewed, labs and imaging independently reviewed, medical management and tests to be discussed/communicated to patient.    Time: I spent 30 minutes spent on the date of the encounter preparing to see patient (including chart review and preparation), obtaining and or reviewing additional medical history, performing a physical exam and evaluation, documenting clinical information in the electronic health record, independently interpreting results, communicating results to the patient and coordinating care.    Edward Aguero MD  Division of Diabetes and Endocrinology  Department of Medicine  [phone]              Again, thank you for allowing me to participate in the care of your patient.        Sincerely,        Edward Aguero MD

## 2021-11-15 NOTE — NURSING NOTE
Latoya Rodríguez's goals for this visit include:   Chief Complaint   Patient presents with     Diabetes       She requests these members of her care team be copied on today's visit information: yes     PCP: Pricila Avila    Referring Provider:  No referring provider defined for this encounter.    /83 (BP Location: Left arm, Patient Position: Chair, Cuff Size: Adult Regular)   Pulse 76   Wt 74.6 kg (164 lb 8 oz)   SpO2 99%   BMI 27.37 kg/m      Do you need any medication refills at today's visit? no

## 2021-11-15 NOTE — PATIENT INSTRUCTIONS
[facility]  Linda Diego RN, Diabetes Educator: [phone]  Clinic Nurses NESTOR Mckenna; CMA's: Venu Burroughs Yang Mee   Clinic Fax: [phone]  On-Call Endocrine at the [place] (after hours/weekends): [phone] option 4  Scheduling Line: [phone]     Appointment Reminders:  * Please bring meter with for staff to download  * If you are due ONLY for an A1C, it is scheduled with the nurse and will be done in clinic. You do not need to schedule a lab appointment. Fasting is not required for an A1C.  * Refill request should be submitted to your pharmacy. They will contact clinic for approval.

## 2021-11-17 ENCOUNTER — HOSPITAL ENCOUNTER (OUTPATIENT)
Dept: CT IMAGING | Facility: CLINIC | Age: 71
End: 2021-11-17
Attending: STUDENT IN AN ORGANIZED HEALTH CARE EDUCATION/TRAINING PROGRAM
Payer: COMMERCIAL

## 2021-11-17 VITALS — RESPIRATION RATE: 16 BRPM | SYSTOLIC BLOOD PRESSURE: 115 MMHG | HEART RATE: 100 BPM | DIASTOLIC BLOOD PRESSURE: 88 MMHG

## 2021-11-17 DIAGNOSIS — I48.20 CHRONIC ATRIAL FIBRILLATION (H): ICD-10-CM

## 2021-11-17 DIAGNOSIS — E11.65 TYPE 2 DIABETES MELLITUS WITH HYPERGLYCEMIA, WITH LONG-TERM CURRENT USE OF INSULIN (H): ICD-10-CM

## 2021-11-17 DIAGNOSIS — Z79.4 TYPE 2 DIABETES MELLITUS WITH HYPERGLYCEMIA, WITH LONG-TERM CURRENT USE OF INSULIN (H): ICD-10-CM

## 2021-11-17 LAB
CREAT BLD-MCNC: 0.8 MG/DL (ref 0.5–1)
GFR SERPL CREATININE-BSD FRML MDRD: >60 ML/MIN/1.73M2

## 2021-11-17 PROCEDURE — 250N000011 HC RX IP 250 OP 636: Performed by: STUDENT IN AN ORGANIZED HEALTH CARE EDUCATION/TRAINING PROGRAM

## 2021-11-17 PROCEDURE — 250N000013 HC RX MED GY IP 250 OP 250 PS 637: Performed by: STUDENT IN AN ORGANIZED HEALTH CARE EDUCATION/TRAINING PROGRAM

## 2021-11-17 PROCEDURE — 250N000009 HC RX 250: Performed by: STUDENT IN AN ORGANIZED HEALTH CARE EDUCATION/TRAINING PROGRAM

## 2021-11-17 PROCEDURE — 75574 CT ANGIO HRT W/3D IMAGE: CPT | Mod: 26 | Performed by: STUDENT IN AN ORGANIZED HEALTH CARE EDUCATION/TRAINING PROGRAM

## 2021-11-17 PROCEDURE — 75574 CT ANGIO HRT W/3D IMAGE: CPT

## 2021-11-17 PROCEDURE — 82565 ASSAY OF CREATININE: CPT

## 2021-11-17 RX ORDER — DIPHENHYDRAMINE HYDROCHLORIDE 50 MG/ML
25-50 INJECTION INTRAMUSCULAR; INTRAVENOUS
Status: DISCONTINUED | OUTPATIENT
Start: 2021-11-17 | End: 2021-11-18 | Stop reason: HOSPADM

## 2021-11-17 RX ORDER — METOPROLOL TARTRATE 50 MG
50 TABLET ORAL ONCE
Status: COMPLETED | OUTPATIENT
Start: 2021-11-17 | End: 2021-11-17

## 2021-11-17 RX ORDER — ACYCLOVIR 200 MG/1
0-1 CAPSULE ORAL
Status: DISCONTINUED | OUTPATIENT
Start: 2021-11-17 | End: 2021-11-18 | Stop reason: HOSPADM

## 2021-11-17 RX ORDER — DIPHENHYDRAMINE HCL 25 MG
25 CAPSULE ORAL
Status: DISCONTINUED | OUTPATIENT
Start: 2021-11-17 | End: 2021-11-18 | Stop reason: HOSPADM

## 2021-11-17 RX ORDER — IOPAMIDOL 755 MG/ML
120 INJECTION, SOLUTION INTRAVASCULAR ONCE
Status: COMPLETED | OUTPATIENT
Start: 2021-11-17 | End: 2021-11-17

## 2021-11-17 RX ORDER — METOPROLOL TARTRATE 1 MG/ML
5-15 INJECTION, SOLUTION INTRAVENOUS
Status: DISCONTINUED | OUTPATIENT
Start: 2021-11-17 | End: 2021-11-18 | Stop reason: HOSPADM

## 2021-11-17 RX ORDER — ONDANSETRON 2 MG/ML
4 INJECTION INTRAMUSCULAR; INTRAVENOUS
Status: DISCONTINUED | OUTPATIENT
Start: 2021-11-17 | End: 2021-11-18 | Stop reason: HOSPADM

## 2021-11-17 RX ORDER — NITROGLYCERIN 0.4 MG/1
.4-.8 TABLET SUBLINGUAL
Status: DISCONTINUED | OUTPATIENT
Start: 2021-11-17 | End: 2021-11-18 | Stop reason: HOSPADM

## 2021-11-17 RX ORDER — IVABRADINE 5 MG/1
5-15 TABLET, FILM COATED ORAL
Status: COMPLETED | OUTPATIENT
Start: 2021-11-17 | End: 2021-11-17

## 2021-11-17 RX ORDER — METHYLPREDNISOLONE SODIUM SUCCINATE 125 MG/2ML
125 INJECTION, POWDER, LYOPHILIZED, FOR SOLUTION INTRAMUSCULAR; INTRAVENOUS
Status: DISCONTINUED | OUTPATIENT
Start: 2021-11-17 | End: 2021-11-18 | Stop reason: HOSPADM

## 2021-11-17 RX ORDER — METOPROLOL TARTRATE 25 MG/1
25-100 TABLET, FILM COATED ORAL
Status: COMPLETED | OUTPATIENT
Start: 2021-11-17 | End: 2021-11-17

## 2021-11-17 RX ADMIN — METOPROLOL TARTRATE 10 MG: 5 INJECTION INTRAVENOUS at 15:07

## 2021-11-17 RX ADMIN — METOPROLOL TARTRATE 100 MG: 100 TABLET, FILM COATED ORAL at 13:45

## 2021-11-17 RX ADMIN — IVABRADINE 15 MG: 5 TABLET, FILM COATED ORAL at 13:46

## 2021-11-17 RX ADMIN — NITROGLYCERIN 0.8 MG: 0.4 TABLET SUBLINGUAL at 15:21

## 2021-11-17 RX ADMIN — METOPROLOL TARTRATE 10 MG: 5 INJECTION INTRAVENOUS at 15:16

## 2021-11-17 RX ADMIN — METOPROLOL TARTRATE 50 MG: 50 TABLET, FILM COATED ORAL at 14:38

## 2021-11-17 RX ADMIN — IOPAMIDOL 120 ML: 755 INJECTION, SOLUTION INTRAVENOUS at 15:13

## 2021-11-22 ENCOUNTER — TELEPHONE (OUTPATIENT)
Dept: ENDOCRINOLOGY | Facility: CLINIC | Age: 71
End: 2021-11-22
Payer: COMMERCIAL

## 2021-11-22 NOTE — TELEPHONE ENCOUNTER
FYI~ A refill request has been made to the  assistance programs for Basaglar pens, Novolog pens & needles    A 60 day supply of Basaglar pens, Novolog pens & needles will be delivered to Latoya's home within 7-10 business days.    Jennifer Suarez  Prescription   Pharmacy Assistance  [phone]

## 2021-11-30 ENCOUNTER — ANCILLARY PROCEDURE (OUTPATIENT)
Dept: CARDIOLOGY | Facility: CLINIC | Age: 71
End: 2021-11-30
Attending: STUDENT IN AN ORGANIZED HEALTH CARE EDUCATION/TRAINING PROGRAM
Payer: COMMERCIAL

## 2021-11-30 DIAGNOSIS — I48.20 CHRONIC ATRIAL FIBRILLATION (H): ICD-10-CM

## 2021-11-30 LAB — LVEF ECHO: NORMAL

## 2021-11-30 PROCEDURE — 93306 TTE W/DOPPLER COMPLETE: CPT | Performed by: INTERNAL MEDICINE

## 2021-12-03 NOTE — ANESTHESIA POSTPROCEDURE EVALUATION
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    Anesthesia Type:  MAC    Note:  Disposition: Outpatient   Postop Pain Control: Uneventful            Sign Out: Well controlled pain   PONV: No   Neuro/Psych: Uneventful            Sign Out: Acceptable/Baseline neuro status   Airway/Respiratory: Uneventful            Sign Out: Acceptable/Baseline resp. status   CV/Hemodynamics:             Events: Arrhythmia            Sign Out: Acceptable CV status; No obvious hypovolemia; No obvious fluid overload   Other NRE: NONE   DID A NON-ROUTINE EVENT OCCUR? YES    Event details/Postop Comments:  Aflutter seen intraop and on post op EKG. Rec patient to go to cardiology as outpatient.  Stable rate and blood pressure, asymptomatic.            Last vitals:  Vitals:    05/11/21 0742 05/11/21 0947 05/11/21 1000   BP: (!) 138/92 102/71    Pulse: 103     Resp: 16 16 16   Temp: 36.2  C (97.1  F) 36.2  C (97.1  F) 36.1  C (97  F)   SpO2: 99% 98% 100%       Last vitals prior to Anesthesia Care Transfer:  CRNA VITALS  5/11/2021 0911 - 5/11/2021 1011      5/11/2021             Resp Rate (set):  10          Electronically Signed By: Harsh More MD, MD  May 11, 2021  10:24 AM  
No change

## 2021-12-10 DIAGNOSIS — E10.9 TYPE I DIABETES MELLITUS, WELL CONTROLLED (H): ICD-10-CM

## 2021-12-11 RX ORDER — PEN NEEDLE, DIABETIC 31 GX5/16"
NEEDLE, DISPOSABLE MISCELLANEOUS
Qty: 500 EACH | Refills: 3 | Status: SHIPPED | OUTPATIENT
Start: 2021-12-11 | End: 2023-01-09

## 2021-12-11 NOTE — TELEPHONE ENCOUNTER
Pen needle    11/15/2021  [facility]     Edward Aguero MD    Endocrinology, Diabetes, and Metabolism

## 2022-01-04 ENCOUNTER — TELEPHONE (OUTPATIENT)
Dept: CARDIOLOGY | Facility: CLINIC | Age: 72
End: 2022-01-04
Payer: COMMERCIAL

## 2022-01-04 NOTE — TELEPHONE ENCOUNTER
[facility] Call Center    Phone Message    May a detailed message be left on voicemail: yes     Reason for Call: Pt said that she's completing a form and she needs Dr. Whipple's NPI #.  Please call her back to let her know what it is.  Thanks.    Action Taken: Message routed to:  Adult Clinics: Cardiology p [phone]    Travel Screening: Not Applicable

## 2022-01-05 NOTE — TELEPHONE ENCOUNTER
Patient is filling out medicare part D prescription drug claim form, for coverage for drugs that were denied for procedures. NPI number given to patient.     Qi Villasenor RN   Cardiology Care Coordinator  [facility]   Phone: [phone]  Fax: [phone]

## 2022-01-30 DIAGNOSIS — E10.9 TYPE 1 DIABETES MELLITUS WITHOUT COMPLICATION (H): ICD-10-CM

## 2022-01-31 RX ORDER — LANCETS
EACH MISCELLANEOUS
Qty: 306 EACH | Refills: 3 | Status: SHIPPED | OUTPATIENT
Start: 2022-01-31 | End: 2023-03-27

## 2022-01-31 NOTE — TELEPHONE ENCOUNTER
Lancets    11/15/2021  [facility]     Edward Aguero MD    Endocrinology, Diabetes, and Metabolism

## 2022-02-28 ENCOUNTER — VIRTUAL VISIT (OUTPATIENT)
Dept: UROLOGY | Facility: CLINIC | Age: 72
End: 2022-02-28
Payer: COMMERCIAL

## 2022-02-28 DIAGNOSIS — N95.2 ATROPHIC VAGINITIS: ICD-10-CM

## 2022-02-28 DIAGNOSIS — R35.0 FREQUENCY OF URINATION: ICD-10-CM

## 2022-02-28 DIAGNOSIS — R39.15 URINARY URGENCY: Primary | ICD-10-CM

## 2022-02-28 DIAGNOSIS — N39.41 URGE INCONTINENCE: ICD-10-CM

## 2022-02-28 PROCEDURE — 99215 OFFICE O/P EST HI 40 MIN: CPT | Mod: 95 | Performed by: UROLOGY

## 2022-02-28 RX ORDER — ESTRADIOL 0.1 MG/G
2 CREAM VAGINAL
Qty: 42.5 G | Refills: 11 | Status: SHIPPED | OUTPATIENT
Start: 2022-02-28 | End: 2023-03-27

## 2022-02-28 NOTE — PROGRESS NOTES
Latoya Rodríguez  who is being evaluated via a billable video visit.      How would you like to obtain your AVS? MyChart  If the video visit is dropped, the invitation should be resent by: Text to cell phone: [phone]  Will anyone else be joining your video visit? No    Video-Visit Details    Type of service:  Video Visit    Video Start Time: 2:08 PM    Video End Time:2:24 PM    Originating Location (pt. Location): Home    Distant Location (provider location):  [facility]     Platform used for Video Visit: Bell[employer]at

## 2022-02-28 NOTE — PROGRESS NOTES
Reason for visit: F/u on therapy for urgency and frequency and urinary incontinence.  Clinical Data: Ms. Rodríguez is a 70 y/o female with the above. She was started on oxybutynin 15 mg xl as well as estrace cream and referred to pelvic floor PT. She did better on the anticholinergic however continued to have a lot of urgency and some urge incontinence. She was then switched to Detrol which did not work, then Sanctura which did but was too expensive and tried Vesicare but then Toviaz was less expensive and using Myrbetriq and that was working better than anything else but then it was not.  She would go for a walk with her dog and after an hour she would start to leak and after 1.5 hours she would just gush.  Another time that she will gush is when she sits up from a seated position.  This was confirmed by UDS.    She then underwent periurethral bulking for ISD on 5/11/21 and notes that her urinary frequency and incontinence have improved but she continues to have a little leakage associated with urgency but not as bad as the previous gushes.    HgA1 C is 7.2 but on a daily basis her sugars fluctuate quite a bit.  Of note she has been using her estrogen cream.    UDS on 2/24/21 reviewed:  -Normal bladder capacity (635 mL) with slightly delayed filling sensations (FS: 328 mL, FD: 333 mL, SD: 452 mL).  -Good bladder compliance.  -No DO or stress urinary incontinence while seated despite max Pabd 99 cm H2O at a volume of 349 mL.  -Patient is then asked to stand. There is no incontinence with the motion of standing. However, after a few seconds of standing in place, she then starts to experience mild terminal DO with incontinence when Pdet reaches 4.4 cm H2O. This leakage starts as just a trickle but quickly progresses to more of a gush at which point she is given permission to void.  -Good detrusor contraction during voiding to max Pdet 43 cm H2O with a brisk flow rate (Qmax 32 ml/s), bell-shape flow curve, and complete  bladder emptying (PVR 0 mL).  -Mildly increased EMG activity during voiding.  -No evidence for bladder outlet obstruction.  -Fluoroscopy reveals a mildly trabeculated bladder wall without diverticuli or VUR. The bladder neck is closed during filling. Voiding images unavailable as patient transferred to [facility] to void.    ASSESSMENT and PLAN: This is a 71 year old female with urinary urgency, frequency and urge incontinence as well as a possible stress incontinence component.   She was helped to some degree with the periurthral bulking but she continues to have urinary urgency and is frustrated.  We discussed PTNS, SNS, and botox as options in detail.  She is interested in SNS we discussed the different company options of rechargeable and non-rechargeable and she is interested in the [employer] device and will make a decision about rechargeable vs. Non afterwards.  -continue estrogen cream   -schedule PNE with [employer]ics.    Thank you for allowing me to participate in the care of Ms. Latoya Rodírguez and I will keep you updated on her progress.   Alberto Zhang MD     46 minutes spent on the date of the encounter doing chart review, history and exam, documentation and further activities per the note

## 2022-03-02 ENCOUNTER — TELEPHONE (OUTPATIENT)
Dept: ENDOCRINOLOGY | Facility: CLINIC | Age: 72
End: 2022-03-02
Payer: COMMERCIAL

## 2022-03-02 NOTE — TELEPHONE ENCOUNTER
PA Initiation    Medication: Accu Chek Shayla Strips - PA Pending  Insurance Company: [employer] - Phone [phone] Fax [phone]  Pharmacy Filling the Rx:    Filling Pharmacy Phone:    Filling Pharmacy Fax:    Start Date: 3/2/2022

## 2022-03-02 NOTE — TELEPHONE ENCOUNTER
Writer contacted patient to review. Patient will need prior authorization as she did last year (see 1/12/21 telephone encounter for details).         Writer will send PA request to diabetes liaison pharmacy team.      Clarisa Nunez RN  Endocrine Care Coordinator  [facility]

## 2022-03-03 NOTE — TELEPHONE ENCOUNTER
Prior Authorization Approval    Authorization Effective Date: 1/31/2022  Authorization Expiration Date: 3/3/2023  Medication: Accu Chek Shayla Strips - PA Approved  Approved Dose/Quantity: 1 month  Reference #: [id number] [id number]   Insurance Company: [employer] - Phone [phone] Fax [phone]  Expected CoPay:       CoPay Card Available:      Foundation Assistance Needed:    Which Pharmacy is filling the prescription (Not needed for infusion/clinic administered):    Pharmacy Notified:    Patient Notified:

## 2022-03-07 ENCOUNTER — MYC MEDICAL ADVICE (OUTPATIENT)
Dept: UROLOGY | Facility: CLINIC | Age: 72
End: 2022-03-07
Payer: COMMERCIAL

## 2022-03-07 NOTE — CONFIDENTIAL NOTE
Financial Counselor Review:    Procedure to be performed (include CPT code):  19015 percutaneous sacral nerve wire  42898-38 bilateral percutaneous sacral nerve wire  28952 Fluoroscopy    Diagnosis code (include ICD-10 code): urinary urgency R39.15, urge incontinence N39.41, and frequency of urination R35.0    Coverage and patient financial responsibility information:YES    Does patient need to be contacted by Financial Counselor:YES    Please send updates to [facility] [place].    Jess Hadley RN, BSN

## 2022-03-08 NOTE — TELEPHONE ENCOUNTER
PB DOS: TBD  Type of Procedure: SNS  CPT Codes: 24975 percutaneous sacral nerve wire  65494-26 bilateral percutaneous sacral nerve wire  92768 Fluoroscopy  ICD10 Codes: Urinary urgency -R39.15, N39.41, R35.0, N95.2    Surgeon/Ordering provider: Dr. Wallace, [id number]  Pre-cert/Authorization completed:  No PA Required  Payer: [employer]  Spoke to [facility] PA list and medicare CMS  Ref. # / Auth #   Valid Dates:     Please advise the patient to contact their insurance for benefits.

## 2022-03-15 ENCOUNTER — TELEPHONE (OUTPATIENT)
Dept: UROLOGY | Facility: CLINIC | Age: 72
End: 2022-03-15
Payer: COMMERCIAL

## 2022-03-15 DIAGNOSIS — Z11.59 ENCOUNTER FOR SCREENING FOR OTHER VIRAL DISEASES: Primary | ICD-10-CM

## 2022-03-15 NOTE — TELEPHONE ENCOUNTER
Patient is scheduled for procedure with Dr. Zhang     Spoke with: patient via phone      Date of Surgery: Tuesday April 26, 2022     Location: [facility] OR      Informed patient they will need an adult : Yes     Pre-op: Yes      H&P: Patient to schedule with PCP      Pre-procedure COVID-19 Test: Friday April 22, 2022 at [facility]     Post-op: 1 week     Additional imaging/appointments:     Additional comments:      Surgery packet: To be sent via mail 3/21/22    Patient is aware that surgery time is tentative to change and to expect a call 3-1 business days from Pre Admission Nursing for instructions and arrival time

## 2022-03-17 ENCOUNTER — TELEPHONE (OUTPATIENT)
Dept: UROLOGY | Facility: CLINIC | Age: 72
End: 2022-03-17
Payer: COMMERCIAL

## 2022-03-17 NOTE — TELEPHONE ENCOUNTER
[4:02 PM] Jess Hadley, for MRN [id number] you could do a video visit with Dr. Zhang on 5/2/22 at 12:45pm with a urology nurse visit in [place] on 5/2 at 9 or 10am?

## 2022-03-17 NOTE — TELEPHONE ENCOUNTER
----- Message from Lois Romo sent at 3/15/2022  8:28 AM CDT -----  Hey,    Can you schedule a 1 week post op at [place] in clinic please. If Vicente is not available, you can do 1 week nurse visit for lead removal and a video visit with Vicente her next available. You don't have to call the patient, she is already aware. DOS 4/26/22.     Thank you!

## 2022-03-30 ENCOUNTER — OFFICE VISIT (OUTPATIENT)
Dept: CARDIOLOGY | Facility: CLINIC | Age: 72
End: 2022-03-30
Payer: COMMERCIAL

## 2022-03-30 VITALS
WEIGHT: 169.6 LBS | HEART RATE: 101 BPM | BODY MASS INDEX: 28.22 KG/M2 | OXYGEN SATURATION: 98 % | DIASTOLIC BLOOD PRESSURE: 81 MMHG | SYSTOLIC BLOOD PRESSURE: 125 MMHG

## 2022-03-30 DIAGNOSIS — Z79.4 TYPE 2 DIABETES MELLITUS WITH HYPERGLYCEMIA, WITH LONG-TERM CURRENT USE OF INSULIN (H): ICD-10-CM

## 2022-03-30 DIAGNOSIS — E11.65 TYPE 2 DIABETES MELLITUS WITH HYPERGLYCEMIA, WITH LONG-TERM CURRENT USE OF INSULIN (H): ICD-10-CM

## 2022-03-30 DIAGNOSIS — I48.20 CHRONIC ATRIAL FIBRILLATION (H): Primary | ICD-10-CM

## 2022-03-30 PROCEDURE — 99215 OFFICE O/P EST HI 40 MIN: CPT | Performed by: STUDENT IN AN ORGANIZED HEALTH CARE EDUCATION/TRAINING PROGRAM

## 2022-03-30 NOTE — PROGRESS NOTES
Chief Complaint: Mala-operative atrial flutter    HPI: Latoya Rodríguez is a 70 year old female with a past medical history of type II diabetes mellitus who was referred to me for evaluation of mala-operative atrial flutter by the [facility] Anesthesia team.     Briefly, Mrs. Rodríguez underwent cystourethroscopy on 05/11/2021 under MAC. She was noted to have mala-operative atrial flutter. This was documented on an EKG on 05/11, as noted below. She reverted to NSR without any intervention from what it seems. Her procedure was completed uneventfully. She was referred to me for further evaluation.    At the time of the consultation, she notes an absence of chest pain at rest, dyspnea at rest or with exertion, PND, orthopnea, peripheral edema, palpitations, lightheadedness or syncope. She has never had any symptoms suggestive of atrial flutter, such as exertional dyspnea or dyspnea at rest. Mrs. Rodríguez walks 4-5 miles daily.     A comprehensive ROS was done and the details are included above in the HPI.    Interval History 11/10/2021:  Since her last visit, Mrs. Rodríguez underwent DCCV. She had three attempts that were unsuccessful. Since then, she notes complete absence of symptoms consistent with atrial fibrillation, such as palpitations, chest pain, exertional dyspnea, or fatigue.  She has continued to take her apixaban in uninterrupted fashion without any sequelae of bleeding.    A comprehensive ROS was done and the details are included above in the HPI.    Interval History 03/30/2022:  Since her last visit, Ms. Rodríguez notes that she has been well from a cardiovascular standpoint.  She has had no sequelae of bleeding and has had no palpitations or change in her exercise tolerance.  Her chief complaint is that she has had significant pain related to herniated lumbar intervertebral disc.    A comprehensive ROS was done and the details are included above in the HPI.      Past Medical History:  - T2DM, IDDM  - Atrial  fibrillation, longstanding persistent  - Aortic plaque (noted on coronary CTA in 2021)  - No prior history of hypertension, dyslipidemia, CAD, TIA/stroke, vascular aneurysms, PAD  Past Medical History:   Diagnosis Date     Cataract      Type 2 diabetes mellitus with hyperglycemia (H) 6/24/2016       Past Surgical History:    Past Surgical History:   Procedure Laterality Date     ANESTHESIA CARDIOVERSION N/A 9/23/2021    Procedure: ANESTHESIA, FOR CARDIOVERSION@1100;  Surgeon: GENERIC ANESTHESIA PROVIDER;  Location: [facility] OR     CYSTOSCOPY, INJECT COLLAGEN, COMBINED N/A 5/11/2021    Procedure: CYSTOSCOPY, WITH PERIURETHRAL BULKING AGENT INJECTION;  Surgeon: Alberto Zhang MD;  Location: [facility] OR     EYE SURGERY  9/04, 5/11    Retinal detachment, Cataract (both eyes)     RELEASE CARPAL TUNNEL Right 8/6/2021    Procedure: RELEASE, CARPAL TUNNEL, Right;  Surgeon: RADHA Sandoval MD;  Location: [facility] OR     RELEASE TRIGGER FINGER Bilateral 5/10/2019    Procedure: RELEASE TRIGGER FINGER, BILATERAL LONG AND RING FINGERS;  Surgeon: RADHA Sandoval MD;  Location: [facility] OR     VASCULAR SURGERY      Varicose Veins       Drug History:  Home cardiac meds: Apixaban 5 mg twice daily, atorvastatin 10 mg q24h, metoprolol succinate 50 mg q24h.  Current Outpatient Medications   Medication Sig Dispense Refill     apixaban ANTICOAGULANT (ELIQUIS) 5 MG tablet Take 1 tablet (5 mg) by mouth 2 times daily 180 tablet 3     atorvastatin (LIPITOR) 10 MG tablet Take 1 tablet (10 mg) by mouth daily 90 tablet 3     blood glucose (ACCU-CHEK MICKY PLUS) test strip Use to test blood sugar 6 times daily 550 strip 3     blood glucose monitoring (ACCU-CHEK FASTCLIX) lancets USE TO TEST BLOOD SUGAR FOUR TIMES A DAY OR AS DIRECTED 306 each 3     calcium carbonate-vitamin D 500-400 MG-UNIT TABS tablt Take 1 tablet by mouth 2 times daily 180 tablet 3     estradiol (ESTRACE) 0.1 MG/GM vaginal cream Place 2 g vaginally twice a week 42.5 g 11      insulin aspart (NOVOLOG FLEXPEN) 100 UNIT/ML pen 1 unit per 15 gram of carb for breakfast and lunch, and 1 unit per 10 grams for dinner.Plus 1 unit per 50 mg/dl above 150. Total daily dose approx 30 units. (Patient taking differently: 1 unit per 15 gram of carb for all meals. Plus 1 unit per 50 mg/dl above 150. Total daily dose approx 30 units.) 30 mL 3     insulin glargine (BASAGLAR KWIKPEN) 100 UNIT/ML pen Inject 30 Units Subcutaneous daily Max, 30 units daily, #3 boxes/45mL, 2 refills. 45 mL 2     insulin pen needle (B-D U/F) 31G X 8 MM miscellaneous USE 5 TIMES DAILY  WITH NOVOLOG AND LANTUS 500 each 3     metoprolol succinate ER (TOPROL-XL) 50 MG 24 hr tablet Take 1 tablet (50 mg) by mouth daily 90 tablet 3     Multiple Vitamins-Minerals (MULTIVITAMIN ADULT PO) Take 1 tablet by mouth every morning        Urine Glucose-Ketones Test (KETO-DIASTIX) STRP 1 strip by In Vitro route as needed 1 each 11     COMPOUNDED NON-CONTROLLED SUBSTANCE (CMPD RX) - PHARMACY TO MIX COMPOUNDED MEDICATION Estriol 0.1% cream (1mg/gram) in HRT base. Place 1 gram vaginally daily for 2 weeks, then vaginally twice weekly. 30 g 6     insulin glargine (LANTUS SOLOSTAR) 100 UNIT/ML pen Inject 30 units everyday. Max: 30 units daily,, #3boxes/45ml. 2 refills. 45 mL 2         Family History:  - No family history of premature CAD, valvular disorders, dysrhythmias  - Mother's brother:  in late 20s of unclear cause, ?'heart attack'  - Mother's second brother:  in late 40s of unclear cause, no autopsy done  Family History   Problem Relation Age of Onset     Hyperlipidemia Mother      Breast Cancer Mother      Other Cancer Father      Leukemia Father      Skin Cancer Father      Coronary Artery Disease Maternal Uncle      Brain Cancer Maternal Uncle      Coronary Artery Disease Maternal Uncle      Stomach Cancer Maternal Aunt      Diabetes No family hx of      Hypertension No family hx of      Cerebrovascular Disease No family hx of       Colon Cancer No family hx of      Thyroid Disease No family hx of      Depression No family hx of      Anxiety Disorder No family hx of        Social History:  Used to work in Management.  Social History     Tobacco Use     Smoking status: Never Smoker     Smokeless tobacco: Never Used   Substance Use Topics     Alcohol use: Yes     Comment: 1 to 2 beers or glasses of wine 1 to 2 times per month       No Known Allergies      Physical Examination:  Vitals: /81 (BP Location: Left arm, Patient Position: Chair, Cuff Size: Adult Regular)   Pulse 101   Wt 76.9 kg (169 lb 9.6 oz)   SpO2 98%   BMI 28.22 kg/m    BMI= Body mass index is 28.22 kg/m .    GENERAL: Healthy, alert and no distress  Eyes: No xanthelasmas.  ENT: Moist mucosal membranes.  RESPIRATORY: No signs of resp distress.   CARDIOVASCULAR:  No JVD, irregularly irregular.  EXTREMITIES: Warm, well-perfused, no edema.   NEUROLOGY: GCS 15/15, no focal deficits.  SKIN: No ecchymoses, no rashes.  PSYCH: Cooperative, pleasant affect.       Investigations:  I personally viewed and interpreted the following investigations:    Labs:    CBC RESULTS:  Lab Results   Component Value Date    WBC 5.1 08/05/2021    WBC 3.8 (L) 06/24/2016    RBC 4.37 08/05/2021    RBC 4.31 06/24/2016    HGB 14.2 08/05/2021    HGB 14.4 06/24/2016    HCT 41.9 08/05/2021    HCT 40.7 06/24/2016    MCV 96 08/05/2021    MCV 94 06/24/2016    MCH 32.5 08/05/2021    MCH 33.4 (H) 06/24/2016    MCHC 33.9 08/05/2021    MCHC 35.4 06/24/2016    RDW 12.7 08/05/2021    RDW 13.3 06/24/2016     08/05/2021     06/24/2016       CMP RESULTS:  Lab Results   Component Value Date     08/05/2021     08/02/2019    POTASSIUM 3.9 09/23/2021    POTASSIUM 4.0 08/02/2019    CHLORIDE 109 08/05/2021    CHLORIDE 103 08/02/2019    CO2 28 08/05/2021    CO2 24 08/02/2019    ANIONGAP 3 08/05/2021    ANIONGAP 8 08/02/2019    GLC 80 08/05/2021     (H) 08/02/2019    BUN 24 08/05/2021    BUN 16  08/02/2019    CR 0.8 11/17/2021    CR 0.76 08/05/2021    CR 0.74 07/01/2021    GFRESTIMATED >60 11/17/2021    GFRESTIMATED 82 07/01/2021    GFRESTBLACK >90 07/01/2021    RENATO 9.2 08/05/2021    RENATO 9.0 08/02/2019    BILITOTAL 0.8 08/05/2021    BILITOTAL 0.7 08/02/2019    ALBUMIN 3.9 08/05/2021    ALBUMIN 3.7 08/02/2019    ALKPHOS 77 08/05/2021    ALKPHOS 97 08/02/2019    ALT 40 08/05/2021    ALT 24 08/02/2019    AST 26 08/05/2021    AST 16 08/02/2019      LIPIDS:  Lab Results   Component Value Date    CHOL 189 07/20/2020     Lab Results   Component Value Date    HDL 71 07/20/2020     Lab Results   Component Value Date     07/20/2020     Lab Results   Component Value Date    TRIG 56 07/20/2020       Recent Tests:    Electrocardiogram (05/11/2021):  Typical, counter-clockwise atrial flutter with variable AV-block. Incomplete RBBB.     Electrocardiogram (06/03/2021):  Reviewed with patient. Atrial fibrillation with HR of 104 bpm. Incomplete RBBB noted.     Coronary CTA (11/17/2021):    1.  Widely patent coronary arteries without evidence of atherosclerosis or stenosis.  2.  Total Agatston score 0 placing the patient in the lowest percentile when compared to an age- and gender-matched control group.  3.  Please review the separate Radiology report for incidental noncardiac findings. This report will follow separately.      Assessment and Plan:   Latoya Rodríguez is a 70 year old female with a past medical history of type II diabetes mellitus (insulin dependent) who presented to me for evaluation of atrial flutter.    I am reassured to see that Mrs. Rodríguez feels relatively well from a cardiac standpoint today.  The main topic of discussion today for us was her general preventive cardiovascular health.  In that regard, we talked about the benefit of a high potency statin given that she has type 2 diabetes mellitus and LDL greater than 70.  However, given her significant musculoskeletal back pain, she was not keen on  increasing her statin and risking further myalgias.  Thus, I think it is reasonable to defer raising the statin intensity until she feels better from the standpoint of her back.  This can be done by her primary care team if her back pain symptoms resolve before I see her or I am happy to do it when I see her next.      Problems:  1. Atrial fibrillation, persistent (CHADS2-VASC of 4)   2. Atrial flutter, persistent   3. T2DM, insulin-dependent  4. Mild aortic plaque (noted incidentally on on the CTA and November 2021)    Plan:  - Continue apixaban 5 mg twice daily and metoprolol succinate 50 mg once daily for atrial fibrillation thromboprophylaxis and rate control, respectively  - Continue atorvastatin 10 mg until musculoskeletal back pain improves and then statin can be raised to a high potency regimen      A total of 40 minutes were spent on the day of the visit for chart review, care coordination, face-to-face consultation with the patient, and documentation.         Amadeo Whipple [facility], [place]    Cardiovascular Division  Pager [phone]    CC  Patient Care Team:  Pricila Avila APRN CNP as PCP - General (Family Practice)  Amy Cooper, RN as Registered Nurse  Edward Aguero MD as Assigned Endocrinology Provider  Alberto Zhang MD as Assigned Surgical Provider  Alberto Zhang MD as MD (Urology)  Karina Hoskins, RN as Specialty Care Coordinator (Urology)  Pricila Avila APRN CNP as Referring Physician (Internal Medicine - Pediatrics)  Amadeo Whipple MD as Assigned Heart and Vascular Provider  Pricila Avila APRN CNP as Assigned PCP  Major Slade MD as Assigned Musculoskeletal Provider

## 2022-03-30 NOTE — NURSING NOTE
Laotya Rodríguez's goals for this visit include:   Chief Complaint   Patient presents with     Follow Up     CTA and Echo        She requests these members of her care team be copied on today's visit information: yes     PCP: Pricila Avila    Referring Provider:  No referring provider defined for this encounter.    /81 (BP Location: Left arm, Patient Position: Chair, Cuff Size: Adult Regular)   Pulse 101   Wt 76.9 kg (169 lb 9.6 oz)   SpO2 98%   BMI 28.22 kg/m      Do you need any medication refills at today's visit? No     Venu BARONE MA   Cardiology Team  [facility]

## 2022-03-30 NOTE — PATIENT INSTRUCTIONS
You were seen at the [facility] Cardiology clinic today.   You saw Dr. Amadeo Whipple, [facility], [place].    The following is a summary of your office visit today:    1. No changes to medications today  2. No tests needed   3. Please try and pursue moderate-intensity exercise for 30 minutes at least five times weekly.  4. Please try and maintain a diet rich in fruit and vegetables and low in saturated fats and sugars.  5. Follow-up with me in 12 months     Nurse contact information:    Cardiology Care Coordinators: Oksana Ott RN   Phone: [phone]   Fax: [phone]      HOW TO CHECK YOUR BLOOD PRESSURE AT HOME:     Avoid eating, smoking, and exercising for at least 30 minutes before taking a reading.    Be sure you have taken your BP medication at least 2-3 hours before you check it.     Sit quietly for 10 minutes before a reading.     Sit in a chair with your feet flat on the floor. Rest your  arm on a table so that the arm cuff is at the same level as your heart.    Remain still during the reading.    Record your blood pressure and pulse in a log and bring to your next appointment.     If you have had any blood work, imaging or other testing completed we will be in touch within 1-2 weeks regarding the results. If you have any questions, concerns or need to schedule a follow up, please contact us at [phone]. If you are needing refills please contact your pharmacy. For urgent after hour care please call the [facility] at [phone] or the [facility] at [phone] and ask to speak to the on-call Cardiologist.    It was a pleasure meeting with you today. Please let us know if there is anything else we can do for you so that we can be sure you are leaving completely satisfied with your care experience.     Your Cardiology Team at [facility]  RN Care Coordinator: Oksana STEEL: Carl

## 2022-04-19 ENCOUNTER — PATIENT OUTREACH (OUTPATIENT)
Dept: UROLOGY | Facility: CLINIC | Age: 72
End: 2022-04-19
Payer: COMMERCIAL

## 2022-04-19 NOTE — TELEPHONE ENCOUNTER
Left message for patient to call to discuss procedure on 4/26/22 with Dr Vicente Hoskins, RN, BSN  Care Coordinator Urology

## 2022-04-19 NOTE — TELEPHONE ENCOUNTER
Patient would like her procedure cancelled since she has not done any prep for the procedure. She has no pre-op and no transportation for procedure. Patient will call when she is ready to reschedule      Karina NIEVES RN, BSN  Care Coordinator

## 2022-04-26 ENCOUNTER — TELEPHONE (OUTPATIENT)
Dept: FAMILY MEDICINE | Facility: CLINIC | Age: 72
End: 2022-04-26
Payer: COMMERCIAL

## 2022-04-26 NOTE — TELEPHONE ENCOUNTER
Form received from [facility].  Placed on Madhavi Avila's desk for signature.

## 2022-04-27 ENCOUNTER — MYC MEDICAL ADVICE (OUTPATIENT)
Dept: ENDOCRINOLOGY | Facility: CLINIC | Age: 72
End: 2022-04-27
Payer: COMMERCIAL

## 2022-04-27 ASSESSMENT — ENCOUNTER SYMPTOMS
NECK PAIN: 0
MUSCLE CRAMPS: 0
ARTHRALGIAS: 1
STIFFNESS: 0
JOINT SWELLING: 0
MUSCLE WEAKNESS: 0
MYALGIAS: 1
BACK PAIN: 0

## 2022-04-27 NOTE — PROGRESS NOTES
Outcome for 04/29/22 8:15 AM: Glucose Readings sent via Appwapp on 4/27/2022 PDF    Outcome for 04/27/22 9:53 AM: Appwapp message sent requesting blood sugar readings.  Heike Adams [facility]  Adult Endocrinology  [facility], [place]

## 2022-05-02 ENCOUNTER — TELEPHONE (OUTPATIENT)
Dept: ENDOCRINOLOGY | Facility: CLINIC | Age: 72
End: 2022-05-02

## 2022-05-02 ENCOUNTER — OFFICE VISIT (OUTPATIENT)
Dept: ENDOCRINOLOGY | Facility: CLINIC | Age: 72
End: 2022-05-02
Payer: COMMERCIAL

## 2022-05-02 VITALS
SYSTOLIC BLOOD PRESSURE: 105 MMHG | BODY MASS INDEX: 27.84 KG/M2 | OXYGEN SATURATION: 98 % | WEIGHT: 167.3 LBS | HEART RATE: 99 BPM | DIASTOLIC BLOOD PRESSURE: 71 MMHG

## 2022-05-02 DIAGNOSIS — E11.65 TYPE 2 DIABETES MELLITUS WITH HYPERGLYCEMIA, WITH LONG-TERM CURRENT USE OF INSULIN (H): ICD-10-CM

## 2022-05-02 DIAGNOSIS — Z78.0 ASYMPTOMATIC MENOPAUSAL STATE: ICD-10-CM

## 2022-05-02 DIAGNOSIS — M85.80 OSTEOPENIA, UNSPECIFIED LOCATION: Primary | ICD-10-CM

## 2022-05-02 DIAGNOSIS — E10.9 TYPE 1 DIABETES MELLITUS WITHOUT COMPLICATION (H): ICD-10-CM

## 2022-05-02 DIAGNOSIS — Z79.4 TYPE 2 DIABETES MELLITUS WITH HYPERGLYCEMIA, WITH LONG-TERM CURRENT USE OF INSULIN (H): ICD-10-CM

## 2022-05-02 LAB — HBA1C MFR BLD: 7.9 % (ref 0–5.7)

## 2022-05-02 PROCEDURE — 99215 OFFICE O/P EST HI 40 MIN: CPT | Performed by: INTERNAL MEDICINE

## 2022-05-02 PROCEDURE — 83036 HEMOGLOBIN GLYCOSYLATED A1C: CPT | Performed by: INTERNAL MEDICINE

## 2022-05-02 RX ORDER — BLOOD SUGAR DIAGNOSTIC
STRIP MISCELLANEOUS
Qty: 550 STRIP | Refills: 3 | Status: SHIPPED | OUTPATIENT
Start: 2022-05-02 | End: 2022-06-17

## 2022-05-02 RX ORDER — PROCHLORPERAZINE 25 MG/1
SUPPOSITORY RECTAL
Qty: 3 EACH | Refills: 11 | Status: SHIPPED | OUTPATIENT
Start: 2022-05-02 | End: 2023-04-06

## 2022-05-02 RX ORDER — INSULIN ASPART 100 [IU]/ML
INJECTION, SOLUTION INTRAVENOUS; SUBCUTANEOUS
Qty: 30 ML | Refills: 3 | Status: SHIPPED | OUTPATIENT
Start: 2022-05-02 | End: 2022-10-24

## 2022-05-02 RX ORDER — PROCHLORPERAZINE 25 MG/1
SUPPOSITORY RECTAL
Qty: 1 EACH | Refills: 0 | Status: SHIPPED | OUTPATIENT
Start: 2022-05-02 | End: 2023-04-06

## 2022-05-02 RX ORDER — PROCHLORPERAZINE 25 MG/1
SUPPOSITORY RECTAL
Qty: 1 EACH | Refills: 3 | Status: SHIPPED | OUTPATIENT
Start: 2022-05-02 | End: 2023-04-06

## 2022-05-02 NOTE — LETTER
5/2/2022         RE: Latoya Rodríguez  [address] [place] [address]        Dear Colleague,    Thank you for referring your patient, Latoya Rodríguez, to the [facility]. Please see a copy of my visit note below.    Outcome for 04/29/22 8:15 AM: Glucose Readings sent via Blurtt on 4/27/2022 PDF    Outcome for 04/27/22 9:53 AM: Blurtt message sent requesting blood sugar readings.  Heike Adams, [facility]  Adult Endocrinology  [facility] [place]          Chief complaint:  Latoya is a 71 year old female seen in follow up of type 1 Diabetes.  HISTORY OF PRESENT ILLNESS  Latoya is a 71-year-old retiree with a history of type 1 diabetes who has a visit today for a follow-up type 1 diabetes.  She was diagnosed with type 1 diabetes 2016 during routine physical where she was found to have high blood glucose levels.  At that time she was diagnosed and treated as a type II diabetic but it was later determined that she had positive TORRES antibodies and low C-peptide.      Her current regimen consist of Lantus 24 units at bedtime, Novolog 1:15 g carbohydrate coverage with breakfast, 1 unit per 18 gram at lunch, and 1 unit per 15 gram at dinner, Novolog correction 1:50 if BG is >150 at meal time, Novolog correction 1:50 if BG is >200 at bedtime    A1c 7.9% today. Reviewed BG in the past 2 weeks, average glucose 231. Highest , lowest BG 45. She notices frequent low BG that usually occurred about 11 am and 3 pm particularly if she went for a walk. She always has high BG before dinner and bedtime. She feels that her carb counting is quite accurate. She eats 3 meals per day with some snack at bedtime. She eats mostly protein and is very consistent meal daily. She is more interested in Dexcom sensor.    Diet recalled:  BG: scramble egg, toast or oatmeal  Lunch: soup and salad or cheese  Dinner: fish and salad and frozen ice-cream bar or popcorn    Past Medical History  Type 1 diabetes,  diagnosed 6/2016  Hyperlipidemia  Osteoporosis     Medications  Current Outpatient Medications   Medication Sig Dispense Refill     apixaban ANTICOAGULANT (ELIQUIS) 5 MG tablet Take 1 tablet (5 mg) by mouth 2 times daily 180 tablet 3     atorvastatin (LIPITOR) 10 MG tablet Take 1 tablet (10 mg) by mouth daily 90 tablet 3     blood glucose (ACCU-CHEK MICKY PLUS) test strip Use to test blood sugar 6 times daily 550 strip 3     blood glucose monitoring (ACCU-CHEK FASTCLIX) lancets USE TO TEST BLOOD SUGAR FOUR TIMES A DAY OR AS DIRECTED 306 each 3     calcium carbonate-vitamin D 500-400 MG-UNIT TABS tablt Take 1 tablet by mouth 2 times daily 180 tablet 3     COMPOUNDED NON-CONTROLLED SUBSTANCE (CMPD RX) - PHARMACY TO MIX COMPOUNDED MEDICATION Estriol 0.1% cream (1mg/gram) in HRT base. Place 1 gram vaginally daily for 2 weeks, then vaginally twice weekly. 30 g 6     estradiol (ESTRACE) 0.1 MG/GM vaginal cream Place 2 g vaginally twice a week 42.5 g 11     insulin aspart (NOVOLOG FLEXPEN) 100 UNIT/ML pen 1 unit per 15 gram of carb for breakfast and lunch, and 1 unit per 10 grams for dinner.Plus 1 unit per 50 mg/dl above 150. Total daily dose approx 30 units. (Patient taking differently: 1 unit per 15 gram of carb for all meals. Plus 1 unit per 50 mg/dl above 150. Total daily dose approx 30 units.) 30 mL 3     insulin glargine (BASAGLAR KWIKPEN) 100 UNIT/ML pen Inject 30 Units Subcutaneous daily Max, 30 units daily, #3 boxes/45mL, 2 refills. 45 mL 2     insulin glargine (LANTUS SOLOSTAR) 100 UNIT/ML pen Inject 30 units everyday. Max: 30 units daily,, #3boxes/45ml. 2 refills. 45 mL 2     insulin pen needle (B-D U/F) 31G X 8 MM miscellaneous USE 5 TIMES DAILY  WITH NOVOLOG AND LANTUS 500 each 3     metoprolol succinate ER (TOPROL-XL) 50 MG 24 hr tablet Take 1 tablet (50 mg) by mouth daily 90 tablet 3     Multiple Vitamins-Minerals (MULTIVITAMIN ADULT PO) Take 1 tablet by mouth every morning        Urine Glucose-Ketones Test  "(KETO-DIASTIX) STRP 1 strip by In Vitro route as needed 1 each 11     Allergies  No Known Allergies    Family History  family history includes Brain Cancer in her maternal uncle; Breast Cancer in her mother; Coronary Artery Disease in her maternal uncle and maternal uncle; Hyperlipidemia in her mother; Leukemia in her father; Other Cancer in her father; Skin Cancer in her father; Stomach Cancer in her maternal aunt.  No thyroid disease or type 1 diabetes in the family    Social History  Social History     Socioeconomic History     Marital status: Single     Spouse name: Not on file     Number of children: Not on file     Years of education: Not on file     Highest education level: Not on file   Occupational History     Not on file   Tobacco Use     Smoking status: Never Smoker     Smokeless tobacco: Never Used   Substance and Sexual Activity     Alcohol use: Yes     Comment: 1 to 2 beers or glasses of wine 1 to 2 times per month     Drug use: No     Sexual activity: Never   Other Topics Concern     Parent/sibling w/ CABG, MI or angioplasty before 65F 55M? No   Social History Narrative     Not on file     Social Determinants of Health     Financial Resource Strain: Not on file   Food Insecurity: Not on file   Transportation Needs: Not on file   Physical Activity: Not on file   Stress: Not on file   Social Connections: Not on file   Intimate Partner Violence: Not on file   Housing Stability: Not on file       REVIEW OF SYSTEMS  10 points ROS were negative otherwise mentioned in HPI    Physical Exam  Vital signs:   There were no vitals taken for this visit.  Estimated body mass index is 28.22 kg/m  as calculated from the following:    Height as of 11/10/21: 1.651 m (5' 5\").    Weight as of 3/30/22: 76.9 kg (169 lb 9.6 oz).  Constitutional: no distress, comfortable, pleasant   Eyes: anicteric  Musculoskeletal: no edema   Skin:  no jaundice   Neurological: normal gait, intact sensation per monofilament bilaterally exam " dated 7/1/2021, no tremor on outstretched hands bilaterally  Psychological: appropriate mood       DATA REVIEW  Lab Results   Component Value Date    A1C 7.9 05/02/2022    A1C 7.6 11/15/2021    A1C 6.7 07/01/2021    A1C 8.4 02/10/2021    A1C 7.2 10/21/2020       ENDO DIABETES Latest Ref Rng & Units 7/1/2021   CHOL <200 mg/dL 172   LDL <100 mg/dL 90   HDL >49 mg/dL 70   NHDL <130 mg/dL 102   TRIGLYCERIDES <150 mg/dL 58     ENDO DIABETES Latest Ref Rng & Units 8/5/2021   CREATININE 0.52 - 1.04 mg/dL 0.76     ENDO DIABETES Latest Ref Rng & Units 7/1/2021   MICROL mg/L <5   UMALCR 0 - 25 mg/g Cr Unable to calculate due to low value     DXA 8/28/2020  Lumbar spine T-score in region of L2-L4 = -2.4   L1-4 percent change: Not significant%      HIPS:  Mean total hip T-score: -1  Mean total hip percent change: Not significant%      Left femoral neck T-score = -1.9  Right femoral neck T-score = -1.6      Radius 33% T-score = -1.6     FRAX:  10 year probability of major osteoporotic fracture: 11.3%  10 year probability of hip fracture: 2.1%  The 10 year probability of fracture may be lower than reported if the  patient has received treatment. FRAX data should be disregarded in patient's taking bisphosphonates.                                                                 IMPRESSION:    Low bone mass (osteopenia).    ASSESSMENT/PLAN:   Latoya is a 71-year-old retiree with a history of type 1 diabetes who is here for diabetes management.    1.  Type 1 diabetes: A1c 7.9 today. Latoya's blood glucose readings over the last 2 weeks were reviewed.  She has more frequent low BG after breakfast and lunch followed by high glucose due to over correction  - discussed Dexcom continuous glucose monitor. She is now interested in a trial.    Recommendations:  -Coninue Lantus 24 units at bedtime  -Change Novolog 1:18 gram of carb coverage with breakfast and 1:20 gram at lunch and continue 1:15 gram at dinner  -Continue 1:50 Novolog correction  if over 150 at breakfast, lunch and dinner, continue 1:50 Novolog correction if BG is >200 at bedtime      2) DM complications:  Retinopathy: normal exam - no DR 6/2021  Nephropathy: negative urine microalbumin 7/2021 -- lab at the next visit  Neuropathy: none per exam 7/2021     3) Hyperlipidemia: LDL 90, HDL 70, TG 58, total cholesterol 172 on 7/2021. Continue Lipitor 10 mg daily.  Will recheck lab the next visit    4) Osteopenia: DXA 2020 showed the lowest T-score of -1.9 at the left femoral neck. She follows up with PCP. Currently on calcium and vitamin D  Will recheck DXA this year    -Follow-up in 4 months     External notes/medical records independently reviewed, labs and imaging independently reviewed, medical management and tests to be discussed/communicated to patient.    Time: I spent 45  minutes spent on the date of the encounter preparing to see patient (including chart review and preparation), obtaining and or reviewing additional medical history, performing a physical exam and evaluation, documenting clinical information in the electronic health record, independently interpreting results, communicating results to the patient and coordinating care.    Edward Aguero MD  Division of Diabetes and Endocrinology  Department of Medicine  [phone]

## 2022-05-02 NOTE — PROGRESS NOTES
Chief complaint:  Latoya is a 71 year old female seen in follow up of type 1 Diabetes.  HISTORY OF PRESENT ILLNESS  Latoya is a 71-year-old retiree with a history of type 1 diabetes who has a visit today for a follow-up type 1 diabetes.  She was diagnosed with type 1 diabetes 2016 during routine physical where she was found to have high blood glucose levels.  At that time she was diagnosed and treated as a type II diabetic but it was later determined that she had positive TORRES antibodies and low C-peptide.      Her current regimen consist of Lantus 24 units at bedtime, Novolog 1:15 g carbohydrate coverage with breakfast, 1 unit per 18 gram at lunch, and 1 unit per 15 gram at dinner, Novolog correction 1:50 if BG is >150 at meal time, Novolog correction 1:50 if BG is >200 at bedtime    A1c 7.9% today. Reviewed BG in the past 2 weeks, average glucose 231. Highest , lowest BG 45. She notices frequent low BG that usually occurred about 11 am and 3 pm particularly if she went for a walk. She always has high BG before dinner and bedtime. She feels that her carb counting is quite accurate. She eats 3 meals per day with some snack at bedtime. She eats mostly protein and is very consistent meal daily. She is more interested in Dexcom sensor.    Diet recalled:  BG: scramble egg, toast or oatmeal  Lunch: soup and salad or cheese  Dinner: fish and salad and frozen ice-cream bar or popcorn    Past Medical History  Type 1 diabetes, diagnosed 6/2016  Hyperlipidemia  Osteoporosis     Medications  Current Outpatient Medications   Medication Sig Dispense Refill     apixaban ANTICOAGULANT (ELIQUIS) 5 MG tablet Take 1 tablet (5 mg) by mouth 2 times daily 180 tablet 3     atorvastatin (LIPITOR) 10 MG tablet Take 1 tablet (10 mg) by mouth daily 90 tablet 3     blood glucose (ACCU-CHEK MICKY PLUS) test strip Use to test blood sugar 6 times daily 550 strip 3     blood glucose monitoring (ACCU-CHEK FASTCLIX) lancets USE TO TEST BLOOD  SUGAR FOUR TIMES A DAY OR AS DIRECTED 306 each 3     calcium carbonate-vitamin D 500-400 MG-UNIT TABS tablt Take 1 tablet by mouth 2 times daily 180 tablet 3     COMPOUNDED NON-CONTROLLED SUBSTANCE (CMPD RX) - PHARMACY TO MIX COMPOUNDED MEDICATION Estriol 0.1% cream (1mg/gram) in HRT base. Place 1 gram vaginally daily for 2 weeks, then vaginally twice weekly. 30 g 6     estradiol (ESTRACE) 0.1 MG/GM vaginal cream Place 2 g vaginally twice a week 42.5 g 11     insulin aspart (NOVOLOG FLEXPEN) 100 UNIT/ML pen 1 unit per 15 gram of carb for breakfast and lunch, and 1 unit per 10 grams for dinner.Plus 1 unit per 50 mg/dl above 150. Total daily dose approx 30 units. (Patient taking differently: 1 unit per 15 gram of carb for all meals. Plus 1 unit per 50 mg/dl above 150. Total daily dose approx 30 units.) 30 mL 3     insulin glargine (BASAGLAR KWIKPEN) 100 UNIT/ML pen Inject 30 Units Subcutaneous daily Max, 30 units daily, #3 boxes/45mL, 2 refills. 45 mL 2     insulin glargine (LANTUS SOLOSTAR) 100 UNIT/ML pen Inject 30 units everyday. Max: 30 units daily,, #3boxes/45ml. 2 refills. 45 mL 2     insulin pen needle (B-D U/F) 31G X 8 MM miscellaneous USE 5 TIMES DAILY  WITH NOVOLOG AND LANTUS 500 each 3     metoprolol succinate ER (TOPROL-XL) 50 MG 24 hr tablet Take 1 tablet (50 mg) by mouth daily 90 tablet 3     Multiple Vitamins-Minerals (MULTIVITAMIN ADULT PO) Take 1 tablet by mouth every morning        Urine Glucose-Ketones Test (KETO-DIASTIX) STRP 1 strip by In Vitro route as needed 1 each 11     Allergies  No Known Allergies    Family History  family history includes Brain Cancer in her maternal uncle; Breast Cancer in her mother; Coronary Artery Disease in her maternal uncle and maternal uncle; Hyperlipidemia in her mother; Leukemia in her father; Other Cancer in her father; Skin Cancer in her father; Stomach Cancer in her maternal aunt.  No thyroid disease or type 1 diabetes in the family    Social History  Social  "History     Socioeconomic History     Marital status: Single     Spouse name: Not on file     Number of children: Not on file     Years of education: Not on file     Highest education level: Not on file   Occupational History     Not on file   Tobacco Use     Smoking status: Never Smoker     Smokeless tobacco: Never Used   Substance and Sexual Activity     Alcohol use: Yes     Comment: 1 to 2 beers or glasses of wine 1 to 2 times per month     Drug use: No     Sexual activity: Never   Other Topics Concern     Parent/sibling w/ CABG, MI or angioplasty before 65F 55M? No   Social History Narrative     Not on file     Social Determinants of Health     Financial Resource Strain: Not on file   Food Insecurity: Not on file   Transportation Needs: Not on file   Physical Activity: Not on file   Stress: Not on file   Social Connections: Not on file   Intimate Partner Violence: Not on file   Housing Stability: Not on file       REVIEW OF SYSTEMS  10 points ROS were negative otherwise mentioned in HPI    Physical Exam  Vital signs:   There were no vitals taken for this visit.  Estimated body mass index is 28.22 kg/m  as calculated from the following:    Height as of 11/10/21: 1.651 m (5' 5\").    Weight as of 3/30/22: 76.9 kg (169 lb 9.6 oz).  Constitutional: no distress, comfortable, pleasant   Eyes: anicteric  Musculoskeletal: no edema   Skin:  no jaundice   Neurological: normal gait, intact sensation per monofilament bilaterally exam dated 7/1/2021, no tremor on outstretched hands bilaterally  Psychological: appropriate mood       DATA REVIEW  Lab Results   Component Value Date    A1C 7.9 05/02/2022    A1C 7.6 11/15/2021    A1C 6.7 07/01/2021    A1C 8.4 02/10/2021    A1C 7.2 10/21/2020       ENDO DIABETES Latest Ref Rng & Units 7/1/2021   CHOL <200 mg/dL 172   LDL <100 mg/dL 90   HDL >49 mg/dL 70   NHDL <130 mg/dL 102   TRIGLYCERIDES <150 mg/dL 58     ENDO DIABETES Latest Ref Rng & Units 8/5/2021   CREATININE 0.52 - 1.04 " mg/dL 0.76     ENDO DIABETES Latest Ref Rng & Units 7/1/2021   MICROL mg/L <5   UMALCR 0 - 25 mg/g Cr Unable to calculate due to low value     DXA 8/28/2020  Lumbar spine T-score in region of L2-L4 = -2.4   L1-4 percent change: Not significant%      HIPS:  Mean total hip T-score: -1  Mean total hip percent change: Not significant%      Left femoral neck T-score = -1.9  Right femoral neck T-score = -1.6      Radius 33% T-score = -1.6     FRAX:  10 year probability of major osteoporotic fracture: 11.3%  10 year probability of hip fracture: 2.1%  The 10 year probability of fracture may be lower than reported if the  patient has received treatment. FRAX data should be disregarded in patient's taking bisphosphonates.                                                                 IMPRESSION:    Low bone mass (osteopenia).    ASSESSMENT/PLAN:   Latoya is a 71-year-old retiree with a history of type 1 diabetes who is here for diabetes management.    1.  Type 1 diabetes: A1c 7.9 today. Latoya's blood glucose readings over the last 2 weeks were reviewed.  She has more frequent low BG after breakfast and lunch followed by high glucose due to over correction  - discussed Dexcom continuous glucose monitor. She is now interested in a trial.    Recommendations:  -Coninue Lantus 24 units at bedtime  -Change Novolog 1:18 gram of carb coverage with breakfast and 1:20 gram at lunch and continue 1:15 gram at dinner  -Continue 1:50 Novolog correction if over 150 at breakfast, lunch and dinner, continue 1:50 Novolog correction if BG is >200 at bedtime      2) DM complications:  Retinopathy: normal exam - no DR 6/2021  Nephropathy: negative urine microalbumin 7/2021 -- lab at the next visit  Neuropathy: none per exam 7/2021     3) Hyperlipidemia: LDL 90, HDL 70, TG 58, total cholesterol 172 on 7/2021. Continue Lipitor 10 mg daily.  Will recheck lab the next visit    4) Osteopenia: DXA 2020 showed the lowest T-score of -1.9 at the left  femoral neck. She follows up with PCP. Currently on calcium and vitamin D  Will recheck DXA this year    -Follow-up in 4 months     External notes/medical records independently reviewed, labs and imaging independently reviewed, medical management and tests to be discussed/communicated to patient.    Time: I spent 45  minutes spent on the date of the encounter preparing to see patient (including chart review and preparation), obtaining and or reviewing additional medical history, performing a physical exam and evaluation, documenting clinical information in the electronic health record, independently interpreting results, communicating results to the patient and coordinating care.    Edward Aguero MD  Division of Diabetes and Endocrinology  Department of Medicine  [phone]

## 2022-05-02 NOTE — NURSING NOTE
Latoya Rodríguez's goals for this visit include:   Chief Complaint   Patient presents with     Diabetes     She requests these members of her care team be copied on today's visit information: Yes    PCP: Pricila Avila    Referring Provider:  Pricila Avila, URVASHI CNP  [address] [address]    /71 (BP Location: Left arm, Patient Position: Sitting, Cuff Size: Adult Regular)   Pulse 99   Wt 75.9 kg (167 lb 4.8 oz)   SpO2 98%   BMI 27.84 kg/m      Do you need any medication refills at today's visit? Yes

## 2022-05-02 NOTE — TELEPHONE ENCOUNTER
PA Initiation    Medication: Dexcom Pa pending  Insurance Company: [employer] - Phone [phone] Fax [phone]  Pharmacy Filling the Rx:    Filling Pharmacy Phone:    Filling Pharmacy Fax:    Start Date: 5/2/2022

## 2022-05-02 NOTE — PATIENT INSTRUCTIONS
- I will send Dexcom to  specialty pharmacy  - send me message in 1 mo so I can see you download  - return in 4 months with lab and DXA scan    If you have any questions, please do not hesitate to call [facility] at [phone] and ask for Endocrinology clinic.    Sincerely,    Edward Aguero MD  Endocrinology    [facility] - Phone number [phone]

## 2022-05-05 NOTE — TELEPHONE ENCOUNTER
Prior Authorization Approval    Authorization Effective Date: 5/2/2022  Authorization Expiration Date: 5/1/2025  Medication: Dexcom Pa Approved  Approved Dose/Quantity: 3year  Reference #: [id number]   Insurance Company: [employer] - Phone [phone] Fax [phone]  Expected CoPay:       CoPay Card Available:      Foundation Assistance Needed:    Which Pharmacy is filling the prescription (Not needed for infusion/clinic administered):    Pharmacy Notified:    Patient Notified:

## 2022-05-13 ENCOUNTER — TELEPHONE (OUTPATIENT)
Dept: UROLOGY | Facility: CLINIC | Age: 72
End: 2022-05-13
Payer: COMMERCIAL

## 2022-05-13 NOTE — TELEPHONE ENCOUNTER
[facility] Call Center    Phone Message    May a detailed message be left on voicemail: yes     Reason for Call: Pt is wondering if she should keep Monday's appointment with Dr. Zhang.  She doesn't know if it's a follow to a procedure or just a follow up she should keep.  Thanks.    Action Taken: Message routed to:  Adult Clinics: Urology p [id number]    Travel Screening: Not Applicable

## 2022-05-13 NOTE — TELEPHONE ENCOUNTER
Called and spoke to patient who reports that she had to cancel her axonics trial procedure with Dr. Zhang. Patient is inquiring if her 5/16/22 post op appointment is needed. Informed patient that the 5/16/22 appointment is not needed and appointment was cancelled. Patient reports that she is ready to reschedule her axonics trial procedure with Dr. Zhang. Informed patient that a message will be sent to the surgery scheduler who will contact her soon to help reschedule. Patient was comfortable with plan.    Jess Hadley RN, BSN

## 2022-05-16 ENCOUNTER — TELEPHONE (OUTPATIENT)
Dept: UROLOGY | Facility: CLINIC | Age: 72
End: 2022-05-16
Payer: COMMERCIAL

## 2022-05-16 NOTE — TELEPHONE ENCOUNTER
Patient wants to reschedule procedure with Dr. Zhang, will call me back to confirm if Tuesday 6/14/22 works for her.     Lois  Ashwini-Op Coordinator for Urology Surgery

## 2022-05-16 NOTE — TELEPHONE ENCOUNTER
Surgery scheduler contacted patient. See 5/16/22 surgery scheduling telephone encounter.    Jess Hadley RN, BSN

## 2022-05-24 NOTE — TELEPHONE ENCOUNTER
Patient left voice message confirming 6/14/22 does work, surgery packet sent on 5/24/22.    Lois  Ashwini-Op Coordinator for Urology Surgery

## 2022-06-06 ENCOUNTER — TRANSFERRED RECORDS (OUTPATIENT)
Dept: HEALTH INFORMATION MANAGEMENT | Facility: CLINIC | Age: 72
End: 2022-06-06
Payer: COMMERCIAL

## 2022-06-06 LAB — RETINOPATHY: NEGATIVE

## 2022-06-08 ENCOUNTER — OFFICE VISIT (OUTPATIENT)
Dept: FAMILY MEDICINE | Facility: CLINIC | Age: 72
End: 2022-06-08
Payer: COMMERCIAL

## 2022-06-08 VITALS
WEIGHT: 171.6 LBS | HEART RATE: 111 BPM | BODY MASS INDEX: 28.59 KG/M2 | SYSTOLIC BLOOD PRESSURE: 112 MMHG | RESPIRATION RATE: 25 BRPM | TEMPERATURE: 98.1 F | OXYGEN SATURATION: 98 % | DIASTOLIC BLOOD PRESSURE: 60 MMHG | HEIGHT: 65 IN

## 2022-06-08 DIAGNOSIS — R39.15 URINARY URGENCY: ICD-10-CM

## 2022-06-08 DIAGNOSIS — I48.20 CHRONIC ATRIAL FIBRILLATION (H): ICD-10-CM

## 2022-06-08 DIAGNOSIS — N39.41 URGE INCONTINENCE OF URINE: ICD-10-CM

## 2022-06-08 DIAGNOSIS — E10.65 TYPE 1 DIABETES MELLITUS WITH HYPERGLYCEMIA (H): ICD-10-CM

## 2022-06-08 DIAGNOSIS — Z01.818 PRE-OP EXAM: Primary | ICD-10-CM

## 2022-06-08 PROCEDURE — 93000 ELECTROCARDIOGRAM COMPLETE: CPT | Performed by: NURSE PRACTITIONER

## 2022-06-08 PROCEDURE — 99214 OFFICE O/P EST MOD 30 MIN: CPT | Performed by: NURSE PRACTITIONER

## 2022-06-08 ASSESSMENT — PAIN SCALES - GENERAL: PAINLEVEL: NO PAIN (0)

## 2022-06-08 NOTE — PATIENT INSTRUCTIONS
Pre-operation Instructions:    - Stop Aspirin and NSAIDS (Ibuprofen, Motrin, Advil, Aleve, Naproxen, Naprosyn) 7 days prior to the surgery/procedure or follow surgeon's direction.    - Stop all Vitamins and Herbal Supplements (including Vitamin E, Fish Oil, Omega 3 Fatty Acids, Ginseng, Gingko) 7 days prior to the surgery/procedure or follow surgeon's direction.    - Medications:  Stop Eliquis 2 days before surgery (due to bleeding risk moderate)  Continue Metoprolol and Atorvastatin day of surgery  Take 80% of your usual evening or morning dose before surgery (this would be 24 units)    - If you become sick before your surgery/procedure (such as a fever, cough, congestion, or sore throat) you should call your primary care provider and surgeon.    - Unless given permission by your surgeon, do not eat or drink after midnight in the evening prior to your surgery/procedure.

## 2022-06-08 NOTE — PROGRESS NOTES
[address] [address]  Phone: [phone]  Primary Provider: Pricila Avila  Pre-op Performing Provider: FELIPA AMAYA    PREOPERATIVE EVALUATION:  Today's date: 6/8/2022    Latoya Rodríguez is a 71 year old female who presents for a preoperative evaluation.    Surgical Information:  Surgery/Procedure:  INSERTION, NEUROSTIMULATOR, SACRAL, PERCUTANEOUS, FOR TRIAL  Surgery Location:[facility] OR   Surgeon: Alberto Zhang MD  Surgery Date: 6/14/2022  Time of Surgery: 10:10am   Where patient plans to recover: At home with family  Fax number for surgical facility: Note does not need to be faxed, will be available electronically in Epic.    Type of Anesthesia Anticipated: Local    Assessment & Plan     The proposed surgical procedure is considered LOW risk.    Pre-op exam    - EKG 12-lead complete w/read - Clinics    Urinary urgency  Reason for surgery    Urge incontinence of urine  Reason for surgery    Chronic atrial fibrillation (H)  Stable on anticoagulation, rate is 100 today but patient stopped Metoprolol yesterday as she thought she needed to stop all medications 7 days prior to surgery.  Recommend restarting Metoprolol for rate control and continue, to be taken day of surgery as well.    Type 1 diabetes mellitus with hyperglycemia (H)  Recent hemoglobin A1C 7.9 and blood sugars varying 100-250, she is insulin dependent  See below    Risks and Recommendations:  The patient has the following additional risks and recommendations for perioperative complications:  Diabetes:  - Patient is on insulin therapy; diabetic NPO guidelines provided and discussed.    Medication Instructions:   - apixaban (Eliquis), edoxaban (Savaysa), rivaroxaban (Xarelto): Bleeding risk is moderate for this procedure AND CrCl (>=) 50 mL/min. HOLD 2 day before surgery.     - Beta Blockers: Continue taking on the day of surgery.   - Statins: Continue taking on the day of  surgery.    - Long acting insulin (e.g. glargine, detemir): Take 80% of the usual evening or morning dose before surgery.   - short acting insulin (e.g. regular, lispro, aspart): HOLD on the morning of surgery.     RECOMMENDATION:  APPROVAL GIVEN to proceed with proposed procedure, without further diagnostic evaluation.                      Subjective     HPI related to upcoming procedure: she has urinary incontinence with persistent symptoms despite conservative treatment.  She is scheduled for a sacral neurostimulator insertion as a trial.    She stopped taking all her medication yesterday because she though she needed to stop all medications 7 days prior to surgery.    Diabetes type 1-  Last A1C 7.9  Blood sugars-  Checks blood sugars with glucometer, not Dexcom.  100 this morning, but readings vary from , usual range 100-250.  Checking blood sugars 4 times daily  She is a pescetarian    Preop Questions 6/7/2022   1. Have you ever had a heart attack or stroke? No   2. Have you ever had surgery on your heart or blood vessels, such as a stent placement, a coronary artery bypass, or surgery on an artery in your head, neck, heart, or legs? YES - vein surgery for varicose veins   3. Do you have chest pain with activity? No   4. Do you have a history of  heart failure? No   5. Do you currently have a cold, bronchitis or symptoms of other infection? No   6. Do you have a cough, shortness of breath, or wheezing? No   7. Do you or anyone in your family have previous history of blood clots? No   8. Do you or does anyone in your family have a serious bleeding problem such as prolonged bleeding following surgeries or cuts? No   9. Have you ever had problems with anemia or been told to take iron pills? No   10. Have you had any abnormal blood loss such as black, tarry or bloody stools, or abnormal vaginal bleeding? No   11. Have you ever had a blood transfusion? No   12. Are you willing to have a blood transfusion if it  is medically needed before, during, or after your surgery? Yes   13. Have you or any of your relatives ever had problems with anesthesia? No   14. Do you have sleep apnea, excessive snoring or daytime drowsiness? No   15. Do you have any artifical heart valves or other implanted medical devices like a pacemaker, defibrillator, or continuous glucose monitor? No   16. Do you have artificial joints? No   17. Are you allergic to latex? No       Health Care Directive:  Patient does not have a Health Care Directive or Living Will: not on file    Preoperative Review of :   reviewed - no record of controlled substances prescribed.    Status of Chronic Conditions:  See problem list for active medical problems.  Problems all longstanding and stable, except as noted/documented.  See ROS for pertinent symptoms related to these conditions.      Review of Systems  Constitutional, neuro, ENT, endocrine, pulmonary, cardiac, gastrointestinal, genitourinary, musculoskeletal, integument and psychiatric systems are negative, except as otherwise noted.    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) 07/29/2016     Priority: Medium     Type 1 diabetes mellitus with hyperglycemia (H) 07/29/2016     Priority: Medium     Osteoporosis 07/11/2016     Priority: Medium     Type 2 diabetes mellitus with hyperglycemia, with long-term current use of  insulin (H) 06/24/2016     Priority: Medium     Hyperlipidemia with target LDL less than 100 06/24/2016     Priority: Medium     Urge incontinence 05/06/2013     Priority: Medium     Urgency of urination 07/18/2011     Priority: Medium     Urinary frequency 07/18/2011     Priority: Medium      Past Medical History:   Diagnosis Date     Cataract      Chronic atrial fibrillation (H) 6/8/2022     Type 2 diabetes mellitus with hyperglycemia (H) 6/24/2016     Past Surgical History:   Procedure Laterality Date     ANESTHESIA CARDIOVERSION N/A 9/23/2021    Procedure: ANESTHESIA, FOR CARDIOVERSION@1100;  Surgeon: GENERIC ANESTHESIA PROVIDER;  Location: [facility] OR     CYSTOSCOPY, INJECT COLLAGEN, COMBINED N/A 5/11/2021    Procedure: CYSTOSCOPY, WITH PERIURETHRAL BULKING AGENT INJECTION;  Surgeon: Alberto Zhang MD;  Location: [facility] OR     EYE SURGERY  9/04, 5/11    Retinal detachment, Cataract (both eyes)     RELEASE CARPAL TUNNEL Right 8/6/2021    Procedure: RELEASE, CARPAL TUNNEL, Right;  Surgeon: RADHA Sandoval MD;  Location: [facility] OR     RELEASE TRIGGER FINGER Bilateral 5/10/2019    Procedure: RELEASE TRIGGER FINGER, BILATERAL LONG AND RING FINGERS;  Surgeon: RADHA Sandoval MD;  Location: [facility] OR     VASCULAR SURGERY      Varicose Veins     Current Outpatient Medications   Medication Sig Dispense Refill     apixaban ANTICOAGULANT (ELIQUIS) 5 MG tablet Take 1 tablet (5 mg) by mouth 2 times daily 180 tablet 3     atorvastatin (LIPITOR) 10 MG tablet Take 1 tablet (10 mg) by mouth daily 90 tablet 3     blood glucose (ACCU-CHEK MICKY PLUS) test strip Use to test blood sugar 6 times daily 550 strip 3     blood glucose monitoring (ACCU-CHEK FASTCLIX) lancets USE TO TEST BLOOD SUGAR FOUR TIMES A DAY OR AS DIRECTED 306 each 3     calcium carbonate-vitamin D 500-400 MG-UNIT TABS tablt Take 1 tablet by mouth 2 times daily 180 tablet 3     Continuous Blood Gluc  (DEXCOM G6 ) JUANA Use to read blood sugars as  per 's instructions. 1 each 0     Continuous Blood Gluc Sensor (DEXCOM G6 SENSOR) MISC Change every 10 days. 3 each 11     Continuous Blood Gluc Transmit (DEXCOM G6 TRANSMITTER) MISC Change every 3 months. 1 each 3     estradiol (ESTRACE) 0.1 MG/GM vaginal cream Place 2 g vaginally twice a week 42.5 g 11     insulin aspart (NOVOLOG FLEXPEN) 100 UNIT/ML pen 1 unit per 15 gram of carb for all meals. Plus 1 unit per 50 mg/dl above 150. Total daily dose approx 30 units. 30 mL 3     insulin glargine (BASAGLAR KWIKPEN) 100 UNIT/ML pen Inject 30 Units Subcutaneous daily Max, 30 units daily, #3 boxes/45mL, 2 refills. 45 mL 2     insulin pen needle (B-D U/F) 31G X 8 MM miscellaneous USE 5 TIMES DAILY  WITH NOVOLOG AND LANTUS 500 each 3     metoprolol succinate ER (TOPROL-XL) 50 MG 24 hr tablet Take 1 tablet (50 mg) by mouth daily 90 tablet 3     Multiple Vitamins-Minerals (MULTIVITAMIN ADULT PO) Take 1 tablet by mouth every morning        Urine Glucose-Ketones Test (KETO-DIASTIX) STRP 1 strip by In Vitro route as needed 1 each 11       No Known Allergies     Social History     Tobacco Use     Smoking status: Never Smoker     Smokeless tobacco: Never Used   Substance Use Topics     Alcohol use: Yes     Comment: 1 to 2 beers or glasses of wine 1 to 2 times per month     Family History   Problem Relation Age of Onset     Hyperlipidemia Mother      Breast Cancer Mother      Other Cancer Father      Leukemia Father      Skin Cancer Father      Coronary Artery Disease Maternal Uncle      Brain Cancer Maternal Uncle      Coronary Artery Disease Maternal Uncle      Stomach Cancer Maternal Aunt      Diabetes No family hx of      Hypertension No family hx of      Cerebrovascular Disease No family hx of      Colon Cancer No family hx of      Thyroid Disease No family hx of      Depression No family hx of      Anxiety Disorder No family hx of      History   Drug Use No         Objective     /60 (BP Location: Right arm)  "  Pulse 111   Temp 98.1  F (36.7  C) (Oral)   Resp 25   Ht 1.651 m (5' 5\")   Wt 77.8 kg (171 lb 9.6 oz)   SpO2 98%   BMI 28.56 kg/m      Physical Exam    GENERAL APPEARANCE: healthy, alert and no distress     EYES: EOMI, PERRL     HENT: ear canals and TM's normal and nose and mouth without ulcers or lesions     NECK: no adenopathy, no asymmetry, masses, or scars and thyroid normal to palpation     RESP: lungs clear to auscultation - no rales, rhonchi or wheezes     CV: irregularly irregular rhythm     SKIN: no suspicious lesions or rashes     NEURO: Normal strength and tone, sensory exam grossly normal, mentation intact and speech normal     PSYCH: mentation appears normal. and affect normal/bright     LYMPHATICS: No cervical adenopathy    Recent Labs   Lab Test 05/02/22  0000 11/17/21  1409 11/15/21  0000 09/23/21  0939 08/05/21  1019   HGB  --   --   --   --  14.2   PLT  --   --   --   --  209   NA  --   --   --   --  140   POTASSIUM  --   --   --  3.9 4.2   CR  --  0.8  --   --  0.76   A1C 7.9*  --  7.6*  --   --       Creatinine clearance 79.22 mL/min    Diagnostics:  EKG: atrial fibrillation with rate 100, rate right bundle branch block, when compared to previous EKG 5/11/21 unchanged    11/30/21 - Echocardiogram with two-dimensional, color and spectral Doppler performed.  ______________________________________________________________________________  Interpretation Summary     Global and regional left ventricular function is normal with an EF of 55-60%.  Global right ventricular function is normal.  Trileaflet aortic sclerosis without stenosis. Mld to moderate aortic  insufficiency is present. Mild mitral and tricuspid insufficiency present.  The inferior vena cava was normal in size with preserved respiratory  variability.  No pericardial effusion is present.  ______________________________________________________________________________  Left Ventricle  Left ventricular size is normal. Left " ventricular wall thickness is normal.  Global and regional left ventricular function is normal with an EF of 55-60%.  Diastolic function not assessed due to atrial fibrillation. No regional wall  motion abnormalities are seen.     Right Ventricle  The right ventricle is normal size. Global right ventricular function is  normal.     Atria  Mild biatrial enlargement is present. The atrial septum is intact as assessed  by color Doppler .     Mitral Valve  Mild mitral insufficiency is present.     Aortic Valve  Trileaflet aortic sclerosis without stenosis. Mild to moderate aortic  insufficiency is present.     Tricuspid Valve  Mild tricuspid insufficiency is present. The right ventricular systolic  pressure is approximated at 26.4 mmHg plus the right atrial pressure.  Pulmonary artery systolic pressure is normal.     Pulmonic Valve  The pulmonic valve is normal.     Vessels  The aorta root is normal. The inferior vena cava was normal in size with  preserved respiratory variability.     Pericardium  No pericardial effusion is present.     Compared to Previous Study  There is no prior study for direct comparison.      Procedure: CTA ANGIOGRAM CORONARY ARTERY   Examination Date: 11/17/2021 3:31 PM   Indication: New-onset atrial fibrillation.    Ordering Provider: SB REILLY  Overall quality of the study: Good.      PROCEDURE: ECG gated multi-slice computed tomography of the heart with  and without intravenous contrast  (Isovue 370, 120 mL at rate of 6.0  mL/sec) was performed on a Siemens Dual Source Flash scanner without  incident. Beta-blockers and ivabradine were used to optimize heart  rate (metoprolol 150 mg oral, metoprolol 20 mg IV, and ivabradine 15  mg PO). Sublingual Nitrostat 0.8 mg was given prior to scanning.  Coronary artery calcium scoring was performed using the Flash scanner  protocol. The CTA portion was performed in the spiral mode at a heart  rate of 101 bpm with 100 kVp. Images were reconstructed and  analyzed  on a [employer] workstation. The scan protocol was optimized to  minimize radiation exposure. The total radiation exposure, including  calcium artery calcium scoring, was calculated to be 437 DLP and 6.1  mSv.                                                                        IMPRESSION:  1.  Widely patent coronary arteries without evidence of  atherosclerosis or stenosis.  2.  Total Agatston score 0 placing the patient in the lowest  percentile when compared to an age- and gender-matched control group.  3.  Please review the separate Radiology report for incidental  noncardiac findings. This report will follow separately.     FINDINGS:     CORONARY CALCIUM SCORE     Total Agatston calcium score: 0   Left main: 0  Left anterior descendin  Left circumflex: 0  Right coronary artery: 0   This places the patient in the lowest  percentile when compared to an  age- and gender-matched control group.     CORONARY ANGIOGRAPHY     DOMINANCE: Right dominant system.   Normal coronary origins and course.     LEFT MAIN:   The LM is a large caliber vessel.   The left main arises normally from the left coronary cusp and is  widely patent without detectable atherosclerosis or stenosis.      LEFT ANTERIOR DESCENDING:   The LAD is a moderate caliber vessel. It has a type III morphology. It  gives rise to a moderate caliber first diagonal branch.   The left anterior descending and its major diagonal branches are  widely patent without detectable atherosclerosis or stenosis.     LEFT CIRCUMFLEX:   The LCx is a moderate caliber vessel. It gives rise to a moderate  caliber first obtuse marginal branch and small caliber second and  third obtuse marginal branches.   The left circumflex and its major branches are widely patent without  detectable atherosclerosis or stenosis.     RIGHT CORONARY ARTERY:   The RCA is a moderate caliber, dominant vessel.    The right coronary artery and its major branches are widely  patent  without detectable atherosclerosis or stenosis.     ADDITIONAL FINDINGS:   There is severe biatrial enlargement.  There is mild calcification of the ascending and descending thoracic  aorta.   The proximal ascending aorta is normal in size.   Normal pulmonary venous anatomy with all four pulmonary veins draining  into the left atrium.    There is no left ventricular mass or thrombus.   Normal pericardial thickness. There is no pericardial effusion.  Please review the separate Radiology report for incidental noncardiac  findings.     SB REILLY MD         Revised Cardiac Risk Index (RCRI):  The patient has the following serious cardiovascular risks for perioperative complications:   - Diabetes Mellitus (on Insulin) = 1 point     RCRI Interpretation: 1 point: Class II (low risk - 0.9% complication rate)           Signed Electronically by: Gilda Massey NP  Copy of this evaluation report is provided to requesting physician.

## 2022-06-10 ENCOUNTER — MYC MEDICAL ADVICE (OUTPATIENT)
Dept: FAMILY MEDICINE | Facility: CLINIC | Age: 72
End: 2022-06-10

## 2022-06-10 ENCOUNTER — LAB (OUTPATIENT)
Dept: URGENT CARE | Facility: URGENT CARE | Age: 72
End: 2022-06-10
Payer: COMMERCIAL

## 2022-06-10 DIAGNOSIS — I48.11 LONGSTANDING PERSISTENT ATRIAL FIBRILLATION (H): ICD-10-CM

## 2022-06-10 DIAGNOSIS — Z11.59 ENCOUNTER FOR SCREENING FOR OTHER VIRAL DISEASES: ICD-10-CM

## 2022-06-10 PROCEDURE — U0003 INFECTIOUS AGENT DETECTION BY NUCLEIC ACID (DNA OR RNA); SEVERE ACUTE RESPIRATORY SYNDROME CORONAVIRUS 2 (SARS-COV-2) (CORONAVIRUS DISEASE [COVID-19]), AMPLIFIED PROBE TECHNIQUE, MAKING USE OF HIGH THROUGHPUT TECHNOLOGIES AS DESCRIBED BY CMS-2020-01-R: HCPCS

## 2022-06-10 PROCEDURE — U0005 INFEC AGEN DETEC AMPLI PROBE: HCPCS

## 2022-06-11 LAB — SARS-COV-2 RNA RESP QL NAA+PROBE: NEGATIVE

## 2022-06-14 ENCOUNTER — HOSPITAL ENCOUNTER (OUTPATIENT)
Facility: AMBULATORY SURGERY CENTER | Age: 72
Discharge: HOME | End: 2022-06-14
Attending: UROLOGY | Admitting: UROLOGY
Payer: COMMERCIAL

## 2022-06-14 VITALS
WEIGHT: 171 LBS | HEART RATE: 90 BPM | BODY MASS INDEX: 28.49 KG/M2 | RESPIRATION RATE: 16 BRPM | OXYGEN SATURATION: 98 % | DIASTOLIC BLOOD PRESSURE: 73 MMHG | TEMPERATURE: 97.4 F | HEIGHT: 65 IN | SYSTOLIC BLOOD PRESSURE: 128 MMHG

## 2022-06-14 DIAGNOSIS — E10.9 TYPE 1 DIABETES MELLITUS WITHOUT COMPLICATION (H): ICD-10-CM

## 2022-06-14 LAB — GLUCOSE BLDC GLUCOMTR-MCNC: 175 MG/DL (ref 70–99)

## 2022-06-14 PROCEDURE — 82962 GLUCOSE BLOOD TEST: CPT | Performed by: PATHOLOGY

## 2022-06-14 PROCEDURE — 64561 IMPLANT NEUROELECTRODES: CPT | Mod: LT

## 2022-06-14 PROCEDURE — C1897 LEAD, NEUROSTIM TEST KIT: HCPCS

## 2022-06-14 PROCEDURE — 64561 IMPLANT NEUROELECTRODES: CPT | Mod: 50 | Performed by: UROLOGY

## 2022-06-14 RX ORDER — BUPIVACAINE HYDROCHLORIDE 2.5 MG/ML
INJECTION, SOLUTION INFILTRATION; PERINEURAL PRN
Status: DISCONTINUED | OUTPATIENT
Start: 2022-06-14 | End: 2022-06-14 | Stop reason: HOSPADM

## 2022-06-14 NOTE — TELEPHONE ENCOUNTER
Routing refill request to provider for review/approval because:  INR requires provider review.     Valorie Vail RN, BSN  [facility]

## 2022-06-14 NOTE — OP NOTE
OPERATIVE REPORT  Date: 06/14/22  Pre-op Dx: Urinary urgency, urge incontinence  Post-op Dx: Same   Procedure performed: Percutaneous neural examination   Surgeon: Alberto Zhang MD   Assistant surgeons: Francisco Alvarez MD; Yuri Barbour MD  Anesthesia: Local   EBL: 1 cc   Complications: None   Disposition: Stable to PACU   Clinical Indication: Ms. Latoya Rodrgíuez is a 71 year old female with a hx of urinary urgency and urge incontinence.  She has tried different treatments for her problem and they have not been successful.  After discussing further management options, the patient has elected to proceed with a percutaneous neural examination as a trial for a permanent sacral nerve stimulation.    DESCRIPTION OF PROCEDURE: The patient was identified correctly, consented and placed in the prone position.  The patient's sacral area was prepped and draped in the usual sterile fashion.  Using the fluoroscope in the AP position the medial aspects of the sacral foramena were identified and marked.  The fluoroscope was then placed in the lateral position and the S3 foramen was identified and marked.  Using marcaine with bicarb bilateral insertion sites were injected to anesthetize the skin.  Once this was achieved a 3 1/2 inch needle was inserted on the right side until it was passed through the S3 foramen as seen on fluoroscopy.  Next the needle was tested and the patient had sensory response near left anus and lower to mid  buttocks at about 1.2 mA. The same was then done on the right 0.2 mA with only tracy response and no sensory response. The right side was the believed to be the stronger response. LEFT is lower in the foramen. On our intra-op X-rays with multiple needles, the lower needles were in the S3 foramen.    At this point the stilette was removed from the insertion needle and the electrode was passed until the 3 1/2 inch lena on both sides.  The needle was pulled out leaving the electrode in place.   These were secured down with steri-strips and a dressing was applied.    All appropriate wires were put in place and attached to the generator and the [employer] representative went over instructions with the patient.    Ms. Latoya Rodríguez will f/u with me in 1 week for lead removal and to go over results of the trial.    Thank you for allowing me to participate in the care of  Ms. Latoya Rodríguez and I will keep you updated on her progress.      Francisco Alvarez MD  Urology, PGY-4  (p) [phone]    Patient was seen, evaluated and plan was formulated in conjunction with me and I agree with the above.  I was present for the entire procedure.  Alberto Zhang MD

## 2022-06-17 RX ORDER — BLOOD SUGAR DIAGNOSTIC
STRIP MISCELLANEOUS
Qty: 550 STRIP | Refills: 3 | Status: SHIPPED | OUTPATIENT
Start: 2022-06-17 | End: 2023-04-06

## 2022-06-17 NOTE — TELEPHONE ENCOUNTER
ACCU-CHEK GUIDE  STRP    Resent order to Pharmacy.   First order from 5/2/2022 was not received.       Daysi Justice RN  Central Triage Red Flags/Med Refills

## 2022-06-19 ENCOUNTER — MEDICAL CORRESPONDENCE (OUTPATIENT)
Dept: HEALTH INFORMATION MANAGEMENT | Facility: CLINIC | Age: 72
End: 2022-06-19
Payer: COMMERCIAL

## 2022-06-20 ENCOUNTER — ALLIED HEALTH/NURSE VISIT (OUTPATIENT)
Dept: NURSING | Facility: CLINIC | Age: 72
End: 2022-06-20
Payer: COMMERCIAL

## 2022-06-20 ENCOUNTER — VIRTUAL VISIT (OUTPATIENT)
Dept: UROLOGY | Facility: CLINIC | Age: 72
End: 2022-06-20

## 2022-06-20 DIAGNOSIS — R35.0 FREQUENCY OF URINATION: ICD-10-CM

## 2022-06-20 DIAGNOSIS — N39.41 URGE INCONTINENCE: Primary | ICD-10-CM

## 2022-06-20 DIAGNOSIS — R39.15 URGENCY OF URINATION: Primary | ICD-10-CM

## 2022-06-20 DIAGNOSIS — R39.15 URINARY URGENCY: ICD-10-CM

## 2022-06-20 PROCEDURE — 99207 PR NO CHARGE NURSE ONLY: CPT

## 2022-06-20 PROCEDURE — 99024 POSTOP FOLLOW-UP VISIT: CPT | Mod: 95 | Performed by: UROLOGY

## 2022-06-20 NOTE — H&P (VIEW-ONLY)
Latoya Rodríguez  who is being evaluated via a billable video visit.      How would you like to obtain your AVS? MyChart  If the video visit is dropped, the invitation should be resent by: Send to e-mail at: [email or web address] Work [employer]  Will anyone else be joining your video visit? No    Video-Visit Details    Type of service:  Video Visit    Video Start Time: 1:19 PM    Video End Time:1:36 PM    Originating Location (pt. Location): Home    Distant Location (provider location):  [facility]     Platform used for Video Visit: [employer]

## 2022-06-20 NOTE — PROGRESS NOTES
Latoya Rodríguez  who is being evaluated via a billable video visit.      How would you like to obtain your AVS? MyChart  If the video visit is dropped, the invitation should be resent by: Send to e-mail at: [email or web address]  Will anyone else be joining your video visit? No    Video-Visit Details    Type of service:  Video Visit    Video Start Time: 1:19 PM    Video End Time:1:36 PM    Originating Location (pt. Location): Home    Distant Location (provider location):  [facility]     Platform used for Video Visit: Taplet

## 2022-06-20 NOTE — PROGRESS NOTES
Reason for visit: F/u on therapy for urgency and frequency and urinary incontinence with PNE on 6/14/22  Clinical Data: Ms. Rodríguez is a 70 y/o female with the above. She was started on oxybutynin 15 mg xl as well as estrace cream and referred to pelvic floor PT. She did better on the anticholinergic however continued to have a lot of urgency and some urge incontinence. She was then switched to Detrol which did not work, then Sanctura which did but was too expensive and tried Vesicare but then Toviaz was less expensive and using Myrbetriq and that was working better than anything else but then it was not.  She would go for a walk with her dog and after an hour she would start to leak and after 1.5 hours she would just gush.  Another time that she will gush is when she sits up from a seated position.  This was confirmed by UDS.    She then underwent periurethral bulking for ISD on 5/11/21 and notes that her urinary frequency and incontinence have improved but she continues to have a little leakage associated with urgency but not as bad as the previous gushes.    HgA1 C is 7.2 but on a daily basis her sugars fluctuate quite a bit.  Of note she has been using her estrogen cream.    UDS on 2/24/21 reviewed:  -Normal bladder capacity (635 mL) with slightly delayed filling sensations (FS: 328 mL, FD: 333 mL, SD: 452 mL).  -Good bladder compliance.  -No DO or stress urinary incontinence while seated despite max Pabd 99 cm H2O at a volume of 349 mL.  -Patient is then asked to stand. There is no incontinence with the motion of standing. However, after a few seconds of standing in place, she then starts to experience mild terminal DO with incontinence when Pdet reaches 4.4 cm H2O. This leakage starts as just a trickle but quickly progresses to more of a gush at which point she is given permission to void.  -Good detrusor contraction during voiding to max Pdet 43 cm H2O with a brisk flow rate (Qmax 32 ml/s), bell-shape flow  curve, and complete bladder emptying (PVR 0 mL).  -Mildly increased EMG activity during voiding.  -No evidence for bladder outlet obstruction.  -Fluoroscopy reveals a mildly trabeculated bladder wall without diverticuli or VUR. The bladder neck is closed during filling. Voiding images unavailable as patient transferred to [facility] to void.    She underwent an Axonics PNE on 6/14/22 and reports three nights where she was able to wait over 5 hours at night.  Also, her urgency went from a level 4 to a level 2.  Frequency also improved.  She would report at least 60% improvement.  The RIGHT side worked best for her.  Although the first night the left side worked well.  ASSESSMENT and PLAN: This is a 71 year old female with urinary urgency, frequency and urge incontinence as well as a possible stress incontinence component.   She was helped to some degree with the periurthral bulking but continued to have urinary urgency and was frustrated.  She would like to proceed with permanent implant of a RIGHT sided lead and we again discussed the different options of rechargeable and non-rechargeable and she is interested in the Axonics non-rechargeable.  We discussed risk of infection, bleeding, and malfunction of the device.  -continue estrogen cream   -schedule axonics permanent implant with non-rechargeable device.    Thank you for allowing me to participate in the care of Ms. Latoya Rodríguez and I will keep you updated on her progress.   Alberto Zhang MD     43 minutes spent on the date of the encounter doing chart review, history and exam, documentation and further activities per the note

## 2022-06-20 NOTE — NURSING NOTE
Latoya Rodríguez comes into clinic today at the request of Dr. Zhang Ordering Provider for Axonics trial lead removal for diagnosis of urinary urgency.    Axonics trial device turned off and cord disconnected from the leads. Tegaderm, gauze, and steri strips removed. Bilateral leads removed with ease and leads intact. Patient's diaries from the trial period obtained and sent for stat scanning. Patient plans to follow up today with Dr. Zhang as scheduled.    This service provided today was under the supervising provider of the day Dr. Smyth, who was available if needed.    Jess Hadley RN

## 2022-06-23 ENCOUNTER — TELEPHONE (OUTPATIENT)
Dept: UROLOGY | Facility: CLINIC | Age: 72
End: 2022-06-23

## 2022-06-23 NOTE — TELEPHONE ENCOUNTER
Patient is scheduled for procedure with Dr. Zhang     Spoke with: Patient via phone     Date of Surgery: Tuesday July 05, 2022     Location: [facility] OR      Informed patient they will need an adult : Yes     Pre-op: Yes      H&P: Completed on 6/8/22      Pre-procedure COVID-19 Test: Friday July 01, 2022 at [facility]     Post-op: 2 weeks at [place]     Additional imaging/appointments:     Additional comments:      Surgery packet: NA    Patient is aware that surgery time is tentative to change and to expect a call 3-1 business days from Pre Admission Nursing for instructions and arrival time

## 2022-07-01 ENCOUNTER — LAB (OUTPATIENT)
Dept: URGENT CARE | Facility: URGENT CARE | Age: 72
End: 2022-07-01
Payer: COMMERCIAL

## 2022-07-01 ENCOUNTER — ANESTHESIA EVENT (OUTPATIENT)
Dept: SURGERY | Facility: AMBULATORY SURGERY CENTER | Age: 72
End: 2022-07-01
Payer: COMMERCIAL

## 2022-07-01 DIAGNOSIS — Z01.812 ENCOUNTER FOR PREOPERATIVE SCREENING LABORATORY TESTING FOR COVID-19 VIRUS: Primary | ICD-10-CM

## 2022-07-01 DIAGNOSIS — Z11.52 ENCOUNTER FOR PREOPERATIVE SCREENING LABORATORY TESTING FOR COVID-19 VIRUS: Primary | ICD-10-CM

## 2022-07-01 LAB — SARS-COV-2 RNA RESP QL NAA+PROBE: NEGATIVE

## 2022-07-01 PROCEDURE — U0005 INFEC AGEN DETEC AMPLI PROBE: HCPCS

## 2022-07-01 PROCEDURE — U0003 INFECTIOUS AGENT DETECTION BY NUCLEIC ACID (DNA OR RNA); SEVERE ACUTE RESPIRATORY SYNDROME CORONAVIRUS 2 (SARS-COV-2) (CORONAVIRUS DISEASE [COVID-19]), AMPLIFIED PROBE TECHNIQUE, MAKING USE OF HIGH THROUGHPUT TECHNOLOGIES AS DESCRIBED BY CMS-2020-01-R: HCPCS

## 2022-07-05 ENCOUNTER — HOSPITAL ENCOUNTER (OUTPATIENT)
Facility: AMBULATORY SURGERY CENTER | Age: 72
Discharge: HOME | End: 2022-07-05
Attending: UROLOGY
Payer: COMMERCIAL

## 2022-07-05 ENCOUNTER — ANESTHESIA (OUTPATIENT)
Dept: SURGERY | Facility: AMBULATORY SURGERY CENTER | Age: 72
End: 2022-07-05
Payer: COMMERCIAL

## 2022-07-05 VITALS
TEMPERATURE: 97 F | RESPIRATION RATE: 16 BRPM | OXYGEN SATURATION: 96 % | BODY MASS INDEX: 28.43 KG/M2 | WEIGHT: 170.86 LBS | HEART RATE: 78 BPM | SYSTOLIC BLOOD PRESSURE: 115 MMHG | DIASTOLIC BLOOD PRESSURE: 66 MMHG

## 2022-07-05 DIAGNOSIS — I48.11 LONGSTANDING PERSISTENT ATRIAL FIBRILLATION (H): ICD-10-CM

## 2022-07-05 DIAGNOSIS — N39.41 URGE INCONTINENCE: Primary | ICD-10-CM

## 2022-07-05 LAB — GLUCOSE BLDC GLUCOMTR-MCNC: 158 MG/DL (ref 70–99)

## 2022-07-05 PROCEDURE — 64561 IMPLANT NEUROELECTRODES: CPT | Mod: RT | Performed by: UROLOGY

## 2022-07-05 PROCEDURE — 64590 INS/RPL PRPH SAC/GSTR NPG/R: CPT

## 2022-07-05 PROCEDURE — 82962 GLUCOSE BLOOD TEST: CPT | Performed by: PATHOLOGY

## 2022-07-05 PROCEDURE — C1778 LEAD, NEUROSTIMULATOR: HCPCS

## 2022-07-05 PROCEDURE — C1767 GENERATOR, NEURO NON-RECHARG: HCPCS

## 2022-07-05 PROCEDURE — 64590 INS/RPL PRPH SAC/GSTR NPG/R: CPT | Mod: GC | Performed by: UROLOGY

## 2022-07-05 PROCEDURE — 64561 IMPLANT NEUROELECTRODES: CPT | Mod: RT

## 2022-07-05 DEVICE — IMPLANTABLE DEVICE: Type: IMPLANTABLE DEVICE | Site: BACK | Status: FUNCTIONAL

## 2022-07-05 RX ORDER — METOPROLOL TARTRATE 1 MG/ML
2.5 INJECTION, SOLUTION INTRAVENOUS ONCE
Status: COMPLETED | OUTPATIENT
Start: 2022-07-05 | End: 2022-07-05

## 2022-07-05 RX ORDER — SODIUM CHLORIDE, SODIUM LACTATE, POTASSIUM CHLORIDE, CALCIUM CHLORIDE 600; 310; 30; 20 MG/100ML; MG/100ML; MG/100ML; MG/100ML
INJECTION, SOLUTION INTRAVENOUS CONTINUOUS PRN
Status: DISCONTINUED | OUTPATIENT
Start: 2022-07-05 | End: 2022-07-05

## 2022-07-05 RX ORDER — OXYCODONE HYDROCHLORIDE 5 MG/1
5 TABLET ORAL EVERY 4 HOURS PRN
Status: DISCONTINUED | OUTPATIENT
Start: 2022-07-05 | End: 2022-07-06 | Stop reason: HOSPADM

## 2022-07-05 RX ORDER — OXYCODONE HYDROCHLORIDE 5 MG/1
5 TABLET ORAL EVERY 6 HOURS PRN
Qty: 10 TABLET | Refills: 0 | Status: SHIPPED | OUTPATIENT
Start: 2022-07-05 | End: 2022-10-24

## 2022-07-05 RX ORDER — PROPOFOL 10 MG/ML
INJECTION, EMULSION INTRAVENOUS CONTINUOUS PRN
Status: DISCONTINUED | OUTPATIENT
Start: 2022-07-05 | End: 2022-07-05

## 2022-07-05 RX ORDER — ONDANSETRON 2 MG/ML
4 INJECTION INTRAMUSCULAR; INTRAVENOUS EVERY 30 MIN PRN
Status: DISCONTINUED | OUTPATIENT
Start: 2022-07-05 | End: 2022-07-06 | Stop reason: HOSPADM

## 2022-07-05 RX ORDER — FENTANYL CITRATE 50 UG/ML
25 INJECTION, SOLUTION INTRAMUSCULAR; INTRAVENOUS EVERY 5 MIN PRN
Status: DISCONTINUED | OUTPATIENT
Start: 2022-07-05 | End: 2022-07-05 | Stop reason: HOSPADM

## 2022-07-05 RX ORDER — CEFAZOLIN SODIUM 2 G/50ML
2 SOLUTION INTRAVENOUS
Status: COMPLETED | OUTPATIENT
Start: 2022-07-05 | End: 2022-07-05

## 2022-07-05 RX ORDER — LIDOCAINE HYDROCHLORIDE 20 MG/ML
INJECTION, SOLUTION INFILTRATION; PERINEURAL PRN
Status: DISCONTINUED | OUTPATIENT
Start: 2022-07-05 | End: 2022-07-05

## 2022-07-05 RX ORDER — BUPIVACAINE HYDROCHLORIDE 2.5 MG/ML
INJECTION, SOLUTION EPIDURAL; INFILTRATION; INTRACAUDAL PRN
Status: DISCONTINUED | OUTPATIENT
Start: 2022-07-05 | End: 2022-07-05 | Stop reason: HOSPADM

## 2022-07-05 RX ORDER — FENTANYL CITRATE 50 UG/ML
25 INJECTION, SOLUTION INTRAMUSCULAR; INTRAVENOUS
Status: DISCONTINUED | OUTPATIENT
Start: 2022-07-05 | End: 2022-07-06 | Stop reason: HOSPADM

## 2022-07-05 RX ORDER — SODIUM CHLORIDE, SODIUM LACTATE, POTASSIUM CHLORIDE, CALCIUM CHLORIDE 600; 310; 30; 20 MG/100ML; MG/100ML; MG/100ML; MG/100ML
INJECTION, SOLUTION INTRAVENOUS CONTINUOUS
Status: DISCONTINUED | OUTPATIENT
Start: 2022-07-05 | End: 2022-07-06 | Stop reason: HOSPADM

## 2022-07-05 RX ORDER — DEXAMETHASONE SODIUM PHOSPHATE 4 MG/ML
INJECTION, SOLUTION INTRA-ARTICULAR; INTRALESIONAL; INTRAMUSCULAR; INTRAVENOUS; SOFT TISSUE PRN
Status: DISCONTINUED | OUTPATIENT
Start: 2022-07-05 | End: 2022-07-05

## 2022-07-05 RX ORDER — ONDANSETRON 2 MG/ML
INJECTION INTRAMUSCULAR; INTRAVENOUS PRN
Status: DISCONTINUED | OUTPATIENT
Start: 2022-07-05 | End: 2022-07-05

## 2022-07-05 RX ORDER — FENTANYL CITRATE 50 UG/ML
INJECTION, SOLUTION INTRAMUSCULAR; INTRAVENOUS PRN
Status: DISCONTINUED | OUTPATIENT
Start: 2022-07-05 | End: 2022-07-05

## 2022-07-05 RX ORDER — MEPERIDINE HYDROCHLORIDE 25 MG/ML
12.5 INJECTION INTRAMUSCULAR; INTRAVENOUS; SUBCUTANEOUS
Status: DISCONTINUED | OUTPATIENT
Start: 2022-07-05 | End: 2022-07-06 | Stop reason: HOSPADM

## 2022-07-05 RX ORDER — ONDANSETRON 4 MG/1
4 TABLET, ORALLY DISINTEGRATING ORAL EVERY 30 MIN PRN
Status: DISCONTINUED | OUTPATIENT
Start: 2022-07-05 | End: 2022-07-06 | Stop reason: HOSPADM

## 2022-07-05 RX ORDER — CEFAZOLIN SODIUM 2 G/50ML
2 SOLUTION INTRAVENOUS SEE ADMIN INSTRUCTIONS
Status: DISCONTINUED | OUTPATIENT
Start: 2022-07-05 | End: 2022-07-05 | Stop reason: HOSPADM

## 2022-07-05 RX ADMIN — SODIUM CHLORIDE, SODIUM LACTATE, POTASSIUM CHLORIDE, CALCIUM CHLORIDE: 600; 310; 30; 20 INJECTION, SOLUTION INTRAVENOUS at 08:00

## 2022-07-05 RX ADMIN — PROPOFOL 150 MCG/KG/MIN: 10 INJECTION, EMULSION INTRAVENOUS at 08:57

## 2022-07-05 RX ADMIN — METOPROLOL TARTRATE 2.5 MG: 1 INJECTION, SOLUTION INTRAVENOUS at 08:16

## 2022-07-05 RX ADMIN — Medication 100 MCG: at 09:11

## 2022-07-05 RX ADMIN — FENTANYL CITRATE 50 MCG: 50 INJECTION, SOLUTION INTRAMUSCULAR; INTRAVENOUS at 08:50

## 2022-07-05 RX ADMIN — DEXAMETHASONE SODIUM PHOSPHATE 4 MG: 4 INJECTION, SOLUTION INTRA-ARTICULAR; INTRALESIONAL; INTRAMUSCULAR; INTRAVENOUS; SOFT TISSUE at 09:00

## 2022-07-05 RX ADMIN — Medication 200 MCG: at 09:22

## 2022-07-05 RX ADMIN — LIDOCAINE HYDROCHLORIDE 60 MG: 20 INJECTION, SOLUTION INFILTRATION; PERINEURAL at 08:57

## 2022-07-05 RX ADMIN — ONDANSETRON 4 MG: 2 INJECTION INTRAMUSCULAR; INTRAVENOUS at 09:00

## 2022-07-05 RX ADMIN — CEFAZOLIN SODIUM 2 G: 2 SOLUTION INTRAVENOUS at 08:45

## 2022-07-05 ASSESSMENT — ENCOUNTER SYMPTOMS: DYSRHYTHMIAS: 1

## 2022-07-05 NOTE — OR NURSING
This writer witnessed the verbal consent between the patient and Dr POPEYE Zhang for the procedure listed for the insertion of sacral nerve stimulator, stage 1&2 in the PreOp room.  Glo Goodwin RN

## 2022-07-05 NOTE — OP NOTE
OPERATIVE REPORT: 7/5/2022    Pre-operative diagnosis: Urinary urgency and frequency and urge incontinence   Post-operative diagnosis: Same   Procedure: Stage I and II SNS placement  Interpretation of fluoroscopic imaging     Surgeon: Alberto Zhang MD   Assistant(s): Sagar Garcia MD      Anesthesia: Local anesthesia with MAC sedation       Estimated blood loss: 5mL     Complications: None   Condition: Patient taken to recovery in stable condition.     Findings: Placed tined lead in the right S3 foramen with biplanar flouroscopic guidance. Trialed S4 foramen as well, but unable to elicit response beyond toe response. The interstim generator was placed in the right buttocks.     Indications:   Latoya Rodríguez is a 71 year old woman with urinary urgency and frequency and urge incontinence refractory to medical management. She elected to proceed with a PNE on 6/14/2022, which proved to be beneficial, and she has therefore elected to proceed with Axonics SNS placement understanding the risks for infection, pain, bleeding, and the need for future procedures and risks of anesthesia. She received IV antibiotics prior to the procedure initiation.    Procedure:    The patient was identified correctly, consented and placed in the prone position on the operating room table. The patient's sacral area was prepped and draped in the usual sterile fashion. A timeout was then performed to confirm correct patient, procedure and operative site prior to procedure start.   Using the fluoroscope in the AP position the medial aspect of the right sacral foramena were identified and marked. The fluoroscope was placed in the lateral position and the S3 foramen was identified and marked. The films from the PNE were called up so as to evaluate for identical placement of the permanent electrode.    0.25% Marcaine was injected at the insertion site to anesthetize the skin. Once this was achieved a 3 1/2 inch needle was inserted on  the right side until it was passed through the S3 foramen as seen on fluoroscopy to match the previous placement. Next the needle was tested and the patient had a sensory and motor responses at about 0.5 mA. At this point the stilette was removed from the insertion needle and passer was placed. Her incision was widened to allow the dilator through which our permanent electrode was passed until the appropriate position on fluoroscopy. The electrode was tested and found to have good motor and sensory response at low amplitude (see specific measurements intra- and post-op below). The dilator was then removed under fluoroscopy ensuring our electrode was not displaced.   At this point a second incision approximately 4 cm in length was created at the right buttocks. We instilled 0.25% Marcaine at the insertion site to anesthetize the skin. Electrocautery was used to dissect down to the gluteal fascia.  Herein we created a small pouch that would accommodate the [employer] non-rechargeable battery. We then tunneled and connected the permanent electrode to the battery. We then closed meliza's with interrupted 3-0 vicryl stitches, closed the deep dermis with a running 3-0 vicryl stitch and then reapproximated the skin with a stratafix suture placed in a running subcuticular fashion, followed by dermabond at both the generator site. The electrode incision was approximated with 3-0 vicryl placed in a simple interrupted fashion and dermabond also applied.    The patient was then returned to the supine position and awoken from anesthesia. All instrument and counts were correct at the end of the procedure. She was then transferred to the PACU in stable condition    Plan: PACU, then discharge to home  Follow up on 7/25 for wound check    Sagar Garcia MD   Urology Resident, PGY-3  July 5, 2022    Post-Op Motor Response Measurements (Anal Michael, No Toe Response):  Lead  MA  1  0.80 mA  2  0.80 mA  3  0.50 mA  4  0.70  mA    Post-op Programmin  2    Base Amplitude:  0.45 mA 0.85 mA  Max Amplitude:   2.0 mA  2.0 mA  Frequency:   14 Hz  14 Hz  Pulse Width   210 uS  210 uS  Cycling:   Off  Off  Ramp:    2 sec  4 sec    Patient was seen, evaluated and plan was formulated in conjunction with me and I agree with the above.  I was present for the entire procedure.  Alberto Zhang MD

## 2022-07-05 NOTE — ANESTHESIA PREPROCEDURE EVALUATION
Anesthesia Pre-Procedure Evaluation    Patient: Latoya Rodríguez   MRN: [id number] : 1950        Procedure : Procedure(s):  INSERTION, SACRAL NERVE STIMULATOR, STAGE 1 & 2 (permanent implant on the RIGHT side with Axonics)          Past Medical History:   Diagnosis Date     Cataract      Chronic atrial fibrillation (H) 2022     Type 2 diabetes mellitus with hyperglycemia (H) 2016      Past Surgical History:   Procedure Laterality Date     ANESTHESIA CARDIOVERSION N/A 2021    Procedure: ANESTHESIA, FOR CARDIOVERSION@1100;  Surgeon: GENERIC ANESTHESIA PROVIDER;  Location: [facility] OR     CYSTOSCOPY, INJECT COLLAGEN, COMBINED N/A 2021    Procedure: CYSTOSCOPY, WITH PERIURETHRAL BULKING AGENT INJECTION;  Surgeon: Alberto Zhang MD;  Location: [facility] OR     EYE SURGERY  ,     Retinal detachment, Cataract (both eyes)     IMPLANT STIMULATOR SACRAL NERVE PERCUTANEOUS TRIAL N/A 2022    Procedure: INSERTION, NEUROSTIMULATOR, SACRAL, PERCUTANEOUS, FOR TRIAL;  Surgeon: Alberto Zhang MD;  Location: [facility] OR     RELEASE CARPAL TUNNEL Right 2021    Procedure: RELEASE, CARPAL TUNNEL, Right;  Surgeon: RADHA Sandoval MD;  Location: [facility] OR     RELEASE TRIGGER FINGER Bilateral 5/10/2019    Procedure: RELEASE TRIGGER FINGER, BILATERAL LONG AND RING FINGERS;  Surgeon: RADHA Sandoval MD;  Location: [facility] OR     VASCULAR SURGERY      Varicose Veins      No Known Allergies   Social History     Tobacco Use     Smoking status: Never Smoker     Smokeless tobacco: Never Used   Substance Use Topics     Alcohol use: Yes     Comment: 1 to 2 beers or glasses of wine 1 to 2 times per month      Wt Readings from Last 1 Encounters:   22 77.5 kg (170 lb 13.7 oz)        Anesthesia Evaluation   Pt has had prior anesthetic. Type: General.        ROS/MED HX  ENT/Pulmonary:  - neg pulmonary ROS     Neurologic:  - neg neurologic ROS     Cardiovascular:     (+) -----Taking blood thinners  dysrhythmias, a-fib,     METS/Exercise Tolerance:     Hematologic:       Musculoskeletal:  - neg musculoskeletal ROS     GI/Hepatic:  - neg GI/hepatic ROS     Renal/Genitourinary:  - neg Renal ROS     Endo:     (+) type II DM,     Psychiatric/Substance Use:  - neg psychiatric ROS     Infectious Disease:       Malignancy:       Other:            Physical Exam    Airway  airway exam normal           Respiratory Devices and Support         Dental  no notable dental history         Cardiovascular   cardiovascular exam normal          Pulmonary   pulmonary exam normal                OUTSIDE LABS:  CBC:   Lab Results   Component Value Date    WBC 5.1 08/05/2021    WBC 3.8 (L) 06/24/2016    HGB 14.2 08/05/2021    HGB 14.4 06/24/2016    HCT 41.9 08/05/2021    HCT 40.7 06/24/2016     08/05/2021     06/24/2016     BMP:   Lab Results   Component Value Date     08/05/2021     08/02/2019    POTASSIUM 3.9 09/23/2021    POTASSIUM 4.2 08/05/2021    CHLORIDE 109 08/05/2021    CHLORIDE 103 08/02/2019    CO2 28 08/05/2021    CO2 24 08/02/2019    BUN 24 08/05/2021    BUN 16 08/02/2019    CR 0.8 11/17/2021    CR 0.76 08/05/2021     (H) 07/05/2022     (H) 06/14/2022     COAGS: No results found for: PTT, INR, FIBR  POC:   Lab Results   Component Value Date     (H) 05/11/2021     HEPATIC:   Lab Results   Component Value Date    ALBUMIN 3.9 08/05/2021    PROTTOTAL 6.6 (L) 08/05/2021    ALT 40 08/05/2021    AST 26 08/05/2021    ALKPHOS 77 08/05/2021    BILITOTAL 0.8 08/05/2021     OTHER:   Lab Results   Component Value Date    A1C 7.9 (A) 05/02/2022    RENATO 9.2 08/05/2021    MAG 2.1 09/23/2021    TSH 1.42 08/05/2021    T4 1.33 07/03/2017    T3 82 03/13/2017       Anesthesia Plan    ASA Status:  3   NPO Status:  NPO Appropriate    Anesthesia Type: MAC.     - Reason for MAC: straight local not clinically adequate   Induction: Intravenous.   Maintenance: TIVA.        Consents    Anesthesia Plan(s)  and associated risks, benefits, and realistic alternatives discussed. Questions answered and patient/representative(s) expressed understanding.     - Discussed: Risks, Benefits and Alternatives for BOTH SEDATION and the PROCEDURE were discussed     - Discussed with:  Patient         Postoperative Care    Pain management: Oral pain medications.   PONV prophylaxis: Ondansetron (or other 5HT-3), Dexamethasone or Solumedrol     Comments:    Other Comments: Hasn't taken metoprolol for 2 days, given 2.5 mg IV in preop            Harsh More MD, MD

## 2022-07-05 NOTE — BRIEF OP NOTE
[facility]    Brief Operative Note    Pre-operative diagnosis: Urge incontinence [N39.41]  Urinary urgency [R39.15]  Frequency of urination [R35.0]  Post-operative diagnosis Same as pre-operative diagnosis    Procedure: Procedure(s):  INSERTION, SACRAL NERVE STIMULATOR, STAGE 1 & 2 (permanent implant on the RIGHT side with Axonics)  Surgeon: Surgeon(s) and Role:     * Alberto Zhang MD - Primary     * Sagar Garcia MD - Resident - Assisting  Anesthesia: Monitor Anesthesia Care   Estimated Blood Loss: 5 mL from 7/5/2022  8:51 AM to 7/5/2022 10:06 AM      Drains: None  Specimens: * No specimens in log *  Findings:   Lead placed in right S3 foramen, see operative note for intra- and post-operative measurements.  Complications: None.  Implants:   Implant Name Type Inv. Item Serial No.  Lot No. LRB No. Used Action   Axonics Tined Lead Kit   [id number] AXONICS  Right 1 Implanted   Axonics Neurostimulator   [id number] AXONICS  Right 1 Implanted

## 2022-07-05 NOTE — ANESTHESIA POSTPROCEDURE EVALUATION
Patient: Latoya Rodríguez    Procedure: Procedure(s):  INSERTION, SACRAL NERVE STIMULATOR, STAGE 1 & 2 (permanent implant on the RIGHT side with Axonics)       Anesthesia Type:  MAC    Note:  Disposition: Outpatient   Postop Pain Control: Uneventful            Sign Out: Well controlled pain   PONV: No   Neuro/Psych: Uneventful            Sign Out: Acceptable/Baseline neuro status   Airway/Respiratory: Uneventful            Sign Out: Acceptable/Baseline resp. status   CV/Hemodynamics: Uneventful            Sign Out: Acceptable CV status; No obvious hypovolemia; No obvious fluid overload   Other NRE: NONE   DID A NON-ROUTINE EVENT OCCUR? No           Last vitals:  Vitals Value Taken Time   /62 07/05/22 1007   Temp 36.1  C (97  F) 07/05/22 1007   Pulse 89 07/05/22 1007   Resp 16 07/05/22 1007   SpO2 97 % 07/05/22 1007       Electronically Signed By: Harsh More MD, MD  July 5, 2022  10:13 AM

## 2022-07-05 NOTE — PROGRESS NOTES
Oxycodone was not sent home with patient after her procedure. She was called after discovering this and she said she doesn't think she will need it. It was sent back to pharmacy to keep on file and she will call there if she needs it.

## 2022-07-05 NOTE — INTERVAL H&P NOTE
"I have reviewed the surgical (or preoperative) H&P that is linked to this encounter, and examined the patient. There are no significant changes    Clinical Conditions Present on Arrival:  Clinically Significant Risk Factors Present on Admission                 # Coagulation Defect: home medication list includes an anticoagulant medication   # Overweight: Estimated body mass index is 28.46 kg/m  as calculated from the following:    Height as of 6/14/22: 1.651 m (5' 5\").    Weight as of 6/14/22: 77.6 kg (171 lb).       "

## 2022-07-05 NOTE — ANESTHESIA CARE TRANSFER NOTE
Patient: Latoya Rodríguez    Procedure: Procedure(s):  INSERTION, SACRAL NERVE STIMULATOR, STAGE 1 & 2 (permanent implant on the RIGHT side with Axonics)       Diagnosis: Urge incontinence [N39.41]  Urinary urgency [R39.15]  Frequency of urination [R35.0]  Diagnosis Additional Information: No value filed.    Anesthesia Type:   MAC     Note:    Oropharynx: oropharynx clear of all foreign objects and spontaneously breathing  Level of Consciousness: awake and drowsy  Oxygen Supplementation: room air      Dentition: dentition unchanged  Vital Signs Stable: post-procedure vital signs reviewed and stable  Report to RN Given: handoff report given  Patient transferred to: Phase II    Handoff Report: Identifed the Patient, Identified the Reponsible Provider, Reviewed the pertinent medical history, Discussed the surgical course, Reviewed Intra-OP anesthesia mangement and issues during anesthesia, Set expectations for post-procedure period and Allowed opportunity for questions and acknowledgement of understanding      Vitals:  Vitals Value Taken Time   /62 07/05/22 1007   Temp 36.1  C (97  F) 07/05/22 1007   Pulse 89 07/05/22 1007   Resp 16 07/05/22 1007   SpO2 97 % 07/05/22 1007       Electronically Signed By: URVASHI Omalley CRNA  July 5, 2022  10:11 AM

## 2022-07-05 NOTE — CARE PLAN
I spoke to patient prior to procedure and confirmed the surgery with her as well as side.  She verbally agreed and had signed the consent on the ipad which was witnessed by pre op nurse, however when she was asleep and ready for the procedure the signatures did no transfer to the computer.  We elected to proceed per patient's wishes rather than have to cancel the entire procedure.    Alberto Zhang MD

## 2022-07-05 NOTE — DISCHARGE INSTRUCTIONS
"[facility]  Home Care Following Anesthesia  For 24 hours after surgery:  Get plenty of rest.  A responsible adult must stay with you for at least 24 hours after you leave the surgery center.  Do not drive or use heavy equipment.  If you have weakness or tingling, don't drive or use heavy equipment until this feeling goes away.   Do not drink alcohol.   Avoid strenuous or risky activities.  Ask for help when climbing stairs.  You may feel lightheaded.  IF so, sit for a few minutes before standing.  Have someone help you get up.   If you have nausea (feel sick to your stomach): Drink only clear liquids such as apple juice, ginger ale, broth or 7-Up.  Rest may also help.  Be sure to drink enough fluids.  Move to a regular diet as you feel able.   You may have a slight fever.  Call the doctor if your fever is over 100 F (37.7 C) (taken under the tongue) or lasts longer than 24 hours.  You may have a dry mouth, a sore throat, muscle aches or trouble sleeping. These should go away after 24 hours.  Do not make important or legal decisions.   It is recommended to avoid smoking.        Today you received a Marcaine or bupivacaine block to numb the nerves near your surgery site.  This is a block using local anesthetic or \"numbing\" medication injected around the nerves to anesthetize or \"numb\" the area supplied by those nerves.  This block is injected into the muscle layer near your surgical site.  The medication may numb the location where you had surgery for 6-18 hours, but may last up to 24 hours.  If your surgical site is an arm or leg you should be careful with your affected limb, since it is possible to injure your limb without being aware of it due to the numbing.  Until full feeling returns, you should guard against bumping or hitting your limb, and avoid extreme hot or cold temperatures on the skin.  As the block wears off, the feeling will return as a tingling or prickly " sensation near your surgical site.  You will experience more discomfort from your incision as the feeling returns.  You may want to take a pain pill (a narcotic or Tylenol if this was prescribed by your surgeon) when you start to experience mild pain before the pain beccomes more severe.  If your pain medications do not control your pain you should notifiy your surgeon.    Tips for taking pain medications  To get the best pain relief possible, remember these points:  Take pain medications as directed, before pain becomes severe.  Pain medication can upset your stomach: taking it with food may help.  Constipation is a common side effect of pain medication. Drink plenty of  fluids.  Eat foods high in fiber. Take a stool softener if recommended by your doctor or pharmacist.  Do not drink alcohol, drive or operate machinery while taking pain medications.  Ask about other ways to control pain, such as with heat, ice or relaxation.    Tylenol/Acetaminophen Consumption  To help encourage the safe use of acetaminophen, the makers of TYLENOL  have lowered the maximum daily dose for single-ingredient Extra Strength TYLENOL  (acetaminophen) products sold in the [place] from 8 pills per day (4,000 mg) to 6 pills per day (3,000 mg). The dosing interval has also changed from 2 pills every 4-6 hours to 2 pills every 6 hours.  If you feel your pain relief is insufficient, you may take Tylenol/Acetaminophen in addition to your narcotic pain medication.   Be careful not to exceed 3,000 mg of Tylenol/Acetaminophen in a 24 hour period from all sources.  If you are taking extra strength Tylenol/acetaminophen (500 mg), the maximum dose is 6 tablets in 24 hours.  If you are taking regular strength acetaminophen (325 mg), the maximum dose is 9 tablets in 24 hours.    Call a doctor for any of the following:  Signs of infection (fever, growing tenderness at the surgery site, a large amount of drainage or bleeding, severe pain, foul-smelling  drainage, redness, swelling).  It has been over 8 to 10 hours since surgery and you are still not able to urinate (pass water).  Headache for over 24 hours.  Signs of Covid-19 infection (temperature over 100 degrees, shortness of breath, cough, loss of taste/smell, generalized body aches, persistent headache, chills, sore throat, nausea/vomiting/diarrhea)  Your doctor is:       Dr. Alberto Zhang, Prostate and Urology: [phone]               Or dial [phone] and ask for the resident on call for:  Prostate Urology  For emergency care, call the:  [facility]:  [phone] (TTY for hearing impaired: [phone])

## 2022-07-06 ENCOUNTER — TELEPHONE (OUTPATIENT)
Dept: ENDOCRINOLOGY | Facility: CLINIC | Age: 72
End: 2022-07-06

## 2022-07-06 NOTE — TELEPHONE ENCOUNTER
Patient contacted and rescheduled.    [facility]  Adult Endocrinology  [facility]

## 2022-07-06 NOTE — TELEPHONE ENCOUNTER
[facility] Call Center    Phone Message    May a detailed message be left on voicemail: yes     Reason for Call: Other: .      Per Patient is wanting to get a call back, Patient states she received a call about needing to reschedule her appt with Dr. Leon on 9/12/2022, Writer had tried to reschedule the appt, first available on 1/23/2023 and patient states she is wondering if she can be seen sooner since the appt was canceled by the provider. Please advise.     Action Taken: Message routed to:  [facility] ([facility] Endo    Travel Screening: Not Applicable

## 2022-07-13 DIAGNOSIS — E78.5 HYPERLIPIDEMIA WITH TARGET LDL LESS THAN 100: ICD-10-CM

## 2022-07-14 ENCOUNTER — TELEPHONE (OUTPATIENT)
Dept: NEUROSURGERY | Facility: CLINIC | Age: 72
End: 2022-07-14

## 2022-07-14 NOTE — TELEPHONE ENCOUNTER
I called patient to get more information regarding why she was being seen. Pt states she is having trouble walking and has neck pain.  Pt has seen Yany in August 2021 regarding hip issues and he mentioned injection but pt is not interested in injection. No images have been completed since last year. Pt is aware that Dr. Anderson does conserative treatments-mainly PT. Pt was scheduled with Dr. Gray but rescheduled with Dr. Justin Cardenas LPN

## 2022-07-14 NOTE — PROGRESS NOTES
SUBJECTIVE:  HPI:  Latoya Rodríguez  Is a 71 year old female who presents today for new patient evaluation of chronic low back pain.  My nurse called for some precharting information.  She has been noticing some weakness in her low back recently.  She has had no spinal imaging.  She reportedly saw Dr. Villalobos in a year ago who recommended injections and she did not want those.  She is self-referred.  She was initially scheduled with Dr. Gray and was moved to my schedule.  In reviewing the chart, her visit with Dr. Slade on 8/16/2021 was for nonradiating right hip pain, tenderness over the greater trochanter, positive AUSTYN, positive FADIR and he recommended lidocaine patches, Voltaren gel, HEP, and a hip injection.    Right hip x-ray 8/5/2021 shows moderate DJD, no fracture or AVN.  She is scheduled for repeat DEXA scan.  Osteopenia noted on 8/28/2020 DEXA scan but lumbar T score was -2.4, left femoral neck was -1.9 right femoral neck -1.6.    She elected not to get the right hip injection because she recalls Dr. Slade telling her to do the injection if things do not get better.  She was doing reasonably well until a month ago when for no reason her symptoms started getting dramatically worse primarily of global bilateral leg throbbing pain so bad that it wakes her at night.  He cannot lay flat.  If she is lying flat for a while and transitions to get up to go to the bathroom that makes her pain worse momentarily.  If she bears weight on her right leg it hurts in her right inguinal and right pelvic area.  The global leg throbbing however is her biggest issue right now.  Has had to stop walking her dog Brandie.  Getting into a car and putting on her socks makes it worse.  Rest makes it better.  Walking however does not make the leg pain worse.  Has not had any MRIs of her lumbar spine nor she had vascular studies of her legs.  Long-term unrelated bladder incontinence has not changed with this nor has she noticed  "any bowel incontinence or lack of sensation in the saddle area..  The specifically no leg numbness or weakness    Pain score and diagram reviewed.  See questionnaire.      ROS: .  Otherwise negative for bowel/bladder dysfunction, balance changes, headache, leg pain/numbness/weakness, fevers, chills, night sweats, unexplained weight loss;  otherwise unremarkable.  She has a recurrent itchy rash over her buttock area always in the same place that usually occurs when she is \"stressed\".  She has never had this evaluated.    See the patient's intake questionnaire from today for details.    Treatment to Date: None    MEDICATIONS:  Reviewed.    ALLERGIES:  Reviewed.     Reviewed past medical, surgical, and family history.    Pertinent for chronic atrial fibrillation, intrinsic sphincter deficiency and overactive bladder with stress incontinence, osteoporosis, type 1 diabetes and type 2 diabetes both listed in the chart, anticoagulated on apixaban, status post implantation of a bladder stimulator, on insulin and oxycodone.  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  She enjoys walking her dog Brandie..  She is .  She has 2 adult children and 2 grandchildren.  She she enjoys yard work and gardening and denies other sports hobbies and activities.    OBJECTIVE:    --CONSTITUTIONAL:   No acute distress.  The patient is well nourished and well groomed.  She transitions well but moves quite stiffly and slowly.  BMI is modestly increased  --PSYCHIATRIC:  Appropriate mood and affect. The patient is awake, alert, oriented to person, place, time and answering questions appropriately with clear speech.    --SKIN: There is a herpetiform rash crossing the midline in the left lower and right upper buttock.  Healing scar from recent right gluteal bladder stimulator implant  --RESPIRATORY: Normal respiratory excursion and effort, and no dyspnea.   --GAIT:  is " significantly right antalgic . Flat foot, heel and toe walking:  normal   .  Squat and rise   quite limited by right inguinal pain.  --STANDING EXAMINATION:    Symmetry of spine/pelvis   unremarkable   .      Range of motion painful in flexion hands to the mid shins.  25% restricted and dramatically painful in extension, and both ranges cause pain in the right inguinal area.   Standing flexion   negative   .    Leandro's sign   negative    .     Stork test   negative    .  Right inguinal pain with active right hip flexion  --NEUROLOGICAL:     ROMBERG,PRONATOR DRIFT:   Normal.   .  SENSATION to light touch is intact in bilateral thighs, lower legs and feet.   REFLEXES:  patellar 1+, and achilles trace.  Babinski is negative. No clonus.  MANUAL MOTOR TESTING:  L3- S1 Myotomes, Femoral, Obturator, Peroneal and Tibial nerves 5/5   DURAL STRETCH TESTS:  SLR negative.  Femoral Stretch Test negative.   --PELVIC/HIP JOINTS:                Long Sitting   negative   neck.    Hip scour     dramatically positive right.    Hip Impingement    dramatically positive right external rotation.   AUSTYN medically positive right.     Piriformis      dramatically positive right.   Spring testing right SI greater than L5 greater than left SI.      PELVIC ALIGNMENT neutral but she has a constitutionally caudal left ASIS.   --LUMBAR/GLUTEAL MUSCLES: Slightly tender left QL otherwise negative.    --ABDOMINAL:  Non-distended.  Nontender.  No palpable mass.  No bruit  --VASCULAR: Femoral pulses 2+ no bruit. Lower extremity capillary refill,  and color normal.   Bilateral leg slightly cool    IMAGING: None available    ASSESSMENT: Latoya Rodríguez is a 71 year old female who presents  today for new patient evaluation of:      Right hip pain    Bilateral throbbing global leg pain especially with rest, not worse with exercise, and awaking her at night    No evidence of a Pelvic Joint Dysfunction but she has a constitutionally caudal left  ASIS    Tenderness to spring testing over the right SI joint and L5 in particular    Herpetiform rash-recurrent posterior pelvis    Implanted bladder stimulator right buttock    Chronic atrial fibrillation, and insulin-dependent diabetes, and chronic stress incontinence.  Anticoagulated on apixaban      PLAN:  Lumbar and right hip MRI, YANCY.  She needs to coordinate the MRIs with her urologist to make sure that the bladder stimulator is MRI compatible.  I am likely to recommend that she have another discussion with Dr. Slade regarding a right hip injection after we get the MRI but because she is anticoagulated, she will have to coordinate with her cardiologist, and also with her endocrinologist because her blood sugars may need to have been adjusted insulin sliding scale.  I want her to get into her PCP right away to have a trial of antiviral medication for the rash in her buttocks.  While the rash is there if she can get into a dermatologist for a biopsy that would be optimal.  I wanted to get a full history and physical with her doctor because of her unremitting leg pain at night.      Advised patient to call or return early if symptoms worsen, or having problems controlling bladder and bowel function or worsening leg weakness.     Please note: Voice recognition software was used in this dictation.  It may therefore contain typographical errors.    Alvaro Anderson MD

## 2022-07-15 NOTE — TELEPHONE ENCOUNTER
SPINE PATIENTS - NEW PROTOCOL PREVISIT    RECORDS RECEIVED FROM: Internal   REASON FOR VISIT: chronic low back pain 1+ yrs   Date of Appt: 07/20/2022   NOTES (FOR ALL VISITS) STATUS DETAILS   OFFICE NOTE from referring provider N/A    OFFICE NOTE from other specialist Internal   08/16/2021 Dr Slade [facility]    DISCHARGE SUMMARY from hospital N/A    DISCHARGE REPORT from ER N/A    EMG REPORT N/A    MEDICATION LIST N/A    IMAGING  (FOR ALL VISITS)     MRI (HEAD, NECK, SPINE) N/A    XRAY (SPINE) *NEUROSURGERY* Internal 08/05/2021 RT hip   CT (HEAD, NECK, SPINE) N/A

## 2022-07-16 RX ORDER — ATORVASTATIN CALCIUM 10 MG/1
10 TABLET, FILM COATED ORAL DAILY
Qty: 90 TABLET | Refills: 3 | Status: SHIPPED | OUTPATIENT
Start: 2022-07-16 | End: 2023-07-21

## 2022-07-16 NOTE — TELEPHONE ENCOUNTER
ATORVASTATIN CALCIUM 10MG TABS  Last Written Prescription Date:   7/15/2021  Last Fill Quantity: 90,   # refills: 3  Last Office Visit :  5/2/2022  Future Office visit:   10/24/2022    Routing refill request to provider for review/approval because:  Needing updated LIPID Profile for this med  Refer to clinic for review and scheduling for Pt care.     Recent Labs   Lab Test 07/01/21  1054   LDL 90       Daysi Justice RN  Central Triage Red Flags/Med Refills

## 2022-07-18 DIAGNOSIS — E10.9 TYPE 1 DIABETES MELLITUS WITHOUT COMPLICATION (H): ICD-10-CM

## 2022-07-18 RX ORDER — INSULIN GLARGINE 100 [IU]/ML
35 INJECTION, SOLUTION SUBCUTANEOUS DAILY
Qty: 45 ML | Refills: 2 | Status: ON HOLD | OUTPATIENT
Start: 2022-07-18 | End: 2023-01-19

## 2022-07-18 NOTE — TELEPHONE ENCOUNTER
I am in process of applying to the Basaglar pen assistance program.  [facility] requires a hand signed, brand name, hard copy script be submitted with their application.    Please hand sign a BRAND name, hard copy script for:      BASAGLAR BIBIIK PEN     Please send the HARD COPY script to me     via interoffice mail  F[employer] Jennifer Jones     or via [place] mail  at:   [facility]  Jennifer Suarez  [address] [place]  [address]    .Thanks so much for your help!     Jennifer Suarez  Prescription Assistance Supervisor  Pharmacy Assistance  [phone]

## 2022-07-20 ENCOUNTER — OFFICE VISIT (OUTPATIENT)
Dept: NEUROSURGERY | Facility: CLINIC | Age: 72
End: 2022-07-20
Payer: COMMERCIAL

## 2022-07-20 ENCOUNTER — TELEPHONE (OUTPATIENT)
Dept: FAMILY MEDICINE | Facility: CLINIC | Age: 72
End: 2022-07-20

## 2022-07-20 ENCOUNTER — PRE VISIT (OUTPATIENT)
Dept: NEUROSURGERY | Facility: CLINIC | Age: 72
End: 2022-07-20

## 2022-07-20 VITALS
HEIGHT: 65 IN | HEART RATE: 86 BPM | SYSTOLIC BLOOD PRESSURE: 145 MMHG | WEIGHT: 170 LBS | BODY MASS INDEX: 28.32 KG/M2 | DIASTOLIC BLOOD PRESSURE: 85 MMHG

## 2022-07-20 DIAGNOSIS — M79.605 BILATERAL LEG PAIN: Primary | ICD-10-CM

## 2022-07-20 DIAGNOSIS — M79.604 BILATERAL LEG PAIN: Primary | ICD-10-CM

## 2022-07-20 DIAGNOSIS — M25.551 RIGHT HIP PAIN: ICD-10-CM

## 2022-07-20 DIAGNOSIS — R21 RASH AND NONSPECIFIC SKIN ERUPTION: ICD-10-CM

## 2022-07-20 DIAGNOSIS — I72.4 ANEURYSM OF ARTERY OF LOWER EXTREMITY (H): ICD-10-CM

## 2022-07-20 PROCEDURE — 99205 OFFICE O/P NEW HI 60 MIN: CPT | Performed by: PREVENTIVE MEDICINE

## 2022-07-20 ASSESSMENT — PAIN SCALES - GENERAL: PAINLEVEL: SEVERE PAIN (6)

## 2022-07-20 NOTE — NURSING NOTE
"Reason For Visit:   Chief Complaint   Patient presents with     Consult     Low back and hip pain right side         Occupation: former manager  Currently working? No.  Work status?  Retired.    Sports: n  Activities: walking              BP (!) 145/85   Pulse 86   Ht 1.651 m (5' 5\")   Wt 77.1 kg (170 lb)   BMI 28.29 kg/m        No Known Allergies    Current Outpatient Medications   Medication     apixaban ANTICOAGULANT (ELIQUIS) 5 MG tablet     atorvastatin (LIPITOR) 10 MG tablet     blood glucose (ACCU-CHEK GUIDE) test strip     blood glucose monitoring (ACCU-CHEK FASTCLIX) lancets     calcium carbonate-vitamin D 500-400 MG-UNIT TABS tablt     Continuous Blood Gluc  (DEXCOM G6 ) JUANA     Continuous Blood Gluc Sensor (DEXCOM G6 SENSOR) MISC     Continuous Blood Gluc Transmit (DEXCOM G6 TRANSMITTER) MISC     estradiol (ESTRACE) 0.1 MG/GM vaginal cream     insulin aspart (NOVOLOG FLEXPEN) 100 UNIT/ML pen     insulin glargine (BASAGLAR KWIKPEN) 100 UNIT/ML pen     insulin pen needle (B-D U/F) 31G X 8 MM miscellaneous     metoprolol succinate ER (TOPROL-XL) 50 MG 24 hr tablet     Multiple Vitamins-Minerals (MULTIVITAMIN ADULT PO)     oxyCODONE (ROXICODONE) 5 MG tablet     Urine Glucose-Ketones Test (KETO-DIASTIX) STRP     No current facility-administered medications for this visit.         Darla Severin-Brown, KACI  "

## 2022-07-20 NOTE — LETTER
7/20/2022         RE: Latoya Rodríguez  [address]  [place] [address]        Dear Colleague,    Thank you for referring your patient, Latoya Rodríguez, to the [facility] [place]. Please see a copy of my visit note below.        SUBJECTIVE:  HPI:  Latoya Rodríguez  Is a 71 year old female who presents today for new patient evaluation of chronic low back pain.  My nurse called for some precharting information.  She has been noticing some weakness in her low back recently.  She has had no spinal imaging.  She reportedly saw Dr. Villalobos in a year ago who recommended injections and she did not want those.  She is self-referred.  She was initially scheduled with Dr. Gray and was moved to my schedule.  In reviewing the chart, her visit with Dr. Slade on 8/16/2021 was for nonradiating right hip pain, tenderness over the greater trochanter, positive AUSTYN, positive FADIR and he recommended lidocaine patches, Voltaren gel, HEP, and a hip injection.    Right hip x-ray 8/5/2021 shows moderate DJD, no fracture or AVN.  She is scheduled for repeat DEXA scan.  Osteopenia noted on 8/28/2020 DEXA scan but lumbar T score was -2.4, left femoral neck was -1.9 right femoral neck -1.6.    She elected not to get the right hip injection because she recalls Dr. Slade telling her to do the injection if things do not get better.  She was doing reasonably well until a month ago when for no reason her symptoms started getting dramatically worse primarily of global bilateral leg throbbing pain so bad that it wakes her at night.  He cannot lay flat.  If she is lying flat for a while and transitions to get up to go to the bathroom that makes her pain worse momentarily.  If she bears weight on her right leg it hurts in her right inguinal and right pelvic area.  The global leg throbbing however is her biggest issue right now.  Has had to stop walking her dog Brandie.  Getting into a car and putting  "on her socks makes it worse.  Rest makes it better.  Walking however does not make the leg pain worse.  Has not had any MRIs of her lumbar spine nor she had vascular studies of her legs.  Long-term unrelated bladder incontinence has not changed with this nor has she noticed any bowel incontinence or lack of sensation in the saddle area..  The specifically no leg numbness or weakness    Pain score and diagram reviewed.  See questionnaire.      ROS: .  Otherwise negative for bowel/bladder dysfunction, balance changes, headache, leg pain/numbness/weakness, fevers, chills, night sweats, unexplained weight loss;  otherwise unremarkable.  She has a recurrent itchy rash over her buttock area always in the same place that usually occurs when she is \"stressed\".  She has never had this evaluated.    See the patient's intake questionnaire from today for details.    Treatment to Date: None    MEDICATIONS:  Reviewed.    ALLERGIES:  Reviewed.     Reviewed past medical, surgical, and family history.    Pertinent for chronic atrial fibrillation, intrinsic sphincter deficiency and overactive bladder with stress incontinence, osteoporosis, type 1 diabetes and type 2 diabetes both listed in the chart, anticoagulated on apixaban, status post implantation of a bladder stimulator, on insulin and oxycodone.  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  She enjoys walking her dog Brandie..  She is .  She has 2 adult children and 2 grandchildren.  She she enjoys yard work and gardening and denies other sports hobbies and activities.    OBJECTIVE:    --CONSTITUTIONAL:   No acute distress.  The patient is well nourished and well groomed.  She transitions well but moves quite stiffly and slowly.  BMI is modestly increased  --PSYCHIATRIC:  Appropriate mood and affect. The patient is awake, alert, oriented to person, place, time and answering questions appropriately with " clear speech.    --SKIN: There is a herpetiform rash crossing the midline in the left lower and right upper buttock.  Healing scar from recent right gluteal bladder stimulator implant  --RESPIRATORY: Normal respiratory excursion and effort, and no dyspnea.   --GAIT:  is significantly right antalgic . Flat foot, heel and toe walking:  normal   .  Squat and rise   quite limited by right inguinal pain.  --STANDING EXAMINATION:    Symmetry of spine/pelvis   unremarkable   .      Range of motion painful in flexion hands to the mid shins.  25% restricted and dramatically painful in extension, and both ranges cause pain in the right inguinal area.   Standing flexion   negative   .    Leandro's sign   negative    .     Stork test   negative    .  Right inguinal pain with active right hip flexion  --NEUROLOGICAL:     ROMBERG,PRONATOR DRIFT:   Normal.   .  SENSATION to light touch is intact in bilateral thighs, lower legs and feet.   REFLEXES:  patellar 1+, and achilles trace.  Babinski is negative. No clonus.  MANUAL MOTOR TESTING:  L3- S1 Myotomes, Femoral, Obturator, Peroneal and Tibial nerves 5/5   DURAL STRETCH TESTS:  SLR negative.  Femoral Stretch Test negative.   --PELVIC/HIP JOINTS:                Long Sitting   negative   neck.    Hip scour     dramatically positive right.    Hip Impingement    dramatically positive right external rotation.   AUSTYN medically positive right.     Piriformis      dramatically positive right.   Spring testing right SI greater than L5 greater than left SI.      PELVIC ALIGNMENT neutral but she has a constitutionally caudal left ASIS.   --LUMBAR/GLUTEAL MUSCLES: Slightly tender left QL otherwise negative.    --ABDOMINAL:  Non-distended.  Nontender.  No palpable mass.  No bruit  --VASCULAR: Femoral pulses 2+ no bruit. Lower extremity capillary refill,  and color normal.   Bilateral leg slightly cool    IMAGING: None available    ASSESSMENT: Latoya Rodríguez is a 71 year old female who  presents  today for new patient evaluation of:      Right hip pain    Bilateral throbbing global leg pain especially with rest, not worse with exercise, and awaking her at night    No evidence of a Pelvic Joint Dysfunction but she has a constitutionally caudal left ASIS    Tenderness to spring testing over the right SI joint and L5 in particular    Herpetiform rash-recurrent posterior pelvis    Implanted bladder stimulator right buttock    Chronic atrial fibrillation, and insulin-dependent diabetes, and chronic stress incontinence.  Anticoagulated on apixaban      PLAN:  Lumbar and right hip MRI, YANCY.  She needs to coordinate the MRIs with her urologist to make sure that the bladder stimulator is MRI compatible.  I am likely to recommend that she have another discussion with Dr. Slade regarding a right hip injection after we get the MRI but because she is anticoagulated, she will have to coordinate with her cardiologist, and also with her endocrinologist because her blood sugars may need to have been adjusted insulin sliding scale.  I want her to get into her PCP right away to have a trial of antiviral medication for the rash in her buttocks.  While the rash is there if she can get into a dermatologist for a biopsy that would be optimal.  I wanted to get a full history and physical with her doctor because of her unremitting leg pain at night.      Advised patient to call or return early if symptoms worsen, or having problems controlling bladder and bowel function or worsening leg weakness.     Please note: Voice recognition software was used in this dictation.  It may therefore contain typographical errors.    Alvaro Anderson MD             Again, thank you for allowing me to participate in the care of your patient.        Sincerely,        Alvaro Anderson MD

## 2022-07-20 NOTE — TELEPHONE ENCOUNTER
Neurosurgery office with Dr. Anderson calling to facilitate appointment for patient due to buttock rash. Per Dr. Anderson's note, advising patient to get in right away for trial of antiviral medication for buttock rash.     Next in person appointment with primary care provider is same day 7/26. Ok to schedule then? Defer to another provider with same day appointment tomorrow if available? Please advise    Alesia Simon RN  [facility]

## 2022-07-20 NOTE — PATIENT INSTRUCTIONS
It is very nice to meet you Latoya.  At this point there are too many questions and not enough answers.  There is a bunch of test we have to get as noted below.  See the assessment and plan for further details of our discussion today and come back and see me after all the tests are done.      ASSESSMENT: Latoya Rodríguez is a 71 year old female who presents  today for new patient evaluation of:    Right hip pain  Bilateral throbbing global leg pain especially with rest, not worse with exercise  No evidence of a Pelvic Joint Dysfunction but she has a constitutionally caudal left ASIS  Tenderness to spring testing over the right SI joint and L5 in particular  Herpetiform rash-recurrent posterior pelvis  Implanted bladder stimulator right buttock  Chronic atrial fibrillation, and insulin-dependent diabetes, and chronic stress incontinence.  Anticoagulated on apixaban      PLAN:  Lumbar and right hip MRI, YANCY.  She needs to coordinate the MRIs with her urologist to make sure that the bladder stimulator is MRI compatible.  I am likely to recommend that she have another discussion with Dr. Slade regarding a right hip injection after we get the MRI but because she is anticoagulated, she will have to coordinate with her cardiologist, and also with her endocrinologist because her blood sugars may need to have been adjusted insulin sliding scale.  I want her to get into her PCP right away to have a trial of antiviral medication for the rash in her buttocks.  While the rash is there if she can get into a dermatologist for a biopsy that would be optimal.  I wanted to get a full history and physical with her doctor because of her unremitting leg pain at night.

## 2022-07-21 ENCOUNTER — OFFICE VISIT (OUTPATIENT)
Dept: URGENT CARE | Facility: URGENT CARE | Age: 72
End: 2022-07-21

## 2022-07-21 ENCOUNTER — ANCILLARY PROCEDURE (OUTPATIENT)
Dept: ULTRASOUND IMAGING | Facility: CLINIC | Age: 72
End: 2022-07-21
Attending: PREVENTIVE MEDICINE
Payer: COMMERCIAL

## 2022-07-21 VITALS
TEMPERATURE: 99.3 F | SYSTOLIC BLOOD PRESSURE: 141 MMHG | WEIGHT: 171 LBS | BODY MASS INDEX: 28.46 KG/M2 | OXYGEN SATURATION: 97 % | HEART RATE: 121 BPM | DIASTOLIC BLOOD PRESSURE: 81 MMHG

## 2022-07-21 DIAGNOSIS — R21 RASH AND NONSPECIFIC SKIN ERUPTION: Primary | ICD-10-CM

## 2022-07-21 DIAGNOSIS — Z79.4 TYPE 2 DIABETES MELLITUS WITH HYPERGLYCEMIA, WITH LONG-TERM CURRENT USE OF INSULIN (H): ICD-10-CM

## 2022-07-21 DIAGNOSIS — M79.604 BILATERAL LEG PAIN: ICD-10-CM

## 2022-07-21 DIAGNOSIS — M79.605 BILATERAL LEG PAIN: ICD-10-CM

## 2022-07-21 DIAGNOSIS — E11.65 TYPE 2 DIABETES MELLITUS WITH HYPERGLYCEMIA, WITH LONG-TERM CURRENT USE OF INSULIN (H): ICD-10-CM

## 2022-07-21 DIAGNOSIS — I72.4 ANEURYSM OF ARTERY OF LOWER EXTREMITY (H): ICD-10-CM

## 2022-07-21 PROCEDURE — 93922 UPR/L XTREMITY ART 2 LEVELS: CPT | Mod: GC | Performed by: RADIOLOGY

## 2022-07-21 PROCEDURE — 87070 CULTURE OTHR SPECIMN AEROBIC: CPT | Performed by: PHYSICIAN ASSISTANT

## 2022-07-21 PROCEDURE — 99214 OFFICE O/P EST MOD 30 MIN: CPT | Performed by: PHYSICIAN ASSISTANT

## 2022-07-21 PROCEDURE — 87077 CULTURE AEROBIC IDENTIFY: CPT | Performed by: PHYSICIAN ASSISTANT

## 2022-07-21 PROCEDURE — 87798 DETECT AGENT NOS DNA AMP: CPT | Performed by: PHYSICIAN ASSISTANT

## 2022-07-21 ASSESSMENT — ENCOUNTER SYMPTOMS
RESPIRATORY NEGATIVE: 1
LIGHT-HEADEDNESS: 0
DIZZINESS: 0
DIARRHEA: 0
MUSCULOSKELETAL NEGATIVE: 1
RHINORRHEA: 0
COUGH: 0
SORE THROAT: 0
ENDOCRINE NEGATIVE: 1
JOINT SWELLING: 0
PALPITATIONS: 0
HEADACHES: 0
VOMITING: 0
NAUSEA: 0
WEAKNESS: 0
CARDIOVASCULAR NEGATIVE: 1
SHORTNESS OF BREATH: 0
NECK PAIN: 0
WOUND: 0
NECK STIFFNESS: 0
FEVER: 0
CHILLS: 0
BACK PAIN: 0
ARTHRALGIAS: 0
EYES NEGATIVE: 1
ALLERGIC/IMMUNOLOGIC NEGATIVE: 1
MYALGIAS: 0

## 2022-07-21 NOTE — TELEPHONE ENCOUNTER
If this rash is shingles need to get it evaluated within 72 hours of blisters in order to get anti viral so I do not know when this started will depend on how quickly needs to be seen

## 2022-07-21 NOTE — TELEPHONE ENCOUNTER
Patient reports that she does not have the rash all the time.The rash moves around on her left buttock. She has had this for a couple of weeks. She has had this on and off for many years. When she gets stressed she gets an outbreak about 3 time a year. Dr. Anderson was thinking it may be herpes. It is always on her buttock area. Currently it is about a 1/4 sized patch. Now it is starting to dry up, but they were originally fluid filled.     Nursing advice: Patient is going to be seen in urgent care today due to access. She will follow up with Pricila Avila CNP October for a long term plan. Thank you. Ryanne Rosario R.N.

## 2022-07-21 NOTE — PROGRESS NOTES
Chief Complaint:    Chief Complaint   Patient presents with     Derm Problem     Pt has rash on butt, started week or two ago, pt gets rash 3-4 times a year,, stress related, size of quarter, pt said she gets little bumps/bubbles and they pop and they crust over, itchy, doctor she seen yesterday is she need to have it checked out before rash goes away       Medical Decision Making:    Vital signs reviewed by Kalin Lakhani PA-C  BP (!) 141/81 (BP Location: Left arm, Patient Position: Sitting, Cuff Size: Adult Regular)   Pulse (!) 121   Temp 99.3  F (37.4  C) (Axillary)   Wt 77.6 kg (171 lb)   SpO2 97%   BMI 28.46 kg/m      Differential Diagnosis:  Shingles, contact dermatitis, cellulitis     ASSESSMENT:     1. Rash and nonspecific skin eruption    2. Type 2 diabetes mellitus with hyperglycemia, with long-term current use of insulin (H)         PLAN:     Patient is in no acute distress.  No indication of cellulitis at this time.  Suspect resolving shingles episode.    Viral and bacterial swabs collected.    Patient instructed to monitor blood sugars closely with rash and follow up with PCP for any DM medication changes if needed.  Patient instructed to follow up with PCP in 1 week if symptoms are not improving.  Sooner if symptoms worsen.  Worrisome symptoms discussed with instructions to go to the ED.  Patient verbalized understanding and agreed with this plan.    Labs:     Results for orders placed or performed in visit on 07/21/22   US YANCY Doppler No Exercise     Status: None    Narrative    Ultrasound YANCY 7/21/2022 10:12 AM    CLINICAL HISTORY: Bilateral leg pain; Bilateral leg pain; Aneurysm of  artery of lower extremity (H).    COMPARISONS: None available.    REFERRING PROVIDER: SKYLAR POND    TECHNIQUE: Ultrasound YANCY.    FINDINGS:  RIGHT:       Brachial: 140 mmHg       Ankle (PT): 138 mmHg       Ankle (DP): 150 mmHg         YANCY: 1.1    Right posterior tibial artery: 71 cm/s, triphasic  Right anterior  tibial artery: 84 cm/s, triphasic  Right dorsalis pedis artery: 80 cm/s, triphasic         LEFT:       Brachial: 140 mmHg       Ankle (PT): 136 mmHg       Ankle (DP): 130 mmHg         YANCY: 1.0    Left posterior tibial artery: 90 cm/s, triphasic  Left anterior tibial artery: 86 cm/s, triphasic  Left dorsalis pedis artery: 76 cm/s, triphasic           Impression    IMPRESSION:  1. RIGHT:       A. Resting YANCY 1.1 which is normal        B. normal high resistance triphasic arterial flow at the ankle    2. LEFT:       A. Resting YANCY 1.0 which is normal       B. normal high resistance triphasic arterial flow at the ankle    Guidelines:    YANCY Diagnostic Criteria (Based on criteria published in Circulation  2011; 124: 5983-0913):    > 1.4: Non compressible    1.00 - 1.40: Normal    0.91 - 0.99: Borderline    At or below 0.90: Abnormal    YANCY Diagnostic Criteria (Based on ACC/AHA guideline 2008):    >/=1.3 - non compressible vessels    1.00  -1.29 - Normal    0.91 - 0.99 - Borderline    0.41 - 0.90 - Mild to moderate PAD    0.00 - 0.40 - Severe PAD    I have personally reviewed the examination and initial interpretation  and I agree with the findings.    LEE ADAMS MD         SYSTEM ID:  [id number]       Current Meds:    Current Outpatient Medications:      apixaban ANTICOAGULANT (ELIQUIS) 5 MG tablet, Take 1 tablet (5 mg) by mouth 2 times daily Resume the day following your procedure, on 7/6/2022, Disp: 180 tablet, Rfl: 3     atorvastatin (LIPITOR) 10 MG tablet, Take 1 tablet (10 mg) by mouth daily, Disp: 90 tablet, Rfl: 3     blood glucose (ACCU-CHEK GUIDE) test strip, Use to test blood sugar 6 times daily, Disp: 550 strip, Rfl: 3     blood glucose monitoring (ACCU-CHEK FASTCLIX) lancets, USE TO TEST BLOOD SUGAR FOUR TIMES A DAY OR AS DIRECTED, Disp: 306 each, Rfl: 3     calcium carbonate-vitamin D 500-400 MG-UNIT TABS tablt, Take 1 tablet by mouth 2 times daily, Disp: 180 tablet, Rfl: 3     Continuous Blood Gluc   (DEXCOM G6 ) JUANA, Use to read blood sugars as per 's instructions., Disp: 1 each, Rfl: 0     Continuous Blood Gluc Sensor (DEXCOM G6 SENSOR) MISC, Change every 10 days., Disp: 3 each, Rfl: 11     Continuous Blood Gluc Transmit (DEXCOM G6 TRANSMITTER) MISC, Change every 3 months., Disp: 1 each, Rfl: 3     estradiol (ESTRACE) 0.1 MG/GM vaginal cream, Place 2 g vaginally twice a week, Disp: 42.5 g, Rfl: 11     insulin aspart (NOVOLOG FLEXPEN) 100 UNIT/ML pen, 1 unit per 15 gram of carb for all meals. Plus 1 unit per 50 mg/dl above 150. Total daily dose approx 30 units., Disp: 30 mL, Rfl: 3     insulin glargine (BASAGLAR KWIKPEN) 100 UNIT/ML pen, Inject 35 Units Subcutaneous daily Max, 35 units daily, Disp: 45 mL, Rfl: 2     insulin pen needle (B-D U/F) 31G X 8 MM miscellaneous, USE 5 TIMES DAILY  WITH NOVOLOG AND LANTUS, Disp: 500 each, Rfl: 3     metoprolol succinate ER (TOPROL-XL) 50 MG 24 hr tablet, Take 1 tablet (50 mg) by mouth daily, Disp: 90 tablet, Rfl: 3     Multiple Vitamins-Minerals (MULTIVITAMIN ADULT PO), Take 1 tablet by mouth every morning , Disp: , Rfl:      oxyCODONE (ROXICODONE) 5 MG tablet, Take 1 tablet (5 mg) by mouth every 6 hours as needed for pain, Disp: 10 tablet, Rfl: 0     Urine Glucose-Ketones Test (KETO-DIASTIX) STRP, 1 strip by In Vitro route as needed, Disp: 1 each, Rfl: 11    Allergies:  No Known Allergies    SUBJECTIVE    HPI: Latoya Rodríguez is an 71 year old female who presents for evaluation and treatment of rash on the L buttock.  Symptoms started 2 weeks ago and have been improving.  Patient has had episodes like this for years on the L buttock.  The rash is itchy with a burning sensation on the L buttock.  She did have some blisters, but these have resolved.  She has not tried anything for the rash.      ROS:      Review of Systems   Constitutional: Negative for chills and fever.   HENT: Negative for congestion, ear pain, rhinorrhea and sore throat.    Eyes:  Negative.    Respiratory: Negative.  Negative for cough and shortness of breath.    Cardiovascular: Negative.  Negative for chest pain and palpitations.   Gastrointestinal: Negative for diarrhea, nausea and vomiting.   Endocrine: Negative.    Genitourinary: Negative.    Musculoskeletal: Negative.  Negative for arthralgias, back pain, joint swelling, myalgias, neck pain and neck stiffness.   Skin: Positive for rash. Negative for wound.   Allergic/Immunologic: Negative.  Negative for immunocompromised state.   Neurological: Negative for dizziness, weakness, light-headedness and headaches.        Family History   Family History   Problem Relation Age of Onset     Hyperlipidemia Mother      Breast Cancer Mother      Other Cancer Father      Leukemia Father      Skin Cancer Father      Coronary Artery Disease Maternal Uncle      Brain Cancer Maternal Uncle      Coronary Artery Disease Maternal Uncle      Stomach Cancer Maternal Aunt      Diabetes No family hx of      Hypertension No family hx of      Cerebrovascular Disease No family hx of      Colon Cancer No family hx of      Thyroid Disease No family hx of      Depression No family hx of      Anxiety Disorder No family hx of        Social History  Social History     Socioeconomic History     Marital status: Single     Spouse name: Not on file     Number of children: Not on file     Years of education: Not on file     Highest education level: Not on file   Occupational History     Not on file   Tobacco Use     Smoking status: Never Smoker     Smokeless tobacco: Never Used   Substance and Sexual Activity     Alcohol use: Yes     Comment: 1 to 2 beers or glasses of wine 1 to 2 times per month     Drug use: No     Sexual activity: Never   Other Topics Concern     Parent/sibling w/ CABG, MI or angioplasty before 65F 55M? No   Social History Narrative     Not on file     Social Determinants of Health     Financial Resource Strain: Not on file   Food Insecurity: Not on file    Transportation Needs: Not on file   Physical Activity: Not on file   Stress: Not on file   Social Connections: Not on file   Intimate Partner Violence: Not on file   Housing Stability: Not on file        Surgical History:  Past Surgical History:   Procedure Laterality Date     ANESTHESIA CARDIOVERSION N/A 9/23/2021    Procedure: ANESTHESIA, FOR CARDIOVERSION@1100;  Surgeon: GENERIC ANESTHESIA PROVIDER;  Location: [facility] OR     CYSTOSCOPY, INJECT COLLAGEN, COMBINED N/A 5/11/2021    Procedure: CYSTOSCOPY, WITH PERIURETHRAL BULKING AGENT INJECTION;  Surgeon: Alberto Zhang MD;  Location: [facility] OR     EYE SURGERY  9/04, 5/11    Retinal detachment, Cataract (both eyes)     IMPLANT STIMULATOR AND LEADS SACRAL NERVE (STAGE ONE AND TWO) Right 7/5/2022    Procedure: INSERTION, SACRAL NERVE STIMULATOR, STAGE 1 & 2 (permanent implant on the RIGHT side with Axonics);  Surgeon: Alberto Zhang MD;  Location: [facility] OR     IMPLANT STIMULATOR SACRAL NERVE PERCUTANEOUS TRIAL N/A 6/14/2022    Procedure: INSERTION, NEUROSTIMULATOR, SACRAL, PERCUTANEOUS, FOR TRIAL;  Surgeon: Alberto Zhang MD;  Location: [facility] OR     RELEASE CARPAL TUNNEL Right 8/6/2021    Procedure: RELEASE, CARPAL TUNNEL, Right;  Surgeon: RADHA Sandoval MD;  Location: [facility] OR     RELEASE TRIGGER FINGER Bilateral 5/10/2019    Procedure: RELEASE TRIGGER FINGER, BILATERAL LONG AND RING FINGERS;  Surgeon: RADHA Sandoval MD;  Location: [facility] OR     VASCULAR SURGERY      Varicose Veins        Problem List:  Patient Active Problem List   Diagnosis     Urgency of urination     Urinary frequency     Urge incontinence     Type 2 diabetes mellitus with hyperglycemia, with long-term current use of insulin (H)     Hyperlipidemia with target LDL less than 100     Osteoporosis     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)     Type 1 diabetes mellitus with hyperglycemia (H)     Age-related osteoporosis without current pathological fracture     Stress  incontinence     Atrophic vaginitis     Intrinsic sphincter deficiency     Overactive bladder     Carpal tunnel syndrome of right wrist     Chronic atrial fibrillation (H)           OBJECTIVE:     Vital signs noted and reviewed by Kalin Lakhani PA-C  BP (!) 141/81 (BP Location: Left arm, Patient Position: Sitting, Cuff Size: Adult Regular)   Pulse (!) 121   Temp 99.3  F (37.4  C) (Axillary)   Wt 77.6 kg (171 lb)   SpO2 97%   BMI 28.46 kg/m       PEFR:    Physical Exam  Vitals and nursing note reviewed.   Constitutional:       General: She is not in acute distress.     Appearance: She is well-developed. She is not ill-appearing, toxic-appearing or diaphoretic.   HENT:      Head: Normocephalic and atraumatic.      Right Ear: Tympanic membrane and external ear normal. No drainage, swelling or tenderness. Tympanic membrane is not perforated, erythematous, retracted or bulging.      Left Ear: Tympanic membrane and external ear normal. No drainage, swelling or tenderness. Tympanic membrane is not perforated, erythematous, retracted or bulging.      Nose: No mucosal edema, congestion or rhinorrhea.      Right Sinus: No maxillary sinus tenderness or frontal sinus tenderness.      Left Sinus: No maxillary sinus tenderness or frontal sinus tenderness.      Mouth/Throat:      Pharynx: No pharyngeal swelling, oropharyngeal exudate, posterior oropharyngeal erythema or uvula swelling.      Tonsils: No tonsillar abscesses.   Eyes:      Pupils: Pupils are equal, round, and reactive to light.   Neck:      Trachea: Trachea normal.   Cardiovascular:      Rate and Rhythm: Normal rate and regular rhythm.      Heart sounds: Normal heart sounds, S1 normal and S2 normal. No murmur heard.    No friction rub. No gallop.   Pulmonary:      Effort: Pulmonary effort is normal. No respiratory distress.      Breath sounds: Normal breath sounds. No decreased breath sounds, wheezing, rhonchi or rales.   Abdominal:      General: Bowel sounds  are normal. There is no distension.      Palpations: Abdomen is soft. Abdomen is not rigid. There is no mass.      Tenderness: There is no abdominal tenderness. There is no guarding or rebound.   Musculoskeletal:      Cervical back: Full passive range of motion without pain, normal range of motion and neck supple.   Lymphadenopathy:      Cervical: No cervical adenopathy.   Skin:     General: Skin is warm and dry.             Comments: Several erythematous papules on the L upper buttock with some scab formation.  No swelling or edema.  No drainage or vesicles.     Neurological:      Mental Status: She is alert and oriented to person, place, and time.      Cranial Nerves: No cranial nerve deficit.      Deep Tendon Reflexes: Reflexes are normal and symmetric.   Psychiatric:         Behavior: Behavior normal. Behavior is cooperative.         Thought Content: Thought content normal.         Judgment: Judgment normal.             Kalin Lakhani PA-C  7/21/2022, 12:17 PM

## 2022-07-22 LAB — VZV DNA SPEC QL NAA+PROBE: NEGATIVE

## 2022-07-23 DIAGNOSIS — B95.8 CELLULITIS DUE TO STAPHYLOCOCCUS: Primary | ICD-10-CM

## 2022-07-23 DIAGNOSIS — L03.90 CELLULITIS DUE TO STAPHYLOCOCCUS: Primary | ICD-10-CM

## 2022-07-23 LAB — BACTERIA SKIN AEROBE CULT: ABNORMAL

## 2022-07-23 RX ORDER — SULFAMETHOXAZOLE/TRIMETHOPRIM 800-160 MG
1 TABLET ORAL 2 TIMES DAILY
Qty: 14 TABLET | Refills: 0 | Status: SHIPPED | OUTPATIENT
Start: 2022-07-23 | End: 2022-07-30

## 2022-07-23 NOTE — CONFIDENTIAL NOTE
Attempted to call patient unable to leave voicemail therefore a  My chart message sent to patient in regards to positive skin culture recommend treatment with oral antibiotic in close follow-up with primary care provider in 1 week.    Linda Acosta CNP

## 2022-07-25 ENCOUNTER — TELEPHONE (OUTPATIENT)
Dept: FAMILY MEDICINE | Facility: CLINIC | Age: 72
End: 2022-07-25

## 2022-07-25 ENCOUNTER — OFFICE VISIT (OUTPATIENT)
Dept: UROLOGY | Facility: CLINIC | Age: 72
End: 2022-07-25
Payer: COMMERCIAL

## 2022-07-25 DIAGNOSIS — R39.15 URINARY URGENCY: Primary | ICD-10-CM

## 2022-07-25 DIAGNOSIS — N36.42 INTRINSIC SPHINCTER DEFICIENCY: ICD-10-CM

## 2022-07-25 DIAGNOSIS — N39.41 URGE INCONTINENCE: ICD-10-CM

## 2022-07-25 PROCEDURE — 99214 OFFICE O/P EST MOD 30 MIN: CPT | Performed by: UROLOGY

## 2022-07-25 ASSESSMENT — PAIN SCALES - GENERAL: PAINLEVEL: NO PAIN (0)

## 2022-07-25 NOTE — NURSING NOTE
Latoya Rodríguez's chief complaint for this visit includes:  Chief Complaint   Patient presents with     RECHECK     Post op (DOS 7/5)      PCP: Pricila Avila    Referring Provider:  No referring provider defined for this encounter.    There were no vitals taken for this visit.  No Pain (0)      No Known Allergies      Do you need any medication refills at today's visit?  No     Piedad Washburn  In-Clinic Visit Facilitator   [facility]

## 2022-07-25 NOTE — PROGRESS NOTES
Reason for visit: F/u on therapy for urgency and frequency and urinary incontinence s/p Axonics implant on 7/5/22.  Clinical Data: Ms. Rodríguez is a 70 y/o female with the above. She was started on oxybutynin 15 mg xl as well as estrace cream and referred to pelvic floor PT. She did better on the anticholinergic however continued to have a lot of urgency and some urge incontinence. She was then switched to Detrol which did not work, then Sanctura which did but was too expensive and tried Vesicare but then Toviaz was less expensive and using Myrbetriq and that was working better than anything else but then it was not.  She would go for a walk with her dog and after an hour she would start to leak and after 1.5 hours she would just gush.  Another time that she will gush is when she sits up from a seated position.  This was confirmed by UDS.    She then underwent periurethral bulking for ISD on 5/11/21 and notes that her urinary frequency and incontinence have improved but she continues to have a little leakage associated with urgency but not as bad as the previous gushes.    HgA1 C is 7.2 but on a daily basis her sugars fluctuate quite a bit.  Of note she has been using her estrogen cream.    UDS on 2/24/21:  -Normal bladder capacity (635 mL) with slightly delayed filling sensations (FS: 328 mL, FD: 333 mL, SD: 452 mL).  -Good bladder compliance.  -No DO or stress urinary incontinence while seated despite max Pabd 99 cm H2O at a volume of 349 mL.  -Patient is then asked to stand. There is no incontinence with the motion of standing. However, after a few seconds of standing in place, she then starts to experience mild terminal DO with incontinence when Pdet reaches 4.4 cm H2O. This leakage starts as just a trickle but quickly progresses to more of a gush at which point she is given permission to void.  -Good detrusor contraction during voiding to max Pdet 43 cm H2O with a brisk flow rate (Qmax 32 ml/s), bell-shape flow  curve, and complete bladder emptying (PVR 0 mL).  -Mildly increased EMG activity during voiding.  -No evidence for bladder outlet obstruction.  -Fluoroscopy reveals a mildly trabeculated bladder wall without diverticuli or VUR. The bladder neck is closed during filling. Voiding images unavailable as patient transferred to [facility] to void.    She underwent an Axonics PNE on 6/14/22 and reports three nights where she was able to wait over 5 hours at night.  Also, her urgency went from a level 4 to a level 2.  Frequency also improved.  She would report at least 60% improvement.  The RIGHT side worked best for her.  Although the first night the left side worked well.    She underwent SNS placement and reports continued improvement in her urgency and frequency.  She is on program 1 with the 3 lights on.  She feels the stimulation in the bicycle seat area on the right.    She reports some continued incontinence when getting up from a seated position.  She had a bulking procedure on 5/11/22 with macroplastique which helped very minimally.    ASSESSMENT and PLAN: This is a 71 year old female with urinary urgency, frequency and urge incontinence as well as a possible stress incontinence component.   She was helped to some degree with the periurthral bulking but continued to have urinary urgency and was frustrated.  She underwent a right sided SNS which helps with the urgency frequency a fair bit but contiues to leak with standing from sitting position indicating some intrinsic sphincter deficiency.  -continue estrogen cream   -schedule periurethral bulking with bulkamid    Thank you for allowing me to participate in the care of Ms. Latoya Rodríguez and I will keep you updated on her progress.   Alberto Zhang MD     38 minutes spent on the date of the encounter doing chart review, history and exam, documentation and further activities per the note

## 2022-07-26 ENCOUNTER — TELEPHONE (OUTPATIENT)
Dept: UROLOGY | Facility: CLINIC | Age: 72
End: 2022-07-26

## 2022-07-26 NOTE — TELEPHONE ENCOUNTER
Gonzales Trujillo  Says there is a signature missing but nothing to sign   Can you help me with this?

## 2022-07-26 NOTE — TELEPHONE ENCOUNTER
I don't know what you're looking at.    Interoffice it to me for review.   I'm out of office until Aug 1    Thanks,    Jennifer

## 2022-07-26 NOTE — TELEPHONE ENCOUNTER
Dr. Zhang placed the following case request: CYSTOSCOPY, WITH PERIURETHRAL BULKING AGENT INJECTION (look in the bladder and urethra and inject filler into the urethra)      Message sent to surgery scheduler for follow up.    Jess Hadley RN, BSN

## 2022-07-28 NOTE — TELEPHONE ENCOUNTER
Patient returned call to writer. Surgery scheduled for 9/12 in [facility].    H&P with PCP.    COVID test will be 1-2 days before surgery and will bring a picture of the results the morning of surgery.    Post-op scheduled for 10/10.    Writer will mail surg packet. No other questions/concerns.

## 2022-08-16 ENCOUNTER — DOCUMENTATION ONLY (OUTPATIENT)
Dept: NEUROSURGERY | Facility: CLINIC | Age: 72
End: 2022-08-16

## 2022-08-16 NOTE — PROGRESS NOTES
I received a staff message things did not go well and the MRI attempt 2 mg Valium pills.  I did not due to her age.  I did leave a message on her home/mobile phone asking her to come in so I can reexamine her and discuss options.  Consideration of procedural sedation versus open sided MRI versus CT scanning will be discussed.

## 2022-08-17 ENCOUNTER — TELEPHONE (OUTPATIENT)
Dept: FAMILY MEDICINE | Facility: CLINIC | Age: 72
End: 2022-08-17

## 2022-08-17 ENCOUNTER — OFFICE VISIT (OUTPATIENT)
Dept: NEUROSURGERY | Facility: CLINIC | Age: 72
End: 2022-08-17
Payer: COMMERCIAL

## 2022-08-17 VITALS
DIASTOLIC BLOOD PRESSURE: 78 MMHG | HEART RATE: 98 BPM | BODY MASS INDEX: 28.49 KG/M2 | HEIGHT: 65 IN | SYSTOLIC BLOOD PRESSURE: 119 MMHG | WEIGHT: 171 LBS

## 2022-08-17 DIAGNOSIS — M79.604 RIGHT LEG PAIN: ICD-10-CM

## 2022-08-17 DIAGNOSIS — M25.551 RIGHT HIP PAIN: Primary | ICD-10-CM

## 2022-08-17 PROCEDURE — 99214 OFFICE O/P EST MOD 30 MIN: CPT | Performed by: PREVENTIVE MEDICINE

## 2022-08-17 RX ORDER — GABAPENTIN 100 MG/1
100 CAPSULE ORAL 3 TIMES DAILY
Qty: 90 CAPSULE | Refills: 0 | Status: SHIPPED | OUTPATIENT
Start: 2022-08-17 | End: 2022-10-24

## 2022-08-17 ASSESSMENT — PAIN SCALES - GENERAL: PAINLEVEL: SEVERE PAIN (7)

## 2022-08-17 NOTE — LETTER
8/17/2022         RE: Latoya Rodríguez  [address]  [place] [address]        Dear Colleague,    Thank you for referring your patient, Latoya Rodríguez, to the [facility] [place]. Please see a copy of my visit note below.      Subjective:  Latoya Rodríguez is a 71 year old female who presents today for follow-up regarding     Right hip pain    Bilateral throbbing global leg pain especially with rest, not worse with exercise, and awaking her at night    No evidence of a Pelvic Joint Dysfunction but she has a constitutionally caudal left ASIS    Tenderness to spring testing over the right SI joint and L5 in particular    Herpetiform rash-recurrent posterior pelvis    Implanted bladder stimulator right buttock    Chronic atrial fibrillation, and insulin-dependent diabetes, and chronic stress incontinence.  Anticoagulated on apixaban  I ordered a lumbar and right hip MRI and an YANCY.  I was considering having Dr. Slade do a right hip depending on the MRI and her coagulation status in coordination with her cardiologist and also her endocrinologist regarding an insulin sliding scale.  I recommending have another dermatologist evaluate her rash and seeing her family doctor for a full history and physical regarding unremitting leg pain at night.    PRIOR HISTORY from 7/20/2022:  She was seen for evaluation of chronic low back pain.  My nurse called for some precharting information.  She has been noticing some weakness in her low back recently.  She has had no spinal imaging.  She reportedly saw Dr. Slade  1 year ago who recommended injections and she did not want those.  She is self-referred.  She was initially scheduled with Dr. Gray and was moved to my schedule.  In reviewing the chart, her visit with Dr. Slade on 8/16/2021 was for nonradiating right hip pain, tenderness over the greater trochanter, positive AUSTYN, positive FADIR and he recommended lidocaine  patches, Voltaren gel, HEP, and a hip injection.     Right hip x-ray 8/5/2021 shows moderate DJD, no fracture or AVN.  She is scheduled for repeat DEXA scan.  Osteopenia noted on 8/28/2020 DEXA scan but lumbar T score was -2.4, left femoral neck was -1.9 right femoral neck -1.6.     She elected not to get the right hip injection because she recalls Dr. Slade telling her to do the injection if things do not get better.  She was doing reasonably well until a month ago when for no reason her symptoms started getting dramatically worse primarily of global bilateral leg throbbing pain so bad that it wakes her at night.    She cannot lay flat.  If she is lying flat for a while and transitions to get up to go to the bathroom that makes her pain worse momentarily.  If she bears weight on her right leg it hurts in her right inguinal and right pelvic area.  The global leg throbbing however is her biggest issue right now.  Has had to stop walking her dog Brandie.  Getting into a car and putting on her socks makes it worse.  Rest makes it better.  Walking however does not make the leg pain worse.  Has not had any MRIs of her lumbar spine nor she had vascular studies of her legs.  Long-term unrelated bladder incontinence has not changed with this nor has she noticed any bowel incontinence or lack of sensation in the saddle area..  The specifically no leg numbness or weakness    INTERIM HISTORY:    YANCY next 7/21/2022: Normal  A repeat DEXA scan is also scheduled on 9/6/2022  Right hip MRI and lumbar MRI are not scheduled until 9/14/2022 under sedation because she could not handle the claustrophobia of the MRI under 2 mg Valium sedation.  Prior to the MRI she has a preoperative history and physical with her family doctor and I want that doctor to do a full work-up as well to see if there is any other reason for this unremitting nighttime pain.    She also went to urgent care on 7/21/2022.  She saw PA there who did a skin culture  which grew Staph epidermidis, and a prescription for some sort of pill that she took twice a day for 7 days and I cannot find it in the chart.  I am assuming this was to treat a skin contaminant but in any case the rash went away and her pain did not change.    Past medical history is reviewed and is unchanged for any new medical diagnoses in the interim.  Pertinent for chronic atrial fibrillation, intrinsic sphincter deficiency and overactive bladder with stress incontinence, osteoporosis, type 1 diabetes and type 2 diabetes both listed in the chart, anticoagulated on apixaban, status post implantation of a bladder stimulator, on insulin and oxycodone.  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  She enjoys walking her dog Brandie..  She is .  She has 2 adult children and 2 grandchildren.  She she enjoys yard work and gardening and denies other sports hobbies and activities.     Objective:      EXAMINATION:  No exam today.  She sitting comfortably.    Assessment     Latoya Rodríguez  is a 71 year old y.o. female who presents today for follow-up regarding     Right hip pain    Bilateral throbbing global leg pain especially with rest, not worse with exercise, and awaking her at night    No evidence of a Pelvic Joint Dysfunction but she has a constitutionally caudal left ASIS    Tenderness to spring testing over the right SI joint and L5 in particular    Herpetiform rash-recurrent posterior pelvis    Implanted bladder stimulator right buttock    Chronic atrial fibrillation, and insulin-dependent diabetes, and chronic stress incontinence.  Anticoagulated on apixaban        Plan:  Proceed with the MRIs of the pelvis and lumbar spine under sedation after her preop physical, and the tests are scheduled on 9/14.  She has a DEXA scan scheduled on 9/6.  She has a full physical scheduled with her PCP, nurse practitioner Pricila Avila, and I am going to  leave all pain management and other medication management in her hands so as to avoid polypharmacy.  Her kidney functions have been good, however, so I will start her on a low-dose of gabapentin 100 mg escalating up over 3 days to 3 times daily.  I will let nurse practitioner Austin adjust that as indicated.    Advised patient to call or return early if symptoms worsen, or having problems controlling bladder and bowel function or worsening leg weakness.     Please note: Voice recognition software was used in this dictation.  It may therefore contain typographical errors.  Alvaro Anderson MD      Again, thank you for allowing me to participate in the care of your patient.        Sincerely,        Alvaro Anderson MD

## 2022-08-17 NOTE — NURSING NOTE
"Reason For Visit:   Chief Complaint   Patient presents with     RECHECK     Increased back pain            Occupation: former manager  Currently working? No.  Work status?  Retired.     Sports: n  Activities: walking            /78   Pulse 98   Ht 1.651 m (5' 5\")   Wt 77.6 kg (171 lb)   BMI 28.46 kg/m        No Known Allergies    Current Outpatient Medications   Medication     apixaban ANTICOAGULANT (ELIQUIS) 5 MG tablet     atorvastatin (LIPITOR) 10 MG tablet     blood glucose (ACCU-CHEK GUIDE) test strip     blood glucose monitoring (ACCU-CHEK FASTCLIX) lancets     calcium carbonate-vitamin D 500-400 MG-UNIT TABS tablt     Continuous Blood Gluc  (DEXCOM G6 ) JUANA     Continuous Blood Gluc Sensor (DEXCOM G6 SENSOR) MISC     Continuous Blood Gluc Transmit (DEXCOM G6 TRANSMITTER) MISC     estradiol (ESTRACE) 0.1 MG/GM vaginal cream     insulin aspart (NOVOLOG FLEXPEN) 100 UNIT/ML pen     insulin glargine (BASAGLAR KWIKPEN) 100 UNIT/ML pen     insulin pen needle (B-D U/F) 31G X 8 MM miscellaneous     metoprolol succinate ER (TOPROL-XL) 50 MG 24 hr tablet     Multiple Vitamins-Minerals (MULTIVITAMIN ADULT PO)     oxyCODONE (ROXICODONE) 5 MG tablet     Urine Glucose-Ketones Test (KETO-DIASTIX) STRP     No current facility-administered medications for this visit.         Darla Severin-Brown, LPN  "

## 2022-08-17 NOTE — TELEPHONE ENCOUNTER
Latoya has been approved to  assistance programs for Eliquis, Estrace, Basaglarpens, Novolog pens & needles through 2022.  She will receive this medication at no cost through the enrollment period.    These medications have been delivered to Latoya's home. Latoya has been informed of this approval.    Latoya will contact my office for refills as we must work directly with the .  I will note EPIC as each refill is requested.    Thanks so much for your help!    Jennifer Suarez  Prescription Assistance Supervisor  Pharmacy Assistance  [phone]

## 2022-08-17 NOTE — PATIENT INSTRUCTIONS
Latoya kirby right leg pain remains a bit of a mystery.  Lets see what the MRIs show and the DEXA scan.  It does not appear that blood vessel issues are a problem and the resolving rash with your antibiotic did not change her pain so that is not the issue either.  I will plan to see you back in about a month after your studies are done and we will try some gabapentin at low-dose and have Pricila Avila adjusted to see if that helps her pain in the meantime.  I will leave other medication in her hands.  See the assessment and plan below for further details of our discussions today.    Assessment     Latoya Rodríguez  is a 71 year old y.o. female who presents today for follow-up regarding   Right hip pain  Bilateral throbbing global leg pain especially with rest, not worse with exercise, and awaking her at night  No evidence of a Pelvic Joint Dysfunction but she has a constitutionally caudal left ASIS  Tenderness to spring testing over the right SI joint and L5 in particular  Herpetiform rash-recurrent posterior pelvis  Implanted bladder stimulator right buttock  Chronic atrial fibrillation, and insulin-dependent diabetes, and chronic stress incontinence.  Anticoagulated on apixaban        Plan:  Proceed with the MRIs of the pelvis and lumbar spine under sedation after her preop physical, and the tests are scheduled on 9/14.  She has a DEXA scan scheduled on 9/6.  She has a full physical scheduled with her PCP, nurse practitioner Pricila Avila, and I am going to leave all pain management and other medication management in her hands so as to avoid polypharmacy.  Her kidney functions have been good, however, so I will start her on a low-dose of gabapentin 100 mg escalating up over 3 days to 3 times daily.  I will let nurse lavern Avila adjust that as indicated.

## 2022-08-17 NOTE — PROGRESS NOTES
Subjective:  Latoya Rodríguez is a 71 year old female who presents today for follow-up regarding     Right hip pain    Bilateral throbbing global leg pain especially with rest, not worse with exercise, and awaking her at night    No evidence of a Pelvic Joint Dysfunction but she has a constitutionally caudal left ASIS    Tenderness to spring testing over the right SI joint and L5 in particular    Herpetiform rash-recurrent posterior pelvis    Implanted bladder stimulator right buttock    Chronic atrial fibrillation, and insulin-dependent diabetes, and chronic stress incontinence.  Anticoagulated on apixaban  I ordered a lumbar and right hip MRI and an YANCY.  I was considering having Dr. Slade do a right hip depending on the MRI and her coagulation status in coordination with her cardiologist and also her endocrinologist regarding an insulin sliding scale.  I recommending have another dermatologist evaluate her rash and seeing her family doctor for a full history and physical regarding unremitting leg pain at night.    PRIOR HISTORY from 7/20/2022:  She was seen for evaluation of chronic low back pain.  My nurse called for some precharting information.  She has been noticing some weakness in her low back recently.  She has had no spinal imaging.  She reportedly saw Dr. Slade  1 year ago who recommended injections and she did not want those.  She is self-referred.  She was initially scheduled with Dr. Gray and was moved to my schedule.  In reviewing the chart, her visit with Dr. Slade on 8/16/2021 was for nonradiating right hip pain, tenderness over the greater trochanter, positive AUSTYN, positive FADIR and he recommended lidocaine patches, Voltaren gel, HEP, and a hip injection.     Right hip x-ray 8/5/2021 shows moderate DJD, no fracture or AVN.  She is scheduled for repeat DEXA scan.  Osteopenia noted on 8/28/2020 DEXA scan but lumbar T score was -2.4, left femoral neck was -1.9 right femoral neck  -1.6.     She elected not to get the right hip injection because she recalls Dr. Slade telling her to do the injection if things do not get better.  She was doing reasonably well until a month ago when for no reason her symptoms started getting dramatically worse primarily of global bilateral leg throbbing pain so bad that it wakes her at night.    She cannot lay flat.  If she is lying flat for a while and transitions to get up to go to the bathroom that makes her pain worse momentarily.  If she bears weight on her right leg it hurts in her right inguinal and right pelvic area.  The global leg throbbing however is her biggest issue right now.  Has had to stop walking her dog Brandie.  Getting into a car and putting on her socks makes it worse.  Rest makes it better.  Walking however does not make the leg pain worse.  Has not had any MRIs of her lumbar spine nor she had vascular studies of her legs.  Long-term unrelated bladder incontinence has not changed with this nor has she noticed any bowel incontinence or lack of sensation in the saddle area..  The specifically no leg numbness or weakness    INTERIM HISTORY:    YANCY next 7/21/2022: Normal  A repeat DEXA scan is also scheduled on 9/6/2022  Right hip MRI and lumbar MRI are not scheduled until 9/14/2022 under sedation because she could not handle the claustrophobia of the MRI under 2 mg Valium sedation.  Prior to the MRI she has a preoperative history and physical with her family doctor and I want that doctor to do a full work-up as well to see if there is any other reason for this unremitting nighttime pain.    She also went to urgent care on 7/21/2022.  She saw PA there who did a skin culture which grew Staph epidermidis, and a prescription for some sort of pill that she took twice a day for 7 days and I cannot find it in the chart.  I am assuming this was to treat a skin contaminant but in any case the rash went away and her pain did not change.    Past medical  history is reviewed and is unchanged for any new medical diagnoses in the interim.  Pertinent for chronic atrial fibrillation, intrinsic sphincter deficiency and overactive bladder with stress incontinence, osteoporosis, type 1 diabetes and type 2 diabetes both listed in the chart, anticoagulated on apixaban, status post implantation of a bladder stimulator, on insulin and oxycodone.  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  She enjoys walking her dog Brandie..  She is .  She has 2 adult children and 2 grandchildren.  She she enjoys yard work and gardening and denies other sports hobbies and activities.     Objective:      EXAMINATION:  No exam today.  She sitting comfortably.    Assessment     Latoya Rodríguez  is a 71 year old y.o. female who presents today for follow-up regarding     Right hip pain    Bilateral throbbing global leg pain especially with rest, not worse with exercise, and awaking her at night    No evidence of a Pelvic Joint Dysfunction but she has a constitutionally caudal left ASIS    Tenderness to spring testing over the right SI joint and L5 in particular    Herpetiform rash-recurrent posterior pelvis    Implanted bladder stimulator right buttock    Chronic atrial fibrillation, and insulin-dependent diabetes, and chronic stress incontinence.  Anticoagulated on apixaban        Plan:  Proceed with the MRIs of the pelvis and lumbar spine under sedation after her preop physical, and the tests are scheduled on 9/14.  She has a DEXA scan scheduled on 9/6.  She has a full physical scheduled with her PCP, nurse practitioner Pricila Avila, and I am going to leave all pain management and other medication management in her hands so as to avoid polypharmacy.  Her kidney functions have been good, however, so I will start her on a low-dose of gabapentin 100 mg escalating up over 3 days to 3 times daily.  I will let nurse  practitioner Austin adjust that as indicated.    Advised patient to call or return early if symptoms worsen, or having problems controlling bladder and bowel function or worsening leg weakness.     Please note: Voice recognition software was used in this dictation.  It may therefore contain typographical errors.  Alvaro Anderson MD

## 2022-08-29 DIAGNOSIS — I48.20 CHRONIC ATRIAL FIBRILLATION (H): ICD-10-CM

## 2022-08-31 RX ORDER — METOPROLOL SUCCINATE 50 MG/1
TABLET, EXTENDED RELEASE ORAL
Qty: 90 TABLET | Refills: 0 | Status: SHIPPED | OUTPATIENT
Start: 2022-08-31 | End: 2022-11-28

## 2022-08-31 NOTE — TELEPHONE ENCOUNTER
Prescription approved per [facility] Refill Protocol.    Irma Ponce RN  [facility]-Primary Care

## 2022-09-06 ENCOUNTER — ANCILLARY PROCEDURE (OUTPATIENT)
Dept: BONE DENSITY | Facility: CLINIC | Age: 72
End: 2022-09-06
Attending: INTERNAL MEDICINE
Payer: COMMERCIAL

## 2022-09-06 ENCOUNTER — LAB (OUTPATIENT)
Dept: LAB | Facility: CLINIC | Age: 72
End: 2022-09-06
Payer: COMMERCIAL

## 2022-09-06 DIAGNOSIS — M85.80 OSTEOPENIA, UNSPECIFIED LOCATION: ICD-10-CM

## 2022-09-06 DIAGNOSIS — Z78.0 ASYMPTOMATIC MENOPAUSAL STATE: ICD-10-CM

## 2022-09-06 DIAGNOSIS — E10.9 TYPE 1 DIABETES MELLITUS WITHOUT COMPLICATION (H): ICD-10-CM

## 2022-09-06 LAB
ALBUMIN SERPL-MCNC: 3.5 G/DL (ref 3.4–5)
CALCIUM SERPL-MCNC: 9.5 MG/DL (ref 8.5–10.1)
CHOLEST SERPL-MCNC: 185 MG/DL
CREAT SERPL-MCNC: 0.69 MG/DL (ref 0.52–1.04)
CREAT UR-MCNC: 89 MG/DL
FASTING STATUS PATIENT QL REPORTED: NO
GFR SERPL CREATININE-BSD FRML MDRD: >90 ML/MIN/1.73M2
HBA1C MFR BLD: 8 % (ref 0–5.6)
HDLC SERPL-MCNC: 61 MG/DL
LDLC SERPL CALC-MCNC: 104 MG/DL
MICROALBUMIN UR-MCNC: 14 MG/L
MICROALBUMIN/CREAT UR: 15.73 MG/G CR (ref 0–25)
NONHDLC SERPL-MCNC: 124 MG/DL
TRIGL SERPL-MCNC: 101 MG/DL
TSH SERPL DL<=0.005 MIU/L-ACNC: 1.27 MU/L (ref 0.4–4)

## 2022-09-06 PROCEDURE — 82306 VITAMIN D 25 HYDROXY: CPT

## 2022-09-06 PROCEDURE — 82310 ASSAY OF CALCIUM: CPT

## 2022-09-06 PROCEDURE — 82040 ASSAY OF SERUM ALBUMIN: CPT

## 2022-09-06 PROCEDURE — 84443 ASSAY THYROID STIM HORMONE: CPT

## 2022-09-06 PROCEDURE — 83036 HEMOGLOBIN GLYCOSYLATED A1C: CPT

## 2022-09-06 PROCEDURE — 82043 UR ALBUMIN QUANTITATIVE: CPT

## 2022-09-06 PROCEDURE — 36415 COLL VENOUS BLD VENIPUNCTURE: CPT

## 2022-09-06 PROCEDURE — 82565 ASSAY OF CREATININE: CPT

## 2022-09-06 PROCEDURE — 80061 LIPID PANEL: CPT

## 2022-09-07 LAB — DEPRECATED CALCIDIOL+CALCIFEROL SERPL-MC: 35 UG/L (ref 20–75)

## 2022-09-09 ENCOUNTER — LAB (OUTPATIENT)
Dept: LAB | Facility: CLINIC | Age: 72
End: 2022-09-09

## 2022-09-09 ENCOUNTER — TELEPHONE (OUTPATIENT)
Dept: FAMILY MEDICINE | Facility: CLINIC | Age: 72
End: 2022-09-09

## 2022-09-09 ENCOUNTER — ANESTHESIA EVENT (OUTPATIENT)
Dept: SURGERY | Facility: AMBULATORY SURGERY CENTER | Age: 72
End: 2022-09-09
Payer: COMMERCIAL

## 2022-09-09 ENCOUNTER — OFFICE VISIT (OUTPATIENT)
Dept: FAMILY MEDICINE | Facility: CLINIC | Age: 72
End: 2022-09-09
Payer: COMMERCIAL

## 2022-09-09 VITALS
SYSTOLIC BLOOD PRESSURE: 112 MMHG | TEMPERATURE: 98.2 F | HEIGHT: 65 IN | OXYGEN SATURATION: 96 % | HEART RATE: 99 BPM | DIASTOLIC BLOOD PRESSURE: 67 MMHG | BODY MASS INDEX: 28.22 KG/M2 | WEIGHT: 169.4 LBS

## 2022-09-09 DIAGNOSIS — I48.21 PERMANENT ATRIAL FIBRILLATION (H): ICD-10-CM

## 2022-09-09 DIAGNOSIS — Z01.818 PRE-OP EXAM: Primary | ICD-10-CM

## 2022-09-09 DIAGNOSIS — Z20.822 ENCOUNTER FOR LABORATORY TESTING FOR COVID-19 VIRUS: ICD-10-CM

## 2022-09-09 DIAGNOSIS — E10.65 TYPE 1 DIABETES MELLITUS WITH HYPERGLYCEMIA (H): ICD-10-CM

## 2022-09-09 LAB
ALBUMIN SERPL-MCNC: 3.8 G/DL (ref 3.4–5)
ALP SERPL-CCNC: 86 U/L (ref 40–150)
ALT SERPL W P-5'-P-CCNC: 23 U/L (ref 0–50)
ANION GAP SERPL CALCULATED.3IONS-SCNC: 7 MMOL/L (ref 3–14)
AST SERPL W P-5'-P-CCNC: 19 U/L (ref 0–45)
BASOPHILS # BLD AUTO: 0 10E3/UL (ref 0–0.2)
BASOPHILS NFR BLD AUTO: 0 %
BILIRUB SERPL-MCNC: 0.6 MG/DL (ref 0.2–1.3)
BUN SERPL-MCNC: 17 MG/DL (ref 7–30)
CALCIUM SERPL-MCNC: 10 MG/DL (ref 8.5–10.1)
CHLORIDE BLD-SCNC: 104 MMOL/L (ref 94–109)
CO2 SERPL-SCNC: 28 MMOL/L (ref 20–32)
CREAT SERPL-MCNC: 0.74 MG/DL (ref 0.52–1.04)
EOSINOPHIL # BLD AUTO: 0.1 10E3/UL (ref 0–0.7)
EOSINOPHIL NFR BLD AUTO: 1 %
ERYTHROCYTE [DISTWIDTH] IN BLOOD BY AUTOMATED COUNT: 12.3 % (ref 10–15)
GFR SERPL CREATININE-BSD FRML MDRD: 86 ML/MIN/1.73M2
GLUCOSE BLD-MCNC: 206 MG/DL (ref 70–99)
HCT VFR BLD AUTO: 41.1 % (ref 35–47)
HGB BLD-MCNC: 13.9 G/DL (ref 11.7–15.7)
IMM GRANULOCYTES # BLD: 0 10E3/UL
IMM GRANULOCYTES NFR BLD: 0 %
LYMPHOCYTES # BLD AUTO: 1.7 10E3/UL (ref 0.8–5.3)
LYMPHOCYTES NFR BLD AUTO: 21 %
MCH RBC QN AUTO: 31.7 PG (ref 26.5–33)
MCHC RBC AUTO-ENTMCNC: 33.8 G/DL (ref 31.5–36.5)
MCV RBC AUTO: 94 FL (ref 78–100)
MONOCYTES # BLD AUTO: 0.6 10E3/UL (ref 0–1.3)
MONOCYTES NFR BLD AUTO: 7 %
NEUTROPHILS # BLD AUTO: 5.4 10E3/UL (ref 1.6–8.3)
NEUTROPHILS NFR BLD AUTO: 70 %
PLATELET # BLD AUTO: 267 10E3/UL (ref 150–450)
POTASSIUM BLD-SCNC: 4.6 MMOL/L (ref 3.4–5.3)
PROT SERPL-MCNC: 7.2 G/DL (ref 6.8–8.8)
RBC # BLD AUTO: 4.39 10E6/UL (ref 3.8–5.2)
SARS-COV-2 RNA RESP QL NAA+PROBE: NEGATIVE
SODIUM SERPL-SCNC: 139 MMOL/L (ref 133–144)
WBC # BLD AUTO: 7.8 10E3/UL (ref 4–11)

## 2022-09-09 PROCEDURE — 85025 COMPLETE CBC W/AUTO DIFF WBC: CPT | Performed by: INTERNAL MEDICINE

## 2022-09-09 PROCEDURE — 99214 OFFICE O/P EST MOD 30 MIN: CPT | Performed by: INTERNAL MEDICINE

## 2022-09-09 PROCEDURE — 93000 ELECTROCARDIOGRAM COMPLETE: CPT | Performed by: INTERNAL MEDICINE

## 2022-09-09 PROCEDURE — U0005 INFEC AGEN DETEC AMPLI PROBE: HCPCS

## 2022-09-09 PROCEDURE — 80053 COMPREHEN METABOLIC PANEL: CPT | Performed by: INTERNAL MEDICINE

## 2022-09-09 PROCEDURE — 36415 COLL VENOUS BLD VENIPUNCTURE: CPT | Performed by: INTERNAL MEDICINE

## 2022-09-09 PROCEDURE — U0003 INFECTIOUS AGENT DETECTION BY NUCLEIC ACID (DNA OR RNA); SEVERE ACUTE RESPIRATORY SYNDROME CORONAVIRUS 2 (SARS-COV-2) (CORONAVIRUS DISEASE [COVID-19]), AMPLIFIED PROBE TECHNIQUE, MAKING USE OF HIGH THROUGHPUT TECHNOLOGIES AS DESCRIBED BY CMS-2020-01-R: HCPCS

## 2022-09-09 RX ORDER — ENOXAPARIN SODIUM 100 MG/ML
80 INJECTION SUBCUTANEOUS EVERY 12 HOURS
Qty: 3.2 ML | Refills: 0 | Status: SHIPPED | OUTPATIENT
Start: 2022-09-10 | End: 2022-09-13

## 2022-09-09 ASSESSMENT — PAIN SCALES - GENERAL: PAINLEVEL: EXTREME PAIN (8)

## 2022-09-09 NOTE — TELEPHONE ENCOUNTER
.Reason for Call:  Pre op - clearance for surgery     Detailed comments: Lynne with [facility] called. Stated there are  blank parts in pre op that needs to be completed for surgery clearance     Phone Number [facility] can be reached at: [phone]    Best Time:     Can we leave a detailed message on this number? Not Applicable    Call taken on 9/9/2022 at 11:59 AM by Ras Candelaria

## 2022-09-09 NOTE — H&P (VIEW-ONLY)
[address] [address]  Phone: [phone]  Primary Provider: Pricila Avila  Pre-op Performing Provider: JACOB FLORES    PREOPERATIVE EVALUATION:  Today's date: 9/9/2022    Latoya Rodríguez is a 71 year old female who presents for a preoperative evaluation.    Surgical Information:  Surgery/Procedure: CYSTOSCOPY, WITH PERIURETHRAL BULKING AGENT INJECTION (look in the bladder and urethra and inject filler into the urethra)  Surgery Location:  OR  Surgeon: Alberto Zhang MD  Surgery Date: 9/12/22  Time of Surgery: 12: 55 PM  Where patient plans to recover: At home with family  Fax number for surgical facility: Note does not need to be faxed, will be available electronically in Epic.    Type of Anesthesia Anticipated: to be determined    HPI related to upcoming procedure:             This is a 71 year old female with type 1 diabetes and atrial fibrillation who comes in today for a preoperative evaluation. Latoya is scheduled to undergo a cystoscopy with periurethral bulging agent injection on September 12, 2022.    Preop Questions 9/6/2022   1. Have you ever had a heart attack or stroke? No   2. Have you ever had surgery on your heart or blood vessels, such as a stent placement, a coronary artery bypass, or surgery on an artery in your head, neck, heart, or legs? YES   3. Do you have chest pain with activity? No   4. Do you have a history of  heart failure? No   5. Do you currently have a cold, bronchitis or symptoms of other infection? No   6. Do you have a cough, shortness of breath, or wheezing? No   7. Do you or anyone in your family have previous history of blood clots? No   8. Do you or does anyone in your family have a serious bleeding problem such as prolonged bleeding following surgeries or cuts? No   9. Have you ever had problems with anemia or been told to take iron pills? No   10. Have you had any abnormal blood loss  such as black, tarry or bloody stools, or abnormal vaginal bleeding? No   11. Have you ever had a blood transfusion? No   12. Are you willing to have a blood transfusion if it is medically needed before, during, or after your surgery? Yes   13. Have you or any of your relatives ever had problems with anesthesia? No   14. Do you have sleep apnea, excessive snoring or daytime drowsiness? No   15. Do you have any artifical heart valves or other implanted medical devices like a pacemaker, defibrillator, or continuous glucose monitor? YES    15a. What type of device do you have? EverPresent system   15b. Name of the clinic that manages your device:  [facility]   16. Do you have artificial joints? No   17. Are you allergic to latex? No       Health Care Directive:  Patient does not have a Health Care Directive or Living Will: Patient wants FULL CODE.    Preoperative Review of :   reviewed - Currently no controlled substances filled in the past 30 days.      Review of Systems  CONSTITUTIONAL: NEGATIVE for fever, chills, change in weight  INTEGUMENTARY/SKIN: NEGATIVE for worrisome rashes, moles or lesions  EYES: NEGATIVE for vision changes or irritation  ENT/MOUTH: NEGATIVE for ear, mouth and throat problems  RESP: NEGATIVE for significant cough or SOB  CV: POSITIVE for atrial fibrillation and NEGATIVE for chest pain.  GI: NEGATIVE for nausea, abdominal pain, heartburn, or change in bowel habits  : NEGATIVE for frequency, dysuria, or hematuria  MUSCULOSKELETAL: NEGATIVE for significant arthralgias or myalgia  NEURO: NEGATIVE for weakness, dizziness or paresthesias  ENDOCRINE: POSITIVE  for insulin-requiring diabetes mellitus.  HEME: NEGATIVE for bleeding problems  PSYCHIATRIC: NEGATIVE for changes in mood or affect    Patient Active Problem List    Diagnosis Date Noted     Chronic atrial fibrillation (H) 06/08/2022     Priority: Medium     Carpal tunnel syndrome of right wrist 06/15/2021     Priority: Medium      Added automatically from request for surgery [id number]       Intrinsic sphincter deficiency 04/05/2021     Priority: Medium     Overactive bladder 04/05/2021     Priority: Medium     Stress incontinence 01/25/2021     Priority: Medium     Atrophic vaginitis 01/25/2021     Priority: Medium     Age-related osteoporosis without current pathological fracture 09/13/2016     Priority: Medium     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) 07/29/2016     Priority: Medium     Type 1 diabetes mellitus with hyperglycemia (H) 07/29/2016     Priority: Medium     Osteoporosis 07/11/2016     Priority: Medium     Type 2 diabetes mellitus with hyperglycemia, with long-term current use of insulin (H) 06/24/2016     Priority: Medium     Hyperlipidemia with target LDL less than 100 06/24/2016     Priority: Medium     Urge incontinence 05/06/2013     Priority: Medium     Urgency of urination 07/18/2011     Priority: Medium     Urinary frequency 07/18/2011     Priority: Medium      Past Medical History:   Diagnosis Date     Cataract      Chronic atrial fibrillation (H) 6/8/2022     Type 2 diabetes mellitus with hyperglycemia (H) 6/24/2016     Past Surgical History:   Procedure Laterality Date     ANESTHESIA CARDIOVERSION N/A 9/23/2021    Procedure: ANESTHESIA, FOR CARDIOVERSION@1100;  Surgeon: GENERIC ANESTHESIA PROVIDER;  Location: [facility] OR     CYSTOSCOPY, INJECT COLLAGEN, COMBINED N/A 5/11/2021    Procedure: CYSTOSCOPY, WITH PERIURETHRAL BULKING AGENT INJECTION;  Surgeon: Alberto Zhang MD;  Location: [facility] OR     EYE SURGERY  9/04, 5/11    Retinal detachment, Cataract (both eyes)     IMPLANT STIMULATOR AND LEADS SACRAL NERVE (STAGE ONE AND TWO) Right 7/5/2022    Procedure: INSERTION, SACRAL NERVE STIMULATOR, STAGE 1 & 2 (permanent implant on the RIGHT side with [employer]);  Surgeon: Alberto Zhang MD;  Location: [facility] OR     IMPLANT STIMULATOR SACRAL NERVE PERCUTANEOUS TRIAL N/A 6/14/2022    Procedure: INSERTION,  NEUROSTIMULATOR, SACRAL, PERCUTANEOUS, FOR TRIAL;  Surgeon: Alberto Zhang MD;  Location: [facility] OR     RELEASE CARPAL TUNNEL Right 8/6/2021    Procedure: RELEASE, CARPAL TUNNEL, Right;  Surgeon: RADHA Sandoval MD;  Location: [facility] OR     RELEASE TRIGGER FINGER Bilateral 5/10/2019    Procedure: RELEASE TRIGGER FINGER, BILATERAL LONG AND RING FINGERS;  Surgeon: RADHA Sandoval MD;  Location: [facility] OR     VASCULAR SURGERY      Varicose Veins     Current Outpatient Medications   Medication Sig Dispense Refill     apixaban ANTICOAGULANT (ELIQUIS) 5 MG tablet Take 1 tablet (5 mg) by mouth 2 times daily Resume the day following your procedure, on 7/6/2022 180 tablet 3     atorvastatin (LIPITOR) 10 MG tablet Take 1 tablet (10 mg) by mouth daily 90 tablet 3     blood glucose (ACCU-CHEK GUIDE) test strip Use to test blood sugar 6 times daily 550 strip 3     blood glucose monitoring (ACCU-CHEK FASTCLIX) lancets USE TO TEST BLOOD SUGAR FOUR TIMES A DAY OR AS DIRECTED 306 each 3     calcium carbonate-vitamin D 500-400 MG-UNIT TABS tablt Take 1 tablet by mouth 2 times daily 180 tablet 3     Continuous Blood Gluc  (DEXCOM G6 ) JUANA Use to read blood sugars as per 's instructions. 1 each 0     Continuous Blood Gluc Sensor (DEXCOM G6 SENSOR) MISC Change every 10 days. 3 each 11     Continuous Blood Gluc Transmit (DEXCOM G6 TRANSMITTER) MISC Change every 3 months. 1 each 3     estradiol (ESTRACE) 0.1 MG/GM vaginal cream Place 2 g vaginally twice a week 42.5 g 11     gabapentin (NEURONTIN) 100 MG capsule Take 1 capsule (100 mg) by mouth 3 times daily Take one pill at night for the first day.  Take one pill twice a day on the second day.  Take one pill 3 times a day from the third day onward.  If you have side effects, back down to the dose you tolerated best with symptom relief. 90 capsule 0     insulin aspart (NOVOLOG FLEXPEN) 100 UNIT/ML pen 1 unit per 15 gram of carb for all meals. Plus 1  "unit per 50 mg/dl above 150. Total daily dose approx 30 units. 30 mL 3     insulin glargine (BASAGLAR KWIKPEN) 100 UNIT/ML pen Inject 35 Units Subcutaneous daily Max, 35 units daily 45 mL 2     insulin pen needle (B-D U/F) 31G X 8 MM miscellaneous USE 5 TIMES DAILY  WITH NOVOLOG AND LANTUS 500 each 3     metoprolol succinate ER (TOPROL XL) 50 MG 24 hr tablet TAKE ONE TABLET BY MOUTH EVERY MORNING 90 tablet 0     Multiple Vitamins-Minerals (MULTIVITAMIN ADULT PO) Take 1 tablet by mouth every morning        oxyCODONE (ROXICODONE) 5 MG tablet Take 1 tablet (5 mg) by mouth every 6 hours as needed for pain 10 tablet 0     Urine Glucose-Ketones Test (KETO-DIASTIX) STRP 1 strip by In Vitro route as needed 1 each 11       No Known Allergies     Social History     Tobacco Use     Smoking status: Never Smoker     Smokeless tobacco: Never Used   Substance Use Topics     Alcohol use: Yes     Comment: 1 to 2 beers or glasses of wine 1 to 2 times per month        Objective   /67 (BP Location: Left arm, Patient Position: Chair, Cuff Size: Adult Regular)   Pulse 99   Temp 98.2  F (36.8  C) (Oral)   Ht 1.651 m (5' 5\")   Wt 76.8 kg (169 lb 6.4 oz)   SpO2 96%   Breastfeeding No   BMI 28.19 kg/m      Physical Exam    GENERAL APPEARANCE: healthy, alert and no distress     EYES: Eyes grossly normal to inspection     HENT: normal cephalic/atraumatic     RESP: lungs clear to auscultation - no rales, rhonchi or wheezes     CV: regular rates and rhythm     NEURO: mentation intact and speech normal     PSYCH: mentation appears normal. and affect normal/bright       Diagnostics:  EKG: atrial fibrillation, rate 120, unchanged from previous tracings  9/6/2022  9:53 AM     Component Value Flag Ref Range Units Status   Hemoglobin A1C 8.0   High   0.0 - 5.6 % Final   Comment:   Normal <5.7%   Prediabetes 5.7-6.4%     Diabetes 6.5% or higher    9/9/2022  7:50 PM     Component Value Flag Ref Range Units Status   Sodium 139   133 - 144 " mmol/L Final   Potassium 4.6   3.4 - 5.3 mmol/L Final   Chloride 104   94 - 109 mmol/L Final   Carbon Dioxide (CO2) 28   20 - 32 mmol/L Final   Anion Gap 7   3 - 14 mmol/L Final   Urea Nitrogen 17   7 - 30 mg/dL Final   Creatinine 0.74   0.52 - 1.04 mg/dL Final   Calcium 10.0   8.5 - 10.1 mg/dL Final   Glucose 206   High   70 - 99 mg/dL Final   Alkaline Phosphatase 86   40 - 150 U/L Final   AST 19   0 - 45 U/L Final   ALT 23   0 - 50 U/L Final   Protein Total 7.2   6.8 - 8.8 g/dL Final   Albumin 3.8   3.4 - 5.0 g/dL Final   Bilirubin Total 0.6   0.2 - 1.3 mg/dL Final   GFR Estimate 86   >60 mL/min/1.73m2 Final     9/9/2022  1:00 PM     Component Value Flag Ref Range Units Status   WBC Count 7.8   4.0 - 11.0 10e3/uL Final   RBC Count 4.39   3.80 - 5.20 10e6/uL Final   Hemoglobin 13.9   11.7 - 15.7 g/dL Final   Hematocrit 41.1   35.0 - 47.0 % Final   MCV 94   78 - 100 fL Final   MCH 31.7   26.5 - 33.0 pg Final   MCHC 33.8   31.5 - 36.5 g/dL Final   RDW 12.3   10.0 - 15.0 % Final   Platelet Count 267   150 - 450 10e3/uL Final   % Neutrophils 70    % Final   % Lymphocytes 21    % Final   % Monocytes 7    % Final   % Eosinophils 1    % Final   % Basophils 0    % Final   % Immature Granulocytes 0    % Final   Absolute Neutrophils 5.4   1.6 - 8.3 10e3/uL Final   Absolute Lymphocytes 1.7   0.8 - 5.3 10e3/uL Final   Absolute Monocytes 0.6   0.0 - 1.3 10e3/uL Final   Absolute Eosinophils 0.1   0.0 - 0.7 10e3/uL Final   Absolute Basophils 0.0   0.0 - 0.2 10e3/uL Final   Absolute Immature Granulocytes 0.0   <=0.4 10e3/uL Final       ASSESSMENT/PLAN  Pre-op exam    - EKG 12-lead complete w/read - Clinics  - CBC with platelets and differential  - enoxaparin ANTICOAGULANT (LOVENOX) 80 MG/0.8ML syringe; Inject 0.8 mLs (80 mg) Subcutaneous every 12 hours for 4 doses Take Lovenox on 9/10/2022 at 8:00 AM and 8:00 PM. Take Lovenox on 9/11/2022 at 8:00 AM and 8:00 PM.  - Comprehensive metabolic panel    Permanent atrial fibrillation  (H)  Despite moderate risk of bleeding and [id number] of only 3, the patient has persistent atrial fibrillation.  Half-life of Eliquis is 8 - 15 hours, but I prefer to use Lovenox preoperatively. Restart Eliquis 12 hours postoperatively.  - enoxaparin ANTICOAGULANT (LOVENOX) 80 MG/0.8ML syringe; Inject 0.8 mLs (80 mg) Subcutaneous every 12 hours for 4 doses Take Lovenox on 9/10/2022 at 8:00 AM and 8:00 PM. Take Lovenox on 9/11/2022 at 8:00 AM and 8:00 PM.    Type 1 diabetes mellitus with hyperglycemia (H)  - Comprehensive metabolic panel    Revised Cardiac Risk Index (RCRI):  The patient has the following serious cardiovascular risks for perioperative complications:   - Diabetes Mellitus (on Insulin) = 1 point     RCRI Interpretation: 1 point: Class II (low risk - 0.9% complication rate)    Signed Electronically by: Ron Meade MD  Copy of this evaluation report is provided to requesting physician.

## 2022-09-09 NOTE — PATIENT INSTRUCTIONS
At [facility], we strive to deliver an exceptional experience to you, every time we see you. If you receive a survey, please complete it as we do value your feedback.  If you have MyChart, you can expect to receive results automatically within 24 hours of their completion.  Your provider will send a note interpreting your results as well.   If you do not have MyChart, you should receive your results in about a week by mail.    Your care team:                            Family Medicine Internal Medicine   MD Ron Sheridan MD Shantel Branch-Fleming, MD Srinivasa Vaka, MD Katya Belousova, AFSHIN ColoradoHillURVASHI Thornton CNP, MD Pediatrics   Roney Ramirez, MD Annetta Medina MD Amelia Massimini APRN CNP   Nano Matthews, APRN CNP Masha Rizo MD             Clinic hours: Monday - Thursday 7 am-6 pm; Fridays 7 am-5 pm.   Urgent care: Monday - Friday 10 am- 8 pm; Saturday and Sunday 9 am-5 pm.    Clinic: [phone]       [facility]: Monday - Thursday 8 am - 7 pm; Friday 8 am - 6 pm  [facility]: [phone]        PATIENT INSTRUCTIONS:    Hold Eliquis on 9/10/2022 AM.    Start Lovenox on 9/10/2022,  at 8:00 AM and 8:00 PM. Take Lovenox on 9/11/2022 at 8:00 AM and 8:00 PM.    Take Metoprolol succinate on 9/12/2022 (not later than 6 AM).    4.   Restart Eliquis 24 hours after surgery.

## 2022-09-09 NOTE — PROGRESS NOTES
[address] [address]  Phone: [phone]  Primary Provider: Pricila Avila  Pre-op Performing Provider: JACOB FLORES    PREOPERATIVE EVALUATION:  Today's date: 9/9/2022    Latoya Rodríguez is a 71 year old female who presents for a preoperative evaluation.    Surgical Information:  Surgery/Procedure: CYSTOSCOPY, WITH PERIURETHRAL BULKING AGENT INJECTION (look in the bladder and urethra and inject filler into the urethra)  Surgery Location:  OR  Surgeon: Alberto Zhang MD  Surgery Date: 9/12/22  Time of Surgery: 12: 55 PM  Where patient plans to recover: At home with family  Fax number for surgical facility: Note does not need to be faxed, will be available electronically in Epic.    Type of Anesthesia Anticipated: to be determined    HPI related to upcoming procedure:             This is a 71 year old female with type 1 diabetes and atrial fibrillation who comes in today for a preoperative evaluation. Latoya is scheduled to undergo a cystoscopy with periurethral bulging agent injection on September 12, 2022.    Preop Questions 9/6/2022   1. Have you ever had a heart attack or stroke? No   2. Have you ever had surgery on your heart or blood vessels, such as a stent placement, a coronary artery bypass, or surgery on an artery in your head, neck, heart, or legs? YES   3. Do you have chest pain with activity? No   4. Do you have a history of  heart failure? No   5. Do you currently have a cold, bronchitis or symptoms of other infection? No   6. Do you have a cough, shortness of breath, or wheezing? No   7. Do you or anyone in your family have previous history of blood clots? No   8. Do you or does anyone in your family have a serious bleeding problem such as prolonged bleeding following surgeries or cuts? No   9. Have you ever had problems with anemia or been told to take iron pills? No   10. Have you had any abnormal blood loss  such as black, tarry or bloody stools, or abnormal vaginal bleeding? No   11. Have you ever had a blood transfusion? No   12. Are you willing to have a blood transfusion if it is medically needed before, during, or after your surgery? Yes   13. Have you or any of your relatives ever had problems with anesthesia? No   14. Do you have sleep apnea, excessive snoring or daytime drowsiness? No   15. Do you have any artifical heart valves or other implanted medical devices like a pacemaker, defibrillator, or continuous glucose monitor? YES    15a. What type of device do you have? PostedIn system   15b. Name of the clinic that manages your device:  [facility]   16. Do you have artificial joints? No   17. Are you allergic to latex? No       Health Care Directive:  Patient does not have a Health Care Directive or Living Will: Patient wants FULL CODE.    Preoperative Review of :   reviewed - Currently no controlled substances filled in the past 30 days.      Review of Systems  CONSTITUTIONAL: NEGATIVE for fever, chills, change in weight  INTEGUMENTARY/SKIN: NEGATIVE for worrisome rashes, moles or lesions  EYES: NEGATIVE for vision changes or irritation  ENT/MOUTH: NEGATIVE for ear, mouth and throat problems  RESP: NEGATIVE for significant cough or SOB  CV: POSITIVE for atrial fibrillation and NEGATIVE for chest pain.  GI: NEGATIVE for nausea, abdominal pain, heartburn, or change in bowel habits  : NEGATIVE for frequency, dysuria, or hematuria  MUSCULOSKELETAL: NEGATIVE for significant arthralgias or myalgia  NEURO: NEGATIVE for weakness, dizziness or paresthesias  ENDOCRINE: POSITIVE  for insulin-requiring diabetes mellitus.  HEME: NEGATIVE for bleeding problems  PSYCHIATRIC: NEGATIVE for changes in mood or affect    Patient Active Problem List    Diagnosis Date Noted     Chronic atrial fibrillation (H) 06/08/2022     Priority: Medium     Carpal tunnel syndrome of right wrist 06/15/2021     Priority: Medium      Added automatically from request for surgery [id number]       Intrinsic sphincter deficiency 04/05/2021     Priority: Medium     Overactive bladder 04/05/2021     Priority: Medium     Stress incontinence 01/25/2021     Priority: Medium     Atrophic vaginitis 01/25/2021     Priority: Medium     Age-related osteoporosis without current pathological fracture 09/13/2016     Priority: Medium     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) 07/29/2016     Priority: Medium     Type 1 diabetes mellitus with hyperglycemia (H) 07/29/2016     Priority: Medium     Osteoporosis 07/11/2016     Priority: Medium     Type 2 diabetes mellitus with hyperglycemia, with long-term current use of insulin (H) 06/24/2016     Priority: Medium     Hyperlipidemia with target LDL less than 100 06/24/2016     Priority: Medium     Urge incontinence 05/06/2013     Priority: Medium     Urgency of urination 07/18/2011     Priority: Medium     Urinary frequency 07/18/2011     Priority: Medium      Past Medical History:   Diagnosis Date     Cataract      Chronic atrial fibrillation (H) 6/8/2022     Type 2 diabetes mellitus with hyperglycemia (H) 6/24/2016     Past Surgical History:   Procedure Laterality Date     ANESTHESIA CARDIOVERSION N/A 9/23/2021    Procedure: ANESTHESIA, FOR CARDIOVERSION@1100;  Surgeon: GENERIC ANESTHESIA PROVIDER;  Location: [facility] OR     CYSTOSCOPY, INJECT COLLAGEN, COMBINED N/A 5/11/2021    Procedure: CYSTOSCOPY, WITH PERIURETHRAL BULKING AGENT INJECTION;  Surgeon: Alberto Zhang MD;  Location: [facility] [place]     EYE SURGERY  9/04, 5/11    Retinal detachment, Cataract (both eyes)     IMPLANT STIMULATOR AND LEADS SACRAL NERVE (STAGE ONE AND TWO) Right 7/5/2022    Procedure: INSERTION, SACRAL NERVE STIMULATOR, STAGE 1 & 2 (permanent implant on the RIGHT side with [employer]onics);  Surgeon: Alberto Zhang MD;  Location: [facility] OR     IMPLANT STIMULATOR SACRAL NERVE PERCUTANEOUS TRIAL N/A 6/14/2022    Procedure: INSERTION,  NEUROSTIMULATOR, SACRAL, PERCUTANEOUS, FOR TRIAL;  Surgeon: Alberto Zhang MD;  Location: [facility] OR     RELEASE CARPAL TUNNEL Right 8/6/2021    Procedure: RELEASE, CARPAL TUNNEL, Right;  Surgeon: RADHA Sandoval MD;  Location: [facility] OR     RELEASE TRIGGER FINGER Bilateral 5/10/2019    Procedure: RELEASE TRIGGER FINGER, BILATERAL LONG AND RING FINGERS;  Surgeon: RADHA Sandoval MD;  Location: [facility] OR     VASCULAR SURGERY      Varicose Veins     Current Outpatient Medications   Medication Sig Dispense Refill     apixaban ANTICOAGULANT (ELIQUIS) 5 MG tablet Take 1 tablet (5 mg) by mouth 2 times daily Resume the day following your procedure, on 7/6/2022 180 tablet 3     atorvastatin (LIPITOR) 10 MG tablet Take 1 tablet (10 mg) by mouth daily 90 tablet 3     blood glucose (ACCU-CHEK GUIDE) test strip Use to test blood sugar 6 times daily 550 strip 3     blood glucose monitoring (ACCU-CHEK FASTCLIX) lancets USE TO TEST BLOOD SUGAR FOUR TIMES A DAY OR AS DIRECTED 306 each 3     calcium carbonate-vitamin D 500-400 MG-UNIT TABS tablt Take 1 tablet by mouth 2 times daily 180 tablet 3     Continuous Blood Gluc  (DEXCOM G6 ) JUANA Use to read blood sugars as per 's instructions. 1 each 0     Continuous Blood Gluc Sensor (DEXCOM G6 SENSOR) MISC Change every 10 days. 3 each 11     Continuous Blood Gluc Transmit (DEXCOM G6 TRANSMITTER) MISC Change every 3 months. 1 each 3     estradiol (ESTRACE) 0.1 MG/GM vaginal cream Place 2 g vaginally twice a week 42.5 g 11     gabapentin (NEURONTIN) 100 MG capsule Take 1 capsule (100 mg) by mouth 3 times daily Take one pill at night for the first day.  Take one pill twice a day on the second day.  Take one pill 3 times a day from the third day onward.  If you have side effects, back down to the dose you tolerated best with symptom relief. 90 capsule 0     insulin aspart (NOVOLOG FLEXPEN) 100 UNIT/ML pen 1 unit per 15 gram of carb for all meals. Plus 1  "unit per 50 mg/dl above 150. Total daily dose approx 30 units. 30 mL 3     insulin glargine (BASAGLAR KWIKPEN) 100 UNIT/ML pen Inject 35 Units Subcutaneous daily Max, 35 units daily 45 mL 2     insulin pen needle (B-D U/F) 31G X 8 MM miscellaneous USE 5 TIMES DAILY  WITH NOVOLOG AND LANTUS 500 each 3     metoprolol succinate ER (TOPROL XL) 50 MG 24 hr tablet TAKE ONE TABLET BY MOUTH EVERY MORNING 90 tablet 0     Multiple Vitamins-Minerals (MULTIVITAMIN ADULT PO) Take 1 tablet by mouth every morning        oxyCODONE (ROXICODONE) 5 MG tablet Take 1 tablet (5 mg) by mouth every 6 hours as needed for pain 10 tablet 0     Urine Glucose-Ketones Test (KETO-DIASTIX) STRP 1 strip by In Vitro route as needed 1 each 11       No Known Allergies     Social History     Tobacco Use     Smoking status: Never Smoker     Smokeless tobacco: Never Used   Substance Use Topics     Alcohol use: Yes     Comment: 1 to 2 beers or glasses of wine 1 to 2 times per month        Objective   /67 (BP Location: Left arm, Patient Position: Chair, Cuff Size: Adult Regular)   Pulse 99   Temp 98.2  F (36.8  C) (Oral)   Ht 1.651 m (5' 5\")   Wt 76.8 kg (169 lb 6.4 oz)   SpO2 96%   Breastfeeding No   BMI 28.19 kg/m      Physical Exam    GENERAL APPEARANCE: healthy, alert and no distress     EYES: Eyes grossly normal to inspection     HENT: normal cephalic/atraumatic     RESP: lungs clear to auscultation - no rales, rhonchi or wheezes     CV: regular rates and rhythm     NEURO: mentation intact and speech normal     PSYCH: mentation appears normal. and affect normal/bright       Diagnostics:  EKG: atrial fibrillation, rate 120, unchanged from previous tracings  9/6/2022  9:53 AM     Component Value Flag Ref Range Units Status   Hemoglobin A1C 8.0   High   0.0 - 5.6 % Final   Comment:   Normal <5.7%   Prediabetes 5.7-6.4%     Diabetes 6.5% or higher    9/9/2022  7:50 PM     Component Value Flag Ref Range Units Status   Sodium 139   133 - 144 " mmol/L Final   Potassium 4.6   3.4 - 5.3 mmol/L Final   Chloride 104   94 - 109 mmol/L Final   Carbon Dioxide (CO2) 28   20 - 32 mmol/L Final   Anion Gap 7   3 - 14 mmol/L Final   Urea Nitrogen 17   7 - 30 mg/dL Final   Creatinine 0.74   0.52 - 1.04 mg/dL Final   Calcium 10.0   8.5 - 10.1 mg/dL Final   Glucose 206   High   70 - 99 mg/dL Final   Alkaline Phosphatase 86   40 - 150 U/L Final   AST 19   0 - 45 U/L Final   ALT 23   0 - 50 U/L Final   Protein Total 7.2   6.8 - 8.8 g/dL Final   Albumin 3.8   3.4 - 5.0 g/dL Final   Bilirubin Total 0.6   0.2 - 1.3 mg/dL Final   GFR Estimate 86   >60 mL/min/1.73m2 Final     9/9/2022  1:00 PM     Component Value Flag Ref Range Units Status   WBC Count 7.8   4.0 - 11.0 10e3/uL Final   RBC Count 4.39   3.80 - 5.20 10e6/uL Final   Hemoglobin 13.9   11.7 - 15.7 g/dL Final   Hematocrit 41.1   35.0 - 47.0 % Final   MCV 94   78 - 100 fL Final   MCH 31.7   26.5 - 33.0 pg Final   MCHC 33.8   31.5 - 36.5 g/dL Final   RDW 12.3   10.0 - 15.0 % Final   Platelet Count 267   150 - 450 10e3/uL Final   % Neutrophils 70    % Final   % Lymphocytes 21    % Final   % Monocytes 7    % Final   % Eosinophils 1    % Final   % Basophils 0    % Final   % Immature Granulocytes 0    % Final   Absolute Neutrophils 5.4   1.6 - 8.3 10e3/uL Final   Absolute Lymphocytes 1.7   0.8 - 5.3 10e3/uL Final   Absolute Monocytes 0.6   0.0 - 1.3 10e3/uL Final   Absolute Eosinophils 0.1   0.0 - 0.7 10e3/uL Final   Absolute Basophils 0.0   0.0 - 0.2 10e3/uL Final   Absolute Immature Granulocytes 0.0   <=0.4 10e3/uL Final       ASSESSMENT/PLAN  Pre-op exam    - EKG 12-lead complete w/read - Clinics  - CBC with platelets and differential  - enoxaparin ANTICOAGULANT (LOVENOX) 80 MG/0.8ML syringe; Inject 0.8 mLs (80 mg) Subcutaneous every 12 hours for 4 doses Take Lovenox on 9/10/2022 at 8:00 AM and 8:00 PM. Take Lovenox on 9/11/2022 at 8:00 AM and 8:00 PM.  - Comprehensive metabolic panel    Permanent atrial fibrillation  (H)  Despite moderate risk of bleeding and [id number] of only 3, the patient has persistent atrial fibrillation.  Half-life of Eliquis is 8 - 15 hours, but I prefer to use Lovenox preoperatively. Restart Eliquis 12 hours postoperatively.  - enoxaparin ANTICOAGULANT (LOVENOX) 80 MG/0.8ML syringe; Inject 0.8 mLs (80 mg) Subcutaneous every 12 hours for 4 doses Take Lovenox on 9/10/2022 at 8:00 AM and 8:00 PM. Take Lovenox on 9/11/2022 at 8:00 AM and 8:00 PM.    Type 1 diabetes mellitus with hyperglycemia (H)  - Comprehensive metabolic panel    Revised Cardiac Risk Index (RCRI):  The patient has the following serious cardiovascular risks for perioperative complications:   - Diabetes Mellitus (on Insulin) = 1 point     RCRI Interpretation: 1 point: Class II (low risk - 0.9% complication rate)    Signed Electronically by: Ron Meade MD  Copy of this evaluation report is provided to requesting physician.

## 2022-09-10 ENCOUNTER — NURSE TRIAGE (OUTPATIENT)
Dept: NURSING | Facility: CLINIC | Age: 72
End: 2022-09-10

## 2022-09-10 NOTE — TELEPHONE ENCOUNTER
Pt calling to find out where she should inject herself with enoxaparin. Reviewed correct injection technique and location with pt     She verbalizes understanding and denies further questions at this time    Reason for Disposition    Caller has medicine question only, adult not sick, AND triager answers question    Protocols used: MEDICATION QUESTION CALL-A-      Nidhi Orr RN [facility] September 10, 2022 1:37 PM

## 2022-09-12 ENCOUNTER — ANESTHESIA (OUTPATIENT)
Dept: SURGERY | Facility: AMBULATORY SURGERY CENTER | Age: 72
End: 2022-09-12
Payer: COMMERCIAL

## 2022-09-12 ENCOUNTER — HOSPITAL ENCOUNTER (OUTPATIENT)
Facility: AMBULATORY SURGERY CENTER | Age: 72
Discharge: HOME | End: 2022-09-12
Attending: UROLOGY | Admitting: UROLOGY
Payer: COMMERCIAL

## 2022-09-12 VITALS
DIASTOLIC BLOOD PRESSURE: 63 MMHG | WEIGHT: 169.31 LBS | OXYGEN SATURATION: 99 % | BODY MASS INDEX: 28.18 KG/M2 | TEMPERATURE: 99 F | HEART RATE: 124 BPM | RESPIRATION RATE: 18 BRPM | SYSTOLIC BLOOD PRESSURE: 105 MMHG

## 2022-09-12 LAB
GLUCOSE BLDC GLUCOMTR-MCNC: 264 MG/DL (ref 70–99)
GLUCOSE BLDC GLUCOMTR-MCNC: 292 MG/DL (ref 70–99)

## 2022-09-12 PROCEDURE — G8907 PT DOC NO EVENTS ON DISCHARG: HCPCS

## 2022-09-12 PROCEDURE — 51715 ENDOSCOPIC INJECTION/IMPLANT: CPT | Performed by: UROLOGY

## 2022-09-12 PROCEDURE — L8606 SYNTHETIC IMPLNT URINARY 1ML: HCPCS

## 2022-09-12 PROCEDURE — G8916 PT W IV AB GIVEN ON TIME: HCPCS

## 2022-09-12 PROCEDURE — 51715 ENDOSCOPIC INJECTION/IMPLANT: CPT

## 2022-09-12 DEVICE — IMPLANTABLE DEVICE: Type: IMPLANTABLE DEVICE | Site: URETHRA | Status: FUNCTIONAL

## 2022-09-12 RX ORDER — LIDOCAINE 40 MG/G
CREAM TOPICAL
Status: DISCONTINUED | OUTPATIENT
Start: 2022-09-12 | End: 2022-09-13 | Stop reason: HOSPADM

## 2022-09-12 RX ORDER — PROPOFOL 10 MG/ML
INJECTION, EMULSION INTRAVENOUS CONTINUOUS PRN
Status: DISCONTINUED | OUTPATIENT
Start: 2022-09-12 | End: 2022-09-12

## 2022-09-12 RX ORDER — CEFAZOLIN SODIUM 2 G/100ML
2 INJECTION, SOLUTION INTRAVENOUS
Status: COMPLETED | OUTPATIENT
Start: 2022-09-12 | End: 2022-09-12

## 2022-09-12 RX ORDER — ACETAMINOPHEN 325 MG/1
975 TABLET ORAL ONCE
Status: COMPLETED | OUTPATIENT
Start: 2022-09-12 | End: 2022-09-12

## 2022-09-12 RX ORDER — SODIUM CHLORIDE, SODIUM LACTATE, POTASSIUM CHLORIDE, CALCIUM CHLORIDE 600; 310; 30; 20 MG/100ML; MG/100ML; MG/100ML; MG/100ML
INJECTION, SOLUTION INTRAVENOUS CONTINUOUS
Status: DISCONTINUED | OUTPATIENT
Start: 2022-09-12 | End: 2022-09-13 | Stop reason: HOSPADM

## 2022-09-12 RX ORDER — HYDRALAZINE HYDROCHLORIDE 20 MG/ML
2.5-5 INJECTION INTRAMUSCULAR; INTRAVENOUS EVERY 10 MIN PRN
Status: DISCONTINUED | OUTPATIENT
Start: 2022-09-12 | End: 2022-09-13 | Stop reason: HOSPADM

## 2022-09-12 RX ORDER — CEFAZOLIN SODIUM 2 G/100ML
2 INJECTION, SOLUTION INTRAVENOUS SEE ADMIN INSTRUCTIONS
Status: DISCONTINUED | OUTPATIENT
Start: 2022-09-12 | End: 2022-09-13 | Stop reason: HOSPADM

## 2022-09-12 RX ORDER — HYDROCODONE BITARTRATE AND ACETAMINOPHEN 5; 325 MG/1; MG/1
1 TABLET ORAL ONCE
Status: DISCONTINUED | OUTPATIENT
Start: 2022-09-12 | End: 2022-09-13 | Stop reason: HOSPADM

## 2022-09-12 RX ORDER — METOPROLOL TARTRATE 1 MG/ML
1-2 INJECTION, SOLUTION INTRAVENOUS EVERY 5 MIN PRN
Status: DISCONTINUED | OUTPATIENT
Start: 2022-09-12 | End: 2022-09-13 | Stop reason: HOSPADM

## 2022-09-12 RX ORDER — ONDANSETRON 2 MG/ML
INJECTION INTRAMUSCULAR; INTRAVENOUS PRN
Status: DISCONTINUED | OUTPATIENT
Start: 2022-09-12 | End: 2022-09-12

## 2022-09-12 RX ORDER — FENTANYL CITRATE 50 UG/ML
INJECTION, SOLUTION INTRAMUSCULAR; INTRAVENOUS PRN
Status: DISCONTINUED | OUTPATIENT
Start: 2022-09-12 | End: 2022-09-12

## 2022-09-12 RX ORDER — LIDOCAINE HYDROCHLORIDE 20 MG/ML
INJECTION, SOLUTION INFILTRATION; PERINEURAL PRN
Status: DISCONTINUED | OUTPATIENT
Start: 2022-09-12 | End: 2022-09-12

## 2022-09-12 RX ORDER — FENTANYL CITRATE 50 UG/ML
25 INJECTION, SOLUTION INTRAMUSCULAR; INTRAVENOUS EVERY 5 MIN PRN
Status: DISCONTINUED | OUTPATIENT
Start: 2022-09-12 | End: 2022-09-13 | Stop reason: HOSPADM

## 2022-09-12 RX ORDER — OXYCODONE HYDROCHLORIDE 5 MG/1
5 TABLET ORAL EVERY 4 HOURS PRN
Status: DISCONTINUED | OUTPATIENT
Start: 2022-09-12 | End: 2022-09-13 | Stop reason: HOSPADM

## 2022-09-12 RX ORDER — ONDANSETRON 4 MG/1
4 TABLET, ORALLY DISINTEGRATING ORAL EVERY 30 MIN PRN
Status: DISCONTINUED | OUTPATIENT
Start: 2022-09-12 | End: 2022-09-13 | Stop reason: HOSPADM

## 2022-09-12 RX ORDER — FENTANYL CITRATE 50 UG/ML
25 INJECTION, SOLUTION INTRAMUSCULAR; INTRAVENOUS
Status: DISCONTINUED | OUTPATIENT
Start: 2022-09-12 | End: 2022-09-13 | Stop reason: HOSPADM

## 2022-09-12 RX ORDER — ONDANSETRON 2 MG/ML
4 INJECTION INTRAMUSCULAR; INTRAVENOUS EVERY 30 MIN PRN
Status: DISCONTINUED | OUTPATIENT
Start: 2022-09-12 | End: 2022-09-13 | Stop reason: HOSPADM

## 2022-09-12 RX ORDER — PROPOFOL 10 MG/ML
INJECTION, EMULSION INTRAVENOUS PRN
Status: DISCONTINUED | OUTPATIENT
Start: 2022-09-12 | End: 2022-09-12

## 2022-09-12 RX ADMIN — ACETAMINOPHEN 975 MG: 325 TABLET ORAL at 12:08

## 2022-09-12 RX ADMIN — PROPOFOL 50 MG: 10 INJECTION, EMULSION INTRAVENOUS at 12:41

## 2022-09-12 RX ADMIN — PROPOFOL 100 MCG/KG/MIN: 10 INJECTION, EMULSION INTRAVENOUS at 12:41

## 2022-09-12 RX ADMIN — FENTANYL CITRATE 25 MCG: 50 INJECTION, SOLUTION INTRAMUSCULAR; INTRAVENOUS at 12:41

## 2022-09-12 RX ADMIN — SODIUM CHLORIDE, SODIUM LACTATE, POTASSIUM CHLORIDE, CALCIUM CHLORIDE: 600; 310; 30; 20 INJECTION, SOLUTION INTRAVENOUS at 12:08

## 2022-09-12 RX ADMIN — ONDANSETRON 4 MG: 2 INJECTION INTRAMUSCULAR; INTRAVENOUS at 12:53

## 2022-09-12 RX ADMIN — LIDOCAINE HYDROCHLORIDE 60 MG: 20 INJECTION, SOLUTION INFILTRATION; PERINEURAL at 12:41

## 2022-09-12 RX ADMIN — CEFAZOLIN SODIUM 2 G: 2 INJECTION, SOLUTION INTRAVENOUS at 12:36

## 2022-09-12 ASSESSMENT — ENCOUNTER SYMPTOMS: DYSRHYTHMIAS: 1

## 2022-09-12 NOTE — OR NURSING
Patient reports IV discontinued site began bleeding after she got up to change clothes. 0.5cm hematoma at site, no longer bleeding.

## 2022-09-12 NOTE — ANESTHESIA POSTPROCEDURE EVALUATION
Patient: Latoya Rodríguez    Procedure: Procedure(s):  CYSTOSCOPY, WITH PERIURETHRAL BULKING AGENT INJECTION (look in the bladder and urethra and inject filler into the urethra)       Anesthesia Type:  MAC    Note:  Disposition: Outpatient   Postop Pain Control: Uneventful            Sign Out: Well controlled pain   PONV: No   Neuro/Psych: Uneventful            Sign Out: Acceptable/Baseline neuro status   Airway/Respiratory: Uneventful            Sign Out: Acceptable/Baseline resp. status   CV/Hemodynamics: Uneventful            Sign Out: Acceptable CV status; No obvious hypovolemia; No obvious fluid overload   Other NRE: NONE   DID A NON-ROUTINE EVENT OCCUR? No           Last vitals:  Vitals Value Taken Time   BP 93/58 09/12/22 1318   Temp 99  F (37.2  C) 09/12/22 1301   Pulse     Resp 16 09/12/22 1318   SpO2 100 % 09/12/22 1318       Electronically Signed By: Major Carlton MD  September 12, 2022  1:27 PM

## 2022-09-12 NOTE — OR NURSING
Dr Carlton notified of elevated blood sugar pre-op. Patient states this has been the normal range for her recently. No interventions prior to surgery per DR Carlton. Patient instructed to follow up with PCP.

## 2022-09-12 NOTE — DISCHARGE INSTRUCTIONS
[facility]   Adult Discharge Orders & Instructions     For 24 hours after surgery    Get plenty of rest.  A responsible adult must stay with you for at least 24 hours after you leave the hospital.   Do not drive or use heavy equipment.  If you have weakness or tingling, don't drive or use heavy equipment until this feeling goes away.  Do not drink alcohol.  Avoid strenuous or risky activities.  Ask for help when climbing stairs.   You may feel lightheaded.  IF so, sit for a few minutes before standing.  Have someone help you get up.   If you have nausea (feel sick to your stomach): Drink only clear liquids such as apple juice, ginger ale, broth or 7-Up.  Rest may also help.  Be sure to drink enough fluids.  Move to a regular diet as you feel able.  You may have a slight fever. Call the doctor if your fever is over 100 F (37.7 C) (taken under the tongue) or lasts longer than 24 hours.  You may have a dry mouth, a sore throat, muscle aches or trouble sleeping.  These should go away after 24 hours.  Do not make important or legal decisions.     Call your doctor for any of the followin.  Signs of infection (fever, growing tenderness at the surgery site, a large amount of drainage or bleeding, severe pain, foul-smelling drainage, redness, swelling).    2. It has been over 8 to 10 hours since surgery and you are still not able to urinate (pass water).    3.  Headache for over 24 hours.    4.  Numbness, tingling or weakness the day after surgery (if you had spinal anesthesia).                  5. Signs of Covid-19 infection (temperature over 100 degrees, shortness of breath, cough, loss of taste/smell, generalized body aches, persistent headache,                  chills, sore throat, nausea/vomiting/diarrhea).  For questions or concerns regarding your procedure, please contact Dr. Zhang at [phone].

## 2022-09-12 NOTE — INTERVAL H&P NOTE
"I have reviewed the surgical (or preoperative) H&P that is linked to this encounter, and examined the patient. There are no significant changes    Clinical Conditions Present on Arrival:  Clinically Significant Risk Factors Present on Admission                 # Coagulation Defect: home medication list includes an anticoagulant medication   # Overweight: Estimated body mass index is 28.18 kg/m  as calculated from the following:    Height as of 9/9/22: 1.651 m (5' 5\").    Weight as of this encounter: 76.8 kg (169 lb 5 oz).       "

## 2022-09-12 NOTE — OP NOTE
Pre-op Dx: stress incontinence and intrinsic sphincter deficiency  Post-op Dx: Same   Procedure performed: cystoscopy and periurethral bulking with Bulkamid  Surgeon: Alberto Zhang MD   Assistant surgeons: none  Anesthesia: mac   EBL: 2 cc   Complications: None   Disposition: Stable to PACU   Clinical Indication: Ms. Latoya Rodríguez is a 71 year old female with a hx of stress incontinence thought to be secondary to ISD. She then underwent periurethral bulking for ISD on 5/11/21 and notes that her urinary frequency and incontinence have improved but she continues to have a little leakage but not as bad as the previous gushes We discussed options and elected to proceed with the above to augment previous bulking.    Clinical Procedure:   Pt. Was identified correctly, consented and placed in the lithotomy position.  She was cleaned and preparred in the usual sterile fashion.  Lidocain gel was inserted into the urethra and given time to take effect.  The small bulkamid cystoscope was then inserted through the urethra and into the bladder.  The urethra was wnl, open bladder neck.  The bladder was with 1+ trabeculation.  No tumors, diverticulae, or stones, however some squamous metaplasia.  Bilateral u/o's were effluxing clear urine.   A needle was then inserted and the scope was withdrawn to the area of the mid urethra and inserted at the 7, 5, 2, and 10 o'clock positions until a nice coaptation of the urethra was observed.  A total of 2 syringes was injected in total.  There was very good coaptation at the end of the procedure. The cystoscope was then withdrawn.  The pt. Tolerated the procedure well.  She will need to void prior to discharge.

## 2022-09-12 NOTE — ANESTHESIA PREPROCEDURE EVALUATION
Anesthesia Pre-Procedure Evaluation    Patient: Latoya Rodríguez   MRN: [id number] : 1950        Procedure : Procedure(s):  CYSTOSCOPY, WITH PERIURETHRAL BULKING AGENT INJECTION (look in the bladder and urethra and inject filler into the urethra)          Past Medical History:   Diagnosis Date     Cataract      Chronic atrial fibrillation (H) 2022     Type 2 diabetes mellitus with hyperglycemia (H) 2016      Past Surgical History:   Procedure Laterality Date     ANESTHESIA CARDIOVERSION N/A 2021    Procedure: ANESTHESIA, FOR CARDIOVERSION@1100;  Surgeon: GENERIC ANESTHESIA PROVIDER;  Location: [facility] OR     CYSTOSCOPY, INJECT COLLAGEN, COMBINED N/A 2021    Procedure: CYSTOSCOPY, WITH PERIURETHRAL BULKING AGENT INJECTION;  Surgeon: Alberto Zhang MD;  Location: [facility] [place] OR     EYE SURGERY  ,     Retinal detachment, Cataract (both eyes)     IMPLANT STIMULATOR AND LEADS SACRAL NERVE (STAGE ONE AND TWO) Right 2022    Procedure: INSERTION, SACRAL NERVE STIMULATOR, STAGE 1 & 2 (permanent implant on the RIGHT side with [employer]onics);  Surgeon: Alberto Zhang MD;  Location: [facility] [place] OR     IMPLANT STIMULATOR SACRAL NERVE PERCUTANEOUS TRIAL N/A 2022    Procedure: INSERTION, NEUROSTIMULATOR, SACRAL, PERCUTANEOUS, FOR TRIAL;  Surgeon: Alberto Zhang MD;  Location: [facility] OR     RELEASE CARPAL TUNNEL Right 2021    Procedure: RELEASE, CARPAL TUNNEL, Right;  Surgeon: RADHA Sandoval MD;  Location: [facility] OR     RELEASE TRIGGER FINGER Bilateral 5/10/2019    Procedure: RELEASE TRIGGER FINGER, BILATERAL LONG AND RING FINGERS;  Surgeon: RADHA Sandoval MD;  Location: [facility] OR     VASCULAR SURGERY      Varicose Veins      No Known Allergies   Social History     Tobacco Use     Smoking status: Never Smoker     Smokeless tobacco: Never Used   Substance Use Topics     Alcohol use: Yes     Comment: 1 to 2 beers or glasses of wine 1 to 2 times per month      Wt Readings from Last 1 Encounters:    09/12/22 76.8 kg (169 lb 5 oz)        Anesthesia Evaluation   Pt has had prior anesthetic. Type: MAC and General.    No history of anesthetic complications       ROS/MED HX  ENT/Pulmonary:  - neg pulmonary ROS     Neurologic:  - neg neurologic ROS     Cardiovascular: Comment: Chronic atrial fibrillation.  Rate controlled by medication.  Anticoagulated.    (+) Dyslipidemia -----Taking blood thinners Pt has received instructions: dysrhythmias, a-fib,     METS/Exercise Tolerance:     Hematologic:  - neg hematologic  ROS     Musculoskeletal:  - neg musculoskeletal ROS     GI/Hepatic:  - neg GI/hepatic ROS     Renal/Genitourinary: Comment: Urinary frequency - neg Renal ROS     Endo:     (+) type I DM, Last HgA1c: Approximaely 8, Using insulin, - not using insulin pump. not previously admitted for DM/DKA.     Psychiatric/Substance Use:  - neg psychiatric ROS     Infectious Disease:       Malignancy:       Other:            Physical Exam    Airway  airway exam normal           Respiratory Devices and Support         Dental           Cardiovascular   cardiovascular exam normal          Pulmonary   pulmonary exam normal                OUTSIDE LABS:  CBC:   Lab Results   Component Value Date    WBC 7.8 09/09/2022    WBC 5.1 08/05/2021    HGB 13.9 09/09/2022    HGB 14.2 08/05/2021    HCT 41.1 09/09/2022    HCT 41.9 08/05/2021     09/09/2022     08/05/2021     BMP:   Lab Results   Component Value Date     09/09/2022     08/05/2021    POTASSIUM 4.6 09/09/2022    POTASSIUM 3.9 09/23/2021    CHLORIDE 104 09/09/2022    CHLORIDE 109 08/05/2021    CO2 28 09/09/2022    CO2 28 08/05/2021    BUN 17 09/09/2022    BUN 24 08/05/2021    CR 0.74 09/09/2022    CR 0.69 09/06/2022     (H) 09/09/2022     (H) 07/05/2022     COAGS: No results found for: PTT, INR, FIBR  POC:   Lab Results   Component Value Date     (H) 05/11/2021     HEPATIC:   Lab Results   Component Value Date    ALBUMIN 3.8  09/09/2022    PROTTOTAL 7.2 09/09/2022    ALT 23 09/09/2022    AST 19 09/09/2022    ALKPHOS 86 09/09/2022    BILITOTAL 0.6 09/09/2022     OTHER:   Lab Results   Component Value Date    A1C 8.0 (H) 09/06/2022    RENATO 10.0 09/09/2022    MAG 2.1 09/23/2021    TSH 1.27 09/06/2022    T4 1.33 07/03/2017    T3 82 03/13/2017       Anesthesia Plan    ASA Status:  3   NPO Status:  NPO Appropriate    Anesthesia Type: MAC.     - Reason for MAC: chronic cardiopulmonary disease   Induction: Intravenous, Propofol.   Maintenance: TIVA.        Consents    Anesthesia Plan(s) and associated risks, benefits, and realistic alternatives discussed. Questions answered and patient/representative(s) expressed understanding.    - Discussed:     - Discussed with:  Patient      - Extended Intubation/Ventilatory Support Discussed: No.      - Patient is DNR/DNI Status: No    Use of blood products discussed: No .     Postoperative Care    Pain management: IV analgesics, Oral pain medications.   PONV prophylaxis: Ondansetron (or other 5HT-3), Background Propofol Infusion     Comments:                Major Carlton MD

## 2022-09-12 NOTE — INTERVAL H&P NOTE
"I have reviewed the surgical (or preoperative) H&P that is linked to this encounter, and examined the patient. There are no significant changes    Clinical Conditions Present on Arrival:  Clinically Significant Risk Factors Present on Admission                 # Coagulation Defect: home medication list includes an anticoagulant medication   # Overweight: Estimated body mass index is 28.19 kg/m  as calculated from the following:    Height as of 9/9/22: 1.651 m (5' 5\").    Weight as of 9/9/22: 76.8 kg (169 lb 6.4 oz).       "

## 2022-09-13 ENCOUNTER — LAB (OUTPATIENT)
Dept: LAB | Facility: CLINIC | Age: 72
End: 2022-09-13
Attending: FAMILY MEDICINE

## 2022-09-13 ENCOUNTER — LAB (OUTPATIENT)
Dept: LAB | Facility: CLINIC | Age: 72
End: 2022-09-13
Payer: COMMERCIAL

## 2022-09-13 ENCOUNTER — NURSE TRIAGE (OUTPATIENT)
Dept: UROLOGY | Facility: CLINIC | Age: 72
End: 2022-09-13

## 2022-09-13 ENCOUNTER — ANESTHESIA EVENT (OUTPATIENT)
Dept: SURGERY | Facility: CLINIC | Age: 72
End: 2022-09-13
Payer: COMMERCIAL

## 2022-09-13 DIAGNOSIS — Z20.822 ENCOUNTER FOR LABORATORY TESTING FOR COVID-19 VIRUS: ICD-10-CM

## 2022-09-13 DIAGNOSIS — R35.0 URINARY FREQUENCY: Primary | ICD-10-CM

## 2022-09-13 DIAGNOSIS — R35.0 URINARY FREQUENCY: ICD-10-CM

## 2022-09-13 LAB
ALBUMIN UR-MCNC: NEGATIVE MG/DL
APPEARANCE UR: CLEAR
BACTERIA #/AREA URNS HPF: ABNORMAL /HPF
BILIRUB UR QL STRIP: NEGATIVE
COLOR UR AUTO: YELLOW
GLUCOSE UR STRIP-MCNC: >1000 MG/DL
HGB UR QL STRIP: ABNORMAL
HYALINE CASTS #/AREA URNS LPF: ABNORMAL /LPF
KETONES UR STRIP-MCNC: NEGATIVE MG/DL
LEUKOCYTE ESTERASE UR QL STRIP: NEGATIVE
NITRATE UR QL: NEGATIVE
PH UR STRIP: 5.5 [PH] (ref 5–7)
RBC #/AREA URNS AUTO: ABNORMAL /HPF
SARS-COV-2 RNA RESP QL NAA+PROBE: NEGATIVE
SKIP: ABNORMAL
SP GR UR STRIP: 1.02 (ref 1–1.03)
SQUAMOUS #/AREA URNS AUTO: ABNORMAL /LPF
UROBILINOGEN UR STRIP-MCNC: NORMAL MG/DL
WBC #/AREA URNS AUTO: ABNORMAL /HPF

## 2022-09-13 PROCEDURE — U0005 INFEC AGEN DETEC AMPLI PROBE: HCPCS

## 2022-09-13 PROCEDURE — 81001 URINALYSIS AUTO W/SCOPE: CPT

## 2022-09-13 PROCEDURE — U0003 INFECTIOUS AGENT DETECTION BY NUCLEIC ACID (DNA OR RNA); SEVERE ACUTE RESPIRATORY SYNDROME CORONAVIRUS 2 (SARS-COV-2) (CORONAVIRUS DISEASE [COVID-19]), AMPLIFIED PROBE TECHNIQUE, MAKING USE OF HIGH THROUGHPUT TECHNOLOGIES AS DESCRIBED BY CMS-2020-01-R: HCPCS

## 2022-09-13 PROCEDURE — 87086 URINE CULTURE/COLONY COUNT: CPT

## 2022-09-13 NOTE — TELEPHONE ENCOUNTER
[facility] Call Center    Phone Message    May a detailed message be left on voicemail: yes     Reason for Call: Other: Pt called and stated that from 2am last night until current she has had the urge to urinate every 1/2 hour to hour - pt wondering if this is normal and when it will subside as it really disturbed her sleep last night, please call pt to further discuss, thanks!      Action Taken: Message routed to:  Adult Clinics: Urology p [id number]    Travel Screening: Not Applicable

## 2022-09-13 NOTE — TELEPHONE ENCOUNTER
Nurse Triage SBAR    Is this a 2nd Level Triage? NO    Situation: Spoke to pt. Pt reports having increased urge an frequency.     Background: Pt had cystoscopy in OR yesterday.     Assessment: Pt reports symptoms started last night. No fever, dysuria, or hematuria. Drinking 5-6 glasses of water daily. No flank pain. Urine is yellow and clear. No difficulty with urination. Advised to push fluids and have urine tested for infection. Pt verbalized understanding.      Protocol Recommended Disposition:   See in Office Today    Recommendation: UA/UC.   Increase fluid intake to at least 8 glasses of water daily.      .    Does the patient meet one of the following criteria for ADS visit consideration? No     Naye Thornton RN MSN    Orders Placed This Encounter   Procedures     Routine UA with microscopic - No culture     Standing Status:   Future     Standing Expiration Date:   9/13/2023     Urine Culture Aerobic Bacterial     Standing Status:   Future     Standing Expiration Date:   9/13/2023       Reason for Disposition    Urinating more frequently than usual (i.e., frequency)    Protocols used: URINARY SYMPTOMS-A-OH

## 2022-09-14 ENCOUNTER — HOSPITAL ENCOUNTER (OUTPATIENT)
Dept: MRI IMAGING | Facility: CLINIC | Age: 72
Discharge: HOME | End: 2022-09-14
Attending: PREVENTIVE MEDICINE | Admitting: ANESTHESIOLOGY
Payer: COMMERCIAL

## 2022-09-14 ENCOUNTER — HOSPITAL ENCOUNTER (OUTPATIENT)
Facility: CLINIC | Age: 72
Discharge: HOME | End: 2022-09-14
Attending: ANESTHESIOLOGY | Admitting: ANESTHESIOLOGY
Payer: COMMERCIAL

## 2022-09-14 ENCOUNTER — ANESTHESIA (OUTPATIENT)
Dept: SURGERY | Facility: CLINIC | Age: 72
End: 2022-09-14
Payer: COMMERCIAL

## 2022-09-14 VITALS
HEIGHT: 65 IN | HEART RATE: 100 BPM | BODY MASS INDEX: 27.84 KG/M2 | OXYGEN SATURATION: 99 % | DIASTOLIC BLOOD PRESSURE: 94 MMHG | TEMPERATURE: 97.7 F | SYSTOLIC BLOOD PRESSURE: 136 MMHG | WEIGHT: 167.11 LBS | RESPIRATION RATE: 18 BRPM

## 2022-09-14 DIAGNOSIS — M25.551 RIGHT HIP PAIN: ICD-10-CM

## 2022-09-14 DIAGNOSIS — M79.604 BILATERAL LEG PAIN: ICD-10-CM

## 2022-09-14 DIAGNOSIS — M79.605 BILATERAL LEG PAIN: ICD-10-CM

## 2022-09-14 LAB
GLUCOSE BLDC GLUCOMTR-MCNC: 156 MG/DL (ref 70–99)
GLUCOSE BLDC GLUCOMTR-MCNC: 181 MG/DL (ref 70–99)

## 2022-09-14 PROCEDURE — 72148 MRI LUMBAR SPINE W/O DYE: CPT

## 2022-09-14 PROCEDURE — 258N000003 HC RX IP 258 OP 636: Performed by: REGISTERED NURSE

## 2022-09-14 PROCEDURE — 999N000141 HC STATISTIC PRE-PROCEDURE NURSING ASSESSMENT

## 2022-09-14 PROCEDURE — 250N000009 HC RX 250: Performed by: REGISTERED NURSE

## 2022-09-14 PROCEDURE — 73721 MRI JNT OF LWR EXTRE W/O DYE: CPT | Mod: RT

## 2022-09-14 PROCEDURE — 73721 MRI JNT OF LWR EXTRE W/O DYE: CPT | Mod: 26 | Performed by: RADIOLOGY

## 2022-09-14 PROCEDURE — 710N000012 HC RECOVERY PHASE 2, PER MINUTE

## 2022-09-14 PROCEDURE — 250N000011 HC RX IP 250 OP 636: Performed by: REGISTERED NURSE

## 2022-09-14 PROCEDURE — 72148 MRI LUMBAR SPINE W/O DYE: CPT | Mod: 26 | Performed by: RADIOLOGY

## 2022-09-14 PROCEDURE — 370N000017 HC ANESTHESIA TECHNICAL FEE, PER MIN

## 2022-09-14 PROCEDURE — 82962 GLUCOSE BLOOD TEST: CPT

## 2022-09-14 RX ORDER — METOPROLOL TARTRATE 1 MG/ML
1-2 INJECTION, SOLUTION INTRAVENOUS EVERY 5 MIN PRN
Status: CANCELLED | OUTPATIENT
Start: 2022-09-14

## 2022-09-14 RX ORDER — HYDROMORPHONE HYDROCHLORIDE 1 MG/ML
0.4 INJECTION, SOLUTION INTRAMUSCULAR; INTRAVENOUS; SUBCUTANEOUS EVERY 5 MIN PRN
Status: CANCELLED | OUTPATIENT
Start: 2022-09-14

## 2022-09-14 RX ORDER — HALOPERIDOL 5 MG/ML
1 INJECTION INTRAMUSCULAR
Status: DISCONTINUED | OUTPATIENT
Start: 2022-09-14 | End: 2022-09-14 | Stop reason: HOSPADM

## 2022-09-14 RX ORDER — PROPOFOL 10 MG/ML
INJECTION, EMULSION INTRAVENOUS PRN
Status: DISCONTINUED | OUTPATIENT
Start: 2022-09-14 | End: 2022-09-14

## 2022-09-14 RX ORDER — ONDANSETRON 2 MG/ML
4 INJECTION INTRAMUSCULAR; INTRAVENOUS EVERY 30 MIN PRN
Status: DISCONTINUED | OUTPATIENT
Start: 2022-09-14 | End: 2022-09-14 | Stop reason: HOSPADM

## 2022-09-14 RX ORDER — SODIUM CHLORIDE, SODIUM LACTATE, POTASSIUM CHLORIDE, CALCIUM CHLORIDE 600; 310; 30; 20 MG/100ML; MG/100ML; MG/100ML; MG/100ML
INJECTION, SOLUTION INTRAVENOUS CONTINUOUS
Status: DISCONTINUED | OUTPATIENT
Start: 2022-09-14 | End: 2022-09-14 | Stop reason: HOSPADM

## 2022-09-14 RX ORDER — LIDOCAINE 40 MG/G
CREAM TOPICAL
Status: DISCONTINUED | OUTPATIENT
Start: 2022-09-14 | End: 2022-09-14 | Stop reason: HOSPADM

## 2022-09-14 RX ORDER — PROPOFOL 10 MG/ML
INJECTION, EMULSION INTRAVENOUS CONTINUOUS PRN
Status: DISCONTINUED | OUTPATIENT
Start: 2022-09-14 | End: 2022-09-14

## 2022-09-14 RX ORDER — SODIUM CHLORIDE, SODIUM LACTATE, POTASSIUM CHLORIDE, CALCIUM CHLORIDE 600; 310; 30; 20 MG/100ML; MG/100ML; MG/100ML; MG/100ML
INJECTION, SOLUTION INTRAVENOUS CONTINUOUS PRN
Status: DISCONTINUED | OUTPATIENT
Start: 2022-09-14 | End: 2022-09-14

## 2022-09-14 RX ORDER — OXYCODONE HYDROCHLORIDE 5 MG/1
5 TABLET ORAL EVERY 4 HOURS PRN
Status: DISCONTINUED | OUTPATIENT
Start: 2022-09-14 | End: 2022-09-14 | Stop reason: HOSPADM

## 2022-09-14 RX ORDER — ONDANSETRON 4 MG/1
4 TABLET, ORALLY DISINTEGRATING ORAL EVERY 30 MIN PRN
Status: DISCONTINUED | OUTPATIENT
Start: 2022-09-14 | End: 2022-09-14 | Stop reason: HOSPADM

## 2022-09-14 RX ORDER — FENTANYL CITRATE 50 UG/ML
25 INJECTION, SOLUTION INTRAMUSCULAR; INTRAVENOUS
Status: DISCONTINUED | OUTPATIENT
Start: 2022-09-14 | End: 2022-09-14 | Stop reason: HOSPADM

## 2022-09-14 RX ORDER — EPHEDRINE SULFATE 50 MG/ML
INJECTION, SOLUTION INTRAVENOUS PRN
Status: DISCONTINUED | OUTPATIENT
Start: 2022-09-14 | End: 2022-09-14

## 2022-09-14 RX ORDER — FENTANYL CITRATE 50 UG/ML
50 INJECTION, SOLUTION INTRAMUSCULAR; INTRAVENOUS EVERY 5 MIN PRN
Status: CANCELLED | OUTPATIENT
Start: 2022-09-14

## 2022-09-14 RX ADMIN — PROPOFOL 20 MG: 10 INJECTION, EMULSION INTRAVENOUS at 09:27

## 2022-09-14 RX ADMIN — PHENYLEPHRINE HYDROCHLORIDE 150 MCG: 10 INJECTION INTRAVENOUS at 09:01

## 2022-09-14 RX ADMIN — PROPOFOL 125 MCG/KG/MIN: 10 INJECTION, EMULSION INTRAVENOUS at 08:33

## 2022-09-14 RX ADMIN — EPHEDRINE SULFATE 10 MG: 50 INJECTION, SOLUTION INTRAVENOUS at 08:47

## 2022-09-14 RX ADMIN — MIDAZOLAM 1 MG: 1 INJECTION INTRAMUSCULAR; INTRAVENOUS at 08:18

## 2022-09-14 RX ADMIN — MIDAZOLAM 1 MG: 1 INJECTION INTRAMUSCULAR; INTRAVENOUS at 08:33

## 2022-09-14 RX ADMIN — SODIUM CHLORIDE, POTASSIUM CHLORIDE, SODIUM LACTATE AND CALCIUM CHLORIDE: 600; 310; 30; 20 INJECTION, SOLUTION INTRAVENOUS at 08:27

## 2022-09-14 RX ADMIN — PROPOFOL 10 MG: 10 INJECTION, EMULSION INTRAVENOUS at 09:16

## 2022-09-14 RX ADMIN — PROPOFOL 10 MG: 10 INJECTION, EMULSION INTRAVENOUS at 09:12

## 2022-09-14 ASSESSMENT — ACTIVITIES OF DAILY LIVING (ADL)
ADLS_ACUITY_SCORE: 35

## 2022-09-14 ASSESSMENT — ENCOUNTER SYMPTOMS: DYSRHYTHMIAS: 1

## 2022-09-14 NOTE — ANESTHESIA PREPROCEDURE EVALUATION
Anesthesia Pre-Procedure Evaluation    Patient: Latoya Rodríguez   MRN: [id number] : 1950        Procedure : Procedure(s):  ANESTHESIA OUT OF OR Magnetic resonance imaging right hip and lumbar @0800 (N/A Update)            Past Medical History:   Diagnosis Date     Cataract      Chronic atrial fibrillation (H) 2022     Type 2 diabetes mellitus with hyperglycemia (H) 2016      Past Surgical History:   Procedure Laterality Date     ANESTHESIA CARDIOVERSION N/A 2021    Procedure: ANESTHESIA, FOR CARDIOVERSION@1100;  Surgeon: GENERIC ANESTHESIA PROVIDER;  Location: [facility] OR     CYSTOSCOPY, INJECT COLLAGEN, COMBINED N/A 2021    Procedure: CYSTOSCOPY, WITH PERIURETHRAL BULKING AGENT INJECTION;  Surgeon: Alberto Zhang MD;  Location: [facility] OR     CYSTOSCOPY, INJECT COLLAGEN, COMBINED N/A 2022    Procedure: CYSTOSCOPY, WITH PERIURETHRAL BULKING AGENT INJECTION (look in the bladder and urethra and inject filler into the urethra);  Surgeon: Alberto Zhang MD;  Location: [facility] OR     EYE SURGERY  ,     Retinal detachment, Cataract (both eyes)     IMPLANT STIMULATOR AND LEADS SACRAL NERVE (STAGE ONE AND TWO) Right 2022    Procedure: INSERTION, SACRAL NERVE STIMULATOR, STAGE 1 & 2 (permanent implant on the RIGHT side with [employer]);  Surgeon: Alberto Zhang MD;  Location: [facility] OR     IMPLANT STIMULATOR SACRAL NERVE PERCUTANEOUS TRIAL N/A 2022    Procedure: INSERTION, NEUROSTIMULATOR, SACRAL, PERCUTANEOUS, FOR TRIAL;  Surgeon: Alberto Zhang MD;  Location: [facility] OR     RELEASE CARPAL TUNNEL Right 2021    Procedure: RELEASE, CARPAL TUNNEL, Right;  Surgeon: RADHA Sandoval MD;  Location: [facility] OR     RELEASE TRIGGER FINGER Bilateral 5/10/2019    Procedure: RELEASE TRIGGER FINGER, BILATERAL LONG AND RING FINGERS;  Surgeon: RADHA Sandoval MD;  Location: [facility] OR     VASCULAR SURGERY      Varicose Veins      No Known Allergies   Social History     Tobacco Use      Smoking status: Never Smoker     Smokeless tobacco: Never Used   Substance Use Topics     Alcohol use: Yes     Comment: 1 to 2 beers or glasses of wine 1 to 2 times per month      Wt Readings from Last 1 Encounters:   09/14/22 75.8 kg (167 lb 1.7 oz)        Anesthesia Evaluation   Pt has had prior anesthetic. Type: MAC and General.    No history of anesthetic complications       ROS/MED HX  ENT/Pulmonary:  - neg pulmonary ROS     Neurologic:  - neg neurologic ROS     Cardiovascular: Comment: Chronic atrial fibrillation.  Rate controlled by medication.  Anticoagulated.    (+) Dyslipidemia -----Taking blood thinners Pt has received instructions: dysrhythmias, a-fib,     METS/Exercise Tolerance:     Hematologic:  - neg hematologic  ROS     Musculoskeletal:  - neg musculoskeletal ROS     GI/Hepatic:  - neg GI/hepatic ROS     Renal/Genitourinary: Comment: Urinary frequency - neg Renal ROS     Endo:     (+) type I DM, Last HgA1c: Approximaely 8, Using insulin, - not using insulin pump. not previously admitted for DM/DKA.     Psychiatric/Substance Use:  - neg psychiatric ROS     Infectious Disease:       Malignancy:       Other:            Physical Exam    Airway  airway exam normal           Respiratory Devices and Support         Dental           Cardiovascular   cardiovascular exam normal          Pulmonary   pulmonary exam normal                OUTSIDE LABS:  CBC:   Lab Results   Component Value Date    WBC 7.8 09/09/2022    WBC 5.1 08/05/2021    HGB 13.9 09/09/2022    HGB 14.2 08/05/2021    HCT 41.1 09/09/2022    HCT 41.9 08/05/2021     09/09/2022     08/05/2021     BMP:   Lab Results   Component Value Date     09/09/2022     08/05/2021    POTASSIUM 4.6 09/09/2022    POTASSIUM 3.9 09/23/2021    CHLORIDE 104 09/09/2022    CHLORIDE 109 08/05/2021    CO2 28 09/09/2022    CO2 28 08/05/2021    BUN 17 09/09/2022    BUN 24 08/05/2021    CR 0.74 09/09/2022    CR 0.69 09/06/2022     (H)  "09/12/2022     (H) 09/12/2022     COAGS: No results found for: PTT, INR, FIBR  POC:   Lab Results   Component Value Date     (H) 05/11/2021     HEPATIC:   Lab Results   Component Value Date    ALBUMIN 3.8 09/09/2022    PROTTOTAL 7.2 09/09/2022    ALT 23 09/09/2022    AST 19 09/09/2022    ALKPHOS 86 09/09/2022    BILITOTAL 0.6 09/09/2022     OTHER:   Lab Results   Component Value Date    A1C 8.0 (H) 09/06/2022    RENATO 10.0 09/09/2022    MAG 2.1 09/23/2021    TSH 1.27 09/06/2022    T4 1.33 07/03/2017    T3 82 03/13/2017       Anesthesia Plan    ASA Status:  3   NPO Status:  NPO Appropriate    Anesthesia Type: General.     - Airway: ETT   Induction: Intravenous, Propofol.   Maintenance: TIVA.        Consents    Anesthesia Plan(s) and associated risks, benefits, and realistic alternatives discussed. Questions answered and patient/representative(s) expressed understanding.    - Discussed:     - Discussed with:  Patient      - Extended Intubation/Ventilatory Support Discussed: No.      - Patient is DNR/DNI Status: No    Use of blood products discussed: No .     Postoperative Care    Pain management: IV analgesics, Oral pain medications.   PONV prophylaxis: Ondansetron (or other 5HT-3), Background Propofol Infusion     Comments:    Other Comments: After discussion with the patient she has had several attempts at this MRI given her severe claustrophobia, both attempts were traumatic she wishes to not experience this again. She wants to be \"out\" given this we discussed the two options and confirmed GA as the best way to move forward                 Eb Quevedo MD  "

## 2022-09-14 NOTE — PROGRESS NOTES
"  Subjective:  Latoya Rodríguez is a 71 year old female who presents today for follow-up regarding     Right hip pain    Bilateral throbbing global leg pain especially with rest, not worse with exercise, and awaking her at night    No evidence of a Pelvic Joint Dysfunction but she has a constitutionally caudal left ASIS    Tenderness to spring testing over the right SI joint and L5 in particular    Herpetiform rash-recurrent posterior pelvis    Implanted bladder stimulator right buttock    Chronic atrial fibrillation, and insulin-dependent diabetes, and chronic stress incontinence.  Anticoagulated on apixaban  MRI pelvis and lumbar spine ordered under sedation.  DEXA scan was planned.  Pain management medications deferred to his nurse practitioner Pricila Avila but I did start a low-dose of gabapentin escalating to 100 mg 3 times daily.    PRIOR HISTORY from   :  Right hip MRI 9/14/2022:  1. Severe degenerative disease of the right hip joint with extensive  labral tearing. Moderate joint effusion and periarticular edema  especially adductor may be reactive to osteoarthritis with muscle  strain but infection could have similar appearance. Please correlate  clinically.  2. Suspect at least partial tear of the right rectus femoris indirect  head.  3. Query mild greater trochanteric bursitis.    Lumbar MRI 9/14/2022: Mild multilevel lumbar spondylosis without high-grade  spinal canal or neural foraminal stenosis.      Latoya reports no change in the quality, severity, location, duration, or timing of pain.  She points to her inguinal area as the site of her severe pain and sometimes \"my leg feels like it is going to explode\" particularly with weightbearing and particularly into the knee.  She does not have any spinal pain.    INTERIM HISTORY:    Right hip MRI 9/14/2022 shows severe DJD, a significant effusion and periarticular edema, likely degenerative, but possibly also infectious.  Borderline trochanteric bursitis, " partial tear of the rectus femoris origin.  There has been no fever or chills or unexplained weight loss.      Past medical history is reviewed and is unchanged for any new medical diagnoses in the interim.  Pertinent for chronic atrial fibrillation, intrinsic sphincter deficiency and overactive bladder with stress incontinence, osteoporosis, type 1 diabetes and type 2 diabetes both listed in the chart, anticoagulated on apixaban, status post implantation of a bladder stimulator, on insulin and oxycodone.  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  She enjoys walking her dog Brandie..  She is .  She has 2 adult children and 2 grandchildren.  She she enjoys yard work and gardening and denies other sports hobbies and activities.     Objective:  IMAGING: Images and reports reviewed.    MR RIGHT HIP WITHOUT CONTRAST 9/14/2022     Severe degenerative change with associated extensive subchondral  cystic changes and edema most signal intensity of the right hip.     Articular cartilage and labrum  Assessment limited on this non-arthrographic study due to relative  lack of joint distension.     Articular cartilage: Extensive full-thickness cartilage loss of the  femoral head and acetabulum with associated subchondral edema/cystic  changes.      Labrum: Tearing of the anterior superior and superior labrum, likely  degenerative.    Joint effusion: Moderate amount of joint fluid.     Bursal effusion: Mild edema over the greater trochanter with small  trochanteric bursal fluid. No substantial iliopsoas bursal effusion.     Muscles and tendons  Muscles and tendons: Suspect at least partial tear of the right rectus  femoris indirect head.                                                                 IMPRESSION:  1. Severe degenerative disease of the right hip joint with extensive  labral tearing. Moderate joint effusion and periarticular  edema  especially adductor may be reactive to osteoarthritis with muscle  strain but infection could have similar appearance. Please correlate  clinically.  2. Suspect at least partial tear of the right rectus femoris indirect  head.  3. Query mild greater trochanteric bursitis.     MR LUMBAR SPINE W/O CONTRAST 9/14/2022     L2-3: Disc bulge and superimposed left foraminal protrusion. Bilateral  facet hypertrophy. Thickening of the ligamentum flavum. Mild left  neural foraminal stenosis. No spinal canal or right neuroforaminal  stenosis.     L3-4: Mild disc bulge. Minimal bilateral neural foraminal stenosis. No  spinal canal stenosis.      L4-5: Disc osteophyte complex. Mild bilateral neural foraminal  stenosis. No spinal canal stenosis.                                                              Impression: Mild multilevel lumbar spondylosis without high-grade  spinal canal or neural foraminal stenosis.       EXAMINATION:    CONSTITUTIONAL:  Vital signs as above.  No acute distress.  The patient is well nourished and well groomed.  She does occasionally grimace with discomfort and readjust herself.  Right SLR is negative  Internal and external rotation of the right hip exactly reproduces her pain.    Assessment     Latoya Rodríguez  is a 71 year old y.o. female who presents today for follow-up regarding     Severe right hip arthritis-doubt infection    Negative Pelvic Joint Dysfunction assessment last time    Neurologically intact with no high-grade lumbar stenosis to explain her right leg pain.      Plan:  CBC, ESR, CRP to assess for inflammatory/infectious markers.  I will send her a MyChart message if there is any surprises but I do not expect the inflammatory markers to be elevated.  Consultation with Dr. Arnaud Ward for probable right hip replacement.      25 minutes of time spent doing chart review, history and exam, documentation, counseling, education, coordination of care, and other activities as  described above.          Advised patient to call or return early if symptoms worsen, or having problems controlling bladder and bowel function or worsening leg weakness.     Please note: Voice recognition software was used in this dictation.  It may therefore contain typographical errors.  Alvaro Anderson MD

## 2022-09-14 NOTE — OR NURSING
Patient arrived from radiology awake, but drowsy.  Patient identified.  VSS are WNL.  PIV patent and infusing .

## 2022-09-14 NOTE — ANESTHESIA CARE TRANSFER NOTE
Patient: Latoya Rodríguez    Procedure: Procedure(s):  ANESTHESIA OUT OF OR Magnetic resonance imaging right hip and lumbar @0800       Diagnosis: Right hip pain [M25.551]  Bilateral leg pain [M79.604, M79.605]  Diagnosis Additional Information: No value filed.    Anesthesia Type:   General     Note:    Oropharynx: oropharynx clear of all foreign objects and spontaneously breathing  Level of Consciousness: awake  Oxygen Supplementation: room air    Independent Airway: airway patency satisfactory and stable  Dentition: dentition unchanged  Vital Signs Stable: post-procedure vital signs reviewed and stable  Report to RN Given: handoff report given  Patient transferred to: Phase II    Handoff Report: Identifed the Patient, Identified the Reponsible Provider, Reviewed the pertinent medical history, Discussed the surgical course, Reviewed Intra-OP anesthesia mangement and issues during anesthesia, Set expectations for post-procedure period and Allowed opportunity for questions and acknowledgement of understanding      Vitals:  Vitals Value Taken Time    88    Temp     Pulse 88    Resp 12    SpO2 100        Electronically Signed By: URVASHI Quick CRNA  September 14, 2022  9:54 AM   Lisa Sweet discharged to home accompanied by daughter.   Patient provided with the following educational materials upon discharge:  AVS and wound suppplies.   Valuables and belongings sent with patient.   discharge summary, discharge instructions, medications and follow up appointments reviewed with patient and daughter.  Patient and daughter verbalized understanding, Wound care education provided to patient and daughter. Wound dressing changed with daughter. Daughter verbalized understanding.

## 2022-09-14 NOTE — OR NURSING
Dr Quevedo notified of blood sugar of 181.  Patient instructed to go home and follow usual blood sugar and insulin protocol.

## 2022-09-14 NOTE — DISCHARGE INSTRUCTIONS
[facility]   Adult Discharge Orders & Instructions     For 24 hours after surgery    Get plenty of rest.  A responsible adult must stay with you for at least 24 hours after you leave the hospital.   Do not drive or use heavy equipment.  If you have weakness or tingling, don't drive or use heavy equipment until this feeling goes away.  Do not drink alcohol.  Avoid strenuous or risky activities.  Ask for help when climbing stairs.   You may feel lightheaded.  IF so, sit for a few minutes before standing.  Have someone help you get up.   If you have nausea (feel sick to your stomach): Drink only clear liquids such as apple juice, ginger ale, broth or 7-Up.  Rest may also help.  Be sure to drink enough fluids.  Move to a regular diet as you feel able.  You may have a slight fever. Call the doctor if your fever is over 100 F (37.7 C) (taken under the tongue) or lasts longer than 24 hours.  You may have a dry mouth, a sore throat, muscle aches or trouble sleeping.  These should go away after 24 hours.  Do not make important or legal decisions.   Call your doctor for any of the followin.  Signs of infection (fever, growing tenderness at the surgery site, a large amount of drainage or bleeding, severe pain, foul-smelling drainage, redness, swelling).    2. It has been over 8 to 10 hours since surgery and you are still not able to urinate (pass water).    3.  Headache for over 24 hours.    4.  Numbness, tingling or weakness the day after surgery (if you had spinal anesthesia).  To contact a doctor, call your primary care physician or [facility] Emergency Department [phone]

## 2022-09-14 NOTE — ANESTHESIA POSTPROCEDURE EVALUATION
Patient: Latoya Rodríguez    Procedure: Procedure(s):  ANESTHESIA OUT OF OR Magnetic resonance imaging right hip and lumbar @0800       Anesthesia Type:  General    Note:  Disposition: Outpatient   Postop Pain Control: Uneventful            Sign Out: Well controlled pain   PONV: No   Neuro/Psych: Uneventful            Sign Out: Acceptable/Baseline neuro status   Airway/Respiratory: Uneventful            Sign Out: Acceptable/Baseline resp. status   CV/Hemodynamics: Uneventful            Sign Out: Acceptable CV status; No obvious hypovolemia; No obvious fluid overload   Other NRE: NONE   DID A NON-ROUTINE EVENT OCCUR? No           Last vitals:  Vitals Value Taken Time   BP  WNL    Temp WNL    Pulse WNL    Resp WNL    SpO2 WNL        Electronically Signed By: Eb Quevedo MD  September 14, 2022  10:42 AM

## 2022-09-14 NOTE — ANESTHESIA PREPROCEDURE EVALUATION
Anesthesia Pre-Procedure Evaluation    Patient: Latoya Rodríguez   MRN: [id number] : 1950        Procedure : Procedure(s):   Magnetic resonance imaging right hip and lumbar @0800 (N/A Update)       Past Medical History:   Diagnosis Date     Cataract      Chronic atrial fibrillation (H) 2022     Type 2 diabetes mellitus with hyperglycemia (H) 2016      Past Surgical History:   Procedure Laterality Date     ANESTHESIA CARDIOVERSION N/A 2021    Procedure: ANESTHESIA, FOR CARDIOVERSION@1100;  Surgeon: GENERIC ANESTHESIA PROVIDER;  Location: [facility]     CYSTOSCOPY, INJECT COLLAGEN, COMBINED N/A 2021    Procedure: CYSTOSCOPY, WITH PERIURETHRAL BULKING AGENT INJECTION;  Surgeon: Alberto Zhang MD;  Location: [facility] OR     CYSTOSCOPY, INJECT COLLAGEN, COMBINED N/A 2022    Procedure: CYSTOSCOPY, WITH PERIURETHRAL BULKING AGENT INJECTION (look in the bladder and urethra and inject filler into the urethra);  Surgeon: Alberto Zhang MD;  Location: [facility]     EYE SURGERY  ,     Retinal detachment, Cataract (both eyes)     IMPLANT STIMULATOR AND LEADS SACRAL NERVE (STAGE ONE AND TWO) Right 2022    Procedure: INSERTION, SACRAL NERVE STIMULATOR, STAGE 1 & 2 (permanent implant on the RIGHT side with [employer]);  Surgeon: Alberto Zhang MD;  Location: [facility] OR     IMPLANT STIMULATOR SACRAL NERVE PERCUTANEOUS TRIAL N/A 2022    Procedure: INSERTION, NEUROSTIMULATOR, SACRAL, PERCUTANEOUS, FOR TRIAL;  Surgeon: Alberto Zhang MD;  Location: [facility] OR     RELEASE CARPAL TUNNEL Right 2021    Procedure: RELEASE, CARPAL TUNNEL, Right;  Surgeon: RADHA Sandoval MD;  Location: [facility]     RELEASE TRIGGER FINGER Bilateral 5/10/2019    Procedure: RELEASE TRIGGER FINGER, BILATERAL LONG AND RING FINGERS;  Surgeon: RADHA Sandoval MD;  Location: [facility]     VASCULAR SURGERY      Varicose Veins      No Known Allergies   Social History     Tobacco Use     Smoking status: Never Smoker      Smokeless tobacco: Never Used   Substance Use Topics     Alcohol use: Yes     Comment: 1 to 2 beers or glasses of wine 1 to 2 times per month      Wt Readings from Last 1 Encounters:   09/14/22 75.8 kg (167 lb 1.7 oz)        Anesthesia Evaluation   Pt has had prior anesthetic. Type: MAC and General.    No history of anesthetic complications       ROS/MED HX  ENT/Pulmonary:  - neg pulmonary ROS     Neurologic:  - neg neurologic ROS     Cardiovascular: Comment: Chronic atrial fibrillation.  Rate controlled by medication.  Anticoagulated.    (+) Dyslipidemia -----Taking blood thinners Pt has received instructions: dysrhythmias, a-fib,     METS/Exercise Tolerance:     Hematologic:  - neg hematologic  ROS     Musculoskeletal:  - neg musculoskeletal ROS     GI/Hepatic:  - neg GI/hepatic ROS     Renal/Genitourinary: Comment: Urinary frequency - neg Renal ROS     Endo:     (+) type I DM, Last HgA1c: Approximaely 8, Using insulin, - not using insulin pump. not previously admitted for DM/DKA.     Psychiatric/Substance Use:  - neg psychiatric ROS     Infectious Disease:       Malignancy:       Other:            Physical Exam    Airway  airway exam normal           Respiratory Devices and Support         Dental           Cardiovascular   cardiovascular exam normal          Pulmonary   pulmonary exam normal                OUTSIDE LABS:  CBC:   Lab Results   Component Value Date    WBC 7.8 09/09/2022    WBC 5.1 08/05/2021    HGB 13.9 09/09/2022    HGB 14.2 08/05/2021    HCT 41.1 09/09/2022    HCT 41.9 08/05/2021     09/09/2022     08/05/2021     BMP:   Lab Results   Component Value Date     09/09/2022     08/05/2021    POTASSIUM 4.6 09/09/2022    POTASSIUM 3.9 09/23/2021    CHLORIDE 104 09/09/2022    CHLORIDE 109 08/05/2021    CO2 28 09/09/2022    CO2 28 08/05/2021    BUN 17 09/09/2022    BUN 24 08/05/2021    CR 0.74 09/09/2022    CR 0.69 09/06/2022     (H) 09/12/2022     (H) 09/12/2022      COAGS: No results found for: PTT, INR, FIBR  POC:   Lab Results   Component Value Date     (H) 2021     HEPATIC:   Lab Results   Component Value Date    ALBUMIN 3.8 2022    PROTTOTAL 7.2 2022    ALT 23 2022    AST 19 2022    ALKPHOS 86 2022    BILITOTAL 0.6 2022     OTHER:   Lab Results   Component Value Date    A1C 8.0 (H) 2022    RENATO 10.0 2022    MAG 2.1 2021    TSH 1.27 2022    T4 1.33 2017    T3 82 2017       Anesthesia Plan    ASA Status:  3   NPO Status:  NPO Appropriate    Anesthesia Type: MAC.     - Reason for MAC: chronic cardiopulmonary disease   Induction: Intravenous, Propofol.   Maintenance: TIVA.        Consents    Anesthesia Plan(s) and associated risks, benefits, and realistic alternatives discussed. Questions answered and patient/representative(s) expressed understanding.    - Discussed:     - Discussed with:  Patient      - Extended Intubation/Ventilatory Support Discussed: No.      - Patient is DNR/DNI Status: No    Use of blood products discussed: No .     Postoperative Care    Pain management: IV analgesics, Oral pain medications.   PONV prophylaxis: Ondansetron (or other 5HT-3), Background Propofol Infusion     Comments:                Radha Cunningham Pre-Procedure Evaluation    Patient: Latoya Rodríguez   MRN: [id number] : 1950        Procedure : Procedure(s):  ANESTHESIA OUT OF OR Magnetic resonance imaging right hip and lumbar @0800          Past Medical History:   Diagnosis Date     Cataract      Chronic atrial fibrillation (H) 2022     Type 2 diabetes mellitus with hyperglycemia (H) 2016      Past Surgical History:   Procedure Laterality Date     ANESTHESIA CARDIOVERSION N/A 2021    Procedure: ANESTHESIA, FOR CARDIOVERSION@1100;  Surgeon: GENERIC ANESTHESIA PROVIDER;  Location: [facility]     CYSTOSCOPY, INJECT COLLAGEN, COMBINED N/A 2021    Procedure: CYSTOSCOPY, WITH  PERIURETHRAL BULKING AGENT INJECTION;  Surgeon: Alberto Zhang MD;  Location: [facility] OR     CYSTOSCOPY, INJECT COLLAGEN, COMBINED N/A 9/12/2022    Procedure: CYSTOSCOPY, WITH PERIURETHRAL BULKING AGENT INJECTION (look in the bladder and urethra and inject filler into the urethra);  Surgeon: Alberto Zhang MD;  Location: [facility]     EYE SURGERY  9/04, 5/11    Retinal detachment, Cataract (both eyes)     IMPLANT STIMULATOR AND LEADS SACRAL NERVE (STAGE ONE AND TWO) Right 7/5/2022    Procedure: INSERTION, SACRAL NERVE STIMULATOR, STAGE 1 & 2 (permanent implant on the RIGHT side with [employer]);  Surgeon: Alberto Zhang MD;  Location: [facility] OR     IMPLANT STIMULATOR SACRAL NERVE PERCUTANEOUS TRIAL N/A 6/14/2022    Procedure: INSERTION, NEUROSTIMULATOR, SACRAL, PERCUTANEOUS, FOR TRIAL;  Surgeon: Alberto Zhang MD;  Location: [facility] OR     RELEASE CARPAL TUNNEL Right 8/6/2021    Procedure: RELEASE, CARPAL TUNNEL, Right;  Surgeon: RADHA Sandoval MD;  Location: [facility]     RELEASE TRIGGER FINGER Bilateral 5/10/2019    Procedure: RELEASE TRIGGER FINGER, BILATERAL LONG AND RING FINGERS;  Surgeon: RADHA Sandoval MD;  Location: [facility]     VASCULAR SURGERY      Varicose Veins      No Known Allergies   Social History     Tobacco Use     Smoking status: Never Smoker     Smokeless tobacco: Never Used   Substance Use Topics     Alcohol use: Yes     Comment: 1 to 2 beers or glasses of wine 1 to 2 times per month      Wt Readings from Last 1 Encounters:   09/14/22 75.8 kg (167 lb 1.7 oz)        Anesthesia Evaluation            ROS/MED HX  ENT/Pulmonary:       Neurologic:       Cardiovascular:     (+) Dyslipidemia hypertension-----dysrhythmias, a-fib,     METS/Exercise Tolerance:     Hematologic:       Musculoskeletal:       GI/Hepatic:       Renal/Genitourinary:       Endo:     (+) type I DM, type II DM,     Psychiatric/Substance Use:       Infectious Disease:       Malignancy:       Other:               OUTSIDE  LABS:  CBC:   Lab Results   Component Value Date    WBC 7.8 09/09/2022    WBC 5.1 08/05/2021    HGB 13.9 09/09/2022    HGB 14.2 08/05/2021    HCT 41.1 09/09/2022    HCT 41.9 08/05/2021     09/09/2022     08/05/2021     BMP:   Lab Results   Component Value Date     09/09/2022     08/05/2021    POTASSIUM 4.6 09/09/2022    POTASSIUM 3.9 09/23/2021    CHLORIDE 104 09/09/2022    CHLORIDE 109 08/05/2021    CO2 28 09/09/2022    CO2 28 08/05/2021    BUN 17 09/09/2022    BUN 24 08/05/2021    CR 0.74 09/09/2022    CR 0.69 09/06/2022     (H) 09/12/2022     (H) 09/12/2022     COAGS: No results found for: PTT, INR, FIBR  POC:   Lab Results   Component Value Date     (H) 05/11/2021     HEPATIC:   Lab Results   Component Value Date    ALBUMIN 3.8 09/09/2022    PROTTOTAL 7.2 09/09/2022    ALT 23 09/09/2022    AST 19 09/09/2022    ALKPHOS 86 09/09/2022    BILITOTAL 0.6 09/09/2022     OTHER:   Lab Results   Component Value Date    A1C 8.0 (H) 09/06/2022    RENATO 10.0 09/09/2022    MAG 2.1 09/23/2021    TSH 1.27 09/06/2022    T4 1.33 07/03/2017    T3 82 03/13/2017               Eb Quevedo MD

## 2022-09-15 LAB — BACTERIA UR CULT: NO GROWTH

## 2022-09-20 ENCOUNTER — OFFICE VISIT (OUTPATIENT)
Dept: NEUROSURGERY | Facility: CLINIC | Age: 72
End: 2022-09-20
Payer: COMMERCIAL

## 2022-09-20 VITALS
BODY MASS INDEX: 27.82 KG/M2 | HEART RATE: 66 BPM | HEIGHT: 65 IN | DIASTOLIC BLOOD PRESSURE: 75 MMHG | WEIGHT: 167 LBS | SYSTOLIC BLOOD PRESSURE: 110 MMHG

## 2022-09-20 DIAGNOSIS — M00.9: ICD-10-CM

## 2022-09-20 DIAGNOSIS — M16.11 ARTHRITIS OF RIGHT HIP: Primary | ICD-10-CM

## 2022-09-20 LAB
CRP SERPL-MCNC: 4.4 MG/L (ref 0–8)
ERYTHROCYTE [DISTWIDTH] IN BLOOD BY AUTOMATED COUNT: 12.5 % (ref 10–15)
ERYTHROCYTE [SEDIMENTATION RATE] IN BLOOD BY WESTERGREN METHOD: 13 MM/HR (ref 0–30)
HCT VFR BLD AUTO: 41.2 % (ref 35–47)
HGB BLD-MCNC: 13.6 G/DL (ref 11.7–15.7)
MCH RBC QN AUTO: 31.6 PG (ref 26.5–33)
MCHC RBC AUTO-ENTMCNC: 33 G/DL (ref 31.5–36.5)
MCV RBC AUTO: 96 FL (ref 78–100)
PLATELET # BLD AUTO: 274 10E3/UL (ref 150–450)
RBC # BLD AUTO: 4.31 10E6/UL (ref 3.8–5.2)
WBC # BLD AUTO: 7.2 10E3/UL (ref 4–11)

## 2022-09-20 PROCEDURE — 85652 RBC SED RATE AUTOMATED: CPT | Performed by: PREVENTIVE MEDICINE

## 2022-09-20 PROCEDURE — 85027 COMPLETE CBC AUTOMATED: CPT | Performed by: PREVENTIVE MEDICINE

## 2022-09-20 PROCEDURE — 86140 C-REACTIVE PROTEIN: CPT | Performed by: PREVENTIVE MEDICINE

## 2022-09-20 PROCEDURE — 99214 OFFICE O/P EST MOD 30 MIN: CPT | Performed by: PREVENTIVE MEDICINE

## 2022-09-20 PROCEDURE — 36415 COLL VENOUS BLD VENIPUNCTURE: CPT | Performed by: PREVENTIVE MEDICINE

## 2022-09-20 ASSESSMENT — PAIN SCALES - GENERAL: PAINLEVEL: EXTREME PAIN (8)

## 2022-09-20 NOTE — NURSING NOTE
"Reason For Visit:   Chief Complaint   Patient presents with     RECHECK     MRI results           Occupation: former manager  Currently working? No.  Work status?  Retired.     Sports: n  Activities: walking          /75   Pulse 66   Ht 1.651 m (5' 5\")   Wt 75.8 kg (167 lb)   BMI 27.79 kg/m        No Known Allergies    Current Outpatient Medications   Medication     apixaban ANTICOAGULANT (ELIQUIS) 5 MG tablet     atorvastatin (LIPITOR) 10 MG tablet     blood glucose (ACCU-CHEK GUIDE) test strip     blood glucose monitoring (ACCU-CHEK FASTCLIX) lancets     calcium carbonate-vitamin D 500-400 MG-UNIT TABS tablt     Continuous Blood Gluc  (DEXCOM G6 ) JUANA     Continuous Blood Gluc Sensor (DEXCOM G6 SENSOR) MISC     Continuous Blood Gluc Transmit (DEXCOM G6 TRANSMITTER) MISC     estradiol (ESTRACE) 0.1 MG/GM vaginal cream     gabapentin (NEURONTIN) 100 MG capsule     insulin aspart (NOVOLOG FLEXPEN) 100 UNIT/ML pen     insulin glargine (BASAGLAR KWIKPEN) 100 UNIT/ML pen     insulin pen needle (B-D U/F) 31G X 8 MM miscellaneous     metoprolol succinate ER (TOPROL XL) 50 MG 24 hr tablet     Multiple Vitamins-Minerals (MULTIVITAMIN ADULT PO)     oxyCODONE (ROXICODONE) 5 MG tablet     Urine Glucose-Ketones Test (KETO-DIASTIX) STRP     No current facility-administered medications for this visit.         Darla Severin-Brown, KACI  "

## 2022-09-20 NOTE — PATIENT INSTRUCTIONS
I am quite certain that the problem is coming from a badly arthritic right hip.  We will make sure it is not an infection and I will send you to a good hip surgeon to talk about a hip replacement surgery, which will need to be coordinated with your family doctor due to your other medical issues and your medications.  I wish you well and hopefully as you recover you will be able to walk with Brandie again.    Assessment     Latoya Rodríguez  is a 71 year old y.o. female who presents today for follow-up regarding   Severe right hip arthritis-doubt infection  Negative Pelvic Joint Dysfunction assessment last time  Neurologically intact with no high-grade lumbar stenosis to explain her right leg pain.      Plan:  CBC, ESR, CRP to assess for inflammatory/infectious markers.  Consultation with Dr. Arnaud Ward for probable right hip replacement.

## 2022-09-20 NOTE — LETTER
"    9/20/2022         RE: Latoya Rodríguez  [address]  [place] [id number]        Dear Colleague,    Thank you for referring your patient, Latoya Rodríguez, to the [facility] [place]. Please see a copy of my visit note below.      Subjective:  Latoya Rodríguez is a 71 year old female who presents today for follow-up regarding     Right hip pain    Bilateral throbbing global leg pain especially with rest, not worse with exercise, and awaking her at night    No evidence of a Pelvic Joint Dysfunction but she has a constitutionally caudal left ASIS    Tenderness to spring testing over the right SI joint and L5 in particular    Herpetiform rash-recurrent posterior pelvis    Implanted bladder stimulator right buttock    Chronic atrial fibrillation, and insulin-dependent diabetes, and chronic stress incontinence.  Anticoagulated on apixaban  MRI pelvis and lumbar spine ordered under sedation.  DEXA scan was planned.  Pain management medications deferred to his nurse practitioner Pricila Avila but I did start a low-dose of gabapentin escalating to 100 mg 3 times daily.    PRIOR HISTORY from   :  Right hip MRI 9/14/2022:  1. Severe degenerative disease of the right hip joint with extensive  labral tearing. Moderate joint effusion and periarticular edema  especially adductor may be reactive to osteoarthritis with muscle  strain but infection could have similar appearance. Please correlate  clinically.  2. Suspect at least partial tear of the right rectus femoris indirect  head.  3. Query mild greater trochanteric bursitis.    Lumbar MRI 9/14/2022: Mild multilevel lumbar spondylosis without high-grade  spinal canal or neural foraminal stenosis.      Latoya reports no change in the quality, severity, location, duration, or timing of pain.  She points to her inguinal area as the site of her severe pain and sometimes \"my leg feels like it is going to explode\" particularly with " weightbearing and particularly into the knee.  She does not have any spinal pain.    INTERIM HISTORY:    Right hip MRI 9/14/2022 shows severe DJD, a significant effusion and periarticular edema, likely degenerative, but possibly also infectious.  Borderline trochanteric bursitis, partial tear of the rectus femoris origin.  There has been no fever or chills or unexplained weight loss.      Past medical history is reviewed and is unchanged for any new medical diagnoses in the interim.  Pertinent for chronic atrial fibrillation, intrinsic sphincter deficiency and overactive bladder with stress incontinence, osteoporosis, type 1 diabetes and type 2 diabetes both listed in the chart, anticoagulated on apixaban, status post implantation of a bladder stimulator, on insulin and oxycodone.  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  She enjoys walking her dog Brandie..  She is .  She has 2 adult children and 2 grandchildren.  She she enjoys yard work and gardening and denies other sports hobbies and activities.     Objective:  IMAGING: Images and reports reviewed.    MR RIGHT HIP WITHOUT CONTRAST 9/14/2022     Severe degenerative change with associated extensive subchondral  cystic changes and edema most signal intensity of the right hip.     Articular cartilage and labrum  Assessment limited on this non-arthrographic study due to relative  lack of joint distension.     Articular cartilage: Extensive full-thickness cartilage loss of the  femoral head and acetabulum with associated subchondral edema/cystic  changes.      Labrum: Tearing of the anterior superior and superior labrum, likely  degenerative.    Joint effusion: Moderate amount of joint fluid.     Bursal effusion: Mild edema over the greater trochanter with small  trochanteric bursal fluid. No substantial iliopsoas bursal effusion.     Muscles and tendons  Muscles and tendons: Suspect at  least partial tear of the right rectus  femoris indirect head.                                                                 IMPRESSION:  1. Severe degenerative disease of the right hip joint with extensive  labral tearing. Moderate joint effusion and periarticular edema  especially adductor may be reactive to osteoarthritis with muscle  strain but infection could have similar appearance. Please correlate  clinically.  2. Suspect at least partial tear of the right rectus femoris indirect  head.  3. Query mild greater trochanteric bursitis.     MR LUMBAR SPINE W/O CONTRAST 9/14/2022     L2-3: Disc bulge and superimposed left foraminal protrusion. Bilateral  facet hypertrophy. Thickening of the ligamentum flavum. Mild left  neural foraminal stenosis. No spinal canal or right neuroforaminal  stenosis.     L3-4: Mild disc bulge. Minimal bilateral neural foraminal stenosis. No  spinal canal stenosis.      L4-5: Disc osteophyte complex. Mild bilateral neural foraminal  stenosis. No spinal canal stenosis.                                                              Impression: Mild multilevel lumbar spondylosis without high-grade  spinal canal or neural foraminal stenosis.       EXAMINATION:    CONSTITUTIONAL:  Vital signs as above.  No acute distress.  The patient is well nourished and well groomed.  She does occasionally grimace with discomfort and readjust herself.  Right SLR is negative  Internal and external rotation of the right hip exactly reproduces her pain.    Assessment     Latoya Rodríguez  is a 71 year old y.o. female who presents today for follow-up regarding     Severe right hip arthritis-doubt infection    Negative Pelvic Joint Dysfunction assessment last time    Neurologically intact with no high-grade lumbar stenosis to explain her right leg pain.      Plan:  CBC, ESR, CRP to assess for inflammatory/infectious markers.  I will send her a MyChart message if there is any surprises but I do not expect the  inflammatory markers to be elevated.  Consultation with Dr. Arnaud Ward for probable right hip replacement.      25 minutes of time spent doing chart review, history and exam, documentation, counseling, education, coordination of care, and other activities as described above.          Advised patient to call or return early if symptoms worsen, or having problems controlling bladder and bowel function or worsening leg weakness.     Please note: Voice recognition software was used in this dictation.  It may therefore contain typographical errors.  Alvaro Anderson MD      Again, thank you for allowing me to participate in the care of your patient.        Sincerely,        Alvaro Anderson MD

## 2022-09-22 NOTE — TELEPHONE ENCOUNTER
DIAGNOSIS: Arthritis of right hip   APPOINTMENT DATE: 09/27/2022   NOTES STATUS DETAILS   OFFICE NOTE from referring provider Internal 09/20/2022 Dr Anderson [facility]    OFFICE NOTE from other specialist Internal 08/16/2021 Dr Slade [facility]    DISCHARGE SUMMARY from hospital N/A    DISCHARGE REPORT from the ER N/A    OPERATIVE REPORT N/A    MEDICATION LIST N/A    EMG (for Spine) N/A    IMPLANT RECORD/STICKER N/A    LABS     CBC/DIFF N/A    CULTURES N/A    INJECTIONS DONE IN RADIOLOGY N/A    MRI Internal 09/14/2022 RT hip   CT SCAN N/A    XRAYS (IMAGES & REPORTS) Internal 08/05/2021 RT hip   TUMOR     PATHOLOGY  Slides & report N/A

## 2022-09-23 DIAGNOSIS — M25.551 RIGHT HIP PAIN: Primary | ICD-10-CM

## 2022-09-24 ASSESSMENT — HOOS JR
GOING UP OR DOWN STAIRS: EXTREME
SITTING: SEVERE
LYING IN BED (TURNING OVER, MAINTAINING HIP POSITION): EXTREME
BENDING TO THE FLOOR TO PICK UP OBJECT: EXTREME
RISING FROM SITTING: EXTREME
WALKING ON UNEVEN SURFACE: EXTREME
HOOS JR TOTAL INTERVAL SCORE: 8.1

## 2022-09-27 ENCOUNTER — OFFICE VISIT (OUTPATIENT)
Dept: ORTHOPEDICS | Facility: CLINIC | Age: 72
End: 2022-09-27
Payer: COMMERCIAL

## 2022-09-27 ENCOUNTER — PRE VISIT (OUTPATIENT)
Dept: ORTHOPEDICS | Facility: CLINIC | Age: 72
End: 2022-09-27

## 2022-09-27 VITALS — WEIGHT: 165 LBS | BODY MASS INDEX: 27.49 KG/M2 | HEIGHT: 65 IN

## 2022-09-27 DIAGNOSIS — M16.11 ARTHRITIS OF RIGHT HIP: ICD-10-CM

## 2022-09-27 PROCEDURE — 99204 OFFICE O/P NEW MOD 45 MIN: CPT | Performed by: ORTHOPAEDIC SURGERY

## 2022-09-27 NOTE — PROGRESS NOTES
Assessment: This is a 72 year old with advnaced right hip osteoarthritis associated with severe symptoms that interfere with activities of daily living despite appropriate non-operative management. We discussed the diagnosis and the treatment options including living with it, additional non-operative management, and arthroplasty.  We spent twenty minutes discussing total hip arthroplasty.  We discussed the implants, the procedure, the risks and benefits, and the post-operative course.  We discussed blood clots, blood clots to the lungs, injury to blood vessels and nerves, dislocation, infection, and leg length difference.  All the patients questions were answered to the best of my ability.    Plan: A1C.  Right total hip when the patient chooses to go forward with it.     Chief Complaint: Consult (Right hip pain )    Physician:  Alvaro Anderson    HPI: Latoya Rodríguez is a 72 year old female who presents today for evaluation of her right hip    Symptom Profile  Location of symptoms:  Groin   Onset:  insidious  Trend: getting worse    Duration of symptoms: about a year   Quality of symptoms: aching, sharp/stabbing  Severity: severe  Alleviate: activity modification  Exacerbating: activities  Previous Treatments: Previous treatments include activity modification, oral pain medication    Current Status:  Results of the patient s Hip Disability and Osteoarthritis Outcome Score (HOOS)  are as follows (0-100 scales with 100 being the theoretical best):  Pain: 45   Symptoms:30  ADLs: 26.47   Sports/Recreation: NA per pat  Quality of Life: 0   [facility] Activity Score: 2 limited by hip symptoms    MEDICAL HISTORY:   Past Medical History:   Diagnosis Date     Cataract      Chronic atrial fibrillation (H) 6/8/2022     Type 2 diabetes mellitus with hyperglycemia (H) 6/24/2016   Recent A1C w as8 and she is working on tighter glucose control with her insulin.    Medications:     Current Outpatient Medications:      apixaban ANTICOAGULANT  (ELIQUIS) 5 MG tablet, Take 1 tablet (5 mg) by mouth 2 times daily Resume the day following your procedure, on 7/6/2022, Disp: 180 tablet, Rfl: 3     atorvastatin (LIPITOR) 10 MG tablet, Take 1 tablet (10 mg) by mouth daily, Disp: 90 tablet, Rfl: 3     blood glucose (ACCU-CHEK GUIDE) test strip, Use to test blood sugar 6 times daily, Disp: 550 strip, Rfl: 3     blood glucose monitoring (ACCU-CHEK FASTCLIX) lancets, USE TO TEST BLOOD SUGAR FOUR TIMES A DAY OR AS DIRECTED, Disp: 306 each, Rfl: 3     calcium carbonate-vitamin D 500-400 MG-UNIT TABS tablt, Take 1 tablet by mouth 2 times daily, Disp: 180 tablet, Rfl: 3     Continuous Blood Gluc  (DEXCOM G6 ) JUANA, Use to read blood sugars as per 's instructions., Disp: 1 each, Rfl: 0     Continuous Blood Gluc Sensor (DEXCOM G6 SENSOR) MISC, Change every 10 days., Disp: 3 each, Rfl: 11     Continuous Blood Gluc Transmit (DEXCOM G6 TRANSMITTER) MISC, Change every 3 months., Disp: 1 each, Rfl: 3     estradiol (ESTRACE) 0.1 MG/GM vaginal cream, Place 2 g vaginally twice a week, Disp: 42.5 g, Rfl: 11     gabapentin (NEURONTIN) 100 MG capsule, Take 1 capsule (100 mg) by mouth 3 times daily Take one pill at night for the first day.  Take one pill twice a day on the second day.  Take one pill 3 times a day from the third day onward.  If you have side effects, back down to the dose you tolerated best with symptom relief. (Patient taking differently: Take 100 mg by mouth 3 times daily Take one pill at night for the first day.  Take one pill twice a day on the second day.  Take one pill 3 times a day from the third day onward.  If you have side effects, back down to the dose you tolerated best with symptom relief.), Disp: 90 capsule, Rfl: 0     insulin aspart (NOVOLOG FLEXPEN) 100 UNIT/ML pen, 1 unit per 15 gram of carb for all meals. Plus 1 unit per 50 mg/dl above 150. Total daily dose approx 30 units., Disp: 30 mL, Rfl: 3     insulin glargine (BASAGLAR  KWIKPEN) 100 UNIT/ML pen, Inject 35 Units Subcutaneous daily Max, 35 units daily, Disp: 45 mL, Rfl: 2     insulin pen needle (B-D U/F) 31G X 8 MM miscellaneous, USE 5 TIMES DAILY  WITH NOVOLOG AND LANTUS, Disp: 500 each, Rfl: 3     metoprolol succinate ER (TOPROL XL) 50 MG 24 hr tablet, TAKE ONE TABLET BY MOUTH EVERY MORNING, Disp: 90 tablet, Rfl: 0     Multiple Vitamins-Minerals (MULTIVITAMIN ADULT PO), Take 1 tablet by mouth every morning , Disp: , Rfl:      oxyCODONE (ROXICODONE) 5 MG tablet, Take 1 tablet (5 mg) by mouth every 6 hours as needed for pain, Disp: 10 tablet, Rfl: 0     Urine Glucose-Ketones Test (KETO-DIASTIX) STRP, 1 strip by In Vitro route as needed, Disp: 1 each, Rfl: 11    Allergies: Patient has no known allergies.    SURGICAL HISTORY:   Past Surgical History:   Procedure Laterality Date     ANESTHESIA CARDIOVERSION N/A 9/23/2021    Procedure: ANESTHESIA, FOR CARDIOVERSION@1100;  Surgeon: GENERIC ANESTHESIA PROVIDER;  Location: [facility] OR     ANESTHESIA OUT OF OR MRI N/A 9/14/2022    Procedure: ANESTHESIA OUT OF OR Magnetic resonance imaging right hip and lumbar @0800;  Surgeon: GENERIC ANESTHESIA PROVIDER;  Location: [facility] OR     CYSTOSCOPY, INJECT COLLAGEN, COMBINED N/A 5/11/2021    Procedure: CYSTOSCOPY, WITH PERIURETHRAL BULKING AGENT INJECTION;  Surgeon: Alberto Zhang MD;  Location: [facility] OR     CYSTOSCOPY, INJECT COLLAGEN, COMBINED N/A 9/12/2022    Procedure: CYSTOSCOPY, WITH PERIURETHRAL BULKING AGENT INJECTION (look in the bladder and urethra and inject filler into the urethra);  Surgeon: Alberto Zhang MD;  Location: [facility] OR     EYE SURGERY  9/04, 5/11    Retinal detachment, Cataract (both eyes)     IMPLANT STIMULATOR AND LEADS SACRAL NERVE (STAGE ONE AND TWO) Right 7/5/2022    Procedure: INSERTION, SACRAL NERVE STIMULATOR, STAGE 1 & 2 (permanent implant on the RIGHT side with [employer]);  Surgeon: Alberto Zhang MD;  Location: [facility] OR     IMPLANT STIMULATOR SACRAL NERVE PERCUTANEOUS  TRIAL N/A 6/14/2022    Procedure: INSERTION, NEUROSTIMULATOR, SACRAL, PERCUTANEOUS, FOR TRIAL;  Surgeon: Alberto Zhang MD;  Location: [facility] OR     RELEASE CARPAL TUNNEL Right 8/6/2021    Procedure: RELEASE, CARPAL TUNNEL, Right;  Surgeon: RADHA Sandoval MD;  Location: [facility] OR     RELEASE TRIGGER FINGER Bilateral 5/10/2019    Procedure: RELEASE TRIGGER FINGER, BILATERAL LONG AND RING FINGERS;  Surgeon: RADHA Sandoval MD;  Location: [facility] OR     VASCULAR SURGERY      Varicose Veins       FAMILY HISTORY:   Family History   Problem Relation Age of Onset     Hyperlipidemia Mother      Breast Cancer Mother      Other Cancer Father      Leukemia Father      Skin Cancer Father      Coronary Artery Disease Maternal Uncle      Brain Cancer Maternal Uncle      Coronary Artery Disease Maternal Uncle      Stomach Cancer Maternal Aunt      Diabetes No family hx of      Hypertension No family hx of      Cerebrovascular Disease No family hx of      Colon Cancer No family hx of      Thyroid Disease No family hx of      Depression No family hx of      Anxiety Disorder No family hx of        SOCIAL HISTORY:   Social History     Tobacco Use     Smoking status: Never Smoker     Smokeless tobacco: Never Used   Substance Use Topics     Alcohol use: Yes     Comment: 1 to 2 beers or glasses of wine 1 to 2 times per month   retired, worked for [email or web address]    REVIEW OF SYSTEMS:  The comprehensive review of systems from the intake form was reviewed with the patient.  No fever, weight change or fatigue. No dry eyes. No oral ulcers, sore throat or voice change. No palpitations, syncope, angina or edema.  No chest pain, excessive sleepiness, shortness of breath or hemoptysis.   No abdominal pain, nausea, vomiting, diarrhea or heartburn.  No skin rash. No focal weakness or numbness. No bleeding or lymphadenopathy. No rhinitis or hives.     Exam:  On physical examination the patient appears the stated age, is in no acute distress,  "affect is appropriate, and breathing is non-labored.  Vitals are documented in the EMR and have been reviewed:    Ht 1.651 m (5' 5\")   Wt 74.8 kg (165 lb)   BMI 27.46 kg/m    5' 5\"  Body mass index is 27.46 kg/m .    Rises from chair: with effort   Gait: pronounced antalgic gait  with less time on the right   Gains the exam table: with effort     RIGHT hip subjective: irritated   Abd:10  Add: 10  Flexion:  65  IRF:-5  ERF:5  Impingement test: ++  Tenderness to palpation:    LEFT hip subjective: not irritated  Abd:  Add:  Flexion: 95  IRF:  0  ERF: 20  Impingement test: -  Tenderness to palpation:    Distally, the circulatory, motor, and sensation exam is intact with 5/5 EHL, gastroc-soleus, and tibialis anterior.    Sensation to light touch is intact.    Dorsalis pedis and posterior tibialis pulses are palpable.    There are no sores on the feet, no bruising, and no lymphedema.    X-rays:   advanced right hip osteoarthritis    MR from 9/14/22  IMPRESSION:  1. Severe degenerative disease of the right hip joint with extensive  labral tearing. Moderate joint effusion and periarticular edema  especially adductor may be reactive to osteoarthritis with muscle  strain but infection could have similar appearance. Please correlate  clinically.  2. Suspect at least partial tear of the right rectus femoris indirect  head.  3. Query mild greater trochanteric bursitis.     I have personally reviewed the examination and initial interpretation  and I agree with the findings.     SHIREEN SAXENA       "

## 2022-09-27 NOTE — LETTER
9/27/2022         RE: Latoya Rodríguez  [address] [facility] [address]        Dear Colleague,    Thank you for referring your patient, Latoya Rodríguez, to the [facility]. Please see a copy of my visit note below.    Assessment: This is a 72 year old with advnaced right hip osteoarthritis associated with severe symptoms that interfere with activities of daily living despite appropriate non-operative management. We discussed the diagnosis and the treatment options including living with it, additional non-operative management, and arthroplasty.  We spent twenty minutes discussing total hip arthroplasty.  We discussed the implants, the procedure, the risks and benefits, and the post-operative course.  We discussed blood clots, blood clots to the lungs, injury to blood vessels and nerves, dislocation, infection, and leg length difference.  All the patients questions were answered to the best of my ability.    Plan: A1C.  Right total hip when the patient chooses to go forward with it.     Chief Complaint: Consult (Right hip pain )    Physician:  Alvaro Anderson    HPI: Latoya Rodríguez is a 72 year old female who presents today for evaluation of her right hip    Symptom Profile  Location of symptoms:  Groin   Onset:  insidious  Trend: getting worse    Duration of symptoms: about a year   Quality of symptoms: aching, sharp/stabbing  Severity: severe  Alleviate: activity modification  Exacerbating: activities  Previous Treatments: Previous treatments include activity modification, oral pain medication    Current Status:  Results of the patient s Hip Disability and Osteoarthritis Outcome Score (HOOS)  are as follows (0-100 scales with 100 being the theoretical best):  Pain: 45   Symptoms:30  ADLs: 26.47   Sports/Recreation: NA per pat  Quality of Life: 0   [facility] Activity Score: 2 limited by hip symptoms    MEDICAL HISTORY:   Past Medical History:   Diagnosis Date     Cataract       Chronic atrial fibrillation (H) 6/8/2022     Type 2 diabetes mellitus with hyperglycemia (H) 6/24/2016   Recent A1C w as8 and she is working on tighter glucose control with her insulin.    Medications:     Current Outpatient Medications:      apixaban ANTICOAGULANT (ELIQUIS) 5 MG tablet, Take 1 tablet (5 mg) by mouth 2 times daily Resume the day following your procedure, on 7/6/2022, Disp: 180 tablet, Rfl: 3     atorvastatin (LIPITOR) 10 MG tablet, Take 1 tablet (10 mg) by mouth daily, Disp: 90 tablet, Rfl: 3     blood glucose (ACCU-CHEK GUIDE) test strip, Use to test blood sugar 6 times daily, Disp: 550 strip, Rfl: 3     blood glucose monitoring (ACCU-CHEK FASTCLIX) lancets, USE TO TEST BLOOD SUGAR FOUR TIMES A DAY OR AS DIRECTED, Disp: 306 each, Rfl: 3     calcium carbonate-vitamin D 500-400 MG-UNIT TABS tablt, Take 1 tablet by mouth 2 times daily, Disp: 180 tablet, Rfl: 3     Continuous Blood Gluc  (DEXCOM G6 ) JUANA, Use to read blood sugars as per 's instructions., Disp: 1 each, Rfl: 0     Continuous Blood Gluc Sensor (DEXCOM G6 SENSOR) MISC, Change every 10 days., Disp: 3 each, Rfl: 11     Continuous Blood Gluc Transmit (DEXCOM G6 TRANSMITTER) MISC, Change every 3 months., Disp: 1 each, Rfl: 3     estradiol (ESTRACE) 0.1 MG/GM vaginal cream, Place 2 g vaginally twice a week, Disp: 42.5 g, Rfl: 11     gabapentin (NEURONTIN) 100 MG capsule, Take 1 capsule (100 mg) by mouth 3 times daily Take one pill at night for the first day.  Take one pill twice a day on the second day.  Take one pill 3 times a day from the third day onward.  If you have side effects, back down to the dose you tolerated best with symptom relief. (Patient taking differently: Take 100 mg by mouth 3 times daily Take one pill at night for the first day.  Take one pill twice a day on the second day.  Take one pill 3 times a day from the third day onward.  If you have side effects, back down to the dose you tolerated best  with symptom relief.), Disp: 90 capsule, Rfl: 0     insulin aspart (NOVOLOG FLEXPEN) 100 UNIT/ML pen, 1 unit per 15 gram of carb for all meals. Plus 1 unit per 50 mg/dl above 150. Total daily dose approx 30 units., Disp: 30 mL, Rfl: 3     insulin glargine (BASAGLAR KWIKPEN) 100 UNIT/ML pen, Inject 35 Units Subcutaneous daily Max, 35 units daily, Disp: 45 mL, Rfl: 2     insulin pen needle (B-D U/F) 31G X 8 MM miscellaneous, USE 5 TIMES DAILY  WITH NOVOLOG AND LANTUS, Disp: 500 each, Rfl: 3     metoprolol succinate ER (TOPROL XL) 50 MG 24 hr tablet, TAKE ONE TABLET BY MOUTH EVERY MORNING, Disp: 90 tablet, Rfl: 0     Multiple Vitamins-Minerals (MULTIVITAMIN ADULT PO), Take 1 tablet by mouth every morning , Disp: , Rfl:      oxyCODONE (ROXICODONE) 5 MG tablet, Take 1 tablet (5 mg) by mouth every 6 hours as needed for pain, Disp: 10 tablet, Rfl: 0     Urine Glucose-Ketones Test (KETO-DIASTIX) STRP, 1 strip by In Vitro route as needed, Disp: 1 each, Rfl: 11    Allergies: Patient has no known allergies.    SURGICAL HISTORY:   Past Surgical History:   Procedure Laterality Date     ANESTHESIA CARDIOVERSION N/A 9/23/2021    Procedure: ANESTHESIA, FOR CARDIOVERSION@1100;  Surgeon: GENERIC ANESTHESIA PROVIDER;  Location: [facility] OR     ANESTHESIA OUT OF OR MRI N/A 9/14/2022    Procedure: ANESTHESIA OUT OF OR Magnetic resonance imaging right hip and lumbar @0800;  Surgeon: GENERIC ANESTHESIA PROVIDER;  Location: [facility] OR     CYSTOSCOPY, INJECT COLLAGEN, COMBINED N/A 5/11/2021    Procedure: CYSTOSCOPY, WITH PERIURETHRAL BULKING AGENT INJECTION;  Surgeon: Alberto Zhang MD;  Location: [facility] OR     CYSTOSCOPY, INJECT COLLAGEN, COMBINED N/A 9/12/2022    Procedure: CYSTOSCOPY, WITH PERIURETHRAL BULKING AGENT INJECTION (look in the bladder and urethra and inject filler into the urethra);  Surgeon: Alberto Zhang MD;  Location: [facility] OR     EYE SURGERY  9/04, 5/11    Retinal detachment, Cataract (both eyes)     IMPLANT STIMULATOR AND LEADS  SACRAL NERVE (STAGE ONE AND TWO) Right 7/5/2022    Procedure: INSERTION, SACRAL NERVE STIMULATOR, STAGE 1 & 2 (permanent implant on the RIGHT side with [employer]);  Surgeon: Alberto Zhang MD;  Location: [facility] OR     IMPLANT STIMULATOR SACRAL NERVE PERCUTANEOUS TRIAL N/A 6/14/2022    Procedure: INSERTION, NEUROSTIMULATOR, SACRAL, PERCUTANEOUS, FOR TRIAL;  Surgeon: Alberto Zhang MD;  Location: [facility] OR     RELEASE CARPAL TUNNEL Right 8/6/2021    Procedure: RELEASE, CARPAL TUNNEL, Right;  Surgeon: RADHA Sandoval MD;  Location: [facility] OR     RELEASE TRIGGER FINGER Bilateral 5/10/2019    Procedure: RELEASE TRIGGER FINGER, BILATERAL LONG AND RING FINGERS;  Surgeon: RADHA Sandoval MD;  Location: [facility] OR     VASCULAR SURGERY      Varicose Veins       FAMILY HISTORY:   Family History   Problem Relation Age of Onset     Hyperlipidemia Mother      Breast Cancer Mother      Other Cancer Father      Leukemia Father      Skin Cancer Father      Coronary Artery Disease Maternal Uncle      Brain Cancer Maternal Uncle      Coronary Artery Disease Maternal Uncle      Stomach Cancer Maternal Aunt      Diabetes No family hx of      Hypertension No family hx of      Cerebrovascular Disease No family hx of      Colon Cancer No family hx of      Thyroid Disease No family hx of      Depression No family hx of      Anxiety Disorder No family hx of        SOCIAL HISTORY:   Social History     Tobacco Use     Smoking status: Never Smoker     Smokeless tobacco: Never Used   Substance Use Topics     Alcohol use: Yes     Comment: 1 to 2 beers or glasses of wine 1 to 2 times per month   retired, worked for [employer]    REVIEW OF SYSTEMS:  The comprehensive review of systems from the intake form was reviewed with the patient.  No fever, weight change or fatigue. No dry eyes. No oral ulcers, sore throat or voice change. No palpitations, syncope, angina or edema.  No chest pain, excessive sleepiness, shortness of breath or  "hemoptysis.   No abdominal pain, nausea, vomiting, diarrhea or heartburn.  No skin rash. No focal weakness or numbness. No bleeding or lymphadenopathy. No rhinitis or hives.     Exam:  On physical examination the patient appears the stated age, is in no acute distress, affect is appropriate, and breathing is non-labored.  Vitals are documented in the EMR and have been reviewed:    Ht 1.651 m (5' 5\")   Wt 74.8 kg (165 lb)   BMI 27.46 kg/m    5' 5\"  Body mass index is 27.46 kg/m .    Rises from chair: with effort   Gait: pronounced antalgic gait  with less time on the right   Gains the exam table: with effort     RIGHT hip subjective: irritated   Abd:10  Add: 10  Flexion:  65  IRF:-5  ERF:5  Impingement test: ++  Tenderness to palpation:    LEFT hip subjective: not irritated  Abd:  Add:  Flexion: 95  IRF:  0  ERF: 20  Impingement test: -  Tenderness to palpation:    Distally, the circulatory, motor, and sensation exam is intact with 5/5 EHL, gastroc-soleus, and tibialis anterior.    Sensation to light touch is intact.    Dorsalis pedis and posterior tibialis pulses are palpable.    There are no sores on the feet, no bruising, and no lymphedema.    X-rays:   advanced right hip osteoarthritis    MR from 9/14/22  IMPRESSION:  1. Severe degenerative disease of the right hip joint with extensive  labral tearing. Moderate joint effusion and periarticular edema  especially adductor may be reactive to osteoarthritis with muscle  strain but infection could have similar appearance. Please correlate  clinically.  2. Suspect at least partial tear of the right rectus femoris indirect  head.  3. Query mild greater trochanteric bursitis.     I have personally reviewed the examination and initial interpretation  and I agree with the findings.     SHIREEN SAXENA           Again, thank you for allowing me to participate in the care of your patient.        Sincerely,        Arnaud Ward MD    "

## 2022-10-10 ENCOUNTER — OFFICE VISIT (OUTPATIENT)
Dept: UROLOGY | Facility: CLINIC | Age: 72
End: 2022-10-10
Payer: COMMERCIAL

## 2022-10-10 DIAGNOSIS — N95.2 ATROPHIC VAGINITIS: ICD-10-CM

## 2022-10-10 DIAGNOSIS — R35.0 URINARY FREQUENCY: Primary | ICD-10-CM

## 2022-10-10 DIAGNOSIS — N39.3 STRESS INCONTINENCE: ICD-10-CM

## 2022-10-10 DIAGNOSIS — N39.41 URGE INCONTINENCE: ICD-10-CM

## 2022-10-10 PROCEDURE — 99214 OFFICE O/P EST MOD 30 MIN: CPT | Performed by: UROLOGY

## 2022-10-10 NOTE — PROGRESS NOTES
Reason for visit: F/u on urinary symptoms.  Clinical Data: Ms. Rodríguez is a 73 y/o female with the above. She was started on oxybutynin 15 mg xl as well as estrace cream and referred to pelvic floor PT. She did better on the anticholinergic however continued to have a lot of urgency and some urge incontinence. She was then switched to Detrol which did not work, then Sanctura which did but was too expensive and tried Vesicare but then Toviaz was less expensive and using Myrbetriq and that was working better than anything else but then it was not.  She would go for a walk with her dog and after an hour she would start to leak and after 1.5 hours she would just gush.  Another time that she will gush is when she sits up from a seated position.  This was confirmed by UDS.    She then underwent periurethral bulking for ISD on 5/11/21 and notes that her urinary frequency and incontinence have improved but she continues to have a little leakage associated with urgency but not as bad as the previous gushes.    HgA1 C is 7.2 but on a daily basis her sugars fluctuate quite a bit.  Of note she has been using her estrogen cream.    UDS on 2/24/21:  -Normal bladder capacity (635 mL) with slightly delayed filling sensations (FS: 328 mL, FD: 333 mL, SD: 452 mL).  -Good bladder compliance.  -No DO or stress urinary incontinence while seated despite max Pabd 99 cm H2O at a volume of 349 mL.  -Patient is then asked to stand. There is no incontinence with the motion of standing. However, after a few seconds of standing in place, she then starts to experience mild terminal DO with incontinence when Pdet reaches 4.4 cm H2O. This leakage starts as just a trickle but quickly progresses to more of a gush at which point she is given permission to void.  -Good detrusor contraction during voiding to max Pdet 43 cm H2O with a brisk flow rate (Qmax 32 ml/s), bell-shape flow curve, and complete bladder emptying (PVR 0 mL).  -Mildly increased EMG  activity during voiding.  -No evidence for bladder outlet obstruction.  -Fluoroscopy reveals a mildly trabeculated bladder wall without diverticuli or VUR. The bladder neck is closed during filling. Voiding images unavailable as patient transferred to [facility] to void.    She underwent an [employer] PNE on 6/14/22 and reports three nights where she was able to wait over 5 hours at night.  Also, her urgency went from a level 4 to a level 2.  Frequency also improved.  She would report at least 60% improvement.  The RIGHT side worked best for her.  Although the first night the left side worked well.    She underwent SNS placement and reports continued improvement in her urgency and frequency.  She is on program 1 with the 3 lights on.  She feels the stimulation in the bicycle seat area on the right.  However then reported some continued incontinence when getting up from a seated position.  She had a bulking procedure on 5/11/22 with macroplastique which helped very minimally so she underwent some periurethral bulking on 9/12/22 but doesn't think it helped much.  Her nocturia is more frequent now as has her day time urgency.  She is also limited by severe pain in her hip which she will have surgery in January but is hoping to heave surgery sooner because of her pain and limited mobility.    ASSESSMENT and PLAN: This is a 72 year old female with urinary urgency, frequency and urge incontinence as well as a possible stress incontinence component.   She was helped to some degree with the periurthral bulking but continued to have urinary urgency and was frustrated.  She underwent a right sided SNS which helps with the urgency frequency a fair bit but contiues to leak with standing from sitting position indicating some intrinsic sphincter deficiency.  She didn't find much relief from the periurethral bulking in terms of leakage but she is also going through a lot of pain with her hip and limited mobility which is likely  contributing further here.  -continue estrogen cream   -could try increasing the setting on her device  -could try to change her program to see if she gets better relief.  -f/u after the hip surgery recovery is complete to optimize her symptoms.    Thank you for allowing me to participate in the care of Ms. Latoya Rodríguez and I will keep you updated on her progress.   Alberto Zhang MD     32 minutes spent on the date of the encounter doing chart review, history and exam, documentation and further activities per the note

## 2022-10-10 NOTE — PATIENT INSTRUCTIONS
-continue estrogen cream   -could try increasing the setting on her device  -could try to change her program to see if she gets better relief.  -f/u after the hip surgery recovery is complete to optimize her symptoms.

## 2022-10-10 NOTE — NURSING NOTE
Latoya Rodríguez's goals for this visit include:   Chief Complaint   Patient presents with     RECHECK     Post op 4 week       She requests these members of her care team be copied on today's visit information:       PCP: Pricila Avila    Referring Provider:  Alberto Zhang MD  [address]  [place] [address]    post void residual: 34 ml    Do you need any medication refills at today's visit?     Camila Reyna LPN on 10/10/2022 at 11:25 AM

## 2022-10-14 NOTE — PROGRESS NOTES
Outcome for 10/14/22 2:35 PM: ID Watchdog message sent   Outcome for 10/17/22 2:11 PM: Glucose Readings sent via ID Watchdog  Ivy Aguilar [facility]  Adult Endocrinology  [facility]

## 2022-10-19 NOTE — PATIENT INSTRUCTIONS
-Coninue Lantus 24 units at bedtime  -Change Novolog 1:15 gram of carb coverage with breakfast and 1:15 gram at lunch and continue 1:15 gram at dinner  -Continue 1:50 Novolog correction if over 150 at breakfast, lunch and dinner, continue 1:50 Novolog correction if BG is >200 at bedtime    - consider adding either Fosamax vs Reclast -- likely after hip surgery      [facility]-Department of Endocrinology  Diabetes Educators:   Linda Diego, RN and Humera Caraballo RN  Clinic Nurse: NESTOR Mckenna  CMA's: Elisa   LPN: Elissa  Scheduling/Clinic phone number : [phone]   Clinic Fax: [phone]  On-Call Endocrine at [facility] (after hours/weekends): [phone] option 4    Please call the number below to schedule your labs.    Lab    General [phone]   [facility] [phone]   [place] [phone]   [facility]  [phone]   [facility] [phone]   [place] [phone]   [facility]) [phone]   [facility] Walk-In Only   [place] [phone]   [place] [phone]   [place] [phone]   [place] [phone]     Please reach out to the following centers to schedule your imaging appointment:       Imaging (DEXA, CT, MRI, XRAY)    [facility] ([facility], [facility], [place]) [phone]   [facility] ([place], [place]) [phone]   [facility] ([facility]) [phone]   [facility] ([facility]) [phone]     Appointment Reminders:  * Please bring meter with for staff to download  * If you are due ONLY for an A1C, it is scheduled with the nurse and will be done in clinic. You do not need to schedule a lab appointment. Fasting is not required for an A1C.  * Refill request should be submitted to your pharmacy. They will contact clinic for approval.

## 2022-10-23 ENCOUNTER — HEALTH MAINTENANCE LETTER (OUTPATIENT)
Age: 72
End: 2022-10-23

## 2022-10-24 ENCOUNTER — OFFICE VISIT (OUTPATIENT)
Dept: ENDOCRINOLOGY | Facility: CLINIC | Age: 72
End: 2022-10-24
Payer: COMMERCIAL

## 2022-10-24 VITALS
BODY MASS INDEX: 27.49 KG/M2 | HEART RATE: 116 BPM | WEIGHT: 165.2 LBS | SYSTOLIC BLOOD PRESSURE: 117 MMHG | DIASTOLIC BLOOD PRESSURE: 52 MMHG | OXYGEN SATURATION: 98 %

## 2022-10-24 DIAGNOSIS — E78.5 HYPERLIPIDEMIA WITH TARGET LDL LESS THAN 100: ICD-10-CM

## 2022-10-24 DIAGNOSIS — E11.65 TYPE 2 DIABETES MELLITUS WITH HYPERGLYCEMIA, WITH LONG-TERM CURRENT USE OF INSULIN (H): ICD-10-CM

## 2022-10-24 DIAGNOSIS — E10.9 TYPE 1 DIABETES MELLITUS WITHOUT COMPLICATION (H): Primary | ICD-10-CM

## 2022-10-24 DIAGNOSIS — Z79.4 TYPE 2 DIABETES MELLITUS WITH HYPERGLYCEMIA, WITH LONG-TERM CURRENT USE OF INSULIN (H): ICD-10-CM

## 2022-10-24 PROCEDURE — 99215 OFFICE O/P EST HI 40 MIN: CPT | Performed by: INTERNAL MEDICINE

## 2022-10-24 RX ORDER — INSULIN ASPART 100 [IU]/ML
INJECTION, SOLUTION INTRAVENOUS; SUBCUTANEOUS
Qty: 30 ML | Refills: 3 | Status: SHIPPED | OUTPATIENT
Start: 2022-10-24 | End: 2023-05-18

## 2022-10-24 ASSESSMENT — ENCOUNTER SYMPTOMS
COUGH: 0
DIZZINESS: 0
SORE THROAT: 0
HEMATOCHEZIA: 0
HEADACHES: 0
NAUSEA: 0
CONSTIPATION: 1
SHORTNESS OF BREATH: 0
EYE PAIN: 0
WEAKNESS: 0
PARESTHESIAS: 0
JOINT SWELLING: 0
DYSURIA: 0
MYALGIAS: 1
FEVER: 0
CHILLS: 0
BREAST MASS: 0
FREQUENCY: 1
ARTHRALGIAS: 0
HEMATURIA: 0
PALPITATIONS: 0
HEARTBURN: 0
NERVOUS/ANXIOUS: 0
ABDOMINAL PAIN: 0
DIARRHEA: 0

## 2022-10-24 ASSESSMENT — ACTIVITIES OF DAILY LIVING (ADL): CURRENT_FUNCTION: NO ASSISTANCE NEEDED

## 2022-10-24 NOTE — NURSING NOTE
Latoya Rodríguez's goals for this visit include:   Chief Complaint   Patient presents with     Diabetes     She requests these members of her care team be copied on today's visit information: Yes    PCP: Pricila Avila    Referring Provider:  Pricila Avila, URVASHI CNP  [address] [place] [address]    /52 (BP Location: Left arm, Patient Position: Sitting, Cuff Size: Adult Regular)   Pulse 116   Wt 74.9 kg (165 lb 3.2 oz)   SpO2 98%   BMI 27.49 kg/m      Do you need any medication refills at today's visit? No

## 2022-10-24 NOTE — PROGRESS NOTES
Chief complaint:  Latoya is a 72 year old female seen in follow up of type 1 Diabetes.  HISTORY OF PRESENT ILLNESS  Latoya is a 72-year-old retiree with a history of type 1 diabetes who has a visit today for a follow-up type 1 diabetes.  She was diagnosed with type 1 diabetes 2016 during routine physical where she was found to have high blood glucose levels.  At that time she was diagnosed and treated as a type II diabetic but it was later determined that she had positive TORRES antibodies and low C-peptide.      Interval history  She has been struggling with right hip pain for the past year.  She was noted to have severe degenerative joint disease requiring hip replacement.  She was initially scheduled for hip replacement in January 2023 but she is looking forward to the early date due to severe hip pain.  She received an orthopedist at [facility].    Regarding type 1 diabetes, current regimen consist of Lantus 30units at bedtime, Novolog 1:18 g with breakfast, 1 unit per 18 gram at lunch, and 1 unit per 15 gram at dinner, Novolog correction 1:50 if BG is >150 at meal time, Novolog correction 1:50 if BG is >200 at bedtime    A1c 8.0% in September 2022. Reviewed BG in the past 2 weeks, average glucose 200 (SD 90).  She usually has high blood glucose before lunch and before dinner.  Fasting blood glucose acceptable.  She eats 3 meals per day without snack between meals or before bedtime.  She stated she could not do exercise due to hip hip pain.  She feels that her carb counting is quite accurate.  She eats mostly protein and is very consistent meal daily. She is more interested in Dexcom sensor but will start after surgery.    Diet recalled:  BG: cereal  Lunch: soup and salad or cheese, egg  Dinner: fish and salad     Stated her mother had history of osteoporosis. DXA 9/6/2022 showed the T score -2 at the lumbar spine, T score -1.6 at the left femoral neck, -1.2 at the right femoral neck and -1.1 at the  wrist.      Past Medical History  Type 1 diabetes, diagnosed 6/2016  Hyperlipidemia  Osteoporosis     Medications  Current Outpatient Medications   Medication Sig Dispense Refill     apixaban ANTICOAGULANT (ELIQUIS) 5 MG tablet Take 1 tablet (5 mg) by mouth 2 times daily Resume the day following your procedure, on 7/6/2022 180 tablet 3     atorvastatin (LIPITOR) 10 MG tablet Take 1 tablet (10 mg) by mouth daily 90 tablet 3     blood glucose (ACCU-CHEK GUIDE) test strip Use to test blood sugar 6 times daily 550 strip 3     blood glucose monitoring (ACCU-CHEK FASTCLIX) lancets USE TO TEST BLOOD SUGAR FOUR TIMES A DAY OR AS DIRECTED 306 each 3     calcium carbonate-vitamin D 500-400 MG-UNIT TABS tablt Take 1 tablet by mouth 2 times daily 180 tablet 3     Continuous Blood Gluc  (DEXCOM G6 ) JUANA Use to read blood sugars as per 's instructions. 1 each 0     Continuous Blood Gluc Sensor (DEXCOM G6 SENSOR) MISC Change every 10 days. 3 each 11     Continuous Blood Gluc Transmit (DEXCOM G6 TRANSMITTER) MISC Change every 3 months. 1 each 3     estradiol (ESTRACE) 0.1 MG/GM vaginal cream Place 2 g vaginally twice a week 42.5 g 11     gabapentin (NEURONTIN) 100 MG capsule Take 1 capsule (100 mg) by mouth 3 times daily Take one pill at night for the first day.  Take one pill twice a day on the second day.  Take one pill 3 times a day from the third day onward.  If you have side effects, back down to the dose you tolerated best with symptom relief. (Patient not taking: Reported on 10/10/2022) 90 capsule 0     insulin aspart (NOVOLOG FLEXPEN) 100 UNIT/ML pen 1 unit per 15 gram of carb for all meals. Plus 1 unit per 50 mg/dl above 150. Total daily dose approx 30 units. 30 mL 3     insulin glargine (BASAGLAR KWIKPEN) 100 UNIT/ML pen Inject 35 Units Subcutaneous daily Max, 35 units daily 45 mL 2     insulin pen needle (B-D U/F) 31G X 8 MM miscellaneous USE 5 TIMES DAILY  WITH NOVOLOG AND LANTUS 500 each 3      metoprolol succinate ER (TOPROL XL) 50 MG 24 hr tablet TAKE ONE TABLET BY MOUTH EVERY MORNING 90 tablet 0     Multiple Vitamins-Minerals (MULTIVITAMIN ADULT PO) Take 1 tablet by mouth every morning        oxyCODONE (ROXICODONE) 5 MG tablet Take 1 tablet (5 mg) by mouth every 6 hours as needed for pain 10 tablet 0     Urine Glucose-Ketones Test (KETO-DIASTIX) STRP 1 strip by In Vitro route as needed 1 each 11     Allergies  No Known Allergies    Family History  family history includes Brain Cancer in her maternal uncle; Breast Cancer in her mother; Coronary Artery Disease in her maternal uncle and maternal uncle; Hyperlipidemia in her mother; Leukemia in her father; Other Cancer in her father; Skin Cancer in her father; Stomach Cancer in her maternal aunt.  No thyroid disease or type 1 diabetes in the family    Social History  Social History     Socioeconomic History     Marital status: Single     Spouse name: Not on file     Number of children: Not on file     Years of education: Not on file     Highest education level: Not on file   Occupational History     Not on file   Tobacco Use     Smoking status: Never     Smokeless tobacco: Never   Substance and Sexual Activity     Alcohol use: Yes     Comment: 1 to 2 beers or glasses of wine 1 to 2 times per month     Drug use: No     Sexual activity: Not Currently     Partners: Female     Birth control/protection: Post-menopausal   Other Topics Concern     Parent/sibling w/ CABG, MI or angioplasty before 65F 55M? No   Social History Narrative     Not on file     Social Determinants of Health     Financial Resource Strain: Not on file   Food Insecurity: Not on file   Transportation Needs: Not on file   Physical Activity: Not on file   Stress: Not on file   Social Connections: Not on file   Intimate Partner Violence: Not on file   Housing Stability: Not on file       REVIEW OF SYSTEMS  10 points ROS were negative otherwise mentioned in HPI    Physical Exam  Vital signs:  "  /52 (BP Location: Left arm, Patient Position: Sitting, Cuff Size: Adult Regular)   Pulse 116   Wt 74.9 kg (165 lb 3.2 oz)   SpO2 98%   BMI 27.49 kg/m    Estimated body mass index is 27.46 kg/m  as calculated from the following:    Height as of 9/27/22: 1.651 m (5' 5\").    Weight as of 9/27/22: 74.8 kg (165 lb).  Constitutional: no distress, comfortable, pleasant   Eyes: anicteric  CVS: irregularly irregular, normal S1,S2, no murmur  Lungs: CTA B/L  Abdomen: Soft, NT, ND, no hepatomegaly  Musculoskeletal: no edema   Skin:  no jaundice   Neurological: normal gait, intact sensation per monofilament bilaterally exam dated 10/24/2022, no tremor on outstretched hands bilaterally  Psychological: appropriate mood       DATA REVIEW  Lab Results   Component Value Date    A1C 8.0 09/06/2022    A1C 7.9 05/02/2022    A1C 7.6 11/15/2021    A1C 6.7 07/01/2021    A1C 8.4 02/10/2021    A1C 7.2 10/21/2020     ENDO DIABETES Latest Ref Rng & Units 9/6/2022   CHOL <200 mg/dL 185   LDL <=100 mg/dL 104 (H)   HDL >=50 mg/dL 61   NHDL <130 mg/dL 124   TRIGLYCERIDES <150 mg/dL 101     ENDO DIABETES Latest Ref Rng & Units 9/9/2022   CREATININE 0.52 - 1.04 mg/dL 0.74     ENDO DIABETES Latest Ref Rng & Units 9/6/2022   MICROL mg/L 14   UMALCR 0.00 - 25.00 mg/g Cr 15.73     DXA 8/28/2020  Lumbar spine T-score in region of L2-L4 = -2.4   L1-4 percent change: Not significant%      HIPS:  Mean total hip T-score: -1  Mean total hip percent change: Not significant%      Left femoral neck T-score = -1.9  Right femoral neck T-score = -1.6      Radius 33% T-score = -1.6     FRAX:  10 year probability of major osteoporotic fracture: 11.3%  10 year probability of hip fracture: 2.1%  The 10 year probability of fracture may be lower than reported if the  patient has received treatment. FRAX data should be disregarded in patient's taking bisphosphonates.                                                                 IMPRESSION:    Low bone mass " (osteopenia).    DXA 9/6/2022  Lumbar spine T-score in region of L1-L4 = -2   L1-4 percent change: 3.7%      HIPS:  Mean total hip T-score: -1.1  Mean total hip percent change: Not significant%      Left femoral neck T-score = -1.6  Right femoral neck T-score = -1.2      Radius 33% T-score = -1.1  Radius 33% percent change: Not significant%      FRAX:  10 year probability of major osteoporotic fracture: 17.2%  10 year probability of hip fracture: 5.4%  The 10 year probability of fracture may be lower than reported if the patient has received treatment. FRAX data should be disregarded in patient's taking bisphosphonates.                                                                    IMPRESSION:    Low bone mass (osteopenia).       ASSESSMENT/PLAN:   Latoya is a 72-year-old retiree with a history of type 1 diabetes who is here for diabetes management.    1.  Type 1 diabetes: A1c 8.0 today. Latoya's blood glucose readings over the last 2 weeks were reviewed.  She has more frequent high glucoses before lunch and before dinner which could be related to inadequate bolus insulin.  Reported her carb counting is pretty good.  - discussed Dexcom continuous glucose monitor. She is now interested in a trial of the hip surgery    Recommendations:  -Continue Lantus 30 units at bedtime  -Change Novolog 1:15 gram of carb coverage with breakfast and 1:15 gram at lunch and continue 1:15 gram at dinner  -Continue 1:50 Novolog correction if over 150 at breakfast, lunch and dinner, continue 1:50 Novolog correction if BG is >200 at bedtime      2) DM complications:  Retinopathy: normal exam - no DR 6/2022  Nephropathy: negative urine microalbumin 9/2022  Neuropathy: none per exam 10/2022     3) Hyperlipidemia: , HDL 61, , total cholesterol 185 on 9/2022. Continue Lipitor 10 mg daily.    4) Osteopenia: Repeat DXA 2022 showed  DXA 9/6/2022 showed the T score -2 at the lumbar spine, T score -1.6 at the left femoral neck, -1.2  at the right femoral neck and -1.1 at the wrist.    10 year probability of major osteoporotic fracture: 17.2%  10 year probability of hip fracture: 5.4%    She is indicated for considering bisphosphonates to reduce fracture risk.  discussed bisphosphonates but will do this after hip surgery  Continue calcium and vitamin D    -Refer to CDE for Dexcom start  -Follow-up in 4 months     External notes/medical records independently reviewed, labs and imaging independently reviewed, medical management and tests to be discussed/communicated to patient.    Time: I spent 42 minutes spent on the date of the encounter preparing to see patient (including chart review and preparation), obtaining and or reviewing additional medical history, performing a physical exam and evaluation, documenting clinical information in the electronic health record, independently interpreting results, communicating results to the patient and coordinating care.    Edward Aguero MD  Division of Diabetes and Endocrinology  Department of Medicine  [phone]

## 2022-10-24 NOTE — LETTER
10/24/2022         RE: Latoya Rodríguez  [address]  [place] [address]        Dear Colleague,    Thank you for referring your patient, Latoya Rodríguez, to the [facility]. Please see a copy of my visit note below.      Chief complaint:  Latoya is a 72 year old female seen in follow up of type 1 Diabetes.  HISTORY OF PRESENT ILLNESS  Latoya is a 72-year-old retiree with a history of type 1 diabetes who has a visit today for a follow-up type 1 diabetes.  She was diagnosed with type 1 diabetes 2016 during routine physical where she was found to have high blood glucose levels.  At that time she was diagnosed and treated as a type II diabetic but it was later determined that she had positive TORRES antibodies and low C-peptide.      Interval history  She has been struggling with right hip pain for the past year.  She was noted to have severe degenerative joint disease requiring hip replacement.  She was initially scheduled for hip replacement in January 2023 but she is looking forward to the early date due to severe hip pain.  She received an orthopedist at [facility].    Regarding type 1 diabetes, current regimen consist of Lantus 30units at bedtime, Novolog 1:18 g with breakfast, 1 unit per 18 gram at lunch, and 1 unit per 15 gram at dinner, Novolog correction 1:50 if BG is >150 at meal time, Novolog correction 1:50 if BG is >200 at bedtime    A1c 8.0% in September 2022. Reviewed BG in the past 2 weeks, average glucose 200 (SD 90).  She usually has high blood glucose before lunch and before dinner.  Fasting blood glucose acceptable.  She eats 3 meals per day without snack between meals or before bedtime.  She stated she could not do exercise due to hip hip pain.  She feels that her carb counting is quite accurate.  She eats mostly protein and is very consistent meal daily. She is more interested in Dexcom sensor but will start after surgery.    Diet recalled:  BG:  cereal  Lunch: soup and salad or cheese, egg  Dinner: fish and salad     Stated her mother had history of osteoporosis. DXA 9/6/2022 showed the T score -2 at the lumbar spine, T score -1.6 at the left femoral neck, -1.2 at the right femoral neck and -1.1 at the wrist.      Past Medical History  Type 1 diabetes, diagnosed 6/2016  Hyperlipidemia  Osteoporosis     Medications  Current Outpatient Medications   Medication Sig Dispense Refill     apixaban ANTICOAGULANT (ELIQUIS) 5 MG tablet Take 1 tablet (5 mg) by mouth 2 times daily Resume the day following your procedure, on 7/6/2022 180 tablet 3     atorvastatin (LIPITOR) 10 MG tablet Take 1 tablet (10 mg) by mouth daily 90 tablet 3     blood glucose (ACCU-CHEK GUIDE) test strip Use to test blood sugar 6 times daily 550 strip 3     blood glucose monitoring (ACCU-CHEK FASTCLIX) lancets USE TO TEST BLOOD SUGAR FOUR TIMES A DAY OR AS DIRECTED 306 each 3     calcium carbonate-vitamin D 500-400 MG-UNIT TABS tablt Take 1 tablet by mouth 2 times daily 180 tablet 3     Continuous Blood Gluc  (DEXCOM G6 ) JUANA Use to read blood sugars as per 's instructions. 1 each 0     Continuous Blood Gluc Sensor (DEXCOM G6 SENSOR) MISC Change every 10 days. 3 each 11     Continuous Blood Gluc Transmit (DEXCOM G6 TRANSMITTER) MISC Change every 3 months. 1 each 3     estradiol (ESTRACE) 0.1 MG/GM vaginal cream Place 2 g vaginally twice a week 42.5 g 11     gabapentin (NEURONTIN) 100 MG capsule Take 1 capsule (100 mg) by mouth 3 times daily Take one pill at night for the first day.  Take one pill twice a day on the second day.  Take one pill 3 times a day from the third day onward.  If you have side effects, back down to the dose you tolerated best with symptom relief. (Patient not taking: Reported on 10/10/2022) 90 capsule 0     insulin aspart (NOVOLOG FLEXPEN) 100 UNIT/ML pen 1 unit per 15 gram of carb for all meals. Plus 1 unit per 50 mg/dl above 150. Total daily  dose approx 30 units. 30 mL 3     insulin glargine (BASAGLAR KWIKPEN) 100 UNIT/ML pen Inject 35 Units Subcutaneous daily Max, 35 units daily 45 mL 2     insulin pen needle (B-D U/F) 31G X 8 MM miscellaneous USE 5 TIMES DAILY  WITH NOVOLOG AND LANTUS 500 each 3     metoprolol succinate ER (TOPROL XL) 50 MG 24 hr tablet TAKE ONE TABLET BY MOUTH EVERY MORNING 90 tablet 0     Multiple Vitamins-Minerals (MULTIVITAMIN ADULT PO) Take 1 tablet by mouth every morning        oxyCODONE (ROXICODONE) 5 MG tablet Take 1 tablet (5 mg) by mouth every 6 hours as needed for pain 10 tablet 0     Urine Glucose-Ketones Test (KETO-DIASTIX) STRP 1 strip by In Vitro route as needed 1 each 11     Allergies  No Known Allergies    Family History  family history includes Brain Cancer in her maternal uncle; Breast Cancer in her mother; Coronary Artery Disease in her maternal uncle and maternal uncle; Hyperlipidemia in her mother; Leukemia in her father; Other Cancer in her father; Skin Cancer in her father; Stomach Cancer in her maternal aunt.  No thyroid disease or type 1 diabetes in the family    Social History  Social History     Socioeconomic History     Marital status: Single     Spouse name: Not on file     Number of children: Not on file     Years of education: Not on file     Highest education level: Not on file   Occupational History     Not on file   Tobacco Use     Smoking status: Never     Smokeless tobacco: Never   Substance and Sexual Activity     Alcohol use: Yes     Comment: 1 to 2 beers or glasses of wine 1 to 2 times per month     Drug use: No     Sexual activity: Not Currently     Partners: Female     Birth control/protection: Post-menopausal   Other Topics Concern     Parent/sibling w/ CABG, MI or angioplasty before 65F 55M? No   Social History Narrative     Not on file     Social Determinants of Health     Financial Resource Strain: Not on file   Food Insecurity: Not on file   Transportation Needs: Not on file   Physical  "Activity: Not on file   Stress: Not on file   Social Connections: Not on file   Intimate Partner Violence: Not on file   Housing Stability: Not on file       REVIEW OF SYSTEMS  10 points ROS were negative otherwise mentioned in HPI    Physical Exam  Vital signs:   /52 (BP Location: Left arm, Patient Position: Sitting, Cuff Size: Adult Regular)   Pulse 116   Wt 74.9 kg (165 lb 3.2 oz)   SpO2 98%   BMI 27.49 kg/m    Estimated body mass index is 27.46 kg/m  as calculated from the following:    Height as of 9/27/22: 1.651 m (5' 5\").    Weight as of 9/27/22: 74.8 kg (165 lb).  Constitutional: no distress, comfortable, pleasant   Eyes: anicteric  CVS: irregularly irregular, normal S1,S2, no murmur  Lungs: CTA B/L  Abdomen: Soft, NT, ND, no hepatomegaly  Musculoskeletal: no edema   Skin:  no jaundice   Neurological: normal gait, intact sensation per monofilament bilaterally exam dated 10/24/2022, no tremor on outstretched hands bilaterally  Psychological: appropriate mood       DATA REVIEW  Lab Results   Component Value Date    A1C 8.0 09/06/2022    A1C 7.9 05/02/2022    A1C 7.6 11/15/2021    A1C 6.7 07/01/2021    A1C 8.4 02/10/2021    A1C 7.2 10/21/2020     ENDO DIABETES Latest Ref Rng & Units 9/6/2022   CHOL <200 mg/dL 185   LDL <=100 mg/dL 104 (H)   HDL >=50 mg/dL 61   NHDL <130 mg/dL 124   TRIGLYCERIDES <150 mg/dL 101     ENDO DIABETES Latest Ref Rng & Units 9/9/2022   CREATININE 0.52 - 1.04 mg/dL 0.74     ENDO DIABETES Latest Ref Rng & Units 9/6/2022   MICROL mg/L 14   UMALCR 0.00 - 25.00 mg/g Cr 15.73     DXA 8/28/2020  Lumbar spine T-score in region of L2-L4 = -2.4   L1-4 percent change: Not significant%      HIPS:  Mean total hip T-score: -1  Mean total hip percent change: Not significant%      Left femoral neck T-score = -1.9  Right femoral neck T-score = -1.6      Radius 33% T-score = -1.6     FRAX:  10 year probability of major osteoporotic fracture: 11.3%  10 year probability of hip fracture: " 2.1%  The 10 year probability of fracture may be lower than reported if the  patient has received treatment. FRAX data should be disregarded in patient's taking bisphosphonates.                                                                 IMPRESSION:    Low bone mass (osteopenia).    DXA 9/6/2022  Lumbar spine T-score in region of L1-L4 = -2   L1-4 percent change: 3.7%      HIPS:  Mean total hip T-score: -1.1  Mean total hip percent change: Not significant%      Left femoral neck T-score = -1.6  Right femoral neck T-score = -1.2      Radius 33% T-score = -1.1  Radius 33% percent change: Not significant%      FRAX:  10 year probability of major osteoporotic fracture: 17.2%  10 year probability of hip fracture: 5.4%  The 10 year probability of fracture may be lower than reported if the patient has received treatment. FRAX data should be disregarded in patient's taking bisphosphonates.                                                                    IMPRESSION:    Low bone mass (osteopenia).       ASSESSMENT/PLAN:   Latoya is a 72-year-old retiree with a history of type 1 diabetes who is here for diabetes management.    1.  Type 1 diabetes: A1c 8.0 today. Latoya's blood glucose readings over the last 2 weeks were reviewed.  She has more frequent high glucoses before lunch and before dinner which could be related to inadequate bolus insulin.  Reported her carb counting is pretty good.  - discussed Dexcom continuous glucose monitor. She is now interested in a trial of the hip surgery    Recommendations:  -Continue Lantus 30 units at bedtime  -Change Novolog 1:15 gram of carb coverage with breakfast and 1:15 gram at lunch and continue 1:15 gram at dinner  -Continue 1:50 Novolog correction if over 150 at breakfast, lunch and dinner, continue 1:50 Novolog correction if BG is >200 at bedtime      2) DM complications:  Retinopathy: normal exam - no DR 6/2022  Nephropathy: negative urine microalbumin 9/2022  Neuropathy:  none per exam 10/2022     3) Hyperlipidemia: , HDL 61, , total cholesterol 185 on 9/2022. Continue Lipitor 10 mg daily.    4) Osteopenia: Repeat DXA 2022 showed  DXA 9/6/2022 showed the T score -2 at the lumbar spine, T score -1.6 at the left femoral neck, -1.2 at the right femoral neck and -1.1 at the wrist.    10 year probability of major osteoporotic fracture: 17.2%  10 year probability of hip fracture: 5.4%    She is indicated for considering bisphosphonates to reduce fracture risk.  discussed bisphosphonates but will do this after hip surgery  Continue calcium and vitamin D    -Refer to CDE for Dexcom start  -Follow-up in 4 months     External notes/medical records independently reviewed, labs and imaging independently reviewed, medical management and tests to be discussed/communicated to patient.    Time: I spent 42 minutes spent on the date of the encounter preparing to see patient (including chart review and preparation), obtaining and or reviewing additional medical history, performing a physical exam and evaluation, documenting clinical information in the electronic health record, independently interpreting results, communicating results to the patient and coordinating care.    Edward Aguero MD  Division of Diabetes and Endocrinology  Department of Medicine  [phone]

## 2022-10-25 ENCOUNTER — ANCILLARY PROCEDURE (OUTPATIENT)
Dept: GENERAL RADIOLOGY | Facility: CLINIC | Age: 72
End: 2022-10-25
Attending: NURSE PRACTITIONER
Payer: COMMERCIAL

## 2022-10-25 ENCOUNTER — OFFICE VISIT (OUTPATIENT)
Dept: FAMILY MEDICINE | Facility: CLINIC | Age: 72
End: 2022-10-25
Payer: COMMERCIAL

## 2022-10-25 VITALS
RESPIRATION RATE: 16 BRPM | HEART RATE: 100 BPM | DIASTOLIC BLOOD PRESSURE: 62 MMHG | OXYGEN SATURATION: 100 % | TEMPERATURE: 98.2 F | SYSTOLIC BLOOD PRESSURE: 121 MMHG | BODY MASS INDEX: 27.94 KG/M2 | HEIGHT: 65 IN | WEIGHT: 167.7 LBS

## 2022-10-25 DIAGNOSIS — M79.641 BILATERAL HAND PAIN: ICD-10-CM

## 2022-10-25 DIAGNOSIS — E10.65 TYPE 1 DIABETES MELLITUS WITH HYPERGLYCEMIA (H): ICD-10-CM

## 2022-10-25 DIAGNOSIS — M79.642 BILATERAL HAND PAIN: ICD-10-CM

## 2022-10-25 DIAGNOSIS — Z12.31 ENCOUNTER FOR SCREENING MAMMOGRAM FOR BREAST CANCER: ICD-10-CM

## 2022-10-25 DIAGNOSIS — L29.9 SCALP PRURITUS: ICD-10-CM

## 2022-10-25 DIAGNOSIS — Z00.00 ENCOUNTER FOR MEDICARE ANNUAL WELLNESS EXAM: Primary | ICD-10-CM

## 2022-10-25 PROCEDURE — G0439 PPPS, SUBSEQ VISIT: HCPCS | Performed by: NURSE PRACTITIONER

## 2022-10-25 PROCEDURE — 99213 OFFICE O/P EST LOW 20 MIN: CPT | Mod: 25 | Performed by: NURSE PRACTITIONER

## 2022-10-25 PROCEDURE — 99207 PR FOOT EXAM NO CHARGE: CPT | Performed by: NURSE PRACTITIONER

## 2022-10-25 PROCEDURE — 73130 X-RAY EXAM OF HAND: CPT | Mod: TC | Performed by: FAMILY MEDICINE

## 2022-10-25 RX ORDER — HYDROXYZINE HYDROCHLORIDE 25 MG/1
25 TABLET, FILM COATED ORAL
Qty: 30 TABLET | Refills: 1 | Status: SHIPPED | OUTPATIENT
Start: 2022-10-25 | End: 2022-12-23

## 2022-10-25 ASSESSMENT — ENCOUNTER SYMPTOMS
ARTHRALGIAS: 0
DIZZINESS: 0
EYE PAIN: 0
BREAST MASS: 0
SHORTNESS OF BREATH: 0
CONSTIPATION: 1
HEMATURIA: 0
COUGH: 0
DIARRHEA: 0
PALPITATIONS: 0
MYALGIAS: 1
ABDOMINAL PAIN: 0
HEMATOCHEZIA: 0
FEVER: 0
CHILLS: 0
HEADACHES: 0
HEARTBURN: 0
PARESTHESIAS: 0
WEAKNESS: 0
NERVOUS/ANXIOUS: 0
JOINT SWELLING: 0
FREQUENCY: 1
SORE THROAT: 0
DYSURIA: 0
NAUSEA: 0

## 2022-10-25 ASSESSMENT — PAIN SCALES - GENERAL: PAINLEVEL: SEVERE PAIN (7)

## 2022-10-25 ASSESSMENT — ACTIVITIES OF DAILY LIVING (ADL): CURRENT_FUNCTION: NO ASSISTANCE NEEDED

## 2022-10-25 NOTE — PROGRESS NOTES
"SUBJECTIVE:   Latoya is a 72 year old who presents for Preventive Visit.    Are you in the first 12 months of your Medicare coverage?  No    Healthy Habits:     In general, how would you rate your overall health?  Poor    Frequency of exercise:  None    Do you usually eat at least 4 servings of fruit and vegetables a day, include whole grains    & fiber and avoid regularly eating high fat or \"junk\" foods?  Yes    Taking medications regularly:  Yes    Medication side effects:  None    Ability to successfully perform activities of daily living:  No assistance needed    Home Safety:  Throw rugs in the hallway and lack of grab bars in the bathroom    Hearing Impairment:  No hearing concerns    In the past 6 months, have you been bothered by leaking of urine?  No    In general, how would you rate your overall mental or emotional health?  Good      PHQ-2 Total Score: 0    Additional concerns today:  Yes    Do you feel safe in your environment? YES    Have you ever done Advance Care Planning? (For example, a Health Directive, POLST, or a discussion with a medical provider or your loved ones about your wishes): No, advance care planning information given to patient to review.  Patient plans to discuss their wishes with loved ones or provider.         Fall risk  Fallen 2 or more times in the past year?: No  Any fall with injury in the past year?: No  click delete button to remove this line now  Cognitive Screening   1) Repeat 3 items (Leader, Season, Table)    2) Clock draw: NORMAL  3) 3 item recall: Recalls 3 objects  Results: 3 items recalled: COGNITIVE IMPAIRMENT LESS LIKELY    Mini-CogTM Copyright YASIR Ni. Licensed by the author for use in [facility]; reprinted with permission (jose@.[facility]). All rights reserved.      Do you have sleep apnea, excessive snoring or daytime drowsiness?: yes    Reviewed and updated as needed this visit by clinical staff    Allergies               Reviewed and updated as needed " this visit by Provider                 Social History     Tobacco Use     Smoking status: Never     Smokeless tobacco: Never   Substance Use Topics     Alcohol use: Yes     Comment: 1 to 2 beers or glasses of wine 1 to 2 times per month     If you drink alcohol do you typically have >3 drinks per day or >7 drinks per week? No    Alcohol Use 10/24/2022   Prescreen: >3 drinks/day or >7 drinks/week? Not Applicable   Prescreen: >3 drinks/day or >7 drinks/week? -       Current providers sharing in care for this patient include:   Patient Care Team:  Pricila Avila APRN CNP as PCP - General (Family Practice)  Amy Cooper, RN as Registered Nurse  Edward Aguero MD as Assigned Endocrinology Provider  Alberto Zhang MD as Assigned Surgical Provider  Alberto Zhang MD as MD (Urology)  Karina Hoskins RN as Specialty Care Coordinator (Urology)  Pricila Avila APRN CNP as Referring Physician (Internal Medicine - Pediatrics)  Amadeo Whipple MD as Assigned Heart and Vascular Provider  Pricila Avila APRN CNP as Assigned PCP  Major Slade MD as Assigned Musculoskeletal Provider  Catrachita Chavez [facility] as Assigned MTM Pharmacist    The following health maintenance items are reviewed in Epic and correct as of today:  Health Maintenance   Topic Date Due     MEDICARE ANNUAL WELLNESS VISIT  08/05/2022     MAMMO SCREENING  08/28/2022     A1C  03/06/2023     EYE EXAM  06/06/2023     LIPID  09/06/2023     MICROALBUMIN  09/06/2023     BMP  09/09/2023     DIABETIC FOOT EXAM  10/25/2023     ANNUAL REVIEW OF HM ORDERS  10/25/2023     FALL RISK ASSESSMENT  10/25/2023     COLORECTAL CANCER SCREENING  09/13/2024     DTAP/TDAP/TD IMMUNIZATION (2 - Td or Tdap) 09/13/2026     ADVANCE CARE PLANNING  10/25/2027     DEXA  09/06/2037     HEPATITIS C SCREENING  Completed     PHQ-2 (once per calendar year)  Completed     INFLUENZA VACCINE  Completed     Pneumococcal Vaccine: 65+ Years  Completed      ZOSTER IMMUNIZATION  Completed     COVID-19 Vaccine  Completed     IPV IMMUNIZATION  Aged Out     MENINGITIS IMMUNIZATION  Aged Out     Labs reviewed in EPIC  BP Readings from Last 3 Encounters:   10/25/22 121/62   10/24/22 117/52   09/20/22 110/75    Wt Readings from Last 3 Encounters:   10/25/22 76.1 kg (167 lb 11.2 oz)   10/24/22 74.9 kg (165 lb 3.2 oz)   09/27/22 74.8 kg (165 lb)                  Patient Active Problem List   Diagnosis     Urgency of urination     Urinary frequency     Urge incontinence     Type 2 diabetes mellitus with hyperglycemia, with long-term current use of insulin (H)     Hyperlipidemia with target LDL less than 100     Osteoporosis     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)     Type 1 diabetes mellitus with hyperglycemia (H)     Age-related osteoporosis without current pathological fracture     Stress incontinence     Atrophic vaginitis     Intrinsic sphincter deficiency     Overactive bladder     Carpal tunnel syndrome of right wrist     Chronic atrial fibrillation (H)     Past Surgical History:   Procedure Laterality Date     ANESTHESIA CARDIOVERSION N/A 9/23/2021    Procedure: ANESTHESIA, FOR CARDIOVERSION@1100;  Surgeon: GENERIC ANESTHESIA PROVIDER;  Location: [facility] OR     ANESTHESIA OUT OF OR MRI N/A 9/14/2022    Procedure: ANESTHESIA OUT OF OR Magnetic resonance imaging right hip and lumbar @0800;  Surgeon: GENERIC ANESTHESIA PROVIDER;  Location: [facility] OR     CYSTOSCOPY, INJECT COLLAGEN, COMBINED N/A 5/11/2021    Procedure: CYSTOSCOPY, WITH PERIURETHRAL BULKING AGENT INJECTION;  Surgeon: Alberto Zhang MD;  Location: [facility] OR     CYSTOSCOPY, INJECT COLLAGEN, COMBINED N/A 9/12/2022    Procedure: CYSTOSCOPY, WITH PERIURETHRAL BULKING AGENT INJECTION (look in the bladder and urethra and inject filler into the urethra);  Surgeon: Alberto Zhang MD;  Location: [facility] OR     EYE SURGERY  9/04, 5/11    Retinal detachment, Cataract (both eyes)     IMPLANT STIMULATOR AND  LEADS SACRAL NERVE (STAGE ONE AND TWO) Right 7/5/2022    Procedure: INSERTION, SACRAL NERVE STIMULATOR, STAGE 1 & 2 (permanent implant on the RIGHT side with [employer]onics);  Surgeon: Alberto Zhang MD;  Location: [facility] OR     IMPLANT STIMULATOR SACRAL NERVE PERCUTANEOUS TRIAL N/A 6/14/2022    Procedure: INSERTION, NEUROSTIMULATOR, SACRAL, PERCUTANEOUS, FOR TRIAL;  Surgeon: Alberto Zhang MD;  Location: [facility] OR     RELEASE CARPAL TUNNEL Right 8/6/2021    Procedure: RELEASE, CARPAL TUNNEL, Right;  Surgeon: RADHA Sandoval MD;  Location: [facility] OR     RELEASE TRIGGER FINGER Bilateral 5/10/2019    Procedure: RELEASE TRIGGER FINGER, BILATERAL LONG AND RING FINGERS;  Surgeon: RADHA Sandoval MD;  Location: [facility] OR     VASCULAR SURGERY      Varicose Veins       Social History     Tobacco Use     Smoking status: Never     Smokeless tobacco: Never   Substance Use Topics     Alcohol use: Yes     Comment: 1 to 2 beers or glasses of wine 1 to 2 times per month     Family History   Problem Relation Age of Onset     Hyperlipidemia Mother      Breast Cancer Mother      Other Cancer Father      Leukemia Father      Skin Cancer Father      Coronary Artery Disease Maternal Uncle      Brain Cancer Maternal Uncle      Coronary Artery Disease Maternal Uncle      Stomach Cancer Maternal Aunt      Diabetes No family hx of      Hypertension No family hx of      Cerebrovascular Disease No family hx of      Colon Cancer No family hx of      Thyroid Disease No family hx of      Depression No family hx of      Anxiety Disorder No family hx of          Current Outpatient Medications   Medication Sig Dispense Refill     apixaban ANTICOAGULANT (ELIQUIS) 5 MG tablet Take 1 tablet (5 mg) by mouth 2 times daily Resume the day following your procedure, on 7/6/2022 180 tablet 3     atorvastatin (LIPITOR) 10 MG tablet Take 1 tablet (10 mg) by mouth daily 90 tablet 3     blood glucose (ACCU-CHEK GUIDE) test strip Use to test blood sugar 6  times daily 550 strip 3     blood glucose monitoring (ACCU-CHEK FASTCLIX) lancets USE TO TEST BLOOD SUGAR FOUR TIMES A DAY OR AS DIRECTED 306 each 3     calcium carbonate-vitamin D 500-400 MG-UNIT TABS tablt Take 1 tablet by mouth 2 times daily 180 tablet 3     estradiol (ESTRACE) 0.1 MG/GM vaginal cream Place 2 g vaginally twice a week 42.5 g 11     hydrOXYzine (ATARAX) 25 MG tablet Take 1 tablet (25 mg) by mouth nightly as needed for itching 30 tablet 1     insulin aspart (NOVOLOG FLEXPEN) 100 UNIT/ML pen 1 unit per 15 gram of carb for all meals. Plus 1 unit per 50 mg/dl above 150. Total daily dose approx 30 units. 30 mL 3     insulin glargine (BASAGLAR KWIKPEN) 100 UNIT/ML pen Inject 35 Units Subcutaneous daily Max, 35 units daily 45 mL 2     insulin pen needle (B-D U/F) 31G X 8 MM miscellaneous USE 5 TIMES DAILY  WITH NOVOLOG AND LANTUS 500 each 3     metoprolol succinate ER (TOPROL XL) 50 MG 24 hr tablet TAKE ONE TABLET BY MOUTH EVERY MORNING 90 tablet 0     Multiple Vitamins-Minerals (MULTIVITAMIN ADULT PO) Take 1 tablet by mouth every morning        Urine Glucose-Ketones Test (KETO-DIASTIX) STRP 1 strip by In Vitro route as needed 1 each 11     Continuous Blood Gluc  (DEXCOM G6 ) JUANA Use to read blood sugars as per 's instructions. (Patient not taking: Reported on 10/24/2022) 1 each 0     Continuous Blood Gluc Sensor (DEXCOM G6 SENSOR) MISC Change every 10 days. (Patient not taking: Reported on 10/24/2022) 3 each 11     Continuous Blood Gluc Transmit (DEXCOM G6 TRANSMITTER) MISC Change every 3 months. (Patient not taking: Reported on 10/24/2022) 1 each 3     No Known Allergies  Recent Labs   Lab Test 09/09/22  1254 09/06/22  0928 05/02/22  0000 11/17/21  1409 11/15/21  0000 09/23/21  0939 08/05/21  1019 08/05/21  1019 07/01/21  1054 10/21/20  1251 07/20/20  0817 08/02/19  0802   A1C  --  8.0* 7.9*  --  7.6*  --   --   --   --    < > 7.8*  --    LDL  --  104*  --   --   --   --    --   --  90  --  107* 99   HDL  --  61  --   --   --   --   --   --  70  --  71 76   TRIG  --  101  --   --   --   --   --   --  58  --  56 57   ALT 23  --   --   --   --   --   --  40  --   --   --  24   CR 0.74 0.69  --    < >  --   --    < > 0.76 0.74  --  0.67 0.62   GFRESTIMATED 86 >90  --    < >  --   --    < > 80 82  --  89 >90   GFRESTBLACK  --   --   --   --   --   --   --   --  >90  --  >90 >90   POTASSIUM 4.6  --   --   --   --  3.9   < > 4.2  --   --   --  4.0   TSH  --  1.27  --   --   --   --   --  1.42  --   --   --  2.01    < > = values in this interval not displayed.      Pneumonia Vaccine:Up to date    Mammogram Screening: Mammogram Screening: Recommended mammography every 1-2 years with patient discussion and risk factor consideration        Review of Systems   Constitutional: Negative for chills and fever.   HENT: Negative for congestion, ear pain, hearing loss and sore throat.    Eyes: Negative for pain and visual disturbance.   Respiratory: Negative for cough and shortness of breath.    Cardiovascular: Negative for chest pain, palpitations and peripheral edema.   Gastrointestinal: Positive for constipation. Negative for abdominal pain, diarrhea, heartburn, hematochezia and nausea.        Has been increasing issue with constipation    Breasts:  Negative for tenderness, breast mass and discharge.   Genitourinary: Positive for frequency and urgency. Negative for dysuria, genital sores, hematuria, pelvic pain, vaginal bleeding and vaginal discharge.        Up 4 times a night with urine incontinence which is excruciating due to her right hip    Musculoskeletal: Positive for myalgias. Negative for arthralgias and joint swelling.        Has persistent pain in right hip and was planned originally in January. Pain has been worse and has appointment with [facility]   Only place that does not hurt is in her recliner   Takes excedrin 2 tablets a couple times   Noticed now some arthritis in her  "thumbs with using her cane    Skin: Negative for rash.        Has intense scalp itching for the last year  No rash present   Will come and go    Neurological: Negative for dizziness, weakness, headaches and paresthesias.   Psychiatric/Behavioral: Negative for mood changes. The patient is not nervous/anxious.      OBJECTIVE:   /62   Pulse 100   Temp 98.2  F (36.8  C) (Oral)   Resp 16   Ht 1.66 m (5' 5.35\")   Wt 76.1 kg (167 lb 11.2 oz)   SpO2 100%   BMI 27.61 kg/m   Estimated body mass index is 27.61 kg/m  as calculated from the following:    Height as of this encounter: 1.66 m (5' 5.35\").    Weight as of this encounter: 76.1 kg (167 lb 11.2 oz).  Physical Exam  GENERAL APPEARANCE: healthy, alert and no distress  EYES: Eyes grossly normal to inspection and conjunctivae and sclerae normal  HENT: ear canals and TM's normal, nose and mouth without ulcers or lesions, oropharynx clear and oral mucous membranes moist  NECK: no adenopathy, no asymmetry, masses, or scars and thyroid normal to palpation  RESP: lungs clear to auscultation - no rales, rhonchi or wheezes  BREAST: normal without masses, tenderness or nipple discharge and no palpable axillary masses or adenopathy  CV: regular rates and rhythm, no murmur, click or rub, no peripheral edema and peripheral pulses strong  ABDOMEN: soft, nontender, no hepatosplenomegaly, no masses and bowel sounds normal  MS: no musculoskeletal defects are noted, gait is age appropriate without ataxia, no cyanosis clubbing or edema is noted  Examination of the hands - normal radial pulse, normal capillary refilling and circulation, squaring of 1st MCP on bilateral.  SKIN: no suspicious lesions or rashes  NEURO: Normal strength and tone, sensory exam grossly normal, mentation intact and speech normal  PSYCH: mentation appears normal and affect normal/bright    Diagnostic Test Results:  Labs reviewed in Epic  Results for orders placed or performed in visit on 10/25/22   XR " Hand Bilateral G/E 3 Views     Status: None    Narrative    HAND BILATERAL THREE OR MORE VIEWS 10/25/2022 3:36 PM     INDICATION: Bilateral hand pain.     COMPARISON: None.      Impression    IMPRESSION:  1.  Right hand: Advanced thumb CMC degenerative arthrosis. Moderate  second DIP degenerative arthrosis. Mild third DIP degenerative  arthrosis. No fracture or periarticular erosions.  2.  Left hand: Advanced thumb CMC degenerative arthrosis. Mild second  DIP degenerative arthrosis. No fracture or periarticular erosions.    ROBERT JORGE MD         SYSTEM ID:  [id number]       ASSESSMENT / PLAN:   Latoya was seen today for physical.    Diagnoses and all orders for this visit:    Encounter for Medicare annual wellness exam  -     REVIEW OF HEALTH MAINTENANCE PROTOCOL ORDERS    Encounter for screening mammogram for breast cancer  -     MA SCREENING DIGITAL BILAT - Future  (s+30); Future    Type 1 diabetes mellitus with hyperglycemia (H)  -     DC FOOT EXAM NO CHARGE  FOLLOW UP WITH SPECIALIST :Endocrinology    Bilateral hand pain  -     XR Hand Bilateral G/E 3 Views; Future  -     Orthopedic  Referral; Future  Will follow up and/or notify patient of  results via My Chart to determine further need for followup      Scalp pruritus  Continue current medications as prescribed.   -     hydrOXYzine (ATARAX) 25 MG tablet; Take 1 tablet (25 mg) by mouth nightly as needed for itching    PLAN:   Patient needs to follow up in if no improvement,or sooner if worsening of symptoms or other symptoms develop.  FURTHER TESTING:       - mammogram  Hand xrays   Will follow up and/or notify patient of  results via My Chart to determine further need for followup   Follow up office visit in one year for annual health maintenance exam, sooner PRN.    Patient has been advised of split billing requirements and indicates understanding: Yes      COUNSELING:  Reviewed preventive health counseling, as reflected in patient  "instructions  Special attention given to:       Regular exercise       Healthy diet/nutrition       Vision screening       Fall risk prevention       Osteoporosis prevention/bone health       Colon cancer screening       Hepatitis C screening       The 10-year ASCVD risk score (Brittni PATEL, et al., 2019) is: 19%    Values used to calculate the score:      Age: 72 years      Sex: Female      Is Non- : No      Diabetic: Yes      Tobacco smoker: No      Systolic Blood Pressure: 121 mmHg      Is BP treated: No      HDL Cholesterol: 61 mg/dL      Total Cholesterol: 185 mg/dL       Advanced Planning     Estimated body mass index is 27.61 kg/m  as calculated from the following:    Height as of this encounter: 1.66 m (5' 5.35\").    Weight as of this encounter: 76.1 kg (167 lb 11.2 oz).    Weight management plan: Discussed healthy diet and exercise guidelines    She reports that she has never smoked. She has never used smokeless tobacco.      Appropriate preventive services were discussed with this patient, including applicable screening as appropriate for cardiovascular disease, diabetes, osteopenia/osteoporosis, and glaucoma.  As appropriate for age/gender, discussed screening for colorectal cancer, prostate cancer, breast cancer, and cervical cancer. Checklist reviewing preventive services available has been given to the patient.    Reviewed patients plan of care and provided an AVS. The Intermediate Care Plan ( asthma action plan, low back pain action plan, and migraine action plan) for Latoya meets the Care Plan requirement. This Care Plan has been established and reviewed with the Patient.    Counseling Resources:  ATP IV Guidelines  Pooled Cohorts Equation Calculator  Breast Cancer Risk Calculator  Breast Cancer: Medication to Reduce Risk  FRAX Risk Assessment  ICSI Preventive Guidelines  Dietary Guidelines for Americans, 2010  USDA's MyPlate  ASA Prophylaxis  Lung CA Screening    Pricila Naranjo " URVASHI Avila CNP  [facility]    Identified Health Risks:

## 2022-10-25 NOTE — PATIENT INSTRUCTIONS
PLAN:   1.   Symptomatic therapy suggested: Continue current medications as prescribed.   2.   Orders Placed This Encounter   Medications    hydrOXYzine (ATARAX) 25 MG tablet     Sig: Take 1 tablet (25 mg) by mouth nightly as needed for itching     Dispense:  30 tablet     Refill:  1       Orders Placed This Encounter   Procedures    REVIEW OF HEALTH MAINTENANCE PROTOCOL ORDERS    HI FOOT EXAM NO CHARGE    MA SCREENING DIGITAL BILAT - Future  (s+30)    XR Hand Bilateral G/E 3 Views     3. Patient needs to follow up in if no improvement,or sooner if worsening of symptoms or other symptoms develop.  FURTHER TESTING:       - mammogram  Hand xrays   Will follow up and/or notify patient of  results via My Chart to determine further need for followup   Follow up office visit in one year for annual health maintenance exam, sooner PRN.    Patient Education   Personalized Prevention Plan  You are due for the preventive services outlined below.  Your care team is available to assist you in scheduling these services.  If you have already completed any of these items, please share that information with your care team to update in your medical record.  Health Maintenance Due   Topic Date Due    Diabetic Foot Exam  07/01/2022    Annual Wellness Visit  08/05/2022    ANNUAL REVIEW OF HM ORDERS  08/05/2022    Mammogram  08/28/2022

## 2022-10-25 NOTE — Clinical Note
Gonzales Mahajan  Can you check as Latoya is trying to figure out her Eliquis and she thought got through the assistance program? Now some question about a renewal do you know who she should talk to? Tina Hendrickson

## 2022-10-26 ENCOUNTER — TRANSFERRED RECORDS (OUTPATIENT)
Dept: HEALTH INFORMATION MANAGEMENT | Facility: CLINIC | Age: 72
End: 2022-10-26

## 2022-10-26 NOTE — RESULT ENCOUNTER NOTE
Concetta Rodríguez,    Attached are your test results.  You have significant arthritis in your hands  I am going to refer you to orthopedics for your hands to see what they can offer    Please contact us if you have any questions.    Pricila Avila, CNP

## 2022-11-04 NOTE — TELEPHONE ENCOUNTER
DIAGNOSIS: STEFANO hand pain   APPOINTMENT DATE: 11/11/2022   NOTES STATUS DETAILS   OFFICE NOTE from referring provider Internal 10/25/2022 Pricila Avila [facility]    OFFICE NOTE from other specialist N/A    DISCHARGE SUMMARY from hospital N/A    DISCHARGE REPORT from the ER N/A    OPERATIVE REPORT N/A    EMG report N/A    MEDICATION LIST N/A    MRI N/A    DEXA (osteoporosis/bone health) N/A    CT SCAN N/A    XRAYS (IMAGES & REPORTS) Internal 10/25/2022 bilateral hands

## 2022-11-10 ENCOUNTER — TELEPHONE (OUTPATIENT)
Dept: ORTHOPEDICS | Facility: CLINIC | Age: 72
End: 2022-11-10

## 2022-11-10 NOTE — TELEPHONE ENCOUNTER
[facility] Call Center    Phone Message    May a detailed message be left on voicemail: yes     Reason for Call: Other: Requesting c/b-needs to know what her A1C needs to be at for surgery. And would like to know how often she can get labs done to see where she is at    Action Taken: Message routed to:  [facility]    Travel Screening: Not Applicable

## 2022-11-11 ENCOUNTER — OFFICE VISIT (OUTPATIENT)
Dept: ORTHOPEDICS | Facility: CLINIC | Age: 72
End: 2022-11-11
Attending: NURSE PRACTITIONER
Payer: COMMERCIAL

## 2022-11-11 ENCOUNTER — PRE VISIT (OUTPATIENT)
Dept: ORTHOPEDICS | Facility: CLINIC | Age: 72
End: 2022-11-11

## 2022-11-11 DIAGNOSIS — M79.641 BILATERAL HAND PAIN: ICD-10-CM

## 2022-11-11 DIAGNOSIS — E10.9 TYPE 1 DIABETES MELLITUS WITHOUT COMPLICATION (H): Primary | ICD-10-CM

## 2022-11-11 DIAGNOSIS — M79.642 BILATERAL HAND PAIN: ICD-10-CM

## 2022-11-11 DIAGNOSIS — M18.0 OSTEOARTHRITIS OF CARPOMETACARPAL (CMC) JOINT OF BOTH THUMBS: Primary | ICD-10-CM

## 2022-11-11 PROCEDURE — 99214 OFFICE O/P EST MOD 30 MIN: CPT | Performed by: FAMILY MEDICINE

## 2022-11-11 NOTE — LETTER
11/11/2022         RE: Latoya Rodríguez  [address] [place] [address]        Dear Colleague,    Thank you for referring your patient, Latoya Rodríguez, to the [facility]. Please see a copy of my visit note below.      [facility]  SPORTS MEDICINE CLINIC VISIT     Nov 11, 2022        ASSESSMENT & PLAN    72-year-old with bilateral thumb pain due to advanced first CMC osteoarthritis    Reviewed imaging and assessment with patient in detail  -Try using topical diclofenac (Voltaren gel) on the hands up to 4 times daily as needed for pain  -May consider using hand-based CMC splint for the thumb for comfort or aggravating activities.  -Consider following up with hand therapy for treatment.  They may also make you a brace to use.  -If you have pain that is not resolved with the above treatments and you would like to try a steroid injection contact our clinic for scheduling    Naeem Fischer MD  [facility]    -----  Chief Complaint   Patient presents with     Consult For     Bilateral thumb CMC joint pain.        SUBJECTIVE  Latoya Rodríguez is a/an 72 year old female who is seen in consultation at the request of  Pricila Avila C.N.P. for evaluation of bilateral thumb pain.     The patient is seen by themselves.  The patient is Right handed    Onset: 2-3 month(s) ago. Reports insidious onset without acute precipitating event.  Location of Pain: bilateral thumb - CMC joint  Worsened by: Twisting a jar - basically anytime she uses it  Better with: Heat  Treatments tried: Heat  Associated symptoms: no distal numbness or tingling; denies swelling or warmth    Orthopedic/Surgical history: YES - Date: 2021 Carpel tunnel  and trigger finger release DOS; 2019  Social History/Occupation: Retired      REVIEW OF SYSTEMS:    Do you have fever, chills, weight loss? No    Do you have any vision problems? No    Do you  have any chest pain or edema? No    Do you have any shortness of breath or wheezing?  No    Do you have stomach problems? No    Do you have any numbness or focal weakness? No    Do you have diabetes? Yes- medication and insulin    Do you have problems with bleeding or clotting? No    Do you have an rashes or other skin lesions? No    OBJECTIVE:  There were no vitals taken for this visit.     General  - alert, pleasant, no distress  CV  - normal radial pulse, cap refill brisk  Musculoskeletal -bilateral thumb  - inspection: no atrophy, normal joint alignment, no swelling  - palpation: Tender to palpation over the dorsal radial aspect of the CMC bilaterally, no soft tissue tenderness, no tenderness at the anatomical snuffbox  - ROM:  MCP 70 deg flexion   0 deg extension   IP 90 deg flexion   0 deg extension  - strength: 5/5  strength, 5/5 wrist abduction, 5/5 flexion, extension, pronation, supination, adduction  - special tests:  Pain with CMC grind on the left.  Less on the right.  Neuro  - no numbness, no motor deficit, grossly normal coordination, normal muscle tone  Skin  - no ecchymosis, erythema, warmth, or induration, no obvious rash            RADIOLOGY:    3 view xrays of bilateral hands performed 10/28/2022 and reviewed independently demonstrating advanced DJD of the bilateral first CMC joints left worse than right. See EMR for formal radiology report.             Again, thank you for allowing me to participate in the care of your patient.        Sincerely,        Naeem Fischer MD

## 2022-11-11 NOTE — TELEPHONE ENCOUNTER
Called and talked with patient, explained that we would need an A1C to be at or below 8 for surgery to continue. She was wondering how often to get this checked, let her know that we do not have a requirement on when it would need to be rechecked. Let her know that most patients can get it rechecked every 3 months but this would be ordered by her provider managing this.   She expressed understanding.  Sun Patino RN

## 2022-11-11 NOTE — PROGRESS NOTES
[facility]  SPORTS MEDICINE CLINIC VISIT     Nov 11, 2022        ASSESSMENT & PLAN    72-year-old with bilateral thumb pain due to advanced first CMC osteoarthritis    Reviewed imaging and assessment with patient in detail  -Try using topical diclofenac (Voltaren gel) on the hands up to 4 times daily as needed for pain  -May consider using hand-based CMC splint for the thumb for comfort or aggravating activities.  -Consider following up with hand therapy for treatment.  They may also make you a brace to use.  -If you have pain that is not resolved with the above treatments and you would like to try a steroid injection contact our clinic for scheduling    Naeem Fischer MD  [facility] SPORTS MEDICINE CLINIC [place]    -----  Chief Complaint   Patient presents with     Consult For     Bilateral thumb CMC joint pain.        SUBJECTIVE  Latoya Rodríguez is a/an 72 year old female who is seen in consultation at the request of  Pricila Avila C.N.P. for evaluation of bilateral thumb pain.     The patient is seen by themselves.  The patient is Right handed    Onset: 2-3 month(s) ago. Reports insidious onset without acute precipitating event.  Location of Pain: bilateral thumb - CMC joint  Worsened by: Twisting a jar - basically anytime she uses it  Better with: Heat  Treatments tried: Heat  Associated symptoms: no distal numbness or tingling; denies swelling or warmth    Orthopedic/Surgical history: YES - Date: 2021 Carpel tunnel  and trigger finger release DOS; 2019  Social History/Occupation: Retired      REVIEW OF SYSTEMS:    Do you have fever, chills, weight loss? No    Do you have any vision problems? No    Do you have any chest pain or edema? No    Do you have any shortness of breath or wheezing?  No    Do you have stomach problems? No    Do you have any numbness or focal weakness? No    Do you have diabetes? Yes- medication and insulin    Do you have problems with bleeding or clotting?  No    Do you have an rashes or other skin lesions? No    OBJECTIVE:  There were no vitals taken for this visit.     General  - alert, pleasant, no distress  CV  - normal radial pulse, cap refill brisk  Musculoskeletal -bilateral thumb  - inspection: no atrophy, normal joint alignment, no swelling  - palpation: Tender to palpation over the dorsal radial aspect of the CMC bilaterally, no soft tissue tenderness, no tenderness at the anatomical snuffbox  - ROM:  MCP 70 deg flexion   0 deg extension   IP 90 deg flexion   0 deg extension  - strength: 5/5  strength, 5/5 wrist abduction, 5/5 flexion, extension, pronation, supination, adduction  - special tests:  Pain with CMC grind on the left.  Less on the right.  Neuro  - no numbness, no motor deficit, grossly normal coordination, normal muscle tone  Skin  - no ecchymosis, erythema, warmth, or induration, no obvious rash            RADIOLOGY:    3 view xrays of bilateral hands performed 10/28/2022 and reviewed independently demonstrating advanced DJD of the bilateral first CMC joints left worse than right. See EMR for formal radiology report.

## 2022-11-11 NOTE — PATIENT INSTRUCTIONS
Thanks for coming today.  Ortho/Sports Medicine Clinic  [address] [place] [address]    To schedule future appointments in Ortho Clinic, you may call [phone].    To schedule ordered imaging or an injection ordered by your provider:  Call Central Imaging Injection scheduling line: [phone]    [email or web address] available online at:  [email or web address]    Please call if any further questions or concerns ([phone]).  Clinic hours 8 am to 5 pm.    Return to clinic (call) if symptoms worsen or fail to improve.    -Try using topical diclofenac (Voltaren gel) on the hands up to 4 times daily as needed for pain  -May consider using hand-based CMC splint for the thumb for comfort or aggravating activities.  -Consider following up with hand therapy for treatment.  They may also make you a brace to use.  -If you have pain that is not resolved with the above treatments and you would like to try a steroid injection contact our clinic for scheduling

## 2022-11-16 ENCOUNTER — THERAPY VISIT (OUTPATIENT)
Dept: OCCUPATIONAL THERAPY | Facility: CLINIC | Age: 72
End: 2022-11-16
Attending: FAMILY MEDICINE
Payer: COMMERCIAL

## 2022-11-16 DIAGNOSIS — M18.0 OSTEOARTHRITIS OF CARPOMETACARPAL (CMC) JOINT OF BOTH THUMBS: ICD-10-CM

## 2022-11-16 DIAGNOSIS — M79.646 THUMB PAIN: ICD-10-CM

## 2022-11-16 PROCEDURE — 97535 SELF CARE MNGMENT TRAINING: CPT | Mod: GO

## 2022-11-16 PROCEDURE — 97165 OT EVAL LOW COMPLEX 30 MIN: CPT | Mod: GO

## 2022-11-16 PROCEDURE — 97110 THERAPEUTIC EXERCISES: CPT | Mod: GO

## 2022-11-16 NOTE — PROGRESS NOTES
Hand Therapy Initial Evaluation    Current Date:  11/16/2022  Referring Provider: Naeem Fischer MD MD Order Date: 11/11/2022    Diagnosis: Osteoarthritis of carpometacarpal (CMC) joint of both thumbs   DOI: Many months since beginning to use a cane    Subjective:  Latoya Rodríguez is a 72 year old female.    Answers for HPI/ROS submitted by the patient on 11/15/2022  Reason for Visit:: painful thumb joints  Number scale: 5/10  General health as reported by patient: fair  Please check all that apply to your current or past medical history: diabetes  Medical allergies: none  Occupation:: retired    Occupational Profile Information:  Right hand dominant  Prior functional level:  independent-all household chores  Patient reports symptoms of pain, stiffness/loss of motion and weakness/loss of strength  Special tests:  x-ray.    Previous treatment: Voltaren   Barriers include:live alone  Mobility: Ambulates with aid of cane  Transportation: drives    Functional Outcome Measure:   Upper Extremity Functional Index Score:  SCORE:   Column Totals: /80: (P) 27   (A lower score indicates greater disability.)    Objective:  Pain Level (Scale 0-10)   11/16/2022   At Rest 3/10   With Use 5-6/10     Pain Description  Date 11/16/2022   Location B webspace abductor muscles   Pain Quality Throbbing   Frequency intermittent or daily     Pain is worst daytime   Exacerbated by using cane and household tasks   Relieved by none   Progression Unchanged     ROM  Thumb 11/16/2022 11/16/2022   AROM  (PROM) R L   MP /57 /55   IP /59 /71   RABD 31 31   PABD 39 29   Kapandji Opposition Scale (0-10/10) 10 10     Thumb Observation/Appearance   - none  + mild    ++ moderate    +++ severe     11/16/2022   Shoulder deformity present over CMC R: +  L: +   Edema over the CMC joint R: -  L: -   Noted collapse of MP into hyperextension during pinch R: -  L: +      Provocative Tests  Pain Report:  - none    + mild    ++ moderate    +++ severe     11/16/2022    Crepitus present R: -  L: -   CMC Adduction Stress Test R: +  L: +   CMC Extension Stress Test R: ++  L: -   Finkelstein's R: -  L: +   WHAT Test R: -  L: -       Strength   (Measured in pounds)  Pain Report: - none  + mild    ++ moderate    +++ severe    11/16/2022 11/16/2022   Trials R L   1  2  3 25 17   Average 25 17     Lat Pinch 11/16/2022 11/16/2022   Trials R L   1  2  3 11 8   Average 11 8     3 Pt Pinch 11/16/2022 11/16/2022   Trials R L   1  2  3 6 6   Average 6 6     Palpation   Pain Report:  - none    + mild    ++ moderate    +++ severe    11/16/2022   CMC Joint Line R: ++  L: ++   Thenar Eminence R: +  L: +   Web Space R: ++  L: -   1st DC R: -  L: -   Radial Styloid R: ++  L: -   FCR R: ++  L: -     Assessment:  Patient presents with symptoms consistent with diagnosis of osteoarthritis of carpometacarpal (CMC) joint of both thumbs, with conservative intervention.    Patient's limitations or Problem List includes:  Pain, Decreased ROM/motion, Weakness, Decreased stability, Decreased , Decreased pinch and Tightness in musculature of the bilateral thumb which interferes with the patient's ability to perform Self Care Tasks (dressing, hygiene/toileting), Sleep Patterns, Recreational Activities, Household Chores and Driving  as compared to previous level of function.    Rehab Potential:  Good - Return to full activity, some limitations    Patient will benefit from skilled Occupational Therapy to increase ROM, flexibility, motion, overall strength,  strength and stability of thumb and decrease pain and edema to return to previous activity level and resume normal daily tasks and to reach their rehab potential.    Barriers to Learning:  No barrier    Communication Issues:  Patient appears to be able to clearly communicate and understand verbal and written communication and follow directions correctly.    Chart Review: Chart Review and Simple history review with patient    Identified Performance  Deficits: bathing/showering, dressing, hygiene and grooming, driving and community mobility, health management and maintenance, home establishment and management, meal preparation and cleanup, shopping, sleep and leisure activities    Assessment of Occupational Performance:  5 or more Performance Deficits    Clinical Decision Making (Complexity): Low complexity    Treatment Explanation:  The following has been discussed with the patient:    RX ordered/plan of care  Anticipated outcomes  Possible risks and side effects    Plan:  Frequency:  1 X week, once daily  Duration:  for 6 weeks    Treatment Plan:    Modalities:    US and Paraffin   Therapeutic Exercise:    AROM, AAROM, PROM, Place and Hold, Isotonics, Isometrics and Stabilization  Therapeutic Activities:   Functional activities   Neuromuscular re-ed:   Nerve Gliding and Kinesiotaping  Manual Techniques:   Joint mobilization, Friction massage, Myofascial release and Manual edema mobilization  Orthotic Fabrication:    Static  Self Care:    Self Care Tasks and Ergonomic Considerations    Discharge Plan:  Achieve all LTG  [place] in home treatment program.  Reach maximal therapeutic benefit.    Home Program:  Arthritis Tips/Joint Protection  Orthosis Wear and Care  Warmth  [place] Massage to Thumb  Thumb Stabilization Web Space Release Method 1 with Clip  Thumb Stabilization Strengthening Isometric C    Next Visit:  Review HEP  Discuss custom splinting/OTC bracing  MFR  Joint mobilization  US/Paraffin

## 2022-11-16 NOTE — PROGRESS NOTES
[facility]    OUTPATIENT Occupational Therapy ORTHOPEDIC EVALUATION  PLAN OF TREATMENT FOR OUTPATIENT REHABILITATION  (COMPLETE FOR INITIAL CLAIMS ONLY)  Patient's Last Name, First Name, M.I.  YOB: 1950  Latoya Rodríguez    Provider s Name:  [facility]   Medical Record No.  [id number]   Start of Care Date:  11/16/22   Onset Date:   11/11/22 (MD Order Date)   Treatment Diagnosis:  B CMC OA Medical Diagnosis:     Osteoarthritis of carpometacarpal (CMC) joint of both thumbs  Thumb pain       Goals:     11/16/22 0500   Goal #1   Goal #1 household chores   Previous Performance Level Independent   Current Functional Task    Current Performance Level Maximum Difficulty   STG Target Perfomance Knife   STG Target Perform Level Moderate Difficulty   Due Date 12/14/22   LTG Target Task/Performance No Difficulty   Due Date 12/28/22       Therapy Frequency:  1x weekly  Predicted Duration of Therapy Intervention:  6 weeks    Catrachita Roman OT                 I CERTIFY THE NEED FOR THESE SERVICES FURNISHED UNDER        THIS PLAN OF TREATMENT AND WHILE UNDER MY CARE     (Physician attestation of this document indicates review and certification of the therapy plan).                     Certification Date From:  11/16/22   Certification Date To:  12/28/22    Referring Provider:  Naeem Fischer    Initial Assessment        See Epic Evaluation SOC Date: 11/16/22

## 2022-12-06 ENCOUNTER — THERAPY VISIT (OUTPATIENT)
Dept: OCCUPATIONAL THERAPY | Facility: CLINIC | Age: 72
End: 2022-12-06
Payer: COMMERCIAL

## 2022-12-06 DIAGNOSIS — M79.646 THUMB PAIN: ICD-10-CM

## 2022-12-06 DIAGNOSIS — M18.0 OSTEOARTHRITIS OF CARPOMETACARPAL (CMC) JOINT OF BOTH THUMBS: Primary | ICD-10-CM

## 2022-12-06 PROCEDURE — 97110 THERAPEUTIC EXERCISES: CPT | Mod: GO

## 2022-12-06 PROCEDURE — 97140 MANUAL THERAPY 1/> REGIONS: CPT | Mod: GO

## 2022-12-10 ENCOUNTER — HEALTH MAINTENANCE LETTER (OUTPATIENT)
Age: 72
End: 2022-12-10

## 2022-12-13 ENCOUNTER — TELEPHONE (OUTPATIENT)
Dept: NURSING | Facility: CLINIC | Age: 72
End: 2022-12-13

## 2022-12-13 ENCOUNTER — THERAPY VISIT (OUTPATIENT)
Dept: OCCUPATIONAL THERAPY | Facility: CLINIC | Age: 72
End: 2022-12-13
Payer: COMMERCIAL

## 2022-12-13 DIAGNOSIS — M79.646 THUMB PAIN: ICD-10-CM

## 2022-12-13 DIAGNOSIS — M18.0 OSTEOARTHRITIS OF CARPOMETACARPAL (CMC) JOINT OF BOTH THUMBS: Primary | ICD-10-CM

## 2022-12-13 PROCEDURE — 97140 MANUAL THERAPY 1/> REGIONS: CPT | Mod: GO

## 2022-12-13 PROCEDURE — 97110 THERAPEUTIC EXERCISES: CPT | Mod: GO

## 2022-12-13 NOTE — TELEPHONE ENCOUNTER
Call from [facility]  to ask if Pt needs covid testing prior to a procedure.    Advised Pt is immuno-compromised with DM 2.    Kelly Umanzor RN, Nurse Advisor 2:56 PM 12/13/2022

## 2022-12-20 ENCOUNTER — THERAPY VISIT (OUTPATIENT)
Dept: OCCUPATIONAL THERAPY | Facility: CLINIC | Age: 72
End: 2022-12-20
Payer: COMMERCIAL

## 2022-12-20 DIAGNOSIS — M79.646 THUMB PAIN: ICD-10-CM

## 2022-12-20 DIAGNOSIS — M18.0 OSTEOARTHRITIS OF CARPOMETACARPAL (CMC) JOINT OF BOTH THUMBS: Primary | ICD-10-CM

## 2022-12-20 PROCEDURE — 97110 THERAPEUTIC EXERCISES: CPT | Mod: GO

## 2022-12-20 NOTE — PROGRESS NOTES
Hand Therapy Discharge Note    Current Date:  12/20/2022  Referring Provider: Naeem Fischer MD MD Order Date: 11/11/2022    Diagnosis: Osteoarthritis of carpometacarpal (CMC) joint of both thumbs   DOI: Many months since beginning to use a cane    Reporting period is 11/16/2022 to 12/20/2022    Subjective:   Subjective changes noted by patient:  Fine. I have noticed the weather has an impact on how I feel.   Functional changes noted by patient:  Improvement in Self Care Tasks (hygiene/toileting, buttons), Sleep Patterns, Recreational Activities, Household Chores and Driving   Patient has noted adverse reaction to:  None    Functional Outcome Measure:  Upper Extremity Functional Index Score:  SCORE:   Column Totals: /80: 58   (A lower score indicates greater disability.)    Objective:  Pain Level (Scale 0-10)   11/16/2022 12/20/2022   At Rest 3/10 0/10   With Use 5-6/10 3/10     Pain Description  Date 11/16/2022   Location B webspace abductor muscles   Pain Quality Throbbing   Frequency intermittent or daily     Pain is worst daytime   Exacerbated by using cane and household tasks   Relieved by none   Progression Unchanged     ROM  Thumb 11/16/2022 11/16/2022   AROM  (PROM) R L   MP /57 /55   IP /59 /71   RABD 31 31   PABD 39 29   Kapandji Opposition Scale (0-10/10) 10 10     Thumb Observation/Appearance   - none  + mild    ++ moderate    +++ severe     11/16/2022   Shoulder deformity present over CMC R: +  L: +   Edema over the CMC joint R: -  L: -   Noted collapse of MP into hyperextension during pinch R: -  L: +      Provocative Tests  Pain Report:  - none    + mild    ++ moderate    +++ severe     11/16/2022 12/20/2022   Crepitus present R: -  L: -    CMC Adduction Stress Test R: +  L: + R: -  L: ++   CMC Extension Stress Test R: ++  L: - R: +   Finkelstein's R: -  L: + L: -   WHAT Test R: -  L: -        Strength   (Measured in pounds)  Pain Report: - none  + mild    ++ moderate    +++ severe    11/16/2022  11/16/2022 12/20/2022 12/20/2022   Trials R L R L   1  2  3 25 17 37 24   Average 25 17 37 24     Lat Pinch 11/16/2022 11/16/2022 12/20/2022 12/20/2022   Trials R L R L   1  2  3 11 8 10 9   Average 11 8 10 9     3 Pt Pinch 11/16/2022 11/16/2022 12/20/2022 12/20/2022   Trials R L R L   1  2  3 6 6 8 8   Average 6 6 8 8     Palpation   Pain Report:  - none    + mild    ++ moderate    +++ severe    11/16/2022 12/20/2022   CMC Joint Line R: ++  L: ++ R: ++  L: ++   Thenar Eminence R: +  L: + R: -  L: -   Web Space R: ++  L: - R: -   1st DC R: -  L: -    Radial Styloid R: ++  L: - R: -   FCR R: ++  L: - R: ++     Please refer to the daily flowsheet for treatment provided today.     Assessment:  Response to therapy has been improvement to:  Strength:   and pinch  Pain:  frequency is less, intensity of pain is decreased, duration of pain is decreased and less tender over affected area    Overall Assessment:  Patient's symptoms are resolving.  Patient is ready to progress to more complex exercises.  Patient is becoming more independent in home exercise program  STG/LTG:  STGoals have been reviewed and progress or achievement has occurred;  see goal sheet for details and updates.    Plan:  Patient reports they are ready to discharge from hand therapy. They will independently manage their symptoms and HEP. They have one month to return to the hand therapy clinic to resume treatment, otherwise, they will follow-up with their referring provider.     Home Exercise Program:  Arthritis Tips/Joint Protection  Orthosis Wear and Care  Warmth  [place] Massage to Thumb  Thumb Stabilization Web Space Release Method 1 with Clip  Thumb Stabilization Strengthening Isometric C  Thumb Stabilization 1st Dorsal Interosseous  Thumb Stabilization Strengthening Isometric 1st Dorsal Interosseous  Thumb stabilization 1st Dorsal Interosseous with rubber band

## 2022-12-23 ENCOUNTER — OFFICE VISIT (OUTPATIENT)
Dept: FAMILY MEDICINE | Facility: CLINIC | Age: 72
End: 2022-12-23
Payer: COMMERCIAL

## 2022-12-23 VITALS
RESPIRATION RATE: 24 BRPM | SYSTOLIC BLOOD PRESSURE: 133 MMHG | HEIGHT: 65 IN | TEMPERATURE: 97.8 F | DIASTOLIC BLOOD PRESSURE: 65 MMHG | WEIGHT: 165.7 LBS | BODY MASS INDEX: 27.61 KG/M2 | OXYGEN SATURATION: 100 % | HEART RATE: 117 BPM

## 2022-12-23 DIAGNOSIS — Z01.818 PREOP GENERAL PHYSICAL EXAM: Primary | ICD-10-CM

## 2022-12-23 DIAGNOSIS — M16.11 ARTHRITIS OF RIGHT HIP: ICD-10-CM

## 2022-12-23 DIAGNOSIS — L29.9 SCALP PRURITUS: ICD-10-CM

## 2022-12-23 DIAGNOSIS — I48.20 CHRONIC ATRIAL FIBRILLATION (H): ICD-10-CM

## 2022-12-23 DIAGNOSIS — E10.65 TYPE 1 DIABETES MELLITUS WITH HYPERGLYCEMIA (H): ICD-10-CM

## 2022-12-23 LAB
ANION GAP SERPL CALCULATED.3IONS-SCNC: 2 MMOL/L (ref 3–14)
BUN SERPL-MCNC: 20 MG/DL (ref 7–30)
CALCIUM SERPL-MCNC: 10.5 MG/DL (ref 8.5–10.1)
CHLORIDE BLD-SCNC: 108 MMOL/L (ref 94–109)
CO2 SERPL-SCNC: 31 MMOL/L (ref 20–32)
CREAT SERPL-MCNC: 0.71 MG/DL (ref 0.52–1.04)
ERYTHROCYTE [DISTWIDTH] IN BLOOD BY AUTOMATED COUNT: 13.2 % (ref 10–15)
GFR SERPL CREATININE-BSD FRML MDRD: 90 ML/MIN/1.73M2
GLUCOSE BLD-MCNC: 116 MG/DL (ref 70–99)
HBA1C MFR BLD: 6.9 % (ref 0–5.6)
HCT VFR BLD AUTO: 41.8 % (ref 35–47)
HGB BLD-MCNC: 13.5 G/DL (ref 11.7–15.7)
MCH RBC QN AUTO: 30.5 PG (ref 26.5–33)
MCHC RBC AUTO-ENTMCNC: 32.3 G/DL (ref 31.5–36.5)
MCV RBC AUTO: 95 FL (ref 78–100)
PLATELET # BLD AUTO: 253 10E3/UL (ref 150–450)
POTASSIUM BLD-SCNC: 4.4 MMOL/L (ref 3.4–5.3)
RBC # BLD AUTO: 4.42 10E6/UL (ref 3.8–5.2)
SODIUM SERPL-SCNC: 141 MMOL/L (ref 133–144)
WBC # BLD AUTO: 9.4 10E3/UL (ref 4–11)

## 2022-12-23 PROCEDURE — 80048 BASIC METABOLIC PNL TOTAL CA: CPT | Performed by: NURSE PRACTITIONER

## 2022-12-23 PROCEDURE — 99214 OFFICE O/P EST MOD 30 MIN: CPT | Performed by: NURSE PRACTITIONER

## 2022-12-23 PROCEDURE — 85027 COMPLETE CBC AUTOMATED: CPT | Performed by: NURSE PRACTITIONER

## 2022-12-23 PROCEDURE — 36415 COLL VENOUS BLD VENIPUNCTURE: CPT | Performed by: NURSE PRACTITIONER

## 2022-12-23 PROCEDURE — 83036 HEMOGLOBIN GLYCOSYLATED A1C: CPT | Performed by: NURSE PRACTITIONER

## 2022-12-23 RX ORDER — HYDROXYZINE HYDROCHLORIDE 25 MG/1
25 TABLET, FILM COATED ORAL
Qty: 30 TABLET | Refills: 1 | Status: ON HOLD | OUTPATIENT
Start: 2022-12-23 | End: 2023-01-19

## 2022-12-23 ASSESSMENT — PAIN SCALES - GENERAL: PAINLEVEL: SEVERE PAIN (7)

## 2022-12-23 NOTE — RESULT ENCOUNTER NOTE
Concetta Rodríguez,    Attached are your test results.  -Normal red blood cell (hgb) levels, normal white blood cell count and normal platelet levels.  A1C is great will route to Dr Aguero     Please contact us if you have any questions.    Pricila Avila, CNP

## 2022-12-23 NOTE — PROGRESS NOTES
[address] [address]  Phone: [phone]  Primary Provider: Estefani Avila  Pre-op Performing Provider: ESTEFANI AVILA      PREOPERATIVE EVALUATION:  Today's date: 12/23/2022    Latoya Rodríguez is a 72 year old female who presents for a preoperative evaluation.    Surgical Information:  Surgery/Procedure: arthoplasty right hip   Surgery Location: [facility]   Surgeon: Dr. Ward   Surgery Date: 1/18/22  Time of Surgery: 11:30AM  Where patient plans to recover: At home with family  Fax number for surgical facility: Note does not need to be faxed, will be available electronically in Epic.    Type of Anesthesia Anticipated: General        Subjective     HPI related to upcoming procedure: arthritis right hip     Preop Questions 12/17/2022   1. Have you ever had a heart attack or stroke? No   2. Have you ever had surgery on your heart or blood vessels, such as a stent placement, a coronary artery bypass, or surgery on an artery in your head, neck, heart, or legs? No    3. Do you have chest pain with activity? No   4. Do you have a history of  heart failure? No   5. Do you currently have a cold, bronchitis or symptoms of other infection? No   6. Do you have a cough, shortness of breath, or wheezing? No   7. Do you or anyone in your family have previous history of blood clots? No   8. Do you or does anyone in your family have a serious bleeding problem such as prolonged bleeding following surgeries or cuts? Is on anticoagulant for hx of Atrial fib    9. Have you ever had problems with anemia or been told to take iron pills? No   10. Have you had any abnormal blood loss such as black, tarry or bloody stools, or abnormal vaginal bleeding? No   11. Have you ever had a blood transfusion? No   12. Are you willing to have a blood transfusion if it is medically needed before, during, or after your surgery? Yes   13.  Have you or any of your relatives ever had problems with anesthesia? No   14. Do you have sleep apnea, excessive snoring or daytime drowsiness? No   15. Do you have any artifical heart valves or other implanted medical devices like a pacemaker, defibrillator, or continuous glucose monitor? No   15a. What type of device do you have? -   15b. Name of the clinic that manages your device:  -   16. Do you have artificial joints? No   17. Are you allergic to latex? No       Health Care Directive:  Patient does not have a Health Care Directive or Living Will: Discussed advance care planning with patient; however, patient declined at this time.    Preoperative Review of :   reviewed - no record of controlled substances prescribed.      Status of Chronic Conditions:  See problem list for active medical problems.  Problems all longstanding and stable, except as noted/documented.  See ROS for pertinent symptoms related to these conditions.    A-FIB - Patient has a longstanding history of chronic A-fib currently on rate control. Current treatment regimen includes Apixaban for stroke prevention and denies significant symptoms of lightheadedness, palpitations or dyspnea.     DIABETES - Patient has a longstanding history of DiabetesType Type I . Patient is being treated with insulin injections and denies significant side effects. Control has been good. Complicating factors include but are not limited to: hyperlipidemia.       Review of Systems  CONSTITUTIONAL: NEGATIVE for fever, chills, change in weight  INTEGUMENTARY/SKIN: NEGATIVE for rash   EYES: NEGATIVE for vision changes or irritation  ENT/MOUTH: NEGATIVE for ear, mouth and throat problems  RESP: NEGATIVE for significant cough or SOB  CV: NEGATIVE for chest pain, palpitations or peripheral edema  GI: POSITIVE for constipation and NEGATIVE for melena, nausea, poor appetite, vomiting and weight loss  : NEGATIVE for frequency, dysuria, or hematuria   female:  frequency and nocturia x 4. Sees urology and has an implant. She already sees urology and talked to her assistant and will review situation due to concern postop   MUSCULOSKELETAL:POSITIVE  for joint pain right hip  And also has joint pain in both hands. Already has seen PT and some improvement with that and will continue with that.  NEGATIVE for joint swelling  and joint warmth  NEURO: NEGATIVE for weakness, dizziness or paresthesias  ENDOCRINE: POSITIVE  for HX diabetes and NEGATIVE for polydipsia, polyphagia and polyuria  HEME: NEGATIVE for bleeding problems  PSYCHIATRIC: NEGATIVE for changes in mood or affect    Patient Active Problem List    Diagnosis Date Noted     Osteoarthritis of carpometacarpal (CMC) joint of both thumbs 11/16/2022     Priority: Medium     Thumb pain 11/16/2022     Priority: Medium     Chronic atrial fibrillation (H) 06/08/2022     Priority: Medium     Carpal tunnel syndrome of right wrist 06/15/2021     Priority: Medium     Added automatically from request for surgery [id number]       Intrinsic sphincter deficiency 04/05/2021     Priority: Medium     Overactive bladder 04/05/2021     Priority: Medium     Stress incontinence 01/25/2021     Priority: Medium     Atrophic vaginitis 01/25/2021     Priority: Medium     Age-related osteoporosis without current pathological fracture 09/13/2016     Priority: Medium     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) 07/29/2016     Priority: Medium     Type 1 diabetes mellitus with hyperglycemia (H) 07/29/2016     Priority: Medium     Osteoporosis 07/11/2016     Priority: Medium     Type 2 diabetes mellitus with hyperglycemia, with long-term current use of insulin (H) 06/24/2016     Priority: Medium     Hyperlipidemia with target LDL less than 100 06/24/2016     Priority: Medium     Urge incontinence 05/06/2013     Priority: Medium     Urgency of urination 07/18/2011     Priority: Medium     Urinary frequency 07/18/2011     Priority: Medium       Past Medical History:   Diagnosis Date     Cataract      Chronic atrial fibrillation (H) 6/8/2022     Osteoarthritis of carpometacarpal (CMC) joint of both thumbs 11/16/2022     Type 2 diabetes mellitus with hyperglycemia (H) 6/24/2016     Past Surgical History:   Procedure Laterality Date     ANESTHESIA CARDIOVERSION N/A 9/23/2021    Procedure: ANESTHESIA, FOR CARDIOVERSION@1100;  Surgeon: GENERIC ANESTHESIA PROVIDER;  Location: [facility] OR     ANESTHESIA OUT OF OR MRI N/A 9/14/2022    Procedure: ANESTHESIA OUT OF OR Magnetic resonance imaging right hip and lumbar @0800;  Surgeon: GENERIC ANESTHESIA PROVIDER;  Location: [facility] OR     CYSTOSCOPY, INJECT COLLAGEN, COMBINED N/A 5/11/2021    Procedure: CYSTOSCOPY, WITH PERIURETHRAL BULKING AGENT INJECTION;  Surgeon: Alberto Zhang MD;  Location: [facility] OR     CYSTOSCOPY, INJECT COLLAGEN, COMBINED N/A 9/12/2022    Procedure: CYSTOSCOPY, WITH PERIURETHRAL BULKING AGENT INJECTION (look in the bladder and urethra and inject filler into the urethra);  Surgeon: Alberto Zhang MD;  Location: [facility] OR     EYE SURGERY  9/04, 5/11    Retinal detachment, Cataract (both eyes)     IMPLANT STIMULATOR AND LEADS SACRAL NERVE (STAGE ONE AND TWO) Right 7/5/2022    Procedure: INSERTION, SACRAL NERVE STIMULATOR, STAGE 1 & 2 (permanent implant on the RIGHT side with [employer]on[employer]);  Surgeon: Alberto Zhang MD;  [place] [facility]     IMPLANT STIMULATOR SACRAL NERVE PERCUTANEOUS TRIAL N/A 6/14/2022    Procedure: INSERTION, NEUROSTIMULATOR, SACRAL, PERCUTANEOUS, FOR TRIAL;  Surgeon: Alberto Zhang MD;  Location: [facility] OR     RELEASE CARPAL TUNNEL Right 8/6/2021    Procedure: RELEASE, CARPAL TUNNEL, Right;  Surgeon: RADHA Sandoval MD;  Location: [facility] OR     RELEASE TRIGGER FINGER Bilateral 5/10/2019    Procedure: RELEASE TRIGGER FINGER, BILATERAL LONG AND RING FINGERS;  Surgeon: RADHA Sandoval MD;  Location: [facility] OR     VASCULAR SURGERY      Varicose Veins     Current Outpatient  Medications   Medication Sig Dispense Refill     apixaban ANTICOAGULANT (ELIQUIS) 5 MG tablet Take 1 tablet (5 mg) by mouth 2 times daily Resume the day following your procedure, on 7/6/2022 180 tablet 3     atorvastatin (LIPITOR) 10 MG tablet Take 1 tablet (10 mg) by mouth daily 90 tablet 3     blood glucose (ACCU-CHEK GUIDE) test strip Use to test blood sugar 6 times daily 550 strip 3     blood glucose monitoring (ACCU-CHEK FASTCLIX) lancets USE TO TEST BLOOD SUGAR FOUR TIMES A DAY OR AS DIRECTED 306 each 3     calcium carbonate-vitamin D 500-400 MG-UNIT TABS tablt Take 1 tablet by mouth 2 times daily 180 tablet 3     Continuous Blood Gluc  (DEXCOM G6 ) JUANA Use to read blood sugars as per 's instructions. (Patient not taking: Reported on 10/24/2022) 1 each 0     Continuous Blood Gluc Sensor (DEXCOM G6 SENSOR) MISC Change every 10 days. (Patient not taking: Reported on 10/24/2022) 3 each 11     Continuous Blood Gluc Transmit (DEXCOM G6 TRANSMITTER) MISC Change every 3 months. (Patient not taking: Reported on 10/24/2022) 1 each 3     estradiol (ESTRACE) 0.1 MG/GM vaginal cream Place 2 g vaginally twice a week 42.5 g 11     hydrOXYzine (ATARAX) 25 MG tablet Take 1 tablet (25 mg) by mouth nightly as needed for itching 30 tablet 1     insulin aspart (NOVOLOG FLEXPEN) 100 UNIT/ML pen 1 unit per 15 gram of carb for all meals. Plus 1 unit per 50 mg/dl above 150. Total daily dose approx 30 units. 30 mL 3     insulin glargine (BASAGLAR KWIKPEN) 100 UNIT/ML pen Inject 35 Units Subcutaneous daily Max, 35 units daily 45 mL 2     insulin pen needle (B-D U/F) 31G X 8 MM miscellaneous USE 5 TIMES DAILY  WITH NOVOLOG AND LANTUS 500 each 3     metoprolol succinate ER (TOPROL XL) 50 MG 24 hr tablet TAKE ONE TABLET BY MOUTH EVERY MORNING 90 tablet 0     Multiple Vitamins-Minerals (MULTIVITAMIN ADULT PO) Take 1 tablet by mouth every morning        Urine Glucose-Ketones Test (KETO-DIASTIX) STRP 1 strip by  "In Vitro route as needed 1 each 11       No Known Allergies     Social History     Tobacco Use     Smoking status: Never     Smokeless tobacco: Never   Substance Use Topics     Alcohol use: Yes     Comment: 1 to 2 beers or glasses of wine 1 to 2 times per month     Family History   Problem Relation Age of Onset     Hyperlipidemia Mother      Breast Cancer Mother      Other Cancer Father      Leukemia Father      Skin Cancer Father      Coronary Artery Disease Maternal Uncle      Brain Cancer Maternal Uncle      Coronary Artery Disease Maternal Uncle      Stomach Cancer Maternal Aunt      Diabetes No family hx of      Hypertension No family hx of      Cerebrovascular Disease No family hx of      Colon Cancer No family hx of      Thyroid Disease No family hx of      Depression No family hx of      Anxiety Disorder No family hx of      History   Drug Use No         Objective     /65 (BP Location: Right arm, Patient Position: Sitting, Cuff Size: Adult Regular)   Pulse 117   Temp 97.8  F (36.6  C) (Oral)   Resp 24   Ht 1.658 m (5' 5.28\")   Wt 75.2 kg (165 lb 11.2 oz)   SpO2 100%   BMI 27.34 kg/m      Physical Exam    GENERAL APPEARANCE: healthy, alert and no distress     EYES: Eyes grossly normal to inspection and conjunctivae and sclerae normal     HENT: ear canals and TM's normal and nose and mouth without ulcers or lesions     NECK: no adenopathy, no asymmetry, masses, or scars and thyroid normal to palpation     RESP: lungs clear to auscultation - no rales, rhonchi or wheezes     CV: regular rates and rhythm, no murmur, click or rub and no irregular beats     ABDOMEN: soft, nontender     MS: extremities normal- no gross deformities noted, no evidence of inflammation in joints, FROM in all extremities.     SKIN: no suspicious lesions or rashes     NEURO: Normal strength and tone, sensory exam grossly normal, mentation intact and speech normal     PSYCH: mentation appears normal. and affect normal/bright   "   LYMPHATICS: No cervical adenopathy    Recent Labs   Lab Test 09/20/22  1047 09/09/22  1254 09/06/22  0928 05/02/22  0000 11/15/21  0000 09/23/21  0939 08/05/21  1019   HGB 13.6 13.9  --   --   --   --  14.2    267  --   --   --   --  209   NA  --  139  --   --   --   --  140   POTASSIUM  --  4.6  --   --   --  3.9 4.2   CR  --  0.74 0.69  --    < >  --  0.76   A1C  --   --  8.0* 7.9*   < >  --   --     < > = values in this interval not displayed.        Diagnostics:  Recent Results (from the past 168 hour(s))   CBC with platelets    Collection Time: 12/23/22  9:24 AM   Result Value Ref Range    WBC Count 9.4 4.0 - 11.0 10e3/uL    RBC Count 4.42 3.80 - 5.20 10e6/uL    Hemoglobin 13.5 11.7 - 15.7 g/dL    Hematocrit 41.8 35.0 - 47.0 %    MCV 95 78 - 100 fL    MCH 30.5 26.5 - 33.0 pg    MCHC 32.3 31.5 - 36.5 g/dL    RDW 13.2 10.0 - 15.0 %    Platelet Count 253 150 - 450 10e3/uL   Basic metabolic panel    Collection Time: 12/23/22  9:24 AM   Result Value Ref Range    Sodium 141 133 - 144 mmol/L    Potassium 4.4 3.4 - 5.3 mmol/L    Chloride 108 94 - 109 mmol/L    Carbon Dioxide (CO2) 31 20 - 32 mmol/L    Anion Gap 2 (L) 3 - 14 mmol/L    Urea Nitrogen 20 7 - 30 mg/dL    Creatinine 0.71 0.52 - 1.04 mg/dL    Calcium 10.5 (H) 8.5 - 10.1 mg/dL    Glucose 116 (H) 70 - 99 mg/dL    GFR Estimate 90 >60 mL/min/1.73m2   Hemoglobin A1c    Collection Time: 12/23/22  9:24 AM   Result Value Ref Range    Hemoglobin A1C 6.9 (H) 0.0 - 5.6 %      EKG required for significant arrhythmia and not completed in the last 90 days.     Revised Cardiac Risk Index (RCRI):  The patient has the following serious cardiovascular risks for perioperative complications:   - Diabetes Mellitus (on Insulin) = 1 point     RCRI Interpretation: 1 point: Class II (low risk - 0.9% complication rate)    Assessment & Plan     The proposed surgical procedure is considered INTERMEDIATE risk.    Preop general physical exam  - CBC with platelets  - Basic  metabolic panel    Arthritis of right hip  - CBC with platelets  - Basic metabolic panel    Type 1 diabetes mellitus with hyperglycemia (H)  - Hemoglobin A1c    Chronic atrial fibrillation (H)  - CBC with platelets    Scalp pruritus  Refill completed.   - hydrOXYzine (ATARAX) 25 MG tablet  Dispense: 30 tablet; Refill: 1      Risks and Recommendations:  The patient has the following additional risks and recommendations for perioperative complications:   - Consult Hospitalist / IM to assist with post-op medical management    Medication Instructions:   - apixaban (Eliquis), edoxaban (Savaysa), rivaroxaban (Xarelto): Bleeding risk is moderate or high for this procedure AND CrCl  (>=) 50 mL/min. HOLD 2 days before surgery.    - Long acting insulin (e.g. glargine, detemir): Take 80% of the usual evening or morning dose before surgery.          - short acting insulin (e.g. regular, lispro, aspart): HOLD on the morning of surgery.     RECOMMENDATION:  APPROVAL GIVEN to proceed with proposed procedure, without further diagnostic evaluation.    Prescription drug management   Time spent doing chart review, history and exam, documentation and further activities per the note         Signed Electronically by: URVASHI Hunter CNP  Copy of this evaluation report is provided to requesting physician.

## 2022-12-23 NOTE — RESULT ENCOUNTER NOTE
Concetta Rodríguez,    Attached are your test results.  -Kidney function (GFR) is normal.  -Sodium is normal.  -Potassium is normal.  -Calcium is elevated.  ADVISE: rechecking this in 1 month.  -Glucose is elevated due to your diabetes.   Please contact us if you have any questions.    Pricila Avila, CNP

## 2022-12-23 NOTE — PATIENT INSTRUCTIONS
Medication Instructions:   - apixaban (Eliquis), edoxaban (Savaysa), rivaroxaban (Xarelto): Bleeding risk is moderate or high for this procedure AND CrCl  (>=) 50 mL/min. HOLD 2 days before surgery.    - Long acting insulin (e.g. glargine, detemir): Take 80% of the usual evening or morning dose before surgery.          - short acting insulin (e.g. regular, lispro, aspart): HOLD on the morning of surgery.     For informational purposes only. Not to replace the advice of your health care provider. Copyright   ,  [facility]. All rights reserved. Clinically reviewed by Radha Styles MD. [employer] [id number] - REV .  Preparing for Your Surgery  Getting started  A nurse will call you to review your health history and instructions. They will give you an arrival time based on your scheduled surgery time. Please be ready to share:  Your doctor's clinic name and phone number  Your medical, surgical, and anesthesia history  A list of allergies and sensitivities  A list of medicines, including herbal treatments and over-the-counter drugs  Whether the patient has a legal guardian (ask how to send us the papers in advance)  Please tell us if you're pregnant--or if there's any chance you might be pregnant. Some surgeries may injure a fetus (unborn baby), so they require a pregnancy test. Surgeries that are safe for a fetus don't always need a test, and you can choose whether to have one.   If you have a child who's having surgery, please ask for a copy of Preparing for Your Child's Surgery.    Preparing for surgery  Within 10 to 30 days of surgery: Have a pre-op exam (sometimes called an H&P, or History and Physical). This can be done at a clinic or pre-operative center.  If you're having a , you may not need this exam. Talk to your care team.  At your pre-op exam, talk to your care team about all medicines you take. If you need to stop any medicines before surgery, ask when to start taking them  again.  We do this for your safety. Many medicines can make you bleed too much during surgery. Some change how well surgery (anesthesia) drugs work.  Call your insurance company to let them know you're having surgery. (If you don't have insurance, call [phone].)  Call your clinic if there's any change in your health. This includes signs of a cold or flu (sore throat, runny nose, cough, rash, fever). It also includes a scrape or scratch near the surgery site.  If you have questions on the day of surgery, call your hospital or surgery center.  Eating and drinking guidelines  For your safety: Unless your surgeon tells you otherwise, follow the guidelines below.  Eat and drink as usual until 8 hours before you arrive for surgery. After that, no food or milk.  Drink clear liquids until 2 hours before you arrive. These are liquids you can see through, like water, Gatorade, and Propel Water. They also include plain black coffee and tea (no cream or milk), candy, and breath mints. You can spit out gum when you arrive.  If you drink alcohol: Stop drinking it the night before surgery.  If your care team tells you to take medicine on the morning of surgery, it's okay to take it with a sip of water.  Preventing infection  Shower or bathe the night before and morning of your surgery. Follow the instructions your clinic gave you. (If no instructions, use regular soap.)  Don't shave or clip hair near your surgery site. We'll remove the hair if needed.  Don't smoke or vape the morning of surgery. You may chew nicotine gum up to 2 hours before surgery. A nicotine patch is okay.  Note: Some surgeries require you to completely quit smoking and nicotine. Check with your surgeon.  Your care team will make every effort to keep you safe from infection. We will:  Clean our hands often with soap and water (or an alcohol-based hand rub).  Clean the skin at your surgery site with a special soap that kills germs.  Give you a special gown  to keep you warm. (Cold raises the risk of infection.)  Wear special hair covers, masks, gowns and gloves during surgery.  Give antibiotic medicine, if prescribed. Not all surgeries need antibiotics.  What to bring on the day of surgery  Photo ID and insurance card  Copy of your health care directive, if you have one  Glasses and hearing aids (bring cases)  You can't wear contacts during surgery  Inhaler and eye drops, if you use them (tell us about these when you arrive)  CPAP machine or breathing device, if you use them  A few personal items, if spending the night  If you have . . .  A pacemaker, ICD (cardiac defibrillator) or other implant: Bring the ID card.  An implanted stimulator: Bring the remote control.  A legal guardian: Bring a copy of the certified (court-stamped) guardianship papers.  Please remove any jewelry, including body piercings. Leave jewelry and other valuables at home.  If you're going home the day of surgery  You must have a responsible adult drive you home. They should stay with you overnight as well.  If you don't have someone to stay with you, and you aren't safe to go home alone, we may keep you overnight. Insurance often won't pay for this.  After surgery  If it's hard to control your pain or you need more pain medicine, please call your surgeon's office.  Questions?   If you have any questions for your care team, list them here: _________________________________________________________________________________________________________________________________________________________________________ ____________________________________ ____________________________________ ____________________________________

## 2022-12-29 ENCOUNTER — TELEPHONE (OUTPATIENT)
Dept: ORTHOPEDICS | Facility: CLINIC | Age: 72
End: 2022-12-29

## 2022-12-29 NOTE — TELEPHONE ENCOUNTER
Called clem Cross with info on where to send forms, as well as my desk phone number to call back if she has more questions.

## 2022-12-29 NOTE — TELEPHONE ENCOUNTER
Hello,  I'm with ortho con.  Pt of Dr. Ward is sending in [facility] paperwork.  Where should this be sent.  The email address to daphne bell has bounced back from.  [email or web address]  pts daughter, Janet, called from .  Thank you.

## 2023-01-02 NOTE — PROGRESS NOTES
Outcome for 01/03/23 8:25 AM: Glucose Readings sent via HelpHub attachment.  Ivy Aguilar [facility]  Adult Endocrinology  [facility]  Outcome for 01/02/23 1:00 PM: HelpHub message sent to send blood sugar readings 2-3 days prior.  Heike Adams [facility]  Adult Endocrinology  [facility]

## 2023-01-02 NOTE — PATIENT INSTRUCTIONS
The day before hip surgery, please take Lantus 24 units  Don't take Novolog in the morning of surgery day    [facility]-Department of Endocrinology  Diabetes Educators:   Linda Diego, RN and Humera Caraballo RN  Clinic Nurse: NESTOR Mckenna  CMA's: Elisa   LPN: Elissa  Scheduling/Clinic phone number : [phone]   Clinic Fax: [phone]  On-Call Endocrine at the [place] (after hours/weekends): [phone] option 4    Please call the number below to schedule your labs.    [facility]    General [phone]   [facility] [phone]   [place] [phone]   [facility]  [phone]   [facility] [phone]   [place] [phone]   [facility]) [phone]   [facility] Only   [place] [phone]   [place] [phone]   [place] [phone]   [place] [phone]     Please reach out to the following centers to schedule your imaging appointment:       Imaging (DEXA, CT, MRI, XRAY)    [facility] ([facility] [place]) [phone]   [facility] ([place], [place]) [phone]   [facility] ([facility]) [phone]   [facility] ([facility]) [phone]     Appointment Reminders:  * Please bring meter with for staff to download  * If you are due ONLY for an A1C, it is scheduled with the nurse and will be done in clinic. You do not need to schedule a lab appointment. Fasting is not required for an A1C.  * Refill request should be submitted to your pharmacy. They will contact clinic for approval.

## 2023-01-05 ENCOUNTER — TELEPHONE (OUTPATIENT)
Dept: FAMILY MEDICINE | Facility: CLINIC | Age: 73
End: 2023-01-05

## 2023-01-05 NOTE — TELEPHONE ENCOUNTER
It is OK about the multivitamin   Before your surgery:  Medication Instructions:   - apixaban (Eliquis), edoxaban (Savaysa), rivaroxaban (Xarelto): Bleeding risk is moderate or high for this procedure AND CrCl  (>=) 50 mL/min. HOLD 2 days before surgery.    - Long acting insulin (e.g. glargine, detemir): Take 80% of the usual evening or morning dose before surgery.          - short acting insulin (e.g. regular, lispro, aspart): HOLD on the morning of surgery.    You may use Tylenol for pain or headache.     On the day of your surgery:  Do not take any medication the morning of your surgery.     After surgery:    You may resume all your medications after the surgery unless your surgeon instructs you otherwise

## 2023-01-05 NOTE — TELEPHONE ENCOUNTER
Patient calling to get clarification on medications she needs to continue taking or to hold before her surgery on 1/18/2023. Patient had pre-op visit with provider on 12/23/2023.    Patient would like clarification if she can continue taking the following medications:  1. Multivitamin  2. Calcium carbonate - Vitamin D  3. Metoprolol Succinate ER  4. Atorvastatin    Patient states that in her packet she received regarding information on her surgery, it states for patient's to stop taking multivitamin 14 days before the surgery. Patient states she has stopped taking that as of now, but wants providers reassurance that this is okay.    Routing to provider to review and advise.    Carmelina Aguilar RN  [facility]

## 2023-01-05 NOTE — TELEPHONE ENCOUNTER
Patient notified of provider's message as written below. Patient verbalized good understanding, had no further questions and needed no further support.Ryanne Rosario R.N.

## 2023-01-05 NOTE — TELEPHONE ENCOUNTER
This writer attempted to contact patient on 01/05/23.    Reason for call message below and left message to call clinic back at [phone].    If patient calls back:   Relay message below, (read verbatim), document that pt called, and close encounter.    NESTOR Pitts  [facility]

## 2023-01-06 NOTE — PROGRESS NOTES
Ortho Navigator Note      Pre-op Date 12/23/22     Medical Clearance  Cleared     Labs WNL     COVID Test Date No longer indicated      Skin  Intact      Activity: Ambulates independently. Occasional use of straight cane.      Equipment Need Patient will likely need a FW walker for discharge although she has a rolling walker that her daughter bought her. Defer to PT/OT for recs.      Daughter is a PT at [employer].      Meds to Hold Held all supplements 14 days prior to surgery  - apixaban (Eliquis) Bleeding risk is moderate or high for this procedure AND CrCl (>=) 50 mL/min. HOLD 2 days before surgery.   - Long acting insulin (e.g. glargine, detemir): Take 80% of the usual evening or morning dose before surgery.  - Short acting insulin (e.g. regular, lispro, aspart): HOLD on the morning of surgery  * Medication recommendations are not intended to be exhaustive; they are limited to common medications that are potentially dangerous if incorrectly managed   NPO Instructions  Defer to PAN RN     Pre-op Joint Education Complete? Yes   Discharge Plan Patient has plan to discharge home on morning of POD 1.   Daughter Rosa Isela will arrive at hospital at 0800 to participate in therapy and discharge education. They will then transport patient home    /Transportation Rosa Isela (daughter) will be .  is physically able to care for patient.      Barriers to Discharge No barriers to discharge.      Additional Info/   Special Needs : Patient had no unanswered questions or concerns.     Patient has a spinal cord stimulator for incontinence. She is up several times each night to void.          01/06/23 1326   Discharge Planning   Patient/Family Anticipates Transition to home;home with family   Concerns to be Addressed no discharge needs identified   Living Arrangements   People in Home alone   Type of Residence Private Residence   Is your private residence a single family home or apartment? Single family home   Number  of Stairs, Within Home, Primary greater than 10 stairs   Stair Railings, Within Home, Primary railings safe and in good condition   Once home, are you able to live on one level? Yes   Which level? Lower Level   Bathroom Shower/Tub Tub/Shower unit   Equipment Currently Used at Home shower chair;walker, rolling;cane, straight   Support System   Support Systems Children   Do you have someone available to stay with you one or two nights once you are home? Yes   Medical Clearance   Date of Physical 12/23/23   It is recommended that you call and check with any specialty providers before surgery to see if you need surgical clearance.  Do you see any specialty providers outside of your primary care provider? No   Blood   Known bleeding disorder or coagulopathy? No   Does the patient have any Hinduism/cultural preferences related to blood products? No   Education   Has the patient scheduled or completed pre-op total joint education, either in class or online, in the last 12 months? No   Patient attended total joint pre-op class/received pre-op teaching  online

## 2023-01-09 ENCOUNTER — OFFICE VISIT (OUTPATIENT)
Dept: ENDOCRINOLOGY | Facility: CLINIC | Age: 73
End: 2023-01-09
Payer: COMMERCIAL

## 2023-01-09 VITALS
OXYGEN SATURATION: 99 % | BODY MASS INDEX: 26.81 KG/M2 | HEART RATE: 95 BPM | SYSTOLIC BLOOD PRESSURE: 135 MMHG | DIASTOLIC BLOOD PRESSURE: 80 MMHG | WEIGHT: 162.5 LBS

## 2023-01-09 DIAGNOSIS — E10.9 TYPE I DIABETES MELLITUS, WELL CONTROLLED (H): ICD-10-CM

## 2023-01-09 PROCEDURE — 99215 OFFICE O/P EST HI 40 MIN: CPT | Mod: GC | Performed by: INTERNAL MEDICINE

## 2023-01-09 RX ORDER — PEN NEEDLE, DIABETIC 31 GX5/16"
NEEDLE, DISPOSABLE MISCELLANEOUS
Qty: 500 EACH | Refills: 3 | Status: SHIPPED | OUTPATIENT
Start: 2023-01-09 | End: 2023-11-14

## 2023-01-09 NOTE — PROGRESS NOTES
"ENDOCRINOLOGY NOTE  -  FOLLOW UP  Chief complaint:  Latoya is a 72 year old female seen in follow up of type 1 Diabetes.  HISTORY OF PRESENT ILLNESS  Latoya is a 72-year-old retiree with a history of type 1 diabetes who has a visit today for a follow-up type 1 diabetes.  She was diagnosed with type 1 diabetes 2016 during routine physical where she was found to have high blood glucose levels.  At that time she was diagnosed and treated as a type II diabetic but it was later determined that she had positive TORRES antibodies and low C-peptide.   Interval history  She has been struggling with right hip pain for the past couple years.  She was noted to have severe degenerative joint disease requiring right hip replacement. BRYANT scheduled for 1/18 - N/.  Hip pain has affected cooking abilities in the kitchen (simple meals).     A1c 6.9% January 2023. Reviewed BG in the past 2 weeks, average glucose 179 (SD 82). She typically has high blood sugar in PM and prior to bedtime. Multiple (5) low blood sugars ranging 40-70s that are in in 9-10 AM or 2 PM range. She usually has high blood glucose before lunch and before dinner.  She eats 3 meals per day without snack between meals or before bedtime.   No exercise due to severe R hip pain.  She feels that her carb counting is quite accurate and going well, outside of large meals during holidays.  She eats mostly protein and is very consistent meal daily.    Her current regimen consist of Lantus 30 units at bedtime, Novolog 1:15 g with breakfast, 1 unit per 15 gram at lunch, and 1 unit per 15 gram at dinner, Novolog correction 1:50 if BG is >150 at meal time, Novolog correction 1:50 if BG is >200 at bedtime. Thinks the carb Correction has been going \"really well\". Eats dinner around 630. Doesn't typically snack. - doesn't like the new interface of blood sugar recordings, and to run the report, and doesn't offer an \"advisor - which is a calculator for how much carb correction\". Is " "interested in alternative options - the Albina is not covered by her insurance and needs the exception.     Lows:  Lightheaded and dizziness. She has lows in the AM and afternoon - she does \"forget to eat\" at times and thinks this correlates with her hypoglycemic events. No nighttime awakening due to this.     Weight is stable.     CGM: still interested but would like to \"focus on one major thing at at time\" and would like to focus on hip replacement first.    Diet recalled:  BG: scrambled eggx2 with cheese and toast with sugarfree jelly  Lunch: soup and salad  Dinner: salmon/broccoli or soup/toast    Stated her mother had history of osteoporosis. DXA 9/6/2022 showed the T score -2 at the lumbar spine, T score -1.6 at the left femoral neck, -1.2 at the right femoral neck and -1.1 at the wrist.    Past Medical History  Type 1 diabetes, diagnosed 6/2016  Hyperlipidemia  Osteoporosis     Medications  Current Outpatient Medications   Medication Sig Dispense Refill     apixaban ANTICOAGULANT (ELIQUIS) 5 MG tablet Take 1 tablet (5 mg) by mouth 2 times daily Resume the day following your procedure, on 7/6/2022 180 tablet 3     atorvastatin (LIPITOR) 10 MG tablet Take 1 tablet (10 mg) by mouth daily 90 tablet 3     blood glucose (ACCU-CHEK GUIDE) test strip Use to test blood sugar 6 times daily 550 strip 3     blood glucose monitoring (ACCU-CHEK FASTCLIX) lancets USE TO TEST BLOOD SUGAR FOUR TIMES A DAY OR AS DIRECTED 306 each 3     calcium carbonate-vitamin D 500-400 MG-UNIT TABS tablt Take 1 tablet by mouth 2 times daily 180 tablet 3     Continuous Blood Gluc  (DEXCOM G6 ) JUANA Use to read blood sugars as per 's instructions. 1 each 0     Continuous Blood Gluc Sensor (DEXCOM G6 SENSOR) MISC Change every 10 days. 3 each 11     Continuous Blood Gluc Transmit (DEXCOM G6 TRANSMITTER) MISC Change every 3 months. 1 each 3     estradiol (ESTRACE) 0.1 MG/GM vaginal cream Place 2 g vaginally twice a " week 42.5 g 11     hydrOXYzine (ATARAX) 25 MG tablet Take 1 tablet (25 mg) by mouth nightly as needed for itching 30 tablet 1     insulin aspart (NOVOLOG FLEXPEN) 100 UNIT/ML pen 1 unit per 15 gram of carb for all meals. Plus 1 unit per 50 mg/dl above 150. Total daily dose approx 30 units. 30 mL 3     insulin glargine (BASAGLAR KWIKPEN) 100 UNIT/ML pen Inject 35 Units Subcutaneous daily Max, 35 units daily 45 mL 2     insulin pen needle (B-D U/F) 31G X 8 MM miscellaneous USE 5 TIMES DAILY  WITH NOVOLOG AND LANTUS 500 each 3     metoprolol succinate ER (TOPROL XL) 50 MG 24 hr tablet TAKE ONE TABLET BY MOUTH EVERY MORNING 90 tablet 0     Multiple Vitamins-Minerals (MULTIVITAMIN ADULT PO) Take 1 tablet by mouth every morning        Urine Glucose-Ketones Test (KETO-DIASTIX) STRP 1 strip by In Vitro route as needed 1 each 11     Allergies  No Known Allergies     Family History  family history includes Brain Cancer in her maternal uncle; Breast Cancer in her mother; Coronary Artery Disease in her maternal uncle and maternal uncle; Hyperlipidemia in her mother; Leukemia in her father; Other Cancer in her father; Skin Cancer in her father; Stomach Cancer in her maternal aunt.  No thyroid disease or type 1 diabetes in the family    Social History  Social History     Socioeconomic History     Marital status: Single     Spouse name: Not on file     Number of children: Not on file     Years of education: Not on file     Highest education level: Not on file   Occupational History     Not on file   Tobacco Use     Smoking status: Never     Smokeless tobacco: Never   Vaping Use     Vaping Use: Never used   Substance and Sexual Activity     Alcohol use: Yes     Comment: 1 to 2 beers or glasses of wine 1 to 2 times per month     Drug use: No     Sexual activity: Not Currently     Partners: Female     Birth control/protection: Post-menopausal   Other Topics Concern     Parent/sibling w/ CABG, MI or angioplasty before 65F 55M? No  "  Social History Narrative     Not on file     Social Determinants of Health     Financial Resource Strain: Not on file   Food Insecurity: Not on file   Transportation Needs: Not on file   Physical Activity: Not on file   Stress: Not on file   Social Connections: Not on file   Intimate Partner Violence: Not on file   Housing Stability: Not on file     REVIEW OF SYSTEMS  10 points ROS were negative otherwise mentioned in HPI    Physical Exam  Vital signs:   /80 (BP Location: Left arm, Patient Position: Sitting, Cuff Size: Adult Regular)   Pulse 95   Wt 73.7 kg (162 lb 8 oz)   SpO2 99%   BMI 26.81 kg/m    Estimated body mass index is 27.34 kg/m  as calculated from the following:    Height as of 12/23/22: 1.658 m (5' 5.28\").    Weight as of 12/23/22: 75.2 kg (165 lb 11.2 oz).   Constitutional: no distress, comfortable, pleasant   Eyes: anicteric  Lungs: CTA B/L  Abdomen: Soft, NT, ND  Musculoskeletal: no edema   Skin:  no jaundice   Neurological: normal gait, intact sensation per monofilament bilaterally exam dated 10/24/2022, no tremor on outstretched hands bilaterally  Psychological: appropriate mood    DATA REVIEW    Hemoglobin A1C   Date Value Ref Range Status   12/23/2022 6.9 (H) 0.0 - 5.6 % Final   09/06/2022 8.0 (H) 0.0 - 5.6 % Final   05/02/2022 7.9 (A) 0.0 - 5.7 % Final   11/15/2021 7.6 (A) 0.0 - 5.7 % Final   07/01/2021 6.7 (A) 0.0 - 5.7 % Final     Hemoglobin A1C POCT   Date Value Ref Range Status   11/15/2019 8.0 (A) 4.3 - 6 % Final   07/12/2019 8.7 (A) 4.3 - 6 % Final   08/29/2016 8.4 (A) 4.3 - 6 % Final       ENDO DIABETES Latest Ref Rng & Units 9/6/2022   CHOL <200 mg/dL 185   LDL <=100 mg/dL 104 (H)   HDL >=50 mg/dL 61   NHDL <130 mg/dL 124   TRIGLYCERIDES <150 mg/dL 101     ENDO DIABETES Latest Ref Rng & Units 9/9/2022   CREATININE 0.52 - 1.04 mg/dL 0.74     ENDO DIABETES Latest Ref Rng & Units 9/6/2022   MICROL mg/L 14   UMALCR 0.00 - 25.00 mg/g Cr 15.73     DXA 8/28/2020  Lumbar spine " T-score in region of L2-L4 = -2.4   L1-4 percent change: Not significant%      HIPS:  Mean total hip T-score: -1  Mean total hip percent change: Not significant%      Left femoral neck T-score = -1.9  Right femoral neck T-score = -1.6      Radius 33% T-score = -1.6     FRAX:  10 year probability of major osteoporotic fracture: 11.3%  10 year probability of hip fracture: 2.1%  The 10 year probability of fracture may be lower than reported if the  patient has received treatment. FRAX data should be disregarded in patient's taking bisphosphonates.                                                                 IMPRESSION:    Low bone mass (osteopenia).    DXA 9/6/2022  Lumbar spine T-score in region of L1-L4 = -2   L1-4 percent change: 3.7%      HIPS:  Mean total hip T-score: -1.1  Mean total hip percent change: Not significant%      Left femoral neck T-score = -1.6  Right femoral neck T-score = -1.2      Radius 33% T-score = -1.1  Radius 33% percent change: Not significant%      FRAX:  10 year probability of major osteoporotic fracture: 17.2%  10 year probability of hip fracture: 5.4%  The 10 year probability of fracture may be lower than reported if the patient has received treatment. FRAX data should be disregarded in patient's taking bisphosphonates.                                                                    IMPRESSION:    Low bone mass (osteopenia).       ASSESSMENT/PLAN:   Latoya is a 72-year-old retiree with a history of type 1 diabetes who is here for diabetes management.    1) Type 1 diabetes: A1c 6.9 today. Latoya's blood glucose readings over the last 2 weeks were reviewed. She has more frequent high glucoses after dinner which could be related to inadequate bolus insulin.  Reported her carb counting is pretty good.  - discussed continuous glucose monitor. She is now interested in a trial after the hip surgery, will schedule with CDE    Recommendations:  -Continue Lantus 30 units at bedtime  -Change  Novolog 1:15 gram of carb coverage with breakfast and 1:15 gram at lunch and continue 1:15 gram at dinner  -Decrease correction to 1:60 Novolog correction if over 150 at breakfast and lunch, continue correction of 1:50 Novolog correction if over 150 at dinner, and 1:50 if BG is >200 at bedtime    2) DM complications:  Retinopathy: normal exam - no DR 6/2022, due 6/2023  Nephropathy: negative urine microalbumin 9/2022  Neuropathy: none per exam 10/2022     3) Hyperlipidemia: , HDL 61, , total cholesterol 185 on 9/2022. Continue Lipitor 10 mg daily.    4) Osteopenia: Repeat DXA 2022 showed  DXA 9/6/2022 showed the T score -2 at the lumbar spine, T score -1.6 at the left femoral neck, -1.2 at the right femoral neck and -1.1 at the wrist.    10 year probability of major osteoporotic fracture: 17.2%  10 year probability of hip fracture: 5.4%    She is indicated for considering bisphosphonates to reduce fracture risk.  discussed bisphosphonates but states her insurance did not cover Fosamax or Reclast. Patient to call to re-evaluate and determine covered options.   - decrease calcium to one tab daily  - continue vitamin D    5) Hypercalcemia, mild, 10.5 on labs, no hx of hypercalcemia. Asymptomatic.  - decrease calcium to one tab daily  - recheck with next labs  - encouraged adequate hydration    -Refer to CDE for CGM start, discuss interface of Accucheck BG reporting  -Follow-up in 4-6 months     This patient was seen and discussed with the attending, Dr. Aguero.    Shabnam Feliciano MD  Internal Medicine Resident    I was present with the resident who participated in the service and in the documentation of the note. I have verified the history and personally performed the physical exam and medical decision making. I agree with the assessment and plan of care as documented in the note.       External notes/medical records independently reviewed, labs and imaging independently reviewed, medical  management and tests to be discussed/communicated to patient.    Time: I spent 44 minutes spent on the date of the encounter preparing to see patient (including chart review and preparation), obtaining and or reviewing additional medical history, performing a physical exam and evaluation, documenting clinical information in the electronic health record, independently interpreting results, communicating results to the patient and coordinating care.

## 2023-01-09 NOTE — LETTER
1/9/2023         RE: Latoya Rodríguez  [address]  [place] [address]        Dear Colleague,    Thank you for referring your patient, Latoya Rodríguez, to the [facility]. Please see a copy of my visit note below.    ENDOCRINOLOGY NOTE  -  FOLLOW UP  Chief complaint:  Latoya is a 72 year old female seen in follow up of type 1 Diabetes.  HISTORY OF PRESENT ILLNESS  Latoya is a 72-year-old retiree with a history of type 1 diabetes who has a visit today for a follow-up type 1 diabetes.  She was diagnosed with type 1 diabetes 2016 during routine physical where she was found to have high blood glucose levels.  At that time she was diagnosed and treated as a type II diabetic but it was later determined that she had positive TORRES antibodies and low C-peptide.   Interval history  She has been struggling with right hip pain for the past couple years.  She was noted to have severe degenerative joint disease requiring right hip replacement. [facility]ANT scheduled for 1/18 - [facility]  Hip pain has affected cooking abilities in the kitchen (simple meals).     A1c 6.9% January 2023. Reviewed BG in the past 2 weeks, average glucose 179 (SD 82). She typically has high blood sugar in PM and prior to bedtime. Multiple (5) low blood sugars ranging 40-70s that are in in 9-10 AM or 2 PM range. She usually has high blood glucose before lunch and before dinner.  She eats 3 meals per day without snack between meals or before bedtime.   No exercise due to severe R hip pain.  She feels that her carb counting is quite accurate and going well, outside of large meals during holidays.  She eats mostly protein and is very consistent meal daily.    Her current regimen consist of Lantus 30 units at bedtime, Novolog 1:15 g with breakfast, 1 unit per 15 gram at lunch, and 1 unit per 15 gram at dinner, Novolog correction 1:50 if BG is >150 at meal time, Novolog correction 1:50 if BG is >200 at bedtime. Thinks the carb  "Correction has been going \"really well\". Eats dinner around 630. Doesn't typically snack. - doesn't like the new interface of blood sugar recordings, and to run the report, and doesn't offer an \"advisor - which is a calculator for how much carb correction\". Is interested in alternative options - the Albina is not covered by her insurance and needs the exception.     Lows:  Lightheaded and dizziness. She has lows in the AM and afternoon - she does \"forget to eat\" at times and thinks this correlates with her hypoglycemic events. No nighttime awakening due to this.     Weight is stable.     CGM: still interested but would like to \"focus on one major thing at at time\" and would like to focus on hip replacement first.    Diet recalled:  BG: scrambled eggx2 with cheese and toast with sugarfree jelly  Lunch: soup and salad  Dinner: salmon/broccoli or soup/toast    Stated her mother had history of osteoporosis. DXA 9/6/2022 showed the T score -2 at the lumbar spine, T score -1.6 at the left femoral neck, -1.2 at the right femoral neck and -1.1 at the wrist.    Past Medical History  Type 1 diabetes, diagnosed 6/2016  Hyperlipidemia  Osteoporosis     Medications  Current Outpatient Medications   Medication Sig Dispense Refill     apixaban ANTICOAGULANT (ELIQUIS) 5 MG tablet Take 1 tablet (5 mg) by mouth 2 times daily Resume the day following your procedure, on 7/6/2022 180 tablet 3     atorvastatin (LIPITOR) 10 MG tablet Take 1 tablet (10 mg) by mouth daily 90 tablet 3     blood glucose (ACCU-CHEK GUIDE) test strip Use to test blood sugar 6 times daily 550 strip 3     blood glucose monitoring (ACCU-CHEK FASTCLIX) lancets USE TO TEST BLOOD SUGAR FOUR TIMES A DAY OR AS DIRECTED 306 each 3     calcium carbonate-vitamin D 500-400 MG-UNIT TABS tablt Take 1 tablet by mouth 2 times daily 180 tablet 3     Continuous Blood Gluc  (DEXCOM G6 ) JUANA Use to read blood sugars as per 's instructions. 1 each 0 "     Continuous Blood Gluc Sensor (DEXCOM G6 SENSOR) MISC Change every 10 days. 3 each 11     Continuous Blood Gluc Transmit (DEXCOM G6 TRANSMITTER) MISC Change every 3 months. 1 each 3     estradiol (ESTRACE) 0.1 MG/GM vaginal cream Place 2 g vaginally twice a week 42.5 g 11     hydrOXYzine (ATARAX) 25 MG tablet Take 1 tablet (25 mg) by mouth nightly as needed for itching 30 tablet 1     insulin aspart (NOVOLOG FLEXPEN) 100 UNIT/ML pen 1 unit per 15 gram of carb for all meals. Plus 1 unit per 50 mg/dl above 150. Total daily dose approx 30 units. 30 mL 3     insulin glargine (BASAGLAR KWIKPEN) 100 UNIT/ML pen Inject 35 Units Subcutaneous daily Max, 35 units daily 45 mL 2     insulin pen needle (B-D U/F) 31G X 8 MM miscellaneous USE 5 TIMES DAILY  WITH NOVOLOG AND LANTUS 500 each 3     metoprolol succinate ER (TOPROL XL) 50 MG 24 hr tablet TAKE ONE TABLET BY MOUTH EVERY MORNING 90 tablet 0     Multiple Vitamins-Minerals (MULTIVITAMIN ADULT PO) Take 1 tablet by mouth every morning        Urine Glucose-Ketones Test (KETO-DIASTIX) STRP 1 strip by In Vitro route as needed 1 each 11     Allergies  No Known Allergies     Family History  family history includes Brain Cancer in her maternal uncle; Breast Cancer in her mother; Coronary Artery Disease in her maternal uncle and maternal uncle; Hyperlipidemia in her mother; Leukemia in her father; Other Cancer in her father; Skin Cancer in her father; Stomach Cancer in her maternal aunt.  No thyroid disease or type 1 diabetes in the family    Social History  Social History     Socioeconomic History     Marital status: Single     Spouse name: Not on file     Number of children: Not on file     Years of education: Not on file     Highest education level: Not on file   Occupational History     Not on file   Tobacco Use     Smoking status: Never     Smokeless tobacco: Never   Vaping Use     Vaping Use: Never used   Substance and Sexual Activity     Alcohol use: Yes     Comment: 1 to  "2 beers or glasses of wine 1 to 2 times per month     Drug use: No     Sexual activity: Not Currently     Partners: Female     Birth control/protection: Post-menopausal   Other Topics Concern     Parent/sibling w/ CABG, MI or angioplasty before 65F 55M? No   Social History Narrative     Not on file     Social Determinants of Health     Financial Resource Strain: Not on file   Food Insecurity: Not on file   Transportation Needs: Not on file   Physical Activity: Not on file   Stress: Not on file   Social Connections: Not on file   Intimate Partner Violence: Not on file   Housing Stability: Not on file     REVIEW OF SYSTEMS  10 points ROS were negative otherwise mentioned in HPI    Physical Exam  Vital signs:   /80 (BP Location: Left arm, Patient Position: Sitting, Cuff Size: Adult Regular)   Pulse 95   Wt 73.7 kg (162 lb 8 oz)   SpO2 99%   BMI 26.81 kg/m    Estimated body mass index is 27.34 kg/m  as calculated from the following:    Height as of 12/23/22: 1.658 m (5' 5.28\").    Weight as of 12/23/22: 75.2 kg (165 lb 11.2 oz).   Constitutional: no distress, comfortable, pleasant   Eyes: anicteric  Lungs: CTA B/L  Abdomen: Soft, NT, ND  Musculoskeletal: no edema   Skin:  no jaundice   Neurological: normal gait, intact sensation per monofilament bilaterally exam dated 10/24/2022, no tremor on outstretched hands bilaterally  Psychological: appropriate mood    DATA REVIEW    Hemoglobin A1C   Date Value Ref Range Status   12/23/2022 6.9 (H) 0.0 - 5.6 % Final   09/06/2022 8.0 (H) 0.0 - 5.6 % Final   05/02/2022 7.9 (A) 0.0 - 5.7 % Final   11/15/2021 7.6 (A) 0.0 - 5.7 % Final   07/01/2021 6.7 (A) 0.0 - 5.7 % Final     Hemoglobin A1C POCT   Date Value Ref Range Status   11/15/2019 8.0 (A) 4.3 - 6 % Final   07/12/2019 8.7 (A) 4.3 - 6 % Final   08/29/2016 8.4 (A) 4.3 - 6 % Final       ENDO DIABETES Latest Ref Rng & Units 9/6/2022   CHOL <200 mg/dL 185   LDL <=100 mg/dL 104 (H)   HDL >=50 mg/dL 61   NHDL <130 mg/dL 124 "   TRIGLYCERIDES <150 mg/dL 101     ENDO DIABETES Latest Ref Rng & Units 9/9/2022   CREATININE 0.52 - 1.04 mg/dL 0.74     ENDO DIABETES Latest Ref Rng & Units 9/6/2022   MICROL mg/L 14   UMALCR 0.00 - 25.00 mg/g Cr 15.73     DXA 8/28/2020  Lumbar spine T-score in region of L2-L4 = -2.4   L1-4 percent change: Not significant%      HIPS:  Mean total hip T-score: -1  Mean total hip percent change: Not significant%      Left femoral neck T-score = -1.9  Right femoral neck T-score = -1.6      Radius 33% T-score = -1.6     FRAX:  10 year probability of major osteoporotic fracture: 11.3%  10 year probability of hip fracture: 2.1%  The 10 year probability of fracture may be lower than reported if the  patient has received treatment. FRAX data should be disregarded in patient's taking bisphosphonates.                                                                 IMPRESSION:    Low bone mass (osteopenia).    DXA 9/6/2022  Lumbar spine T-score in region of L1-L4 = -2   L1-4 percent change: 3.7%      HIPS:  Mean total hip T-score: -1.1  Mean total hip percent change: Not significant%      Left femoral neck T-score = -1.6  Right femoral neck T-score = -1.2      Radius 33% T-score = -1.1  Radius 33% percent change: Not significant%      FRAX:  10 year probability of major osteoporotic fracture: 17.2%  10 year probability of hip fracture: 5.4%  The 10 year probability of fracture may be lower than reported if the patient has received treatment. FRAX data should be disregarded in patient's taking bisphosphonates.                                                                    IMPRESSION:    Low bone mass (osteopenia).       ASSESSMENT/PLAN:   Latoya is a 72-year-old retiree with a history of type 1 diabetes who is here for diabetes management.    1) Type 1 diabetes: A1c 6.9 today. Latoya's blood glucose readings over the last 2 weeks were reviewed. She has more frequent high glucoses after dinner which could be related to  inadequate bolus insulin.  Reported her carb counting is pretty good.  - discussed continuous glucose monitor. She is now interested in a trial after the hip surgery, will schedule with CDE    Recommendations:  -Continue Lantus 30 units at bedtime  -Change Novolog 1:15 gram of carb coverage with breakfast and 1:15 gram at lunch and continue 1:15 gram at dinner  -Decrease correction to 1:60 Novolog correction if over 150 at breakfast and lunch, continue correction of 1:50 Novolog correction if over 150 at dinner, and 1:50 if BG is >200 at bedtime    2) DM complications:  Retinopathy: normal exam - no DR 6/2022, due 6/2023  Nephropathy: negative urine microalbumin 9/2022  Neuropathy: none per exam 10/2022     3) Hyperlipidemia: , HDL 61, , total cholesterol 185 on 9/2022. Continue Lipitor 10 mg daily.    4) Osteopenia: Repeat DXA 2022 showed  DXA 9/6/2022 showed the T score -2 at the lumbar spine, T score -1.6 at the left femoral neck, -1.2 at the right femoral neck and -1.1 at the wrist.    10 year probability of major osteoporotic fracture: 17.2%  10 year probability of hip fracture: 5.4%    She is indicated for considering bisphosphonates to reduce fracture risk.  discussed bisphosphonates but states her insurance did not cover Fosamax or Reclast. Patient to call to re-evaluate and determine covered options.   - decrease calcium to one tab daily  - continue vitamin D    5) Hypercalcemia, mild, 10.5 on labs, no hx of hypercalcemia. Asymptomatic.  - decrease calcium to one tab daily  - recheck with next labs  - encouraged adequate hydration    -Refer to CDE for CGM start, discuss interface of Accucheck BG reporting  -Follow-up in 4-6 months     This patient was seen and discussed with the attending, Dr. Aguero.    Shabnam Feliciano MD  Internal Medicine Resident    I was present with the resident who participated in the service and in the documentation of the note. I have verified the history and  personally performed the physical exam and medical decision making. I agree with the assessment and plan of care as documented in the note.       External notes/medical records independently reviewed, labs and imaging independently reviewed, medical management and tests to be discussed/communicated to patient.    Time: I spent 44 minutes spent on the date of the encounter preparing to see patient (including chart review and preparation), obtaining and or reviewing additional medical history, performing a physical exam and evaluation, documenting clinical information in the electronic health record, independently interpreting results, communicating results to the patient and coordinating care.        Again, thank you for allowing me to participate in the care of your patient.        Sincerely,        Edward Aguero MD

## 2023-01-09 NOTE — NURSING NOTE
Latoya Rodríguez's goals for this visit include:   Chief Complaint   Patient presents with     Diabetes     She requests these members of her care team be copied on today's visit information: Yes    PCP: Pricila Avila    Referring Provider:  Pricila Avila, URVASHI CNP  [address] [place] [address]    /80 (BP Location: Left arm, Patient Position: Sitting, Cuff Size: Adult Regular)   Pulse 95   Wt 73.7 kg (162 lb 8 oz)   SpO2 99%   BMI 26.81 kg/m      Do you need any medication refills at today's visit? No

## 2023-01-12 ENCOUNTER — DOCUMENTATION ONLY (OUTPATIENT)
Dept: ORTHOPEDICS | Facility: CLINIC | Age: 73
End: 2023-01-12

## 2023-01-12 NOTE — PROGRESS NOTES
Writer faxed completed and signed forms to [facility] at [phone]. Originals sent to cordell Harris on 1/12/2023 at 8:06 AM

## 2023-01-17 ENCOUNTER — TELEPHONE (OUTPATIENT)
Dept: ORTHOPEDICS | Facility: CLINIC | Age: 73
End: 2023-01-17
Payer: COMMERCIAL

## 2023-01-17 NOTE — TELEPHONE ENCOUNTER
Patient has procedure with Dr. Ward tomorrow and is waiting on a call from care team to let her know what time she should arrive for her surgery. Please call patient.

## 2023-01-17 NOTE — TELEPHONE ENCOUNTER
Called [facility] and left a message with them to see if a phone call to patient has been completed.  Called patient back and reviewed surgery time and arrival time, npo instructions and medications to stop.   She will await for a call from [facility] to review details.  Sun Patino RN

## 2023-01-18 ENCOUNTER — ANESTHESIA (OUTPATIENT)
Dept: SURGERY | Facility: CLINIC | Age: 73
DRG: 469 | End: 2023-01-18
Payer: COMMERCIAL

## 2023-01-18 ENCOUNTER — APPOINTMENT (OUTPATIENT)
Dept: GENERAL RADIOLOGY | Facility: CLINIC | Age: 73
DRG: 469 | End: 2023-01-18
Payer: COMMERCIAL

## 2023-01-18 ENCOUNTER — HOSPITAL ENCOUNTER (INPATIENT)
Facility: CLINIC | Age: 73
LOS: 4 days | Discharge: HOME | DRG: 469 | End: 2023-01-24
Attending: ORTHOPAEDIC SURGERY | Admitting: ORTHOPAEDIC SURGERY
Payer: COMMERCIAL

## 2023-01-18 ENCOUNTER — ANESTHESIA EVENT (OUTPATIENT)
Dept: SURGERY | Facility: CLINIC | Age: 73
DRG: 469 | End: 2023-01-18
Payer: COMMERCIAL

## 2023-01-18 DIAGNOSIS — M16.11 OSTEOARTHRITIS OF RIGHT HIP, UNSPECIFIED OSTEOARTHRITIS TYPE: Primary | ICD-10-CM

## 2023-01-18 DIAGNOSIS — I48.20 CHRONIC ATRIAL FIBRILLATION (H): ICD-10-CM

## 2023-01-18 DIAGNOSIS — G47.00 INSOMNIA, UNSPECIFIED TYPE: ICD-10-CM

## 2023-01-18 DIAGNOSIS — E10.9 TYPE 1 DIABETES MELLITUS WITHOUT COMPLICATION (H): ICD-10-CM

## 2023-01-18 PROBLEM — Z98.890 POST-OPERATIVE NAUSEA AND VOMITING: Status: ACTIVE | Noted: 2023-01-18

## 2023-01-18 PROBLEM — R11.2 POST-OPERATIVE NAUSEA AND VOMITING: Status: ACTIVE | Noted: 2023-01-18

## 2023-01-18 LAB
ABO/RH(D): NORMAL
ANTIBODY SCREEN: NEGATIVE
GLUCOSE BLDC GLUCOMTR-MCNC: 202 MG/DL (ref 70–99)
GLUCOSE BLDC GLUCOMTR-MCNC: 65 MG/DL (ref 70–99)
GLUCOSE BLDC GLUCOMTR-MCNC: 70 MG/DL (ref 70–99)
GLUCOSE BLDC GLUCOMTR-MCNC: 87 MG/DL (ref 70–99)
SPECIMEN EXPIRATION DATE: NORMAL

## 2023-01-18 PROCEDURE — 710N000011 HC RECOVERY PHASE 1, LEVEL 3, PER MIN: Performed by: ORTHOPAEDIC SURGERY

## 2023-01-18 PROCEDURE — 250N000011 HC RX IP 250 OP 636: Performed by: NURSE PRACTITIONER

## 2023-01-18 PROCEDURE — 250N000011 HC RX IP 250 OP 636

## 2023-01-18 PROCEDURE — 999N000065 XR PELVIS AND HIP PORTABLE RIGHT 1 VIEW

## 2023-01-18 PROCEDURE — 250N000009 HC RX 250

## 2023-01-18 PROCEDURE — C1776 JOINT DEVICE (IMPLANTABLE): HCPCS | Performed by: ORTHOPAEDIC SURGERY

## 2023-01-18 PROCEDURE — 272N000001 HC OR GENERAL SUPPLY STERILE: Performed by: ORTHOPAEDIC SURGERY

## 2023-01-18 PROCEDURE — 99204 OFFICE O/P NEW MOD 45 MIN: CPT | Performed by: INTERNAL MEDICINE

## 2023-01-18 PROCEDURE — 250N000009 HC RX 250: Performed by: NURSE PRACTITIONER

## 2023-01-18 PROCEDURE — 258N000003 HC RX IP 258 OP 636

## 2023-01-18 PROCEDURE — 250N000011 HC RX IP 250 OP 636: Performed by: ANESTHESIOLOGY

## 2023-01-18 PROCEDURE — 86901 BLOOD TYPING SEROLOGIC RH(D): CPT | Performed by: ORTHOPAEDIC SURGERY

## 2023-01-18 PROCEDURE — 0SR9019 REPLACEMENT OF RIGHT HIP JOINT WITH METAL SYNTHETIC SUBSTITUTE, CEMENTED, OPEN APPROACH: ICD-10-PCS | Performed by: ORTHOPAEDIC SURGERY

## 2023-01-18 PROCEDURE — 82962 GLUCOSE BLOOD TEST: CPT

## 2023-01-18 PROCEDURE — 96372 THER/PROPH/DIAG INJ SC/IM: CPT | Performed by: INTERNAL MEDICINE

## 2023-01-18 PROCEDURE — 250N000025 HC SEVOFLURANE, PER MIN: Performed by: ORTHOPAEDIC SURGERY

## 2023-01-18 PROCEDURE — 370N000017 HC ANESTHESIA TECHNICAL FEE, PER MIN: Performed by: ORTHOPAEDIC SURGERY

## 2023-01-18 PROCEDURE — 250N000012 HC RX MED GY IP 250 OP 636 PS 637: Performed by: INTERNAL MEDICINE

## 2023-01-18 PROCEDURE — 999N000141 HC STATISTIC PRE-PROCEDURE NURSING ASSESSMENT: Performed by: ORTHOPAEDIC SURGERY

## 2023-01-18 PROCEDURE — 360N000077 HC SURGERY LEVEL 4, PER MIN: Performed by: ORTHOPAEDIC SURGERY

## 2023-01-18 PROCEDURE — 250N000011 HC RX IP 250 OP 636: Performed by: ORTHOPAEDIC SURGERY

## 2023-01-18 PROCEDURE — 250N000013 HC RX MED GY IP 250 OP 250 PS 637

## 2023-01-18 PROCEDURE — 258N000001 HC RX 258: Performed by: ORTHOPAEDIC SURGERY

## 2023-01-18 PROCEDURE — 27130 TOTAL HIP ARTHROPLASTY: CPT | Mod: RT | Performed by: ORTHOPAEDIC SURGERY

## 2023-01-18 PROCEDURE — C1713 ANCHOR/SCREW BN/BN,TIS/BN: HCPCS | Performed by: ORTHOPAEDIC SURGERY

## 2023-01-18 PROCEDURE — 250N000011 HC RX IP 250 OP 636: Performed by: NURSE ANESTHETIST, CERTIFIED REGISTERED

## 2023-01-18 DEVICE — REFLECTION SPHERICAL HEAD SCREW 35MM
Type: IMPLANTABLE DEVICE | Site: HIP | Status: FUNCTIONAL
Brand: REFLECTION

## 2023-01-18 DEVICE — REFLECTION SPHERICAL HEAD SCREW 20MM
Type: IMPLANTABLE DEVICE | Site: HIP | Status: FUNCTIONAL
Brand: REFLECTION

## 2023-01-18 DEVICE — BONE CEMENT RESTRICTOR BUCK FEMORAL 30MM 71279420: Type: IMPLANTABLE DEVICE | Site: HIP | Status: FUNCTIONAL

## 2023-01-18 DEVICE — IMP HEAD FEMORAL SNR COBALT 32MM +4 71303204: Type: IMPLANTABLE DEVICE | Site: HIP | Status: FUNCTIONAL

## 2023-01-18 DEVICE — R3 3 HOLE ACETABULAR SHELL 48MM
Type: IMPLANTABLE DEVICE | Site: HIP | Status: FUNCTIONAL
Brand: R3 ACETABULAR

## 2023-01-18 DEVICE — SPECTRON EF PRIMARY HIGH OFFSET                                    12/14 FEM SZ 1
Type: IMPLANTABLE DEVICE | Site: HIP | Status: FUNCTIONAL
Brand: SPECTRON

## 2023-01-18 DEVICE — BONE CEMENT SIMPLEX W/TOBRAMYCIN 6197-9-001: Type: IMPLANTABLE DEVICE | Site: HIP | Status: FUNCTIONAL

## 2023-01-18 DEVICE — R3 20 DEGREE XLPE ACETABULAR LINER                                    32MM INNER DIAMETER X OUTER DIAMETER 48MM
Type: IMPLANTABLE DEVICE | Site: HIP | Status: FUNCTIONAL
Brand: R3

## 2023-01-18 RX ORDER — ACETAMINOPHEN 325 MG/1
650 TABLET ORAL EVERY 4 HOURS PRN
Status: DISCONTINUED | OUTPATIENT
Start: 2023-01-21 | End: 2023-01-23

## 2023-01-18 RX ORDER — FENTANYL CITRATE 50 UG/ML
25 INJECTION, SOLUTION INTRAMUSCULAR; INTRAVENOUS EVERY 5 MIN PRN
Status: DISCONTINUED | OUTPATIENT
Start: 2023-01-18 | End: 2023-01-18

## 2023-01-18 RX ORDER — POLYETHYLENE GLYCOL 3350 17 G/17G
17 POWDER, FOR SOLUTION ORAL DAILY
Status: DISCONTINUED | OUTPATIENT
Start: 2023-01-19 | End: 2023-01-24 | Stop reason: HOSPADM

## 2023-01-18 RX ORDER — AMOXICILLIN 250 MG
1 CAPSULE ORAL 2 TIMES DAILY
Status: DISCONTINUED | OUTPATIENT
Start: 2023-01-18 | End: 2023-01-24 | Stop reason: HOSPADM

## 2023-01-18 RX ORDER — ACETAMINOPHEN 325 MG/1
975 TABLET ORAL EVERY 8 HOURS
Status: COMPLETED | OUTPATIENT
Start: 2023-01-18 | End: 2023-01-21

## 2023-01-18 RX ORDER — NALOXONE HYDROCHLORIDE 0.4 MG/ML
0.2 INJECTION, SOLUTION INTRAMUSCULAR; INTRAVENOUS; SUBCUTANEOUS
Status: DISCONTINUED | OUTPATIENT
Start: 2023-01-18 | End: 2023-01-24 | Stop reason: HOSPADM

## 2023-01-18 RX ORDER — NALOXONE HYDROCHLORIDE 0.4 MG/ML
0.4 INJECTION, SOLUTION INTRAMUSCULAR; INTRAVENOUS; SUBCUTANEOUS
Status: DISCONTINUED | OUTPATIENT
Start: 2023-01-18 | End: 2023-01-24 | Stop reason: HOSPADM

## 2023-01-18 RX ORDER — DEXTROSE MONOHYDRATE 25 G/50ML
25-50 INJECTION, SOLUTION INTRAVENOUS
Status: DISCONTINUED | OUTPATIENT
Start: 2023-01-18 | End: 2023-01-24 | Stop reason: HOSPADM

## 2023-01-18 RX ORDER — HYDROXYZINE HYDROCHLORIDE 25 MG/1
25 TABLET, FILM COATED ORAL
Status: DISCONTINUED | OUTPATIENT
Start: 2023-01-18 | End: 2023-01-18

## 2023-01-18 RX ORDER — ACETAMINOPHEN 325 MG/1
975 TABLET ORAL ONCE
Status: COMPLETED | OUTPATIENT
Start: 2023-01-18 | End: 2023-01-18

## 2023-01-18 RX ORDER — ONDANSETRON 2 MG/ML
4 INJECTION INTRAMUSCULAR; INTRAVENOUS EVERY 30 MIN PRN
Status: DISCONTINUED | OUTPATIENT
Start: 2023-01-18 | End: 2023-01-18

## 2023-01-18 RX ORDER — TRANEXAMIC ACID 650 MG/1
1950 TABLET ORAL ONCE
Status: COMPLETED | OUTPATIENT
Start: 2023-01-18 | End: 2023-01-18

## 2023-01-18 RX ORDER — ONDANSETRON 2 MG/ML
INJECTION INTRAMUSCULAR; INTRAVENOUS PRN
Status: DISCONTINUED | OUTPATIENT
Start: 2023-01-18 | End: 2023-01-18

## 2023-01-18 RX ORDER — HYDROMORPHONE HYDROCHLORIDE 1 MG/ML
0.2 INJECTION, SOLUTION INTRAMUSCULAR; INTRAVENOUS; SUBCUTANEOUS EVERY 5 MIN PRN
Status: DISCONTINUED | OUTPATIENT
Start: 2023-01-18 | End: 2023-01-18

## 2023-01-18 RX ORDER — CEFAZOLIN SODIUM/WATER 2 G/20 ML
2 SYRINGE (ML) INTRAVENOUS
Status: COMPLETED | OUTPATIENT
Start: 2023-01-18 | End: 2023-01-18

## 2023-01-18 RX ORDER — FENTANYL CITRATE 50 UG/ML
INJECTION, SOLUTION INTRAMUSCULAR; INTRAVENOUS PRN
Status: DISCONTINUED | OUTPATIENT
Start: 2023-01-18 | End: 2023-01-18

## 2023-01-18 RX ORDER — FENTANYL CITRATE 50 UG/ML
50 INJECTION, SOLUTION INTRAMUSCULAR; INTRAVENOUS EVERY 5 MIN PRN
Status: DISCONTINUED | OUTPATIENT
Start: 2023-01-18 | End: 2023-01-18

## 2023-01-18 RX ORDER — HYDROMORPHONE HYDROCHLORIDE 1 MG/ML
0.4 INJECTION, SOLUTION INTRAMUSCULAR; INTRAVENOUS; SUBCUTANEOUS EVERY 5 MIN PRN
Status: DISCONTINUED | OUTPATIENT
Start: 2023-01-18 | End: 2023-01-18

## 2023-01-18 RX ORDER — HYDROXYZINE HYDROCHLORIDE 25 MG/1
25 TABLET, FILM COATED ORAL EVERY 6 HOURS PRN
Status: DISCONTINUED | OUTPATIENT
Start: 2023-01-18 | End: 2023-01-23

## 2023-01-18 RX ORDER — OXYCODONE HYDROCHLORIDE 10 MG/1
10 TABLET ORAL EVERY 4 HOURS PRN
Status: DISCONTINUED | OUTPATIENT
Start: 2023-01-18 | End: 2023-01-24 | Stop reason: HOSPADM

## 2023-01-18 RX ORDER — SODIUM CHLORIDE, SODIUM LACTATE, POTASSIUM CHLORIDE, CALCIUM CHLORIDE 600; 310; 30; 20 MG/100ML; MG/100ML; MG/100ML; MG/100ML
INJECTION, SOLUTION INTRAVENOUS CONTINUOUS
Status: DISCONTINUED | OUTPATIENT
Start: 2023-01-18 | End: 2023-01-21

## 2023-01-18 RX ORDER — NICOTINE POLACRILEX 4 MG
15-30 LOZENGE BUCCAL
Status: DISCONTINUED | OUTPATIENT
Start: 2023-01-18 | End: 2023-01-24 | Stop reason: HOSPADM

## 2023-01-18 RX ORDER — METOCLOPRAMIDE HYDROCHLORIDE 5 MG/ML
5 INJECTION INTRAMUSCULAR; INTRAVENOUS EVERY 6 HOURS PRN
Status: DISCONTINUED | OUTPATIENT
Start: 2023-01-18 | End: 2023-01-24 | Stop reason: HOSPADM

## 2023-01-18 RX ORDER — PROPOFOL 10 MG/ML
INJECTION, EMULSION INTRAVENOUS PRN
Status: DISCONTINUED | OUTPATIENT
Start: 2023-01-18 | End: 2023-01-18

## 2023-01-18 RX ORDER — SODIUM CHLORIDE, SODIUM LACTATE, POTASSIUM CHLORIDE, CALCIUM CHLORIDE 600; 310; 30; 20 MG/100ML; MG/100ML; MG/100ML; MG/100ML
INJECTION, SOLUTION INTRAVENOUS CONTINUOUS
Status: DISCONTINUED | OUTPATIENT
Start: 2023-01-18 | End: 2023-01-18

## 2023-01-18 RX ORDER — ONDANSETRON 4 MG/1
4 TABLET, ORALLY DISINTEGRATING ORAL EVERY 6 HOURS PRN
Status: DISCONTINUED | OUTPATIENT
Start: 2023-01-18 | End: 2023-01-24 | Stop reason: HOSPADM

## 2023-01-18 RX ORDER — HYDROMORPHONE HYDROCHLORIDE 1 MG/ML
0.2 INJECTION, SOLUTION INTRAMUSCULAR; INTRAVENOUS; SUBCUTANEOUS
Status: DISCONTINUED | OUTPATIENT
Start: 2023-01-18 | End: 2023-01-21

## 2023-01-18 RX ORDER — CEFAZOLIN SODIUM/WATER 2 G/20 ML
2 SYRINGE (ML) INTRAVENOUS SEE ADMIN INSTRUCTIONS
Status: DISCONTINUED | OUTPATIENT
Start: 2023-01-18 | End: 2023-01-18 | Stop reason: HOSPADM

## 2023-01-18 RX ORDER — SODIUM CHLORIDE, SODIUM LACTATE, POTASSIUM CHLORIDE, CALCIUM CHLORIDE 600; 310; 30; 20 MG/100ML; MG/100ML; MG/100ML; MG/100ML
INJECTION, SOLUTION INTRAVENOUS CONTINUOUS PRN
Status: DISCONTINUED | OUTPATIENT
Start: 2023-01-18 | End: 2023-01-18

## 2023-01-18 RX ORDER — BISACODYL 10 MG
10 SUPPOSITORY, RECTAL RECTAL DAILY PRN
Status: DISCONTINUED | OUTPATIENT
Start: 2023-01-18 | End: 2023-01-24 | Stop reason: HOSPADM

## 2023-01-18 RX ORDER — CEFAZOLIN SODIUM 1 G/3ML
1 INJECTION, POWDER, FOR SOLUTION INTRAMUSCULAR; INTRAVENOUS EVERY 8 HOURS
Status: DISCONTINUED | OUTPATIENT
Start: 2023-01-18 | End: 2023-01-18

## 2023-01-18 RX ORDER — EPINEPHRINE 1 MG/ML
INJECTION, SOLUTION, CONCENTRATE INTRAVENOUS PRN
Status: DISCONTINUED | OUTPATIENT
Start: 2023-01-18 | End: 2023-01-18 | Stop reason: HOSPADM

## 2023-01-18 RX ORDER — METOPROLOL SUCCINATE 50 MG/1
50 TABLET, EXTENDED RELEASE ORAL EVERY MORNING
Status: DISCONTINUED | OUTPATIENT
Start: 2023-01-19 | End: 2023-01-24

## 2023-01-18 RX ORDER — ONDANSETRON 2 MG/ML
4 INJECTION INTRAMUSCULAR; INTRAVENOUS EVERY 6 HOURS PRN
Status: DISCONTINUED | OUTPATIENT
Start: 2023-01-18 | End: 2023-01-24 | Stop reason: HOSPADM

## 2023-01-18 RX ORDER — HALOPERIDOL 5 MG/ML
0.25 INJECTION INTRAMUSCULAR EVERY 10 MIN PRN
Status: COMPLETED | OUTPATIENT
Start: 2023-01-18 | End: 2023-01-18

## 2023-01-18 RX ORDER — DEXAMETHASONE SODIUM PHOSPHATE 4 MG/ML
INJECTION, SOLUTION INTRA-ARTICULAR; INTRALESIONAL; INTRAMUSCULAR; INTRAVENOUS; SOFT TISSUE PRN
Status: DISCONTINUED | OUTPATIENT
Start: 2023-01-18 | End: 2023-01-18

## 2023-01-18 RX ORDER — SCOLOPAMINE TRANSDERMAL SYSTEM 1 MG/1
1 PATCH, EXTENDED RELEASE TRANSDERMAL
Status: DISCONTINUED | OUTPATIENT
Start: 2023-01-18 | End: 2023-01-24 | Stop reason: HOSPADM

## 2023-01-18 RX ORDER — HYDROMORPHONE HYDROCHLORIDE 1 MG/ML
0.4 INJECTION, SOLUTION INTRAMUSCULAR; INTRAVENOUS; SUBCUTANEOUS
Status: DISCONTINUED | OUTPATIENT
Start: 2023-01-18 | End: 2023-01-21

## 2023-01-18 RX ORDER — ONDANSETRON 4 MG/1
4 TABLET, ORALLY DISINTEGRATING ORAL EVERY 30 MIN PRN
Status: DISCONTINUED | OUTPATIENT
Start: 2023-01-18 | End: 2023-01-18

## 2023-01-18 RX ORDER — CEFAZOLIN SODIUM 1 G/3ML
1 INJECTION, POWDER, FOR SOLUTION INTRAMUSCULAR; INTRAVENOUS EVERY 8 HOURS
Status: COMPLETED | OUTPATIENT
Start: 2023-01-18 | End: 2023-01-19

## 2023-01-18 RX ORDER — LIDOCAINE 40 MG/G
CREAM TOPICAL
Status: DISCONTINUED | OUTPATIENT
Start: 2023-01-18 | End: 2023-01-24 | Stop reason: HOSPADM

## 2023-01-18 RX ORDER — LIDOCAINE HYDROCHLORIDE 20 MG/ML
INJECTION, SOLUTION INFILTRATION; PERINEURAL PRN
Status: DISCONTINUED | OUTPATIENT
Start: 2023-01-18 | End: 2023-01-18

## 2023-01-18 RX ORDER — PROCHLORPERAZINE MALEATE 5 MG
5 TABLET ORAL EVERY 6 HOURS PRN
Status: DISCONTINUED | OUTPATIENT
Start: 2023-01-18 | End: 2023-01-24 | Stop reason: HOSPADM

## 2023-01-18 RX ORDER — OXYCODONE HYDROCHLORIDE 5 MG/1
5 TABLET ORAL EVERY 4 HOURS PRN
Status: DISCONTINUED | OUTPATIENT
Start: 2023-01-18 | End: 2023-01-24 | Stop reason: HOSPADM

## 2023-01-18 RX ORDER — DIAZEPAM 10 MG/2ML
2.5 INJECTION, SOLUTION INTRAMUSCULAR; INTRAVENOUS ONCE
Status: COMPLETED | OUTPATIENT
Start: 2023-01-18 | End: 2023-01-18

## 2023-01-18 RX ADMIN — HALOPERIDOL LACTATE 0.25 MG: 5 INJECTION, SOLUTION INTRAMUSCULAR at 16:49

## 2023-01-18 RX ADMIN — SODIUM CHLORIDE, POTASSIUM CHLORIDE, SODIUM LACTATE AND CALCIUM CHLORIDE: 600; 310; 30; 20 INJECTION, SOLUTION INTRAVENOUS at 10:30

## 2023-01-18 RX ADMIN — PROPOFOL 140 MG: 10 INJECTION, EMULSION INTRAVENOUS at 10:47

## 2023-01-18 RX ADMIN — Medication 30 MG: at 11:32

## 2023-01-18 RX ADMIN — OXYCODONE HYDROCHLORIDE 5 MG: 5 TABLET ORAL at 15:56

## 2023-01-18 RX ADMIN — FENTANYL CITRATE 25 MCG: 50 INJECTION INTRAMUSCULAR; INTRAVENOUS at 14:02

## 2023-01-18 RX ADMIN — Medication 50 MG: at 10:49

## 2023-01-18 RX ADMIN — HYDROMORPHONE HYDROCHLORIDE 0.2 MG: 1 INJECTION, SOLUTION INTRAMUSCULAR; INTRAVENOUS; SUBCUTANEOUS at 14:30

## 2023-01-18 RX ADMIN — AMISULPRIDE 10 MG: 2.5 INJECTION, SOLUTION INTRAVENOUS at 14:53

## 2023-01-18 RX ADMIN — PHENYLEPHRINE HYDROCHLORIDE 50 MCG: 10 INJECTION INTRAVENOUS at 12:55

## 2023-01-18 RX ADMIN — HYDROMORPHONE HYDROCHLORIDE 0.4 MG: 1 INJECTION, SOLUTION INTRAMUSCULAR; INTRAVENOUS; SUBCUTANEOUS at 14:40

## 2023-01-18 RX ADMIN — FENTANYL CITRATE 50 MCG: 50 INJECTION INTRAMUSCULAR; INTRAVENOUS at 14:16

## 2023-01-18 RX ADMIN — PHENYLEPHRINE HYDROCHLORIDE 100 MCG: 10 INJECTION INTRAVENOUS at 10:58

## 2023-01-18 RX ADMIN — ACETAMINOPHEN 975 MG: 325 TABLET ORAL at 09:01

## 2023-01-18 RX ADMIN — ACETAMINOPHEN 975 MG: 325 TABLET, FILM COATED ORAL at 18:19

## 2023-01-18 RX ADMIN — LIDOCAINE HYDROCHLORIDE 60 MG: 20 INJECTION, SOLUTION INFILTRATION; PERINEURAL at 10:47

## 2023-01-18 RX ADMIN — HYDROMORPHONE HYDROCHLORIDE 0.2 MG: 1 INJECTION, SOLUTION INTRAMUSCULAR; INTRAVENOUS; SUBCUTANEOUS at 14:21

## 2023-01-18 RX ADMIN — SUGAMMADEX 200 MG: 100 INJECTION, SOLUTION INTRAVENOUS at 13:05

## 2023-01-18 RX ADMIN — HYDROMORPHONE HYDROCHLORIDE 0.2 MG: 1 INJECTION, SOLUTION INTRAMUSCULAR; INTRAVENOUS; SUBCUTANEOUS at 15:04

## 2023-01-18 RX ADMIN — FENTANYL CITRATE 25 MCG: 50 INJECTION, SOLUTION INTRAMUSCULAR; INTRAVENOUS at 11:41

## 2023-01-18 RX ADMIN — FENTANYL CITRATE 25 MCG: 50 INJECTION INTRAMUSCULAR; INTRAVENOUS at 13:29

## 2023-01-18 RX ADMIN — HYDROMORPHONE HYDROCHLORIDE 0.4 MG: 1 INJECTION, SOLUTION INTRAMUSCULAR; INTRAVENOUS; SUBCUTANEOUS at 15:48

## 2023-01-18 RX ADMIN — FENTANYL CITRATE 50 MCG: 50 INJECTION INTRAMUSCULAR; INTRAVENOUS at 13:47

## 2023-01-18 RX ADMIN — METOCLOPRAMIDE HYDROCHLORIDE 5 MG: 5 INJECTION INTRAMUSCULAR; INTRAVENOUS at 20:04

## 2023-01-18 RX ADMIN — TRANEXAMIC ACID 1950 MG: 650 TABLET ORAL at 09:01

## 2023-01-18 RX ADMIN — DIAZEPAM 2.5 MG: 5 INJECTION, SOLUTION INTRAMUSCULAR; INTRAVENOUS at 15:13

## 2023-01-18 RX ADMIN — PROCHLORPERAZINE EDISYLATE 2.5 MG: 5 INJECTION INTRAMUSCULAR; INTRAVENOUS at 14:08

## 2023-01-18 RX ADMIN — INSULIN GLARGINE 20 UNITS: 100 INJECTION, SOLUTION SUBCUTANEOUS at 22:29

## 2023-01-18 RX ADMIN — SODIUM CHLORIDE, POTASSIUM CHLORIDE, SODIUM LACTATE AND CALCIUM CHLORIDE: 600; 310; 30; 20 INJECTION, SOLUTION INTRAVENOUS at 12:12

## 2023-01-18 RX ADMIN — HALOPERIDOL LACTATE 0.25 MG: 5 INJECTION, SOLUTION INTRAMUSCULAR at 16:34

## 2023-01-18 RX ADMIN — SCOPALAMINE 1 PATCH: 1 PATCH, EXTENDED RELEASE TRANSDERMAL at 20:00

## 2023-01-18 RX ADMIN — PROPOFOL 30 MG: 10 INJECTION, EMULSION INTRAVENOUS at 13:02

## 2023-01-18 RX ADMIN — Medication 2 G: at 10:52

## 2023-01-18 RX ADMIN — DEXAMETHASONE SODIUM PHOSPHATE 4 MG: 4 INJECTION, SOLUTION INTRA-ARTICULAR; INTRALESIONAL; INTRAMUSCULAR; INTRAVENOUS; SOFT TISSUE at 12:08

## 2023-01-18 RX ADMIN — HYDROMORPHONE HYDROCHLORIDE 0.5 MG: 1 INJECTION, SOLUTION INTRAMUSCULAR; INTRAVENOUS; SUBCUTANEOUS at 12:36

## 2023-01-18 RX ADMIN — FENTANYL CITRATE 25 MCG: 50 INJECTION INTRAMUSCULAR; INTRAVENOUS at 13:35

## 2023-01-18 RX ADMIN — HYDROMORPHONE HYDROCHLORIDE 0.5 MG: 1 INJECTION, SOLUTION INTRAMUSCULAR; INTRAVENOUS; SUBCUTANEOUS at 13:16

## 2023-01-18 RX ADMIN — CEFAZOLIN 1 G: 1 INJECTION, POWDER, FOR SOLUTION INTRAMUSCULAR; INTRAVENOUS at 18:19

## 2023-01-18 RX ADMIN — ONDANSETRON 4 MG: 2 INJECTION INTRAMUSCULAR; INTRAVENOUS at 13:46

## 2023-01-18 RX ADMIN — ONDANSETRON 4 MG: 2 INJECTION INTRAMUSCULAR; INTRAVENOUS at 12:36

## 2023-01-18 RX ADMIN — PROCHLORPERAZINE EDISYLATE 5 MG: 5 INJECTION INTRAMUSCULAR; INTRAVENOUS at 17:31

## 2023-01-18 RX ADMIN — FENTANYL CITRATE 75 MCG: 50 INJECTION, SOLUTION INTRAMUSCULAR; INTRAVENOUS at 10:47

## 2023-01-18 ASSESSMENT — ACTIVITIES OF DAILY LIVING (ADL)
ADLS_ACUITY_SCORE: 29
ADLS_ACUITY_SCORE: 25
ADLS_ACUITY_SCORE: 23
ADLS_ACUITY_SCORE: 25
ADLS_ACUITY_SCORE: 23
ADLS_ACUITY_SCORE: 22
ADLS_ACUITY_SCORE: 23
ADLS_ACUITY_SCORE: 23

## 2023-01-18 ASSESSMENT — ENCOUNTER SYMPTOMS: DYSRHYTHMIAS: 1

## 2023-01-18 NOTE — BRIEF OP NOTE
[facility]    Brief Operative Note    Pre-operative diagnosis: Arthritis of right hip [M16.11]  Post-operative diagnosis Same as pre-operative diagnosis    Procedure: Procedure(s):  ARTHROPLASTY, HIP, TOTAL RIGHT  Surgeon: Surgeon(s) and Role:     * Arnaud Ward MD - Primary     * Clemente Toure MD - Resident - Assisting     * Zak Suarez MD - Fellow - Assisting  Anesthesia: General   Estimated Blood Loss: 200 ml    Drains: None  Specimens: * No specimens in log *  Findings:   See full operative report.  Complications: None.  Implants:   Implant Name Type Inv. Item Serial No.  Lot No. LRB No. Used Action   BONE CEMENT RESTRICTOR OCASIO FEMORAL 30MM [id number] - [id number] Cement, Bone BONE CEMENT RESTRICTOR OCASIO FEMORAL 30MM [id number]  [employer]R [id number] Right 1 Implanted   IMP SCR ACET SNN SPHERICAL HEAD 6.5X35MM [id number] - [id number] Metallic Hardware/[employer]ridge IMP SCR ACET SNN SPHERICAL HEAD 6.5X35MM [id number]  [employer] [id number] Right 1 Implanted   IMP LINER S&N ACET R3 XLPE [id number] 0DEG [id number] - [id number] Total Joint Component/Insert IMP LINER S&N ACET R3 XLPE [id number] 0DEG [id number]  [employer] [id number] Right 1 Implanted and Explanted   IMP SHELL SNR ACET R3 3H 48MM [id number] - [id number] Total Joint Component/Insert IMP SHELL SNR ACET R3 3H 48MM [id number]  [employer] [id number] Right 1 Implanted   IMP SCR ACET SNN SPHERICAL HEAD 6.5X20MM [id number] - [id number] Metallic Hardware/E[employer]ridge IMP SCR ACET SNN SPHERICAL HEAD 6.5X20MM [id number]  [employer] [id number] Right 1 Implanted   BONE CEMENT SIMPLEX W/TOBRAMYCIN 6197-9-001 - [id number] Cement, Bone BONE CEMENT SIMPLEX W/TOBRAMYCIN 6197-9-001  [employer] [id number] Right 2 Implanted   IMP STEM FEMORAL SNR BIPOLAR SPECTRON HO SIZE 1 [id number] - [id number] Total Joint Component/Insert IMP STEM FEMORAL SNR BIPOLAR SPECTRON HO SIZE 1 [id number]   [employer]R [id number] Right 1 Implanted   IMP LINER S&N ACET R3 XLPE [id number] 20DEG [id number] - [id number] Total Joint Component/Insert IMP LINER S&N ACET R3 XLPE [id number] 20DEG [id number]  [employer] [id number] Right 1 Implanted   IMP HEAD FEMORAL SNR [place] 32MM +4 [id number] - [id number] Total Joint Component/Insert IMP HEAD FEMORAL SNR [place] 32MM +4 [id number]  [employer] [id number] Right 1 Implanted       A/P: Latoya Rodríguez is a 73yo F with right hip OA, chronic atrial fibrillation, DM2 now s/p right BRYANT on 1/18/2023 with Dr. Ward    Activity: Up with assist until independent  Weight bearing status: WBAT RLE with posterior hip precautions x3 months.  Antibiotics: Ancef x24 hours perioperatively.  Diet: Begin with clear fluids and progress diet as tolerated.  DVT prophylaxis: PTA Eliquis to resume POD1 and mechanical while in the hospital  Wound Care: Exofin, steri-strips, silver alginate, tegaderm   Pain management: transition from IV to orals as tolerated.   X-rays: AP pelvis, cross table lateral right hip in PACU.  Physical Therapy:  ROM, ADL's.  Occupational Therapy: ADL's.  Labs: Trend Hgb on POD #1, 2, 3 .  Consults: PT, OT. Medicine, appreciate assistance in caring for this patient.    Follow-up: Clinic with Dr. Ward on 2/28/2023\    Disposition: Pending progress with therapies, pain control on orals, and medical stability, anticipate discharge to home on POD #1-2.    Clemente Toure MD  Orthopaedic Surgery PGY-4  [phone]    Please page me at [phone] with any questions/concerns. If there is no response, if it is a weekend, or if it is during evening hours then please page the orthopaedic surgery resident on call.

## 2023-01-18 NOTE — OR NURSING
PACU to Inpatient Nursing Handoff    Patient Latoya Rodríguez is a 72 year old female who speaks English.   Procedure Procedure(s):  ARTHROPLASTY, HIP, TOTAL RIGHT   Surgeon(s) Primary: Arnaud Ward MD  Resident - Assisting: Clemente Toure MD  Fellow - Assisting: Zak Suarez MD     No Known Allergies    Isolation  [unfilled]     Past Medical History   has a past medical history of Cataract, Chronic atrial fibrillation (H) (06/08/2022), Osteoarthritis of carpometacarpal (CMC) joint of both thumbs (11/16/2022), and Type 2 diabetes mellitus with hyperglycemia (H) (06/24/2016).    Anesthesia General   Dermatome Level     Preop Meds acetaminophen (Tylenol) - time given: 0900  TXA - time given: 0950   Nerve block Not applicable   Intraop Meds dexamethasone (Decadron)  fentanyl (Sublimaze): 100 mcg total  hydromorphone (Dilaudid): 1 mg total  ondansetron (Zofran): last given at 1236   Local Meds Yes - Local Cocktail (morphine, ropivacaine, epinephrine, Toradol)   Antibiotics cefazolin (Ancef) - last given at 1052     Pain Patient Currently in Pain: yes   PACU meds  fentanyl (Sublimaze): 175 mcg (total dose) last given at 1416   hydromorphone (Dilaudid): 1.4 mg (total dose) last given at 1548   oxycodone (Roxicodone): 5 mg (total dose) last given at 1556   prochlorperazine (Compazine): 2.5 mg (total dose) last given at 1408   Barhemysys 10mg given at 1453   Valium 2.5mg given at 1513  Haldol 0.5mg last given at 1649   PCA / epidural No   Capnography     Telemetry ECG Rhythm: Atrial fibrillation   Inpatient Telemetry Monitor Ordered? No        Labs Glucose Lab Results   Component Value Date    GLC 70 01/18/2023     12/23/2022     08/02/2019       Hgb Lab Results   Component Value Date    HGB 13.5 12/23/2022    HGB 14.4 06/24/2016       INR No results found for: INR   PACU Imaging Completed     Wound/Incision Incision/Surgical Site 05/10/19 Bilateral Finger (Comment which one) (Active)   Number  of days: 1349       Incision/Surgical Site 08/06/21 Right Wrist (Active)   Number of days: 530       Incision/Surgical Site 06/14/22 Bilateral Back (Active)   Number of days: 218       Incision/Surgical Site 01/18/23 Right Hip (Active)   Incision Assessment [facility] 01/18/23 1516   Closure RIVER 01/18/23 1516   Incision Drainage Amount None 01/18/23 1516   Dressing Intervention Clean, dry, intact 01/18/23 1516   Number of days: 0      CMS        Equipment ice pack and abductor pillow   Other LDA       IV Access Peripheral IV 01/18/23 Right;Posterior Hand (Active)   Site Assessment [facility] 01/18/23 1515   Line Status Infusing 01/18/23 1515   Phlebitis Scale 0-->no symptoms 01/18/23 1515   Infiltration Scale 0 01/18/23 1515   Number of days: 0      Blood Products Not applicable  mL   Intake/Output Date 01/18/23 0700 - 01/19/23 0659   Shift 7760-3490 3578-7185 1478-8711 24 Hour Total   INTAKE   I.V. 1400   1400   Shift Total(mL/kg) 1400(18.82)   1400(18.82)   OUTPUT   Emesis/NG output 50   50   Blood 200   200   Shift Total(mL/kg) 250(3.36)   250(3.36)   Weight (kg) 74.4 74.4 74.4 74.4      Drains / Marks     Time of void PreOp Void Prior to Procedure: 0900 (01/18/23 0908)    PostOp Straight cath: 700 mL (01/18/23 1630)    Diapered? No   Bladder Scan Bladder Scan Volume (mL): 635 ml (01/18/23 1630)   PO    nausea and emesis     Vitals    B/P: 123/62  T: 97  F (36.1  C)    Temp src: Temporal  P:  Pulse: 97 (01/18/23 1630)          R: (!) 7  O2:  SpO2: 98 %    O2 Device: Nasal cannula (01/18/23 1515)    Oxygen Delivery: 2 LPM (01/18/23 1515)         Family/support present daughter   Patient belongings  suitcase, walker and 3 belongings bags returned to patient   Patient transported on cart and air mat   DC meds/scripts (obs/outpt) Not applicable   Inpatient Pain Meds Released? Yes       Special needs/considerations None   Tasks needing completion None       Becca Alvarez RN  [facility] [id number]

## 2023-01-18 NOTE — OR NURSING
Pt continued to have pain/nausea. Updated Dr. Whatley, order received for IV valium. See mar, gave dose at 1513. Pt now sleeping between cares, appears comfortable. Woke with BP check, said pain is still the same but was unable to rate, then fell back asleep. Appears to be resting comfortably now.

## 2023-01-18 NOTE — OR NURSING
"Pt continued to have ongoing nausea. Updated Dr. Reyna. Dr. Paula also at bedside, orders received for IV haldol x 2 prn, both doses were given. Pt reports that nausea is still there but \"better\" and is no longer actively retching/vomiting. Pt reports pain is 4/10.   "

## 2023-01-18 NOTE — ANESTHESIA PROCEDURE NOTES
Airway       Patient location during procedure: OR       Procedure Start/Stop Times: 1/18/2023 10:55 AM  Staff -        Anesthesiologist:  Vipul Huynh MD       CRNA: Farheen Kirk APRN CRNA       Performed By: CRNA and anesthesiologistIndications and Patient Condition       Indications for airway management: mala-procedural       Induction type:intravenous       Mask difficulty assessment: 1 - vent by mask    Final Airway Details       Final airway type: endotracheal airway       Successful airway: ETT - single  Endotracheal Airway Details        ETT size (mm): 7.0       Cuffed: yes       Successful intubation technique: direct laryngoscopy       DL Blade Type: MAC 3       Grade View of Cords: 1       Adjucts: stylet       Position: Right       Measured from: gums/teeth       Secured at (cm): 22       Bite block used: None    Post intubation assessment        Placement verified by: capnometry, equal breath sounds and chest rise        Number of attempts at approach: 1       Number of other approaches attempted: 0       Secured with: silk tape       Ease of procedure: easy       Dentition: Intact and Unchanged    Medication(s) Administered   Medication Administration Time: 1/18/2023 10:55 AM    Additional Comments       Intubated by medical student

## 2023-01-18 NOTE — CONSULTS
[facility]  Consult Note - Hospitalist Service  Date of Admission:  1/18/2023  Consult Requested by: Ortho   Reason for Consult: Medical co-management     Assessment & Plan   Latoya Rodríguez is a 72 year old female admitted on 1/18/2023.  Postoperative day # 0 s/p total right hip arthroplasty.  Estimated blood loss 200 ml.  No complications during surgery.  Internal medicine was consulted for medical co-management.  Patient has a past medical history significant for diabetes mellitus type 1, atrial fibrillation and dyslipidemia.     S/p total right hip arthroplasty  - Ortho is primary team.   - DVT prophylaxis, pain control and mala-op antibiotics per Ortho.  - Gentle hydration.  - Capnography per protocol.  - PT and OT per protocol.  - Disposition per Ortho team.  - Monitor hemoglobin.  - Consider bowel regimen while patient is on narcotics.    Diabetes mellitus type 1  - Continue monitoring blood glucose.   - Patient taking Lantus 30 units at bedtime.  I will order 20 units of Lantus for tonight.  This will be increased to her PTA dose if she is having good oral intake.  - Patient is also taking carb count at home a ratio of 1:15. Hold this for now.   - I ordered a sliding scale insulin.     Atrial fibrillation  - Hold PTA apixaban for now.  I defer to Ortho team when to restart anticoagulation in the post surgical state.  - Continue on PTA metoprolol with holding parameters.    Dyslipidemia  - Per patient's report she is not taking Lipitor at this time.  Defer to her primary care physician to restart this medication.       Clinically Significant Risk Factors Present on Admission               # Drug Induced Coagulation Defect: home medication list includes an anticoagulant medication      # Circulatory Shock: currently requiring pressors for blood pressure support     # Overweight: Estimated body mass index is 27.29 kg/m  as calculated from the following:    Height as  "of this encounter: 1.651 m (5' 5\").    Weight as of this encounter: 74.4 kg (164 lb 0.4 oz).           Levi Chacon MD  Hospitalist Service  Securely message with [employer] (more info)  Text page via [facility] Paging/Directory   ______________________________________________________________________    Chief Complaint     Hip pain    History is obtained from the patient    History of Present Illness   Latoya Rodríguez is a 72 year old female admitted on 1/18/2023.  Postoperative day # 0 s/p total right hip arthroplasty.  Estimated blood loss 200 ml.  No complications during surgery.  Internal medicine was consulted for medical co-management.  Patient has a past medical history significant for diabetes mellitus type 1, atrial fibrillation and dyslipidemia.     I saw the patient after surgery when she arrived to the medical floor.  The patient was alert and oriented.  She had significant pain and nausea at PACU.   Patient's family was at bedside she was somnolent at the time of my interview but she was able to answer my questions and follow commands.  Patient was hemodynamically stable, afebrile and maintaining normal oxygen saturation on 1- 2 L of oxygen via nasal cannula.  Patient stated that pain was improved and she did not have any nausea at the time of my interview.  Patient was about to eat some of her dinner.    Patient denies any chest pain, palpitations, shortness of breath, cough, dizziness, lightheadedness, diaphoresis, abdominal pain, dysuria, fever or chills.      Past Medical History    Past Medical History:   Diagnosis Date     Cataract      Chronic atrial fibrillation (H) 06/08/2022     Osteoarthritis of carpometacarpal (CMC) joint of both thumbs 11/16/2022     Type 2 diabetes mellitus with hyperglycemia (H) 06/24/2016       Past Surgical History   Past Surgical History:   Procedure Laterality Date     ANESTHESIA CARDIOVERSION N/A 9/23/2021    Procedure: ANESTHESIA, FOR CARDIOVERSION@1100;  Surgeon: " GENERIC ANESTHESIA PROVIDER;  Location: [facility] OR     ANESTHESIA OUT OF OR MRI N/A 9/14/2022    Procedure: ANESTHESIA OUT OF OR Magnetic resonance imaging right hip and lumbar @0800;  Surgeon: GENERIC ANESTHESIA PROVIDER;  Location: [facility] OR     CYSTOSCOPY, INJECT COLLAGEN, COMBINED N/A 5/11/2021    Procedure: CYSTOSCOPY, WITH PERIURETHRAL BULKING AGENT INJECTION;  Surgeon: Alberto Zhang MD;  Location: [facility] OR     CYSTOSCOPY, INJECT COLLAGEN, COMBINED N/A 9/12/2022    Procedure: CYSTOSCOPY, WITH PERIURETHRAL BULKING AGENT INJECTION (look in the bladder and urethra and inject filler into the urethra);  Surgeon: Alberto Zhang MD;  Location: [facility] OR     EYE SURGERY  9/04, 5/11    Retinal detachment, Cataract (both eyes)     IMPLANT STIMULATOR AND LEADS SACRAL NERVE (STAGE ONE AND TWO) Right 7/5/2022    Procedure: INSERTION, SACRAL NERVE STIMULATOR, STAGE 1 & 2 (permanent implant on the RIGHT side with Axonics);  Surgeon: Alberto Zhang MD;  Location: [facility] OR     IMPLANT STIMULATOR SACRAL NERVE PERCUTANEOUS TRIAL N/A 6/14/2022    Procedure: INSERTION, NEUROSTIMULATOR, SACRAL, PERCUTANEOUS, FOR TRIAL;  Surgeon: Alberto Zhang MD;  Location: [facility] OR     RELEASE CARPAL TUNNEL Right 8/6/2021    Procedure: RELEASE, CARPAL TUNNEL, Right;  Surgeon: RADHA Sandoval MD;  Location: [facility] OR     RELEASE TRIGGER FINGER Bilateral 5/10/2019    Procedure: RELEASE TRIGGER FINGER, BILATERAL LONG AND RING FINGERS;  Surgeon: RADHA Sandoval MD;  Location: [facility] OR     VASCULAR SURGERY      Varicose Veins       Medications   I have reviewed this patient's current medications       Review of Systems    The 10 point Review of Systems is negative other than noted in the HPI or here.   Complete review of system was done during my interview with the patient.  She endorses incisional pain and nausea.  All other review of systems negative as above.      Physical Exam   Vital Signs: Temp: 97  F (36.1  C) Temp src: Temporal BP:  93/68 Pulse: (!) 123   Resp: 14 SpO2: 98 % O2 Device: Nasal cannula Oxygen Delivery: 2 LPM  Weight: 164 lbs .36 oz    General Appearance: Alert, on nasal cannula, no acute distress.  Respiratory: Normal respiratory effort, clear lungs.  No crackles or wheezing.  Cardiovascular: RRR, no murmur auscultated.  GI: Abdomen was soft, positive bowel sounds, no significant tenderness to palpation.  Skin: No rash.  Other: Patient was alert and oriented x4.  Moving all extremities.  Speech was normal.  No facial droop.   No peripheral edema of the lower extremities, neurovascular examination was intact.    Medical Decision Making       55 MINUTES SPENT BY ME on the date of service doing chart review, history, exam, documentation & further activities per the note.      Data

## 2023-01-18 NOTE — PROVIDER NOTIFICATION
"Dr. Lopez at bedside. Discussed that pain for the patient is 8/10 since arriving to pacu. Patient has received 175 mg of Fentanyl and 1.0 mg of Dilaudid. Stating having pain more in right knee than in hip. No numbness or tingling. Good CMS.  Patient has vomited x2. Giving zofran and 2.5 mg of Compazine. Patient states that pain and nausea are \"both terrible\".  MD called to bedside to evaluate. Ordered Barhemsys for nausea to try. Stated to continue with dilaudid for pain. Will continue to monitor  "

## 2023-01-18 NOTE — ANESTHESIA POSTPROCEDURE EVALUATION
Patient: Latoya Rodríguez    Procedure: Procedure(s):  ARTHROPLASTY, HIP, TOTAL RIGHT       Anesthesia Type:  General    Note:  Disposition: Admission   Postop Pain Control: Uneventful            Sign Out: Well controlled pain   PONV: Yes            Symptoms: REFRACTORY Nausea + Vomiting            Sign Out: PONV/POV resolved with treatment   Neuro/Psych: Uneventful            Sign Out: Acceptable/Baseline neuro status   Airway/Respiratory: Uneventful            Sign Out: Acceptable/Baseline resp. status   CV/Hemodynamics: Uneventful            Sign Out: Acceptable CV status; No obvious hypovolemia; No obvious fluid overload   Other NRE: NONE   DID A NON-ROUTINE EVENT OCCUR? YES    Event details/Postop Comments:  Required multiple agents, see mar. Minimal nausea with no ongoing emesis.            Last vitals:  Vitals Value Taken Time   /80 01/18/23 1345   Temp     Pulse 117 01/18/23 1353   Resp 19 01/18/23 1353   SpO2 94 % 01/18/23 1353   Vitals shown include unvalidated device data.    Electronically Signed By: Vipul Huynh MD  January 18, 2023  1:54 PM

## 2023-01-18 NOTE — ANESTHESIA CARE TRANSFER NOTE
Patient: Latoya Rodríguez    Procedure: Procedure(s):  ARTHROPLASTY, HIP, TOTAL RIGHT       Diagnosis: Arthritis of right hip [M16.11]  Diagnosis Additional Information: No value filed.    Anesthesia Type:   General     Note:    Oropharynx: oropharynx clear of all foreign objects and spontaneously breathing  Level of Consciousness: awake  Oxygen Supplementation: face mask  Level of Supplemental Oxygen (L/min / FiO2): 6  Independent Airway: airway patency satisfactory and stable  Dentition: dentition unchanged  Vital Signs Stable: post-procedure vital signs reviewed and stable  Report to RN Given: handoff report given  Patient transferred to: PACU    Handoff Report: Identifed the Patient, Identified the Reponsible Provider, Reviewed the pertinent medical history, Discussed the surgical course, Reviewed Intra-OP anesthesia mangement and issues during anesthesia, Set expectations for post-procedure period and Allowed opportunity for questions and acknowledgement of understanding      Vitals:  Vitals Value Taken Time   BP     Temp     Pulse 114 01/18/23 1322   Resp 20 01/18/23 1322   SpO2 100 % 01/18/23 1322   Vitals shown include unvalidated device data.    Electronically Signed By: URVASHI Corea CRNA  January 18, 2023  1:23 PM

## 2023-01-18 NOTE — ANESTHESIA PREPROCEDURE EVALUATION
Anesthesia Pre-Procedure Evaluation    Patient: Latoya Rodríguez   MRN: [id number] : 1950        Procedure : Procedure(s):  ARTHROPLASTY, HIP, TOTAL RIGHT          Past Medical History:   Diagnosis Date     Cataract      Chronic atrial fibrillation (H) 2022     Osteoarthritis of carpometacarpal (CMC) joint of both thumbs 2022     Type 2 diabetes mellitus with hyperglycemia (H) 2016      Past Surgical History:   Procedure Laterality Date     ANESTHESIA CARDIOVERSION N/A 2021    Procedure: ANESTHESIA, FOR CARDIOVERSION@1100;  Surgeon: GENERIC ANESTHESIA PROVIDER;  Location: [facility] OR     ANESTHESIA OUT OF OR MRI N/A 2022    Procedure: ANESTHESIA OUT OF OR Magnetic resonance imaging right hip and lumbar @0800;  Surgeon: GENERIC ANESTHESIA PROVIDER;  Location: [facility]     CYSTOSCOPY, INJECT COLLAGEN, COMBINED N/A 2021    Procedure: CYSTOSCOPY, WITH PERIURETHRAL BULKING AGENT INJECTION;  Surgeon: Alberto Zhang MD;  Location: [facility] OR     CYSTOSCOPY, INJECT COLLAGEN, COMBINED N/A 2022    Procedure: CYSTOSCOPY, WITH PERIURETHRAL BULKING AGENT INJECTION (look in the bladder and urethra and inject filler into the urethra);  Surgeon: Alberto Zhang MD;  Location: [facility] OR     EYE SURGERY  ,     Retinal detachment, Cataract (both eyes)     IMPLANT STIMULATOR AND LEADS SACRAL NERVE (STAGE ONE AND TWO) Right 2022    Procedure: INSERTION, SACRAL NERVE STIMULATOR, STAGE 1 & 2 (permanent implant on the RIGHT side with [employer]onics);  Surgeon: Alberto Zhang MD;  Location: [facility] OR     IMPLANT STIMULATOR SACRAL NERVE PERCUTANEOUS TRIAL N/A 2022    Procedure: INSERTION, NEUROSTIMULATOR, SACRAL, PERCUTANEOUS, FOR TRIAL;  Surgeon: Alberto Zhang MD;  Location: [facility] OR     RELEASE CARPAL TUNNEL Right 2021    Procedure: RELEASE, CARPAL TUNNEL, Right;  Surgeon: RADHA Sandoval MD;  Location: [facility]     RELEASE TRIGGER FINGER Bilateral 5/10/2019    Procedure: RELEASE  TRIGGER FINGER, BILATERAL LONG AND RING FINGERS;  Surgeon: RADHA Sandoval MD;  Location: [facility]     VASCULAR SURGERY      Varicose Veins      No Known Allergies   Social History     Tobacco Use     Smoking status: Never     Smokeless tobacco: Never   Substance Use Topics     Alcohol use: Yes     Comment: 1 to 2 beers or glasses of wine 1 to 2 times per month      Wt Readings from Last 1 Encounters:   01/09/23 73.7 kg (162 lb 8 oz)        Anesthesia Evaluation   Pt has had prior anesthetic. Type: MAC and General.    No history of anesthetic complications       ROS/MED HX  ENT/Pulmonary:  - neg pulmonary ROS     Neurologic:  - neg neurologic ROS     Cardiovascular: Comment: Chronic atrial fibrillation.  Rate controlled by medication.  Anticoagulated.    (+) Dyslipidemia -----Taking blood thinners Pt has received instructions: dysrhythmias, a-fib,     METS/Exercise Tolerance:     Hematologic:  - neg hematologic  ROS     Musculoskeletal:  - neg musculoskeletal ROS     GI/Hepatic:  - neg GI/hepatic ROS     Renal/Genitourinary: Comment: Urinary frequency - neg Renal ROS     Endo:     (+) type I DM, Last HgA1c: Approximaely 8, Using insulin, - not using insulin pump. not previously admitted for DM/DKA.     Psychiatric/Substance Use:  - neg psychiatric ROS     Infectious Disease:       Malignancy:       Other:            Physical Exam    Airway        Mallampati: II       Respiratory Devices and Support         Dental           Cardiovascular          Rhythm and rate: irregular     Pulmonary           breath sounds clear to auscultation           OUTSIDE LABS:  CBC:   Lab Results   Component Value Date    WBC 9.4 12/23/2022    WBC 7.2 09/20/2022    HGB 13.5 12/23/2022    HGB 13.6 09/20/2022    HCT 41.8 12/23/2022    HCT 41.2 09/20/2022     12/23/2022     09/20/2022     BMP:   Lab Results   Component Value Date     12/23/2022     09/09/2022    POTASSIUM 4.4 12/23/2022    POTASSIUM 4.6  09/09/2022    CHLORIDE 108 12/23/2022    CHLORIDE 104 09/09/2022    CO2 31 12/23/2022    CO2 28 09/09/2022    BUN 20 12/23/2022    BUN 17 09/09/2022    CR 0.71 12/23/2022    CR 0.74 09/09/2022     (H) 12/23/2022     (H) 09/14/2022     COAGS: No results found for: PTT, INR, FIBR  POC:   Lab Results   Component Value Date     (H) 05/11/2021     HEPATIC:   Lab Results   Component Value Date    ALBUMIN 3.8 09/09/2022    PROTTOTAL 7.2 09/09/2022    ALT 23 09/09/2022    AST 19 09/09/2022    ALKPHOS 86 09/09/2022    BILITOTAL 0.6 09/09/2022     OTHER:   Lab Results   Component Value Date    A1C 6.9 (H) 12/23/2022    RENATO 10.5 (H) 12/23/2022    MAG 2.1 09/23/2021    TSH 1.27 09/06/2022    T4 1.33 07/03/2017    T3 82 03/13/2017    CRP 4.4 09/20/2022    SED 13 09/20/2022       Anesthesia Plan    ASA Status:  2   NPO Status:  NPO Appropriate    Anesthesia Type: General.     - Airway: ETT   Induction: Intravenous.   Maintenance: Balanced.        Consents    Anesthesia Plan(s) and associated risks, benefits, and realistic alternatives discussed. Questions answered and patient/representative(s) expressed understanding.    - Discussed:     - Discussed with:  Patient         Postoperative Care    Pain management: Oral pain medications, IV analgesics, Multi-modal analgesia.   PONV prophylaxis: Ondansetron (or other 5HT-3), Dexamethasone or Solumedrol     Comments:                Vipul Huynh MD

## 2023-01-18 NOTE — OP NOTE
OPERATIVE REPORT    DATE OF SERVICE: 1/18/23    SURGEON: Arnaud Ward MD.    ASSISTANT(S):  Clemente Toure MD and Zak Suarez MD    PREOPERATIVE DIAGNOSIS:  Osteoarthritis    POSTOPERATIVE DIAGNOSIS:  Osteoarthritis    OPERATION PERFORMED:  Right total hip arthroplasty    IMPLANTS:    Implant Name Type Inv. Item Serial No.  Lot No. LRB No. Used Action   BONE CEMENT RESTRICTOR OCASIO FEMORAL 30MM [id number] - [id number] Cement, Bone BONE CEMENT RESTRICTOR OCASIO FEMORAL 30MM [id number]  [employer] [id number] Right 1 Implanted   IMP SCR ACET SNN SPHERICAL HEAD 6.5X35MM [id number] - [id number] Metallic Hardware/Temple IMP SCR ACET SNN SPHERICAL HEAD 6.5X35MM [id number]  [employer] [id number] Right 1 Implanted   IMP SHELL SNR ACET R3 3H 48MM [id number] - [id number] Total Joint Component/Insert IMP SHELL SNR ACET R3 3H 48MM [id number]  [employer] [id number] Right 1 Implanted   IMP SCR ACET SNN SPHERICAL HEAD 6.5X20MM [id number] - [id number] Metallic Hardware/Temple IMP SCR ACET SNN SPHERICAL HEAD 6.5X20MM [id number]  [employer] [id number] Right 1 Implanted   BONE CEMENT SIMPLEX W/TOBRAMYCIN 6197-9-001 - [id number] Cement, Bone BONE CEMENT SIMPLEX W/TOBRAMYCIN 6197-9-001  [employer] [id number] Right 2 Implanted   IMP STEM FEMORAL SNR BIPOLAR SPECTRON HO SIZE 1 [id number] - [id number] Total Joint Component/Insert IMP STEM FEMORAL SNR BIPOLAR SPECTRON HO SIZE 1 [id number]  [employer] [id number] Right 1 Implanted   IMP LINER S&N ACET R3 XLPE [id number] 20DEG [id number] - [id number] Total Joint Component/Insert IMP LINER S&N ACET R3 XLPE [id number] 20DEG [id number]  [employer] [id number] Right 1 Implanted   IMP HEAD FEMORAL SNR [place] 32MM +4 [id number] - [id number] Total Joint Component/Insert IMP HEAD FEMORAL SNR [place] 32MM +4 [id number]  [employer] [id number] Right 1 Implanted       ANESTHETIC:  General     OPERATIVE FINDINGS:  End stage arthrosis of the hip    BLOOD LOSS about  200 ml     COMPLICATIONS:  None apparent    OPERATIVE INDICATIONS:  The patient has a long history of debilitating pain secondary to ostearthritis of the hip.  Despite comprehensive non-operative management these symptoms continued to interfere with activities of daily living.  After discussion of further treatment options including the risks and benefits that patient elected to proceed with a total hip.    DESCRIPTION OF THE PROCEDURE:  The patient was identified in the preoperative holding area.  The consent form including the risks and benefits were reviewed with the patient.  The operative limb was identified and marked.  The patient was brought back to the operating room and placed supine on the operating table.  An anesthetic was induced by the anesthesia team.   The patient was placed in the lateral decubitus position and prepped and draped in the normal standard fashion for a hip replacement.  A time-out was called.  Antibiotics were given.  We utilized an approximately 15 cm curvilinear incision, centered on the vastus ridge, and performed a standard posterior approach to the hip.  The tensor fascia was split.  A small portion of gluteus kiet was split in line with its fibers.  The sciatic nerve was palpated.  The east-west retractor was placed.  The posterior border of gluteus medius was exposed and retracted.  The tendon of piriformis and that of the obturators was released from their attachments.  A trapdoor posterior capsulotomy was performed.  The hip was dislocated.  The lesser trochanter was exposed.  A ruler was used to measure and electrocautery was used to lena our neck cut as preoperatively templated.  The head was measured with a caliper and found to be about 44 mm.  This measurement was used to choose our first reamer.  The neck cut was re-measured. The femur was elevated.  A Hohmann was placed over the anterior rim of the acetabulum and the femur was subluxed anterior.  A split was made in  "the inferior capsule.  The transverse acetabular ligament was left intact and used a guide for the anterversion of the acetabular component.  Circumferential retractors were placed.  We began reaming and went up by two until sufficient contact was made with the acetabular rim.  We then went up by one millimeter for a one millimeter press-fit.  We were within one size of our preoperative plan.   A trail was placed.  It had an excellent press fit.  We then placed out final component in 40 degrees of inclination and approximately 20 degrees of anteversion, parallel to the transverse ligament.  The press fit was excellent.  Screws were placed for additional initial fixation.  A flat liner was then placed. It locked into place.  Attention was turned to the femur.  Retractors were placed to elevated the proximal femur and to protect the tendon of gluteus medius.  Remaining lateral neck was removed and the piriformis fossa was cleared of soft-tissue.  A box osteotome and canal finder were used to prepare for broaching.  A sharp broach was used to lateralize slightly.  We then broached up sequentially to a size 1.  It was rotationally stable and sat up 1-2 millimeters from the neck-cut.  Preoperatively the patient had templated to a high offset stem.  The high offset stem appropriately tensioned the abductors.  We trialed the following femoral heads: 0 and +4. The 0 was clinically too loose to be stable.  The +4 appropriately tensioned the abductors and clinically equalized the leg-lengths.  The stability exam was excellent.  The hip was stable and there was no impingement posteriorly with hyper-extension and maximal external rotation.  With full extension, the knee could be fixed to bring the foot nearly to the buttock.  With the hip in ninety degrees of flexion and neutral rotation there was greater than 60 degrees of internal rotation before subluxation.  There appropriate movement with a \"yvonne\" test.  Happy with our " stability exam, the final implant was cemented into place using fourth generation cement technique. It was placed in approximately twenty degrees of anteversion.  It sat within 1 mm of the broach.  We then trailed with a +4 head.  The stability exam was identical.  We then placed the final head on a clean, dry neck and impacted it into place. The hip was reduced after directly visualizing the entire acetabulum.  The wound was then irrigated.  The posterior capsule and short external rotators were sutured to the greater trochanter with non-absorbable suture through bone tunnels.The fascia was closed with interrupted Vicryl, the dermis with interrupted Vicryl, and skin with running monocryl, Dermabond and steri-strips.  At the end of the procedure the sponge and needle counts were correct times two.  The patient tolerated the procedure well and returned to the PAR extubated and stable.    POSTOPERATIVE PLAN:  1. Weight bearing as tolerated  2. Standard posterior hip precautions  3. DVT prophylaxis   4. 24 hours of prophylactic antibiotics  5. Follow-up:  Wound clinic in 2 weeks and with Ed in clinic in 6 weeks for x-rays and a rehabilitation check.

## 2023-01-19 ENCOUNTER — APPOINTMENT (OUTPATIENT)
Dept: PHYSICAL THERAPY | Facility: CLINIC | Age: 73
DRG: 469 | End: 2023-01-19
Attending: ORTHOPAEDIC SURGERY
Payer: COMMERCIAL

## 2023-01-19 ENCOUNTER — APPOINTMENT (OUTPATIENT)
Dept: OCCUPATIONAL THERAPY | Facility: CLINIC | Age: 73
DRG: 469 | End: 2023-01-19
Payer: COMMERCIAL

## 2023-01-19 LAB
ANION GAP SERPL CALCULATED.3IONS-SCNC: 5 MMOL/L (ref 3–14)
BUN SERPL-MCNC: 15 MG/DL (ref 7–30)
CALCIUM SERPL-MCNC: 8.5 MG/DL (ref 8.5–10.1)
CHLORIDE BLD-SCNC: 108 MMOL/L (ref 94–109)
CO2 SERPL-SCNC: 24 MMOL/L (ref 20–32)
CREAT SERPL-MCNC: 0.61 MG/DL (ref 0.52–1.04)
GFR SERPL CREATININE-BSD FRML MDRD: >90 ML/MIN/1.73M2
GLUCOSE BLD-MCNC: 177 MG/DL (ref 70–99)
GLUCOSE BLDC GLUCOMTR-MCNC: 179 MG/DL (ref 70–99)
GLUCOSE BLDC GLUCOMTR-MCNC: 183 MG/DL (ref 70–99)
GLUCOSE BLDC GLUCOMTR-MCNC: 201 MG/DL (ref 70–99)
GLUCOSE BLDC GLUCOMTR-MCNC: 247 MG/DL (ref 70–99)
GLUCOSE BLDC GLUCOMTR-MCNC: 312 MG/DL (ref 70–99)
GLUCOSE BLDC GLUCOMTR-MCNC: 381 MG/DL (ref 70–99)
GLUCOSE BLDC GLUCOMTR-MCNC: 91 MG/DL (ref 70–99)
HGB BLD-MCNC: 10.5 G/DL (ref 11.7–15.7)
POTASSIUM BLD-SCNC: 4.4 MMOL/L (ref 3.4–5.3)
SODIUM SERPL-SCNC: 137 MMOL/L (ref 133–144)

## 2023-01-19 PROCEDURE — 96372 THER/PROPH/DIAG INJ SC/IM: CPT | Performed by: INTERNAL MEDICINE

## 2023-01-19 PROCEDURE — 97535 SELF CARE MNGMENT TRAINING: CPT | Mod: GO

## 2023-01-19 PROCEDURE — 99214 OFFICE O/P EST MOD 30 MIN: CPT | Performed by: INTERNAL MEDICINE

## 2023-01-19 PROCEDURE — 97165 OT EVAL LOW COMPLEX 30 MIN: CPT | Mod: GO

## 2023-01-19 PROCEDURE — 36415 COLL VENOUS BLD VENIPUNCTURE: CPT

## 2023-01-19 PROCEDURE — 80048 BASIC METABOLIC PNL TOTAL CA: CPT | Performed by: INTERNAL MEDICINE

## 2023-01-19 PROCEDURE — 97110 THERAPEUTIC EXERCISES: CPT | Mod: GP | Performed by: PHYSICAL THERAPIST

## 2023-01-19 PROCEDURE — 258N000003 HC RX IP 258 OP 636: Performed by: ANESTHESIOLOGY

## 2023-01-19 PROCEDURE — 250N000013 HC RX MED GY IP 250 OP 250 PS 637: Performed by: STUDENT IN AN ORGANIZED HEALTH CARE EDUCATION/TRAINING PROGRAM

## 2023-01-19 PROCEDURE — 82962 GLUCOSE BLOOD TEST: CPT

## 2023-01-19 PROCEDURE — 85018 HEMOGLOBIN: CPT

## 2023-01-19 PROCEDURE — 250N000012 HC RX MED GY IP 250 OP 636 PS 637: Performed by: INTERNAL MEDICINE

## 2023-01-19 PROCEDURE — 250N000013 HC RX MED GY IP 250 OP 250 PS 637: Performed by: INTERNAL MEDICINE

## 2023-01-19 PROCEDURE — 250N000011 HC RX IP 250 OP 636: Performed by: ORTHOPAEDIC SURGERY

## 2023-01-19 PROCEDURE — 97530 THERAPEUTIC ACTIVITIES: CPT | Mod: GP | Performed by: PHYSICAL THERAPIST

## 2023-01-19 PROCEDURE — 250N000013 HC RX MED GY IP 250 OP 250 PS 637

## 2023-01-19 PROCEDURE — 97161 PT EVAL LOW COMPLEX 20 MIN: CPT | Mod: GP | Performed by: PHYSICAL THERAPIST

## 2023-01-19 PROCEDURE — 97116 GAIT TRAINING THERAPY: CPT | Mod: GP | Performed by: PHYSICAL THERAPIST

## 2023-01-19 PROCEDURE — 97530 THERAPEUTIC ACTIVITIES: CPT | Mod: GO

## 2023-01-19 RX ORDER — POLYETHYLENE GLYCOL 3350 17 G/17G
17 POWDER, FOR SOLUTION ORAL DAILY
Qty: 10 PACKET | Refills: 0 | Status: SHIPPED | OUTPATIENT
Start: 2023-01-20 | End: 2023-06-12

## 2023-01-19 RX ORDER — HYDROXYZINE HYDROCHLORIDE 25 MG/1
25 TABLET, FILM COATED ORAL EVERY 6 HOURS PRN
Qty: 40 TABLET | Refills: 0 | Status: SHIPPED | OUTPATIENT
Start: 2023-01-19 | End: 2023-08-31

## 2023-01-19 RX ORDER — OXYCODONE HYDROCHLORIDE 5 MG/1
5 TABLET ORAL EVERY 4 HOURS PRN
Qty: 26 TABLET | Refills: 0 | Status: SHIPPED | OUTPATIENT
Start: 2023-01-19 | End: 2023-06-12

## 2023-01-19 RX ORDER — INSULIN GLARGINE 100 [IU]/ML
30 INJECTION, SOLUTION SUBCUTANEOUS DAILY
COMMUNITY
Start: 2023-01-19 | End: 2023-04-11

## 2023-01-19 RX ORDER — ACETAMINOPHEN 325 MG/1
650 TABLET ORAL EVERY 4 HOURS PRN
Qty: 60 TABLET | Refills: 0 | Status: SHIPPED | OUTPATIENT
Start: 2023-01-21 | End: 2024-05-21

## 2023-01-19 RX ORDER — AMOXICILLIN 250 MG
1 CAPSULE ORAL 2 TIMES DAILY
Qty: 30 TABLET | Refills: 0 | Status: SHIPPED | OUTPATIENT
Start: 2023-01-19 | End: 2023-06-12

## 2023-01-19 RX ADMIN — OXYCODONE HYDROCHLORIDE 5 MG: 5 TABLET ORAL at 16:52

## 2023-01-19 RX ADMIN — INSULIN ASPART 3 UNITS: 100 INJECTION, SOLUTION INTRAVENOUS; SUBCUTANEOUS at 13:36

## 2023-01-19 RX ADMIN — ACETAMINOPHEN 975 MG: 325 TABLET, FILM COATED ORAL at 16:47

## 2023-01-19 RX ADMIN — APIXABAN 5 MG: 2.5 TABLET, FILM COATED ORAL at 20:07

## 2023-01-19 RX ADMIN — SENNOSIDES AND DOCUSATE SODIUM 1 TABLET: 8.6; 5 TABLET ORAL at 20:07

## 2023-01-19 RX ADMIN — SENNOSIDES AND DOCUSATE SODIUM 1 TABLET: 8.6; 5 TABLET ORAL at 08:34

## 2023-01-19 RX ADMIN — CEFAZOLIN 1 G: 1 INJECTION, POWDER, FOR SOLUTION INTRAMUSCULAR; INTRAVENOUS at 03:28

## 2023-01-19 RX ADMIN — SODIUM CHLORIDE, POTASSIUM CHLORIDE, SODIUM LACTATE AND CALCIUM CHLORIDE: 600; 310; 30; 20 INJECTION, SOLUTION INTRAVENOUS at 03:28

## 2023-01-19 RX ADMIN — ACETAMINOPHEN 975 MG: 325 TABLET, FILM COATED ORAL at 01:08

## 2023-01-19 RX ADMIN — METOPROLOL TARTRATE 12.5 MG: 25 TABLET ORAL at 20:06

## 2023-01-19 RX ADMIN — INSULIN ASPART 5 UNITS: 100 INJECTION, SOLUTION INTRAVENOUS; SUBCUTANEOUS at 19:01

## 2023-01-19 RX ADMIN — INSULIN ASPART 1 UNITS: 100 INJECTION, SOLUTION INTRAVENOUS; SUBCUTANEOUS at 08:35

## 2023-01-19 RX ADMIN — ACETAMINOPHEN 975 MG: 325 TABLET, FILM COATED ORAL at 08:32

## 2023-01-19 RX ADMIN — POLYETHYLENE GLYCOL 3350 17 G: 17 POWDER, FOR SOLUTION ORAL at 08:32

## 2023-01-19 ASSESSMENT — ACTIVITIES OF DAILY LIVING (ADL)
ADLS_ACUITY_SCORE: 29
ADLS_ACUITY_SCORE: 33
ADLS_ACUITY_SCORE: 29
ADLS_ACUITY_SCORE: 33
PREVIOUS_RESPONSIBILITIES: MEAL PREP;HOUSEKEEPING;LAUNDRY;SHOPPING
ADLS_ACUITY_SCORE: 29

## 2023-01-19 NOTE — PLAN OF CARE
"VS: BP 98/48 (BP Location: Left arm)   Pulse 90   Temp (!) 95.6  F (35.3  C) (Oral)   Resp 18   Ht 1.651 m (5' 5\")   Wt 74.4 kg (164 lb 0.4 oz)   SpO2 99%   BMI 27.29 kg/m      O2: SpO2 > and stable on 1L O2. LS clear and equal bilaterally. Denies chest pain and SOB.    Output: Pt had a little Urine in the brief. PVR was 694 mL at 0344; Straight cath was done and UOP was 600 mL   Last BM: 1/17/2023 per pt report, denies abdominal discomfort. BS active / passing flatus.    Activity: Pt refused OOB, and she said that she wanted to sleep.   Skin: WDL except, R hip incision.   Pain: Pain managed with scheduled Tylenol.    CMS: Intact, AOx4. Denies numbness and tingling.   Dressing: R hip dressing CDI. PIV is DCI   Diet: Regular diet. Denies nausea/vomiting.    BG overnight was 201.   LDA: R PIV infusing LR at 100 mL/hr.    Equipment: IV pole, personal belongings.    Plan:  Continue with plan of care. Call light within reach, pt able to make needs known.      "

## 2023-01-19 NOTE — PROGRESS NOTES
Orthopaedic Surgery Progress Note 01/19/2023    Interval Events  - s/p OR yesterday  - AFVSS    Subjective  No acute events overnight.  Pain controlled. Denies new numbness, tingling, or weakness.  Tolerating diet with vomiting x1, no ongoing nausea or vomiting this AM.  Straight cath x2, no spontaneous voiding since OR.  +flatus, -BM.    Objective  Temp: (!) 95.7  F (35.4  C) Temp src: Oral BP: 110/46 Pulse: 77   Resp: 17 SpO2: 95 % O2 Device: Nasal cannula Oxygen Delivery: 1 LPM    Exam:  Gen: No acute distress, resting comfortably in bed.  Resp: Non-labored breathing  Cardio: Regular rate via peripheral pulse  MSK:  RLE:  - Dressings c/d/I  - Fires TA, GSC, EHL, FHL  - SILT tibial/sural/saphenous/DP/SP nerves  - PT/DP pulses 2+, foot wwp    No lab results found in last 7 days.    Invalid input(s): SEDRATE    Imaging: postop XR with stable implant alignment, no fractures or dislocations    Assessment: Latoya Rodríguez is a 72 year old female with right hip OA, chronic atrial fibrillation, DM2 now s/p right BRYANT on 1/18/2023 with Dr. Ward    Today:  - Pain control/medical monitoring  - PT/OT  - Anticipate discharge today pending voiding    Plan:  Activity: Up with assist until independent  Weight bearing status: WBAT RLE with posterior hip precautions x3 months.  Antibiotics: Ancef x24 hours perioperatively.  Diet: Begin with clear fluids and progress diet as tolerated.  DVT prophylaxis: PTA Eliquis to resume POD1 and mechanical while in the hospital  Wound Care: Exofin, steri-strips, silver alginate, tegaderm   Pain management: transition from IV to orals as tolerated.   X-rays: AP pelvis, cross table lateral right hip in PACU.  Physical Therapy:  ROM, ADL's.  Occupational Therapy: ADL's.  Labs: Trend Hgb on POD #1, 2, 3 .  Consults: PT, OT. Medicine, appreciate assistance in caring for this patient.     Follow-up: Clinic with Dr. Ward on 2/28/2023     Disposition: Pending progress with therapies, pain control on  orals, and medical stability, anticipate discharge to home on POD #1-2.    Clemente Toure MD  Orthopaedic Surgery PGY-4  [phone]    Please page me at [phone] with any questions/concerns. If there is no response, if it is a weekend, or if it is during evening hours then please page the orthopaedic surgery resident on call.       Future Appointments   Date Time Provider Department Center   1/19/2023  8:15 AM Sandra Christopher, PT [facility]   1/19/2023 10:00 AM Tory Emanuel, OT [facility]   1/19/2023  2:00 PM Sandra Christopher, PT [facility]   1/25/2023 10:10 AM Jaun Millard, PT [facility]   1/31/2023  1:00 PM MG NURSE ONLY [facility]   2/28/2023  1:20 PM Arnaud Ward MD [facility]   3/29/2023  2:00 PM Amadeo Whipple MD [facility]   6/19/2023 11:10 AM Edward Aguero MD [facility]

## 2023-01-19 NOTE — PROGRESS NOTES
Care Management Discharge Note    Discharge Date: 01/20/2023       Discharge Disposition:  Home      Discharge Services:  Outpatient PT    Discharge DME:      Discharge Transportation: family or friend will provide    Private pay costs discussed: Not applicable    Additional Information:  Outpatient physical therapy referral sent to Centralized Scheduling. RNCC available as needed.    Bethanie Tello RN, BSN  Care Coordinator, [facility] Orth[facility]  Phone [phone]  Pager [phone]

## 2023-01-19 NOTE — PLAN OF CARE
Goal Outcome Evaluation:       1900-0730      VS: Vitals stable.   O2: Sats > 92%  with 2L of O2   Output: Pt has implanted bladder stimulator-writer and pt checked the stimulator using personal scanner, working appropriately.     Incontinent-wears brief     Last BM: 1/17/23 and passing gas   Activity: Decline OOB this shift.   Scarred to move around due to being nauseous.    Skin: Dry and intact except R hip incision.    Pain: Denies when asked.    CMS: A&Ox4. Sleepy, arousable to voice.    Denies numbness and tingling.    Dressing: Dressing to R hip C/D/I.  PIV dressing to R hand C/D/I.   Diet: Regular.  Reported brownish emesis output about 20ml ( vomited 1x. ) . NP informed. No orders. Cont to monitor.    LDA: PIV to R hand infusing at 100 mL/hr.   Equipment: IV pole, FWW, gait belt, and personal belongings.    Plan: Continue to monitor.    Additional Info:       Patient vital signs are at baseline: No,  Reason:      Patient able to ambulate as they were prior to admission or with assist devices provided by therapies during their stay:  No,  Reason:  Decline OOB this shift.   Scarred to move around due to being nauseous.     Patient MUST void prior to discharge:  No,  Reason:    Patient able to tolerate oral intake:  No,  Reason:  Pt has continuous nausea and intermittent vomiting.     Pain has adequate pain control using Oral analgesics:  Yes    Does patient have an identified :  Yes    Has goal D/C date and time been discussed with patient:  Yes

## 2023-01-19 NOTE — PROGRESS NOTES
01/19/23 0800   Appointment Info   Signing Clinician's Name / Credentials (PT) Alannah Burnham SPT   Student Supervision On-site supervision provided   Living Environment   People in Home alone   Current Living Arrangements house   Home Accessibility stairs to enter home;stairs within home   Number of Stairs, Main Entrance 1   Stair Railings, Main Entrance none  (has wooden shelf she uses to pull up on left going up)   Number of Stairs, Within Home, Primary greater than 10 stairs   Stair Railings, Within Home, Primary   (R side going down)   Transportation Anticipated family or friend will provide   Self-Care   Usual Activity Tolerance moderate   Current Activity Tolerance fair   Regular Exercise No   Equipment Currently Used at Home cane, straight   Fall history within last six months no   Activity/Exercise/Self-Care Comment Has a walker and cane at home   General Information   Onset of Illness/Injury or Date of Surgery 01/18/23   Referring Physician Arnaud Ward MD   Patient/Family Therapy Goals Statement (PT) Get back to walking. Pt has an exercise bike she uses   Pertinent History of Current Problem (include personal factors and/or comorbidities that impact the POC) S/p total right hip arthroplasty, see chart for PMH   Existing Precautions/Restrictions fall;no hip IR;no hip ADD past midline;90 degree hip flexion;no pivoting or twisting   Weight-Bearing Status - LUE full weight-bearing   Weight-Bearing Status - RUE full weight-bearing   Weight-Bearing Status - LLE full weight-bearing   Weight-Bearing Status - RLE weight-bearing as tolerated   General Observations Pt was greeted supine in bed. Pt was agreeble to PT   Cognition   Affect/Mental Status (Cognition) WFL   Orientation Status (Cognition) oriented x 4   Follows Commands (Cognition) WFL   Pain Assessment   Patient Currently in Pain Yes, see Vital Sign flowsheet  (Pt feels stiff from not moving)   Integumentary/Edema   Integumentary/Edema no deficits  were identifed   Integumentary/Edema Comments Incision not observed at this time   Posture    Posture Forward head position;Protracted shoulders;Kyphosis   Range of Motion (ROM)   Range of Motion ROM is WFL   Strength (Manual Muscle Testing)   Strength (Manual Muscle Testing) strength is WFL   Bed Mobility   Bed Mobility scooting/bridging;supine-sit;sit-supine   Comment, (Bed Mobility) Supine to/from sitting with HOB elevated and mod A x 1. Pt required Min A  to scoot up in bed.   Transfers   Comment, (Transfers) Pt required Min A to transfer from sit<>Std. Pt had increased nausea upon sitting and standing   Gait/Stairs (Locomotion)   Comment, (Gait/Stairs) Gait not initiate secondary to dizziness and pain.   Balance   Balance Comments Pt demonstrated good sitting balance at EOB with 4-ww.   Sensory Examination   Sensory Perception patient reports no sensory changes   Clinical Impression   Criteria for Skilled Therapeutic Intervention Yes, treatment indicated   PT Diagnosis (PT) Impaired functional mobility s/p R BRYANT   Influenced by the following impairments Post op-pain, nausea, strength deficits   Functional limitations due to impairments Impaired bed mobility, transfers, gait training.   Clinical Presentation (PT Evaluation Complexity) Evolving/Changing   Clinical Presentation Rationale Pt has signficant post op pain and significantly low and changing blood pressure with immobility as well as activity   Clinical Decision Making (Complexity) moderate complexity   Planned Therapy Interventions (PT) bed mobility training;gait training;home exercise program;stair training;transfer training   Anticipated Equipment Needs at Discharge (PT) walker, rolling   Risk & Benefits of therapy have been explained evaluation/treatment results reviewed;patient   PT Total Evaluation Time   PT Eval, Low Complexity Minutes (80390) 10   Plan of Care Review   Plan of Care Reviewed With patient;family   Therapy Certification   Start of  care date 01/19/23   Certification date from 01/19/23   Certification date to 01/21/23   Medical Diagnosis s/p right BRYANT   Physical Therapy Goals   PT Frequency 2x/day   PT Predicted Duration/Target Date for Goal Attainment 01/23/23   PT Goals Bed Mobility;Transfers;Gait;Stairs   PT: Bed Mobility Modified independent;Supine to/from sit;Within precautions   PT: Transfers Modified independent;Sit to/from stand;Bed to/from chair;Within precautions   PT: Gait Modified independent;Rolling walker;Within precautions;100 feet   PT: Stairs Modified independent;Greater than 10 stairs;Rail on right   Interventions   Interventions Quick Adds Therapeutic Activity;Therapeutic Procedure   Therapeutic Procedure/Exercise   Ther. Procedure: strength, endurance, ROM, flexibillity Minutes (06043) 10   Treatment Detail/Skilled Intervention Pt completed supine LE strengthening exercises 1x10 each including: ankle pumps, quad sets, glut sets, heel slides,  HS, and SAQ. Pt was provided with cues for straightening leg during SAQ, closing recliner for HS and only moving within a range that is comfortable and within precautions.   Therapeutic Activity   Therapeutic Activities: dynamic activities to improve functional performance Minutes (93707) 20   Symptoms Noted During/After Treatment Increased pain;Fatigue;Dizziness  (Increased nausea)   Treatment Detail/Skilled Intervention Pt's BP was taken while supine in bed after completing LE exercises(90/50). Pt instructed on posterior hip precautions with bed mobility and functional activity. Pt was guided with mod A through supine<>sit transfer with multiple cues to push off bed with hands and scoot feet off EOB. Pt was able to move feet IND but needed assistance moving trunk. Pt verbalized increased pain and nausea with supine<>sit transfer. Pt sat at EOB for 3 minutes while BP was taken (93/57). Pt was then instructed on sit<>std transfer and completed 3 STS with Min A. Pt cued to push off bed  with one hand and straighten knees at the top. Pt cued to lift head and chest up and continue to breathe. Pt had increased pain and nausea while standing. BP was taken at the end of final STS (98/26). Pt reported increased fatigue and nausea and requested to eat before doing any more therapy.   PT Discharge Planning   PT Plan STS, assess gait   PT Discharge Recommendation (DC Rec) home with assist;home with outpatient physical therapy;home with home care physical therapy   PT Rationale for DC Rec Pt lives alone but has daughters to help while she recovers. Pt has difficulty with bed mobility and transfers due to increased pain and nausea.   PT Brief overview of current status Pt is Mod A for bed mobility and min A  with transfers and FWW.   Total Session Time   Timed Code Treatment Minutes 30   Total Session Time (sum of timed and untimed services) 40

## 2023-01-19 NOTE — PLAN OF CARE
[facility]  OUTPATIENT PHYSICAL THERAPY EVALUATION  PLAN OF TREATMENT FOR OUTPATIENT REHABILITATION  (COMPLETE FOR INITIAL CLAIMS ONLY)  Patient's Last Name, First Name, M.I.  YOB: 1950  Latoya Rodríguez                        Provider's Name  [facility] Medical Record No.  [id number]                             Onset Date:  01/18/23   Start of Care Date:  01/19/23   Type:     _X_PT   ___OT   ___SLP Medical Diagnosis:  s/p right BRYANT              PT Diagnosis:  Impaired functional mobility s/p R BRYANT Visits from SOC:  1     See note for plan of treatment, functional goals and certification details    I CERTIFY THE NEED FOR THESE SERVICES FURNISHED UNDER        THIS PLAN OF TREATMENT AND WHILE UNDER MY CARE     (Physician co-signature of this document indicates review and certification of the therapy plan).

## 2023-01-19 NOTE — PROGRESS NOTES
[facility]    Medicine Progress Note - Hospitalist Service, GOLD TEAM 19    Date of Admission:  1/18/2023    Assessment & Plan       Latoya Rodríguez is a 72 year old female admitted on 1/18/2023.  Admitted s/p total right hip arthroplasty.  Estimated blood loss 200 ml.  No complications during surgery.  Internal medicine was consulted for medical co-management.  Patient has a past medical history significant for diabetes mellitus type 1, atrial fibrillation and dyslipidemia.      S/p total right hip arthroplasty.1/18.  - Ortho is primary team.  -  Post op Mx per ortho: DVT prophylaxis, pain control and mala-op antibiotics per Ortho.  - Gentle hydration until adequate po.   - Capnography per protocol.  - PT and OT per protocol.  - Disposition per Ortho team.  - Monitor hemoglobin for post op acute blood loss. Transfuse for <7.0.   - Consider bowel regimen while patient is on narcotics.     Diabetes mellitus type 1  - Continue monitoring blood glucose.   - Patient taking Lantus 30 units at bedtime.  1/18: Post Op started on 20 units of Lantus HS.  titrate as needed.   - Patient is also taking carb count at home a ratio of 1:15. resume. 1/19/2023  -Ct sliding scale insulin.      Atrial fibrillation  - Hold PTA apixaban for now.  Defer to Ortho team when to restart anticoagulation in the post surgical state.  - Continue on PTA metoprolol with holding parameters.  - Monitor vitals.      Dyslipidemia  - Per patient's report she is not taking Lipitor at this time.  Defer to her primary care physician to restart this medication.     Rest per primary team.        Disposition Plan      Expected Discharge Date: 01/20/2023,  3:00 PM                Armaan Riggs MD  Hospitalist Service, GOLD TEAM 19  [facility]  Securely message with [employer] (more info)  Text page via [employer] Paging/Directory   See signed in provider for up to date coverage  information  ______________________________________________________________________    Interval History   Interval events reviewed.   States doing better.   Denies fever or chills.   No cough or cp or sob.   No LH or dizziness.   No NV or pain abdomen.   No BM yet.   No new sensory or motor complaint.     No other new or acute medical concern      Physical Exam   Vital Signs: Temp: 97.9  F (36.6  C) Temp src: Oral BP: 92/56 Pulse: 82   Resp: 16 SpO2: 97 % O2 Device: Nasal cannula Oxygen Delivery: 1 LPM  Weight: 164 lbs .36 oz    General Appearance: Awake, interactive, NAD  HEENT: AT/NC, Anicteric, Moist MM  Respiratory: Normal work of breathing.on RA.   Cardiovascular: RRR  GI/Abd: Non distended.   Extremities: No pedal edema.  Neuro: AO x 4, Grossly non focal.   Psychiatry: Stable mood.    Medical Decision Making   Moderate    35 MINUTES SPENT BY ME on the date of service doing chart review, history, exam, documentation & further activities per the note.      Data     I have personally reviewed the following data over the past 24 hrs:    N/A  \   10.5 (L)   / N/A     N/A N/A N/A /  179 (H)   N/A N/A N/A \       Imaging results reviewed over the past 24 hrs:   Recent Results (from the past 24 hour(s))   XR Pelvis w Hip Port Right 1 View    Narrative    EXAM: XR PELVIS AND HIP PORTABLE RIGHT 1 VIEW  LOCATION: [facility]  DATE/TIME: 1/18/2023 2:33 PM    INDICATION: Status post hip surgery.  COMPARISON: Radiographs from 9/27/2022.      Impression    IMPRESSION: Recent right total hip arthroplasty with adjacent gas. Excellent position and alignment of components. No evidence of complication or periprosthetic fracture. No other change.

## 2023-01-19 NOTE — PLAN OF CARE
"VS: /51 (BP Location: Left arm)   Pulse 94   Temp (!) 95.7  F (35.4  C) (Oral)   Resp (!) 8   Ht 1.651 m (5' 5\")   Wt 74.4 kg (164 lb 0.4 oz)   SpO2 98%   BMI 27.29 kg/m     Pt has irregular heartbeat, has hx of A-fib.   O2: Sating >90% on 2L of O2 via NC. Lung sounds clear. Denies chest pain and SOB.   Output: Due to void.    Last BM: 1/17/23 per pt report. Bowel sounds active x4. Passing flatus.    Activity: Pt was not OOB this shift.    Skin: Pt has thin, dry skin. R hip incision.    Pain: Pt stated that she has little to no pain.    CMS: A&Ox4. Pt is lethargic and wakes with gentle shaking. Denies N/T.    Dressing: Dressing to R hip C/D/I. PIV dressing to R hand C/D/I.   Diet: Regular. Appetite was good. . Sliding scale not given per MD verbal order since pt has continuous nausea and did not eat. Pt had emesis x2. IV compazine was effective for an hour. Sea bands on. MD paged with concerned of N/V, dizziness and upset stomach. Passed on call back information to NOC RN.    LDA: PIV to R hand infusing at 100 mL/hr.   Equipment: IV pole, FWW, gait belt, and personal belongings.    Plan: Continue to monitor. Call light within reach and utilized appropriately.    Additional Info: Pt arrived on unit at 1730.       Patient vital signs are at baseline: No,  Reason:  On 2L of O2  Patient able to ambulate as they were prior to admission or with assist devices provided by therapies during their stay:  No,  Reason:  Not OOB yet  Patient MUST void prior to discharge:  No,  Reason:  Due to void  Patient able to tolerate oral intake:  No,  Reason:  Pt has continuous nausea and intermittent vomiting.   Pain has adequate pain control using Oral analgesics:  Yes  Does patient have an identified :  Yes  Has goal D/C date and time been discussed with patient:  Yes   "

## 2023-01-19 NOTE — PHARMACY-ADMISSION MEDICATION HISTORY
Admission Medication History Completed by Pharmacy    See [facility] Admission Navigator for allergy information, preferred outpatient pharmacy, prior to admission medications and immunization status.     Medication History Sources:     Pharmacy dispense records, patient (but was sleepy and we didn't get through the whole list), chart review/Care Everywhere    Able to confirm all the rx meds with the pharmacy and the Lantus per 1/9/23 endocrinology visit notes    The RN was able to document last doses of medications prior to the surgery    Changes made to PTA medication list (reason):    Added: None    Deleted: None    Changed: Lantus from 34 units to 30 units daily    Additional Information:    None    Prior to Admission medications    Medication Sig Last Dose Taking? Auth Provider Long Term End Date   apixaban ANTICOAGULANT (ELIQUIS) 5 MG tablet Take 1 tablet (5 mg) by mouth 2 times daily Resume the day following your procedure, on 7/6/2022 1/15/2023 Yes Sagar Garcia MD     atorvastatin (LIPITOR) 10 MG tablet Take 1 tablet (10 mg) by mouth daily Past Week Yes Edward Aguero MD Yes    calcium carbonate-vitamin D 500-400 MG-UNIT TABS tablt Take 1 tablet by mouth 2 times daily Past Week Yes Pricila Avila APRN CNP     estradiol (ESTRACE) 0.1 MG/GM vaginal cream Place 2 g vaginally twice a week Past Week Yes Alberto Zhang MD     hydrOXYzine (ATARAX) 25 MG tablet Take 1 tablet (25 mg) by mouth nightly as needed for itching Unknown Yes Pricila Avila APRN CNP     insulin aspart (NOVOLOG FLEXPEN) 100 UNIT/ML pen 1 unit per 15 gram of carb for all meals. Plus 1 unit per 50 mg/dl above 150. Total daily dose approx 30 units. 1/17/2023 Yes Edward Aguero MD Yes    insulin glargine (BASAGLAR KWIKPEN) 100 UNIT/ML pen Inject 35 Units Subcutaneous daily Max, 35 units daily  Patient taking differently: Inject 30 Units Subcutaneous daily Max, 35 units daily 1/17/2023 Took 24 units   Yes Pricila Avila APRN CNP Yes    metoprolol succinate ER (TOPROL XL) 50 MG 24 hr tablet TAKE ONE TABLET BY MOUTH EVERY MORNING 1/18/2023 at 0600 Yes Pricila Avila APRN CNP Yes    Multiple Vitamins-Minerals (MULTIVITAMIN ADULT PO) Take 1 tablet by mouth every morning  Past Week Yes Reported, Patient       Date completed: 01/18/23    Medication history completed by: Anika FloresD

## 2023-01-20 ENCOUNTER — APPOINTMENT (OUTPATIENT)
Dept: PHYSICAL THERAPY | Facility: CLINIC | Age: 73
DRG: 469 | End: 2023-01-20
Attending: ORTHOPAEDIC SURGERY
Payer: COMMERCIAL

## 2023-01-20 ENCOUNTER — APPOINTMENT (OUTPATIENT)
Dept: OCCUPATIONAL THERAPY | Facility: CLINIC | Age: 73
DRG: 469 | End: 2023-01-20
Attending: ORTHOPAEDIC SURGERY
Payer: COMMERCIAL

## 2023-01-20 PROBLEM — E10.9 TYPE 1 DIABETES MELLITUS WITHOUT COMPLICATION (H): Status: ACTIVE | Noted: 2023-01-20

## 2023-01-20 PROBLEM — M16.11 OSTEOARTHRITIS OF RIGHT HIP, UNSPECIFIED OSTEOARTHRITIS TYPE: Status: ACTIVE | Noted: 2023-01-20

## 2023-01-20 LAB
ALBUMIN SERPL-MCNC: 2.9 G/DL (ref 3.4–5)
ALP SERPL-CCNC: 54 U/L (ref 40–150)
ALT SERPL W P-5'-P-CCNC: 15 U/L (ref 0–50)
ANION GAP SERPL CALCULATED.3IONS-SCNC: 7 MMOL/L (ref 3–14)
AST SERPL W P-5'-P-CCNC: 20 U/L (ref 0–45)
ATRIAL RATE - MUSE: 131 BPM
BILIRUB SERPL-MCNC: 1 MG/DL (ref 0.2–1.3)
BUN SERPL-MCNC: 10 MG/DL (ref 7–30)
CALCIUM SERPL-MCNC: 8.6 MG/DL (ref 8.5–10.1)
CHLORIDE BLD-SCNC: 106 MMOL/L (ref 94–109)
CO2 SERPL-SCNC: 23 MMOL/L (ref 20–32)
CREAT SERPL-MCNC: 0.58 MG/DL (ref 0.52–1.04)
DIASTOLIC BLOOD PRESSURE - MUSE: NORMAL MMHG
ERYTHROCYTE [DISTWIDTH] IN BLOOD BY AUTOMATED COUNT: 13.3 % (ref 10–15)
GFR SERPL CREATININE-BSD FRML MDRD: >90 ML/MIN/1.73M2
GLUCOSE BLD-MCNC: 284 MG/DL (ref 70–99)
GLUCOSE BLDC GLUCOMTR-MCNC: 229 MG/DL (ref 70–99)
GLUCOSE BLDC GLUCOMTR-MCNC: 232 MG/DL (ref 70–99)
GLUCOSE BLDC GLUCOMTR-MCNC: 240 MG/DL (ref 70–99)
GLUCOSE BLDC GLUCOMTR-MCNC: 260 MG/DL (ref 70–99)
GLUCOSE BLDC GLUCOMTR-MCNC: 274 MG/DL (ref 70–99)
HCT VFR BLD AUTO: 32 % (ref 35–47)
HGB BLD-MCNC: 10.8 G/DL (ref 11.7–15.7)
INTERPRETATION ECG - MUSE: NORMAL
MAGNESIUM SERPL-MCNC: 1.8 MG/DL (ref 1.6–2.3)
MCH RBC QN AUTO: 31 PG (ref 26.5–33)
MCHC RBC AUTO-ENTMCNC: 33.8 G/DL (ref 31.5–36.5)
MCV RBC AUTO: 92 FL (ref 78–100)
P AXIS - MUSE: NORMAL DEGREES
PHOSPHATE SERPL-MCNC: 1.6 MG/DL (ref 2.5–4.5)
PLATELET # BLD AUTO: 167 10E3/UL (ref 150–450)
POTASSIUM BLD-SCNC: 4 MMOL/L (ref 3.4–5.3)
PR INTERVAL - MUSE: NORMAL MS
PROT SERPL-MCNC: 5.8 G/DL (ref 6.8–8.8)
QRS DURATION - MUSE: 120 MS
QT - MUSE: 330 MS
QTC - MUSE: 466 MS
R AXIS - MUSE: 54 DEGREES
RBC # BLD AUTO: 3.48 10E6/UL (ref 3.8–5.2)
SODIUM SERPL-SCNC: 136 MMOL/L (ref 133–144)
SYSTOLIC BLOOD PRESSURE - MUSE: NORMAL MMHG
T AXIS - MUSE: 60 DEGREES
VENTRICULAR RATE- MUSE: 120 BPM
WBC # BLD AUTO: 8 10E3/UL (ref 4–11)

## 2023-01-20 PROCEDURE — 250N000013 HC RX MED GY IP 250 OP 250 PS 637: Performed by: INTERNAL MEDICINE

## 2023-01-20 PROCEDURE — 250N000009 HC RX 250: Performed by: STUDENT IN AN ORGANIZED HEALTH CARE EDUCATION/TRAINING PROGRAM

## 2023-01-20 PROCEDURE — 85027 COMPLETE CBC AUTOMATED: CPT | Performed by: INTERNAL MEDICINE

## 2023-01-20 PROCEDURE — 250N000009 HC RX 250: Performed by: INTERNAL MEDICINE

## 2023-01-20 PROCEDURE — 97530 THERAPEUTIC ACTIVITIES: CPT | Mod: GO

## 2023-01-20 PROCEDURE — 97110 THERAPEUTIC EXERCISES: CPT | Mod: GP | Performed by: REHABILITATION PRACTITIONER

## 2023-01-20 PROCEDURE — 82962 GLUCOSE BLOOD TEST: CPT

## 2023-01-20 PROCEDURE — 84100 ASSAY OF PHOSPHORUS: CPT | Performed by: INTERNAL MEDICINE

## 2023-01-20 PROCEDURE — 250N000013 HC RX MED GY IP 250 OP 250 PS 637

## 2023-01-20 PROCEDURE — 250N000011 HC RX IP 250 OP 636: Performed by: INTERNAL MEDICINE

## 2023-01-20 PROCEDURE — 97116 GAIT TRAINING THERAPY: CPT | Mod: GP | Performed by: REHABILITATION PRACTITIONER

## 2023-01-20 PROCEDURE — 36415 COLL VENOUS BLD VENIPUNCTURE: CPT | Performed by: INTERNAL MEDICINE

## 2023-01-20 PROCEDURE — 258N000003 HC RX IP 258 OP 636: Performed by: INTERNAL MEDICINE

## 2023-01-20 PROCEDURE — 120N000002 HC R&B MED SURG/OB UMMC

## 2023-01-20 PROCEDURE — 99232 SBSQ HOSP IP/OBS MODERATE 35: CPT | Performed by: INTERNAL MEDICINE

## 2023-01-20 PROCEDURE — 83735 ASSAY OF MAGNESIUM: CPT | Performed by: INTERNAL MEDICINE

## 2023-01-20 PROCEDURE — 97535 SELF CARE MNGMENT TRAINING: CPT | Mod: GO

## 2023-01-20 PROCEDURE — 80053 COMPREHEN METABOLIC PANEL: CPT | Performed by: INTERNAL MEDICINE

## 2023-01-20 PROCEDURE — 97530 THERAPEUTIC ACTIVITIES: CPT | Mod: GP | Performed by: REHABILITATION PRACTITIONER

## 2023-01-20 RX ORDER — LABETALOL HYDROCHLORIDE 5 MG/ML
20 INJECTION, SOLUTION INTRAVENOUS EVERY 4 HOURS PRN
Status: DISCONTINUED | OUTPATIENT
Start: 2023-01-20 | End: 2023-01-20

## 2023-01-20 RX ORDER — METOPROLOL TARTRATE 1 MG/ML
5 INJECTION, SOLUTION INTRAVENOUS ONCE
Status: COMPLETED | OUTPATIENT
Start: 2023-01-20 | End: 2023-01-20

## 2023-01-20 RX ORDER — METOPROLOL TARTRATE 1 MG/ML
5 INJECTION, SOLUTION INTRAVENOUS EVERY 5 MIN PRN
Status: DISCONTINUED | OUTPATIENT
Start: 2023-01-20 | End: 2023-01-23

## 2023-01-20 RX ORDER — SODIUM CHLORIDE 9 MG/ML
INJECTION, SOLUTION INTRAVENOUS CONTINUOUS
Status: ACTIVE | OUTPATIENT
Start: 2023-01-20 | End: 2023-01-20

## 2023-01-20 RX ORDER — SODIUM CHLORIDE 9 MG/ML
INJECTION, SOLUTION INTRAVENOUS
Status: DISCONTINUED
Start: 2023-01-20 | End: 2023-01-20 | Stop reason: HOSPADM

## 2023-01-20 RX ADMIN — SENNOSIDES AND DOCUSATE SODIUM 1 TABLET: 8.6; 5 TABLET ORAL at 08:33

## 2023-01-20 RX ADMIN — INSULIN ASPART 2 UNITS: 100 INJECTION, SOLUTION INTRAVENOUS; SUBCUTANEOUS at 08:39

## 2023-01-20 RX ADMIN — METOPROLOL TARTRATE 5 MG: 5 INJECTION INTRAVENOUS at 06:06

## 2023-01-20 RX ADMIN — ACETAMINOPHEN 975 MG: 325 TABLET, FILM COATED ORAL at 00:12

## 2023-01-20 RX ADMIN — APIXABAN 5 MG: 2.5 TABLET, FILM COATED ORAL at 20:28

## 2023-01-20 RX ADMIN — METOPROLOL TARTRATE 5 MG: 1 INJECTION, SOLUTION INTRAVENOUS at 22:17

## 2023-01-20 RX ADMIN — SENNOSIDES AND DOCUSATE SODIUM 1 TABLET: 8.6; 5 TABLET ORAL at 22:18

## 2023-01-20 RX ADMIN — INSULIN ASPART 3 UNITS: 100 INJECTION, SOLUTION INTRAVENOUS; SUBCUTANEOUS at 12:54

## 2023-01-20 RX ADMIN — ACETAMINOPHEN 975 MG: 325 TABLET, FILM COATED ORAL at 08:33

## 2023-01-20 RX ADMIN — SODIUM CHLORIDE: 9 INJECTION, SOLUTION INTRAVENOUS at 16:43

## 2023-01-20 RX ADMIN — APIXABAN 5 MG: 2.5 TABLET, FILM COATED ORAL at 08:33

## 2023-01-20 RX ADMIN — Medication 37.5 MG: at 20:28

## 2023-01-20 RX ADMIN — POTASSIUM PHOSPHATE, MONOBASIC AND POTASSIUM PHOSPHATE, DIBASIC 30 MMOL: 224; 236 INJECTION, SOLUTION INTRAVENOUS at 12:16

## 2023-01-20 RX ADMIN — METOPROLOL SUCCINATE 50 MG: 50 TABLET, EXTENDED RELEASE ORAL at 03:48

## 2023-01-20 RX ADMIN — OXYCODONE HYDROCHLORIDE 10 MG: 10 TABLET ORAL at 08:33

## 2023-01-20 RX ADMIN — LABETALOL HYDROCHLORIDE 20 MG: 5 INJECTION, SOLUTION INTRAVENOUS at 16:37

## 2023-01-20 RX ADMIN — ACETAMINOPHEN 975 MG: 325 TABLET, FILM COATED ORAL at 16:37

## 2023-01-20 RX ADMIN — POLYETHYLENE GLYCOL 3350 17 G: 17 POWDER, FOR SOLUTION ORAL at 08:34

## 2023-01-20 RX ADMIN — INSULIN ASPART 3 UNITS: 100 INJECTION, SOLUTION INTRAVENOUS; SUBCUTANEOUS at 18:08

## 2023-01-20 RX ADMIN — OXYCODONE HYDROCHLORIDE 5 MG: 5 TABLET ORAL at 15:32

## 2023-01-20 RX ADMIN — LABETALOL HYDROCHLORIDE 20 MG: 5 INJECTION, SOLUTION INTRAVENOUS at 11:12

## 2023-01-20 ASSESSMENT — ACTIVITIES OF DAILY LIVING (ADL)
ADLS_ACUITY_SCORE: 33

## 2023-01-20 NOTE — PROGRESS NOTES
Text page hospitalist.      Admitted in [facility] s/p R-BRYANT. Hx of afib. Heart rate is in between 120s to 140s. Pt laying in bed, comfortably with no complains. Pls advice. Thanks.

## 2023-01-20 NOTE — UTILIZATION REVIEW
Admission Status; Secondary Review Determination     Admission Date: 1/18/2023  8:20 AM       Under the authority of the Utilization Management Committee, the utilization review process indicated a secondary review on the above patient.  The review outcome is based on review of the medical records, discussions with staff, and applying clinical experience noted on the date of the review.        (x)      Inpatient Status Appropriate - This patient's medical care is consistent with medical management for inpatient care and reasonable inpatient medical practice.       RATIONALE FOR DETERMINATION      Brief clinical presentation, information copied from the chart, abbreviated and edited for relevant content:     Latoya Rodríguez is a 72 year old female admitted on 1/18/2023.  Admitted s/p total right hip arthroplasty.  Estimated blood loss 200 ml.  No complications during surgery.  Internal medicine was consulted for medical co-management. Had Post op Hgb at 10.8 reflecting acute blood loss anemia, but transfusion not necessary. Now also with  Atrial fibrillation with Rapid Ventricular response.  Patient went into RVR last night, received 1 dose of IV metoprolol 12.5 mg followed by home dose of 40 mg XL. Patient likely went into RVR as she did not receive her long acting metoprolol dose on POD#1  Today, she continues to be symptomatic ( dizzy) with increase in rates up to 140's with ambulation   Has  Low phos, plan to Replace phos ( IV 30 mmols)  With PRN IV labetolol 20 mg Q 4 hrs for HR > 110. Not discharging, POD 3         At the time of admission with the information available to the attending physician, more than 2 nights hospital complex care was anticipated. Also, there was a risk of adverse outcome if patient was treated outpatient or observation. High intensity of services anticipated. Inpatient admission appropriate based on Medicare guidelines.       The information on this document is developed by the  utilization review team in order for the business office to ensure compliance.  This only denotes the appropriateness of proper admission status and does not reflect the quality of care rendered.         The definitions of Inpatient Status and Observation Status used in making the determination above are those provided in the CMS Coverage Manual, Chapter 1 and Chapter 6, section 70.4.      Sincerely,      Paty Park MD   Utilization Review/ Case Management  [facility].

## 2023-01-20 NOTE — PLAN OF CARE
Goal Outcome Evaluation:         VS: HR between 120s to 140s. Hospitalist aware.See other notes.   Other vitals stable.    O2: Sats > 92%  on RA.   Output: Pt has implanted bladder stimulator-working appropriately per pt report.     Incontinent-wears brief     Has some urgency and frequency voiding.     Last BM: 1/17/23, senna schedule given and passing gas   Activity: Up with A1-GB and walker.    Skin: Dry and intact except R hip incision.    Pain: C/o minimal pain- Tylenol.  Ice pack at times.    CMS: A&Ox4.   Denies numbness and tingling in all extremities.    Dressing: Dressing to R hip C/D/I.  PIV dressing to R hand C/D/I.   Diet: Regular.   LDA: PIV to R hand: SL    Equipment: IV pole, FWW, gait belt, and personal belongings.    Plan: Continue to monitor.    Additional Info:        Patient vital signs are at baseline: No, HR elevated. Hospitalist aware.      Patient able to ambulate as they were prior to admission or with assist devices provided by therapies during their stay:  Yes     Patient MUST void prior to discharge: Yes    Patient able to tolerate oral intake:  Yes    Does patient have an identified :    Has goal D/C date and time been discussed with patient:  Yes,hoping to discharge tomorrow to home.

## 2023-01-20 NOTE — PROVIDER NOTIFICATION
Text page MD     Patient HR continues to be in the 110 to 120's. Pt laying comfortably in bed, asymptomatic. is it okay for her to work with therapy? Pls advice.     1012: Patient HR now fluctuating between 100's to 150's. asymptomatic at rest, c/o dizziness with therapy. BP stable. Labs drawn, pending results.

## 2023-01-20 NOTE — PLAN OF CARE
[facility]      OUTPATIENT OCCUPATIONAL THERAPY  EVALUATION  PLAN OF TREATMENT FOR OUTPATIENT REHABILITATION  (COMPLETE FOR INITIAL CLAIMS ONLY)  Patient's Last Name, First Name, M.I.  YOB: 1950  Latoya Rodríguez                          Provider's Name  [facility] Medical Record No.  [id number]                               Onset Date:  01/18/23   Start of Care Date:  01/19/23     Type:     ___PT   _X_OT   ___SLP Medical Diagnosis:  s/p R BRYANT                        OT Diagnosis:  impaired IND for ADLs   Visits from SOC:  1   _________________________________________________________________________________  Plan of Treatment/Functional Goals    Planned Interventions: ADL retraining, transfer training   Goals: See Occupational Therapy Goals on Care Plan in [employer] electronic health record.    Therapy Frequency: Daily  Predicted Duration of Therapy Intervention: 01/20/23  _________________________________________________________________________________    I CERTIFY THE NEED FOR THESE SERVICES FURNISHED UNDER        THIS PLAN OF TREATMENT AND WHILE UNDER MY CARE     (Physician co-signature of this document indicates review and certification of the therapy plan).              Certification date from: 01/19/23, Certification date to: 01/22/23    Referring Physician: Alvaro Anderson MD            Initial Assessment        See Occupational Therapy evaluation dated 01/19/23 in Epic electronic health record.

## 2023-01-20 NOTE — PROGRESS NOTES
Text page MD.      Admitted in 5ORtho-EKG shows A-fib with rapid ventricular response. Low voltage QRS. Right bundle branch block. Abnormal ECG. pls advice.       MD called and ordered RN to give metoprolol AM dose now and monitor in 1HR.

## 2023-01-20 NOTE — PROGRESS NOTES
01/19/23 1000   Appointment Info   Signing Clinician's Name / Credentials (OT) JULIA Abbott   Student Supervision Therapy services provided with the co-signing licensed therapist guiding and directing the services, and providing the skilled judgement and assessment throughout the session   Rehab Comments (OT) R hip posterior precautions   Living Environment   People in Home alone   Current Living Arrangements house   Home Accessibility stairs to enter home;stairs within home   Number of Stairs, Main Entrance 1   Stair Railings, Main Entrance none   Number of Stairs, Within Home, Primary greater than 10 stairs   Stair Railings, Within Home, Primary railings safe and in good condition   Transportation Anticipated family or friend will provide   Living Environment Comments Pt has tub/shower with extended tub bench, standard sized toilet, family can assist at dc   Self-Care   Usual Activity Tolerance moderate   Current Activity Tolerance fair   Regular Exercise No   Equipment Currently Used at Home cane, straight;tub bench;walker, rolling;dressing device  (sock aide)   Fall history within last six months no   Activity/Exercise/Self-Care Comment IND at baseline, uses AE   Instrumental Activities of Daily Living (IADL)   Previous Responsibilities meal prep;housekeeping;laundry;shopping   General Information   Onset of Illness/Injury or Date of Surgery 01/18/23   Referring Physician Alvaro Anderson MD   Patient/Family Therapy Goal Statement (OT) walk the dog and drive the car   Additional Occupational Profile Info/Pertinent History of Current Problem s/p right BRYANT   Existing Precautions/Restrictions no hip IR;no hip ADD past midline;90 degree hip flexion;no pivoting or twisting   Left Upper Extremity (Weight-bearing Status) full weight-bearing (FWB)   Right Upper Extremity (Weight-bearing Status) full weight-bearing (FWB)   Left Lower Extremity (Weight-bearing Status) full weight-bearing (FWB)   Right Lower  Extremity (Weight-bearing Status) weight-bearing as tolerated (WBAT)   Cognitive Status Examination   Cognitive Status Comments WFL   Visual Perception   Impact of Vision Impairment on Function (Vision) WFL   Pain Assessment   Patient Currently in Pain Yes, see Vital Sign flowsheet   Range of Motion Comprehensive   Comment, General Range of Motion B UE's WFL   Strength Comprehensive (MMT)   Comment, General Manual Muscle Testing (MMT) Assessment UE's overall WFL   Bed Mobility   Bed Mobility supine-sit;sit-supine   Supine-Sit [place] (Bed Mobility) minimum assist (75% patient effort)   Sit-Supine [place] (Bed Mobility) minimum assist (75% patient effort)   Transfers   Transfers sit-stand transfer   Sit-Stand Transfer   Sit-Stand [place] (Transfers) minimum assist (75% patient effort)   Assistive Device (Sit-Stand Transfers) walker, front-wheeled   Bathing Assessment/Intervention   [place] Level (Bathing) maximum assist (25% patient effort)   Lower Body Dressing Assessment/Training   [place] Level (Lower Body Dressing) maximum assist (25% patient effort)   Toileting   [place] Level (Toileting) minimum assist (75% patient effort)   Clinical Impression   Criteria for Skilled Therapeutic Interventions Met (OT) Yes, treatment indicated   OT Diagnosis impaired IND for ADLs   OT Problem List-Impairments impacting ADL activity tolerance impaired;post-surgical precautions;pain   Assessment of Occupational Performance 1-3 Performance Deficits   Identified Performance Deficits bathing, dressing, toileting   Planned Therapy Interventions (OT) ADL retraining;transfer training   Clinical Decision Making Complexity (OT) low complexity   Risk & Benefits of therapy have been explained evaluation/treatment results reviewed;care plan/treatment goals reviewed;risks/benefits reviewed;current/potential barriers reviewed;participants voiced agreement with care plan;participants included;patient;daughter    Clinical Impression Comments Pt experiencing impaired IND for ADLs and functional mobility. Pt would benefit from OT services for ADL retraining within precautions.   OT Total Evaluation Time   OT Eval, Low Complexity Minutes (11790) 10   Therapy Certification   Start of Care Date 01/19/23   Certification date from 01/19/23   Certification date to 01/22/23   Medical Diagnosis s/p R BRYANT   OT Goals   Therapy Frequency (OT) Daily   OT Predicted Duration/Target Date for Goal Attainment 01/20/23   OT Goals Lower Body Dressing;Lower Body Bathing;Toilet Transfer/Toileting;Bed Mobility   OT: Lower Body Dressing Modified independent;using adaptive equipment;within precautions   OT: Lower Body Bathing Modified independent;with precautions   OT: Toilet Transfer/Toileting Modified independent;within precautions   OT: Goal 1 Pt will complete tub/shower transfer using DME with mod I, maintaining precautions   Self-Care/Home Management   Self-Care/Home Mgmt/ADL, Compensatory, Meal Prep Minutes (13943) 20   Symptoms Noted During/After Treatment (Meal Preparation/Planning Training) dizziness;fatigue;increased pain   Treatment Detail/Skilled Intervention Pt was greeted sitting up in bed, two daughters in room, agreeable to session. Pt educated on precautions and AE appropriate post hip surgery including removable shower head,reacher, and raised toilet seat, patient reports having a tub bench and sock aide.  Placed commode at bedside vs using bathroom due to lower BP's (see ther act and vitals) and pt completed with CGA using FWW. Pt educated to extend surgical leg when sitting to adhere to precautions. Pt educated on LE dressing with AE/CS. Pt demonstrated understanding by donning underwear with reacher. Pt educated on bathing tools like long-handled sponge to adhere to precautions. Verbalized understanding.   Therapeutic Activities   Therapeutic Activity Minutes (82327) 23   Symptoms noted during/after treatment  dizziness;fatigue;increased pain   Treatment Detail/Skilled Intervention Pt required Gordy for B LE's and VCs for hand placement to transfer from supine to EOB. Pt took a minute to rest, reported no nausea or dizziness. Pt needed Gordy to stand from EOB using FWW with vc's for proper technique and hand placement. To improve activity tolerance for IND in ADLs, pt ambulated a few steps forward with CGA using FWW. Pt required Min A for leg support when transferring from sitting EOB to supine with vcs to avoid IR. Extra time taken throughout session to watch vitals 2/2 low BP results. First result was 109/26 sitting EOB on L arm, pt reported mild LH. BP taken again shortly after with 94/35. Returned to supine, BP re-assessed after a few mins, 110/40 supine on R arm. Nursing notified of BP's. Pt left with needs met, call light in reach.   OT Discharge Planning   OT Plan review precautions, review LB dressing with AE/CS, toilet transfer-RTS? & tub transfer education using tub bench   OT Discharge Recommendation (DC Rec) home with assist   OT Rationale for DC Rec Pt has been experiencing pain for 6 months and has found ways to modify ADL tasks. Pt has support from daughters. Pt has most DME in place at home. Anticipate once BPs and activity improves, pt will be able to discharge home with assist from family and use of DME.   OT Brief overview of current status min-CGA using FWW, limited by low BP readings and mild LH   Total Session Time   Timed Code Treatment Minutes 43   Total Session Time (sum of timed and untimed services) 53

## 2023-01-20 NOTE — PROGRESS NOTES
"Orthopaedic Surgery Progress Note 01/20/2023    Interval Events  - AFVSS  - Medicine: afib overnight, resumed PTA metoprolol  - PT recs home    Subjective  No acute events overnight.  States \"better\" this AM.  Pain controlled. Denies new numbness, tingling, or weakness.  Tolerating diet without nausea or vomiting.  Voiding spontaneously.  +flatus, +BM.   Denies lightheadedness or dizziness    Objective  Temp: 98.6  F (37  C) Temp src: Oral BP: 135/65 Pulse: (!) 128 (HR ranges between 120s to 130s)   Resp: 16 SpO2: 95 % O2 Device: None (Room air) Oxygen Delivery: 1 LPM    Exam:  Gen: No acute distress, resting comfortably in bed.  Resp: Non-labored breathing  Cardio: Regular rate via peripheral pulse  MSK:  RLE:  - Dressings c/d/I  - Fires TA, GSC, EHL, FHL  - SILT tibial/sural/saphenous/DP/SP nerves  - PT/DP pulses 2+, foot wwp    Recent Labs   Lab 01/19/23  1034   HGB 10.5*       Imaging: postop XR with stable implant alignment, no fractures or dislocations     Assessment: Latoya Rodríguez is a 72 year old female with right hip OA, chronic atrial fibrillation, DM2 now s/p right BRYANT on 1/18/2023 with Dr. Ed Humphreys afib overnight, Medicine resumed PTA metoprolol.  PT recs home     Today:  - Pain control/medical monitoring  - Anticipate discharge today pending medical stability     Plan:  Activity: Up with assist until independent  Weight bearing status: WBAT RLE with posterior hip precautions x3 months.  Antibiotics: s/p Ancef x24 hours perioperatively.  Diet: Begin with clear fluids and progress diet as tolerated.  DVT prophylaxis: PTA Eliquis to resume POD1 and mechanical while in the hospital  Wound Care: Exofin, steri-strips, silver alginate, tegaderm   Pain management: transition from IV to orals as tolerated.   X-rays: AP pelvis, cross table lateral right hip in PACU.  Physical Therapy:  ROM, ADL's.  Occupational Therapy: ADL's.  Labs: POD1 Hb 10.5  Consults: PT, OT. Medicine, appreciate assistance in " caring for this patient.     Follow-up: Clinic with Dr. Ward on 2/28/2023     Disposition: Pending medical stability, anticipate discharge to home on POD #2.    Clemente Toure MD  Orthopaedic Surgery PGY-4  [phone]    Please page me at [phone] with any questions/concerns. If there is no response, if it is a weekend, or if it is during evening hours then please page the orthopaedic surgery resident on call.       Future Appointments   Date Time Provider Department Center   1/20/2023  9:00 AM Tory Emanuel OT [facility] [place]   1/20/2023 11:00 AM Sandra Christopher, PT [facility]   1/20/2023  1:30 PM Sandra Christopher, PT [facility]   1/25/2023 10:10 AM Jaun Millard, PT [facility]   1/31/2023  1:00 PM MG NURSE ONLY ORTHO [facility] [place]   2/28/2023  1:20 PM Arnaud Ward MD [facility] [place]   3/29/2023  2:00 PM Amadeo Whipple MD [facility] [place]   6/19/2023 11:10 AM Edward Aguero MD [facility] [place]

## 2023-01-20 NOTE — PROGRESS NOTES
SPIRITUAL HEALTH SERVICES  SPIRITUAL ASSESSMENT Progress Note  [facility] ([facility]) M 527 01/20/23      REFERRAL SOURCE:     Pt declined visit with Unit  . She stated she was not in a spiritual crisis nor had any support needs.    PLAN: No follow up necessary.    Masoud Pena MA, MPA  Associate   Pager: [phone]

## 2023-01-20 NOTE — PROGRESS NOTES
[facility]    Medicine Progress Note - Hospitalist Service, GOLD TEAM 19    Date of Admission:  1/18/2023    Assessment & Plan       Latoya Rodríguez is a 72 year old female admitted on 1/18/2023.  Admitted s/p total right hip arthroplasty.  Estimated blood loss 200 ml.  No complications during surgery.  Internal medicine was consulted for medical co-management.  Patient has a past medical history significant for diabetes mellitus type 1, atrial fibrillation and dyslipidemia.      S/p total right hip arthroplasty, POD#2  - Ortho is primary team.  -  Post op Mx per ortho: DVT prophylaxis, pain control and mala-op antibiotics per Ortho.  - Gentle hydration until adequate po.   - Capnography per protocol.  - PT and OT per protocol.  - Disposition per Ortho team.  - Monitor hemoglobin for post op acute blood loss. Transfuse for <7.0. Post op Hgb at 10.8 reflecting acute blood loss anemia, but transfusion not necessary   - Consider bowel regimen while patient is on narcotics.     Diabetes mellitus type 1  - Continue monitoring blood glucose.   - Patient taking Lantus 30 units at bedtime.  1/18: Post Op started on 20 units of Lantus HS.  titrate as needed.   - Patient is also taking carb count at home a ratio of 1:15. resume. 1/19/2023  -Ct sliding scale insulin.   -Home meds to resume at discharge      Atrial fibrillation with Rapid Ventricular response   - Resume Apixaban  - Continue on PTA metoprolol with holding parameters.  - Patient went into RVR last night, received 1 dose of IV metoprolol 12.5 mg followed by home dose of 40 mg XL  Patient likely went into RVR as she did not receive her long acting metoprolol dose on POD#1  Continues to be symptomatic ( dizzy) with increase in rates up to 140's with ambulation   - Electrolytes ( Low phos) and renal function  Within normal limits  -Replace phos ( IV 30 mmols)  - IV labetolol 20 mg Q 4 hrs PRN for HR >  110     Dyslipidemia  - Per patient's report she is not taking Lipitor at this time.  Defer to her primary care physician to restart this medication.     Rest per primary team.        Disposition Plan     Expected Discharge Date: 01/20/2023,  3:00 PM                Satya Rubio MD  Hospitalist Service, GOLD TEAM 19  [facility]  Securely message with [employer] (more info)  Text page via [facility] Paging/Directory   See signed in provider for up to date coverage information  ______________________________________________________________________    Interval History   Charts reviewed. Patient went into RVR last night with a background of existing A fib  Patient denies palpitation, chest pain or SOB   No fever or chills  Eating and drinking well         Physical Exam   Vital Signs: Temp: (!) 96.7  F (35.9  C) Temp src: Axillary BP: (!) 152/70 Pulse: 104   Resp: 16 SpO2: 93 % O2 Device: None (Room air) Oxygen Delivery: 1 LPM  Weight: 164 lbs .36 oz    General Appearance: Awake, interactive, NAD  HEENT: AT/NC, Anicteric, Moist MM  Respiratory: Normal work of breathing.on RA.   Cardiovascular: Irregular heart rate. Tachycardia. No murmurs   GI/Abd: Non distended.   Extremities: No pedal edema.  Neuro: AO x 4, Grossly non focal.   Psychiatry: Stable mood.    Medical Decision Making   Moderate    35 MINUTES SPENT BY ME on the date of service doing chart review, history, exam, documentation & further activities per the note.      Data     I have personally reviewed the following data over the past 24 hrs:    N/A  \   10.5 (L)   / N/A     137 108 15 /  232 (H)   4.4 24 0.61 \       Imaging results reviewed over the past 24 hrs:   No results found for this or any previous visit (from the past 24 hour(s)).

## 2023-01-20 NOTE — PLAN OF CARE
VS: VSS, except some low BP noted pt denied lightheadedness,CP or SOB.   O2: Room air sat. > 90%.   Output: Voided on BSC x 2 adequate amount and some incontinent noted.    Last BM: 01/17/23   Activity: Up to chair sitting and up to  BSC with walker, transfer belt and walker.    Skin: Intact except surgical incision.    Pain: Comfortably manageable with PRN medication and ice pack.    Neuro: CMS ans neuro intact.    Dressing: CDI.    Diet: Regular tolerating okay.   LDA: PIV SL.    Equipment: Walker, IV pole, and personal belongings.    Plan: TBD.    Additional Info: Pt heart rate was at 130's about 7 pm before getting up to bathroom pt denied any symptome, up to BSC and back to bed, pt placed on CAPNO monitor pt resting comfortable in bed, report given to on coming RN will continue to monitor pt and notified provider accordingly.        Patient vital signs are at baseline: yes  Patient able to ambulate as they were prior to admission or with assistive devices provided by therapies during their stay: not yet walked up to BSC.  Patient MUST void prior to discharge: yes  Patient able to tolerate oral intake:    Pain has adequate pain control using Oral analgesics: yes  Has goal D/C date and time been discussed with patient: yes.

## 2023-01-20 NOTE — PROGRESS NOTES
text paged MD      [address]. Admitted in 5ORtho, s/p R-BRYANT. Heart rate continuous to  be in between 110-125.Metoprolol given 1x around 8pm yet HR is about the same. Pt laying comfortably in bed, asymptomatic. Pls advice.

## 2023-01-20 NOTE — PROGRESS NOTES
Hospitalist Cross Cover Note:      Developed rapid a fib tonight. Asymptomatic, BP stable. Metoprolol was held this morning due to hypotension.     Give PO metoprolol tartrate 12.5mg x1 dose now. Monitor vitals.       Sarah Don PA-C on 1/19/2023 at 8:04 PM

## 2023-01-21 ENCOUNTER — APPOINTMENT (OUTPATIENT)
Dept: OCCUPATIONAL THERAPY | Facility: CLINIC | Age: 73
DRG: 469 | End: 2023-01-21
Attending: ORTHOPAEDIC SURGERY
Payer: COMMERCIAL

## 2023-01-21 LAB
ALBUMIN SERPL-MCNC: 2.7 G/DL (ref 3.4–5)
ALP SERPL-CCNC: 49 U/L (ref 40–150)
ALT SERPL W P-5'-P-CCNC: 14 U/L (ref 0–50)
ANION GAP SERPL CALCULATED.3IONS-SCNC: 8 MMOL/L (ref 3–14)
AST SERPL W P-5'-P-CCNC: 15 U/L (ref 0–45)
BILIRUB SERPL-MCNC: 1 MG/DL (ref 0.2–1.3)
BUN SERPL-MCNC: 6 MG/DL (ref 7–30)
CALCIUM SERPL-MCNC: 8.5 MG/DL (ref 8.5–10.1)
CHLORIDE BLD-SCNC: 107 MMOL/L (ref 94–109)
CO2 SERPL-SCNC: 26 MMOL/L (ref 20–32)
CREAT SERPL-MCNC: 0.54 MG/DL (ref 0.52–1.04)
ERYTHROCYTE [DISTWIDTH] IN BLOOD BY AUTOMATED COUNT: 13.3 % (ref 10–15)
GFR SERPL CREATININE-BSD FRML MDRD: >90 ML/MIN/1.73M2
GLUCOSE BLD-MCNC: 196 MG/DL (ref 70–99)
GLUCOSE BLDC GLUCOMTR-MCNC: 198 MG/DL (ref 70–99)
GLUCOSE BLDC GLUCOMTR-MCNC: 199 MG/DL (ref 70–99)
GLUCOSE BLDC GLUCOMTR-MCNC: 227 MG/DL (ref 70–99)
GLUCOSE BLDC GLUCOMTR-MCNC: 290 MG/DL (ref 70–99)
GLUCOSE BLDC GLUCOMTR-MCNC: 342 MG/DL (ref 70–99)
HCT VFR BLD AUTO: 31.9 % (ref 35–47)
HGB BLD-MCNC: 10.7 G/DL (ref 11.7–15.7)
MCH RBC QN AUTO: 30.9 PG (ref 26.5–33)
MCHC RBC AUTO-ENTMCNC: 33.5 G/DL (ref 31.5–36.5)
MCV RBC AUTO: 92 FL (ref 78–100)
PHOSPHATE SERPL-MCNC: 2.3 MG/DL (ref 2.5–4.5)
PLATELET # BLD AUTO: 172 10E3/UL (ref 150–450)
POTASSIUM BLD-SCNC: 4 MMOL/L (ref 3.4–5.3)
PROT SERPL-MCNC: 5.8 G/DL (ref 6.8–8.8)
RBC # BLD AUTO: 3.46 10E6/UL (ref 3.8–5.2)
SODIUM SERPL-SCNC: 141 MMOL/L (ref 133–144)
WBC # BLD AUTO: 7.5 10E3/UL (ref 4–11)

## 2023-01-21 PROCEDURE — 250N000009 HC RX 250: Performed by: INTERNAL MEDICINE

## 2023-01-21 PROCEDURE — 85027 COMPLETE CBC AUTOMATED: CPT | Performed by: INTERNAL MEDICINE

## 2023-01-21 PROCEDURE — 258N000003 HC RX IP 258 OP 636

## 2023-01-21 PROCEDURE — 250N000013 HC RX MED GY IP 250 OP 250 PS 637: Performed by: INTERNAL MEDICINE

## 2023-01-21 PROCEDURE — 250N000009 HC RX 250: Performed by: NURSE PRACTITIONER

## 2023-01-21 PROCEDURE — 258N000003 HC RX IP 258 OP 636: Performed by: INTERNAL MEDICINE

## 2023-01-21 PROCEDURE — 99232 SBSQ HOSP IP/OBS MODERATE 35: CPT | Performed by: INTERNAL MEDICINE

## 2023-01-21 PROCEDURE — 120N000002 HC R&B MED SURG/OB UMMC

## 2023-01-21 PROCEDURE — 80053 COMPREHEN METABOLIC PANEL: CPT | Performed by: INTERNAL MEDICINE

## 2023-01-21 PROCEDURE — 36415 COLL VENOUS BLD VENIPUNCTURE: CPT | Performed by: INTERNAL MEDICINE

## 2023-01-21 PROCEDURE — 250N000013 HC RX MED GY IP 250 OP 250 PS 637: Performed by: PHYSICIAN ASSISTANT

## 2023-01-21 PROCEDURE — 250N000013 HC RX MED GY IP 250 OP 250 PS 637

## 2023-01-21 PROCEDURE — 99207 PR NOT IN PERSON INPATIENT CONSULT STATISTICAL MARKER: CPT | Performed by: INTERNAL MEDICINE

## 2023-01-21 PROCEDURE — 97535 SELF CARE MNGMENT TRAINING: CPT | Mod: GO | Performed by: OCCUPATIONAL THERAPIST

## 2023-01-21 PROCEDURE — 84100 ASSAY OF PHOSPHORUS: CPT | Performed by: INTERNAL MEDICINE

## 2023-01-21 RX ORDER — METOPROLOL TARTRATE 1 MG/ML
5 INJECTION, SOLUTION INTRAVENOUS EVERY 5 MIN PRN
Status: DISCONTINUED | OUTPATIENT
Start: 2023-01-21 | End: 2023-01-23

## 2023-01-21 RX ORDER — SODIUM CHLORIDE 9 MG/ML
INJECTION, SOLUTION INTRAVENOUS
Status: COMPLETED
Start: 2023-01-21 | End: 2023-01-21

## 2023-01-21 RX ORDER — METOPROLOL TARTRATE 25 MG/1
50 TABLET, FILM COATED ORAL 4 TIMES DAILY
Status: DISCONTINUED | OUTPATIENT
Start: 2023-01-21 | End: 2023-01-24

## 2023-01-21 RX ADMIN — SENNOSIDES AND DOCUSATE SODIUM 1 TABLET: 8.6; 5 TABLET ORAL at 19:52

## 2023-01-21 RX ADMIN — INSULIN ASPART 2 UNITS: 100 INJECTION, SOLUTION INTRAVENOUS; SUBCUTANEOUS at 09:16

## 2023-01-21 RX ADMIN — ACETAMINOPHEN 975 MG: 325 TABLET, FILM COATED ORAL at 08:25

## 2023-01-21 RX ADMIN — SENNOSIDES AND DOCUSATE SODIUM 1 TABLET: 8.6; 5 TABLET ORAL at 08:25

## 2023-01-21 RX ADMIN — METOPROLOL TARTRATE 5 MG: 5 INJECTION INTRAVENOUS at 19:26

## 2023-01-21 RX ADMIN — Medication 37.5 MG: at 13:06

## 2023-01-21 RX ADMIN — METOPROLOL TARTRATE 5 MG: 5 INJECTION INTRAVENOUS at 10:19

## 2023-01-21 RX ADMIN — Medication 37.5 MG: at 16:07

## 2023-01-21 RX ADMIN — METOPROLOL TARTRATE 50 MG: 25 TABLET, FILM COATED ORAL at 19:52

## 2023-01-21 RX ADMIN — APIXABAN 5 MG: 2.5 TABLET, FILM COATED ORAL at 08:25

## 2023-01-21 RX ADMIN — Medication 37.5 MG: at 09:13

## 2023-01-21 RX ADMIN — ACETAMINOPHEN 650 MG: 325 TABLET, FILM COATED ORAL at 18:29

## 2023-01-21 RX ADMIN — ACETAMINOPHEN 975 MG: 325 TABLET, FILM COATED ORAL at 00:25

## 2023-01-21 RX ADMIN — POTASSIUM PHOSPHATE, MONOBASIC AND POTASSIUM PHOSPHATE, DIBASIC 30 MMOL: 224; 236 INJECTION, SOLUTION INTRAVENOUS at 11:14

## 2023-01-21 RX ADMIN — METOPROLOL TARTRATE 5 MG: 5 INJECTION INTRAVENOUS at 08:19

## 2023-01-21 RX ADMIN — APIXABAN 5 MG: 2.5 TABLET, FILM COATED ORAL at 19:52

## 2023-01-21 RX ADMIN — SCOPALAMINE 1 PATCH: 1 PATCH, EXTENDED RELEASE TRANSDERMAL at 19:57

## 2023-01-21 RX ADMIN — INSULIN ASPART 2 UNITS: 100 INJECTION, SOLUTION INTRAVENOUS; SUBCUTANEOUS at 13:12

## 2023-01-21 RX ADMIN — INSULIN ASPART 4 UNITS: 100 INJECTION, SOLUTION INTRAVENOUS; SUBCUTANEOUS at 18:30

## 2023-01-21 RX ADMIN — POLYETHYLENE GLYCOL 3350 17 G: 17 POWDER, FOR SOLUTION ORAL at 08:26

## 2023-01-21 RX ADMIN — METOPROLOL TARTRATE 5 MG: 5 INJECTION INTRAVENOUS at 01:35

## 2023-01-21 RX ADMIN — SODIUM CHLORIDE 1000 ML: 9 INJECTION, SOLUTION INTRAVENOUS at 04:33

## 2023-01-21 RX ADMIN — METOPROLOL TARTRATE 5 MG: 5 INJECTION INTRAVENOUS at 18:30

## 2023-01-21 ASSESSMENT — ACTIVITIES OF DAILY LIVING (ADL)
ADLS_ACUITY_SCORE: 33
ADLS_ACUITY_SCORE: 33
ADLS_ACUITY_SCORE: 26
ADLS_ACUITY_SCORE: 33
ADLS_ACUITY_SCORE: 26
ADLS_ACUITY_SCORE: 33
ADLS_ACUITY_SCORE: 26
ADLS_ACUITY_SCORE: 33

## 2023-01-21 NOTE — PROGRESS NOTES
A&Ox4, calm and cooperative, able to make needs known with call light in reach.  VSS x HR, on RA, tele in place d/t afib, continuous pulse ox in place, urgency incontinence at times, A1 with walker and gait belt.  LBM 1/21.  BG WNL overnight.  LPIV infusing IV fluids.  PRN metoprolol given overnight.  Incision site CDI.  Continue POC.

## 2023-01-21 NOTE — PLAN OF CARE
Patient A&O x4 and able to make her needs known. Denied CP, lightheadedness, dizziness, numbness or tingling. Denied SOB/CERDA. PIV phosphorous given for replacement. Pt is drinking well and voiding spontaneously without difficulties. Pain tolerable and taken. Ice pack applied to L leg and incentive spirometer encouraged and done several times, repositioned and turned in bed, heels elevated off bed, demonstrates the ability to use call light appropriately. BG stable and insulin covered. Elevated HR Metoprolol 5mg IV x2 given and scheduled one was increased QID. Will continue with POC.

## 2023-01-21 NOTE — CONSULTS
"    [facility]    Cardiology Consult Note-Cardiology    Consultation performed as e-consultation due to patient location.     Date of Admission:  1/18/2023  Consult Requested by: Internal Medicine  Reason for Consult: \"Patinet with known AFib now with RVR ( uncontrolled), S/P TKR- preventing therapies\"    Assessment & Plan: SL   Latoya Rodríguez is a 72 year old female with a history of persistent atrial fibrillation on metoprolol succinate 50mg for rate control and anticoagulated with apixaban for stroke risk reduction who is admitted to hospital following elective JUAN PABLO.  Post-operatively she has experienced episodes of RVR which have proven difficult to rate control. Cardiology has been consulted for assistance with Afib management.    #Persistent Atrial Fibrillation  #Paroxysmal RVR  Patient with history of persistent atiral fibrillation with multiple failed attempts at rhythm control in the past, currently managed with a rate control strategy prior to her current admission. Episodes of RVR are likely to be driven by excess adrenergic tone in the setting of recent surgical intervention. These are best addressed by minimizing exacerbating factors particularly pain. In the interim, continue with a rate control strategy utilizing increasing doses of metoprolol as needed. Target a resting heart rate of <110bpm. No data exists to guide target heart rate with exertion/activity.   - metoprolol tartrate can be dosed QID with dose uptitration (up to 200mg total daily dose) as needed to target a resting heart rate of less than 110 bpm  - once heart rate is reasonably controlled on metoprolol tartrate the dose can be consolidated to a once daily equivalent dose of metoprolol succinate  - patient with normal LV systolic function, therefore CCB such as diltiazem would be a reasonable alternative agent if unable to achieve heart rate control with beta-blocker therapy  - please make " sure that pain is well controlled  - continue PTA apixaban     These recommendations were discussed with the primary team.     The patient's care was discussed with the Attending Physician, Dr. Armijo.      John Bonner MD   Cardiology Fellow  Pager: [phone]  [facility] [place]    ______________________________________________________________________    Chief Complaint   Dizziness    History is obtained from the chart review.     History of Present Illness   Latoya Rodríguez is a 72 year old female admitted on 1/18/2023  s/p total right hip arthroplasty.  She has a history of persistent atrial fibrillation on metoprolol succinate 50mg for rate control and anticoagulated with apixaban for stroke risk reduction prior to admission.  Post-operatively she has experienced episodes of RVR which have proven difficult to rate control. Cardiology has been consulted for assistance with Afib management.      Past Medical History    Past Medical History:   Diagnosis Date     Cataract      Chronic atrial fibrillation (H) 06/08/2022     Osteoarthritis of carpometacarpal (CMC) joint of both thumbs 11/16/2022     Post-operative nausea and vomiting 1/18/2023    Severe. Recommend TIVA     Type 2 diabetes mellitus with hyperglycemia (H) 06/24/2016       Past Surgical History   Past Surgical History:   Procedure Laterality Date     ANESTHESIA CARDIOVERSION N/A 9/23/2021    Procedure: ANESTHESIA, FOR CARDIOVERSION@1100;  Surgeon: GENERIC ANESTHESIA PROVIDER;  Location: [facility] OR     ANESTHESIA OUT OF OR MRI N/A 9/14/2022    Procedure: ANESTHESIA OUT OF OR Magnetic resonance imaging right hip and lumbar @0800;  Surgeon: GENERIC ANESTHESIA PROVIDER;  Location: [facility] OR     ARTHROPLASTY HIP Right 1/18/2023    Procedure: ARTHROPLASTY, HIP, TOTAL RIGHT;  Surgeon: Arnaud Ward MD;  Location: [facility] OR     CYSTOSCOPY, INJECT COLLAGEN, COMBINED N/A 5/11/2021    Procedure: CYSTOSCOPY, WITH PERIURETHRAL BULKING AGENT  INJECTION;  Surgeon: Alberto Zhang MD;  Location: [facility] OR     CYSTOSCOPY, INJECT COLLAGEN, COMBINED N/A 9/12/2022    Procedure: CYSTOSCOPY, WITH PERIURETHRAL BULKING AGENT INJECTION (look in the bladder and urethra and inject filler into the urethra);  Surgeon: Alberto Zhang MD;  Location: [facility] OR     EYE SURGERY  9/04, 5/11    Retinal detachment, Cataract (both eyes)     IMPLANT STIMULATOR AND LEADS SACRAL NERVE (STAGE ONE AND TWO) Right 7/5/2022    Procedure: INSERTION, SACRAL NERVE STIMULATOR, STAGE 1 & 2 (permanent implant on the RIGHT side with [employer]on[employer]);  Surgeon: Alberto Zhang MD;  Location: [facility] OR     IMPLANT STIMULATOR SACRAL NERVE PERCUTANEOUS TRIAL N/A 6/14/2022    Procedure: INSERTION, NEUROSTIMULATOR, SACRAL, PERCUTANEOUS, FOR TRIAL;  Surgeon: Alberto Zhang MD;  Location: [facility] OR     RELEASE CARPAL TUNNEL Right 8/6/2021    Procedure: RELEASE, CARPAL TUNNEL, Right;  Surgeon: RADHA Sandoval MD;  Location: [facility] OR     RELEASE TRIGGER FINGER Bilateral 5/10/2019    Procedure: RELEASE TRIGGER FINGER, BILATERAL LONG AND RING FINGERS;  Surgeon: RADHA Sandoval MD;  Location: [facility] OR     VASCULAR SURGERY      Varicose Veins       Medications   I have reviewed this patient's current medications  Medications Prior to Admission   Medication Sig Dispense Refill Last Dose     apixaban ANTICOAGULANT (ELIQUIS) 5 MG tablet Take 1 tablet (5 mg) by mouth 2 times daily Resume the day following your procedure, on 7/6/2022 180 tablet 3 1/15/2023     atorvastatin (LIPITOR) 10 MG tablet Take 1 tablet (10 mg) by mouth daily 90 tablet 3 Past Week     calcium carbonate-vitamin D 500-400 MG-UNIT TABS tablt Take 1 tablet by mouth 2 times daily 180 tablet 3 Past Week     estradiol (ESTRACE) 0.1 MG/GM vaginal cream Place 2 g vaginally twice a week 42.5 g 11 Past Week     insulin aspart (NOVOLOG FLEXPEN) 100 UNIT/ML pen 1 unit per 15 gram of carb for all meals. Plus 1 unit per 50 mg/dl above 150. Total  daily dose approx 30 units. 30 mL 3 1/17/2023     metoprolol succinate ER (TOPROL XL) 50 MG 24 hr tablet TAKE ONE TABLET BY MOUTH EVERY MORNING 90 tablet 0 1/18/2023 at 0600     Multiple Vitamins-Minerals (MULTIVITAMIN ADULT PO) Take 1 tablet by mouth every morning    Past Week     blood glucose (ACCU-CHEK GUIDE) test strip Use to test blood sugar 6 times daily 550 strip 3      blood glucose monitoring (ACCU-CHEK FASTCLIX) lancets USE TO TEST BLOOD SUGAR FOUR TIMES A DAY OR AS DIRECTED 306 each 3      Continuous Blood Gluc  (DEXCOM G6 ) JUANA Use to read blood sugars as per 's instructions. (Patient not taking: Reported on 1/17/2023) 1 each 0 Not Taking     Continuous Blood Gluc Sensor (DEXCOM G6 SENSOR) MISC Change every 10 days. (Patient not taking: Reported on 1/9/2023) 3 each 11      Continuous Blood Gluc Transmit (DEXCOM G6 TRANSMITTER) MISC Change every 3 months. (Patient not taking: Reported on 1/9/2023) 1 each 3      insulin pen needle (B-D U/F) 31G X 8 MM miscellaneous USE 5 TIMES DAILY  WITH NOVOLOG AND LANTUS 500 each 3      Urine Glucose-Ketones Test (KETO-DIASTIX) STRP 1 strip by In Vitro route as needed (Patient not taking: Reported on 1/9/2023) 1 each 11      [DISCONTINUED] hydrOXYzine (ATARAX) 25 MG tablet Take 1 tablet (25 mg) by mouth nightly as needed for itching 30 tablet 1 Unknown     [DISCONTINUED] insulin glargine (BASAGLAR KWIKPEN) 100 UNIT/ML pen Inject 35 Units Subcutaneous daily Max, 35 units daily (Patient taking differently: Inject 30 Units Subcutaneous daily Max, 35 units daily) 45 mL 2 1/17/2023       Physical Exam   No physical exam performed. Consultation completed as e-consultation.    Medical Decision Making             Data     I have personally reviewed the following data over the past 24 hrs:    7.5  \   10.7 (L)   / 172     141 107 6 (L) /  290 (H)   4.0 26 0.54 \       ALT: 14 AST: 15 AP: 49 TBILI: 1.0   ALB: 2.7 (L) TOT PROTEIN: 5.8 (L) LIPASE: N/A        Imaging results reviewed over the past 24 hrs:   No results found for this or any previous visit (from the past 24 hour(s)).     Echocardiogram 11/30/21  Global and regional left ventricular function is normal with an EF of 55-60%.  Global right ventricular function is normal.  Trileaflet aortic sclerosis without stenosis. Mld to moderate aortic  insufficiency is present. Mild mitral and tricuspid insufficiency present.  The inferior vena cava was normal in size with preserved respiratory  variability.  No pericardial effusion is present.  ______________________________________________________________________________      EKG 1/20/23

## 2023-01-21 NOTE — PLAN OF CARE
[id number]  No significant changes this shift. Pt's HR was primarily in the 110-120 range but started to go above 130 more frequently, so prn IV metoprolol was administered x1. CMS intact. Assist of 1 with gait belt and walker to bathroom. Pt denies pain ex. while ambulating to the bathroom -- denied pain meds. Left PIV SL. Incision on right hip covered with gauze and tegaderm, dressing C/D/I. Blister above surgical incision covered with mepilex. Continue POC.

## 2023-01-21 NOTE — PROGRESS NOTES
"Orthopaedic Daily Progress Note:  Patient seen and examined.    S/Interval History:  Overnight no events.  Pain is well controlled. On Med/Surg. Discussed patient with internal medicine. To be seen By cardiology, stable. Up to hallway with PT earlier but held this AM awaiting cards.     O:  /83 (BP Location: Right arm)   Pulse 109   Temp 98.3  F (36.8  C) (Oral)   Resp 20   Ht 1.651 m (5' 5\")   Wt 74.4 kg (164 lb 0.4 oz)   SpO2 97%   BMI 27.29 kg/m      A&O, NAD, breathing is non-labored. Distally EHL, GS, TA are 5/5 with sensation to light touch at baseline, and palpable distal pulse.     I/O last 3 completed shifts:  In: -   Out: 1050 [Urine:1050]      Results:  BMPRecent Labs   Lab 01/21/23  0852 01/21/23  0233 01/20/23  2138 01/20/23  1726 01/20/23  1133 01/20/23  1012 01/19/23  1309 01/19/23  1034   NA  --   --   --   --   --  136  --  137   POTASSIUM  --   --   --   --   --  4.0  --  4.4   CHLORIDE  --   --   --   --   --  106  --  108   CO2  --   --   --   --   --  23  --  24   BUN  --   --   --   --   --  10  --  15   CR  --   --   --   --   --  0.58  --  0.61   * 199* 229* 260*   < > 284*   < > 177*    < > = values in this interval not displayed.     CBC  Recent Labs   Lab 01/20/23  1012 01/19/23  1034   WBC 8.0  --    HGB 10.8* 10.5*     --      Dresing is CDI. No spotting.     A/P: Doing well.  72 year old female POD 2 RTHA. Doing well. Discharge when medically appropriate.                          "

## 2023-01-21 NOTE — PLAN OF CARE
Transferred to unit from [address] [place] [address] at 9:15pm accompanied by dtr Corrie.Pt is alert,well orientated.  R hip drsg CDI.Swelling around site notable.CMS intact.  Urgency with urination.Up to commode atleast x2 with CGA1,GB and walker.Already voided on pad then voided more on commode.Is passing gas,last BM couple days ago per pt.  Senna given.  Telemonitoring shows Afib with HR reaching up to the 130-133 range.MD aware and IV metoprolol was given.Currently,HR is on the 100-115 range.Remains asymptomatic during the entire course her HR is elevated.Denying any chest pain,dizzinesss/lightheadedness.  New PIV line on L hand is patent for IV LR at 100ml/hr and PRN IV meto.  Denies pain at rest but does have pain on affected R hip with activity.Declines pain meds offered.States want to be awakened or scheduled tylenol.Ice pack applied.  BG checked for 229,covered with  1 unit novolog.HS lantus 30 units given.Will check BG at 2AM.  Call light is near reach.  Dtr was seen teary eyed and admits to being scared for her mom with her HR condition.Dtr and pt were assured that pt is in the right unit for continous monitoring.Dtr left in abt 20-30mins after pt settled in room.Dtr states okay to be called anytime at night when pt's HR goes up to 160's.

## 2023-01-21 NOTE — PLAN OF CARE
"VS: /53 (BP Location: Left arm)   Pulse (!) 133   Temp 98.8  F (37.1  C) (Oral)   Resp 17   Ht 1.651 m (5' 5\")   Wt 74.4 kg (164 lb 0.4 oz)   SpO2 95%   BMI 27.29 kg/m       O2: >90% on room air   Output: Voiding spontaneously on BSC   Last BM: 1/17/23   Activity: WBAT. Ax1 with walker and gait belt    Skin: Incision to right hip    Pain: Managed with schedule Tylenol and PRN Tylenol    CMS: Alert and oriented x4. Denies numbness and tingling.    Dressing: CDI   Diet: Regular diet,BG before each meal    LDA: IV infusing sodium chloride @100mL/hr   Equipment: IV pole/pump, walker, gait belt, PCDs, BSC, personal belongings, call light within patient reach    Plan: To transfer to Telemetry unit due to elevated HR into the 160's. MD aware (please see MD note), patient c/o dizziness with ambulation. IV fluids infusing. Upcoming nurse notified of patient condition.    Additional Info:         "

## 2023-01-21 NOTE — CARE PLAN
Patient seen at bedside for ongoing afib with rvr. Decided to give metoprolol IV 5 mg x1 this evening. We're holding PTA metoprolol-XR 50 mg and starting metoprolol tartrate 37.5 mg Q8H. Patient pending transfer to [facility] for telemetry monitoring.   Also, has metoprolol 5 mg IV prn if HR sustaining > 130.       KAIDEN JARQUIN MD  Hospitalist Service  [facility]

## 2023-01-21 NOTE — PROGRESS NOTES
Orthopaedic Surgery Progress Note 01/21/2023    Subjective  No acute events overnight.  Doing well this AM, comfortable.  Pain controlled. Tolerating diet. No HA/CP/SOB/N/V/abd pain. Voiding. Denies new numbness, tingling, or weakness.  Tolerating diet without nausea or vomiting.     PT recommending home w/ home assist.    Objective  Temp: 98.3  F (36.8  C) Temp src: Oral BP: 114/83 Pulse: 109   Resp: 20 SpO2: 97 % O2 Device: None (Room air)      Exam:  Gen: No acute distress, resting comfortably in bed.  Resp: Non-labored breathing  Cardio: Regular rate via peripheral pulse  MSK:  RLE:  - Dressings c/d/I  - Fires TA, GSC, EHL, FHL  - SILT tibial/sural/saphenous/DP/SP nerves  - PT/DP pulses 2+, foot wwp    Recent Labs   Lab 01/20/23  1012 01/19/23  1034   WBC 8.0  --    HGB 10.8* 10.5*     --         Assessment: Latoya Rodríguez is a 72 year old female with right hip OA, chronic atrial fibrillation, DM2 now s/p right BRYANT on 1/18/2023 with Dr. Ward     Goal for 01/21/23:  - Pain control/medical monitoring  - Anticipate discharge today pending medical stability     Plan:  Activity: Up with assist until independent  Weight bearing status: WBAT RLE with posterior hip precautions x3 months.  Antibiotics: s/p Ancef x24 hours perioperatively.  Diet: Begin with clear fluids and progress diet as tolerated.  DVT prophylaxis: PTA Eliquis to resume POD1 and mechanical while in the hospital  Wound Care: Exofin, steri-strips, silver alginate, tegaderm   Pain management: orals as tolerated.   X-rays: AP pelvis, cross table lateral right hip in PACU.  Physical Therapy:  ROM, ADL's.  Occupational Therapy: ADL's.  Labs: POD1 Hb 10.5  Consults: PT, OT. Medicine, appreciate assistance in caring for this patient.     Follow-up: Clinic with Dr. Ward on 2/28/2023     Disposition: Pending medical stability, anticipate discharge to home on 1/21.    Leroy Solorzano MD  Orthopaedic Surgery, PGY-5  Pager: [phone]    Please page the  orthopaedic surgery resident on call with any questions/concerns       Future Appointments   Date Time Provider Department Center   1/20/2023  9:00 AM Tory Emanuel OT [facility] [place]   1/20/2023 11:00 AM Sandra Christopher, PT [facility] [place]   1/20/2023  1:30 PM Sandra Christopher, PT [facility] [place]   1/25/2023 10:10 AM Jaun Millard, PT [facility]   1/31/2023  1:00 PM [facility] NURSE ONLY [facility] [place]   2/28/2023  1:20 PM Arnaud Ward MD [facility] [place]   3/29/2023  2:00 PM Amadeo Whipple MD [facility] [place]   6/19/2023 11:10 AM Edward Aguero MD [facility] [place]

## 2023-01-21 NOTE — PROGRESS NOTES
[facility]    Medicine Progress Note - Hospitalist Service, GOLD TEAM 19    Date of Admission:  1/18/2023    Assessment & Plan       Latoya Rodríguez is a 72 year old female admitted on 1/18/2023.  Admitted s/p total right hip arthroplasty.  Estimated blood loss 200 ml.  No complications during surgery.  Internal medicine was consulted for medical co-management.  Patient has a past medical history significant for diabetes mellitus type 1, atrial fibrillation and dyslipidemia.      S/p total right hip arthroplasty, POD#3  - Ortho is primary team.  -  Post op Mx per ortho: DVT prophylaxis, pain control and mala-op antibiotics per Ortho.  - Gentle hydration until adequate po.   - Capnography per protocol.  - PT and OT per protocol.  - Disposition per Ortho team.  - Monitor hemoglobin for post op acute blood loss. Transfuse for <7.0. Post op Hgb at 10.8 reflecting acute blood loss anemia, but transfusion not necessary   - Consider bowel regimen while patient is on narcotics.     Diabetes mellitus type 1  - Continue monitoring blood glucose.   - Patient taking Lantus 30 units at bedtime.  1/18: Post Op started on 20 units of Lantus HS, now increased to 30 units   - Patient is also taking carb count at home a ratio of 1:15. resume. 1/19/2023  -Ct sliding scale insulin.   -Home meds to resume at discharge      Atrial fibrillation with Rapid Ventricular response   - Resume Apixaban  - Patient went into RVR on 1/19/, received 1 dose of IV metoprolol 12.5 mg followed by home dose of 50 mg XL  Patient likely went into RVR as she did not receive her long acting metoprolol dose on POD#1. Other causes are being evaluated  Continues to be symptomatic ( dizzy) with increase in rates up to 140's with ambulation   - Electrolytes ( persistent Low phos) and renal function within normal limits  - Will replace phos again today ( 1/21) with 30 m mols   -Overnight Metoprolol dose changed to short  acting 37.5 mg TID  - Received 2 doses of IV labetalol 20 mg on 1/20 and has received 2 doses of IV metoprolol 5 mg IV X 2  - IV labetolol 20 mg Q 4 hrs PRN for HR > 110  - Discussed with Cardiology team. They reviewed the charts. Plan to increase the frequency of metoprolol to 37.5 mg 4 times daily, and increase to 200 mg total in 24 hrs. If patient has still rates > 110, consider a second line agent ( diltiazem)         Dyslipidemia  - Per patient's report she is not taking Lipitor at this time.  Defer to her primary care physician to restart this medication.     Rest per primary team.        Disposition Plan     Expected Discharge Date: 01/22/2023                  Satya Rubio MD  Hospitalist Service, [address]  Securely message with Vocera (more info)  Text page via [facility] Paging/Directory   See signed in provider for up to date coverage information  ______________________________________________________________________    Interval History   Charts reviewed.   Patient was transferred to a telemetry unit for persistent A Fib  Denies palpitation or chest pain  No fever or chills  Eating and drinking well         Physical Exam   Vital Signs: Temp: 99.4  F (37.4  C) Temp src: Oral BP: 118/75 Pulse: (!) 137   Resp: 22 SpO2: 96 % O2 Device: None (Room air)    Weight: 164 lbs .36 oz    General Appearance: Awake, interactive, NAD  HEENT: AT/NC, Anicteric, Moist MM  Respiratory: Normal work of breathing.on RA.   Cardiovascular: Irregular heart rate. Tachycardia. No murmurs   GI/Abd: Non distended.   Extremities: No pedal edema.  Neuro: AO x 4, Grossly non focal.   Psychiatry: Stable mood.    Medical Decision Making   Moderate    35 MINUTES SPENT BY ME on the date of service doing chart review, history, exam, documentation & further activities per the note.      Data     I have personally reviewed the following data over the past 24 hrs:    7.5  \    10.7 (L)   / 172     141 107 6 (L) /  198 (H)   4.0 26 0.54 \       ALT: 14 AST: 15 AP: 49 TBILI: 1.0   ALB: 2.7 (L) TOT PROTEIN: 5.8 (L) LIPASE: N/A       Imaging results reviewed over the past 24 hrs:   No results found for this or any previous visit (from the past 24 hour(s)).

## 2023-01-22 LAB
ALBUMIN SERPL-MCNC: 2.6 G/DL (ref 3.4–5)
ALP SERPL-CCNC: 49 U/L (ref 40–150)
ALT SERPL W P-5'-P-CCNC: 14 U/L (ref 0–50)
ANION GAP SERPL CALCULATED.3IONS-SCNC: 6 MMOL/L (ref 3–14)
AST SERPL W P-5'-P-CCNC: 14 U/L (ref 0–45)
BILIRUB SERPL-MCNC: 1.1 MG/DL (ref 0.2–1.3)
BUN SERPL-MCNC: 7 MG/DL (ref 7–30)
CALCIUM SERPL-MCNC: 8.5 MG/DL (ref 8.5–10.1)
CHLORIDE BLD-SCNC: 109 MMOL/L (ref 94–109)
CO2 SERPL-SCNC: 26 MMOL/L (ref 20–32)
CREAT SERPL-MCNC: 0.6 MG/DL (ref 0.52–1.04)
GFR SERPL CREATININE-BSD FRML MDRD: >90 ML/MIN/1.73M2
GLUCOSE BLD-MCNC: 123 MG/DL (ref 70–99)
GLUCOSE BLDC GLUCOMTR-MCNC: 210 MG/DL (ref 70–99)
GLUCOSE BLDC GLUCOMTR-MCNC: 232 MG/DL (ref 70–99)
GLUCOSE BLDC GLUCOMTR-MCNC: 296 MG/DL (ref 70–99)
GLUCOSE BLDC GLUCOMTR-MCNC: 73 MG/DL (ref 70–99)
HOLD SPECIMEN: NORMAL
MAGNESIUM SERPL-MCNC: 1.9 MG/DL (ref 1.6–2.3)
PHOSPHATE SERPL-MCNC: 2.7 MG/DL (ref 2.5–4.5)
POTASSIUM BLD-SCNC: 3.8 MMOL/L (ref 3.4–5.3)
PROT SERPL-MCNC: 5.8 G/DL (ref 6.8–8.8)
SODIUM SERPL-SCNC: 141 MMOL/L (ref 133–144)

## 2023-01-22 PROCEDURE — 250N000013 HC RX MED GY IP 250 OP 250 PS 637: Performed by: PHYSICIAN ASSISTANT

## 2023-01-22 PROCEDURE — 250N000013 HC RX MED GY IP 250 OP 250 PS 637: Performed by: INTERNAL MEDICINE

## 2023-01-22 PROCEDURE — 99232 SBSQ HOSP IP/OBS MODERATE 35: CPT | Performed by: INTERNAL MEDICINE

## 2023-01-22 PROCEDURE — 120N000002 HC R&B MED SURG/OB UMMC

## 2023-01-22 PROCEDURE — 36415 COLL VENOUS BLD VENIPUNCTURE: CPT | Performed by: INTERNAL MEDICINE

## 2023-01-22 PROCEDURE — 80053 COMPREHEN METABOLIC PANEL: CPT | Performed by: INTERNAL MEDICINE

## 2023-01-22 PROCEDURE — 250N000013 HC RX MED GY IP 250 OP 250 PS 637

## 2023-01-22 PROCEDURE — 83735 ASSAY OF MAGNESIUM: CPT | Performed by: INTERNAL MEDICINE

## 2023-01-22 PROCEDURE — 84100 ASSAY OF PHOSPHORUS: CPT | Performed by: INTERNAL MEDICINE

## 2023-01-22 PROCEDURE — 250N000009 HC RX 250: Performed by: INTERNAL MEDICINE

## 2023-01-22 RX ORDER — DILTIAZEM HYDROCHLORIDE 120 MG/1
120 CAPSULE, COATED, EXTENDED RELEASE ORAL DAILY
Status: DISCONTINUED | OUTPATIENT
Start: 2023-01-22 | End: 2023-01-24 | Stop reason: HOSPADM

## 2023-01-22 RX ADMIN — METOPROLOL TARTRATE 50 MG: 25 TABLET, FILM COATED ORAL at 20:14

## 2023-01-22 RX ADMIN — APIXABAN 5 MG: 2.5 TABLET, FILM COATED ORAL at 09:01

## 2023-01-22 RX ADMIN — ACETAMINOPHEN 650 MG: 325 TABLET, FILM COATED ORAL at 05:48

## 2023-01-22 RX ADMIN — ACETAMINOPHEN 650 MG: 325 TABLET, FILM COATED ORAL at 20:55

## 2023-01-22 RX ADMIN — DILTIAZEM HYDROCHLORIDE 120 MG: 120 CAPSULE, COATED, EXTENDED RELEASE ORAL at 09:02

## 2023-01-22 RX ADMIN — INSULIN ASPART 2 UNITS: 100 INJECTION, SOLUTION INTRAVENOUS; SUBCUTANEOUS at 12:33

## 2023-01-22 RX ADMIN — APIXABAN 5 MG: 2.5 TABLET, FILM COATED ORAL at 20:14

## 2023-01-22 RX ADMIN — SENNOSIDES AND DOCUSATE SODIUM 1 TABLET: 8.6; 5 TABLET ORAL at 09:02

## 2023-01-22 RX ADMIN — SENNOSIDES AND DOCUSATE SODIUM 1 TABLET: 8.6; 5 TABLET ORAL at 20:14

## 2023-01-22 RX ADMIN — METOPROLOL TARTRATE 50 MG: 25 TABLET, FILM COATED ORAL at 15:57

## 2023-01-22 RX ADMIN — METOPROLOL TARTRATE 50 MG: 25 TABLET, FILM COATED ORAL at 09:00

## 2023-01-22 RX ADMIN — POLYETHYLENE GLYCOL 3350 17 G: 17 POWDER, FOR SOLUTION ORAL at 09:02

## 2023-01-22 RX ADMIN — ACETAMINOPHEN 650 MG: 325 TABLET, FILM COATED ORAL at 15:57

## 2023-01-22 RX ADMIN — INSULIN ASPART 2 UNITS: 100 INJECTION, SOLUTION INTRAVENOUS; SUBCUTANEOUS at 18:47

## 2023-01-22 RX ADMIN — METOPROLOL TARTRATE 5 MG: 5 INJECTION INTRAVENOUS at 06:37

## 2023-01-22 RX ADMIN — METOPROLOL TARTRATE 50 MG: 25 TABLET, FILM COATED ORAL at 12:29

## 2023-01-22 ASSESSMENT — ACTIVITIES OF DAILY LIVING (ADL)
ADLS_ACUITY_SCORE: 30
ADLS_ACUITY_SCORE: 26
ADLS_ACUITY_SCORE: 30
ADLS_ACUITY_SCORE: 30
ADLS_ACUITY_SCORE: 26
ADLS_ACUITY_SCORE: 30
ADLS_ACUITY_SCORE: 30
ADLS_ACUITY_SCORE: 26

## 2023-01-22 NOTE — PROGRESS NOTES
[facility]    Medicine Progress Note - Hospitalist Service, GOLD TEAM 19    Date of Admission:  1/18/2023    Assessment & Plan       Latoya Rodríguez is a 72 year old female admitted on 1/18/2023.  Admitted s/p total right hip arthroplasty.  Estimated blood loss 200 ml.  No complications during surgery.  Internal medicine was consulted for medical co-management.  Patient has a past medical history significant for diabetes mellitus type 1, atrial fibrillation and dyslipidemia.      S/p total right hip arthroplasty, POD#4  - Ortho is primary team.  -  Post op Mx per ortho: DVT prophylaxis, pain control and mala-op antibiotics per Ortho.  - Gentle hydration until adequate po.   - Capnography per protocol.  - PT and OT per protocol.  - Disposition per Ortho team.  - Monitor hemoglobin for post op acute blood loss. Transfuse for <7.0. Post op Hgb at 10.8 reflecting acute blood loss anemia, but transfusion not necessary   - Consider bowel regimen while patient is on narcotics.     Diabetes mellitus type 1  - Continue monitoring blood glucose.   - Patient taking Lantus 30 units at bedtime.  1/18: Post Op started on 20 units of Lantus HS, now increased to 30 units   - Patient is also taking carb count at home a ratio of 1:15. resume. 1/19/2023  -Ct sliding scale insulin.   -Home meds to resume at discharge      Atrial fibrillation with Rapid Ventricular response   - Resume Apixaban  - Patient went into RVR on 1/19/, received 1 dose of IV metoprolol 12.5 mg followed by home dose of 50 mg XL  Patient likely went into RVR as she did not receive her long acting metoprolol dose on POD#1. Other causes are being evaluated  She continued  to be symptomatic ( dizzy) with increase in rates up to 140's with ambulation   - Electrolytes and renal function within normal limits on 1/22 after phosphorus replacement on 1/20 and 1/21   -Overnight on 1/20,  Metoprolol dose changed to short acting  37.5 mg TID  - Received 2 doses of IV labetalol 20 mg on 1/20 and has received  5 doses of IV metoprolol 5 mg IV  since 1/21  - Discussed with Cardiology team( 1/21). They reviewed the charts. Plan to increase the frequency of metoprolol to 37.5 mg 4 times daily, and increase to 200 mg total in 24 hrs. If patient has still rates > 110, consider a second line agent ( diltiazem)  - Extensive conversation had with both the daughters at bedside regarding this plan on 1/21  - Patient has already reached the max of 200 mg of Metoprolol ( 1/22). Therefore, per their recommendations, Diltiazem initiated ( Diltiazem 120 mg XL)  - At discharge, will likely consolidate Metoprolol to  mg BID  - See sperate note by Cardiology        Dyslipidemia  - Per patient's report she is not taking Lipitor at this time.  Defer to her primary care physician to restart this medication.     Rest per primary team.        Disposition Plan     Expected Discharge Date: 01/22/2023                  Satya Rubio MD  Hospitalist Service, [address]  Securely message with [employer] (more info)  Text page via [facility] Paging/Directory   See signed in provider for up to date coverage information  ______________________________________________________________________    Interval History   Charts reviewed.   No acute complaints. Patient does not feel the palpitations  Denies chest pain  'Eating and drinking well       Physical Exam   Vital Signs: Temp: 98.6  F (37  C) Temp src: Oral BP: 115/70 Pulse: (!) 121   Resp: 18 SpO2: 94 % O2 Device: None (Room air)    Weight: 164 lbs .36 oz    General Appearance: Awake, interactive, NAD  HEENT: AT/NC, Anicteric, Moist MM  Respiratory: Normal work of breathing.on RA.   Cardiovascular: Irregular heart rate. Tachycardia. No murmurs   GI/Abd: Non distended.   Extremities: No pedal edema.  Neuro: AO x 4, Grossly non focal.   Psychiatry: Stable  mood.    Medical Decision Making   Moderate    35 MINUTES SPENT BY ME on the date of service doing chart review, history, exam, documentation & further activities per the note.      Data     I have personally reviewed the following data over the past 24 hrs:    N/A  \   N/A   / N/A     141 109 7 /  210 (H)   3.8 26 0.60 \       ALT: 14 AST: 14 AP: 49 TBILI: 1.1   ALB: 2.6 (L) TOT PROTEIN: 5.8 (L) LIPASE: N/A       Imaging results reviewed over the past 24 hrs:   No results found for this or any previous visit (from the past 24 hour(s)).

## 2023-01-22 NOTE — PROGRESS NOTES
Orthopaedic Surgery Progress Note 01/22/2023    Interval Events  - AFVSS with afib with RVR  - Medicine: increased metoprolol to max dose, added metorprolol 5mg IV if refractory after IV    Subjective  No acute events overnight.  Expresses more pain this AM but only using few doses of tylenol, no hydroxyzine/oxycodone.  Denies new numbness, tingling, or weakness.  Tolerating diet without nausea or vomiting.  Voiding spontaneously.  +flatus.  No chest pain, SOB, lightheadedness    Objective  Temp: 98.1  F (36.7  C) Temp src: Oral BP: 124/69 Pulse: (!) 122   Resp: 19 SpO2: 96 % O2 Device: None (Room air)      Exam:  Gen: No acute distress, resting comfortably in bed.  Resp: Non-labored breathing  Cardio: Regular rate via peripheral pulse  MSK:  RLE:  - Dressings c/d/I  - Small blister at proximal extent of dressing -- covered with mepilex  - Fires TA, GSC, EHL, FHL  - SILT tibial/sural/saphenous/DP/SP nerves  - PT/DP pulses 2+, foot wwp    Recent Labs   Lab 01/21/23  0847 01/20/23  1012 01/19/23  1034   WBC 7.5 8.0  --    HGB 10.7* 10.8* 10.5*    167  --        Assessment: Latoya Rodríguez is a 72 year old female with right hip OA, chronic atrial fibrillation, DM2 now s/p right BRYANT on 1/18/2023 with Dr. Ward    Postoperatively with atrial fibrillation with RVR, asymptomatic.  Medicine/cardiology managing.     Goal for 01/22/23:  - Pain control/medical monitoring -- please offer PO medications as available  - Anticipate discharge today pending medical stability     Plan:  Activity: Up with assist until independent  Weight bearing status: WBAT RLE with posterior hip precautions x3 months.  Antibiotics: s/p Ancef x24 hours perioperatively.  Diet: Begin with clear fluids and progress diet as tolerated.  DVT prophylaxis: PTA Eliquis to resume POD1 and mechanical while in the hospital  Wound Care: Exofin, steri-strips, silver alginate, tegaderm   Pain management: orals as tolerated.   X-rays: AP pelvis, cross table  lateral right hip in PACU -- complete  Physical Therapy:  ROM, ADL's.  Occupational Therapy: ADL's.  Labs: POD1 Hb 10.5  Consults: PT, OT. Medicine, appreciate assistance in caring for this patient.     Follow-up: Clinic with Dr. Ward on 2/28/2023     Disposition: Pending medical stability, anticipate discharge to home as early as 1/22    Clemente Toure MD  Orthopaedic Surgery PGY-4  [phone]    Please page me at [phone] with any questions/concerns. If there is no response, if it is a weekend, or if it is during evening hours then please page the orthopaedic surgery resident on call.       Future Appointments   Date Time Provider Department Center   1/22/2023  7:00 AM UR OT WAITLIST [facility] [place]   1/22/2023  8:45 AM Dylan Huerta PTA [facility] [place]   1/22/2023  1:30 PM Dylan Huerta PTA [facility] [place]   1/25/2023 10:10 AM Jaun Millard, PT [facility]   1/31/2023  1:00 PM MG NURSE ONLY ORTHO [facility] [place]   2/28/2023  1:20 PM Arnaud Ward MD [facility] [place]   3/29/2023  2:00 PM Amadeo Whipple MD [facility] [place]   6/19/2023 11:10 AM Edward Aguero MD [facility] [place]

## 2023-01-22 NOTE — PLAN OF CARE
HR this morning was hanging around 140-150, pt asymptomatic. Scheduled oral metoprolol and oral diltiazem administered, and after about 1-1.5 hrs, HR decreased to 100-110 range. No IV metoprolol administered this shift. Pt and her family spoke with cardiology team and plan to follow up outpatient after discharge. Continue telemetry monitoring. Pt c/o pain in bilateral anterior thighs -- managed with prn ibuprofen and heat packs, pt declined oxycodone. Left PIV SL. Incision C/D/I. Continue POC. Likely discharge tomorrow.

## 2023-01-22 NOTE — PROGRESS NOTES
Brief x-cover note    Pt again in Afib w/ RVR rate 140-150s and remains asymptomatic    - Increased metoprolol tartrate and is now at max dose (50mg QID)  - added prn metoprolol 5mg IV if refractory after oral medication  -patient is due for oral dose now, so will use this prior to giving further IV dose, d/w patient's RN    Nanci Marks PA-C  [facility]  Contact information available via [facility] Paging/Directory

## 2023-01-23 ENCOUNTER — APPOINTMENT (OUTPATIENT)
Dept: PHYSICAL THERAPY | Facility: CLINIC | Age: 73
DRG: 469 | End: 2023-01-23
Attending: ORTHOPAEDIC SURGERY
Payer: COMMERCIAL

## 2023-01-23 LAB
ANION GAP SERPL CALCULATED.3IONS-SCNC: 10 MMOL/L (ref 3–14)
BUN SERPL-MCNC: 11 MG/DL (ref 7–30)
CALCIUM SERPL-MCNC: 8.3 MG/DL (ref 8.5–10.1)
CHLORIDE BLD-SCNC: 104 MMOL/L (ref 94–109)
CO2 SERPL-SCNC: 23 MMOL/L (ref 20–32)
CREAT SERPL-MCNC: 0.56 MG/DL (ref 0.52–1.04)
GFR SERPL CREATININE-BSD FRML MDRD: >90 ML/MIN/1.73M2
GLUCOSE BLD-MCNC: 304 MG/DL (ref 70–99)
GLUCOSE BLDC GLUCOMTR-MCNC: 137 MG/DL (ref 70–99)
GLUCOSE BLDC GLUCOMTR-MCNC: 236 MG/DL (ref 70–99)
GLUCOSE BLDC GLUCOMTR-MCNC: 315 MG/DL (ref 70–99)
GLUCOSE BLDC GLUCOMTR-MCNC: 318 MG/DL (ref 70–99)
GLUCOSE BLDC GLUCOMTR-MCNC: 318 MG/DL (ref 70–99)
MAGNESIUM SERPL-MCNC: 2 MG/DL (ref 1.6–2.3)
POTASSIUM BLD-SCNC: 4.1 MMOL/L (ref 3.4–5.3)
SODIUM SERPL-SCNC: 137 MMOL/L (ref 133–144)

## 2023-01-23 PROCEDURE — 250N000013 HC RX MED GY IP 250 OP 250 PS 637: Performed by: PHYSICIAN ASSISTANT

## 2023-01-23 PROCEDURE — 82248 BILIRUBIN DIRECT: CPT | Performed by: INTERNAL MEDICINE

## 2023-01-23 PROCEDURE — 99231 SBSQ HOSP IP/OBS SF/LOW 25: CPT | Mod: GC | Performed by: INTERNAL MEDICINE

## 2023-01-23 PROCEDURE — 83735 ASSAY OF MAGNESIUM: CPT | Performed by: INTERNAL MEDICINE

## 2023-01-23 PROCEDURE — 80053 COMPREHEN METABOLIC PANEL: CPT | Performed by: INTERNAL MEDICINE

## 2023-01-23 PROCEDURE — 36415 COLL VENOUS BLD VENIPUNCTURE: CPT | Performed by: INTERNAL MEDICINE

## 2023-01-23 PROCEDURE — 93010 ELECTROCARDIOGRAM REPORT: CPT | Performed by: INTERNAL MEDICINE

## 2023-01-23 PROCEDURE — 250N000013 HC RX MED GY IP 250 OP 250 PS 637: Performed by: INTERNAL MEDICINE

## 2023-01-23 PROCEDURE — 99232 SBSQ HOSP IP/OBS MODERATE 35: CPT | Performed by: INTERNAL MEDICINE

## 2023-01-23 PROCEDURE — 120N000002 HC R&B MED SURG/OB UMMC

## 2023-01-23 PROCEDURE — 97116 GAIT TRAINING THERAPY: CPT | Mod: GP

## 2023-01-23 PROCEDURE — 97535 SELF CARE MNGMENT TRAINING: CPT | Mod: GO

## 2023-01-23 PROCEDURE — 250N000013 HC RX MED GY IP 250 OP 250 PS 637

## 2023-01-23 PROCEDURE — 258N000003 HC RX IP 258 OP 636: Performed by: INTERNAL MEDICINE

## 2023-01-23 PROCEDURE — 97530 THERAPEUTIC ACTIVITIES: CPT | Mod: GP

## 2023-01-23 PROCEDURE — 258N000003 HC RX IP 258 OP 636

## 2023-01-23 RX ORDER — HYDROXYZINE HYDROCHLORIDE 25 MG/1
25-50 TABLET, FILM COATED ORAL EVERY 6 HOURS PRN
Status: DISCONTINUED | OUTPATIENT
Start: 2023-01-23 | End: 2023-01-24 | Stop reason: HOSPADM

## 2023-01-23 RX ORDER — HYDROXYZINE HYDROCHLORIDE 25 MG/1
25 TABLET, FILM COATED ORAL EVERY 6 HOURS PRN
Status: DISCONTINUED | OUTPATIENT
Start: 2023-01-23 | End: 2023-01-23

## 2023-01-23 RX ORDER — ACETAMINOPHEN 325 MG/1
975 TABLET ORAL 3 TIMES DAILY
Status: DISCONTINUED | OUTPATIENT
Start: 2023-01-23 | End: 2023-01-24 | Stop reason: HOSPADM

## 2023-01-23 RX ORDER — TRAZODONE HYDROCHLORIDE 50 MG/1
50 TABLET, FILM COATED ORAL
Status: DISCONTINUED | OUTPATIENT
Start: 2023-01-23 | End: 2023-01-24 | Stop reason: HOSPADM

## 2023-01-23 RX ORDER — LANOLIN ALCOHOL/MO/W.PET/CERES
3 CREAM (GRAM) TOPICAL AT BEDTIME
Status: DISCONTINUED | OUTPATIENT
Start: 2023-01-23 | End: 2023-01-24 | Stop reason: HOSPADM

## 2023-01-23 RX ORDER — METHOCARBAMOL 500 MG/1
500 TABLET, FILM COATED ORAL 4 TIMES DAILY PRN
Status: DISCONTINUED | OUTPATIENT
Start: 2023-01-23 | End: 2023-01-24

## 2023-01-23 RX ORDER — SODIUM CHLORIDE 9 MG/ML
INJECTION, SOLUTION INTRAVENOUS
Status: COMPLETED
Start: 2023-01-23 | End: 2023-01-23

## 2023-01-23 RX ORDER — METOPROLOL TARTRATE 1 MG/ML
5 INJECTION, SOLUTION INTRAVENOUS EVERY 6 HOURS PRN
Status: DISCONTINUED | OUTPATIENT
Start: 2023-01-23 | End: 2023-01-24 | Stop reason: HOSPADM

## 2023-01-23 RX ORDER — GABAPENTIN 100 MG/1
100 CAPSULE ORAL AT BEDTIME
Status: DISCONTINUED | OUTPATIENT
Start: 2023-01-23 | End: 2023-01-23

## 2023-01-23 RX ADMIN — METOPROLOL TARTRATE 50 MG: 25 TABLET, FILM COATED ORAL at 08:10

## 2023-01-23 RX ADMIN — ACETAMINOPHEN 650 MG: 325 TABLET, FILM COATED ORAL at 11:51

## 2023-01-23 RX ADMIN — SENNOSIDES AND DOCUSATE SODIUM 1 TABLET: 8.6; 5 TABLET ORAL at 08:08

## 2023-01-23 RX ADMIN — INSULIN ASPART 4 UNITS: 100 INJECTION, SOLUTION INTRAVENOUS; SUBCUTANEOUS at 13:09

## 2023-01-23 RX ADMIN — DILTIAZEM HYDROCHLORIDE 120 MG: 120 CAPSULE, COATED, EXTENDED RELEASE ORAL at 08:07

## 2023-01-23 RX ADMIN — ACETAMINOPHEN 975 MG: 325 TABLET ORAL at 17:57

## 2023-01-23 RX ADMIN — METOPROLOL TARTRATE 50 MG: 25 TABLET, FILM COATED ORAL at 11:48

## 2023-01-23 RX ADMIN — SODIUM CHLORIDE 500 ML: 9 INJECTION, SOLUTION INTRAVENOUS at 13:23

## 2023-01-23 RX ADMIN — INSULIN ASPART 2 UNITS: 100 INJECTION, SOLUTION INTRAVENOUS; SUBCUTANEOUS at 17:57

## 2023-01-23 RX ADMIN — SODIUM CHLORIDE 500 ML: 9 INJECTION, SOLUTION INTRAVENOUS at 14:55

## 2023-01-23 RX ADMIN — APIXABAN 5 MG: 2.5 TABLET, FILM COATED ORAL at 08:08

## 2023-01-23 RX ADMIN — APIXABAN 5 MG: 2.5 TABLET, FILM COATED ORAL at 20:03

## 2023-01-23 RX ADMIN — SENNOSIDES AND DOCUSATE SODIUM 1 TABLET: 8.6; 5 TABLET ORAL at 20:03

## 2023-01-23 RX ADMIN — POLYETHYLENE GLYCOL 3350 17 G: 17 POWDER, FOR SOLUTION ORAL at 08:07

## 2023-01-23 RX ADMIN — METOPROLOL TARTRATE 50 MG: 25 TABLET, FILM COATED ORAL at 20:04

## 2023-01-23 RX ADMIN — MELATONIN TAB 3 MG 3 MG: 3 TAB at 20:03

## 2023-01-23 ASSESSMENT — ACTIVITIES OF DAILY LIVING (ADL)
ADLS_ACUITY_SCORE: 26
ADLS_ACUITY_SCORE: 30
ADLS_ACUITY_SCORE: 26
ADLS_ACUITY_SCORE: 30

## 2023-01-23 NOTE — PLAN OF CARE
Goal Outcome Evaluation:    Patient alert/oriented x4, room air. Lung sounds clear bilaterally. No c/o SOB. GI sounds audible, good appetite/regular diet. BM 1/22. Pain managed with tylenol. Urgency issues with bladder/brief on. Tele/permanent AFIB with RVR. Assistof 1 with walker/gait belt. Right hip incision CDI. BM today.     Call light within reach, able to make needs known.

## 2023-01-23 NOTE — PROGRESS NOTES
[facility]   Cardiology   Progress Note     ASSESSMENT:  #Permanent atrial fibrillation  #Rapid ventricular response  Latoya Rodríguez is a 72 year old year old female with PMH of atrial fibrillation on rate control and anticoagulation, DMI, and hyperlipidemia admitted 1/18 s/p total right hip arthroplasty without complications. Patient is chronically in atrial fibrillation. Patient was found to be in asymptomatic atrial fibrillation with RVR 1/19 after daily metoprolol was held due to low blood pressures. Patient hemoglobin also ~2 points lower after surgery despite minimal blood loss during the procedure. She is now on Metoprolol tartrate 50 QID and diltiazem 120. Pulse seems to be returning to baseline ~100 and patient is minimally symptomatic.     PLAN:  -HR in the 110s after surgery is acceptable, office visit HR are in the low 100s usually.   -recommend deescalating rate controlling agents, would start by stopping diltiazem and would begin transitioning to long acting metoprolol succinate. Would try 100mg BID tomorrow with goal of further downtitration over time.     Patient seen and discussed with Dr. Sanders, who agrees with above plan.    Kirstie Braun, MS4  [facility] Cardiology Team    I saw this patient together with medical student, Kirstie Braun, and performed an independent exam at the same time which confirmed findings. I have edited this note as appropriate to reflect our joint assessment/plan. Patient was also seen and discussed with attending physician, Dr. Sanders, who is in agreement with above.     Chinmay Collado MD  Cardiology Fellow  [phone]      Interval History:  Patient went into RVR on 1/19/23, received 1 dose of IV metoprolol 12.5 mg followed by home dose of 50 mg XL. She is now in metoprolol succinate 50 mg QID and continues to be in atrial fibrillation with rates ranging from 100-130s.  Patient likely went into RVR as she did not receive her long acting  metoprolol dose on POD#1 and hemoglobin is 2 points below preoperative level. Other causes are being evaluated by medicine.      Physical Exam:  Temp:  [97.7  F (36.5  C)-98.4  F (36.9  C)] 98.1  F (36.7  C)  Pulse:  [] 106  Resp:  [15-24] 24  BP: (107-121)/(52-72) 121/72  SpO2:  [96 %-99 %] 98 %    Wt:   Wt Readings from Last 5 Encounters:   01/18/23 74.4 kg (164 lb 0.4 oz)   01/09/23 73.7 kg (162 lb 8 oz)   12/23/22 75.2 kg (165 lb 11.2 oz)   10/25/22 76.1 kg (167 lb 11.2 oz)   10/24/22 74.9 kg (165 lb 3.2 oz)     Virtual visit - no physical exam completed    Medications:    apixaban ANTICOAGULANT  5 mg Oral BID     diltiazem ER COATED BEADS  120 mg Oral Daily     insulin aspart   Subcutaneous TID AC     insulin aspart  1-7 Units Subcutaneous TID AC     insulin aspart  1-5 Units Subcutaneous At Bedtime     insulin glargine  30 Units Subcutaneous At Bedtime     [Held by provider] metoprolol succinate ER  50 mg Oral QAM     metoprolol tartrate  50 mg Oral 4x Daily     polyethylene glycol  17 g Oral Daily     scopolamine  1 patch Transdermal Q72H    And     scopolamine   Transdermal Q8H     senna-docusate  1 tablet Oral BID     sodium chloride (PF)  3 mL Intracatheter Q8H         Labs:     CBC  Recent Labs   Lab 01/21/23  0847 01/20/23  1012 01/19/23  1034   WBC 7.5 8.0  --    RBC 3.46* 3.48*  --    HGB 10.7* 10.8* 10.5*   HCT 31.9* 32.0*  --    MCV 92 92  --    MCH 30.9 31.0  --    MCHC 33.5 33.8  --    RDW 13.3 13.3  --     167  --        ECG:  Atrial fibrillation with rapid ventricular response   Low voltage QRS   Right bundle branch block   Abnormal ECG     When compared with ECG of 23-SEP-2021 08:52 unchanged      Echo 11/30/2021:  Global and regional left ventricular function is normal with an EF of 55-60%.  Global right ventricular function is normal.  Trileaflet aortic sclerosis without stenosis. Mld to moderate aortic  insufficiency is present. Mild mitral and tricuspid insufficiency  present.  The inferior vena cava was normal in size with preserved respiratory  variability.  No pericardial effusion is present.    Mild biatrial enlargement    Attending Attestation: I have seen and evaluated the patient and spent a total of 25 minutes with this patient and clinical rounding time. 15 minutes of this time was spent in counseling and coordination of care. I agree with the assessment and plan.  -Ventricular rates of up to 110 bpm are acceptable in atrial fibrillation

## 2023-01-23 NOTE — PROGRESS NOTES
[facility]    Medicine Progress Note - Hospitalist Service, GOLD TEAM 19    Date of Admission:  1/18/2023    Assessment & Plan       Latoya Rodríguez is a 72 year old female admitted on 1/18/2023.  Admitted s/p total right hip arthroplasty.  Estimated blood loss 200 ml.  No complications during surgery.  Internal medicine was consulted for medical co-management.  Patient has a past medical history significant for diabetes mellitus type 1, atrial fibrillation and dyslipidemia.     Today's changes: 1/23/2023  Overall doing better   However still HR In 110s  Progressing slowly with therapy.   Ct to monitor on tele.   Iv 500 ml nSS Bolus  BG High: increase carbh coverage w snacks.   PT, OT  Likely home tomorrow.        S/p total right hip arthroplasty.  POD 5    - Ortho is primary team.  - Post op Mx per ortho: DVT prophylaxis, pain control and mala-op antibiotics per Ortho.  - Gentle hydration until adequate po.   - Capnography per protocol.  - PT and OT per protocol.  - Disposition per Ortho team.  - Monitor hemoglobin for post op acute blood loss. Transfuse for <7.0. Post op Hgb at 10.8 reflecting acute blood loss anemia.  - Consider bowel regimen while patient is on narcotics.     Diabetes mellitus type 1  - Continue monitoring blood glucose.   - Patient taking Lantus 30 units at bedtime.  1/18: Post Op started on 20 units of Lantus HS, now increased to 30 units   - Patient is also taking carb count at home a ratio of 1:15. resume. 1/19/2023: adjust to 1:10 cho coverage.   -Continue sliding scale insulin.   - Home meds to resume at discharge      Atrial fibrillation with Rapid Ventricular response   - Resume Apixaban  - Patient went into RVR on 1/19/, with mild LH. Currently asymptomatic  - Electrolytes and renal function within normal limits on 1/22 after phosphorus replacement on 1/20 and 1/21   - Overnight on 1/20,  Metoprolol dose changed to short acting 37.5 mg TID  -  Received 2 doses of IV labetalol 20 mg on 1/20 and has received  5 doses of IV metoprolol 5 mg IV  since 1/21  - Discussed with Cardiology team( 1/21). They reviewed the charts. Plan to increase the frequency of metoprolol to 37.5 mg 4 times daily, and increase to 200 mg total in 24 hrs. If patient has still rates > 110, consider a second line agent ( diltiazem)  - Extensive conversation had with both the daughters at bedside regarding this plan on 1/21  - Patient has already reached the max of 200 mg of Metoprolol ( 1/22). Therefore, per their recommendations, Diltiazem initiated ( Diltiazem 120 mg XL)  - At discharge, will likely consolidate Metoprolol to  mg BID  - See seperate note by Cardiology    - pain control, keep hydrated. Monitor bp        Dyslipidemia  - Per patient's report she is not taking Lipitor at this time.  Defer to her primary care physician to restart this medication.     Rest per primary team.        Disposition Plan      Expected Discharge Date: 01/23/2023                  Armaan Riggs MD  Hospitalist Service, GOLD TEAM [address] [place] [address]  Securely message with [employer] (more info)  Text page via [employer] Paging/Directory   See signed in provider for up to date coverage information  ______________________________________________________________________    Interval History   Charts reviewed.   No acute complaints. Patient does not feel the palpitations  Currently no LH or dizziness  Denies chest pain or sob   'Eating and drinking well       Physical Exam   Vital Signs: Temp: 98.1  F (36.7  C) Temp src: Oral BP: 109/64 Pulse: 106   Resp: 24 SpO2: 98 % O2 Device: None (Room air)    Weight: 164 lbs .36 oz    General Appearance: Awake, interactive, NAD  HEENT: AT/NC, Anicteric, Moist MM  Respiratory: Normal work of breathing.on RA.   Cardiovascular: Irregular heart rate. Tachycardia. No murmurs   GI/Abd: Non distended.   Extremities: No pedal  edema.  Neuro: AO x 4, Grossly non focal.   Psychiatry: Stable mood.    Medical Decision Making   Moderate    35 MINUTES SPENT BY ME on the date of service doing chart review, history, exam, documentation & further activities per the note.      Data     I have personally reviewed the following data over the past 24 hrs:    N/A  \   N/A   / N/A     137 104 11 / 304 (H)   4.1 23 0.56 \       Imaging results reviewed over the past 24 hrs:   No results found for this or any previous visit (from the past 24 hour(s)).

## 2023-01-23 NOTE — PROGRESS NOTES
A&Ox4, calm and cooperative, able to make needs known with call light in reach.  On RA, urgency incontinence, LBM 1/21.  Denied SOB, CP, n/t, n/v.  Reported pain, given PRN tylenol.  BG checks, insulin given at bedtime.  Bed alarm on.  1A with walker and gait belt.  Pt requested to use commode overnight for easier transfers.  LPIV SL.  Continue POC.

## 2023-01-23 NOTE — PROGRESS NOTES
Orthopaedic Surgery Progress Note 01/23/2023    Interval Events  - AFVSS  - Medicine/cardiology following: on max metoprolol, added diltiazem -- intend to consolidate to metoprolol XL 100mg BID at discharge    Subjective  No acute events overnight.  Pain controlled. Denies new numbness, tingling, or weakness.  Tolerating diet without nausea or vomiting.  Voiding spontaneously.  +flatus, -BM.   Denies chest pain, SOB.  Eager to discharge as soon as able    Objective  Temp: 97.7  F (36.5  C) Temp src: Oral BP: 109/61 Pulse: 81   Resp: 22 SpO2: 96 % O2 Device: None (Room air)      Exam:  Gen: No acute distress, resting comfortably in bed.  Resp: Non-labored breathing  Cardio: Regular rate via peripheral pulse  MSK:  RLE:  - Dressings c/d/I  - Small blister at proximal extent of dressing -- covered with mepilex  - Fires TA, GSC, EHL, FHL  - SILT tibial/sural/saphenous/DP/SP nerves  - PT/DP pulses 2+, foot wwp    Recent Labs   Lab 01/21/23  0847 01/20/23  1012 01/19/23  1034   WBC 7.5 8.0  --    HGB 10.7* 10.8* 10.5*    167  --        Assessment: Latoya Rodríguez is a 72 year old female with right hip OA, chronic atrial fibrillation, DM2 now s/p right BRYANT on 1/18/2023 with Dr. Ward     Postoperatively with atrial fibrillation with RVR, asymptomatic.  Medicine/cardiology managing.     Goal for 01/23/23:  - Pain control/medical monitoring -- please offer PO medications as available  - Anticipate discharge pending medical stability -- okay to discharge from orthopaedics perspective     Plan:  Activity: Up with assist until independent  Weight bearing status: WBAT RLE with posterior hip precautions x3 months.  Antibiotics: s/p Ancef x24 hours perioperatively.  Diet: Begin with clear fluids and progress diet as tolerated.  DVT prophylaxis: PTA Eliquis to resume POD1 and mechanical while in the hospital  Wound Care: Exofin, steri-strips, silver alginate, tegaderm   Pain management: orals as tolerated.   X-rays: AP  pelvis, cross table lateral right hip in PACU -- complete  Physical Therapy:  ROM, ADL's.  Occupational Therapy: ADL's.  Labs: POD1 Hb 10.5  Consults: PT, OT. Medicine, appreciate assistance in caring for this patient.     Follow-up: Clinic with Dr. Ward on 2/28/2023     Disposition: Pending medical stability, anticipate discharge to home as early as today    Clemente Toure MD  Orthopaedic Surgery PGY-4  [phone]    Please page me at [phone] with any questions/concerns. If there is no response, if it is a weekend, or if it is during evening hours then please page the orthopaedic surgery resident on call.       Future Appointments   Date Time Provider Department Center   1/23/2023  8:45 AM Duyen Lamb, PT [facility] [place]   1/23/2023  1:00 PM Duyen Lamb, PT [facility] [place]   1/23/2023  1:00 PM Alicia Wagoner, OT [facility] [place]   1/25/2023 10:10 AM Jaun Millard, PT [facility] [place]   1/31/2023  1:00 PM MG NURSE ONLY ORTHO [facility] [place]   2/28/2023  1:20 PM Arnaud Ward MD [facility] [place]   3/29/2023  2:00 PM Amadeo Whipple MD [facility] [place]   6/19/2023 11:10 AM Edward Aguero MD [facility] [place]

## 2023-01-23 NOTE — PROGRESS NOTES
Care Management Discharge Note    Discharge Date: 01/23/2023       Discharge Disposition:  Home    Discharge Services:  Home PT and  RN    Discharge DME:  None    Discharge Transportation: Daughter will transport    Private pay costs discussed: Not applicable      Education Provided on the Discharge Plan:  yes  Persons Notified of Discharge Plans: patient, daughter, HC  Patient/Family in Agreement with the Plan:  Yes    Handoff Referral Completed: No    Additional Information:  This RNCC spoke with patient/daughter to discuss discharge planning, patient doesn't appear to have any complex needs during admission or at discharge and is progressing with mobility. Plan is for patient to return to previous living situation and would benefit from further skilled home physical therapy for strengthening and increase independence with all functional mobility/gait   and follow up RN visit. Daughter will provide transport home.    Went over IMM, was signed and placed in patient chart        Mai HERNANDEZN RN CCM  RN Care Coordinator 8A and 10 ICU  [address] [place] [address]  [email or web address]   Office: (10 ICU) [phone]   Pager: [phone]    For Weekend & Holiday on call RN Care Coordinator:  (Tasks: Home care, home infusion, medical equipment/oxygen, transportation, IMM & DC forms, etc.)   [facility] ([id number]) Saturday & Sunday; (5911-5240)  Recognized Holidays  Pager #1: [phone] Units: 4A, 4C, 4E, 5A & 5B   Pager #2: [phone] Units: 6A, 6B, 6C, 6D  Pager #3: [phone] Units: 7A, 7B, 7C, 7D & 5C   Pager #4: [phone] Units: 5 Ortho, 8A, 10 ICU, & [facility]      For Weekend & Holiday on call Social Work:  (Tasks: TCU, transportation, Hospice, adjustment to illness counseling, Health Care Directives, Child Protection and Domestic Violence concerns, Vulnerable Adult, IMM forms, etc.)   [facility] (0800 - 1630) Saturday and Sunday  Pager:  [phone] Units: 4A, 4C, 4E, 5A and 5B   Pager: [phone] Units: 6A, 6B, 6C, 6D   Pager: [phone]: 7A, 7B, 7C, 7D, and 5C      [facility] ([id number]) Saturday and Sunday  Units: 5 Ortho, 8A, and 10 ICU   Pager: [phone]

## 2023-01-24 ENCOUNTER — APPOINTMENT (OUTPATIENT)
Dept: OCCUPATIONAL THERAPY | Facility: CLINIC | Age: 73
DRG: 469 | End: 2023-01-24
Attending: ORTHOPAEDIC SURGERY
Payer: COMMERCIAL

## 2023-01-24 ENCOUNTER — APPOINTMENT (OUTPATIENT)
Dept: PHYSICAL THERAPY | Facility: CLINIC | Age: 73
DRG: 469 | End: 2023-01-24
Attending: ORTHOPAEDIC SURGERY
Payer: COMMERCIAL

## 2023-01-24 VITALS
HEART RATE: 104 BPM | HEIGHT: 65 IN | DIASTOLIC BLOOD PRESSURE: 55 MMHG | RESPIRATION RATE: 18 BRPM | SYSTOLIC BLOOD PRESSURE: 98 MMHG | BODY MASS INDEX: 27.33 KG/M2 | WEIGHT: 164.02 LBS | TEMPERATURE: 98.6 F | OXYGEN SATURATION: 97 %

## 2023-01-24 LAB
ALBUMIN SERPL-MCNC: 2.6 G/DL (ref 3.4–5)
ALP SERPL-CCNC: 55 U/L (ref 40–150)
ALT SERPL W P-5'-P-CCNC: 21 U/L (ref 0–50)
AST SERPL W P-5'-P-CCNC: 28 U/L (ref 0–45)
BILIRUB DIRECT SERPL-MCNC: 0.2 MG/DL (ref 0–0.2)
BILIRUB SERPL-MCNC: 1 MG/DL (ref 0.2–1.3)
GLUCOSE BLDC GLUCOMTR-MCNC: 179 MG/DL (ref 70–99)
GLUCOSE BLDC GLUCOMTR-MCNC: 229 MG/DL (ref 70–99)
PROT SERPL-MCNC: 5.8 G/DL (ref 6.8–8.8)

## 2023-01-24 PROCEDURE — 99232 SBSQ HOSP IP/OBS MODERATE 35: CPT | Performed by: INTERNAL MEDICINE

## 2023-01-24 PROCEDURE — 250N000012 HC RX MED GY IP 250 OP 636 PS 637: Performed by: INTERNAL MEDICINE

## 2023-01-24 PROCEDURE — 93010 ELECTROCARDIOGRAM REPORT: CPT | Performed by: INTERNAL MEDICINE

## 2023-01-24 PROCEDURE — 93005 ELECTROCARDIOGRAM TRACING: CPT

## 2023-01-24 PROCEDURE — 97116 GAIT TRAINING THERAPY: CPT | Mod: GP

## 2023-01-24 PROCEDURE — 250N000013 HC RX MED GY IP 250 OP 250 PS 637: Performed by: INTERNAL MEDICINE

## 2023-01-24 PROCEDURE — 250N000013 HC RX MED GY IP 250 OP 250 PS 637

## 2023-01-24 PROCEDURE — 97530 THERAPEUTIC ACTIVITIES: CPT | Mod: GP

## 2023-01-24 RX ORDER — METOPROLOL SUCCINATE 50 MG/1
100 TABLET, EXTENDED RELEASE ORAL 2 TIMES DAILY
Status: DISCONTINUED | OUTPATIENT
Start: 2023-01-24 | End: 2023-01-24 | Stop reason: HOSPADM

## 2023-01-24 RX ORDER — DILTIAZEM HYDROCHLORIDE 30 MG/1
30 TABLET, FILM COATED ORAL EVERY 6 HOURS PRN
Status: DISCONTINUED | OUTPATIENT
Start: 2023-01-24 | End: 2023-01-24 | Stop reason: HOSPADM

## 2023-01-24 RX ORDER — METOPROLOL SUCCINATE 100 MG/1
100 TABLET, EXTENDED RELEASE ORAL 2 TIMES DAILY
Qty: 60 TABLET | Refills: 0 | Status: SHIPPED | OUTPATIENT
Start: 2023-01-24 | End: 2023-02-10

## 2023-01-24 RX ORDER — OXYCODONE HYDROCHLORIDE 5 MG/1
5 TABLET ORAL ONCE
Status: COMPLETED | OUTPATIENT
Start: 2023-01-24 | End: 2023-01-24

## 2023-01-24 RX ORDER — DILTIAZEM HYDROCHLORIDE 30 MG/1
30 TABLET, FILM COATED ORAL EVERY 6 HOURS PRN
Qty: 30 TABLET | Refills: 0 | Status: SHIPPED | OUTPATIENT
Start: 2023-01-24 | End: 2023-06-12

## 2023-01-24 RX ORDER — METHOCARBAMOL 500 MG/1
500 TABLET, FILM COATED ORAL 4 TIMES DAILY
Status: DISCONTINUED | OUTPATIENT
Start: 2023-01-24 | End: 2023-01-24 | Stop reason: HOSPADM

## 2023-01-24 RX ORDER — LANOLIN ALCOHOL/MO/W.PET/CERES
3 CREAM (GRAM) TOPICAL
Qty: 30 TABLET | Refills: 0 | Status: SHIPPED | OUTPATIENT
Start: 2023-01-24

## 2023-01-24 RX ORDER — DILTIAZEM HYDROCHLORIDE 30 MG/1
30 TABLET, FILM COATED ORAL ONCE
Status: COMPLETED | OUTPATIENT
Start: 2023-01-24 | End: 2023-01-24

## 2023-01-24 RX ADMIN — METOPROLOL SUCCINATE 100 MG: 50 TABLET, EXTENDED RELEASE ORAL at 08:15

## 2023-01-24 RX ADMIN — INSULIN ASPART 1 UNITS: 100 INJECTION, SOLUTION INTRAVENOUS; SUBCUTANEOUS at 09:02

## 2023-01-24 RX ADMIN — METHOCARBAMOL 500 MG: 500 TABLET ORAL at 08:15

## 2023-01-24 RX ADMIN — METHOCARBAMOL 500 MG: 500 TABLET ORAL at 13:19

## 2023-01-24 RX ADMIN — DILTIAZEM HYDROCHLORIDE 30 MG: 30 TABLET, FILM COATED ORAL at 14:56

## 2023-01-24 RX ADMIN — OXYCODONE HYDROCHLORIDE 5 MG: 5 TABLET ORAL at 01:27

## 2023-01-24 RX ADMIN — APIXABAN 5 MG: 2.5 TABLET, FILM COATED ORAL at 08:16

## 2023-01-24 RX ADMIN — INSULIN ASPART 2 UNITS: 100 INJECTION, SOLUTION INTRAVENOUS; SUBCUTANEOUS at 13:25

## 2023-01-24 RX ADMIN — ACETAMINOPHEN 975 MG: 325 TABLET ORAL at 08:15

## 2023-01-24 RX ADMIN — ACETAMINOPHEN 975 MG: 325 TABLET ORAL at 13:19

## 2023-01-24 RX ADMIN — SENNOSIDES AND DOCUSATE SODIUM 1 TABLET: 8.6; 5 TABLET ORAL at 08:15

## 2023-01-24 RX ADMIN — POLYETHYLENE GLYCOL 3350 17 G: 17 POWDER, FOR SOLUTION ORAL at 08:16

## 2023-01-24 RX ADMIN — INSULIN HUMAN 5 UNITS: 100 INJECTION, SUSPENSION SUBCUTANEOUS at 10:05

## 2023-01-24 ASSESSMENT — ACTIVITIES OF DAILY LIVING (ADL)
ADLS_ACUITY_SCORE: 30
ADLS_ACUITY_SCORE: 29
ADLS_ACUITY_SCORE: 29
ADLS_ACUITY_SCORE: 30
ADLS_ACUITY_SCORE: 28
ADLS_ACUITY_SCORE: 28
ADLS_ACUITY_SCORE: 26
ADLS_ACUITY_SCORE: 30

## 2023-01-24 NOTE — PROGRESS NOTES
A&Ox4, RA, denies SOB, CP, N/T. On tele/permenant Afib with AVR. Reports pain of 5/10 on back, managed with Tylenol. VSS but for pulse ranging .  R hip incision dressing CDI with some discoloration around the incision site. Incontinent of bladder due urgency and LBM 1/23/23. On regular diet with good appetite. Assist of 1 with walker and gait belt to bathroom.

## 2023-01-24 NOTE — CARE PLAN
Temp: 98.6  F (37  C) Temp src: Oral BP: 114/56 Pulse: 85   Resp: 16 SpO2: 97 % O2 Device: None (Room air)    Patient is alert and oriented x4 and able to make needs known. Pt is TELE monitor for permanent Afib with AVR. Right hip incision dressing is CDI. Patient is assist of 1 with transfers using walker and gait belt. Pt  Has bruise on the surgical site.

## 2023-01-24 NOTE — PROGRESS NOTES
[facility]    Medicine Progress Note - Hospitalist Service, GOLD TEAM 18    Date of Admission:  1/18/2023    Assessment & Plan       Latoya Rodríguez is a 72 year old female admitted on 1/18/2023.  Admitted s/p total right hip arthroplasty.  Estimated blood loss 200 ml.  No complications during surgery.  Internal medicine was consulted for medical co-management.  Patient has a past medical history significant for diabetes mellitus type 1, atrial fibrillation and dyslipidemia.     Today's changes:   1/24/2023    Overall doing better. HR ranging 80s to 110s. Pt denies any symptoms as Cp or sob or LH or palpitations. BG high. BP stable. Tele: afib w rvr. Lytes stable  - discontinue diltiazem.   - Adjust metoprolol to  mg bid. Plus diltiazem 30 mg Q 6 hr prn for HR>120.   -  BG high: NPH 5 U subcutaneous x 1. Resume pta home regimen at discharge.   - Further per primary team.   Okay to discharge from medical stand point till HR <120s. Pt asymptomatic. Pt and family at bedside counseled.        S/p total right hip arthroplasty.    - Ortho is primary team.  - Post op Mx per ortho: DVT prophylaxis, pain control and mala-op antibiotics per Ortho.  - PT and OT per protocol.  - Disposition per Ortho team.  - Monitor hemoglobin for post op acute blood loss. Transfuse for <7.0. Post op Hgb at 10.8 reflecting acute blood loss anemia.  - Consider bowel regimen while patient is on narcotics.     Diabetes mellitus type 1  - Continue monitoring blood glucose.   - Patient taking Lantus 30 units at bedtime.  1/18: Post Op started on 20 units of Lantus HS, now increased to 30 units   - Patient is also taking carb count at home a ratio of 1:15. resume. 1/19/2023: adjust to 1:10 cho coverage.   -Continue sliding scale insulin.   - Home meds to resume at discharge      Atrial fibrillation with Rapid Ventricular response   - Resume Apixaban.   - Patient went into RVR on 1/19/, with mild LH.  Currently asymptomatic  - Electrolytes and renal function within normal limits s/p phosphorus replacement on 1/20 and 1/21   - Discussed with Cardiology team( 1/21). They reviewed the charts. Increased the frequency of metoprolol to 50 mg 4 times daily. Added diltiazem 120 mg as HR remains >110s  - Extensive conversation had with both the daughters at bedside regarding this plan on 1/21 and 1/24  - At discharge, will likely consolidate Metoprolol to  mg BID  - See seperate note by Cardiology    - Pain control, keep hydrated. Monitor bp     1/23:   Per Cardiology:   PLAN:  -HR in the 110s after surgery is acceptable, office visit HR are in the low 100s usually.   -recommend deescalating rate controlling agents, would start by stopping diltiazem and would begin transitioning to long acting metoprolol succinate. Would try 100mg BID tomorrow with goal of further downtitration over time.         Hypophosphatemia: replaced.     Dyslipidemia  - Per patient's report she is not taking Lipitor at this time.  Defer to her primary care physician to restart this medication.     Rest per primary team.        Disposition Plan       per primary.     Armaan Riggs MD  Hospitalist Service, GOLD TEAM 18  [facility]  Securely message with [employer] (more info)  Text page via [employer] Paging/Directory   See signed in provider for up to date coverage information  ______________________________________________________________________    Interval History   Charts reviewed.   No acute complaints. Doing better.   Currently no LH or dizziness  Denies chest pain or sob   'Eating and drinking well   Ambulating with therapy      Physical Exam   Vital Signs: Temp: 98.6  F (37  C) Temp src: Oral BP: 112/57 Pulse: 85   Resp: 16 SpO2: 97 % O2 Device: None (Room air)    Weight: 164 lbs .36 oz    General Appearance: Awake, interactive, NAD  HEENT: AT/NC, Anicteric, Moist MM  Respiratory: Normal work of  breathing.on RA.   Cardiovascular: Irregular heart rate. Tachycardia.   GI/Abd: Non distended.   Extremities: No pedal edema.  Neuro: AO x 4, Grossly non focal.   Psychiatry: Stable mood.    Medical Decision Making   Moderate    40 MINUTES SPENT BY ME on the date of service doing chart review, history, exam, documentation & further activities per the note.       nadiya RN, patient, family. Primary team. SW, CC    Data     I have personally reviewed the following data over the past 24 hrs:    N/A  \   N/A   / N/A     137 104 11 /  179 (H)   4.1 23 0.56 \       Imaging results reviewed over the past 24 hrs:   No results found for this or any previous visit (from the past 24 hour(s)).

## 2023-01-24 NOTE — PLAN OF CARE
Goal Outcome Evaluation:      Plan of Care Reviewed With: patient, child    Pt. discharged at 1735 to home.  Pt. was accompanied by daughter, and left with personal belongings.  Prior to discharge, PIV was removed.  Pt. received complete discharge paperwork and all medications as filled by discharge pharmacy.  Pt. was given times of last dose for all discharge medications in writing on discharge medication sheets.  Discharge teaching included all medication, pain management, activity restrictions, dressing changes, and signs and symptoms of infection.  Pt. to follow up with Dr. Ward on 2/28/2023.  Pt. had no further questions at the time of discharge and no unmet needs were identified.

## 2023-01-24 NOTE — PROGRESS NOTES
"Orthopaedic Surgery Progress Note 01/24/2023    Interval Events  - AFVSS  - Medicine/cardiology following, anticipate discharge home today    Subjective  No acute events overnight.  States \"better\" this AM.  Pain controlled. Denies new numbness, tingling, or weakness.  Tolerating diet without nausea or vomiting.  Voiding spontaneously.  +flatus, +BM.  Denies palpitations, SOB, lightheadedness, dizziness    Objective  Temp: 98.6  F (37  C) Temp src: Oral BP: 114/56 Pulse: 85   Resp: 16 SpO2: 97 % O2 Device: None (Room air)      Exam:  Gen: No acute distress, resting comfortably in bed.  Resp: Non-labored breathing  Cardio: Regular rate via peripheral pulse  MSK:  RLE:  - Dressings c/d/I  - Small blister at proximal extent of dressing -- covered with mepilex  - Fires TA, GSC, EHL, FHL  - SILT tibial/sural/saphenous/DP/SP nerves  - PT/DP pulses 2+, foot wwp    Recent Labs   Lab 01/21/23  0847 01/20/23  1012 01/19/23  1034   WBC 7.5 8.0  --    HGB 10.7* 10.8* 10.5*    167  --        Assessment: Latoya Rodríguez is a 72 year old female with right hip OA, chronic atrial fibrillation, DM2 now s/p right BRYANT on 1/18/2023 with Dr. Ward     Postoperatively with atrial fibrillation with RVR, asymptomatic.  Medicine/cardiology managing.     Goal for 01/24/23:  - Pain control/medical monitoring -- please offer PO medications as available  - Anticipate discharge pending medical stability -- okay to discharge from orthopaedics perspective     Plan:  Activity: Up with assist until independent  Weight bearing status: WBAT RLE with posterior hip precautions x3 months.  Antibiotics: s/p Ancef x24 hours perioperatively.  Diet: Begin with clear fluids and progress diet as tolerated.  DVT prophylaxis: PTA Eliquis to resume POD1 and mechanical while in the hospital  Wound Care: Exofin, steri-strips, silver alginate, tegaderm   Pain management: orals as tolerated.   X-rays: AP pelvis, cross table lateral right hip in PACU -- " complete  Physical Therapy:  ROM, ADL's.  Occupational Therapy: ADL's.  Labs: POD1 Hb 10.5  Consults: PT, OT. Medicine, appreciate assistance in caring for this patient.     Follow-up: Clinic with Dr. Ward on 2/28/2023     Disposition: Pending medical stability, anticipate discharge to home as early as today    Clemente Toure MD  Orthopaedic Surgery PGY-4  [phone]    Please page me at [phone] with any questions/concerns. If there is no response, if it is a weekend, or if it is during evening hours then please page the orthopaedic surgery resident on call.       Future Appointments   Date Time Provider Department Center   1/24/2023 10:00 AM Duyen Lamb, PT [facility] [place]   1/24/2023  1:00 PM Alicia Wagoner, OT [facility] [place]   1/25/2023 10:10 AM Jaun Millard, PT [facility] [place]   1/31/2023  1:00 PM MG NURSE ONLY [facility]TH[facility] [place]   2/28/2023  1:20 PM Arnaud Ward MD [facility] [place]   3/29/2023  2:00 PM Amadeo Whipple MD [facility] [place]   6/19/2023 11:10 AM Edward Aguero MD [facility] [place]

## 2023-01-24 NOTE — DISCHARGE INSTRUCTIONS
Per Cardiology 1/23/2023:     PLAN:  -HR in the 110s after surgery is acceptable, office visit HR are in the low 100s usually.   -recommend deescalating rate controlling agents, would start by stopping diltiazem and would begin transitioning to long acting metoprolol succinate. Would try Metoprolol XL 100mg BID tomorrow with goal of further downtitration over time.     *  Follow-up with Primary care and Cardiology as needed after discharge.   Monitor BP, HR closely for next few days.   Pain control.   Stay hydrated.

## 2023-01-24 NOTE — PLAN OF CARE
Occupational Therapy Discharge Summary    Reason for therapy discharge:    Discharged to home with home therapy.    Progress towards therapy goal(s). See goals on Care Plan in [facility] electronic health record for goal details.  Goals partially met.  Barriers to achieving goals:   discharge from facility.    Therapy recommendation(s):    Continued therapy is recommended.  Rationale/Recommendations:  pt has made good progress toward OT goals. No further OT needs at this time. .

## 2023-01-24 NOTE — PROGRESS NOTES
A&Ox4, RA, denies SOB, CP, N/T. On tele/permenant Afib with AVR. Denies pain, managed with scheduled Tylenol but declined other pain interventions. VSS except HR reaching 149 with ambulation, provider aware.  R hip incision dressing CDI with some discoloration around the incision site. Bladder urgency and LBM 1/23/23. On regular diet with good appetite. Assist of 1 with walker and gait belt to bathroom.    Discharging today to home with daughter

## 2023-01-25 ENCOUNTER — TELEPHONE (OUTPATIENT)
Dept: ENDOCRINOLOGY | Facility: CLINIC | Age: 73
End: 2023-01-25

## 2023-01-25 NOTE — DISCHARGE SUMMARY
ORTHOPAEDIC SURGERY DISCHARGE SUMMARY     Date of Admission: 1/18/2023  Date of Discharge: 1/24/2023  5:42 PM  Disposition: Home  Staff Physician: Arnaud Ward MD  Primary Care Provider: Pricila Avila      ADMISSION DIAGNOSIS:  Arthritis of right hip [M16.11]    DISCHARGE DIAGNOSIS:  Arthritis of right hip [M16.11]    PROCEDURES: Procedure(s):  ARTHROPLASTY, HIP, TOTAL RIGHT on 1/18/2023    BRIEF HISTORY:  The patient has a long history of debilitating pain secondary to ostearthritis of the hip.  Despite comprehensive non-operative management these symptoms continued to interfere with activities of daily living.  After discussion of further treatment options including the risks and benefits that patient elected to proceed with a total hip.    HOSPITAL COURSE:    The patient was admitted following the above listed procedures for pain control and rehabilitation. Latoya Rodríguez did well post-operatively. Medicine was consulted post operatively to aid in management of medical co-morbidities. The patient received routine nursing cares and at the time of discharge was medically stable. Vital signs were stable throughout admission with exception to noted to have atrial fibrillation with rapid ventricular response.    Medicine/cardiology managed heart rate with metoprolol and diltiazem.  Patient improved and Medicine coordinated outpatient rate control regimen.       The patient is tolerating a regular diet and is voiding spontaneously. All PT/OT goals have been met for safe mobility. Pain is now controlled on oral medications which will be available on discharge. Stool softeners have been used while taking pain medications to help prevent constipation. Latoya Rodríguez is deemed medically safe to discharge on POD6.     Antibiotics:  Ancef given periop and 24 hours postop.  DVT prophylaxis:  PTA Eliquis resumed after surgery  PT Progress:  Has met PT/OT goals for safe mobility.    Pain Meds:  Weaned off all IV pain  meds by discharge.  Inpatient Events: No significant events or complications with exception to atrial fibrillation with rapid ventricular response.  Medicine/cardiology managed with rate controlling medications; improved by time of discharge    PHYSICAL EXAM:    Gen: No acute distress, resting comfortably in bed.  Resp: Non-labored breathing  Cardio: Regular rate via peripheral pulse  MSK:  RLE:  - Dressings c/d/I  - Small blister at proximal extent of dressing -- covered with mepilex  - Fires TA, GSC, EHL, FHL  - SILT tibial/sural/saphenous/DP/SP nerves  - PT/DP pulses 2+, foot wwp    FOLLOWUP:    Clinic with Dr. Ward on 2/28/2023    Future Appointments   Date Time Provider Department Center   1/25/2023 11:30 AM Tory Emanuel OT [facility] [place]   1/25/2023  7:00 PM UR PT WAITLIST URPT [place]   1/31/2023  1:00 PM [facility] [place]   2/28/2023  1:20 PM [facility] [place]   3/29/2023  2:00 PM Amadeo Whipple MD [facility] [place]   6/19/2023 11:10 AM Edward Aguero [facility]       Orthopaedic Surgery appointments are at the [facility] ([address] [place] [address]). Call [phone] to schedule a follow-up appointment at this location with your provider.     PLANNED DISCHARGE ORDERS:     DVT Prophylaxis: PTA Eliquis     Activity: WBAT RLE with posterior hip precautions x3 months.     Wound Care: see Below      Discharge Medication List as of 1/24/2023 12:42 PM      START taking these medications    Details   acetaminophen (TYLENOL) 325 MG tablet Take 2 tablets (650 mg) by mouth every 4 hours as needed for other, Disp-60 tablet, R-0, E-Prescribe      melatonin 3 MG tablet Take 1 tablet (3 mg) by mouth nightly as needed for sleep, Disp-30 tablet, R-0, E-Prescribe      oxyCODONE (ROXICODONE) 5 MG tablet Take 1 tablet (5 mg) by mouth every 4 hours as needed for moderate pain (4-6), Disp-26 tablet, R-0, E-Prescribe       polyethylene glycol (MIRALAX) 17 g packet Take 17 g by mouth daily, Disp-10 packet, R-0, E-Prescribe      senna-docusate (SENOKOT-S/PERICOLACE) 8.6-50 MG tablet Take 1 tablet by mouth 2 times daily, Disp-30 tablet, R-0, E-Prescribe         CONTINUE these medications which have CHANGED    Details   hydrOXYzine (ATARAX) 25 MG tablet Take 1 tablet (25 mg) by mouth every 6 hours as needed for itching, Disp-40 tablet, R-0, E-Prescribe      insulin glargine (BASAGLAR KWIKPEN) 100 UNIT/ML pen Inject 30 Units Subcutaneous daily Max, 35 units daily, HistoricalIf Basaglar is not covered by insurance, may substitute Lantus or Semglee or other insulin glargine product per insurance preference at same dose and frequency.        metoprolol succinate ER (TOPROL XL) 100 MG 24 hr tablet Take 1 tablet (100 mg) by mouth 2 times daily, Disp-60 tablet, R-0, E-Prescribe         CONTINUE these medications which have NOT CHANGED    Details   apixaban ANTICOAGULANT (ELIQUIS) 5 MG tablet Take 1 tablet (5 mg) by mouth 2 times daily Resume the day following your procedure, on 7/6/2022, Disp-180 tablet, R-3, Historical      atorvastatin (LIPITOR) 10 MG tablet Take 1 tablet (10 mg) by mouth daily, Disp-90 tablet, R-3, E-Prescribe      calcium carbonate-vitamin D 500-400 MG-UNIT TABS tablt Take 1 tablet by mouth 2 times daily, Disp-180 tablet, R-3, E-Prescribe      estradiol (ESTRACE) 0.1 MG/GM vaginal cream Place 2 g vaginally twice a weekDisp-42.5 g, [id number]      insulin aspart (NOVOLOG FLEXPEN) 100 UNIT/ML pen 1 unit per 15 gram of carb for all meals. Plus 1 unit per 50 mg/dl above 150. Total daily dose approx 30 units., Disp-30 mL, R-3, E-Prescribe      Multiple Vitamins-Minerals (MULTIVITAMIN ADULT PO) Take 1 tablet by mouth every morning , Historical      blood glucose (ACCU-CHEK GUIDE) test strip Use to test blood sugar 6 times daily, Disp-550 strip, R-3, E-Prescribe      blood glucose monitoring (ACCU-CHEK FASTCLIX) lancets  "USE TO TEST BLOOD SUGAR FOUR TIMES A DAY OR AS DIRECTED, Disp-306 each, R-3, E-Prescribe      Continuous Blood Gluc  (DEXCOM G6 ) JUANA Use to read blood sugars as per 's instructions., Disp-1 each, R-0, E-Prescribe      Continuous Blood Gluc Sensor (DEXCOM G6 SENSOR) MISC Change every 10 days., Disp-3 each, R-11, E-Prescribe      Continuous Blood Gluc Transmit (DEXCOM G6 TRANSMITTER) MISC Change every 3 months., Disp-1 each, R-3, E-Prescribe      insulin pen needle (B-D U/F) 31G X 8 MM miscellaneous USE 5 TIMES DAILY  WITH NOVOLOG AND LANTUSDisp-500 each, [id number]      Urine Glucose-Ketones Test (KETO-DIASTIX) STRP 1 strip by In Vitro route as needed, Disp-1 each, R-11, E-Prescribe1 bottle of 50 strips               Discharge Procedure Orders   Physical Therapy Referral   Standing Status: Future   Referral Priority: Routine Referral Type: Rehab Therapy Physical Therapy   Number of Visits Requested: 1     Home Care Referral   Referral Priority: Routine: Next available opening Referral Type: Home Health Therapies & Aides   Number of Visits Requested: 1     Reason for your hospital stay   Order Comments: Total hip arthroplasty with Dr. Ward     When to call - Contact Surgeon Team   Order Comments: You may experience symptoms that require follow-up before your scheduled appointment. Refer to the \"[employer] Tool\" for instructions on when to contact your Surgeon Team if you are concerned about pain control, blood clots, constipation, or if you are unable to urinate.     When to call - Reach out to Urgent Care   Order Comments: If you are not able to reach your Surgeon Team and you need immediate care, go to the Orthopedic Walk-in Clinic or Urgent Care at your Surgeon's office.  Do NOT go to the Emergency Room unless you have shortness of breath, chest pain, or other signs of a medical emergency.     When to call - Reasons to Call 911   Order Comments: Call 911 immediately if you " experience sudden-onset chest pain, arm weakness/numbness, slurred speech, or shortness of breath     Discharge Instruction - Breathing exercises   Order Comments: Perform breathing exercises using your Incentive Spirometer 10 times per hour while awake for 2 weeks.     Symptoms - Fever Management   Order Comments: A low grade fever can be expected after surgery.  Use acetaminophen (TYLENOL) as needed for fever management.  Contact your Surgeon Team if you have a fever greater than 101.5 F, chills, and/or night sweats.     Symptoms - Constipation management   Order Comments: Constipation (hard, dry bowel movements) is expected after surgery due to the combination of being less active, the anesthetic, and the opioid pain medication.  You can do the following to help reduce constipation:  ~  FLUIDS:  Drink clear liquids (water or Gatorade), or juice (apple/prune).  ~  DIET:  Eat a fiber rich diet.    ~  ACTIVITY:  Get up and move around several times a day.  Increase your activity as you are able.  MEDICATIONS:  Reduce the risk of constipation by starting medications before you are constipated.  You can take Miralax   (1 packet as directed) and/or a stool softener (Senokot 1-2 tablets 1-2 times a day).  If you already have constipation and these medications are not working, you can get magnesium citrate and use as directed.  If you continue to have constipation you can try an over the counter suppository or enema.  Call your Surgeon Team if it has been greater than 3 days since your last bowel movement.     Symptoms - Reduced Urine Output   Order Comments: Changes in the amount of fluids you drank before and after surgery may result in problems urinating.  It is important to stay well-hydrated after surgery and drink plenty of water. If it has been greater than 8 hours since you have urinated despite drinking plenty of water, call your Surgeon Team.     Activity - Exercises to prevent blood clots   Order Comments:  "Unless otherwise directed by your Surgeon team, perform the following exercises at least three times per day for the first four weeks after surgery to prevent blood clots in your legs: 1) Point and flex your feet (Ankle Pumps), 2) Move your ankle around in big circles, 3) Wiggle your toes, 4) Walk, even for short distances, several times a day, will help decrease the risk of blood clots.     Order Specific Question Answer Comments   Is discharge order? Yes      Comfort and Pain Management - Pain after Surgery   Order Comments: Pain after surgery is normal and expected.  You will have some amount of pain for several weeks after surgery.  Your pain will improve with time.  There are several things you can do to help reduce your pain including: rest, ice, elevation, and using pain medications as needed. Contact your Surgeon Team if you have pain that persists or worsens after surgery despite rest, ice, elevation, and taking your medication(s) as prescribed. Contact your Surgeon Team if you have new numbness, tingling, or weakness in your operative extremity.     Comfort and Pain Management - Swelling after Surgery   Order Comments: Swelling and/or bruising of the surgical extremity is common and may persist for several months after surgery. In addition to frequent icing and elevation, gentle compressive support with an ACE wrap or tubigrip may help with swelling. Apply compression regularly, removing at least twice daily to perform skin checks. Contact your Surgeon Team if your swelling increases and is NOT associated with an increase in your activity level, or if your swelling increases and is associated with redness and pain.     Comfort and Pain Management - LOWER Extremity Elevation   Order Comments: Swelling is expected for several months after surgery. This type of swelling is usually associated with gravity and activity, and can be improved with elevation.   The best way to do this is to get your \"toes above " "your nose\" by laying down and placing several pillows lengthwise under your calf and heel. When elevating your leg keep your knee completely straight. Perform this elevation as often as possible especially for the first two weeks after surgery.     Comfort and Pain Management - Cold therapy   Order Comments: Ice can be used to control swelling and discomfort after surgery. Place a thin towel over your operative site and apply the ice pack overtop. Leave ice pack in place for 20 minutes, then remove for 20 minutes. Repeat this 20 minutes on/20 minutes off routine as often as tolerated.     Medication Instructions - Acetaminophen (TYLENOL) Instructions   Order Comments: You were discharged with acetaminophen (TYLENOL) for pain management after surgery. Acetaminophen most effectively manages pain symptoms when it is taken on a schedule without missing doses (every four, six, or eight hours). Your Provider will prescribe a safe daily dose between 3000 - 4000 mg.  Do NOT exceed this daily dose. Most patients use acetaminophen for pain control for the first four weeks after surgery.  You can wean from this medication as your pain decreases.     Medication Instructions - NSAID Instructions   Order Comments: You were discharged with an anti-inflammatory medication for pain management to use in combination with acetaminophen (TYLENOL) and the narcotic pain medication.  Take this medication exactly as directed.  You should only take one anti-inflammatory at a time.  Some common anti-inflammatories include: ibuprofen (ADVIL, MOTRIN), naproxen (ALEVE, NAPROSYN), celecoxib (CELEBREX), meloxicam (MOBIC), ketorolac (TORADOL).  Take this medication with food and water.     Medication Instructions - Opioids - Tapering Instructions   Order Comments: In the first three days following surgery, your symptoms may warrant use of the narcotic pain medication every four to six hours as prescribed. This is normal. As your pain symptoms " "improve, focus your efforts on decreasing (tapering) use of narcotic medications. The most successful tapering strategy is to first, decrease the number of tablets you take every 4-6 hours to the minimum prescribed. Then, increase the amount of time between doses.  For example:  First, taper to   or 1 tablet every 4-6 hours.  Then, taper to   or 1 tablet every 6-8 hours.  Then, taper to   or 1 tablet every 8-10 hours.  Then, taper to   or 1 tablet every 10-12 hours.  Then, taper to   or 1 tablet at bedtime.  The bedtime dose can help with comfort during sleep and is typically the last dose to be discontinued after surgery.     Follow Up Care   Order Comments: Follow-up with your Surgeon Team in 2 weeks for wound check.     Medication instructions - No pharmacologic VTE prophylaxis prescribed   Order Comments: Your Surgeon did not prescribe medication for anticoagulation.     Medication Instructions - Opioid Instructions (Greater than or equal to 65 years)   Order Comments: You were discharged with an opioid medication (hydromorphone, oxycodone, hydrocodone, or tramadol). This medication should only be taken for breakthrough pain that is not controlled with acetaminophen (TYLENOL). If you rate your pain less than 3 you do not need this medication.  Pain rating 0-3:  You do not need this medication  Pain rating 4-6:  Take 1/2 tablet every 4-6 hours as needed  Pain rating 7-10:  Take 1 tablet every 4-6 hours as needed  Do not exceed 4 tablets per day     Activity - Total Hip Arthroplasty   Order Comments: Refer to the [facility] \"Your Guide to Total Joint Replacement\" for recommendations on activities and Exercises.     Order Specific Question Answer Comments   Is discharge order? Yes      Return to Driving   Order Comments: Return to driving - Driving is NOT permitted until directed by your provider. Under no circumstance are you permitted to drive while using narcotic pain medications.     Order Specific " Question Answer Comments   Is discharge order? Yes      Dressing / Wound Care - Wound   Order Comments: You have a clean dressing on your surgical wound. Dressing change instructions as follows: dressing will be removed at your follow-up appointment. Contact your Surgeon Team if you have increased redness, warmth around the surgical wound, and/or drainage from the surgical wound.     Dressing / Wound Care - NO Tub Bathing   Order Comments: Tub bathing, swimming, or any other activities that will cause your incision to be submerged in water should be avoided until allowed by your Surgeon.     No flexion past 90 degrees   Order Comments: No bending at waist past 90 degrees.     Order Specific Question Answer Comments   Is discharge order? Yes      No internal rotation   Order Comments: No pivoting on your operative leg.     Order Specific Question Answer Comments   Is discharge order? Yes      No adduction past midline   Order Comments: No crossing your operative leg.     Order Specific Question Answer Comments   Is discharge order? Yes      Weight bearing as tolerated   Order Comments: Weight bearing as tolerated on your operative extremity.     Order Specific Question Answer Comments   Is discharge order? Yes      Dressing Wound Care - Shower with wound/dressing NOT covered   Order Comments: You do not need to cover your dressing or incision in the shower, you may allow water and soap to run over top of the surgical dressing or incision. You may shower 2 days after surgery.  You are strictly prohibited from soaking or submerging the surgical wound underwater.     Crutches DME   Order Comments: DME Documentation: Describe the reason for need to support medical necessity: Impaired gait status post hip surgery. I, the undersigned, certify that the above prescribed supplies are medically necessary for this patient and is both reasonable and necessary in reference to accepted standards of medical practice in the  treatment of this patient's condition and is not prescribed as a convenience.     Order Specific Question Answer Comments   DME Provider: [employer]    Crutch Type: Standard    Crutches Add On: NA    Length of Need: Lifetime      Cane DME   Order Comments: DME Documentation: Describe the reason for need to support medical necessity: Impaired gait status post hip surgery. I, the undersigned, certify that the above prescribed supplies are medically necessary for this patient and is both reasonable and necessary in reference to accepted standards of medical practice in the treatment of this patient's condition and is not prescribed as a convenience.     Order Specific Question Answer Comments   DME Provider: [employer]    Cane Type: Single Tip    Reminder: Patient can typically get 1 every 5 years      Walker DME   Order Comments: : DME Documentation: Describe the reason for need to support medical necessity: Impaired gait status post hip surgery. I, the undersigned, certify that the above prescribed supplies are medically necessary for this patient and is both reasonable and necessary in reference to accepted standards of medical practice in the treatment of this patient's condition and is not prescribed as a convenience.     Order Specific Question Answer Comments   DME Provider: [employer]    Walker Type: Standard (2 Wheel)    Accessories: N/A      Walker Order for DME - ONLY FOR DME   Order Comments: I, the undersigned, certify that the above prescribed supplies are medically necessary for this patient and is both reasonable and necessary in reference to accepted standards of medical and necessary in reference to accepted standards of medical practice in the treatment of this patient's condition and is not prescribed as a convenience.      Order Specific Question Answer Comments   DME Provider: bria Valencia    Start Date: 1/23/2023    Walker Type: Standard (2 Wheel)    Diagnosis: R26.89 - Impaired  Gait and Mobility      Discharge Instruction - Regular Diet Adult   Order Comments: Return to your pre-surgery diet unless instructed otherwise     Order Specific Question Answer Comments   Is discharge order? Yes        Clemente Toure MD  Orthopaedic Surgery PGY-4  [phone]

## 2023-01-25 NOTE — TELEPHONE ENCOUNTER
[facility] Call Center    Phone Message    May a detailed message be left on voicemail: yes     Reason for Call: The patient called stating the daniel she was using to calculate her insulin has been discontinued and she is not sure what to do? She says she has never had to calculate it herself before. She is wondering how to do that or if there is another daniel that is recommended? She is very worried and asking for a call back ASAP. Please contact patient to discuss. Thank you.    Action Taken: Message routed to:  Other: [facility]    Travel Screening: Not Applicable

## 2023-01-25 NOTE — PLAN OF CARE
Physical Therapy Discharge Summary    Reason for therapy discharge:    All goals and outcomes met, no further needs identified.    Progress towards therapy goal(s). See goals on Care Plan in [facility] electronic health record for goal details.  Goals met    Therapy recommendation(s):    Continued therapy is recommended.  Rationale/Recommendations:  home PT for safety evaluation and progression.

## 2023-01-25 NOTE — TELEPHONE ENCOUNTER
Previously on Accu Chek Expert. Reviewed regimen. Will schedule visit with Educator to discuss sensor and In Pen when recovered from hip surgery.    TDD approximately 48 units. Novolog 3-5-10.     Insulin regimen sent to patient via My Chart.    Tasha Harley RN, Diabetes Educator  Diabetes Education Department  [facility] [place]  [phone]

## 2023-01-26 ENCOUNTER — TELEPHONE (OUTPATIENT)
Dept: FAMILY MEDICINE | Facility: CLINIC | Age: 73
End: 2023-01-26
Payer: COMMERCIAL

## 2023-01-26 LAB
ATRIAL RATE - MUSE: 129 BPM
ATRIAL RATE - MUSE: 52 BPM
DIASTOLIC BLOOD PRESSURE - MUSE: NORMAL MMHG
DIASTOLIC BLOOD PRESSURE - MUSE: NORMAL MMHG
INTERPRETATION ECG - MUSE: NORMAL
INTERPRETATION ECG - MUSE: NORMAL
P AXIS - MUSE: 114 DEGREES
P AXIS - MUSE: NORMAL DEGREES
PR INTERVAL - MUSE: 124 MS
PR INTERVAL - MUSE: NORMAL MS
QRS DURATION - MUSE: 112 MS
QRS DURATION - MUSE: 112 MS
QT - MUSE: 338 MS
QT - MUSE: 352 MS
QTC - MUSE: 471 MS
QTC - MUSE: 497 MS
R AXIS - MUSE: 0 DEGREES
R AXIS - MUSE: 2 DEGREES
SYSTOLIC BLOOD PRESSURE - MUSE: NORMAL MMHG
SYSTOLIC BLOOD PRESSURE - MUSE: NORMAL MMHG
T AXIS - MUSE: 7 DEGREES
T AXIS - MUSE: 8 DEGREES
VENTRICULAR RATE- MUSE: 108 BPM
VENTRICULAR RATE- MUSE: 130 BPM

## 2023-01-26 NOTE — PROGRESS NOTES
[facility] [place] [facility] [place]      HPI:   Ms. Rodríguez is a 72 year old female with medical history pertinent for T2DM, permanent AF, and arthritis. She recently underwent total right hip arthoplasty. She was noted to have postop AF with RVR. She presents to cardiology clinic for hospital follow up.     With regards to her cardiac conditions, Ms. Rodríguez has known permanent AF. She was first noted to have an episode of perioperative AFL in 5/2021 while undergoing cystourethroscopy. She reverted to NSR without any intervention. Then, in 9/2021 Ms. Rodríguez had recurrence of AF for which she underwent DCCV x 3 without restoration of sinus rhythm. Ms. Rodríguez is rate controlled and at follow up visits with Dr. Whipple she had been essentially asymptomatic. She's maintained on eliquis for CVA prophylaxis. As mentioned, Ms. Rodríguez underwent total right hip arthroplasty on 1/18/2023. She went into AF with RVR with HRs 150s on POD # 1. She was treated with IV metoprolol and started on metoprolol tartrate 37.5 mg QID. Due to ongoing RVR, she was also briefly initiated on diltiazem. Ms. Rodríguez was evaluated by cardiology who felt that HRs ~ 110 were permissible. She was transitioned from lopressor to toprol-xl and diltiazem was stopped. Ms. Rodríguez was discharged home on 1/24 and had been resumed on eliquis 5mg BID and metoprolol succinate 100 mg BID.     Today, Ms. Rodríguez presents to clinic with her daughter. Feeling relatively well since surgery. Reports having a couple of challenging nights due to pain. Daughter brings a thorough record of HRs and BPs over the last week. HRs are consistently running 90-120s. On one occasion HR at rest was 130s-145 bpm and treated with diltiazem x 1 dose. SBPs stable at 110s-120s. Denies chest pain, SOB, CERDA, palpitations. Mildly lightheaded today. Daughter reports that HR this morning was in the 60s by pulse-ox. In clinic HR 60 bpm by pulse-ox, however by manual check HR  "~ 100 bpm. Resumed eliquis prior to discharge. Denies any signs or symptoms of bleeding. Daughter concerned about labs, thinks her mom looks \"yellow\". K and phos modestly low post-op. Liver and renal function wnl.     Cardiac Medications:   - Eliquis 5 mg BID  - Metoprolol succinate 100 mg BID  - Atorvastatin 10 mg daily   - Diltiazem 30 mg q6h PRN for HR > 120 bpm      PAST MEDICAL HISTORY:  Past Medical History:   Diagnosis Date     Cataract      Chronic atrial fibrillation (H) 06/08/2022     Osteoarthritis of carpometacarpal (CMC) joint of both thumbs 11/16/2022     Post-operative nausea and vomiting 1/18/2023    Severe. Recommend TIVA     Type 2 diabetes mellitus with hyperglycemia (H) 06/24/2016       FAMILY HISTORY:  Family History   Problem Relation Age of Onset     Hyperlipidemia Mother      Breast Cancer Mother      Other Cancer Father      Leukemia Father      Skin Cancer Father      Coronary Artery Disease Maternal Uncle      Brain Cancer Maternal Uncle      Coronary Artery Disease Maternal Uncle      Stomach Cancer Maternal Aunt      Diabetes No family hx of      Hypertension No family hx of      Cerebrovascular Disease No family hx of      Colon Cancer No family hx of      Thyroid Disease No family hx of      Depression No family hx of      Anxiety Disorder No family hx of        SOCIAL HISTORY:  Social History     Socioeconomic History     Marital status: Single   Tobacco Use     Smoking status: Never     Smokeless tobacco: Never   Vaping Use     Vaping Use: Never used   Substance and Sexual Activity     Alcohol use: Yes     Comment: 1 to 2 beers or glasses of wine 1 to 2 times per month     Drug use: No     Sexual activity: Not Currently     Partners: Female     Birth control/protection: Post-menopausal   Other Topics Concern     Parent/sibling w/ CABG, MI or angioplasty before 65F 55M? No       CURRENT MEDICATIONS:  acetaminophen (TYLENOL) 325 MG tablet, Take 2 tablets (650 mg) by mouth every 4 hours " as needed for other  apixaban ANTICOAGULANT (ELIQUIS) 5 MG tablet, Take 1 tablet (5 mg) by mouth 2 times daily Resume the day following your procedure, on 7/6/2022  atorvastatin (LIPITOR) 10 MG tablet, Take 1 tablet (10 mg) by mouth daily  blood glucose (ACCU-CHEK GUIDE) test strip, Use to test blood sugar 6 times daily  blood glucose monitoring (ACCU-CHEK FASTCLIX) lancets, USE TO TEST BLOOD SUGAR FOUR TIMES A DAY OR AS DIRECTED  calcium carbonate-vitamin D 500-400 MG-UNIT TABS tablt, Take 1 tablet by mouth 2 times daily  Continuous Blood Gluc  (DEXCOM G6 ) JUANA, Use to read blood sugars as per 's instructions. (Patient not taking: Reported on 1/17/2023)  Continuous Blood Gluc Sensor (DEXCOM G6 SENSOR) MISC, Change every 10 days. (Patient not taking: Reported on 1/9/2023)  Continuous Blood Gluc Transmit (DEXCOM G6 TRANSMITTER) MISC, Change every 3 months. (Patient not taking: Reported on 1/9/2023)  diltiazem (CARDIZEM) 30 MG tablet, Take 1 tablet (30 mg) by mouth every 6 hours as needed (HR>120)  estradiol (ESTRACE) 0.1 MG/GM vaginal cream, Place 2 g vaginally twice a week  hydrOXYzine (ATARAX) 25 MG tablet, Take 1 tablet (25 mg) by mouth every 6 hours as needed for itching  insulin aspart (NOVOLOG FLEXPEN) 100 UNIT/ML pen, 1 unit per 15 gram of carb for all meals. Plus 1 unit per 50 mg/dl above 150. Total daily dose approx 30 units.  insulin glargine (BASAGLAR KWIKPEN) 100 UNIT/ML pen, Inject 30 Units Subcutaneous daily Max, 35 units daily  insulin pen needle (B-D U/F) 31G X 8 MM miscellaneous, USE 5 TIMES DAILY  WITH NOVOLOG AND LANTUS  melatonin 3 MG tablet, Take 1 tablet (3 mg) by mouth nightly as needed for sleep  metoprolol succinate ER (TOPROL XL) 100 MG 24 hr tablet, Take 1 tablet (100 mg) by mouth 2 times daily  Multiple Vitamins-Minerals (MULTIVITAMIN ADULT PO), Take 1 tablet by mouth every morning   oxyCODONE (ROXICODONE) 5 MG tablet, Take 1 tablet (5 mg) by mouth every 4  hours as needed for moderate pain (4-6)  polyethylene glycol (MIRALAX) 17 g packet, Take 17 g by mouth daily  senna-docusate (SENOKOT-S/PERICOLACE) 8.6-50 MG tablet, Take 1 tablet by mouth 2 times daily  Urine Glucose-Ketones Test (KETO-DIASTIX) STRP, 1 strip by In Vitro route as needed (Patient not taking: Reported on 1/9/2023)    No current facility-administered medications on file prior to visit.      ROS:   Refer to HPI    EXAM:  There were no vitals taken for this visit.  GENERAL: Appears comfortable, in no acute distress.   HEENT: Eye symmetrical, no discharge or icterus bilaterally. Mucous membranes moist and without lesions.  CV: tachycardic, irregularly irregular, +S1S2, no murmur, rub, or gallop.   RESPIRATORY: Respirations regular, even, and unlabored. Lungs CTA throughout.   GI: Soft and non distended with normoactive bowel sounds present in all quadrants. No tenderness, rebound, guarding. No hepatomegaly.   EXTREMITIES: Trace peripheral edema. 2+ bilateral pedal pulses.   NEUROLOGIC: Alert and oriented x 3. No focal deficits.   MUSCULOSKELETAL: No joint swelling or tenderness.   SKIN: No jaundice. No rashes or lesions.     Labs, reviewed with patient in clinic today:  CBC RESULTS:  Lab Results   Component Value Date    WBC 7.5 01/21/2023    WBC 3.8 (L) 06/24/2016    RBC 3.46 (L) 01/21/2023    RBC 4.31 06/24/2016    HGB 10.7 (L) 01/21/2023    HGB 14.4 06/24/2016    HCT 31.9 (L) 01/21/2023    HCT 40.7 06/24/2016    MCV 92 01/21/2023    MCV 94 06/24/2016    MCH 30.9 01/21/2023    MCH 33.4 (H) 06/24/2016    MCHC 33.5 01/21/2023    MCHC 35.4 06/24/2016    RDW 13.3 01/21/2023    RDW 13.3 06/24/2016     01/21/2023     06/24/2016       CMP RESULTS:  Lab Results   Component Value Date     01/23/2023     08/02/2019    POTASSIUM 4.1 01/23/2023    POTASSIUM 4.0 08/02/2019    CHLORIDE 104 01/23/2023    CHLORIDE 103 08/02/2019    CO2 23 01/23/2023    CO2 24 08/02/2019    ANIONGAP 10  01/23/2023    ANIONGAP 8 08/02/2019     (H) 01/24/2023     (H) 01/23/2023     (H) 08/02/2019    BUN 11 01/23/2023    BUN 16 08/02/2019    CR 0.56 01/23/2023    CR 0.74 07/01/2021    GFRESTIMATED >90 01/23/2023    GFRESTIMATED >60 11/17/2021    GFRESTIMATED 82 07/01/2021    GFRESTBLACK >90 07/01/2021    RENATO 8.3 (L) 01/23/2023    RENATO 9.0 08/02/2019    BILITOTAL 1.0 01/23/2023    BILITOTAL 0.7 08/02/2019    ALBUMIN 2.6 (L) 01/23/2023    ALBUMIN 3.7 08/02/2019    ALKPHOS 55 01/23/2023    ALKPHOS 97 08/02/2019    ALT 21 01/23/2023    ALT 24 08/02/2019    AST 28 01/23/2023    AST 16 08/02/2019        INR RESULTS:  No results found for: INR    Lab Results   Component Value Date    MAG 2.0 01/23/2023     No results found for: NTBNPI  No results found for: NTBNP    Cardiac Diagnostics:    11/2021 ECHO  Interpretation Summary     Global and regional left ventricular function is normal with an EF of 55-60%.  Global right ventricular function is normal.  Trileaflet aortic sclerosis without stenosis. Mld to moderate aortic  insufficiency is present. Mild mitral and tricuspid insufficiency present.  The inferior vena cava was normal in size with preserved respiratory  variability.  No pericardial effusion is present    11/2021 Coronary CTA                                                              IMPRESSION:  1.  Widely patent coronary arteries without evidence of  atherosclerosis or stenosis.  2.  Total Agatston score 0 placing the patient in the lowest  percentile when compared to an age- and gender-matched control group.  3.  Please review the separate Radiology report for incidental  noncardiac findings. This report will follow separately.      Assessment and Plan:   Ms. Rodríguez is a 72 year old female with medical history pertinent for T2DM, permanent AF, and arthritis. She recently underwent right total hip arthoplasty. She was noted to have post-op AF with RVR. She presents to cardiology clinic for  hospital follow up.     Doing well s/p R hip arthoplasty. Heart rates ranging 90s-120s with metoprolol succinate 100 mg BID. Prior to surgery rates were reasonably controlled on metoprolol succinate 50 mg daily. Suspect that has she continues to recover and pain becomes more well controlled that she may require less beta blockade. Instructed on metoprolol hold parameters and HRs < 120 are permissible. Ms. Rodríguez will continue to monitor HRs and BPs and we will follow up with RN phone call on Monday 1/30. Per daughter's request, will check CMP today, however I reassured them that I suspect labs to be wnl.     # Permanent Atrial Fibrillation   # Postop AF with RVR  - continue eliquis 5 mg BID  - continue metoprolol succinate 100 mg BID. If HR < 60, hold metoprolol  - continue diltiazem 30 mg q6h PRN for HR >130        Follow up:  - Cardiology [facility] in 2 weeks  - Dr. Whipple 3/2023       Chart review time today: 10 minutes  Visit time today: 20 minutes  Total time spent today: 30 minutes        Olya Johnson DNP, NP-C  General Cardiology   01/27/23          CC

## 2023-01-26 NOTE — TELEPHONE ENCOUNTER
Called Dot and relayed provider's message below. Dot verbalized understanding.    NESTOR Pitts  [facility]

## 2023-01-26 NOTE — TELEPHONE ENCOUNTER
General Call      Reason for Call:  [facility] (jennifer) calling from  [phone] to req verbal orders for    Delayed start of care for 1/29/23        [facility] (jennifer)  [phone]

## 2023-01-27 ENCOUNTER — LAB (OUTPATIENT)
Dept: LAB | Facility: CLINIC | Age: 73
End: 2023-01-27
Payer: COMMERCIAL

## 2023-01-27 ENCOUNTER — OFFICE VISIT (OUTPATIENT)
Dept: CARDIOLOGY | Facility: CLINIC | Age: 73
End: 2023-01-27
Attending: NURSE PRACTITIONER
Payer: COMMERCIAL

## 2023-01-27 ENCOUNTER — TELEPHONE (OUTPATIENT)
Dept: ORTHOPEDICS | Facility: CLINIC | Age: 73
End: 2023-01-27

## 2023-01-27 VITALS
BODY MASS INDEX: 25.91 KG/M2 | DIASTOLIC BLOOD PRESSURE: 63 MMHG | SYSTOLIC BLOOD PRESSURE: 117 MMHG | WEIGHT: 155.7 LBS | HEART RATE: 100 BPM | OXYGEN SATURATION: 98 %

## 2023-01-27 DIAGNOSIS — I48.20 CHRONIC ATRIAL FIBRILLATION (H): Primary | ICD-10-CM

## 2023-01-27 DIAGNOSIS — I48.20 CHRONIC ATRIAL FIBRILLATION (H): ICD-10-CM

## 2023-01-27 LAB
ALBUMIN SERPL BCG-MCNC: 3.8 G/DL (ref 3.5–5.2)
ALP SERPL-CCNC: 72 U/L (ref 35–104)
ALT SERPL W P-5'-P-CCNC: 14 U/L (ref 10–35)
ANION GAP SERPL CALCULATED.3IONS-SCNC: 9 MMOL/L (ref 7–15)
AST SERPL W P-5'-P-CCNC: 17 U/L (ref 10–35)
BILIRUB SERPL-MCNC: 0.5 MG/DL
BUN SERPL-MCNC: 12.5 MG/DL (ref 8–23)
CALCIUM SERPL-MCNC: 10.1 MG/DL (ref 8.8–10.2)
CHLORIDE SERPL-SCNC: 99 MMOL/L (ref 98–107)
CREAT SERPL-MCNC: 0.6 MG/DL (ref 0.51–0.95)
DEPRECATED HCO3 PLAS-SCNC: 28 MMOL/L (ref 22–29)
GFR SERPL CREATININE-BSD FRML MDRD: >90 ML/MIN/1.73M2
GLUCOSE SERPL-MCNC: 258 MG/DL (ref 70–99)
PHOSPHATE SERPL-MCNC: 4.2 MG/DL (ref 2.5–4.5)
POTASSIUM SERPL-SCNC: 4.5 MMOL/L (ref 3.4–5.3)
PROT SERPL-MCNC: 6.5 G/DL (ref 6.4–8.3)
SODIUM SERPL-SCNC: 136 MMOL/L (ref 136–145)

## 2023-01-27 PROCEDURE — G0463 HOSPITAL OUTPT CLINIC VISIT: HCPCS | Performed by: NURSE PRACTITIONER

## 2023-01-27 PROCEDURE — 99214 OFFICE O/P EST MOD 30 MIN: CPT | Performed by: NURSE PRACTITIONER

## 2023-01-27 PROCEDURE — 36415 COLL VENOUS BLD VENIPUNCTURE: CPT | Performed by: PATHOLOGY

## 2023-01-27 PROCEDURE — 84100 ASSAY OF PHOSPHORUS: CPT | Performed by: PATHOLOGY

## 2023-01-27 PROCEDURE — 80053 COMPREHEN METABOLIC PANEL: CPT | Performed by: PATHOLOGY

## 2023-01-27 ASSESSMENT — PAIN SCALES - GENERAL: PAINLEVEL: NO PAIN (0)

## 2023-01-27 NOTE — PATIENT INSTRUCTIONS
You were seen today in the Cardiovascular Clinic at the [facility] by:       Olya Johnson NP     Your visit summary and instructions are as follows:    If HRs < 60, hold metoprolol. If HRs are consistently , okay to take metoprolol 50 mg twice daily  Continue to monitor HRs and BP  Labs today  Continue to work toward the recommendation of 150 minutes of moderate intensity exercise/week and maintain a healthy lifestyle (avoid illicit drugs, smoking and moderate alcohol consumption)  NESTOR Ye will call Monday to review HRs and symptoms       Return to cardiology clinic 2 weeks with Olya Johnson. If feeling better, okay to cancel.      Thank you for your visit today!     Please MyChart message me or call my nurse if you have any questions or concerns.      During Business Hours:  [phone], option # 1 (University) then option # 4 (medical questions) and ask to speak with my nurse.     After hours, weekends or holidays:   [phone], Option #4  Ask to speak to the On-Call Cardiologist. Inform them you are a cardiology patient at the [facility].

## 2023-01-27 NOTE — TELEPHONE ENCOUNTER
[facility] Call Center    Phone Message    May a detailed message be left on voicemail: yes     Reason for Call: Other: Patient has a [facility] Only visit - 2wks post up would like to see if there is anything from 3pm or after. Please call and advise     Action Taken: Other: [facility]    Travel Screening: Not Applicable

## 2023-01-27 NOTE — LETTER
1/27/2023      RE: Latoya Rodríguez  [address] [facility] [address]       Dear Colleague,    Thank you for the opportunity to participate in the care of your patient, Latoya Rodríguez, at the [facility] [place] [facility]. Please see a copy of my visit note below.      [facility] Cardiology - [facility]   Cardiovascular Clinic Note      HPI:   Ms. Rodríguez is a 72 year old female with medical history pertinent for T2DM, permanent AF, and arthritis. She recently underwent total right hip arthoplasty. She was noted to have postop AF with RVR. She presents to cardiology clinic for hospital follow up.     With regards to her cardiac conditions, Ms. Rodríguez has known permanent AF. She was first noted to have an episode of perioperative AFL in 5/2021 while undergoing cystourethroscopy. She reverted to NSR without any intervention. Then, in 9/2021 Ms. Rodríguez had recurrence of AF for which she underwent DCCV x 3 without restoration of sinus rhythm. Ms. Rodríguez is rate controlled and at follow up visits with Dr. Whipple she had been essentially asymptomatic. She's maintained on eliquis for CVA prophylaxis. As mentioned, Ms. Rodríguez underwent total right hip arthroplasty on 1/18/2023. She went into AF with RVR with HRs 150s on POD # 1. She was treated with IV metoprolol and started on metoprolol tartrate 37.5 mg QID. Due to ongoing RVR, she was also briefly initiated on diltiazem. Ms. Rodríguez was evaluated by cardiology who felt that HRs ~ 110 were permissible. She was transitioned from lopressor to toprol-xl and diltiazem was stopped. Ms. Rodríguez was discharged home on 1/24 and had been resumed on eliquis 5mg BID and metoprolol succinate 100 mg BID.     Today, Ms. Rodríguez presents to clinic with her daughter. Feeling relatively well since surgery. Reports having a couple of challenging nights due to pain. Daughter brings a thorough record of HRs and BPs over the  "last week. HRs are consistently running 90-120s. On one occasion HR at rest was 130s-145 bpm and treated with diltiazem x 1 dose. SBPs stable at 110s-120s. Denies chest pain, SOB, CERDA, palpitations. Mildly lightheaded today. Daughter reports that HR this morning was in the 60s by pulse-ox. In clinic HR 60 bpm by pulse-ox, however by manual check HR ~ 100 bpm. Resumed eliquis prior to discharge. Denies any signs or symptoms of bleeding. Daughter concerned about labs, thinks her mom looks \"yellow\". K and phos modestly low post-op. Liver and renal function wnl.     Cardiac Medications:   - Eliquis 5 mg BID  - Metoprolol succinate 100 mg BID  - Atorvastatin 10 mg daily   - Diltiazem 30 mg q6h PRN for HR > 120 bpm      PAST MEDICAL HISTORY:  Past Medical History:   Diagnosis Date     Cataract      Chronic atrial fibrillation (H) 06/08/2022     Osteoarthritis of carpometacarpal (CMC) joint of both thumbs 11/16/2022     Post-operative nausea and vomiting 1/18/2023    Severe. Recommend TIVA     Type 2 diabetes mellitus with hyperglycemia (H) 06/24/2016       FAMILY HISTORY:  Family History   Problem Relation Age of Onset     Hyperlipidemia Mother      Breast Cancer Mother      Other Cancer Father      Leukemia Father      Skin Cancer Father      Coronary Artery Disease Maternal Uncle      Brain Cancer Maternal Uncle      Coronary Artery Disease Maternal Uncle      Stomach Cancer Maternal Aunt      Diabetes No family hx of      Hypertension No family hx of      Cerebrovascular Disease No family hx of      Colon Cancer No family hx of      Thyroid Disease No family hx of      Depression No family hx of      Anxiety Disorder No family hx of        SOCIAL HISTORY:  Social History     Socioeconomic History     Marital status: Single   Tobacco Use     Smoking status: Never     Smokeless tobacco: Never   Vaping Use     Vaping Use: Never used   Substance and Sexual Activity     Alcohol use: Yes     Comment: 1 to 2 beers or glasses " of wine 1 to 2 times per month     Drug use: No     Sexual activity: Not Currently     Partners: Female     Birth control/protection: Post-menopausal   Other Topics Concern     Parent/sibling w/ CABG, MI or angioplasty before 65F 55M? No       CURRENT MEDICATIONS:  acetaminophen (TYLENOL) 325 MG tablet, Take 2 tablets (650 mg) by mouth every 4 hours as needed for other  apixaban ANTICOAGULANT (ELIQUIS) 5 MG tablet, Take 1 tablet (5 mg) by mouth 2 times daily Resume the day following your procedure, on 7/6/2022  atorvastatin (LIPITOR) 10 MG tablet, Take 1 tablet (10 mg) by mouth daily  blood glucose (ACCU-CHEK GUIDE) test strip, Use to test blood sugar 6 times daily  blood glucose monitoring (ACCU-CHEK FASTCLIX) lancets, USE TO TEST BLOOD SUGAR FOUR TIMES A DAY OR AS DIRECTED  calcium carbonate-vitamin D 500-400 MG-UNIT TABS tablt, Take 1 tablet by mouth 2 times daily  Continuous Blood Gluc  (DEXCOM G6 ) JUANA, Use to read blood sugars as per 's instructions. (Patient not taking: Reported on 1/17/2023)  Continuous Blood Gluc Sensor (DEXCOM G6 SENSOR) MISC, Change every 10 days. (Patient not taking: Reported on 1/9/2023)  Continuous Blood Gluc Transmit (DEXCOM G6 TRANSMITTER) MISC, Change every 3 months. (Patient not taking: Reported on 1/9/2023)  diltiazem (CARDIZEM) 30 MG tablet, Take 1 tablet (30 mg) by mouth every 6 hours as needed (HR>120)  estradiol (ESTRACE) 0.1 MG/GM vaginal cream, Place 2 g vaginally twice a week  hydrOXYzine (ATARAX) 25 MG tablet, Take 1 tablet (25 mg) by mouth every 6 hours as needed for itching  insulin aspart (NOVOLOG FLEXPEN) 100 UNIT/ML pen, 1 unit per 15 gram of carb for all meals. Plus 1 unit per 50 mg/dl above 150. Total daily dose approx 30 units.  insulin glargine (BASAGLAR KWIKPEN) 100 UNIT/ML pen, Inject 30 Units Subcutaneous daily Max, 35 units daily  insulin pen needle (B-D U/F) 31G X 8 MM miscellaneous, USE 5 TIMES DAILY  WITH NOVOLOG AND  LANTUS  melatonin 3 MG tablet, Take 1 tablet (3 mg) by mouth nightly as needed for sleep  metoprolol succinate ER (TOPROL XL) 100 MG 24 hr tablet, Take 1 tablet (100 mg) by mouth 2 times daily  Multiple Vitamins-Minerals (MULTIVITAMIN ADULT PO), Take 1 tablet by mouth every morning   oxyCODONE (ROXICODONE) 5 MG tablet, Take 1 tablet (5 mg) by mouth every 4 hours as needed for moderate pain (4-6)  polyethylene glycol (MIRALAX) 17 g packet, Take 17 g by mouth daily  senna-docusate (SENOKOT-S/PERICOLACE) 8.6-50 MG tablet, Take 1 tablet by mouth 2 times daily  Urine Glucose-Ketones Test (KETO-DIASTIX) STRP, 1 strip by In Vitro route as needed (Patient not taking: Reported on 1/9/2023)    No current facility-administered medications on file prior to visit.      ROS:   Refer to HPI    EXAM:  There were no vitals taken for this visit.  GENERAL: Appears comfortable, in no acute distress.   HEENT: Eye symmetrical, no discharge or icterus bilaterally. Mucous membranes moist and without lesions.  CV: tachycardic, irregularly irregular, +S1S2, no murmur, rub, or gallop.   RESPIRATORY: Respirations regular, even, and unlabored. Lungs CTA throughout.   GI: Soft and non distended with normoactive bowel sounds present in all quadrants. No tenderness, rebound, guarding. No hepatomegaly.   EXTREMITIES: Trace peripheral edema. 2+ bilateral pedal pulses.   NEUROLOGIC: Alert and oriented x 3. No focal deficits.   MUSCULOSKELETAL: No joint swelling or tenderness.   SKIN: No jaundice. No rashes or lesions.     Labs, reviewed with patient in clinic today:  CBC RESULTS:  Lab Results   Component Value Date    WBC 7.5 01/21/2023    WBC 3.8 (L) 06/24/2016    RBC 3.46 (L) 01/21/2023    RBC 4.31 06/24/2016    HGB 10.7 (L) 01/21/2023    HGB 14.4 06/24/2016    HCT 31.9 (L) 01/21/2023    HCT 40.7 06/24/2016    MCV 92 01/21/2023    MCV 94 06/24/2016    MCH 30.9 01/21/2023    MCH 33.4 (H) 06/24/2016    [facility] 33.5 01/21/2023    [facility] 35.4 06/24/2016     RDW 13.3 01/21/2023    RDW 13.3 06/24/2016     01/21/2023     06/24/2016       CMP RESULTS:  Lab Results   Component Value Date     01/23/2023     08/02/2019    POTASSIUM 4.1 01/23/2023    POTASSIUM 4.0 08/02/2019    CHLORIDE 104 01/23/2023    CHLORIDE 103 08/02/2019    CO2 23 01/23/2023    CO2 24 08/02/2019    ANIONGAP 10 01/23/2023    ANIONGAP 8 08/02/2019     (H) 01/24/2023     (H) 01/23/2023     (H) 08/02/2019    BUN 11 01/23/2023    BUN 16 08/02/2019    CR 0.56 01/23/2023    CR 0.74 07/01/2021    GFRESTIMATED >90 01/23/2023    GFRESTIMATED >60 11/17/2021    GFRESTIMATED 82 07/01/2021    GFRESTBLACK >90 07/01/2021    RENATO 8.3 (L) 01/23/2023    RENATO 9.0 08/02/2019    BILITOTAL 1.0 01/23/2023    BILITOTAL 0.7 08/02/2019    ALBUMIN 2.6 (L) 01/23/2023    ALBUMIN 3.7 08/02/2019    ALKPHOS 55 01/23/2023    ALKPHOS 97 08/02/2019    ALT 21 01/23/2023    ALT 24 08/02/2019    AST 28 01/23/2023    AST 16 08/02/2019        INR RESULTS:  No results found for: INR    Lab Results   Component Value Date    MAG 2.0 01/23/2023     No results found for: NTBNPI  No results found for: NTBNP    Cardiac Diagnostics:    11/2021 ECHO  Interpretation Summary     Global and regional left ventricular function is normal with an EF of 55-60%.  Global right ventricular function is normal.  Trileaflet aortic sclerosis without stenosis. Mld to moderate aortic  insufficiency is present. Mild mitral and tricuspid insufficiency present.  The inferior vena cava was normal in size with preserved respiratory  variability.  No pericardial effusion is present    11/2021 Coronary CTA                                                              IMPRESSION:  1.  Widely patent coronary arteries without evidence of  atherosclerosis or stenosis.  2.  Total Agatston score 0 placing the patient in the lowest  percentile when compared to an age- and gender-matched control group.  3.  Please review the separate  Radiology report for incidental  noncardiac findings. This report will follow separately.      Assessment and Plan:   Ms. Rodríguez is a 72 year old female with medical history pertinent for T2DM, permanent AF, and arthritis. She recently underwent right total hip arthoplasty. She was noted to have post-op AF with RVR. She presents to cardiology clinic for hospital follow up.     Doing well s/p R hip arthoplasty. Heart rates ranging 90s-120s with metoprolol succinate 100 mg BID. Prior to surgery rates were reasonably controlled on metoprolol succinate 50 mg daily. Suspect that has she continues to recover and pain becomes more well controlled that she may require less beta blockade. Instructed on metoprolol hold parameters and HRs < 120 are permissible. Ms. Rodríguez will continue to monitor HRs and BPs and we will follow up with RN phone call on Monday 1/30. Per daughter's request, will check CMP today, however I reassured them that I suspect labs to be wnl.     # Permanent Atrial Fibrillation   # Postop AF with RVR  - continue eliquis 5 mg BID  - continue metoprolol succinate 100 mg BID. If HR < 60, hold metoprolol  - continue diltiazem 30 mg q6h PRN for HR >130        Follow up:  - Cardiology [facility] in 2 weeks  - Dr. Whipple 3/2023       Chart review time today: 10 minutes  Visit time today: 20 minutes  Total time spent today: 30 minutes        Olya Johnson DNP, NP-C  General Cardiology   01/27/23

## 2023-01-29 ENCOUNTER — MEDICAL CORRESPONDENCE (OUTPATIENT)
Dept: HEALTH INFORMATION MANAGEMENT | Facility: CLINIC | Age: 73
End: 2023-01-29

## 2023-01-30 ENCOUNTER — CARE COORDINATION (OUTPATIENT)
Dept: CARDIOLOGY | Facility: CLINIC | Age: 73
End: 2023-01-30
Payer: COMMERCIAL

## 2023-01-30 ENCOUNTER — TELEPHONE (OUTPATIENT)
Dept: EDUCATION SERVICES | Facility: CLINIC | Age: 73
End: 2023-01-30
Payer: COMMERCIAL

## 2023-01-30 ENCOUNTER — TELEPHONE (OUTPATIENT)
Dept: ORTHOPEDICS | Facility: CLINIC | Age: 73
End: 2023-01-30
Payer: COMMERCIAL

## 2023-01-30 ENCOUNTER — TELEPHONE (OUTPATIENT)
Dept: FAMILY MEDICINE | Facility: CLINIC | Age: 73
End: 2023-01-30
Payer: COMMERCIAL

## 2023-01-30 NOTE — TELEPHONE ENCOUNTER
[facility] Call Center    Phone Message    May a detailed message be left on voicemail: yes     Reason for Call: Other: Patient is requesting a call back from Tasha Harley to help her figure out the instructions for calculations that were sent via [employer]. Please follow up. Thank you.    Action Taken: Other: Endo    Travel Screening: Not Applicable

## 2023-01-30 NOTE — TELEPHONE ENCOUNTER
Reason for Call: Request for an order or referral:    Order or referral being requested:  Verbal orders for skilled nursing once a week for four weeks, and then every other week for four weeks, and 3 PRN, home health aid once a week for three weeks, PT Eval.     Date needed: as soon as possible    Phone number Patient can be reached at:  [facility] [phone]    Best Time:  anytime    Can we leave a detailed message on this number?  YES    Call taken on 1/30/2023 at 11:53 AM by Josselyn El

## 2023-01-30 NOTE — TELEPHONE ENCOUNTER
[facility] Call Center    Phone Message    May a detailed message be left on voicemail: yes     Reason for Call: Other: Celeste from [facility] [phone] called requests orders for physical therapy 1 time per week for 1 week, the 2 times per week for 1 week, than 1 time per week for 2 weeks. Please call with verbal orders     Action Taken: Other: [facility]    Travel Screening: Not Applicable

## 2023-01-30 NOTE — PROGRESS NOTES
Called Latoya to check in on Heart rates and symptoms. Pt states her HR's has been  consistently. She did have an episode of -140 upon waking up on 1/29 but she did not feel any symptoms. Pt has been taking 50mg of metoprolol BID. She states she feels like her lightheadedness has been improving this over the weekend. Told her to continue the current plan and HR parameters. Will let Olya Johnson NP know.

## 2023-01-30 NOTE — TELEPHONE ENCOUNTER
Called and left  for Celeste regarding her PT order request. VM was confidential. Stated order requested was acceptable and to call/message with any other requests.     Giovanny AZAR ATC

## 2023-01-31 ENCOUNTER — ALLIED HEALTH/NURSE VISIT (OUTPATIENT)
Dept: NURSING | Facility: CLINIC | Age: 73
End: 2023-01-31
Payer: COMMERCIAL

## 2023-01-31 DIAGNOSIS — Z47.89 ORTHOPEDIC AFTERCARE: Primary | ICD-10-CM

## 2023-01-31 DIAGNOSIS — M25.551 RIGHT HIP PAIN: Primary | ICD-10-CM

## 2023-01-31 PROCEDURE — 99207 PR NO CHARGE NURSE ONLY: CPT

## 2023-01-31 RX ORDER — OXYCODONE HYDROCHLORIDE 5 MG/1
5 TABLET ORAL EVERY 6 HOURS PRN
Qty: 16 TABLET | Refills: 0 | Status: SHIPPED | OUTPATIENT
Start: 2023-01-31 | End: 2023-02-04

## 2023-01-31 NOTE — PROGRESS NOTES
Latoya Rodríguez comes into clinic today at the request of Dr. Ward for suture removal and wound check. The patient is status post RTHA on 1/20/23.     Incision clean, dry and intact. Cut bilateral suture ends to incision.  Steri-strips removed. Incision cleaned. Pt instructed on wound care and symptoms of infection to watch for.     Discussed anticoagulation. Pt is currently taking eliquis. Pt advised against any NSAIDs while on any anticoagulation med (ASA, Lovenox,or Coumadin).      Discussed current pain medication regime. Pt currently taking OXycodone occasionaly, mostly a night and at 5am take this with Tylenol. Does not need pain medication during the day.      Discussed current physical therapy program. Pt is homecare 2 times a week and doing HEP 2-3 times a day. Not much elevating and icing.     Patient using walker to aid in ambulation    Discussed edema. Patient has moderate edema    Reviewed Hip precautions.    Total time spent with Pt: 30 minutes.    Sun Patino RN

## 2023-01-31 NOTE — TELEPHONE ENCOUNTER
Reviewed correction and carbohydrate calculations. Patient verbalized correct dosing with BG of 214 and 50 gram carbs=5-6 units.    She will contact clinic to schedule appointment with educator to show In Pen.    Tasha Harley RN, Diabetes Educator  Diabetes Education Department  [facility] [place]  [phone]

## 2023-01-31 NOTE — TELEPHONE ENCOUNTER
RN called Adelaide and left detailed message relaying provider approval for requested home care orders below. RN provided fax number [phone] and clinic number [phone].     If Adelaide calls back please confirm she received message.    Yulisa Reyes RN    [employer]

## 2023-01-31 NOTE — PATIENT INSTRUCTIONS
"  If incision is dry, OK to leave open to air (no bandage). If incision is draining, cover with gauze and keep clean and dry and call the office. If steri strips (tapes) applied, let fall off on their own. Please do not apply any ointments or lotions to incision. No soaking in tubs or pools for 3 months after surgery.    If any swelling in leg(s), elevate surgical leg above heart (lay flat, elevate leg on blankets or pillows).    *Continue with post-op hip precautions. These are strict for 3 months post surgery, but should be followed for life. As your muscles gain strength, you will notice that you will have more range of motion, but your risk of dislocating remains.    NO bending past 90 degrees at the waist (operative side)   NO crossing your operative leg over or under your non-operative leg   NO turning your operative leg inward     *Please call if a sharp increase in pain, fall, change in movement or sensation, chest pain, calf pain, shortness of breath, fevers greater than 101.4, redness, erythema around the incision sites.    *If needing refills on pain meds, please call clinic at least 3 days before you run out.    *Continue physical therapy as directed.  If needing orders for Outpatient Physical therapy, please call the clinic.    *Continue to use assistive device: cane or crutch until no limp. Discuss with therapist if questions.    *Dr. Ward advises no dental work or cleaning until 6 months after any surgery with implants to hips or knees unless emergent issue arises. This includes total joint surgeries. Once you are 6 months past your surgery, you will need prophylactic antibiotics for the rest of your lifetime with any cleaning or dental work.  This is also for any other \"dirty\" procedures that you may have, such as a colonoscopy.    If you have any questions, call the clinic at [phone] and ask for the Orthopaedics dept. "     ----------------------------------------------------------------------        Thanks for coming today.  Ortho/Sports Medicine Clinic  [address] [place] [address]    To schedule future appointments in Ortho Clinic, you may call [phone].    To schedule ordered imaging or an injection ordered by your provider:  Call Central Imaging Injection scheduling line: [phone]    [employer] available online at:  [email or web address]    Please call if any further questions or concerns ([phone]).  Clinic hours 8 am to 5 pm.    Return to clinic (call) if symptoms worsen or fail to improve.

## 2023-02-06 ENCOUNTER — MYC MEDICAL ADVICE (OUTPATIENT)
Dept: FAMILY MEDICINE | Facility: CLINIC | Age: 73
End: 2023-02-06
Payer: COMMERCIAL

## 2023-02-06 DIAGNOSIS — I48.11 LONGSTANDING PERSISTENT ATRIAL FIBRILLATION (H): ICD-10-CM

## 2023-02-07 ENCOUNTER — PATIENT OUTREACH (OUTPATIENT)
Dept: CARE COORDINATION | Facility: CLINIC | Age: 73
End: 2023-02-07

## 2023-02-07 NOTE — PROGRESS NOTES
Social Work Telephone Message Note  [facility]     Patient Name:  Latoya Rodríguez  /Age:  1950 (72 year old)    Referral Source: Dr Ed sanchez  Reason for Referral:  resources and support     attempted to contact Patient via telephone on 23. Sw had received a consult to connect with patient regarding resources and support with a recent total hip.  Left a message providing writer contact information requesting a return call.  will await Patient's return phone call and will provide assistance at that time.          PETER Monet, [facility]    [facility]  [phone]  [email or web address]

## 2023-02-08 ENCOUNTER — TELEPHONE (OUTPATIENT)
Dept: FAMILY MEDICINE | Facility: CLINIC | Age: 73
End: 2023-02-08
Payer: COMMERCIAL

## 2023-02-08 NOTE — TELEPHONE ENCOUNTER
Forms/Letter Request    Type of form/letter: forms recieved from [employer] (order [id number])   placed on providers desk for response     Have you been seen for this request:  Do we have the form/letter:  When is form/letter needed by:    How would you like the form/letter returned: fax to [phone]  Patient Notified form requests are processed in 3-5 business days    Could we send this information to you [facility] or would you prefer to receive a phone call?:

## 2023-02-10 ENCOUNTER — OFFICE VISIT (OUTPATIENT)
Dept: CARDIOLOGY | Facility: CLINIC | Age: 73
End: 2023-02-10
Attending: NURSE PRACTITIONER
Payer: COMMERCIAL

## 2023-02-10 VITALS
SYSTOLIC BLOOD PRESSURE: 105 MMHG | BODY MASS INDEX: 26.39 KG/M2 | HEART RATE: 121 BPM | WEIGHT: 158.6 LBS | OXYGEN SATURATION: 99 % | DIASTOLIC BLOOD PRESSURE: 72 MMHG

## 2023-02-10 DIAGNOSIS — I48.20 CHRONIC ATRIAL FIBRILLATION (H): ICD-10-CM

## 2023-02-10 PROCEDURE — 99214 OFFICE O/P EST MOD 30 MIN: CPT | Performed by: NURSE PRACTITIONER

## 2023-02-10 PROCEDURE — G0463 HOSPITAL OUTPT CLINIC VISIT: HCPCS

## 2023-02-10 PROCEDURE — G0463 HOSPITAL OUTPT CLINIC VISIT: HCPCS | Performed by: NURSE PRACTITIONER

## 2023-02-10 RX ORDER — METOPROLOL SUCCINATE 50 MG/1
50 TABLET, EXTENDED RELEASE ORAL DAILY
Qty: 90 TABLET | Refills: 3 | Status: SHIPPED | OUTPATIENT
Start: 2023-02-10 | End: 2023-11-14

## 2023-02-10 ASSESSMENT — PAIN SCALES - GENERAL: PAINLEVEL: NO PAIN (0)

## 2023-02-10 NOTE — NURSING NOTE
Chief Complaint   Patient presents with     Follow Up     02/10/23: 2 week follow-up due to continued varied afib rates and int. Lightheadedness.      Vitals were taken and medications reconciled.    Varinder Tilley, EMT  11:49 AM

## 2023-02-10 NOTE — PATIENT INSTRUCTIONS
You were seen today in the Cardiovascular Clinic at the [facility] by:       BARRETT Nguyen     Your visit summary and instructions are as follows:    Please take metoprolol succinate 50 daily  Continue to monitor your blood pressure. Try different wrists and use BP cuff if concerned about low readings.   Continue to work toward the recommendation of 150 minutes of moderate intensity exercise/week and maintain a healthy lifestyle (avoid illicit drugs, smoking and moderate alcohol consumption)      Return to cardiology clinic 3/29/23 with Dr. Whipple*     Thank you for your visit today!     Please MyChart message me or call my nurse if you have any questions or concerns.      During Business Hours:  [phone], option # 1 (University) then option # 4 (medical questions) and ask to speak with my nurse.     After hours, weekends or holidays:   [phone], Option #4  Ask to speak to the On-Call Cardiologist. Inform them you are a cardiology patient at the [facility].'

## 2023-02-10 NOTE — LETTER
2/10/2023      RE: Latoya Rodríguez  [address] [facility] [id number]       Dear Colleague,    Thank you for the opportunity to participate in the care of your patient, Latoya Rodríguez, at the [facility] [place] at [facility]. Please see a copy of my visit note below.      [facility] Cardiology - [facility] [place]   Cardiology Clinic Note      HPI:   Ms. Rodríguez is a 72 year old female with medical history pertinent for T2DM, permanent AF, and arthritis. She recently underwent total right hip arthoplasty. She was noted to have postop AF with RVR. She presents to cardiology clinic for routine follow up.      With regards to her cardiac conditions, Ms. Rodríguez has known permanent AF. She was first noted to have an episode of perioperative AFL in 5/2021 while undergoing cystourethroscopy. She reverted to NSR without any intervention. Then, in 9/2021 Ms. Rodríguez had recurrence of AF for which she underwent DCCV x 3 without restoration of sinus rhythm. Ms. Rodríguez is rate controlled and at follow up visits with Dr. Whipple she had been essentially asymptomatic. She's maintained on eliquis for CVA prophylaxis. As mentioned, Ms. Rodríguez underwent total right hip arthroplasty on 1/18/2023. She went into AF with RVR with HRs 150s on POD # 1. She was treated with IV metoprolol and started on metoprolol tartrate 37.5 mg QID. Due to ongoing RVR, she was also briefly initiated on diltiazem. Ms. Rodríguez was evaluated by cardiology who felt that HRs ~ 110 were permissible. She was transitioned from lopressor to toprol-xl and diltiazem was stopped. Ms. Rodríguez was discharged home on 1/24 and had been resumed on eliquis 5mg BID and metoprolol succinate 100 mg BID.      Ms. Rodríguez was seen in clinic by myself on 1/27/23. At that time she was feeling relatively well. HRs were consistently running 90-120s. On one occasion HR at rest was 130s-145 bpm and treated with diltiazem x 1  dose. At clinic visit rates were around 100, but earlier that morning daughter reported rates in the 60s. Metoprolol was reduced to 50 mg BID.     Today, Ms. Rodríguez presents to clinic with her daughter. She reports feeling well. She is frustrated by her slow progression with physical strength s/p R hip arthroplasty, but continues to work with PT. No acute CV concerns. Denies chest pain, SOB, CERDA, palpitations, lightheadedness, orthopnea, PND, LE edema, syncope, or presyncope. Ms. Rodríguez continues to routinely monitor her BP and HRs. BP typically 90s/60s. She reports one low reading this past week with a SBP of 77. She uses a wrist cuff. HRs 70s-90s, on occasion rates in the 120s. She tells me that she has been taking metoprolol 50 mg daily PRN.  in clinic. She has not taken metoprolol today.     Cardiac Medications:   - Eliquis 5 mg BID  - Metoprolol succinate 50 mg daily   - Atorvastatin 10 mg daily   - Diltiazem 30 mg q6h PRN for HR > 120 bpm      PAST MEDICAL HISTORY:  Past Medical History:   Diagnosis Date     Cataract      Chronic atrial fibrillation (H) 06/08/2022     Osteoarthritis of carpometacarpal (CMC) joint of both thumbs 11/16/2022     Post-operative nausea and vomiting 1/18/2023    Severe. Recommend TIVA     Type 2 diabetes mellitus with hyperglycemia (H) 06/24/2016       FAMILY HISTORY:  Family History   Problem Relation Age of Onset     Hyperlipidemia Mother      Breast Cancer Mother      Other Cancer Father      Leukemia Father      Skin Cancer Father      Coronary Artery Disease Maternal Uncle      Brain Cancer Maternal Uncle      Coronary Artery Disease Maternal Uncle      Stomach Cancer Maternal Aunt      Diabetes No family hx of      Hypertension No family hx of      Cerebrovascular Disease No family hx of      Colon Cancer No family hx of      Thyroid Disease No family hx of      Depression No family hx of      Anxiety Disorder No family hx of        SOCIAL HISTORY:  Social History      Socioeconomic History     Marital status: Single   Tobacco Use     Smoking status: Never     Smokeless tobacco: Never   Vaping Use     Vaping Use: Never used   Substance and Sexual Activity     Alcohol use: Yes     Comment: 1 to 2 beers or glasses of wine 1 to 2 times per month     Drug use: No     Sexual activity: Not Currently     Partners: Female     Birth control/protection: Post-menopausal   Other Topics Concern     Parent/sibling w/ CABG, MI or angioplasty before 65F 55M? No       CURRENT MEDICATIONS:  acetaminophen (TYLENOL) 325 MG tablet, Take 2 tablets (650 mg) by mouth every 4 hours as needed for other  apixaban ANTICOAGULANT (ELIQUIS) 5 MG tablet, Take 1 tablet (5 mg) by mouth 2 times daily Resume the day following your procedure, on 7/6/2022  atorvastatin (LIPITOR) 10 MG tablet, Take 1 tablet (10 mg) by mouth daily  blood glucose (ACCU-CHEK GUIDE) test strip, Use to test blood sugar 6 times daily  blood glucose monitoring (ACCU-CHEK FASTCLIX) lancets, USE TO TEST BLOOD SUGAR FOUR TIMES A DAY OR AS DIRECTED  calcium carbonate-vitamin D 500-400 MG-UNIT TABS tablt, Take 1 tablet by mouth 2 times daily  Continuous Blood Gluc  (DEXCOM G6 ) JUANA, Use to read blood sugars as per 's instructions.  Continuous Blood Gluc Sensor (DEXCOM G6 SENSOR) MISC, Change every 10 days.  Continuous Blood Gluc Transmit (DEXCOM G6 TRANSMITTER) MISC, Change every 3 months. (Patient not taking: Reported on 1/9/2023)  diltiazem (CARDIZEM) 30 MG tablet, Take 1 tablet (30 mg) by mouth every 6 hours as needed (HR>120)  estradiol (ESTRACE) 0.1 MG/GM vaginal cream, Place 2 g vaginally twice a week (Patient not taking: Reported on 1/27/2023)  hydrOXYzine (ATARAX) 25 MG tablet, Take 1 tablet (25 mg) by mouth every 6 hours as needed for itching  insulin aspart (NOVOLOG FLEXPEN) 100 UNIT/ML pen, 1 unit per 15 gram of carb for all meals. Plus 1 unit per 50 mg/dl above 150. Total daily dose approx 30  units.  insulin glargine (BASAGLAR KWIKPEN) 100 UNIT/ML pen, Inject 30 Units Subcutaneous daily Max, 35 units daily  insulin pen needle (B-D U/F) 31G X 8 MM miscellaneous, USE 5 TIMES DAILY  WITH NOVOLOG AND LANTUS  melatonin 3 MG tablet, Take 1 tablet (3 mg) by mouth nightly as needed for sleep  metoprolol succinate ER (TOPROL XL) 100 MG 24 hr tablet, Take 1 tablet (100 mg) by mouth 2 times daily  Multiple Vitamins-Minerals (MULTIVITAMIN ADULT PO), Take 1 tablet by mouth every morning   oxyCODONE (ROXICODONE) 5 MG tablet, Take 1 tablet (5 mg) by mouth every 4 hours as needed for moderate pain (4-6)  polyethylene glycol (MIRALAX) 17 g packet, Take 17 g by mouth daily (Patient not taking: Reported on 1/27/2023)  senna-docusate (SENOKOT-S/PERICOLACE) 8.6-50 MG tablet, Take 1 tablet by mouth 2 times daily (Patient not taking: Reported on 1/27/2023)  Urine Glucose-Ketones Test (KETO-DIASTIX) STRP, 1 strip by In Vitro route as needed (Patient not taking: Reported on 1/9/2023)    No current facility-administered medications on file prior to visit.      ROS:   Refer to HPI    EXAM:  There were no vitals taken for this visit.  GENERAL: Appears comfortable, in no acute distress.   HEENT: Eye symmetrical, no discharge or icterus bilaterally. Mucous membranes moist and without lesions.  CV: tachycardic, irregularly irregular, +S1S2, no murmur, rub, or gallop.   RESPIRATORY: Respirations regular, even, and unlabored. Lungs CTA throughout.   GI: Soft and non distended with normoactive bowel sounds present in all quadrants. No tenderness, rebound, guarding. No hepatomegaly.   EXTREMITIES: Trace peripheral edema. 2+ bilateral pedal pulses.   NEUROLOGIC: Alert and oriented x 3. No focal deficits.   MUSCULOSKELETAL: No joint swelling or tenderness.   SKIN: No jaundice. No rashes or lesions.     Labs, reviewed with patient in clinic today:  CBC RESULTS:  Lab Results   Component Value Date    WBC 7.5 01/21/2023    WBC 3.8 (L)  06/24/2016    RBC 3.46 (L) 01/21/2023    RBC 4.31 06/24/2016    HGB 10.7 (L) 01/21/2023    HGB 14.4 06/24/2016    HCT 31.9 (L) 01/21/2023    HCT 40.7 06/24/2016    MCV 92 01/21/2023    MCV 94 06/24/2016    MCH 30.9 01/21/2023    MCH 33.4 (H) 06/24/2016    MCHC 33.5 01/21/2023    MCHC 35.4 06/24/2016    RDW 13.3 01/21/2023    RDW 13.3 06/24/2016     01/21/2023     06/24/2016       CMP RESULTS:  Lab Results   Component Value Date     01/27/2023     08/02/2019    POTASSIUM 4.5 01/27/2023    POTASSIUM 4.1 01/23/2023    POTASSIUM 4.0 08/02/2019    CHLORIDE 99 01/27/2023    CHLORIDE 104 01/23/2023    CHLORIDE 103 08/02/2019    CO2 28 01/27/2023    CO2 23 01/23/2023    CO2 24 08/02/2019    ANIONGAP 9 01/27/2023    ANIONGAP 10 01/23/2023    ANIONGAP 8 08/02/2019     (H) 01/27/2023     (H) 01/24/2023     (H) 01/23/2023     (H) 08/02/2019    BUN 12.5 01/27/2023    BUN 11 01/23/2023    BUN 16 08/02/2019    CR 0.60 01/27/2023    CR 0.74 07/01/2021    GFRESTIMATED >90 01/27/2023    GFRESTIMATED >60 11/17/2021    GFRESTIMATED 82 07/01/2021    GFRESTBLACK >90 07/01/2021    RENATO 10.1 01/27/2023    RENATO 9.0 08/02/2019    BILITOTAL 0.5 01/27/2023    BILITOTAL 0.7 08/02/2019    ALBUMIN 3.8 01/27/2023    ALBUMIN 2.6 (L) 01/23/2023    ALBUMIN 3.7 08/02/2019    ALKPHOS 72 01/27/2023    ALKPHOS 97 08/02/2019    ALT 14 01/27/2023    ALT 24 08/02/2019    AST 17 01/27/2023    AST 16 08/02/2019        INR RESULTS:  No results found for: INR    Lab Results   Component Value Date    MAG 2.0 01/23/2023     No results found for: NTBNPI  No results found for: NTBNP    Cardiac Diagnostics:    11/2021 ECHO  Interpretation Summary     Global and regional left ventricular function is normal with an EF of 55-60%.  Global right ventricular function is normal.  Trileaflet aortic sclerosis without stenosis. Mld to moderate aortic  insufficiency is present. Mild mitral and tricuspid insufficiency  present.  The inferior vena cava was normal in size with preserved respiratory  variability.  No pericardial effusion is present    11/2021 Coronary CTA                                                              IMPRESSION:  1.  Widely patent coronary arteries without evidence of  atherosclerosis or stenosis.  2.  Total Agatston score 0 placing the patient in the lowest  percentile when compared to an age- and gender-matched control group.  3.  Please review the separate Radiology report for incidental  noncardiac findings. This report will follow separately.      Assessment and Plan:   Ms. Rodríguez is a 72 year old female with medical history pertinent for T2DM, permanent AF, and arthritis. She recently underwent right total hip arthoplasty. She was noted to have post-op AF with RVR. She presents to cardiology clinic for hospital follow up.     Doing well s/p R hip arthoplasty. Heart rates well controlled with episodes of rates 110s-120s. We discussed that these rates are permissible, as long as they are not consistently elevated. Recommend that she resume metoprolol succinate 50 mg daily.   With regards to her BP, I question the accuracy of her wrist cuff. Recommend that she take BP on both wrists when its low to correlate. If able, would prefer BPs with an arm cuff. Refill provided for metoprolol.     # Permanent Atrial Fibrillation   # Postop AF with RVR  - continue eliquis 5 mg BID  - continue metoprolol succinate 50 mg daily  - continue diltiazem 30 mg q6h PRN for HR consistently >130        Follow up:  - Dr. Whipple 3/2023           Please do not hesitate to contact me if you have any questions/concerns.     Sincerely,     URVASHI Browning CNP

## 2023-02-10 NOTE — PROGRESS NOTES
[facility] Cardiology - [facility] [place]   Cardiology Clinic Note      HPI:   Ms. Rodríguez is a 72 year old female with medical history pertinent for T2DM, permanent AF, and arthritis. She recently underwent total right hip arthoplasty. She was noted to have postop AF with RVR. She presents to cardiology clinic for routine follow up.      With regards to her cardiac conditions, Ms. Rodríguez has known permanent AF. She was first noted to have an episode of perioperative AFL in 5/2021 while undergoing cystourethroscopy. She reverted to NSR without any intervention. Then, in 9/2021 Ms. Rodríguez had recurrence of AF for which she underwent DCCV x 3 without restoration of sinus rhythm. Ms. Rodríguez is rate controlled and at follow up visits with Dr. Whipple she had been essentially asymptomatic. She's maintained on eliquis for CVA prophylaxis. As mentioned, Ms. Rodríguez underwent total right hip arthroplasty on 1/18/2023. She went into AF with RVR with HRs 150s on POD # 1. She was treated with IV metoprolol and started on metoprolol tartrate 37.5 mg QID. Due to ongoing RVR, she was also briefly initiated on diltiazem. Ms. Rodríguez was evaluated by cardiology who felt that HRs ~ 110 were permissible. She was transitioned from lopressor to toprol-xl and diltiazem was stopped. Ms. Rodríguez was discharged home on 1/24 and had been resumed on eliquis 5mg BID and metoprolol succinate 100 mg BID.      Ms. Rodríguez was seen in clinic by myself on 1/27/23. At that time she was feeling relatively well. HRs were consistently running 90-120s. On one occasion HR at rest was 130s-145 bpm and treated with diltiazem x 1 dose. At clinic visit rates were around 100, but earlier that morning daughter reported rates in the 60s. Metoprolol was reduced to 50 mg BID.     Today, Ms. Rodríguez presents to clinic with her daughter. She reports feeling well. She is frustrated by her slow progression with physical strength s/p R hip arthroplasty, but continues to work with PT. No  acute CV concerns. Denies chest pain, SOB, CERDA, palpitations, lightheadedness, orthopnea, PND, LE edema, syncope, or presyncope. Ms. Rodríguez continues to routinely monitor her BP and HRs. BP typically 90s/60s. She reports one low reading this past week with a SBP of 77. She uses a wrist cuff. HRs 70s-90s, on occasion rates in the 120s. She tells me that she has been taking metoprolol 50 mg daily PRN.  in clinic. She has not taken metoprolol today.     Cardiac Medications:   - Eliquis 5 mg BID  - Metoprolol succinate 50 mg daily   - Atorvastatin 10 mg daily   - Diltiazem 30 mg q6h PRN for HR > 120 bpm      PAST MEDICAL HISTORY:  Past Medical History:   Diagnosis Date     Cataract      Chronic atrial fibrillation (H) 06/08/2022     Osteoarthritis of carpometacarpal (CMC) joint of both thumbs 11/16/2022     Post-operative nausea and vomiting 1/18/2023    Severe. Recommend TIVA     Type 2 diabetes mellitus with hyperglycemia (H) 06/24/2016       FAMILY HISTORY:  Family History   Problem Relation Age of Onset     Hyperlipidemia Mother      Breast Cancer Mother      Other Cancer Father      Leukemia Father      Skin Cancer Father      Coronary Artery Disease Maternal Uncle      Brain Cancer Maternal Uncle      Coronary Artery Disease Maternal Uncle      Stomach Cancer Maternal Aunt      Diabetes No family hx of      Hypertension No family hx of      Cerebrovascular Disease No family hx of      Colon Cancer No family hx of      Thyroid Disease No family hx of      Depression No family hx of      Anxiety Disorder No family hx of        SOCIAL HISTORY:  Social History     Socioeconomic History     Marital status: Single   Tobacco Use     Smoking status: Never     Smokeless tobacco: Never   Vaping Use     Vaping Use: Never used   Substance and Sexual Activity     Alcohol use: Yes     Comment: 1 to 2 beers or glasses of wine 1 to 2 times per month     Drug use: No     Sexual activity: Not Currently     Partners: Female      Birth control/protection: Post-menopausal   Other Topics Concern     Parent/sibling w/ CABG, MI or angioplasty before 65F 55M? No       CURRENT MEDICATIONS:  acetaminophen (TYLENOL) 325 MG tablet, Take 2 tablets (650 mg) by mouth every 4 hours as needed for other  apixaban ANTICOAGULANT (ELIQUIS) 5 MG tablet, Take 1 tablet (5 mg) by mouth 2 times daily Resume the day following your procedure, on 7/6/2022  atorvastatin (LIPITOR) 10 MG tablet, Take 1 tablet (10 mg) by mouth daily  blood glucose (ACCU-CHEK GUIDE) test strip, Use to test blood sugar 6 times daily  blood glucose monitoring (ACCU-CHEK FASTCLIX) lancets, USE TO TEST BLOOD SUGAR FOUR TIMES A DAY OR AS DIRECTED  calcium carbonate-vitamin D 500-400 MG-UNIT TABS tablt, Take 1 tablet by mouth 2 times daily  Continuous Blood Gluc  (DEXCOM G6 ) JUANA, Use to read blood sugars as per 's instructions.  Continuous Blood Gluc Sensor (DEXCOM G6 SENSOR) MISC, Change every 10 days.  Continuous Blood Gluc Transmit (DEXCOM G6 TRANSMITTER) MISC, Change every 3 months. (Patient not taking: Reported on 1/9/2023)  diltiazem (CARDIZEM) 30 MG tablet, Take 1 tablet (30 mg) by mouth every 6 hours as needed (HR>120)  estradiol (ESTRACE) 0.1 MG/GM vaginal cream, Place 2 g vaginally twice a week (Patient not taking: Reported on 1/27/2023)  hydrOXYzine (ATARAX) 25 MG tablet, Take 1 tablet (25 mg) by mouth every 6 hours as needed for itching  insulin aspart (NOVOLOG FLEXPEN) 100 UNIT/ML pen, 1 unit per 15 gram of carb for all meals. Plus 1 unit per 50 mg/dl above 150. Total daily dose approx 30 units.  insulin glargine (BASAGLAR KWIKPEN) 100 UNIT/ML pen, Inject 30 Units Subcutaneous daily Max, 35 units daily  insulin pen needle (B-D U/F) 31G X 8 MM miscellaneous, USE 5 TIMES DAILY  WITH NOVOLOG AND LANTUS  melatonin 3 MG tablet, Take 1 tablet (3 mg) by mouth nightly as needed for sleep  metoprolol succinate ER (TOPROL XL) 100 MG 24 hr tablet, Take 1 tablet  (100 mg) by mouth 2 times daily  Multiple Vitamins-Minerals (MULTIVITAMIN ADULT PO), Take 1 tablet by mouth every morning   oxyCODONE (ROXICODONE) 5 MG tablet, Take 1 tablet (5 mg) by mouth every 4 hours as needed for moderate pain (4-6)  polyethylene glycol (MIRALAX) 17 g packet, Take 17 g by mouth daily (Patient not taking: Reported on 1/27/2023)  senna-docusate (SENOKOT-S/PERICOLACE) 8.6-50 MG tablet, Take 1 tablet by mouth 2 times daily (Patient not taking: Reported on 1/27/2023)  Urine Glucose-Ketones Test (KETO-DIASTIX) STRP, 1 strip by In Vitro route as needed (Patient not taking: Reported on 1/9/2023)    No current facility-administered medications on file prior to visit.      ROS:   Refer to HPI    EXAM:  There were no vitals taken for this visit.  GENERAL: Appears comfortable, in no acute distress.   HEENT: Eye symmetrical, no discharge or icterus bilaterally. Mucous membranes moist and without lesions.  CV: tachycardic, irregularly irregular, +S1S2, no murmur, rub, or gallop.   RESPIRATORY: Respirations regular, even, and unlabored. Lungs CTA throughout.   GI: Soft and non distended with normoactive bowel sounds present in all quadrants. No tenderness, rebound, guarding. No hepatomegaly.   EXTREMITIES: Trace peripheral edema. 2+ bilateral pedal pulses.   NEUROLOGIC: Alert and oriented x 3. No focal deficits.   MUSCULOSKELETAL: No joint swelling or tenderness.   SKIN: No jaundice. No rashes or lesions.     Labs, reviewed with patient in clinic today:  CBC RESULTS:  Lab Results   Component Value Date    WBC 7.5 01/21/2023    WBC 3.8 (L) 06/24/2016    RBC 3.46 (L) 01/21/2023    RBC 4.31 06/24/2016    HGB 10.7 (L) 01/21/2023    HGB 14.4 06/24/2016    HCT 31.9 (L) 01/21/2023    HCT 40.7 06/24/2016    MCV 92 01/21/2023    MCV 94 06/24/2016    MCH 30.9 01/21/2023    MCH 33.4 (H) 06/24/2016    MCHC 33.5 01/21/2023    MCHC 35.4 06/24/2016    RDW 13.3 01/21/2023    RDW 13.3 06/24/2016     01/21/2023    PLT  172 06/24/2016       CMP RESULTS:  Lab Results   Component Value Date     01/27/2023     08/02/2019    POTASSIUM 4.5 01/27/2023    POTASSIUM 4.1 01/23/2023    POTASSIUM 4.0 08/02/2019    CHLORIDE 99 01/27/2023    CHLORIDE 104 01/23/2023    CHLORIDE 103 08/02/2019    CO2 28 01/27/2023    CO2 23 01/23/2023    CO2 24 08/02/2019    ANIONGAP 9 01/27/2023    ANIONGAP 10 01/23/2023    ANIONGAP 8 08/02/2019     (H) 01/27/2023     (H) 01/24/2023     (H) 01/23/2023     (H) 08/02/2019    BUN 12.5 01/27/2023    BUN 11 01/23/2023    BUN 16 08/02/2019    CR 0.60 01/27/2023    CR 0.74 07/01/2021    GFRESTIMATED >90 01/27/2023    GFRESTIMATED >60 11/17/2021    GFRESTIMATED 82 07/01/2021    GFRESTBLACK >90 07/01/2021    RENATO 10.1 01/27/2023    RENATO 9.0 08/02/2019    BILITOTAL 0.5 01/27/2023    BILITOTAL 0.7 08/02/2019    ALBUMIN 3.8 01/27/2023    ALBUMIN 2.6 (L) 01/23/2023    ALBUMIN 3.7 08/02/2019    ALKPHOS 72 01/27/2023    ALKPHOS 97 08/02/2019    ALT 14 01/27/2023    ALT 24 08/02/2019    AST 17 01/27/2023    AST 16 08/02/2019        INR RESULTS:  No results found for: INR    Lab Results   Component Value Date    MAG 2.0 01/23/2023     No results found for: NTBNPI  No results found for: NTBNP    Cardiac Diagnostics:    11/2021 ECHO  Interpretation Summary     Global and regional left ventricular function is normal with an EF of 55-60%.  Global right ventricular function is normal.  Trileaflet aortic sclerosis without stenosis. Mld to moderate aortic  insufficiency is present. Mild mitral and tricuspid insufficiency present.  The inferior vena cava was normal in size with preserved respiratory  variability.  No pericardial effusion is present    11/2021 Coronary CTA                                                              IMPRESSION:  1.  Widely patent coronary arteries without evidence of  atherosclerosis or stenosis.  2.  Total Agatston score 0 placing the patient in the  lowest  percentile when compared to an age- and gender-matched control group.  3.  Please review the separate Radiology report for incidental  noncardiac findings. This report will follow separately.      Assessment and Plan:   Ms. Rodríguez is a 72 year old female with medical history pertinent for T2DM, permanent AF, and arthritis. She recently underwent right total hip arthoplasty. She was noted to have post-op AF with RVR. She presents to cardiology clinic for hospital follow up.     Doing well s/p R hip arthoplasty. Heart rates well controlled with episodes of rates 110s-120s. We discussed that these rates are permissible, as long as they are not consistently elevated. Recommend that she resume metoprolol succinate 50 mg daily.   With regards to her BP, I question the accuracy of her wrist cuff. Recommend that she take BP on both wrists when its low to correlate. If able, would prefer BPs with an arm cuff. Refill provided for metoprolol.     # Permanent Atrial Fibrillation   # Postop AF with RVR  - continue eliquis 5 mg BID  - continue metoprolol succinate 50 mg daily  - continue diltiazem 30 mg q6h PRN for HR consistently >130        Follow up:  - Dr. Whipple 3/2023           Olya Johnson DNP, NP-C  General Cardiology   2/10/23          CC

## 2023-02-15 DIAGNOSIS — Z47.89 ORTHOPEDIC AFTERCARE: Primary | ICD-10-CM

## 2023-02-20 ENCOUNTER — TELEPHONE (OUTPATIENT)
Dept: FAMILY MEDICINE | Facility: CLINIC | Age: 73
End: 2023-02-20
Payer: COMMERCIAL

## 2023-02-20 DIAGNOSIS — E10.65 TYPE 1 DIABETES MELLITUS WITH HYPERGLYCEMIA (H): Primary | ICD-10-CM

## 2023-02-20 NOTE — TELEPHONE ENCOUNTER
NESTOR Benitez calling with patient's home care. Eli calling to request orders and to provide updates.    1. Eli requesting 1 additional HHA visit to be added on. Patient has not had hot water for the last couple of visits and is getting the issue resolved, but Eli feels patient would benefit from an additional HHA visit.    2. Eli states patient has not been able to check her feet, especially since surgery. She has some concerns about the condition of patient's feet given her Type 1 Diabetes and is requesting to know if referral to podiatry could be made. Patient did express interest with Eli during most recent visit. Eli states she spent a lot of time discussing nutrition and managing patient's diabetes. Eli wasn't sure if diabetic consult should be made. RN informed Eli that patient has been connected with a diabetic educator and is following up with them in June 2023.     3. Eli suggested a follow up visit between patient and PCP would be beneficial as patient has questions about supplements such as calcium and vitamin B12. RN called patient and scheduled virtual follow up with PCP for 2/21/23.    Routing to provider to review and advise on requested home care orders and referral to podiatry.    Yulisa Reyes RN    [facility]

## 2023-02-21 ENCOUNTER — ANCILLARY PROCEDURE (OUTPATIENT)
Dept: GENERAL RADIOLOGY | Facility: CLINIC | Age: 73
End: 2023-02-21
Attending: ORTHOPAEDIC SURGERY
Payer: COMMERCIAL

## 2023-02-21 ENCOUNTER — VIRTUAL VISIT (OUTPATIENT)
Dept: FAMILY MEDICINE | Facility: CLINIC | Age: 73
End: 2023-02-21
Payer: COMMERCIAL

## 2023-02-21 ENCOUNTER — OFFICE VISIT (OUTPATIENT)
Dept: ORTHOPEDICS | Facility: CLINIC | Age: 73
End: 2023-02-21
Payer: COMMERCIAL

## 2023-02-21 DIAGNOSIS — Z47.89 ORTHOPEDIC AFTERCARE: ICD-10-CM

## 2023-02-21 DIAGNOSIS — I48.20 CHRONIC ATRIAL FIBRILLATION (H): ICD-10-CM

## 2023-02-21 DIAGNOSIS — E10.9 TYPE 1 DIABETES MELLITUS WITHOUT COMPLICATION (H): ICD-10-CM

## 2023-02-21 DIAGNOSIS — Z47.89 ORTHOPEDIC AFTERCARE: Primary | ICD-10-CM

## 2023-02-21 DIAGNOSIS — M16.11 OSTEOARTHRITIS OF RIGHT HIP, UNSPECIFIED OSTEOARTHRITIS TYPE: Primary | ICD-10-CM

## 2023-02-21 PROCEDURE — 99442 PR PHYSICIAN TELEPHONE EVALUATION 11-20 MIN: CPT | Mod: 95 | Performed by: NURSE PRACTITIONER

## 2023-02-21 PROCEDURE — 99024 POSTOP FOLLOW-UP VISIT: CPT | Performed by: ORTHOPAEDIC SURGERY

## 2023-02-21 PROCEDURE — 73502 X-RAY EXAM HIP UNI 2-3 VIEWS: CPT | Mod: RT | Performed by: RADIOLOGY

## 2023-02-21 ASSESSMENT — PAIN SCALES - GENERAL: PAINLEVEL: NO PAIN (0)

## 2023-02-21 NOTE — PROGRESS NOTES
Latoya is a 72 year old who is being evaluated via a billable telephone visit.      What phone number would you like to be contacted at?    [phone]        How would you like to obtain your AVS? Hay    Distant Location (provider location):  On-site        Subjective   Latoya is a 72 year old, presenting for the following health issues:  Follow Up      History of Present Illness       Reason for visit:  Surgery follow-up    She eats 2-3 servings of fruits and vegetables daily.She consumes 0 sweetened beverage(s) daily.She exercises with enough effort to increase her heart rate 9 or less minutes per day.  She exercises with enough effort to increase her heart rate 3 or less days per week.   She is taking medications regularly.     Date of Admission: 1/18/2023  Date of Discharge: 1/24/2023  5:42 PM  Disposition: Home  Staff Physician: Arnaud Ward MD  Primary Care Provider: Pricila Avila        ADMISSION DIAGNOSIS:  Arthritis of right hip [M16.11]     DISCHARGE DIAGNOSIS:  Arthritis of right hip [M16.11]     PROCEDURES: Procedure(s):  ARTHROPLASTY, HIP, TOTAL RIGHT on 1/18/2023     BRIEF HISTORY:  The patient has a long history of debilitating pain secondary to ostearthritis of the hip.  Despite comprehensive non-operative management these symptoms continued to interfere with activities of daily living.  After discussion of further treatment options including the risks and benefits that patient elected to proceed with a total hip.     HOSPITAL COURSE:    The patient was admitted following the above listed procedures for pain control and rehabilitation. Latoya Rodríguez did well post-operatively. Medicine was consulted post operatively to aid in management of medical co-morbidities. The patient received routine nursing cares and at the time of discharge was medically stable. Vital signs were stable throughout admission with exception to noted to have atrial fibrillation with rapid ventricular  response.     Medicine/cardiology managed heart rate with metoprolol and diltiazem.  Patient improved and Medicine coordinated outpatient rate control regimen.         The patient is tolerating a regular diet and is voiding spontaneously. All PT/OT goals have been met for safe mobility. Pain is now controlled on oral medications which will be available on discharge. Stool softeners have been used while taking pain medications to help prevent constipation. Latoya Rodríguez is deemed medically safe to discharge on POD6.     Follow up after surgery   Had to spend a week after surgery due to flipping into atrial fib   Is now back on Eliquis and metoprolol   The juliana is still on her med list from cardiology   Is getting at home physical therapy but thinks this next week will be her last       Diabetes is doing OK   Was a little high this morning and is yo yoing    This morning was 63 and then had a snack   Family helping with meals but is microwaved meals     Lab work is in process  Labs reviewed in EPIC  BP Readings from Last 3 Encounters:   02/10/23 105/72   01/27/23 117/63   01/24/23 98/55    Wt Readings from Last 3 Encounters:   02/10/23 71.9 kg (158 lb 9.6 oz)   01/27/23 70.6 kg (155 lb 11.2 oz)   01/18/23 74.4 kg (164 lb 0.4 oz)                  Patient Active Problem List   Diagnosis     Urgency of urination     Urinary frequency     Urge incontinence     Hyperlipidemia with target LDL less than 100     Osteoporosis     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)     Type 1 diabetes mellitus with hyperglycemia (H)     Age-related osteoporosis without current pathological fracture     Stress incontinence     Atrophic vaginitis     Intrinsic sphincter deficiency     Overactive bladder     Carpal tunnel syndrome of right wrist     Chronic atrial fibrillation (H)     Osteoarthritis of carpometacarpal (CMC) joint of both thumbs     Thumb pain     Post-operative nausea and vomiting     Type 1 diabetes  mellitus without complication (H)     Osteoarthritis of right hip, unspecified osteoarthritis type     Past Surgical History:   Procedure Laterality Date     ANESTHESIA CARDIOVERSION N/A 9/23/2021    Procedure: ANESTHESIA, FOR CARDIOVERSION@1100;  Surgeon: GENERIC ANESTHESIA PROVIDER;  Location: [facility] OR     ANESTHESIA OUT OF OR MRI N/A 9/14/2022    Procedure: ANESTHESIA OUT OF OR Magnetic resonance imaging right hip and lumbar @0800;  Surgeon: GENERIC ANESTHESIA PROVIDER;  Location: [facility] OR     ARTHROPLASTY HIP Right 1/18/2023    Procedure: ARTHROPLASTY, HIP, TOTAL RIGHT;  Surgeon: Arnaud Ward MD;  Location: [facility]     CYSTOSCOPY, INJECT COLLAGEN, COMBINED N/A 5/11/2021    Procedure: CYSTOSCOPY, WITH PERIURETHRAL BULKING AGENT INJECTION;  Surgeon: Alberto Zhang MD;  Location: [facility] OR     CYSTOSCOPY, INJECT COLLAGEN, COMBINED N/A 9/12/2022    Procedure: CYSTOSCOPY, WITH PERIURETHRAL BULKING AGENT INJECTION (look in the bladder and urethra and inject filler into the urethra);  Surgeon: Alberto Zhang MD;  Location: [facility] OR     EYE SURGERY  9/04, 5/11    Retinal detachment, Cataract (both eyes)     IMPLANT STIMULATOR AND LEADS SACRAL NERVE (STAGE ONE AND TWO) Right 7/5/2022    Procedure: INSERTION, SACRAL NERVE STIMULATOR, STAGE 1 & 2 (permanent implant on the RIGHT side with [employer]);  Surgeon: Alberto Zhang MD;  Location: [facility] OR     IMPLANT STIMULATOR SACRAL NERVE PERCUTANEOUS TRIAL N/A 6/14/2022    Procedure: INSERTION, NEUROSTIMULATOR, SACRAL, PERCUTANEOUS, FOR TRIAL;  Surgeon: Alberto Zhang MD;  Location: [facility] OR     RELEASE CARPAL TUNNEL Right 8/6/2021    Procedure: RELEASE, CARPAL TUNNEL, Right;  Surgeon: RADHA Sandoval MD;  Location: [facility]     RELEASE TRIGGER FINGER Bilateral 5/10/2019    Procedure: RELEASE TRIGGER FINGER, BILATERAL LONG AND RING FINGERS;  Surgeon: RADHA Sandoval MD;  Location: [facility] OR     VASCULAR SURGERY      Varicose Veins       Social History     Tobacco  Use     Smoking status: Never     Smokeless tobacco: Never   Substance Use Topics     Alcohol use: Yes     Comment: 1 to 2 beers or glasses of wine 1 to 2 times per month     Family History   Problem Relation Age of Onset     Hyperlipidemia Mother      Breast Cancer Mother      Other Cancer Father      Leukemia Father      Skin Cancer Father      Coronary Artery Disease Maternal Uncle      Brain Cancer Maternal Uncle      Coronary Artery Disease Maternal Uncle      Stomach Cancer Maternal Aunt      Diabetes No family hx of      Hypertension No family hx of      Cerebrovascular Disease No family hx of      Colon Cancer No family hx of      Thyroid Disease No family hx of      Depression No family hx of      Anxiety Disorder No family hx of          Current Outpatient Medications   Medication Sig Dispense Refill     acetaminophen (TYLENOL) 325 MG tablet Take 2 tablets (650 mg) by mouth every 4 hours as needed for other 60 tablet 0     apixaban ANTICOAGULANT (ELIQUIS) 5 MG tablet Take 1 tablet (5 mg) by mouth 2 times daily Resume the day following your procedure, on 7/6/2022 180 tablet 0     atorvastatin (LIPITOR) 10 MG tablet Take 1 tablet (10 mg) by mouth daily 90 tablet 3     blood glucose (ACCU-CHEK GUIDE) test strip Use to test blood sugar 6 times daily 550 strip 3     blood glucose monitoring (ACCU-CHEK FASTCLIX) lancets USE TO TEST BLOOD SUGAR FOUR TIMES A DAY OR AS DIRECTED 306 each 3     calcium carbonate-vitamin D 500-400 MG-UNIT TABS tablt Take 1 tablet by mouth 2 times daily 180 tablet 3     Continuous Blood Gluc  (DEXCOM G6 ) JUANA Use to read blood sugars as per 's instructions. 1 each 0     Continuous Blood Gluc Sensor (DEXCOM G6 SENSOR) MISC Change every 10 days. 3 each 11     Continuous Blood Gluc Transmit (DEXCOM G6 TRANSMITTER) MISC Change every 3 months. 1 each 3     diltiazem (CARDIZEM) 30 MG tablet Take 1 tablet (30 mg) by mouth every 6 hours as needed (HR>120) 30 tablet  0     estradiol (ESTRACE) 0.1 MG/GM vaginal cream Place 2 g vaginally twice a week 42.5 g 11     hydrOXYzine (ATARAX) 25 MG tablet Take 1 tablet (25 mg) by mouth every 6 hours as needed for itching 40 tablet 0     insulin aspart (NOVOLOG FLEXPEN) 100 UNIT/ML pen 1 unit per 15 gram of carb for all meals. Plus 1 unit per 50 mg/dl above 150. Total daily dose approx 30 units. 30 mL 3     insulin glargine (BASAGLAR KWIKPEN) 100 UNIT/ML pen Inject 30 Units Subcutaneous daily Max, 35 units daily       insulin pen needle (B-D U/F) 31G X 8 MM miscellaneous USE 5 TIMES DAILY  WITH NOVOLOG AND LANTUS 500 each 3     melatonin 3 MG tablet Take 1 tablet (3 mg) by mouth nightly as needed for sleep 30 tablet 0     metoprolol succinate ER (TOPROL XL) 50 MG 24 hr tablet Take 1 tablet (50 mg) by mouth daily 90 tablet 3     Multiple Vitamins-Minerals (MULTIVITAMIN ADULT PO) Take 1 tablet by mouth every morning        oxyCODONE (ROXICODONE) 5 MG tablet Take 1 tablet (5 mg) by mouth every 4 hours as needed for moderate pain (4-6) 26 tablet 0     polyethylene glycol (MIRALAX) 17 g packet Take 17 g by mouth daily 10 packet 0     senna-docusate (SENOKOT-S/PERICOLACE) 8.6-50 MG tablet Take 1 tablet by mouth 2 times daily 30 tablet 0     Urine Glucose-Ketones Test (KETO-DIASTIX) STRP 1 strip by In Vitro route as needed 1 each 11     conjugated estrogens (PREMARIN) 0.625 MG/GM vaginal cream Place 0.5 g vaginally twice a week 30 g 3     No Known Allergies    Review of Systems   Constitutional, HEENT, cardiovascular, pulmonary, gi and gu systems are negative, except as otherwise noted.      Objective             Physical Exam   alert and no distress  PSYCH: Alert and oriented times 3; coherent speech, normal   rate and volume, able to articulate logical thoughts, able   to abstract reason, no tangential thoughts, no hallucinations   or delusions  Her affect is normal and pleasant  RESP: No cough, no audible wheezing, able to talk in full  sentences  Remainder of exam unable to be completed due to telephone visits      Assessment & Plan     Osteoarthritis of right hip, unspecified osteoarthritis type  Improving post op   FOLLOW UP WITH SPECIALIST :Orthopedics      Type 1 diabetes mellitus without complication (H)  FOLLOW UP WITH SPECIALIST :Endocrinology      Chronic atrial fibrillation (H)  FOLLOW UP WITH SPECIALIST :Cardiology       Time spent doing chart review, history and exam, documentation and further activities per the note     MED REC REQUIRED  Post Medication Reconciliation Status:       See Patient Instructions      Return in about 1 month (around 3/21/2023), or if symptoms worsen or fail to improve, for Specialist appt.    URVASHI Hunter CNP  [facility]          Phone call duration: 14 minutes

## 2023-02-21 NOTE — NURSING NOTE
Latoya Rodríguez's chief complaint for this visit includes:  Chief Complaint   Patient presents with     Surgical Followup     RTHA 1/18/23 - feels good!       Referring Provider:  No referring provider defined for this encounter.    There were no vitals taken for this visit.  No Pain (0)       Previous surgeries: RTHA 1/18/23  Treatments done: PT- going great.   Imaging completed: XR 2/21  Pain: 0/10  Concerns: Everything is progressing well from her perspective, wants to confirm.     Giovanny Ogden, ATC

## 2023-02-21 NOTE — TELEPHONE ENCOUNTER
RN called NESTOR Benitez with patient's home care and relayed provider message below. Eli verbalized understanding and agreed to plan. No further questions or concerns at this time.     Yulisa Reyes RN    [facility]

## 2023-02-21 NOTE — PROGRESS NOTES
.Chief Complaint: Surgical Followup (RTHA 1/18/23 - feels good!)   4 weeks right total knee    HPI: Latoya Rodríguez returns today in follow-up for her right hip. She reports that she is doing very well. No pain medication, can use for weather reasons. Working with home PT. Doing stairs well.   Very happy with how she is doing so far.     LUDY Jr: 64.664  [facility]: 4    Medications and allergies are documented in the EMR and have been reviewed.    Current Outpatient Medications:      acetaminophen (TYLENOL) 325 MG tablet, Take 2 tablets (650 mg) by mouth every 4 hours as needed for other, Disp: 60 tablet, Rfl: 0     apixaban ANTICOAGULANT (ELIQUIS) 5 MG tablet, Take 1 tablet (5 mg) by mouth 2 times daily Resume the day following your procedure, on 7/6/2022, Disp: 180 tablet, Rfl: 0     atorvastatin (LIPITOR) 10 MG tablet, Take 1 tablet (10 mg) by mouth daily, Disp: 90 tablet, Rfl: 3     blood glucose (ACCU-CHEK GUIDE) test strip, Use to test blood sugar 6 times daily, Disp: 550 strip, Rfl: 3     blood glucose monitoring (ACCU-CHEK FASTCLIX) lancets, USE TO TEST BLOOD SUGAR FOUR TIMES A DAY OR AS DIRECTED, Disp: 306 each, Rfl: 3     calcium carbonate-vitamin D 500-400 MG-UNIT TABS tablt, Take 1 tablet by mouth 2 times daily, Disp: 180 tablet, Rfl: 3     Continuous Blood Gluc  (DEXCOM G6 ) JUANA, Use to read blood sugars as per 's instructions., Disp: 1 each, Rfl: 0     Continuous Blood Gluc Sensor (DEXCOM G6 SENSOR) MISC, Change every 10 days., Disp: 3 each, Rfl: 11     Continuous Blood Gluc Transmit (DEXCOM G6 TRANSMITTER) MISC, Change every 3 months. (Patient not taking: Reported on 1/9/2023), Disp: 1 each, Rfl: 3     diltiazem (CARDIZEM) 30 MG tablet, Take 1 tablet (30 mg) by mouth every 6 hours as needed (HR>120) (Patient not taking: Reported on 2/10/2023), Disp: 30 tablet, Rfl: 0     estradiol (ESTRACE) 0.1 MG/GM vaginal cream, Place 2 g vaginally twice a week (Patient not taking: Reported  on 1/27/2023), Disp: 42.5 g, Rfl: 11     hydrOXYzine (ATARAX) 25 MG tablet, Take 1 tablet (25 mg) by mouth every 6 hours as needed for itching (Patient not taking: Reported on 2/10/2023), Disp: 40 tablet, Rfl: 0     insulin aspart (NOVOLOG FLEXPEN) 100 UNIT/ML pen, 1 unit per 15 gram of carb for all meals. Plus 1 unit per 50 mg/dl above 150. Total daily dose approx 30 units., Disp: 30 mL, Rfl: 3     insulin glargine (BASAGLAR KWIKPEN) 100 UNIT/ML pen, Inject 30 Units Subcutaneous daily Max, 35 units daily, Disp: , Rfl:      insulin pen needle (B-D U/F) 31G X 8 MM miscellaneous, USE 5 TIMES DAILY  WITH NOVOLOG AND LANTUS, Disp: 500 each, Rfl: 3     melatonin 3 MG tablet, Take 1 tablet (3 mg) by mouth nightly as needed for sleep, Disp: 30 tablet, Rfl: 0     metoprolol succinate ER (TOPROL XL) 50 MG 24 hr tablet, Take 1 tablet (50 mg) by mouth daily, Disp: 90 tablet, Rfl: 3     Multiple Vitamins-Minerals (MULTIVITAMIN ADULT PO), Take 1 tablet by mouth every morning , Disp: , Rfl:      oxyCODONE (ROXICODONE) 5 MG tablet, Take 1 tablet (5 mg) by mouth every 4 hours as needed for moderate pain (4-6) (Patient not taking: Reported on 2/10/2023), Disp: 26 tablet, Rfl: 0     polyethylene glycol (MIRALAX) 17 g packet, Take 17 g by mouth daily (Patient not taking: Reported on 1/27/2023), Disp: 10 packet, Rfl: 0     senna-docusate (SENOKOT-S/PERICOLACE) 8.6-50 MG tablet, Take 1 tablet by mouth 2 times daily (Patient not taking: Reported on 1/27/2023), Disp: 30 tablet, Rfl: 0     Urine Glucose-Ketones Test (KETO-DIASTIX) STRP, 1 strip by In Vitro route as needed (Patient not taking: Reported on 1/9/2023), Disp: 1 each, Rfl: 11  Allergies: Patient has no known allergies.    Physical Exam:  On physical examination the patient appears the stated age, is in no acute distress, affect is appropriate, and breathing is non-labored.  There were no vitals taken for this visit.  There is no height or weight on file to calculate BMI.  Gait:  mild Trendelenburg on the right.   Incision: healed and bvenign  ROM: fluid and painless with >90 flexion  Distally, the circulatory, motor, and sensation exam is intact with 5/5 EHL, gastroc-soleus, and tibialis anterior.  Sensation to light touch is intact.  Dorsalis pedis and posterior tibialis pulses are palpable.  There are no sores on the feet, no bruising, and no lymphedema.    X-rays:    I reviewed the x-rays dated today.  Previous films reviewed.    Findings:  Normal progression for a total hip arthroplasty without evidence of loosening or subsidence.    Assessment: doing very well. Discussed dental proph. Discussed activities on total hip.   Plan: cont with rehab and RTC in 6 weeks, sooner if issues.     No ref. provider found

## 2023-02-21 NOTE — TELEPHONE ENCOUNTER
Please give verbal OK to proceed with HHA visit as requested  Also will make referral to podiatry as requested     Please be aware that coverage of these services is subject to the terms and limitations of your health insurance plan.  Call member services at your health plan with any benefit or coverage questions.   The [facility]  will call you to coordinate your care as prescribed by your provider. A representative will call you within 2 business days to help you schedule your appointment, or you may contact the  Representative at: [phone].

## 2023-02-21 NOTE — LETTER
2/21/2023         RE: Latoya Rodríguez  [address] [facility] [place] [address]        Dear Colleague,    Thank you for referring your patient, Latoya Rodríguez, to the [facility]. Please see a copy of my visit note below.    .Chief Complaint: Surgical Followup (RTHA 1/18/23 - feels good!)   4 weeks right total knee    HPI: Latoya Rodríguez returns today in follow-up for her right hip. She reports that she is doing very well. No pain medication, can use for weather reasons. Working with home PT. Doing stairs well.   Very happy with how she is doing so far.     LUDY Jr: 64.664  [facility]: 4    Medications and allergies are documented in the EMR and have been reviewed.    Current Outpatient Medications:      acetaminophen (TYLENOL) 325 MG tablet, Take 2 tablets (650 mg) by mouth every 4 hours as needed for other, Disp: 60 tablet, Rfl: 0     apixaban ANTICOAGULANT (ELIQUIS) 5 MG tablet, Take 1 tablet (5 mg) by mouth 2 times daily Resume the day following your procedure, on 7/6/2022, Disp: 180 tablet, Rfl: 0     atorvastatin (LIPITOR) 10 MG tablet, Take 1 tablet (10 mg) by mouth daily, Disp: 90 tablet, Rfl: 3     blood glucose (ACCU-CHEK GUIDE) test strip, Use to test blood sugar 6 times daily, Disp: 550 strip, Rfl: 3     blood glucose monitoring (ACCU-CHEK FASTCLIX) lancets, USE TO TEST BLOOD SUGAR FOUR TIMES A DAY OR AS DIRECTED, Disp: 306 each, Rfl: 3     calcium carbonate-vitamin D 500-400 MG-UNIT TABS tablt, Take 1 tablet by mouth 2 times daily, Disp: 180 tablet, Rfl: 3     Continuous Blood Gluc  (DEXCOM G6 ) JUANA, Use to read blood sugars as per 's instructions., Disp: 1 each, Rfl: 0     Continuous Blood Gluc Sensor (DEXCOM G6 SENSOR) MISC, Change every 10 days., Disp: 3 each, Rfl: 11     Continuous Blood Gluc Transmit (DEXCOM G6 TRANSMITTER) MISC, Change every 3 months. (Patient not taking: Reported on 1/9/2023), Disp: 1 each, Rfl: 3     diltiazem  (CARDIZEM) 30 MG tablet, Take 1 tablet (30 mg) by mouth every 6 hours as needed (HR>120) (Patient not taking: Reported on 2/10/2023), Disp: 30 tablet, Rfl: 0     estradiol (ESTRACE) 0.1 MG/GM vaginal cream, Place 2 g vaginally twice a week (Patient not taking: Reported on 1/27/2023), Disp: 42.5 g, Rfl: 11     hydrOXYzine (ATARAX) 25 MG tablet, Take 1 tablet (25 mg) by mouth every 6 hours as needed for itching (Patient not taking: Reported on 2/10/2023), Disp: 40 tablet, Rfl: 0     insulin aspart (NOVOLOG FLEXPEN) 100 UNIT/ML pen, 1 unit per 15 gram of carb for all meals. Plus 1 unit per 50 mg/dl above 150. Total daily dose approx 30 units., Disp: 30 mL, Rfl: 3     insulin glargine (BASAGLAR KWIKPEN) 100 UNIT/ML pen, Inject 30 Units Subcutaneous daily Max, 35 units daily, Disp: , Rfl:      insulin pen needle (B-D U/F) 31G X 8 MM miscellaneous, USE 5 TIMES DAILY  WITH NOVOLOG AND LANTUS, Disp: 500 each, Rfl: 3     melatonin 3 MG tablet, Take 1 tablet (3 mg) by mouth nightly as needed for sleep, Disp: 30 tablet, Rfl: 0     metoprolol succinate ER (TOPROL XL) 50 MG 24 hr tablet, Take 1 tablet (50 mg) by mouth daily, Disp: 90 tablet, Rfl: 3     Multiple Vitamins-Minerals (MULTIVITAMIN ADULT PO), Take 1 tablet by mouth every morning , Disp: , Rfl:      oxyCODONE (ROXICODONE) 5 MG tablet, Take 1 tablet (5 mg) by mouth every 4 hours as needed for moderate pain (4-6) (Patient not taking: Reported on 2/10/2023), Disp: 26 tablet, Rfl: 0     polyethylene glycol (MIRALAX) 17 g packet, Take 17 g by mouth daily (Patient not taking: Reported on 1/27/2023), Disp: 10 packet, Rfl: 0     senna-docusate (SENOKOT-S/PERICOLACE) 8.6-50 MG tablet, Take 1 tablet by mouth 2 times daily (Patient not taking: Reported on 1/27/2023), Disp: 30 tablet, Rfl: 0     Urine Glucose-Ketones Test (KETO-DIASTIX) STRP, 1 strip by In Vitro route as needed (Patient not taking: Reported on 1/9/2023), Disp: 1 each, Rfl: 11  Allergies: Patient has no known  allergies.    Physical Exam:  On physical examination the patient appears the stated age, is in no acute distress, affect is appropriate, and breathing is non-labored.  There were no vitals taken for this visit.  There is no height or weight on file to calculate BMI.  Gait: mild Trendelenburg on the right.   Incision: healed and bvenign  ROM: fluid and painless with >90 flexion  Distally, the circulatory, motor, and sensation exam is intact with 5/5 EHL, gastroc-soleus, and tibialis anterior.  Sensation to light touch is intact.  Dorsalis pedis and posterior tibialis pulses are palpable.  There are no sores on the feet, no bruising, and no lymphedema.    X-rays:    I reviewed the x-rays dated today.  Previous films reviewed.    Findings:  Normal progression for a total hip arthroplasty without evidence of loosening or subsidence.    Assessment: doing very well. Discussed dental proph. Discussed activities on total hip.   Plan: cont with rehab and RTC in 6 weeks, sooner if issues.     No ref. provider found        Again, thank you for allowing me to participate in the care of your patient.        Sincerely,        Arnaud Ward MD

## 2023-02-22 ENCOUNTER — TELEPHONE (OUTPATIENT)
Dept: FAMILY MEDICINE | Facility: CLINIC | Age: 73
End: 2023-02-22
Payer: COMMERCIAL

## 2023-02-22 DIAGNOSIS — N39.41 URGE INCONTINENCE: ICD-10-CM

## 2023-02-22 DIAGNOSIS — N95.2 ATROPHIC VAGINITIS: ICD-10-CM

## 2023-02-22 DIAGNOSIS — R39.15 URINARY URGENCY: ICD-10-CM

## 2023-02-22 NOTE — TELEPHONE ENCOUNTER
Wrote a script for premarin cream which could be acceptable alternative   Thanks for all your work with Latoya

## 2023-02-22 NOTE — TELEPHONE ENCOUNTER
Forms/Letter Request    Type of form/letter: [facility] order # [id number]    Have you been seen for this request: N/A    Do we have the form/letter: Yes: placed in [employer] forms in basket for review    When is form/letter needed by: ASAP    How would you like the form/letter returned: Fax  [phone]

## 2023-02-22 NOTE — TELEPHONE ENCOUNTER
I spoke with Latoya, she is in need of financial assistance for medication.    We reviewed the Prescription Assistance Program for manfacturer assistance programs, gross income, insurance and Rx list.    QUESTION-- The assistance program for Estrace has been discontinued.  [employer] does have a program for Premarin Cream that Latoya should qualify for.  If Premarin Cream would be an appropriate alternative, I do need the dose information for the application.     assistance applications will be completed for-  (Premarin Cream), Eliquis, Novolog pens, needles & Basaglar pens.    I am sending [employer] co-pay [employer] information to Latoya for her Toprol XL.    When approved, Latoya will receive this medication at no cost through December 2023.    Thanks so much for your help!    Jennifer Suarez  Prescription Assistance Supervisor  Pharmacy Assistance  [phone]

## 2023-02-24 ENCOUNTER — MEDICAL CORRESPONDENCE (OUTPATIENT)
Dept: HEALTH INFORMATION MANAGEMENT | Facility: CLINIC | Age: 73
End: 2023-02-24

## 2023-02-28 ENCOUNTER — TELEPHONE (OUTPATIENT)
Dept: ENDOCRINOLOGY | Facility: CLINIC | Age: 73
End: 2023-02-28

## 2023-02-28 NOTE — TELEPHONE ENCOUNTER
PA Initiation    Medication: Accu-Chek Guide Test strips renewal  Insurance Company: [employer] - Phone [phone] Fax [phone]  Pharmacy Filling the Rx:    Filling Pharmacy Phone:    Filling Pharmacy Fax:    Start Date: 2/28/2023    Key: [id number]

## 2023-02-28 NOTE — TELEPHONE ENCOUNTER
Prior Authorization Approval    Authorization Effective Date: 1/29/2023  Authorization Expiration Date: 2/28/2024  Medication: Accu-Chek Guide Test strips renewal  Approved Dose/Quantity: 550 per 90 days  Reference #: Key: [id number]   Insurance Company: [employer] - Phone [phone] Fax [phone]  Expected CoPay:       CoPay Card Available:      Foundation Assistance Needed:    Which Pharmacy is filling the prescription (Not needed for infusion/clinic administered):    Pharmacy Notified: No  Patient Notified: No

## 2023-03-01 ENCOUNTER — TRANSFERRED RECORDS (OUTPATIENT)
Dept: HEALTH INFORMATION MANAGEMENT | Facility: CLINIC | Age: 73
End: 2023-03-01

## 2023-03-14 ENCOUNTER — THERAPY VISIT (OUTPATIENT)
Dept: PHYSICAL THERAPY | Facility: CLINIC | Age: 73
End: 2023-03-14
Attending: ORTHOPAEDIC SURGERY
Payer: COMMERCIAL

## 2023-03-14 DIAGNOSIS — Z47.1 AFTERCARE FOLLOWING LEFT HIP JOINT REPLACEMENT SURGERY: ICD-10-CM

## 2023-03-14 DIAGNOSIS — Z96.642 AFTERCARE FOLLOWING LEFT HIP JOINT REPLACEMENT SURGERY: ICD-10-CM

## 2023-03-14 PROCEDURE — 97161 PT EVAL LOW COMPLEX 20 MIN: CPT | Mod: GP | Performed by: PHYSICAL THERAPIST

## 2023-03-14 PROCEDURE — 97110 THERAPEUTIC EXERCISES: CPT | Mod: GP | Performed by: PHYSICAL THERAPIST

## 2023-03-14 ASSESSMENT — ACTIVITIES OF DAILY LIVING (ADL)
WALKING_APPROXIMATELY_10_MINUTES: NO DIFFICULTY AT ALL
GOING_UP_1_FLIGHT_OF_STAIRS: NO DIFFICULTY AT ALL
SITTING_FOR_15_MINUTES: SLIGHT DIFFICULTY
LIGHT_TO_MODERATE_WORK: SLIGHT DIFFICULTY
HEAVY_WORK: MODERATE DIFFICULTY
HOS_ADL_SCORE(%): 63.24
DEEP_SQUATTING: UNABLE TO DO
WALKING_UP_STEEP_HILLS: MODERATE DIFFICULTY
HOS_ADL_HIGHEST_POTENTIAL_SCORE: 68
WALKING_INITIALLY: NO DIFFICULTY AT ALL
GETTING_INTO_AND_OUT_OF_AN_AVERAGE_CAR: SLIGHT DIFFICULTY
PUTTING_ON_SOCKS_AND_SHOES: UNABLE TO DO
WALKING_DOWN_STEEP_HILLS: MODERATE DIFFICULTY
GETTING_INTO_AND_OUT_OF_A_BATHTUB: UNABLE TO DO
STEPPING_UP_AND_DOWN_CURBS: NO DIFFICULTY AT ALL
TWISTING/PIVOTING_ON_INVOLVED_LEG: UNABLE TO DO
HOS_ADL_ITEM_SCORE_TOTAL: 43
HOS_ADL_COUNT: 17
GOING_DOWN_1_FLIGHT_OF_STAIRS: NO DIFFICULTY AT ALL
STANDING_FOR_15_MINUTES: MODERATE DIFFICULTY
WALKING_15_MINUTES_OR_GREATER: NO DIFFICULTY AT ALL
ROLLING_OVER_IN_BED: SLIGHT DIFFICULTY
RECREATIONAL_ACTIVITIES: MODERATE DIFFICULTY

## 2023-03-14 NOTE — PROGRESS NOTES
Physical Therapy Initial Evaluation  Subjective:    Patient Health History  Latoya Rodríguez being seen for right hip.     Problem began: 1/18/2023 (date of surgery).   Problem occurred: unknown   Pain is reported as 0/10 on pain scale.  General health as reported by patient is good.  Pertinent medical history includes: diabetes, heart problems and implanted device.   Red flags:  Pain at rest/night.  Medical allergies: none.       Current medications:  None.    Current occupation is retired.   Primary job tasks include:  Computer work, driving, lifting/carrying and prolonged sitting.   Other job/home tasks details: enjoys sewing.                Therapist Generated HPI Evaluation  Problem details: Pt presents to PT today s/p R BRYANT;  DOS 1/18/2023.  Patient recently completed a course of Home PT..         Type of problem:  Right hip.    This is a chronic condition.  Condition occurred with:  Insidious onset.  Where condition occurred: for unknown reasons.  Patient reports pain:  Joint and lateral.  Pain is described as aching and is intermittent.  Pain radiates to:  No radiation. Pain is the same all the time.  Since onset symptoms are gradually improving.  Associated symptoms:  Loss of strength and loss of motion/stiffness. Symptoms are exacerbated by activity, ascending stairs, descending stairs, weight bearing, standing, walking and bending/squatting (mostly feeling tired)  and relieved by rest, ice and activity/movement.  Special tests included:  MRI and x-ray.  Previous treatment includes physical therapy and surgery. There was moderate improvement following previous treatment.  Barriers include:  Lives alone.                        Objective:    Gait:    Gait Type:  Antalgic   Weight Bearing Status:  WBAT   Assistive Devices:  None  Deviations:  Shoulder:  Decr arm swing RLumbar:  Trunk flexion and trunk lean LHip:  Hip hiking R and Trendelenberg R                                                 Hip  Evaluation  HIP AROM:    Flexion: Left:   Right:  90    Extension: Left:   Right:  NT  Abduction: Left:    Right:  12          Knee Flexion:  Left: 130    Right: 125  Knee Extension: Left: 0    Right: 0    Hip Strength:    Flexion:   Right: 4-/5   Pain:                      Abduction:  Right: 4-/5    Pain:        Knee Flexion:  Left: 5/5   Pain:Right: 4/5   Pain:  Knee Extension:  Left: 5/5   Pain:Right: 4/5    Pain:        Hip Special Testing:   Not Assessed                     General     ROS    Assessment/Plan:    Patient is a 72 year old female with right side hip complaints.    Patient has the following significant findings with corresponding treatment plan.                Diagnosis 1:  S/p R BRYANT  Pain -  hot/cold therapy and manual therapy  Decreased ROM/flexibility - manual therapy, therapeutic exercise and therapeutic activity  Decreased strength - therapeutic exercise and therapeutic activities  Decreased proprioception - neuro re-education and therapeutic activities  Impaired gait - gait training  Impaired muscle performance - neuro re-education  Decreased function - therapeutic activities    Therapy Evaluation Codes:   1) History comprised of:   Personal factors that impact the plan of care:      Past/current experiences.    Comorbidity factors that impact the plan of care are:      Diabetes, Heart problems and Implanted device.     Medications impacting care: Pain.  2) Examination of Body Systems comprised of:   Body structures and functions that impact the plan of care:      Hip.   Activity limitations that impact the plan of care are:      Bending, Dressing, Stairs, Standing and transitions.  3) Clinical presentation characteristics are:   Stable/Uncomplicated.  4) Decision-Making    Low complexity using standardized patient assessment instrument and/or measureable assessment of functional outcome.  Cumulative Therapy Evaluation is: Low complexity.    Previous and current functional limitations:  (See  Goal Flow Sheet for this information)    Short term and Long term goals: (See Goal Flow Sheet for this information)     Communication ability:  Patient appears to be able to clearly communicate and understand verbal and written communication and follow directions correctly.  Treatment Explanation - The following has been discussed with the patient:   RX ordered/plan of care  Anticipated outcomes  Possible risks and side effects  This patient would benefit from PT intervention to resume normal activities.   Rehab potential is good.    Frequency:  1 X week, once daily  Duration:  for 2 months  Discharge Plan:  Achieve all LTG.  Independent in home treatment program.  Reach maximal therapeutic benefit.    Please refer to the daily flowsheet for treatment today, total treatment time and time spent performing 1:1 timed codes.

## 2023-03-15 NOTE — PROGRESS NOTES
[facility]    OUTPATIENT Physical Therapy ORTHOPEDIC EVALUATION  PLAN OF TREATMENT FOR OUTPATIENT REHABILITATION  (COMPLETE FOR INITIAL CLAIMS ONLY)  Patient's Last Name, First Name, M.I.  YOB: 1950  Latoya Rodríguez    Provider s Name:  [facility]   Medical Record No.  [id number]   Start of Care Date:  03/14/23   Onset Date:  01/18/23    Treatment Diagnosis:  s/p R BRYANT Medical Diagnosis:  Data Unavailable       Goals:     03/14/23 0500   Body Part   Goals listed below are for s/p R BRYANT   Goal #1   Goal #1 self cares/transfers/bed mobility   Previous Functional Level No restrictions   Performance Level unable to don/doff shoe and sock on R side without sock assist; 0/4 per HOS   STG Target Performance Be able to    Performance Level don and doff R shoe and sock with 2/4 difficulty per HOS  (4/4= no difficulty; 0/10= unable to do)   Rationale for independent self care such as dressing, personal hygiene, bathing;for independent community transportation;for independent living   Due Date 04/11/23   LTG Target Performance Be able to    Performance level don an doff R sock/shoe with 3/4 difficulty per HOS  (4/4= no difficulty; 0/5= unalbe to do)   Rationale independence in self cares;for independent community transportation;for independent living   Due Date 05/14/23         Therapy Frequency:  1x/week  Predicted Duration of Therapy Intervention:  x2 months    Carla Rasheed, PT                 I CERTIFY THE NEED FOR THESE SERVICES FURNISHED UNDER        THIS PLAN OF TREATMENT AND WHILE UNDER MY CARE     (Physician attestation of this document indicates review and certification of the therapy plan).                     Certification Date From:  03/14/23   Certification Date To:  05/14/23    Referring Provider:  Arnaud Ward MD    Initial Assessment        See Epic  Evaluation SOC Date: 03/14/23

## 2023-03-16 ENCOUNTER — TELEPHONE (OUTPATIENT)
Dept: FAMILY MEDICINE | Facility: CLINIC | Age: 73
End: 2023-03-16

## 2023-03-16 NOTE — TELEPHONE ENCOUNTER
Forms/Letter Request    Type of form/letter: [facility] Prescription Assistance    Have you been seen for this request: N/A    Do we have the form/letter: Yes: placed in providers forms basket for review    When is form/letter needed by: ASAP    How would you like the form/letter returned: Return originals back to Jennifer Suarez

## 2023-03-21 ENCOUNTER — THERAPY VISIT (OUTPATIENT)
Dept: PHYSICAL THERAPY | Facility: CLINIC | Age: 73
End: 2023-03-21
Attending: ORTHOPAEDIC SURGERY
Payer: COMMERCIAL

## 2023-03-21 DIAGNOSIS — Z47.1 AFTERCARE FOLLOWING LEFT HIP JOINT REPLACEMENT SURGERY: Primary | ICD-10-CM

## 2023-03-21 DIAGNOSIS — Z96.642 AFTERCARE FOLLOWING LEFT HIP JOINT REPLACEMENT SURGERY: Primary | ICD-10-CM

## 2023-03-21 PROCEDURE — 97112 NEUROMUSCULAR REEDUCATION: CPT | Mod: GP | Performed by: PHYSICAL THERAPIST

## 2023-03-21 PROCEDURE — 97110 THERAPEUTIC EXERCISES: CPT | Mod: GP | Performed by: PHYSICAL THERAPIST

## 2023-03-22 ENCOUNTER — PATIENT OUTREACH (OUTPATIENT)
Dept: CARE COORDINATION | Facility: CLINIC | Age: 73
End: 2023-03-22
Payer: COMMERCIAL

## 2023-03-22 NOTE — PROGRESS NOTES
Social Work Telephone Message Note  [facility]     Patient Name:  Latoya Rodríguez  /Age:  1950 (72 year old)    Referral Source: Ortho - Dr Ward  Reason for Referral:  Resources and support following a total hip     attempted to contact Patient via telephone on 3/22/23. Sw had received a consult to connect with patient offering resources and support following a hip replacement. Left a message providing writer contact information encouraging a return call.  will await Patient's return phone call and will provide assistance at that time.            PETER Monet, [facility]    [facility]  [phone]  [email or web address]

## 2023-03-23 DIAGNOSIS — R39.15 URINARY URGENCY: ICD-10-CM

## 2023-03-23 DIAGNOSIS — N39.41 URGE INCONTINENCE: ICD-10-CM

## 2023-03-23 DIAGNOSIS — E10.9 TYPE 1 DIABETES MELLITUS WITHOUT COMPLICATION (H): ICD-10-CM

## 2023-03-23 DIAGNOSIS — N95.2 ATROPHIC VAGINITIS: ICD-10-CM

## 2023-03-27 RX ORDER — LANCETS
EACH MISCELLANEOUS
Qty: 306 EACH | Refills: 3 | Status: SHIPPED | OUTPATIENT
Start: 2023-03-27 | End: 2023-04-06

## 2023-03-27 NOTE — TELEPHONE ENCOUNTER
ESTRADIOL 0.1MG/GM CREA      Last Written Prescription Date:  2/28/22  Last Fill Quantity: 42.5g,   # refills: 11  Last Office Visit : 10/10/22  Future Office visit:  NONE    Routing refill request to provider for review/approval because:    NOT ON REFILL PROTOCOL FOR UROLOGY

## 2023-03-28 NOTE — PROGRESS NOTES
Chief Complaint: Mala-operative atrial flutter    HPI (06/09/2021): Latoya Rodríguez is a 70 year old female with a past medical history of type II diabetes mellitus who was referred to me for evaluation of mala-operative atrial flutter by the [facility] Anesthesia team.     Briefly, Mrs. Rodríguez underwent cystourethroscopy on 05/11/2021 under [facility]. She was noted to have mala-operative atrial flutter. This was documented on an EKG on 05/11, as noted below. She reverted to NSR without any intervention from what it seems. Her procedure was completed uneventfully. She was referred to me for further evaluation.    At the time of the consultation, she notes an absence of chest pain at rest, dyspnea at rest or with exertion, PND, orthopnea, peripheral edema, palpitations, lightheadedness or syncope. She has never had any symptoms suggestive of atrial flutter, such as exertional dyspnea or dyspnea at rest. Mrs. Rodríguez walks 4-5 miles daily.     A comprehensive ROS was done and the details are included above in the HPI.    Interval History 11/10/2021:  Since her last visit, Mrs. Rodríguez underwent DCCV. She had three attempts that were unsuccessful. Since then, she notes complete absence of symptoms consistent with atrial fibrillation, such as palpitations, chest pain, exertional dyspnea, or fatigue.  She has continued to take her apixaban in uninterrupted fashion without any sequelae of bleeding.    A comprehensive ROS was done and the details are included above in the HPI.    Interval History 03/30/2022:  Since her last visit, Ms. Rodríguez notes that she has been well from a cardiovascular standpoint.  She has had no sequelae of bleeding and has had no palpitations or change in her exercise tolerance.  Her chief complaint is that she has had significant pain related to herniated lumbar intervertebral disc.    A comprehensive ROS was done and the details are included above in the HPI.    Interval History 3/29/23:    Since Ms.  Marcos's last visit, she had a right hip arthroplasty.  Her postoperative course was notable for her having an increase in her ventricular rate, for which cardiology was consulted.  Ms. Rodríguez was started on diltiazem and metoprolol titrate 50 four times daily.  This was de-escalated over the course of her admission as her heart rate improved.  After discharge, Ms. Rodríguez then saw my colleague Ms. Johnson multiple times.  Her metoprolol was continued.    Today, Ms. Rodríguez notes that she feels well. No CERDA, CP, palpitations.     A comprehensive ROS was done and the details are included above in the HPI.    Past Medical History:  - T2DM, IDDM  - Atrial fibrillation, longstanding persistent  - Aortic plaque (noted on coronary CTA in 2021)  - No prior history of hypertension, dyslipidemia, CAD, TIA/stroke, vascular aneurysms, PAD  Past Medical History:   Diagnosis Date     Cataract      Chronic atrial fibrillation (H) 06/08/2022     Osteoarthritis of carpometacarpal (CMC) joint of both thumbs 11/16/2022     Post-operative nausea and vomiting 1/18/2023    Severe. Recommend TIVA     Type 2 diabetes mellitus with hyperglycemia (H) 06/24/2016       Past Surgical History:    Past Surgical History:   Procedure Laterality Date     ANESTHESIA CARDIOVERSION N/A 9/23/2021    Procedure: ANESTHESIA, FOR CARDIOVERSION@1100;  Surgeon: GENERIC ANESTHESIA PROVIDER;  Location: [facility] OR     ANESTHESIA OUT OF OR MRI N/A 9/14/2022    Procedure: ANESTHESIA OUT OF OR Magnetic resonance imaging right hip and lumbar @0800;  Surgeon: GENERIC ANESTHESIA PROVIDER;  Location: [facility] OR     ARTHROPLASTY HIP Right 1/18/2023    Procedure: ARTHROPLASTY, HIP, TOTAL RIGHT;  Surgeon: Arnaud Ward MD;  Location: [facility] OR     CYSTOSCOPY, INJECT COLLAGEN, COMBINED N/A 5/11/2021    Procedure: CYSTOSCOPY, WITH PERIURETHRAL BULKING AGENT INJECTION;  Surgeon: Alberto Zhang MD;  Location: [facility] OR     CYSTOSCOPY, INJECT COLLAGEN, COMBINED N/A 9/12/2022     Procedure: CYSTOSCOPY, WITH PERIURETHRAL BULKING AGENT INJECTION (look in the bladder and urethra and inject filler into the urethra);  Surgeon: Alberto Zhang MD;  Location: [facility] OR     EYE SURGERY  9/04, 5/11    Retinal detachment, Cataract (both eyes)     IMPLANT STIMULATOR AND LEADS SACRAL NERVE (STAGE ONE AND TWO) Right 7/5/2022    Procedure: INSERTION, SACRAL NERVE STIMULATOR, STAGE 1 & 2 (permanent implant on the RIGHT side with [employer]on[employer]);  Surgeon: Alberto Zhang MD;  Location: [facility] OR     IMPLANT STIMULATOR SACRAL NERVE PERCUTANEOUS TRIAL N/A 6/14/2022    Procedure: INSERTION, NEUROSTIMULATOR, SACRAL, PERCUTANEOUS, FOR TRIAL;  Surgeon: Alberto Zhang MD;  Location: [facility] OR     RELEASE CARPAL TUNNEL Right 8/6/2021    Procedure: RELEASE, CARPAL TUNNEL, Right;  Surgeon: RADHA Sandoval MD;  Location: [facility] OR     RELEASE TRIGGER FINGER Bilateral 5/10/2019    Procedure: RELEASE TRIGGER FINGER, BILATERAL LONG AND RING FINGERS;  Surgeon: RADHA Sandoval MD;  Location: [facility] OR     VASCULAR SURGERY      Varicose Veins       Drug History:  Home cardiac meds: Apixaban 5 mg twice daily, atorvastatin 10 mg q24h, metoprolol succinate 50 mg q24h. No longer on as needed diltiazem.  Current Outpatient Medications   Medication Sig Dispense Refill     acetaminophen (TYLENOL) 325 MG tablet Take 2 tablets (650 mg) by mouth every 4 hours as needed for other 60 tablet 0     apixaban ANTICOAGULANT (ELIQUIS) 5 MG tablet Take 1 tablet (5 mg) by mouth 2 times daily Resume the day following your procedure, on 7/6/2022 180 tablet 0     atorvastatin (LIPITOR) 10 MG tablet Take 1 tablet (10 mg) by mouth daily 90 tablet 3     blood glucose (ACCU-CHEK GUIDE) test strip Use to test blood sugar 6 times daily 550 strip 3     blood glucose monitoring (ACCU-CHEK FASTCLIX) lancets USE TO TEST BLOOD SUGAR FOUR TIMES A DAY OR AS DIRECTED 306 each 3     calcium carbonate-vitamin D 500-400 MG-UNIT TABS tablt Take 1 tablet by mouth  2 times daily 180 tablet 3     conjugated estrogens (PREMARIN) 0.625 MG/GM vaginal cream Place 0.5 g vaginally twice a week 30 g 3     Continuous Blood Gluc  (DEXCOM G6 ) JUANA Use to read blood sugars as per 's instructions. 1 each 0     Continuous Blood Gluc Sensor (DEXCOM G6 SENSOR) MISC Change every 10 days. 3 each 11     Continuous Blood Gluc Transmit (DEXCOM G6 TRANSMITTER) MISC Change every 3 months. 1 each 3     estradiol (ESTRACE) 0.1 MG/GM vaginal cream Place 2 g vaginally twice a week 42.5 g 11     insulin aspart (NOVOLOG FLEXPEN) 100 UNIT/ML pen 1 unit per 15 gram of carb for all meals. Plus 1 unit per 50 mg/dl above 150. Total daily dose approx 30 units. 30 mL 3     insulin glargine (BASAGLAR KWIKPEN) 100 UNIT/ML pen Inject 30 Units Subcutaneous daily Max, 35 units daily       insulin pen needle (B-D U/F) 31G X 8 MM miscellaneous USE 5 TIMES DAILY  WITH NOVOLOG AND LANTUS 500 each 3     melatonin 3 MG tablet Take 1 tablet (3 mg) by mouth nightly as needed for sleep 30 tablet 0     metoprolol succinate ER (TOPROL XL) 50 MG 24 hr tablet Take 1 tablet (50 mg) by mouth daily 90 tablet 3     Multiple Vitamins-Minerals (MULTIVITAMIN ADULT PO) Take 1 tablet by mouth every morning        senna-docusate (SENOKOT-S/PERICOLACE) 8.6-50 MG tablet Take 1 tablet by mouth 2 times daily 30 tablet 0     Urine Glucose-Ketones Test (KETO-DIASTIX) STRP 1 strip by In Vitro route as needed 1 each 11     diltiazem (CARDIZEM) 30 MG tablet Take 1 tablet (30 mg) by mouth every 6 hours as needed (HR>120) (Patient not taking: Reported on 3/29/2023) 30 tablet 0     hydrOXYzine (ATARAX) 25 MG tablet Take 1 tablet (25 mg) by mouth every 6 hours as needed for itching (Patient not taking: Reported on 3/29/2023) 40 tablet 0     oxyCODONE (ROXICODONE) 5 MG tablet Take 1 tablet (5 mg) by mouth every 4 hours as needed for moderate pain (4-6) (Patient not taking: Reported on 3/29/2023) 26 tablet 0     polyethylene  glycol (MIRALAX) 17 g packet Take 17 g by mouth daily (Patient not taking: Reported on 3/29/2023) 10 packet 0     Family History:  - No family history of premature CAD, valvular disorders, dysrhythmias  - Mother's brother:  in late 20s of unclear cause, ?'heart attack'  - Mother's second brother:  in late 40s of unclear cause, no autopsy done  Family History   Problem Relation Age of Onset     Hyperlipidemia Mother      Breast Cancer Mother      Other Cancer Father      Leukemia Father      Skin Cancer Father      Coronary Artery Disease Maternal Uncle      Brain Cancer Maternal Uncle      Coronary Artery Disease Maternal Uncle      Stomach Cancer Maternal Aunt      Diabetes No family hx of      Hypertension No family hx of      Cerebrovascular Disease No family hx of      Colon Cancer No family hx of      Thyroid Disease No family hx of      Depression No family hx of      Anxiety Disorder No family hx of        Social History:  Used to work in Management.  Social History     Tobacco Use     Smoking status: Never     Smokeless tobacco: Never   Substance Use Topics     Alcohol use: Yes     Comment: 1 to 2 beers or glasses of wine 1 to 2 times per month       No Known Allergies      Physical Examination:  Vitals: /75 (BP Location: Left arm, Patient Position: Chair, Cuff Size: Adult Regular)   Pulse 94   Wt 72.3 kg (159 lb 6.4 oz)   SpO2 100%   BMI 26.53 kg/m    BMI= Body mass index is 26.53 kg/m .    GENERAL: Healthy, alert and no distress  Eyes: No xanthelasmas.  ENT: Moist mucosal membranes.  RESPIRATORY: No signs of resp distress.  Chest clear to auscultation bilaterally.  CARDIOVASCULAR:  No JVD, irregularly irregular, S1 plus S2 without any added heart sounds or murmurs.  EXTREMITIES: Warm, well-perfused, no edema.   NEUROLOGY: GCS 15/15, no focal deficits.  SKIN: No ecchymoses, no rashes.  PSYCH: Cooperative, pleasant affect.       Investigations:  I personally viewed and interpreted the  following investigations:    Labs:    CBC RESULTS:  Lab Results   Component Value Date    WBC 7.5 01/21/2023    WBC 3.8 (L) 06/24/2016    RBC 3.46 (L) 01/21/2023    RBC 4.31 06/24/2016    HGB 10.7 (L) 01/21/2023    HGB 14.4 06/24/2016    HCT 31.9 (L) 01/21/2023    HCT 40.7 06/24/2016    MCV 92 01/21/2023    MCV 94 06/24/2016    MCH 30.9 01/21/2023    MCH 33.4 (H) 06/24/2016    MCHC 33.5 01/21/2023    MCHC 35.4 06/24/2016    RDW 13.3 01/21/2023    RDW 13.3 06/24/2016     01/21/2023     06/24/2016       CMP RESULTS:  Lab Results   Component Value Date     01/27/2023     08/02/2019    POTASSIUM 4.5 01/27/2023    POTASSIUM 4.1 01/23/2023    POTASSIUM 4.0 08/02/2019    CHLORIDE 99 01/27/2023    CHLORIDE 104 01/23/2023    CHLORIDE 103 08/02/2019    CO2 28 01/27/2023    CO2 23 01/23/2023    CO2 24 08/02/2019    ANIONGAP 9 01/27/2023    ANIONGAP 10 01/23/2023    ANIONGAP 8 08/02/2019     (H) 01/27/2023     (H) 01/24/2023     (H) 01/23/2023     (H) 08/02/2019    BUN 12.5 01/27/2023    BUN 11 01/23/2023    BUN 16 08/02/2019    CR 0.60 01/27/2023    CR 0.74 07/01/2021    GFRESTIMATED >90 01/27/2023    GFRESTIMATED >60 11/17/2021    GFRESTIMATED 82 07/01/2021    GFRESTBLACK >90 07/01/2021    RENATO 10.1 01/27/2023    RENATO 9.0 08/02/2019    BILITOTAL 0.5 01/27/2023    BILITOTAL 0.7 08/02/2019    ALBUMIN 3.8 01/27/2023    ALBUMIN 2.6 (L) 01/23/2023    ALBUMIN 3.7 08/02/2019    ALKPHOS 72 01/27/2023    ALKPHOS 97 08/02/2019    ALT 14 01/27/2023    ALT 24 08/02/2019    AST 17 01/27/2023    AST 16 08/02/2019      LIPIDS:  Lab Results   Component Value Date    CHOL 185 09/06/2022    CHOL 172 07/01/2021     Lab Results   Component Value Date    HDL 61 09/06/2022    HDL 70 07/01/2021     Lab Results   Component Value Date     09/06/2022    LDL 90 07/01/2021     Lab Results   Component Value Date    TRIG 101 09/06/2022    TRIG 58 07/01/2021       Recent Tests:    Electrocardiogram  (05/11/2021):  Typical, counter-clockwise atrial flutter with variable AV-block. Incomplete RBBB.     Electrocardiogram (06/03/2021):  Reviewed with patient. Atrial fibrillation with HR of 104 bpm. Incomplete RBBB noted.     Coronary CTA (11/17/2021):    1.  Widely patent coronary arteries without evidence of atherosclerosis or stenosis.  2.  Total Agatston score 0 placing the patient in the lowest percentile when compared to an age- and gender-matched control group.  3.  Please review the separate Radiology report for incidental noncardiac findings. This report will follow separately.      Assessment and Plan:   Latoya Rodríguez is a 72 year old female with a past medical history of type II diabetes mellitus (insulin dependent) who initially presented to me for evaluation of atrial flutter in 2021.    Ms. Rodríguez continues to do well today.  I would recommend that her primary care team consider raising her atorvastatin to a 40 mg dose given her type 2 diabetes and her LDL greater than 70.  We had previously deferred this given that she had musculoskeletal back pain.  Otherwise, I would not make any changes to her medical regimen.      Problems:  1. Atrial fibrillation, persistent (CHADS2-VASC of 4)   2. Atrial flutter, persistent   3. T2DM, insulin-dependent  4. Mild aortic plaque (noted incidentally on on the CTA and November 2021)    Plan:  - Continue apixaban 5 mg twice daily and metoprolol succinate 50 mg once daily for atrial fibrillation thromboprophylaxis and rate control, respectively  - Primary care team to consider raising atorvastatin 10 mg given type 2 diabetes mellitus and LDL greater than 70    A total of 30 minutes were spent on the day of the visit for chart review, care coordination, face-to-face consultation with the patient, and documentation.     Amadeo Whipple, [facility], [place]    Cardiovascular Division  Pager [phone]    CC  Patient Care Team:  Pricila Avila, URVASHI CNP as PCP -  General (Family Practice)  Amy Cooper, RN as Registered Nurse  Edward Aguero MD as Assigned Endocrinology Provider  Alberto Zhang MD as Assigned Surgical Provider  Alberto Zhang MD as MD (Urology)  Karina oHskins, RN as Specialty Care Coordinator (Urology)  Pricila Avila APRN CNP as Referring Physician (Internal Medicine - Pediatrics)  Amadeo Whipple MD as Assigned Heart and Vascular Provider  Pricila Avila APRN CNP as Assigned PCP  Arnaud Ward MD as Assigned Musculoskeletal Provider

## 2023-03-29 ENCOUNTER — OFFICE VISIT (OUTPATIENT)
Dept: CARDIOLOGY | Facility: CLINIC | Age: 73
End: 2023-03-29
Payer: COMMERCIAL

## 2023-03-29 ENCOUNTER — THERAPY VISIT (OUTPATIENT)
Dept: PHYSICAL THERAPY | Facility: CLINIC | Age: 73
End: 2023-03-29
Payer: COMMERCIAL

## 2023-03-29 VITALS
BODY MASS INDEX: 26.53 KG/M2 | WEIGHT: 159.4 LBS | HEART RATE: 94 BPM | SYSTOLIC BLOOD PRESSURE: 109 MMHG | DIASTOLIC BLOOD PRESSURE: 75 MMHG | OXYGEN SATURATION: 100 %

## 2023-03-29 DIAGNOSIS — I48.20 CHRONIC ATRIAL FIBRILLATION (H): ICD-10-CM

## 2023-03-29 DIAGNOSIS — Z96.642 AFTERCARE FOLLOWING LEFT HIP JOINT REPLACEMENT SURGERY: Primary | ICD-10-CM

## 2023-03-29 DIAGNOSIS — E11.65 TYPE 2 DIABETES MELLITUS WITH HYPERGLYCEMIA, WITH LONG-TERM CURRENT USE OF INSULIN (H): ICD-10-CM

## 2023-03-29 DIAGNOSIS — Z47.1 AFTERCARE FOLLOWING LEFT HIP JOINT REPLACEMENT SURGERY: Primary | ICD-10-CM

## 2023-03-29 DIAGNOSIS — Z79.4 TYPE 2 DIABETES MELLITUS WITH HYPERGLYCEMIA, WITH LONG-TERM CURRENT USE OF INSULIN (H): ICD-10-CM

## 2023-03-29 PROCEDURE — 97112 NEUROMUSCULAR REEDUCATION: CPT | Mod: GP | Performed by: PHYSICAL THERAPIST

## 2023-03-29 PROCEDURE — 99214 OFFICE O/P EST MOD 30 MIN: CPT | Performed by: STUDENT IN AN ORGANIZED HEALTH CARE EDUCATION/TRAINING PROGRAM

## 2023-03-29 PROCEDURE — 97110 THERAPEUTIC EXERCISES: CPT | Mod: GP | Performed by: PHYSICAL THERAPIST

## 2023-03-29 NOTE — PATIENT INSTRUCTIONS
Take your medicines every day, as directed     Changes made today:  Continue your Eliquis, metoprolol, and atorvastatin as you are      Cardiology Care Coordinators:      Oksana Ott, RN  Sruthi Gusman, RN     Yajaira Baker, NESTOR     Cardiology Rooming staff:  HERVE Chin    Phone  [phone]      Fax [phone]    To Contact us     During Business Hours:  [phone]     If you are needing refills please contact your pharmacy.     For urgent after hour care please call the [facility] at [phone] or the [facility] at [phone] and ask to speak to the cardiologist on call.            HOW TO CHECK YOUR BLOOD PRESSURE AT HOME:     Avoid eating, smoking, and exercising for at least 30 minutes before taking a reading.     Be sure you have taken your BP medication at least 2-3 hours before you check it.      Sit quietly for 10 minutes before a reading.      Sit in a chair with your feet flat on the floor. Rest your  arm on a table so that the arm cuff is at the same level as your heart.     Remain still during the reading.  Record your blood pressure and pulse in a log and bring to your next appointment.       Use [employer] allows you to communicate directly with your heart team through secure messaging.  [employer] can be accessed any time on your phone, computer, or tablet.  If you need assistance, we'd be happy to help!             Keep your Heart Appointments:     Follow up with me in 6-12 months

## 2023-03-29 NOTE — NURSING NOTE
Latoya Rodríguez's goals for this visit include:   Chief Complaint   Patient presents with     Follow Up       She requests these members of her care team be copied on today's visit information: yes     PCP: Pricila Avila    Referring Provider:  Amadeo Whipple MD  [address] [place] [id number]    /75 (BP Location: Left arm, Patient Position: Chair, Cuff Size: Adult Regular)   Pulse 94   Wt 72.3 kg (159 lb 6.4 oz)   SpO2 100%   BMI 26.53 kg/m      Do you need any medication refills at today's visit? No       PENELOPE Mendez   Cardiology Team  [facility]

## 2023-03-30 ENCOUNTER — TELEPHONE (OUTPATIENT)
Dept: EDUCATION SERVICES | Facility: CLINIC | Age: 73
End: 2023-03-30
Payer: COMMERCIAL

## 2023-03-30 DIAGNOSIS — E10.65 TYPE 1 DIABETES MELLITUS WITH HYPERGLYCEMIA (H): Primary | ICD-10-CM

## 2023-03-30 RX ORDER — ACYCLOVIR 400 MG/1
1 TABLET ORAL SEE ADMIN INSTRUCTIONS
Qty: 1 EACH | Refills: 0 | Status: SHIPPED | OUTPATIENT
Start: 2023-03-30 | End: 2023-04-06

## 2023-03-30 RX ORDER — ACYCLOVIR 400 MG/1
1 TABLET ORAL
Qty: 9 EACH | Refills: 3 | Status: SHIPPED | OUTPATIENT
Start: 2023-03-30 | End: 2024-03-15

## 2023-03-30 NOTE — TELEPHONE ENCOUNTER
Latoya J Nelson called inquiring about getting a continuous glucose monitor.  Reviewed gavin and Dexcom G7 continuous glucose monitor.  She was agreeable to get the Dexcom G7 continuous glucose monitor so I will send a prescription for the sensors and  to [facility].  She was given their phone number to check back with them in 7-10 days.  Once she receives the Dexcom G7 continuous glucose monitor she will send us a [employer] message and we will schedule training.    Humera Caraballo RN, [facility]

## 2023-04-03 RX ORDER — ESTRADIOL 0.1 MG/G
CREAM VAGINAL
Qty: 42.5 G | Refills: 11 | Status: SHIPPED | OUTPATIENT
Start: 2023-04-03

## 2023-04-05 ENCOUNTER — THERAPY VISIT (OUTPATIENT)
Dept: PHYSICAL THERAPY | Facility: CLINIC | Age: 73
End: 2023-04-05
Payer: COMMERCIAL

## 2023-04-05 DIAGNOSIS — Z47.1 AFTERCARE FOLLOWING LEFT HIP JOINT REPLACEMENT SURGERY: Primary | ICD-10-CM

## 2023-04-05 DIAGNOSIS — Z96.642 AFTERCARE FOLLOWING LEFT HIP JOINT REPLACEMENT SURGERY: Primary | ICD-10-CM

## 2023-04-05 PROCEDURE — 97530 THERAPEUTIC ACTIVITIES: CPT | Mod: GP | Performed by: PHYSICAL THERAPIST

## 2023-04-05 PROCEDURE — 97110 THERAPEUTIC EXERCISES: CPT | Mod: 59 | Performed by: PHYSICAL THERAPIST

## 2023-04-05 PROCEDURE — 97112 NEUROMUSCULAR REEDUCATION: CPT | Mod: 59 | Performed by: PHYSICAL THERAPIST

## 2023-04-05 NOTE — PROGRESS NOTES
Subjective:  HPI  Physical Exam                    Objective:  System    Physical Exam    General     ROS    Assessment/Plan:    PROGRESS  REPORT    Progress reporting period is from 3/14/2023 to 4/5/2023.       SUBJECTIVE  Subjective changes noted by patient:  Pt stated she is doing well; doing her HEP each day. Patient expressed concerns with  not feeling very flexible; lack of flexibility leads to difficulty putting on shoes and socks and bending over to pick items up from floor    Current pain level is : 1/10.     Previous pain level was  NA  .   Changes in function:  Yes, slow progression  Adverse reaction to treatment or activity: None    OBJECTIVE  Changes noted in objective findings:    Added body mechanics and addtional stretching to POC today.    Gait: slight limp noted on R side.    R hip PROM: 90 flexion, 12 Abd.    R hip strength: 4-/5 flexion, 4/5 abd     ASSESSMENT/PLAN  Updated problem list and treatment plan: Diagnosis 1:  R BRYANT  Decreased ROM/flexibility - manual therapy, therapeutic exercise and therapeutic activity  Decreased strength - therapeutic exercise and therapeutic activities  Decreased proprioception - neuro re-education and therapeutic activities  Impaired gait - gait training  Impaired muscle performance - neuro re-education  Decreased function - therapeutic activities  STG/LTGs have been met or progress has been made towards goals:  Yes (See Goal flow sheet completed today.)  Assessment of Progress: The patient's condition is improving.  The patient's condition has potential to improve.  Self Management Plans:  Patient has been instructed in a home treatment program.  I have re-evaluated this patient and find that the nature, scope, duration and intensity of the therapy is appropriate for the medical condition of the patient.  Latoya continues to require the following intervention to meet STG and LTG's:  PT    Recommendations:  This patient would benefit from continued therapy.      Frequency:  1 X week, once daily  Duration:  for 2-4 visits        Please refer to the daily flowsheet for treatment today, total treatment time and time spent performing 1:1 timed codes.

## 2023-04-06 ENCOUNTER — ALLIED HEALTH/NURSE VISIT (OUTPATIENT)
Dept: EDUCATION SERVICES | Facility: CLINIC | Age: 73
End: 2023-04-06
Payer: COMMERCIAL

## 2023-04-06 DIAGNOSIS — E10.65 TYPE 1 DIABETES MELLITUS WITH HYPERGLYCEMIA (H): Primary | ICD-10-CM

## 2023-04-06 PROCEDURE — G0108 DIAB MANAGE TRN  PER INDIV: HCPCS

## 2023-04-06 NOTE — PROGRESS NOTES
DIABETES SELF MANAGEMENT EDUCATION: Previous Diagnosis/Individual Diabetes Review    Latoya Rodríguez presents today for initiation of continuous glucose monitoring related to Type 1 diabetes.  She is accompanied by self    Year of diagnosis: 2016     Referring provider:  Dr.Tasma Aguero     Past Diabetes Education: Yes  Support system: family  Living Situation: She lives alone.  She has 2 daughters, 1 who is a physical therapist who helps her with her diabetes although Latoya said that her daughter worries too much about her.  Employment: She is a retired .    DIABETES RELATED CONCERNS/COMMENTS: Dr.Tasma Aguero has been wanting her to use a continuous glucose monitor for several months.  Latoya has been reluctant to do that because she does not want to make things complicated for herself.  She is concerned about having more frequent low blood sugars so she is agreeable to start wearing the Dexcom G7 so that is what we will start up today.    Patient's emotional response to diabetes: expresses readiness to learn    ASSESSMENT:  Patient Problem List and Medication List reviewed for relevant medical history, current medical status, and diabetes risk factors.    MEDICATION:    Current Diabetes Management per Patient:  Taking diabetes medications?  Basaglar 30 units every evening.  She takes NovoLog 1 unit for every 15 g for all meals plus a correction scale.    MONITORING:  Patient glucose self monitoring as follows: continuously using a continuous glucose monitor (CGM)  BG meter: Dexcom G7 continuous glucose monitor     Blood sugar data: Not available as restarting the Dexcom G7 today.  Shortly after getting the Dexcom G7 up and running her first blood sugar on the sensor was 68 mg/dL and dropping.  She took a Glucerna bar that she had in her purse and we waited the 15 minutes.  The blood sugar came up to 72 mg/dL and then shortly after dropped back down to 63 mg/dL.  After the  education, she ended up staying at least 45 minutes having a total of 4 carb choices to get the blood sugar greater than 90 mg/dL so she could drive home.      Patient's most recent   Lab Results   Component Value Date    A1C 6.9 12/23/2022    A1C 7.9 05/02/2022      Patient's A1C goal: <8.0    PHYSICAL ACTIVITY:  not assessed    NUTRITION:  Patient knows how to carb count and sometimes looks up food items to get accurate carb counting.  Beverages: Not assessed  Cultural/Alevism diet restrictions: No   Biggest Challenge to Healthy Eating: knowing what to eat    Diabetes knowledge and skills assessment:   Barriers to Learning Assessment: No Barriers identified    Problem Solving:    Patient is at risk of hypoglycemia?: YES  Hospitalizations for hyper or hypoglycemia: No    Healthy Coping and Stress Management:   Sources of stress identified by patient My health    EDUCATION and INSTRUCTION PROVIDED AT THIS VISIT:    -Action, timing and potential side-effects of Basaglar and NovoLog diabetes medications:    Dexcom G7 start up today included;  Explanation of difference between blood glucose and sensor glucose.  Sensor glucose will tend to trail behind the blood glucose by a few minutes.    Reviewed the meaning of the rate of change arrows.    Discussed simply using the G7 as a monitoring tool for now, until used to recognizing patterns and cautioned against reacting to higher numbers by administering short acting insulin until there is a better understanding of patterns.   Insertion of sensor, technique, angle, site rotation, skin prep use, frequency of change.  Cautioned that no decisions about treatment of hypo- or hyperglycemia can be based on a sensor reading, but must be double checked with a blood glucose until reasonable certain of the sensors accuracy.    Programming settings:  Hi and low alerts, rate alerts, predictive alerts,  out-of-range alerts and fixed low alert of 55 mg/mL.  Patient was guided in  putting settings into i-Phone.    Pt verbalized understanding of concepts discussed and recommendations provided today.       PLAN:  Given the low blood sugar that she had in the office today, I had her decrease her NovoLog at breakfast from 1 unit per 15 to now 1 unit per 20 g.  I am going to have her go ahead and decrease her Basaglar from 30 units to now taking 25 units every evening starting tonight.    See patient instructions/AVS    FOLLOW-UP:    I will see her back in 2 weeks to review Dexcom clarity report and make further insulin adjustments.    Time Spent: 75 minutes  Encounter Type: Individual    Any diabetes medication dose changes were made via the CDE Protocol and Collaborative Practice [facility]  A copy of this encounter was provided to patient's referring provider.    Humera Caraballo RN, [facility] [place]

## 2023-04-07 ASSESSMENT — HOOS JR
HOOS JR TOTAL INTERVAL SCORE: 76.78
BENDING TO THE FLOOR TO PICK UP OBJECT: EXTREME

## 2023-04-11 ENCOUNTER — OFFICE VISIT (OUTPATIENT)
Dept: ORTHOPEDICS | Facility: CLINIC | Age: 73
End: 2023-04-11
Payer: COMMERCIAL

## 2023-04-11 ENCOUNTER — MYC REFILL (OUTPATIENT)
Dept: FAMILY MEDICINE | Facility: CLINIC | Age: 73
End: 2023-04-11

## 2023-04-11 DIAGNOSIS — E10.9 TYPE 1 DIABETES MELLITUS WITHOUT COMPLICATION (H): ICD-10-CM

## 2023-04-11 DIAGNOSIS — Z47.89 ORTHOPEDIC AFTERCARE: Primary | ICD-10-CM

## 2023-04-11 DIAGNOSIS — M85.80 OSTEOPENIA, UNSPECIFIED LOCATION: Primary | ICD-10-CM

## 2023-04-11 PROCEDURE — 99024 POSTOP FOLLOW-UP VISIT: CPT | Performed by: ORTHOPAEDIC SURGERY

## 2023-04-11 RX ORDER — ALENDRONATE SODIUM 70 MG/1
70 TABLET ORAL
Qty: 12 TABLET | Refills: 3 | Status: SHIPPED | OUTPATIENT
Start: 2023-04-11 | End: 2024-02-28

## 2023-04-11 RX ORDER — INSULIN GLARGINE 100 [IU]/ML
30 INJECTION, SOLUTION SUBCUTANEOUS DAILY
Qty: 15 ML | Refills: 0 | Status: SHIPPED | OUTPATIENT
Start: 2023-04-11 | End: 2023-05-03

## 2023-04-11 ASSESSMENT — PAIN SCALES - GENERAL: PAINLEVEL: NO PAIN (0)

## 2023-04-11 NOTE — NURSING NOTE
Latoya Rodríguez's chief complaint for this visit includes:  Chief Complaint   Patient presents with     Surgical Followup     3mo s/p RTHA doing good.        Referring Provider:  No referring provider defined for this encounter.    There were no vitals taken for this visit.  No Pain (0)   Global Mental Health Score: 19  Global Physical Health Score: 16  PROMIS TOTAL - SUBSCORES: 35  UCLA: 7, HOOSJR: 76.78    Pain increases with: No pain  Previous surgeries: RTHA   Previous injections within last 6 months: NO  Pain: 0/10  Concerns: Restrictions going forward.     Giovanny Ogden, ATC

## 2023-04-11 NOTE — PROGRESS NOTES
"Chief Complaint: Surgical Followup (3mo s/p RTHA doing good. )         HPI: Latoya Rodríguez returns today in follow-up for her right hip. She rports that she is doing great. No hip pain \"I don't have any pain.\" Walking about 1 mile a day, happy with how she is doing so far.     Pain medication: no  Assist device: no   Physical therapy:complete  Returned to work: NA    Current Status:  HOOS Jr: 76.78  UCLA: 7  Global Mental Health Score - 19  Global Physical Health Score - 16  PROMIS TOTAL - SUBSCORES - 35  Medications and allergies are documented in the EMR and have been reviewed.      Current Outpatient Medications:      acetaminophen (TYLENOL) 325 MG tablet, Take 2 tablets (650 mg) by mouth every 4 hours as needed for other, Disp: 60 tablet, Rfl: 0     alendronate (FOSAMAX) 70 MG tablet, Take 1 tablet (70 mg) by mouth every 7 days, Disp: 12 tablet, Rfl: 3     apixaban ANTICOAGULANT (ELIQUIS) 5 MG tablet, Take 1 tablet (5 mg) by mouth 2 times daily Resume the day following your procedure, on 7/6/2022, Disp: 180 tablet, Rfl: 0     atorvastatin (LIPITOR) 10 MG tablet, Take 1 tablet (10 mg) by mouth daily, Disp: 90 tablet, Rfl: 3     calcium carbonate-vitamin D 500-400 MG-UNIT TABS tablt, Take 1 tablet by mouth 2 times daily, Disp: 180 tablet, Rfl: 3     conjugated estrogens (PREMARIN) 0.625 MG/GM vaginal cream, Place 0.5 g vaginally twice a week, Disp: 30 g, Rfl: 3     Continuous Blood Gluc Sensor (DEXCOM G7 SENSOR) MISC, 1 each every 10 days, Disp: 9 each, Rfl: 3     diltiazem (CARDIZEM) 30 MG tablet, Take 1 tablet (30 mg) by mouth every 6 hours as needed (HR>120) (Patient not taking: Reported on 3/29/2023), Disp: 30 tablet, Rfl: 0     estradiol (ESTRACE) 0.1 MG/GM vaginal cream, INSERT 2G VAGINALLY TWICE PER WEEK, Disp: 42.5 g, Rfl: 11     hydrOXYzine (ATARAX) 25 MG tablet, Take 1 tablet (25 mg) by mouth every 6 hours as needed for itching (Patient not taking: Reported on 3/29/2023), Disp: 40 tablet, Rfl: 0     " insulin aspart (NOVOLOG FLEXPEN) 100 UNIT/ML pen, 1 unit per 15 gram of carb for all meals. Plus 1 unit per 50 mg/dl above 150. Total daily dose approx 30 units., Disp: 30 mL, Rfl: 3     insulin glargine (BASAGLAR KWIKPEN) 100 UNIT/ML pen, Inject 30 Units Subcutaneous daily Max, 35 units daily, Disp: 15 mL, Rfl: 0     insulin pen needle (B-D U/F) 31G X 8 MM miscellaneous, USE 5 TIMES DAILY  WITH NOVOLOG AND LANTUS, Disp: 500 each, Rfl: 3     melatonin 3 MG tablet, Take 1 tablet (3 mg) by mouth nightly as needed for sleep, Disp: 30 tablet, Rfl: 0     metoprolol succinate ER (TOPROL XL) 50 MG 24 hr tablet, Take 1 tablet (50 mg) by mouth daily, Disp: 90 tablet, Rfl: 3     Multiple Vitamins-Minerals (MULTIVITAMIN ADULT PO), Take 1 tablet by mouth every morning , Disp: , Rfl:      oxyCODONE (ROXICODONE) 5 MG tablet, Take 1 tablet (5 mg) by mouth every 4 hours as needed for moderate pain (4-6) (Patient not taking: Reported on 3/29/2023), Disp: 26 tablet, Rfl: 0     polyethylene glycol (MIRALAX) 17 g packet, Take 17 g by mouth daily (Patient not taking: Reported on 3/29/2023), Disp: 10 packet, Rfl: 0     senna-docusate (SENOKOT-S/PERICOLACE) 8.6-50 MG tablet, Take 1 tablet by mouth 2 times daily, Disp: 30 tablet, Rfl: 0     Urine Glucose-Ketones Test (KETO-DIASTIX) STRP, 1 strip by In Vitro route as needed, Disp: 1 each, Rfl: 11    Allergies: Patient has no known allergies.        Physical Exam:  On physical examination the patient appears the stated age, is in no acute distress, affect is appropriate, and breathing is non-labored.    There were no vitals taken for this visit.  There is no height or weight on file to calculate BMI.    Rises from chair:easily   Gait: normal   Assessment: doing very well. All the patient's questions were answered to the best of my ability.       Plan: RTC at one year, sooner if issues.    No ref. provider found

## 2023-04-11 NOTE — LETTER
"    4/11/2023         RE: Latoya Rodríguez  [address]  [place] [address]        Dear Colleague,    Thank you for referring your patient, Latoya Rodríguez, to the [facility]. Please see a copy of my visit note below.    Chief Complaint: Surgical Followup (3mo s/p RTHA doing good. )         HPI: Latoya Rodríguez returns today in follow-up for her right hip. She rports that she is doing great. No hip pain \"I don't have any pain.\" Walking about 1 mile a day, happy with how she is doing so far.     Pain medication: no  Assist device: no   Physical therapy:complete  Returned to work: NA    Current Status:  HOOS Jr: 76.78  UCLA: 7  Global Mental Health Score - 19  Global Physical Health Score - 16  PROMIS TOTAL - SUBSCORES - 35  Medications and allergies are documented in the EMR and have been reviewed.      Current Outpatient Medications:      acetaminophen (TYLENOL) 325 MG tablet, Take 2 tablets (650 mg) by mouth every 4 hours as needed for other, Disp: 60 tablet, Rfl: 0     alendronate (FOSAMAX) 70 MG tablet, Take 1 tablet (70 mg) by mouth every 7 days, Disp: 12 tablet, Rfl: 3     apixaban ANTICOAGULANT (ELIQUIS) 5 MG tablet, Take 1 tablet (5 mg) by mouth 2 times daily Resume the day following your procedure, on 7/6/2022, Disp: 180 tablet, Rfl: 0     atorvastatin (LIPITOR) 10 MG tablet, Take 1 tablet (10 mg) by mouth daily, Disp: 90 tablet, Rfl: 3     calcium carbonate-vitamin D 500-400 MG-UNIT TABS tablt, Take 1 tablet by mouth 2 times daily, Disp: 180 tablet, Rfl: 3     conjugated estrogens (PREMARIN) 0.625 MG/GM vaginal cream, Place 0.5 g vaginally twice a week, Disp: 30 g, Rfl: 3     Continuous Blood Gluc Sensor (DEXCOM G7 SENSOR) MISC, 1 each every 10 days, Disp: 9 each, Rfl: 3     diltiazem (CARDIZEM) 30 MG tablet, Take 1 tablet (30 mg) by mouth every 6 hours as needed (HR>120) (Patient not taking: Reported on 3/29/2023), Disp: 30 tablet, Rfl: 0     estradiol (ESTRACE) 0.1 " MG/GM vaginal cream, INSERT 2G VAGINALLY TWICE PER WEEK, Disp: 42.5 g, Rfl: 11     hydrOXYzine (ATARAX) 25 MG tablet, Take 1 tablet (25 mg) by mouth every 6 hours as needed for itching (Patient not taking: Reported on 3/29/2023), Disp: 40 tablet, Rfl: 0     insulin aspart (NOVOLOG FLEXPEN) 100 UNIT/ML pen, 1 unit per 15 gram of carb for all meals. Plus 1 unit per 50 mg/dl above 150. Total daily dose approx 30 units., Disp: 30 mL, Rfl: 3     insulin glargine (BASAGLAR KWIKPEN) 100 UNIT/ML pen, Inject 30 Units Subcutaneous daily Max, 35 units daily, Disp: 15 mL, Rfl: 0     insulin pen needle (B-D U/F) 31G X 8 MM miscellaneous, USE 5 TIMES DAILY  WITH NOVOLOG AND LANTUS, Disp: 500 each, Rfl: 3     melatonin 3 MG tablet, Take 1 tablet (3 mg) by mouth nightly as needed for sleep, Disp: 30 tablet, Rfl: 0     metoprolol succinate ER (TOPROL XL) 50 MG 24 hr tablet, Take 1 tablet (50 mg) by mouth daily, Disp: 90 tablet, Rfl: 3     Multiple Vitamins-Minerals (MULTIVITAMIN ADULT PO), Take 1 tablet by mouth every morning , Disp: , Rfl:      oxyCODONE (ROXICODONE) 5 MG tablet, Take 1 tablet (5 mg) by mouth every 4 hours as needed for moderate pain (4-6) (Patient not taking: Reported on 3/29/2023), Disp: 26 tablet, Rfl: 0     polyethylene glycol (MIRALAX) 17 g packet, Take 17 g by mouth daily (Patient not taking: Reported on 3/29/2023), Disp: 10 packet, Rfl: 0     senna-docusate (SENOKOT-S/PERICOLACE) 8.6-50 MG tablet, Take 1 tablet by mouth 2 times daily, Disp: 30 tablet, Rfl: 0     Urine Glucose-Ketones Test (KETO-DIASTIX) STRP, 1 strip by In Vitro route as needed, Disp: 1 each, Rfl: 11    Allergies: Patient has no known allergies.        Physical Exam:  On physical examination the patient appears the stated age, is in no acute distress, affect is appropriate, and breathing is non-labored.    There were no vitals taken for this visit.  There is no height or weight on file to calculate BMI.    Rises from chair:easily   Gait:  normal   Assessment: doing very well. All the patient's questions were answered to the best of my ability.       Plan: RTC at one year, sooner if issues.    No ref. provider found        Again, thank you for allowing me to participate in the care of your patient.        Sincerely,        Arnaud Ward MD

## 2023-04-18 ENCOUNTER — THERAPY VISIT (OUTPATIENT)
Dept: PHYSICAL THERAPY | Facility: CLINIC | Age: 73
End: 2023-04-18
Payer: COMMERCIAL

## 2023-04-18 DIAGNOSIS — Z47.1 AFTERCARE FOLLOWING LEFT HIP JOINT REPLACEMENT SURGERY: Primary | ICD-10-CM

## 2023-04-18 DIAGNOSIS — Z96.642 AFTERCARE FOLLOWING LEFT HIP JOINT REPLACEMENT SURGERY: Primary | ICD-10-CM

## 2023-04-18 PROCEDURE — 97110 THERAPEUTIC EXERCISES: CPT | Mod: GP | Performed by: PHYSICAL THERAPIST

## 2023-04-18 PROCEDURE — 97112 NEUROMUSCULAR REEDUCATION: CPT | Mod: GP | Performed by: PHYSICAL THERAPIST

## 2023-04-18 ASSESSMENT — ACTIVITIES OF DAILY LIVING (ADL)
TWISTING/PIVOTING_ON_INVOLVED_LEG: UNABLE TO DO
RECREATIONAL_ACTIVITIES: NO DIFFICULTY AT ALL
WALKING_APPROXIMATELY_10_MINUTES: NO DIFFICULTY AT ALL
HOS_ADL_SCORE(%): 82.35
WALKING_UP_STEEP_HILLS: NO DIFFICULTY AT ALL
PUTTING_ON_SOCKS_AND_SHOES: MODERATE DIFFICULTY
HOS_ADL_COUNT: 17
HEAVY_WORK: NO DIFFICULTY AT ALL
WALKING_15_MINUTES_OR_GREATER: NO DIFFICULTY AT ALL
GOING_UP_1_FLIGHT_OF_STAIRS: NO DIFFICULTY AT ALL
ROLLING_OVER_IN_BED: NO DIFFICULTY AT ALL
WALKING_DOWN_STEEP_HILLS: NO DIFFICULTY AT ALL
SITTING_FOR_15_MINUTES: NO DIFFICULTY AT ALL
WALKING_INITIALLY: NO DIFFICULTY AT ALL
HOS_ADL_ITEM_SCORE_TOTAL: 56
GETTING_INTO_AND_OUT_OF_AN_AVERAGE_CAR: NO DIFFICULTY AT ALL
GETTING_INTO_AND_OUT_OF_A_BATHTUB: UNABLE TO DO
HOS_ADL_HIGHEST_POTENTIAL_SCORE: 68
DEEP_SQUATTING: EXTREME DIFFICULTY
STANDING_FOR_15_MINUTES: SLIGHT DIFFICULTY
GOING_DOWN_1_FLIGHT_OF_STAIRS: NO DIFFICULTY AT ALL
STEPPING_UP_AND_DOWN_CURBS: NO DIFFICULTY AT ALL
LIGHT_TO_MODERATE_WORK: NO DIFFICULTY AT ALL

## 2023-04-27 ENCOUNTER — ALLIED HEALTH/NURSE VISIT (OUTPATIENT)
Dept: EDUCATION SERVICES | Facility: CLINIC | Age: 73
End: 2023-04-27
Payer: COMMERCIAL

## 2023-04-27 DIAGNOSIS — E10.65 TYPE 1 DIABETES MELLITUS WITH HYPERGLYCEMIA (H): Primary | ICD-10-CM

## 2023-04-27 PROCEDURE — G0108 DIAB MANAGE TRN  PER INDIV: HCPCS

## 2023-04-27 NOTE — PROGRESS NOTES
DIABETES SELF MANAGEMENT EDUCATION: Previous Diagnosis/Individual Diabetes Review    Latoya Rodríguez presents today for initiation of continuous glucose monitoring related to Type 1 diabetes.  She is accompanied by self    Year of diagnosis: 2016     Referring provider:  Dr.Tasma Aguero     Past Diabetes Education: Yes  Support system: family  Living Situation: She lives alone.  She has 2 daughters, 1 who is a physical therapist who helps her with her diabetes although Latoya said that her daughter worries too much about her.  Employment: She is a retired .    DIABETES RELATED CONCERNS/COMMENTS: Dr.Tasma Aguero has been wanting her to use a continuous glucose monitor for several months.  Latoya has been reluctant to do that because she does not want to make things complicated for herself.  She is concerned about having more frequent low blood sugars so she is agreeable to start wearing the Dexcom G7 so that is what we will start up today.    Patient's emotional response to diabetes: expresses readiness to learn    ASSESSMENT:  Patient Problem List and Medication List reviewed for relevant medical history, current medical status, and diabetes risk factors.    MEDICATION:    Current Diabetes Management per Patient:  Taking diabetes medications?  Basaglar 25 units every evening.  She takes NovoLog 1 unit for every 15 g for all meals plus a correction scale.    MONITORING:            Patient's most recent   Lab Results   Component Value Date    A1C 6.9 12/23/2022    A1C 7.9 05/02/2022      Patient's A1C goal: <8.0    PHYSICAL ACTIVITY:  not assessed    NUTRITION:  Patient knows how to carb count and sometimes looks up food items to get accurate carb counting.  Beverages: Not assessed  Cultural/Sikhism diet restrictions: No   Biggest Challenge to Healthy Eating: knowing what to eat    Diabetes knowledge and skills assessment:   Barriers to Learning Assessment: No Barriers  identified    Problem Solving:    Patient is at risk of hypoglycemia?: YES  Hospitalizations for hyper or hypoglycemia: No    Healthy Coping and Stress Management:   Sources of stress identified by patient My health    EDUCATION and INSTRUCTION PROVIDED AT THIS VISIT:    -Action, timing and potential side-effects of Basaglar and NovoLog diabetes medications:      Pt verbalized understanding of concepts discussed and recommendations provided today.       PLAN:  She is still having low blood sugars between 4-6 am and before lunch. She is running higher in the evening after supper.  We will decrease the Basaglar from 25 to now taking 20 units.  We will decrease the Novolog from 1:15 gm to now taking 1:20 for breakfast, 1:15 at lunch and 1:10 at supper.    See patient instructions/AVS    FOLLOW-UP:    I will see her back in 2 weeks to review Dexcom clarity report and make further insulin adjustments.    Time Spent: 30 minutes  Encounter Type: Individual    Any diabetes medication dose changes were made via the CDE Protocol and Collaborative Practice Agreement with [place] and  [facility].  A copy of this encounter was provided to patient's referring provider.    Humera Caraballo RN, [facility]

## 2023-05-02 ENCOUNTER — DOCUMENTATION ONLY (OUTPATIENT)
Dept: ENDOCRINOLOGY | Facility: CLINIC | Age: 73
End: 2023-05-02

## 2023-05-02 NOTE — PROGRESS NOTES
Statement of Certifying physician  form received. Form has been completed to the best of writers ability and have been please in the file for Dr. Leon to be complete and sign.    Heike Adams CMA  Adult Endocrinology  [facility], [place]

## 2023-05-03 DIAGNOSIS — E10.9 TYPE 1 DIABETES MELLITUS WITHOUT COMPLICATION (H): ICD-10-CM

## 2023-05-03 RX ORDER — INSULIN GLARGINE 100 [IU]/ML
30 INJECTION, SOLUTION SUBCUTANEOUS DAILY
Qty: 45 ML | Refills: 1 | Status: SHIPPED | OUTPATIENT
Start: 2023-05-03 | End: 2023-05-04

## 2023-05-03 RX ORDER — INSULIN GLARGINE 100 [IU]/ML
30 INJECTION, SOLUTION SUBCUTANEOUS DAILY
Qty: 45 ML | Refills: 0 | Status: SHIPPED | OUTPATIENT
Start: 2023-05-03 | End: 2023-05-03

## 2023-05-03 RX ORDER — INSULIN GLARGINE 100 [IU]/ML
30 INJECTION, SOLUTION SUBCUTANEOUS DAILY
Qty: 15 ML | Refills: 0 | Status: SHIPPED | OUTPATIENT
Start: 2023-05-03 | End: 2023-05-03

## 2023-05-03 NOTE — PROGRESS NOTES
Form completed and signed by Dr. Leon. The form has been faxed back to [facility] at [phone].    Heike Adams, [facility]  Adult Endocrinology  [facility] [place]

## 2023-05-03 NOTE — TELEPHONE ENCOUNTER
I am in process of applying to the Basaglar pens assistance program.  [facility] requires a hand signed, brand name, hard copy script be submitted with their application.    Please hand sign a BRAND name, hard copy script for:      BASAGLAR KWIK PENS    Please send the HARD COPY script to me at--     via interoffice mail  [employer] Jennifer Jones     or via US mail  at:   [facility]  Jennifer Suarez  [address] [place]  [address]    Thanks so much for your help!    Jennifer Suarez  Prescription Assistance Supervisor  Pharmacy Assistance  [phone]

## 2023-05-03 NOTE — TELEPHONE ENCOUNTER
Approved Rx then noted it was sent to local print     Re-pended Rx and sending to provider to approve    Tatiana CABRAL, Triage RN  [facility]

## 2023-05-04 DIAGNOSIS — E10.9 TYPE 1 DIABETES MELLITUS WITHOUT COMPLICATION (H): ICD-10-CM

## 2023-05-04 RX ORDER — INSULIN GLARGINE 100 [IU]/ML
30 INJECTION, SOLUTION SUBCUTANEOUS DAILY
Qty: 45 ML | Refills: 1 | Status: SHIPPED | OUTPATIENT
Start: 2023-05-04 | End: 2023-05-04

## 2023-05-04 RX ORDER — INSULIN GLARGINE 100 [IU]/ML
30 INJECTION, SOLUTION SUBCUTANEOUS DAILY
Qty: 45 ML | Refills: 1 | Status: SHIPPED | OUTPATIENT
Start: 2023-05-04 | End: 2023-05-18

## 2023-05-05 ENCOUNTER — TELEPHONE (OUTPATIENT)
Dept: FAMILY MEDICINE | Facility: CLINIC | Age: 73
End: 2023-05-05
Payer: COMMERCIAL

## 2023-05-05 NOTE — TELEPHONE ENCOUNTER
ZACKARY- I have submitted Latoya's Novolog pens & needles application to the [employer] assistance program.    I will note Epic when I have a decision from [employer].    Jennifer Suarez  Prescription Assistance Supervisor  Pharmacy Assistance  [phone]

## 2023-05-16 ENCOUNTER — THERAPY VISIT (OUTPATIENT)
Dept: PHYSICAL THERAPY | Facility: CLINIC | Age: 73
End: 2023-05-16
Payer: COMMERCIAL

## 2023-05-16 ENCOUNTER — TELEPHONE (OUTPATIENT)
Dept: FAMILY MEDICINE | Facility: CLINIC | Age: 73
End: 2023-05-16

## 2023-05-16 DIAGNOSIS — Z96.642 AFTERCARE FOLLOWING LEFT HIP JOINT REPLACEMENT SURGERY: Primary | ICD-10-CM

## 2023-05-16 DIAGNOSIS — Z47.1 AFTERCARE FOLLOWING LEFT HIP JOINT REPLACEMENT SURGERY: Primary | ICD-10-CM

## 2023-05-16 PROCEDURE — 97110 THERAPEUTIC EXERCISES: CPT | Mod: GP | Performed by: PHYSICAL THERAPIST

## 2023-05-16 PROCEDURE — 97112 NEUROMUSCULAR REEDUCATION: CPT | Mod: GP | Performed by: PHYSICAL THERAPIST

## 2023-05-16 NOTE — TELEPHONE ENCOUNTER
MORGANI- I have submitted Latoya's Cindy still pen application to the [facility]'s assistance program.    I will note Epic when I have a decision from [place].    Jennifer Suarez  Prescription Assistance Supervisor  Pharmacy Assistance  [phone]

## 2023-05-16 NOTE — TELEPHONE ENCOUNTER
Latoya has been approved to the [employer] [facility] assistance program for Basaglar kwik pens through December 2023. She will receive this medication at no cost through the enrollment period.    A  120 day supply of Basaglar kwik pens will be delivered to Latoya's home. [facility] will contact Latoya to schedule delivery. Latoya has been informed of this approval and delivery.    Latoya will contact my office for refills as we must work directly with the .  I will note EPIC as each refill is requested.    Thanks so much for your help!    Jennifer Suarez  Prescription Assistance Supervisor  Pharmacy Assistance  [phone]

## 2023-05-16 NOTE — PROGRESS NOTES
Subjective:  HPI  Physical Exam                    Objective:  System    Physical Exam    General     ROS    Assessment/Plan:    DISCHARGE REPORT    Progress reporting period is from 4/5/2023 to 5/16/2023.       SUBJECTIVE  Subjective changes noted by patient:  Patient stated overall she is doing well; Has very little pain and difficulty with only a few tasks like bending over to put shoes and socks on the R foot.  Patient has been working in her yard and walking 1-2 miles with her dog.       Current pain level is 1/10  .     Previous pain level was  NA .   Changes in function:  Yes (See Goal flowsheet attached for changes in current functional level)  Adverse reaction to treatment or activity: None    OBJECTIVE  Changes noted in objective findings:    Gait: antalgic; noted limp and increase upper trunk movements.  R hip AROM:  Flexion= 92, abd= 18, ext= not tested.  R hip strength: 4/5 flexion, 4+/5 abd  R knee strength: ext 4+/5, flex 5/5  Proprioception: Fair R LE.        ASSESSMENT/PLAN  Updated problem list and treatment plan: Diagnosis 1:  S/p R BRYANT  Pain -  hot/cold therapy  Decreased strength - therapeutic exercise and therapeutic activities  Decreased proprioception - neuro re-education and therapeutic activities  Impaired gait - gait training  Impaired muscle performance - neuro re-education  Decreased function - therapeutic activities  STG/LTGs have been met or progress has been made towards goals:  Yes (See Goal flow sheet completed today.)  Assessment of Progress: The patient's condition is improving.  Patient is meeting short term goals and is progressing towards long term goals.  Self Management Plans:  Patient has been instructed in a home treatment program.  Patient is independent in a home treatment program.    Latoya continues to require the following intervention to meet STG and LTG's:  PT intervention no longer needed; STG met    Recommendations:  This patient is ready to be discharged from  therapy and continue their home treatment program.    Please refer to the daily flowsheet for treatment today, total treatment time and time spent performing 1:1 timed codes.

## 2023-05-17 ENCOUNTER — TELEPHONE (OUTPATIENT)
Dept: FAMILY MEDICINE | Facility: CLINIC | Age: 73
End: 2023-05-17
Payer: COMMERCIAL

## 2023-05-17 NOTE — TELEPHONE ENCOUNTER
Latoya has been approved to the [employer] assistance program for Novolog pens & needles through December 2023.  She will receive this medication at no cost through the enrollment period.    A  120 day supply of Novolog pens & needles should be delivered to Latoya's home today. She has been informed of this approval and delivery.    Latoya will contact my office for refills as we must work directly with the .  I will note EPIC as each refill is requested.    Thanks so much for your help!    Jennifer Suarez  Prescription Assistance Supervisor  Pharmacy Assistance  [phone]

## 2023-05-18 ENCOUNTER — ALLIED HEALTH/NURSE VISIT (OUTPATIENT)
Dept: EDUCATION SERVICES | Facility: CLINIC | Age: 73
End: 2023-05-18
Payer: COMMERCIAL

## 2023-05-18 DIAGNOSIS — E11.65 TYPE 2 DIABETES MELLITUS WITH HYPERGLYCEMIA, WITH LONG-TERM CURRENT USE OF INSULIN (H): ICD-10-CM

## 2023-05-18 DIAGNOSIS — Z79.4 TYPE 2 DIABETES MELLITUS WITH HYPERGLYCEMIA, WITH LONG-TERM CURRENT USE OF INSULIN (H): ICD-10-CM

## 2023-05-18 DIAGNOSIS — E10.9 TYPE 1 DIABETES MELLITUS WITHOUT COMPLICATION (H): ICD-10-CM

## 2023-05-18 PROCEDURE — G0108 DIAB MANAGE TRN  PER INDIV: HCPCS

## 2023-05-18 RX ORDER — INSULIN GLARGINE 100 [IU]/ML
18 INJECTION, SOLUTION SUBCUTANEOUS DAILY
Qty: 45 ML | Refills: 1 | COMMUNITY
Start: 2023-05-18 | End: 2023-11-14

## 2023-05-18 RX ORDER — INSULIN ASPART 100 [IU]/ML
INJECTION, SOLUTION INTRAVENOUS; SUBCUTANEOUS
Qty: 30 ML | Refills: 3 | COMMUNITY
Start: 2023-05-18 | End: 2024-02-05

## 2023-05-18 NOTE — PROGRESS NOTES
DIABETES SELF MANAGEMENT EDUCATION: Previous Diagnosis/Individual Diabetes Review    Latoya Rodríguez presents today for modification of medication(s) and download of continuous glucose monitoring related to Type-1 Diabetes Mellitus   She is accompanied by self    Year of diagnosis: 2016  Referring provider:  Dr.Tasma Aguero     Past Diabetes Education: Yes  Support system: children  Living Situation: Latoya lives alone. She has 2 daughters and 2 grandsons.  Employment: retired     DIABETES RELATED CONCERNS/COMMENTS: Dexcom G7 clarity review and insulin adjustments. She is still having some low blood sugars mainly between 8-9 pm. It takes about an hour for her blood sugars to rise after a low so at that point she has had 3-4 carb choices and then blood sugars run high later. Her blood sugars drop around 5-6 am in the low 70's 1-2 times per week.  She has had a few occasions where she was at her daughters house and a meal but did not bring her Novolog pen with her.   She applied and is able to get patient assistance for both of her insulins.    Patient's emotional response to diabetes: expresses readiness to learn    ASSESSMENT:  Patient Problem List and Medication List reviewed for relevant medical history, current medical status, and diabetes risk factors.    MEDICATION:    Current Diabetes Management per Patient:  Taking diabetes medications?  Basaglar 20 units at bedtime   Novolog 1:20 grams at breakfast, 1:15 at lunch and 1:10 at supper plus correction at all meals of 1 per 50 over 150 mg/dl.    MONITORING:  Patient glucose self monitoring as follows: continuously using a continuous glucose monitor (CGM)  BG meter: Dexcom G7 continuous glucose monitor   BG results: see blood glucose log attached to this encounter   Blood sugar data:        Patient's most recent   Lab Results   Component Value Date    A1C 6.9 12/23/2022    A1C 7.9 05/02/2022      Patient's A1C goal: <8.0    PHYSICAL  ACTIVITY:  not assessed    NUTRITION:  Patient eats 3 meals per day  Beverages:   Cultural/Mormonism diet restrictions: No   Biggest Challenge to Healthy Eating: dosing for meals    Diabetes knowledge and skills assessment:   Barriers to Learning Assessment: No Barriers identified    Problem Solving:    Patient is at risk of hypoglycemia?: YES  Hospitalizations for hyper or hypoglycemia: No    Healthy Coping and Stress Management:   Sources of stress identified by patient My health    EDUCATION and INSTRUCTION PROVIDED AT THIS VISIT:    -Target blood glucose for pre-meal and 2 hour post-prandial glucose   -Action, timing and potential side-effects of Basaglar and Novolog diabetes medications:     Pt verbalized understanding of concepts discussed and recommendations provided today.       PLAN:  We will decrease her Basaglar from 20 to 18 units at hs to eliminate the low blood sugars in the 70's between 5-6 am.  We will decrease her Novolog at supper from 1:10 to 1:15 grams. She will continue the breakfast at 1:20 and lunch at 1:15 grams.   No change to the correction scale at all meals.  I encouraged her to carry a Novolog pen in her purse for when she is away from home and needs to cover her meals with insulin. She should write the start date on the pen cover with a sharpie and can use that for 28 days.  She will round down instead of rounding up for her mealtime insulin to avoid low blood sugars.    See patient instructions/AVS    FOLLOW-UP:    She sees Dr.Tasma Aguero in June and I will see her back in July.  She is to call me or HotClick[employer]ide[employer] message me for 2 or more low blood sugars less than 70 in one week.    Time Spent: 50  minutes  Encounter Type: Individual    Any diabetes medication dose changes were made via the CDE Protocol and Collaborative Practice Agreement with [place] and [facility].  A copy of this encounter was provided to patient's referring provider.    Humera Caraballo RN, [facility]

## 2023-06-10 ENCOUNTER — OFFICE VISIT (OUTPATIENT)
Dept: URGENT CARE | Facility: URGENT CARE | Age: 73
End: 2023-06-10
Payer: COMMERCIAL

## 2023-06-10 VITALS
TEMPERATURE: 98.7 F | DIASTOLIC BLOOD PRESSURE: 82 MMHG | WEIGHT: 164.2 LBS | RESPIRATION RATE: 20 BRPM | BODY MASS INDEX: 27.32 KG/M2 | HEART RATE: 111 BPM | OXYGEN SATURATION: 97 % | SYSTOLIC BLOOD PRESSURE: 130 MMHG

## 2023-06-10 DIAGNOSIS — E10.65 TYPE 1 DIABETES MELLITUS WITH HYPERGLYCEMIA (H): ICD-10-CM

## 2023-06-10 DIAGNOSIS — M79.89 LEFT LEG SWELLING: Primary | ICD-10-CM

## 2023-06-10 PROCEDURE — 99214 OFFICE O/P EST MOD 30 MIN: CPT | Performed by: PHYSICIAN ASSISTANT

## 2023-06-10 ASSESSMENT — ENCOUNTER SYMPTOMS
COUGH: 0
PALPITATIONS: 0
ARTHRALGIAS: 1
ALLERGIC/IMMUNOLOGIC NEGATIVE: 1
WEAKNESS: 0
SORE THROAT: 0
WOUND: 0
NECK STIFFNESS: 0
RHINORRHEA: 0
CARDIOVASCULAR NEGATIVE: 1
COLOR CHANGE: 1
RESPIRATORY NEGATIVE: 1
VOMITING: 0
ENDOCRINE NEGATIVE: 1
SHORTNESS OF BREATH: 0
HEADACHES: 0
FEVER: 0
BACK PAIN: 0
CHILLS: 0
NECK PAIN: 0
LIGHT-HEADEDNESS: 0
MYALGIAS: 1
JOINT SWELLING: 0
NAUSEA: 0
DIZZINESS: 0
EYES NEGATIVE: 1
DIARRHEA: 0

## 2023-06-10 NOTE — PROGRESS NOTES
Chief Complaint:     Chief Complaint   Patient presents with     Leg Swelling     Left; Fall x2 wks; hurt knee; no longer having bruising ; swelling      ASSESSMENT     1. Left leg swelling    2. Type 1 diabetes mellitus with hyperglycemia (H)      PLAN    Patient is in no acute distress.    She has significant swelling and bruising of the L lower leg.  With Hx of anticoagulation and trauma, can not rule out hematoma, or DVT.  Patient instructed to go to the ED now for further evaluation, imaging, and possible surgical consult.  Follow up with your primary provider in the next week.  Patient verbalized understanding, and agrees with this plan.       HPI: Latoya Rodríguez is an 72 year old female who presents today for evaluation of left leg injury. Onset of the injury was 2 weeks ago when she tripped coming into the house and landed on her left knee. It was immediately painful and began swelling that night. She developed a bruise the went from the top of her knee down to her shin and covered the anterior and lateral portion of her leg. Since then the bruising has gotten better, but there is still some small bruising and discoloration. It has been painful to walk on since the fall. There is significant swelling of the knee and lower leg each day; worse at the end of the day. She has been elevating the leg in bed each night. Is on Eloquis, taking regularily each morning and night.     Patient denies any numbness, tingling, or dysfunction of the leg.       ROS:      Review of Systems   Constitutional: Negative for chills and fever.   HENT: Negative for congestion, ear pain, rhinorrhea and sore throat.    Eyes: Negative.    Respiratory: Negative.  Negative for cough and shortness of breath.    Cardiovascular: Negative.  Negative for chest pain and palpitations.   Gastrointestinal: Negative for diarrhea, nausea and vomiting.   Endocrine: Negative.    Genitourinary: Negative.    Musculoskeletal: Positive for arthralgias and  myalgias. Negative for back pain, joint swelling, neck pain and neck stiffness.   Skin: Positive for color change. Negative for rash and wound.   Allergic/Immunologic: Negative.  Negative for immunocompromised state.   Neurological: Negative for dizziness, weakness, light-headedness and headaches.        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)     Type 1 diabetes mellitus with hyperglycemia (H)     Age-related osteoporosis without current pathological fracture     Stress incontinence     Atrophic vaginitis     Intrinsic sphincter deficiency     Overactive bladder     Carpal tunnel syndrome of right wrist     Chronic atrial fibrillation (H)     Osteoarthritis of carpometacarpal (CMC) joint of both thumbs     Thumb pain     Post-operative nausea and vomiting     Type 1 diabetes mellitus without complication (H)     Osteoarthritis of right hip, unspecified osteoarthritis type        Allergies  No Known Allergies     Smoking History  History   Smoking Status     Never   Smokeless Tobacco     Never        Current Meds    Current Outpatient Medications:      acetaminophen (TYLENOL) 325 MG tablet, Take 2 tablets (650 mg) by mouth every 4 hours as needed for other, Disp: 60 tablet, Rfl: 0     alendronate (FOSAMAX) 70 MG tablet, Take 1 tablet (70 mg) by mouth every 7 days, Disp: 12 tablet, Rfl: 3     atorvastatin (LIPITOR) 10 MG tablet, Take 1 tablet (10 mg) by mouth daily, Disp: 90 tablet, Rfl: 3     calcium carbonate-vitamin D 500-400 MG-UNIT TABS tablt, Take 1 tablet by mouth 2 times daily, Disp: 180 tablet, Rfl: 3     conjugated estrogens (PREMARIN) 0.625 MG/GM vaginal cream, Place 0.5 g vaginally twice a week, Disp: 30 g, Rfl: 3     Continuous Blood Gluc Sensor (DEXCOM G7 SENSOR) MISC, 1 each every 10 days,  Disp: 9 each, Rfl: 3     ELIQUIS ANTICOAGULANT 5 MG tablet, TAKE ONE TABLET BY MOUTH TWICE A DAY RESUME THE DAY FOLLOWING YOU PROCEDURE, Disp: 180 tablet, Rfl: 1     estradiol (ESTRACE) 0.1 MG/GM vaginal cream, INSERT 2G VAGINALLY TWICE PER WEEK, Disp: 42.5 g, Rfl: 11     insulin aspart (NOVOLOG FLEXPEN) 100 UNIT/ML pen, 1 unit per 20 gram of carb for breakfast, 1 unit per 15 grams of carbs for lunch and dinner. Plus 1 unit per 50 mg/dl above 150. Total daily dose approx 30 units., Disp: 30 mL, Rfl: 3     insulin glargine (BASAGLAR KWIKPEN) 100 UNIT/ML pen, Inject 18 Units Subcutaneous daily Max, 35 units daily, Disp: 45 mL, Rfl: 1     insulin pen needle (B-D U/F) 31G X 8 MM miscellaneous, USE 5 TIMES DAILY  WITH NOVOLOG AND LANTUS, Disp: 500 each, Rfl: 3     melatonin 3 MG tablet, Take 1 tablet (3 mg) by mouth nightly as needed for sleep, Disp: 30 tablet, Rfl: 0     metoprolol succinate ER (TOPROL XL) 50 MG 24 hr tablet, Take 1 tablet (50 mg) by mouth daily, Disp: 90 tablet, Rfl: 3     Multiple Vitamins-Minerals (MULTIVITAMIN ADULT PO), Take 1 tablet by mouth every morning , Disp: , Rfl:      senna-docusate (SENOKOT-S/PERICOLACE) 8.6-50 MG tablet, Take 1 tablet by mouth 2 times daily, Disp: 30 tablet, Rfl: 0     Urine Glucose-Ketones Test (KETO-DIASTIX) STRP, 1 strip by In Vitro route as needed, Disp: 1 each, Rfl: 11     diltiazem (CARDIZEM) 30 MG tablet, Take 1 tablet (30 mg) by mouth every 6 hours as needed (HR>120) (Patient not taking: Reported on 6/10/2023), Disp: 30 tablet, Rfl: 0     hydrOXYzine (ATARAX) 25 MG tablet, Take 1 tablet (25 mg) by mouth every 6 hours as needed for itching (Patient not taking: Reported on 3/29/2023), Disp: 40 tablet, Rfl: 0     oxyCODONE (ROXICODONE) 5 MG tablet, Take 1 tablet (5 mg) by mouth every 4 hours as needed for moderate pain (4-6) (Patient not taking: Reported on 3/29/2023), Disp: 26 tablet, Rfl: 0     polyethylene glycol (MIRALAX) 17 g packet, Take 17 g by mouth daily  (Patient not taking: Reported on 3/29/2023), Disp: 10 packet, Rfl: 0        Vital signs reviewed by Kalin Lakhani PA-C  /82   Pulse 111   Temp 98.7  F (37.1  C) (Tympanic)   Resp 20   Wt 74.5 kg (164 lb 3.2 oz)   SpO2 97%   BMI 27.32 kg/m      Physical Exam     Physical Exam  Vitals and nursing note reviewed.   Constitutional:       General: She is not in acute distress.     Appearance: She is well-developed. She is not ill-appearing, toxic-appearing or diaphoretic.   HENT:      Head: Normocephalic and atraumatic.      Right Ear: No drainage, swelling or tenderness. Tympanic membrane is not perforated, erythematous, retracted or bulging.      Left Ear: No drainage, swelling or tenderness. Tympanic membrane is not perforated, erythematous, retracted or bulging.      Nose: No mucosal edema.      Right Sinus: No maxillary sinus tenderness or frontal sinus tenderness.      Left Sinus: No maxillary sinus tenderness or frontal sinus tenderness.      Mouth/Throat:      Pharynx: No pharyngeal swelling or uvula swelling.      Tonsils: No tonsillar abscesses.   Neck:      Trachea: Trachea normal.   Cardiovascular:      Rate and Rhythm: Tachycardia present.      Heart sounds: S1 normal and S2 normal.   Pulmonary:      Effort: Pulmonary effort is normal. No respiratory distress.      Breath sounds: Normal breath sounds. No decreased breath sounds.   Abdominal:      Palpations: Abdomen is not rigid.   Musculoskeletal:      Cervical back: Full passive range of motion without pain and normal range of motion.      Left knee: Swelling and ecchymosis present. No crepitus. Decreased range of motion. Tenderness present over the lateral joint line. Normal pulse.      Left lower leg: Swelling and tenderness present. Edema present.      Left ankle:      Left Achilles Tendon: No tenderness. Nelson's test negative.        Legs:    Skin:     General: Skin is warm and dry.   Neurological:      Mental Status: She is alert and  oriented to person, place, and time.      Cranial Nerves: No cranial nerve deficit.      Deep Tendon Reflexes: Reflexes are normal and symmetric.   Psychiatric:         Behavior: Behavior normal. Behavior is cooperative.         Thought Content: Thought content normal.         Judgment: Judgment normal.             Kalin Lakhani PA-C  6/10/2023, 2:24 PM

## 2023-06-12 ENCOUNTER — ANCILLARY PROCEDURE (OUTPATIENT)
Dept: GENERAL RADIOLOGY | Facility: CLINIC | Age: 73
End: 2023-06-12
Attending: STUDENT IN AN ORGANIZED HEALTH CARE EDUCATION/TRAINING PROGRAM
Payer: COMMERCIAL

## 2023-06-12 ENCOUNTER — OFFICE VISIT (OUTPATIENT)
Dept: PEDIATRICS | Facility: CLINIC | Age: 73
End: 2023-06-12
Payer: COMMERCIAL

## 2023-06-12 ENCOUNTER — ANCILLARY PROCEDURE (OUTPATIENT)
Dept: ULTRASOUND IMAGING | Facility: CLINIC | Age: 73
End: 2023-06-12
Attending: STUDENT IN AN ORGANIZED HEALTH CARE EDUCATION/TRAINING PROGRAM
Payer: COMMERCIAL

## 2023-06-12 ENCOUNTER — OFFICE VISIT (OUTPATIENT)
Dept: FAMILY MEDICINE | Facility: CLINIC | Age: 73
End: 2023-06-12
Payer: COMMERCIAL

## 2023-06-12 VITALS
WEIGHT: 160 LBS | SYSTOLIC BLOOD PRESSURE: 102 MMHG | RESPIRATION RATE: 16 BRPM | TEMPERATURE: 97.5 F | HEIGHT: 65 IN | HEART RATE: 125 BPM | DIASTOLIC BLOOD PRESSURE: 62 MMHG | BODY MASS INDEX: 26.66 KG/M2 | OXYGEN SATURATION: 95 %

## 2023-06-12 VITALS
RESPIRATION RATE: 18 BRPM | SYSTOLIC BLOOD PRESSURE: 133 MMHG | TEMPERATURE: 98.2 F | WEIGHT: 164 LBS | OXYGEN SATURATION: 98 % | HEIGHT: 65 IN | DIASTOLIC BLOOD PRESSURE: 84 MMHG | HEART RATE: 93 BPM | BODY MASS INDEX: 27.32 KG/M2

## 2023-06-12 DIAGNOSIS — L03.116 CELLULITIS OF LEFT LOWER EXTREMITY: Primary | ICD-10-CM

## 2023-06-12 DIAGNOSIS — M25.562 ACUTE PAIN OF LEFT KNEE: ICD-10-CM

## 2023-06-12 DIAGNOSIS — M79.89 LEFT LEG SWELLING: ICD-10-CM

## 2023-06-12 DIAGNOSIS — M79.662 PAIN OF LEFT LOWER LEG: ICD-10-CM

## 2023-06-12 DIAGNOSIS — E10.9 TYPE 1 DIABETES MELLITUS WITHOUT COMPLICATION (H): ICD-10-CM

## 2023-06-12 DIAGNOSIS — S89.92XD LEFT LEG INJURY, SUBSEQUENT ENCOUNTER: Primary | ICD-10-CM

## 2023-06-12 DIAGNOSIS — I48.11 LONGSTANDING PERSISTENT ATRIAL FIBRILLATION (H): ICD-10-CM

## 2023-06-12 DIAGNOSIS — M79.662 PAIN AND SWELLING OF LEFT LOWER LEG: ICD-10-CM

## 2023-06-12 DIAGNOSIS — M79.89 PAIN AND SWELLING OF LEFT LOWER LEG: ICD-10-CM

## 2023-06-12 PROCEDURE — 73590 X-RAY EXAM OF LOWER LEG: CPT | Mod: LT | Performed by: RADIOLOGY

## 2023-06-12 PROCEDURE — 99215 OFFICE O/P EST HI 40 MIN: CPT | Performed by: STUDENT IN AN ORGANIZED HEALTH CARE EDUCATION/TRAINING PROGRAM

## 2023-06-12 PROCEDURE — 73562 X-RAY EXAM OF KNEE 3: CPT | Mod: LT | Performed by: RADIOLOGY

## 2023-06-12 PROCEDURE — 93971 EXTREMITY STUDY: CPT | Mod: LT | Performed by: RADIOLOGY

## 2023-06-12 PROCEDURE — 99207 REFERRAL TO ACUTE AND DIAGNOSTIC SERVICES: CPT | Performed by: FAMILY MEDICINE

## 2023-06-12 RX ORDER — CEPHALEXIN 500 MG/1
500 CAPSULE ORAL 4 TIMES DAILY
Qty: 28 CAPSULE | Refills: 0 | Status: SHIPPED | OUTPATIENT
Start: 2023-06-12 | End: 2023-06-20

## 2023-06-12 ASSESSMENT — PAIN SCALES - GENERAL
PAINLEVEL: SEVERE PAIN (6)
PAINLEVEL: MODERATE PAIN (4)

## 2023-06-12 NOTE — PROGRESS NOTES
Assessment & Plan     Cellulitis of left lower extremity  US negative for DVT. She has an area of erythema, mild warmth and tenderness on the left medial distal leg that is concerning for cellulitis and is at higher risk with history of DM1. I suspect the swelling in the lower leg is from knee injury when she fell causing swelling and dependent fluid collection because she has stayed active walking 4 miles a day. I advised rest with elevation of the leg above the level of her heart, no strenuous activity for the next couple weeks, compression with ACE wrap as compression stockings are too hard to get on and lymphedema specialist referral to further treat the swelling in the leg as I am not sure if ACE bandage will be sufficient compression. Follow up with primary care in 7 days to recheck cellulitis.   - cephALEXin (KEFLEX) 500 MG capsule; Take 1 capsule (500 mg) by mouth 4 times daily for 7 days    Left leg swelling  - Lymphedema Therapy Referral; Future    Acute pain of left knee  No acute fracture seen per my review of x-ray. Awaiting radiology review.   - XR Knee Left 3 Views; Future    Pain of left lower leg  No fracture seen per my review of x-ray. Awaiting radiology review.   - XR Tibia and Fibula Left 2 Views; Future  - US Lower Extremity Venous Duplex Left; Future      45 minutes spent by me on the date of the encounter doing chart review, review of test results, interpretation of tests, patient visit and documentation         Patient Instructions   Lymphedema specialist referral to treat the swelling.  For now use ACE wrap to compress the swelling.    Cephalexin antibiotic four times daily for 7 days for cellulitis infection of the lower inner leg.    Giana Hernández CNP        No follow-ups on file.    URVASHI Birch CNP  [facility]    Pablito Klein is a 72 year old, presenting for the following health issues:  Fall (Fell 3 weeks ago )        6/12/2023    10:57 AM  "  Additional Questions   Roomed by Leroy REES     Musculoskeletal problem/pain  Onset/Duration: 3 weeks ago she fell.  Pain started right away and bruising about a week later   Description:       Location: Left lower leg        Joint swelling: YES       Redness: YES       Pain: mild, 4/10  Throbbing pain more at night.  More of a tightness and pressure on shin        Warmth: YES  Progression of symptoms same  Accompanying signs and symptoms:       Fevers: no        Numbness/tingling/weakness: no   History        Trauma to the area: YES- fell on Left side 3 weeks ago         Previous history of Gout: no         Alcohol usage: no         Diuretic use: no         Recent illness: no         Previous similar problem: no         Previous evaluation: no   Aggravating factors include: standing, walking, climbing stairs and exercise  Therapies tried and outcome: ice, Ibuprofen, Excedrin and elevation   Have you had any surgeries on your arteries of veins: Yes, leg surgeries.  4 surgeries            Review of Systems   Constitutional, HEENT, cardiovascular, pulmonary, gi and gu systems are negative, except as otherwise noted.      Objective    /84 (BP Location: Right arm, Patient Position: Sitting, Cuff Size: Adult Regular)   Pulse 93   Temp 98.2  F (36.8  C) (Oral)   Resp 18   Ht 1.651 m (5' 5\")   Wt 74.4 kg (164 lb)   LMP  (LMP Unknown)   SpO2 98%   BMI 27.29 kg/m    Body mass index is 27.29 kg/m .  Physical Exam   GENERAL APPEARANCE: healthy, alert and no distress  MS: generalized tight swelling of left lower leg/ankle/foot with ecchymosis of the proximal LLE extending to mid LE; tenderness to palpation of left pes anserine bursa that is mild and mid left shin; mild swelling of the left knee    SKIN: pinkish hue to left medial distal lower leg that is slightly warmer in comparison to surrounding skin, nontender and orange peel-javier texture to skin overlying that particular area;   NEURO: Normal strength " and tone, mentation intact and speech normal  PSYCH: mentation appears normal and affect normal/bright    Results for orders placed or performed in visit on 06/12/23   US Lower Extremity Venous Duplex Left     Status: None    Narrative    EXAMINATION: US LOWER EXTREMITY VENOUS DUPLEX LEFT, 6/12/2023 2:29 PM     COMPARISON: None.    HISTORY:  left lower leg swelling x 3 weeks after fall onto knee, on Eliquis,  PCP wanting to r/o DVT; Pain of left lower leg    TECHNIQUE:  Gray-scale evaluation with compression, spectral flow, and  color Doppler assessment of the deep venous system of the left leg  from groin to knee, and then at the ankle.    FINDINGS:  In the left lower extremity, the common femoral, superficial femoral,  popliteal and posterior tibial veins demonstrate normal  compressibility and blood flow. There is normal compressibility,  phasicity, and augmentation in the right common femoral vein.      Impression    IMPRESSION:  1.  No evidence left lower extremity deep venous thrombosis.    JARAD RYAN MD         SYSTEM ID:  [id number]

## 2023-06-12 NOTE — PROGRESS NOTES
Assessment & Plan     Left leg injury, subsequent encounter    Pain and swelling of left lower leg  She has a lot of swelling and bruising in the left medial and posterior calf area.  I suspect much of this is related to hematoma as she has continued to take Eliquis since the injury.  We discussed options to work this up further and I recommended she be evaluated at the acute diagnostic services clinic in [place].  I spoke with a physician there and gave a signout.  She is planning to head straight there now.  She reports feeling comfortable to drive herself.    Longstanding persistent atrial fibrillation (H)  This issue has been stable on Eliquis and metoprolol.  Her pulse has been elevated in the 120s today and was 111 2 days ago when she was in the urgent care.  This is probably from the atrial fibrillation, but a PE could be contributing to this.  It is reassuring that she is not having any chest pains or shortness of breath symptoms.  Her oxygen saturation is 95% on room air.    Type 1 diabetes mellitus without complication (H)  She reports that the blood sugars have been stable on Basaglar and NovoLog.             MED REC REQUIRED  Post Medication Reconciliation Status:  Discharge medications reconciled, continue medications without change          Major Jean, [facility]   Latoya is a 72 year old, presenting for the following health issues:  Hospital F/U         View : No data to display.              [facility]     ED/UC Followup:    Facility:  [facility]  Date of visit: 06/10  Reason for visit: Fall 3 weeks ago, left leg swelling  Current Status: Home - symptoms same        She reports tripping over a rug 3 weeks ago and fell onto her left anterior knee.  Since that time she describes having increased pain and swelling into the left lower leg.  She is still able to ambulate without significant difficulty, but the swelling has persisted.  She denies any open  "wounds.  She denies having chest pains or shortness of breath.  She has a history of chronic atrial fibrillation and type 1 diabetes.  She is on Eliquis and insulin.      Review of Systems   Constitutional, HEENT, cardiovascular, pulmonary, GI, , musculoskeletal, neuro, skin, endocrine and psych systems are negative, except as otherwise noted.      Objective    /62   Pulse (!) 125   Temp 97.5  F (36.4  C) (Temporal)   Resp 16   Ht 1.651 m (5' 5\")   Wt 72.6 kg (160 lb)   LMP  (LMP Unknown)   SpO2 95%   BMI 26.63 kg/m    Body mass index is 26.63 kg/m .  Physical Exam   GENERAL: healthy, alert and no distress  EYES: Eyes grossly normal to inspection, PERRL and conjunctivae and sclerae normal  HENT: nose and mouth without ulcers or lesions  NECK: no adenopathy, no asymmetry, masses, or scars and thyroid normal to palpation  RESP: lungs clear to auscultation - no rales, rhonchi or wheezes  CV: regular rate and rhythm, normal S1 S2, no S3 or S4, no murmur, click or rub, no peripheral edema and peripheral pulses strong  MS: There is significant swelling and bruising in the left leg.  Homans signs negative.  There is some mild tenderness in the left lower leg.  Knee flexion is slightly diminished on the left.  Sylwia's testing negative bilaterally.  SKIN: no suspicious lesions or rashes  NEURO: Normal strength and tone, mentation intact and speech normal  PSYCH: mentation appears normal, affect normal/bright                    "

## 2023-06-12 NOTE — PATIENT INSTRUCTIONS
Lymphedema specialist referral to treat the swelling.  For now use ACE wrap to compress the swelling.    Cephalexin antibiotic four times daily for 7 days for cellulitis infection of the lower inner leg.    Giana Hernández, CNP

## 2023-06-13 NOTE — PROGRESS NOTES
Outcome for 06/13/23 3:22 PM: Data uploaded on Dexcom - reports printed  Heike Adams CMA  Adult Endocrinology  [facility], [facility]

## 2023-06-19 ENCOUNTER — TELEPHONE (OUTPATIENT)
Dept: ORTHOPEDICS | Facility: CLINIC | Age: 73
End: 2023-06-19

## 2023-06-19 ENCOUNTER — OFFICE VISIT (OUTPATIENT)
Dept: ENDOCRINOLOGY | Facility: CLINIC | Age: 73
End: 2023-06-19
Payer: COMMERCIAL

## 2023-06-19 VITALS
DIASTOLIC BLOOD PRESSURE: 69 MMHG | WEIGHT: 162.2 LBS | OXYGEN SATURATION: 97 % | SYSTOLIC BLOOD PRESSURE: 108 MMHG | BODY MASS INDEX: 26.99 KG/M2 | HEART RATE: 89 BPM

## 2023-06-19 DIAGNOSIS — E10.9 TYPE 1 DIABETES MELLITUS WITHOUT COMPLICATION (H): Primary | ICD-10-CM

## 2023-06-19 DIAGNOSIS — Z79.4 TYPE 2 DIABETES MELLITUS WITH HYPERGLYCEMIA, WITH LONG-TERM CURRENT USE OF INSULIN (H): ICD-10-CM

## 2023-06-19 DIAGNOSIS — Z47.89 ORTHOPEDIC AFTERCARE: Primary | ICD-10-CM

## 2023-06-19 DIAGNOSIS — E11.65 TYPE 2 DIABETES MELLITUS WITH HYPERGLYCEMIA, WITH LONG-TERM CURRENT USE OF INSULIN (H): ICD-10-CM

## 2023-06-19 LAB — HBA1C MFR BLD: 8.6 % (ref 0–5.7)

## 2023-06-19 PROCEDURE — 99215 OFFICE O/P EST HI 40 MIN: CPT | Performed by: INTERNAL MEDICINE

## 2023-06-19 PROCEDURE — 83036 HEMOGLOBIN GLYCOSYLATED A1C: CPT | Performed by: INTERNAL MEDICINE

## 2023-06-19 NOTE — NURSING NOTE
Latoya Rodríguez's goals for this visit include:   Chief Complaint   Patient presents with     Consult     Diabetes     She requests these members of her care team be copied on today's visit information: Yes    PCP: Pricila Avila    Referring Provider:  Pricila Avila, URVASHI CNP  [address] [place] [address]    /69 (BP Location: Left arm, Patient Position: Sitting, Cuff Size: Adult Large)   Pulse 89   Wt 73.6 kg (162 lb 3.2 oz)   LMP  (LMP Unknown)   SpO2 97%   BMI 26.99 kg/m      Do you need any medication refills at today's visit? No

## 2023-06-19 NOTE — LETTER
6/19/2023         RE: Latoya Rodríguez  [address] [facility] [address]        Dear Colleague,    Thank you for referring your patient, Latoya Rodríguez, to the [facility]. Please see a copy of my visit note below.          Chief complaint:  Latoya is a 72 year old female seen in follow up of type 1 Diabetes.  HISTORY OF PRESENT ILLNESS  Latoya is a 72-year-old retiree with a history of type 1 diabetes who has a visit today for a follow-up type 1 diabetes.  She was diagnosed with type 1 diabetes 2016 during routine physical where she was found to have high blood glucose levels.  At that time she was diagnosed and treated as a type II diabetic but it was later determined that she had positive TORRES antibodies and low C-peptide.      Interval history  She underwent hip replacement in January 2023. It went well.    Regarding type 1 diabetes, current regimen consist of Lantus 18 units at bedtime, Novolog 1:20 g with breakfast, 1 unit per 15 gram at lunch, and 1 unit per 15 gram at dinner, Novolog correction 1:50 if BG is >150 at meal time, Novolog correction 1:50 if BG is >200 at bedtime    A1c is up to 8.6% today. She is currently using Dexcom G6 to track glucose levels. Reviewed BG in the past 4 weeks, sensor usage 97%.  Average glucose 204 (SD 71).  Time in range 38%.  Time above range 60%, time below range 1%.  She usually has high blood glucose after meals.  Fasting blood glucose acceptable.  She eats 3 meals per day without snack between meals or before bedtime. She feels that her carb counting is quite accurate  She is more active after hip replacement.  She eats mostly protein and is very consistent meal daily.     Patient has dyspnea with high risk of fracture, her mother had history of osteoporosis. DXA 9/6/2022 showed the T score -2 at the lumbar spine, T score -1.6 at the left femoral neck, -1.2 at the right femoral neck and -1.1 at the wrist.  Started on Fosamax 70  mg weekly since April 2023 due to high FRAX.    Past Medical History  Type 1 diabetes, diagnosed 6/2016  Hyperlipidemia  Osteoporosis     Medications  Current Outpatient Medications   Medication Sig Dispense Refill    acetaminophen (TYLENOL) 325 MG tablet Take 2 tablets (650 mg) by mouth every 4 hours as needed for other 60 tablet 0    alendronate (FOSAMAX) 70 MG tablet Take 1 tablet (70 mg) by mouth every 7 days 12 tablet 3    atorvastatin (LIPITOR) 10 MG tablet Take 1 tablet (10 mg) by mouth daily 90 tablet 3    calcium carbonate-vitamin D 500-400 MG-UNIT TABS tablt Take 1 tablet by mouth 2 times daily 180 tablet 3    cephALEXin (KEFLEX) 500 MG capsule Take 1 capsule (500 mg) by mouth 4 times daily for 7 days 28 capsule 0    conjugated estrogens (PREMARIN) 0.625 MG/GM vaginal cream Place 0.5 g vaginally twice a week 30 g 3    Continuous Blood Gluc Sensor (DEXCOM G7 SENSOR) MISC 1 each every 10 days 9 each 3    ELIQUIS ANTICOAGULANT 5 MG tablet TAKE ONE TABLET BY MOUTH TWICE A DAY RESUME THE DAY FOLLOWING YOU PROCEDURE 180 tablet 1    estradiol (ESTRACE) 0.1 MG/GM vaginal cream INSERT 2G VAGINALLY TWICE PER WEEK 42.5 g 11    hydrOXYzine (ATARAX) 25 MG tablet Take 1 tablet (25 mg) by mouth every 6 hours as needed for itching 40 tablet 0    insulin aspart (NOVOLOG FLEXPEN) 100 UNIT/ML pen 1 unit per 20 gram of carb for breakfast, 1 unit per 15 grams of carbs for lunch and dinner. Plus 1 unit per 50 mg/dl above 150. Total daily dose approx 30 units. 30 mL 3    insulin glargine (BASAGLAR KWIKPEN) 100 UNIT/ML pen Inject 18 Units Subcutaneous daily Max, 35 units daily 45 mL 1    insulin pen needle (B-D U/F) 31G X 8 MM miscellaneous USE 5 TIMES DAILY  WITH NOVOLOG AND LANTUS 500 each 3    melatonin 3 MG tablet Take 1 tablet (3 mg) by mouth nightly as needed for sleep 30 tablet 0    metoprolol succinate ER (TOPROL XL) 50 MG 24 hr tablet Take 1 tablet (50 mg) by mouth daily 90 tablet 3    Multiple Vitamins-Minerals  (MULTIVITAMIN ADULT PO) Take 1 tablet by mouth every morning       Urine Glucose-Ketones Test (KETO-DIASTIX) STRP 1 strip by In Vitro route as needed 1 each 11     Allergies  No Known Allergies    Family History  family history includes Brain Cancer in her maternal uncle; Breast Cancer in her mother; Coronary Artery Disease in her maternal uncle and maternal uncle; Hyperlipidemia in her mother; Leukemia in her father; Other Cancer in her father; Skin Cancer in her father; Stomach Cancer in her maternal aunt.  No thyroid disease or type 1 diabetes in the family    Social History  Social History     Socioeconomic History    Marital status: Single     Spouse name: Not on file    Number of children: Not on file    Years of education: Not on file    Highest education level: Not on file   Occupational History    Not on file   Tobacco Use    Smoking status: Never    Smokeless tobacco: Never   Vaping Use    Vaping status: Never Used   Substance and Sexual Activity    Alcohol use: Yes     Comment: 1 to 2 beers or glasses of wine 1 to 2 times per month    Drug use: No    Sexual activity: Not Currently     Partners: Female     Birth control/protection: Post-menopausal   Other Topics Concern    Parent/sibling w/ CABG, MI or angioplasty before 65F 55M? No   Social History Narrative    Not on file     Social Determinants of Health     Financial Resource Strain: Not on file   Food Insecurity: Not on file   Transportation Needs: Not on file   Physical Activity: Not on file   Stress: Not on file   Social Connections: Not on file   Intimate Partner Violence: Not on file   Housing Stability: Not on file       REVIEW OF SYSTEMS  10 points ROS were negative otherwise mentioned in HPI    Physical Exam  Vital signs:   /69 (BP Location: Left arm, Patient Position: Sitting, Cuff Size: Adult Large)   Pulse 89   Wt 73.6 kg (162 lb 3.2 oz)   LMP  (LMP Unknown)   SpO2 97%   BMI 26.99 kg/m    Estimated body mass index is 27.29 kg/m   "as calculated from the following:    Height as of 6/12/23: 1.651 m (5' 5\").    Weight as of 6/12/23: 74.4 kg (164 lb).  Constitutional: no distress, comfortable, pleasant   Eyes: anicteric  Musculoskeletal:+2 edema after a fall  Skin:  no jaundice   Neurological: normal gait, intact sensation per monofilament bilaterally exam dated 10/24/2022, no tremor on outstretched hands bilaterally  Psychological: appropriate mood       DATA REVIEW    Lab Results   Component Value Date    A1C 8.6 06/19/2023    A1C 6.9 12/23/2022    A1C 8.0 09/06/2022    A1C 7.9 05/02/2022    A1C 7.6 11/15/2021    A1C 6.7 07/01/2021    A1C 8.4 02/10/2021       ENDO DIABETES Latest Ref Rng & Units 9/6/2022   CHOL <200 mg/dL 185   LDL <=100 mg/dL 104 (H)   HDL >=50 mg/dL 61   NHDL <130 mg/dL 124   TRIGLYCERIDES <150 mg/dL 101     ENDO DIABETES Latest Ref Rng & Units 9/9/2022   CREATININE 0.52 - 1.04 mg/dL 0.74     ENDO DIABETES Latest Ref Rng & Units 9/6/2022   MICROL mg/L 14   UMALCR 0.00 - 25.00 mg/g Cr 15.73     DXA 8/28/2020  Lumbar spine T-score in region of L2-L4 = -2.4   L1-4 percent change: Not significant%      HIPS:  Mean total hip T-score: -1  Mean total hip percent change: Not significant%      Left femoral neck T-score = -1.9  Right femoral neck T-score = -1.6      Radius 33% T-score = -1.6     FRAX:  10 year probability of major osteoporotic fracture: 11.3%  10 year probability of hip fracture: 2.1%  The 10 year probability of fracture may be lower than reported if the  patient has received treatment. FRAX data should be disregarded in patient's taking bisphosphonates.                                                                 IMPRESSION:    Low bone mass (osteopenia).    DXA 9/6/2022  Lumbar spine T-score in region of L1-L4 = -2   L1-4 percent change: 3.7%      HIPS:  Mean total hip T-score: -1.1  Mean total hip percent change: Not significant%      Left femoral neck T-score = -1.6  Right femoral neck T-score = -1.2      Radius " 33% T-score = -1.1  Radius 33% percent change: Not significant%      FRAX:  10 year probability of major osteoporotic fracture: 17.2%  10 year probability of hip fracture: 5.4%  The 10 year probability of fracture may be lower than reported if the patient has received treatment. FRAX data should be disregarded in patient's taking bisphosphonates.                                                                    IMPRESSION:    Low bone mass (osteopenia).       ASSESSMENT/PLAN:   Latoya is a 72-year-old retiree with a history of type 1 diabetes who is here for T1 diabetes management.    1.  Type 1 diabetes: A1c up to 8.6 today. Latoya's blood glucose readings over the last 4 weeks through Dexcom showed frequent high glucoses after meals. Reported her carb counting is pretty good.    Recommendations:  -Increase Lantus to 20 units at bedtime  -Change Novolog 1:15 gram of carb coverage with breakfast and 1:13 gram at lunch and continue 1:13 gram at dinner and 1:20 for snack  -Continue 1:50 Novolog correction if over 150 at breakfast, lunch and dinner, continue 1:50 Novolog correction if BG is >200 at bedtime      2) DM complications:  Retinopathy: normal exam - no DR 6/2022 -- she has an appointment in July   Nephropathy: negative urine microalbumin 9/2022  Neuropathy: none per exam 10/2022     3) Hyperlipidemia: , HDL 61, , total cholesterol 185 on 9/2022. Continue Lipitor 10 mg daily.    4) Osteopenia: DXA 9/6/2022 showed the T score -2 at the lumbar spine, T score -1.6 at the left femoral neck, -1.2 at the right femoral neck and -1.1 at the wrist.    10 year probability of major osteoporotic fracture: 17.2%  10 year probability of hip fracture: 5.4%    She is indicated for considering bisphosphonates to reduce fracture risk.  Started on Fosamax 70 mg weekly since April 2023.  Continue calcium and vitamin D    - fasting labs in the fall  -Follow-up with CDE in 2 weeks  -Follow-up in 2 months with  PA  -Follow-up 6 months with MD    External notes/medical records independently reviewed, labs and imaging independently reviewed, medical management and tests to be discussed/communicated to patient.    Time: I spent  40 minutes spent on the date of the encounter preparing to see patient (including chart review and preparation), obtaining and or reviewing additional medical history, performing a physical exam and evaluation, documenting clinical information in the electronic health record, independently interpreting results, communicating results to the patient and coordinating care.    Edward Aguero MD  Division of Diabetes and Endocrinology  Department of Medicine

## 2023-06-19 NOTE — TELEPHONE ENCOUNTER
[facility] Call Center    Phone Message    May a detailed message be left on voicemail: yes     Reason for Call: Other: questions on dental RX. Please call pt back. Appt is July 6th She also has a question about when Dr. Ward would like her back for her 1 year check. Jan or April?    Action Taken: Other: [id number]    Travel Screening: Not Applicable

## 2023-06-19 NOTE — PATIENT INSTRUCTIONS
[facility]-Department of Endocrinology  Diabetes Educators:   Tasha Harley, RN and Humera Caraballo RN  Clinic Nurse: NESTOR Mckenna  CMA's: Elisa MOCKN: Elissa  Scheduling/Clinic phone number : [phone]   Clinic Fax: [phone]  On-Call Endocrine at the [place] (after hours/weekends): [phone] option 4    Please call the number below to schedule your labs.    [facility] [phone]   [facility] [phone]   [place] [phone]   [facility]  [phone]   [facility] [phone]   [place] [phone]   [facility]) [phone]   [facility] Walk-In Only   [place] [phone]   [place] [phone]   [place] [phone]   [place] [phone]     Please reach out to the following centers to schedule your imaging appointment:       Imaging (DEXA, CT, MRI, XRAY)    [facility] ([facility], [facility], [place]) [phone]   [facility] ([place], [place]) [phone]   [facility] ([facility]) [phone]   [facility] ([facility]) [phone]     Appointment Reminders:  * Please bring meter with for staff to download  * If you are due ONLY for an A1C, it is scheduled with the nurse and will be done in clinic. You do not need to schedule a lab appointment. Fasting is not required for an A1C.  * Refill request should be submitted to your pharmacy. They will contact clinic for approval.

## 2023-06-19 NOTE — TELEPHONE ENCOUNTER
Called and spoke with Pt who had a question around best way to request Abx for Dental visits going forward. Told Pt that we prefer them to ask for Abx around 2 weeks before their dental visits, and this can be done like any other medication request via [employer]. All other questions were answered at this time.    Giovanny AZAR ATC

## 2023-06-19 NOTE — PROGRESS NOTES
Chief complaint:  Latoya is a 72 year old female seen in follow up of type 1 Diabetes.  HISTORY OF PRESENT ILLNESS  Latoya is a 72-year-old retiree with a history of type 1 diabetes who has a visit today for a follow-up type 1 diabetes.  She was diagnosed with type 1 diabetes 2016 during routine physical where she was found to have high blood glucose levels.  At that time she was diagnosed and treated as a type II diabetic but it was later determined that she had positive TORRES antibodies and low C-peptide.      Interval history  She underwent hip replacement in January 2023. It went well.    Regarding type 1 diabetes, current regimen consist of Lantus 18 units at bedtime, Novolog 1:20 g with breakfast, 1 unit per 15 gram at lunch, and 1 unit per 15 gram at dinner, Novolog correction 1:50 if BG is >150 at meal time, Novolog correction 1:50 if BG is >200 at bedtime    A1c is up to 8.6% today. She is currently using Dexcom G6 to track glucose levels. Reviewed BG in the past 4 weeks, sensor usage 97%.  Average glucose 204 (SD 71).  Time in range 38%.  Time above range 60%, time below range 1%.  She usually has high blood glucose after meals.  Fasting blood glucose acceptable.  She eats 3 meals per day without snack between meals or before bedtime. She feels that her carb counting is quite accurate  She is more active after hip replacement.  She eats mostly protein and is very consistent meal daily.     Patient has dyspnea with high risk of fracture, her mother had history of osteoporosis. DXA 9/6/2022 showed the T score -2 at the lumbar spine, T score -1.6 at the left femoral neck, -1.2 at the right femoral neck and -1.1 at the wrist.  Started on Fosamax 70 mg weekly since April 2023 due to high FRAX.    Past Medical History  Type 1 diabetes, diagnosed 6/2016  Hyperlipidemia  Osteoporosis     Medications  Current Outpatient Medications   Medication Sig Dispense Refill     acetaminophen (TYLENOL) 325 MG tablet Take 2  tablets (650 mg) by mouth every 4 hours as needed for other 60 tablet 0     alendronate (FOSAMAX) 70 MG tablet Take 1 tablet (70 mg) by mouth every 7 days 12 tablet 3     atorvastatin (LIPITOR) 10 MG tablet Take 1 tablet (10 mg) by mouth daily 90 tablet 3     calcium carbonate-vitamin D 500-400 MG-UNIT TABS tablt Take 1 tablet by mouth 2 times daily 180 tablet 3     cephALEXin (KEFLEX) 500 MG capsule Take 1 capsule (500 mg) by mouth 4 times daily for 7 days 28 capsule 0     conjugated estrogens (PREMARIN) 0.625 MG/GM vaginal cream Place 0.5 g vaginally twice a week 30 g 3     Continuous Blood Gluc Sensor (DEXCOM G7 SENSOR) MISC 1 each every 10 days 9 each 3     ELIQUIS ANTICOAGULANT 5 MG tablet TAKE ONE TABLET BY MOUTH TWICE A DAY RESUME THE DAY FOLLOWING YOU PROCEDURE 180 tablet 1     estradiol (ESTRACE) 0.1 MG/GM vaginal cream INSERT 2G VAGINALLY TWICE PER WEEK 42.5 g 11     hydrOXYzine (ATARAX) 25 MG tablet Take 1 tablet (25 mg) by mouth every 6 hours as needed for itching 40 tablet 0     insulin aspart (NOVOLOG FLEXPEN) 100 UNIT/ML pen 1 unit per 20 gram of carb for breakfast, 1 unit per 15 grams of carbs for lunch and dinner. Plus 1 unit per 50 mg/dl above 150. Total daily dose approx 30 units. 30 mL 3     insulin glargine (BASAGLAR KWIKPEN) 100 UNIT/ML pen Inject 18 Units Subcutaneous daily Max, 35 units daily 45 mL 1     insulin pen needle (B-D U/F) 31G X 8 MM miscellaneous USE 5 TIMES DAILY  WITH NOVOLOG AND LANTUS 500 each 3     melatonin 3 MG tablet Take 1 tablet (3 mg) by mouth nightly as needed for sleep 30 tablet 0     metoprolol succinate ER (TOPROL XL) 50 MG 24 hr tablet Take 1 tablet (50 mg) by mouth daily 90 tablet 3     Multiple Vitamins-Minerals (MULTIVITAMIN ADULT PO) Take 1 tablet by mouth every morning        Urine Glucose-Ketones Test (KETO-DIASTIX) STRP 1 strip by In Vitro route as needed 1 each 11     Allergies  No Known Allergies    Family History  family history includes Brain Cancer in  "her maternal uncle; Breast Cancer in her mother; Coronary Artery Disease in her maternal uncle and maternal uncle; Hyperlipidemia in her mother; Leukemia in her father; Other Cancer in her father; Skin Cancer in her father; Stomach Cancer in her maternal aunt.  No thyroid disease or type 1 diabetes in the family    Social History  Social History     Socioeconomic History     Marital status: Single     Spouse name: Not on file     Number of children: Not on file     Years of education: Not on file     Highest education level: Not on file   Occupational History     Not on file   Tobacco Use     Smoking status: Never     Smokeless tobacco: Never   Vaping Use     Vaping status: Never Used   Substance and Sexual Activity     Alcohol use: Yes     Comment: 1 to 2 beers or glasses of wine 1 to 2 times per month     Drug use: No     Sexual activity: Not Currently     Partners: Female     Birth control/protection: Post-menopausal   Other Topics Concern     Parent/sibling w/ CABG, MI or angioplasty before 65F 55M? No   Social History Narrative     Not on file     Social Determinants of Health     Financial Resource Strain: Not on file   Food Insecurity: Not on file   Transportation Needs: Not on file   Physical Activity: Not on file   Stress: Not on file   Social Connections: Not on file   Intimate Partner Violence: Not on file   Housing Stability: Not on file       REVIEW OF SYSTEMS  10 points ROS were negative otherwise mentioned in HPI    Physical Exam  Vital signs:   /69 (BP Location: Left arm, Patient Position: Sitting, Cuff Size: Adult Large)   Pulse 89   Wt 73.6 kg (162 lb 3.2 oz)   LMP  (LMP Unknown)   SpO2 97%   BMI 26.99 kg/m    Estimated body mass index is 27.29 kg/m  as calculated from the following:    Height as of 6/12/23: 1.651 m (5' 5\").    Weight as of 6/12/23: 74.4 kg (164 lb).  Constitutional: no distress, comfortable, pleasant   Eyes: anicteric  Musculoskeletal:+2 edema after a fall  Skin:  no " jaundice   Neurological: normal gait, intact sensation per monofilament bilaterally exam dated 10/24/2022, no tremor on outstretched hands bilaterally  Psychological: appropriate mood       DATA REVIEW    Lab Results   Component Value Date    A1C 8.6 06/19/2023    A1C 6.9 12/23/2022    A1C 8.0 09/06/2022    A1C 7.9 05/02/2022    A1C 7.6 11/15/2021    A1C 6.7 07/01/2021    A1C 8.4 02/10/2021       ENDO DIABETES Latest Ref Rng & Units 9/6/2022   CHOL <200 mg/dL 185   LDL <=100 mg/dL 104 (H)   HDL >=50 mg/dL 61   NHDL <130 mg/dL 124   TRIGLYCERIDES <150 mg/dL 101     ENDO DIABETES Latest Ref Rng & Units 9/9/2022   CREATININE 0.52 - 1.04 mg/dL 0.74     ENDO DIABETES Latest Ref Rng & Units 9/6/2022   MICROL mg/L 14   UMALCR 0.00 - 25.00 mg/g Cr 15.73     DXA 8/28/2020  Lumbar spine T-score in region of L2-L4 = -2.4   L1-4 percent change: Not significant%      HIPS:  Mean total hip T-score: -1  Mean total hip percent change: Not significant%      Left femoral neck T-score = -1.9  Right femoral neck T-score = -1.6      Radius 33% T-score = -1.6     FRAX:  10 year probability of major osteoporotic fracture: 11.3%  10 year probability of hip fracture: 2.1%  The 10 year probability of fracture may be lower than reported if the  patient has received treatment. FRAX data should be disregarded in patient's taking bisphosphonates.                                                                 IMPRESSION:    Low bone mass (osteopenia).    DXA 9/6/2022  Lumbar spine T-score in region of L1-L4 = -2   L1-4 percent change: 3.7%      HIPS:  Mean total hip T-score: -1.1  Mean total hip percent change: Not significant%      Left femoral neck T-score = -1.6  Right femoral neck T-score = -1.2      Radius 33% T-score = -1.1  Radius 33% percent change: Not significant%      FRAX:  10 year probability of major osteoporotic fracture: 17.2%  10 year probability of hip fracture: 5.4%  The 10 year probability of fracture may be lower than  reported if the patient has received treatment. FRAX data should be disregarded in patient's taking bisphosphonates.                                                                    IMPRESSION:    Low bone mass (osteopenia).       ASSESSMENT/PLAN:   Latoya is a 72-year-old retiree with a history of type 1 diabetes who is here for T1 diabetes management.    1.  Type 1 diabetes: A1c up to 8.6 today. Latoya's blood glucose readings over the last 4 weeks through Dexcom showed frequent high glucoses after meals. Reported her carb counting is pretty good.    Recommendations:  -Increase Lantus to 20 units at bedtime  -Change Novolog 1:15 gram of carb coverage with breakfast and 1:13 gram at lunch and continue 1:13 gram at dinner and 1:20 for snack  -Continue 1:50 Novolog correction if over 150 at breakfast, lunch and dinner, continue 1:50 Novolog correction if BG is >200 at bedtime      2) DM complications:  Retinopathy: normal exam - no DR 6/2022 -- she has an appointment in July   Nephropathy: negative urine microalbumin 9/2022  Neuropathy: none per exam 10/2022     3) Hyperlipidemia: , HDL 61, , total cholesterol 185 on 9/2022. Continue Lipitor 10 mg daily.    4) Osteopenia: DXA 9/6/2022 showed the T score -2 at the lumbar spine, T score -1.6 at the left femoral neck, -1.2 at the right femoral neck and -1.1 at the wrist.    10 year probability of major osteoporotic fracture: 17.2%  10 year probability of hip fracture: 5.4%    She is indicated for considering bisphosphonates to reduce fracture risk.  Started on Fosamax 70 mg weekly since April 2023.  Continue calcium and vitamin D    - fasting labs in the fall  -Follow-up with CDE in 2 weeks  -Follow-up in 2 months with PA  -Follow-up 6 months with MD    External notes/medical records independently reviewed, labs and imaging independently reviewed, medical management and tests to be discussed/communicated to patient.    Time: I spent  40 minutes spent on the  date of the encounter preparing to see patient (including chart review and preparation), obtaining and or reviewing additional medical history, performing a physical exam and evaluation, documenting clinical information in the electronic health record, independently interpreting results, communicating results to the patient and coordinating care.    Edward Aguero MD  Division of Diabetes and Endocrinology  Department of Medicine

## 2023-06-20 ENCOUNTER — OFFICE VISIT (OUTPATIENT)
Dept: FAMILY MEDICINE | Facility: CLINIC | Age: 73
End: 2023-06-20
Payer: COMMERCIAL

## 2023-06-20 VITALS
HEART RATE: 84 BPM | TEMPERATURE: 100.2 F | RESPIRATION RATE: 18 BRPM | DIASTOLIC BLOOD PRESSURE: 62 MMHG | BODY MASS INDEX: 26.41 KG/M2 | SYSTOLIC BLOOD PRESSURE: 103 MMHG | HEIGHT: 65 IN | OXYGEN SATURATION: 98 % | WEIGHT: 158.5 LBS

## 2023-06-20 DIAGNOSIS — I72.4 ANEURYSM OF ARTERY OF LOWER EXTREMITY (H): ICD-10-CM

## 2023-06-20 DIAGNOSIS — E10.9 TYPE 1 DIABETES MELLITUS WITHOUT COMPLICATION (H): ICD-10-CM

## 2023-06-20 DIAGNOSIS — L03.116 CELLULITIS OF LEFT LOWER EXTREMITY: Primary | ICD-10-CM

## 2023-06-20 PROCEDURE — 99214 OFFICE O/P EST MOD 30 MIN: CPT | Performed by: NURSE PRACTITIONER

## 2023-06-20 RX ORDER — AMOXICILLIN 500 MG/1
CAPSULE ORAL
Qty: 4 CAPSULE | Refills: 0 | Status: SHIPPED | OUTPATIENT
Start: 2023-06-20 | End: 2023-11-30

## 2023-06-20 RX ORDER — CEPHALEXIN 500 MG/1
500 CAPSULE ORAL 4 TIMES DAILY
Qty: 20 CAPSULE | Refills: 0 | Status: SHIPPED | OUTPATIENT
Start: 2023-06-20 | End: 2023-06-25

## 2023-06-20 ASSESSMENT — PAIN SCALES - GENERAL: PAINLEVEL: NO PAIN (0)

## 2023-06-20 NOTE — PATIENT INSTRUCTIONS
PLAN:   1.   Symptomatic therapy suggested: elevation of affected area and call prn if symptoms persist or worsen.  Will extend antibiotic for another 5 days  2.  Orders Placed This Encounter   Medications    cephALEXin (KEFLEX) 500 MG capsule     Sig: Take 1 capsule (500 mg) by mouth 4 times daily for 5 days     Dispense:  20 capsule     Refill:  0     3. Patient needs to follow up in if no improvement,or sooner if worsening of symptoms or other symptoms develop.

## 2023-06-20 NOTE — PROGRESS NOTES
Pablito Klein is a 72 year old, presenting for the following health issues:  Hospital F/U        6/12/2023    10:57 AM   Additional Questions   Roomed by Leroy REES     Plan of care communicated with patient       ED/UC Followup:    Facility:  [facility]   Date of visit: 6/12/2023  Reason for visit: cellulitis left lower leg   Current Status: improved     Was coming into the garage foot caught the rug fell  Hit left on ceramic tile   Did not break the skin    Then about 3 weeks later the left leg started to swell and that when went to Urgent care  Has chronic issue with venous statis   When did you first notice your pain? 4-5 weeks    Have you seen anyone else for your pain? Yes -   How has your pain affected your ability to work? Not currently working - unrelated to pain  Where in your body do you have pain? 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)     Type 1 diabetes mellitus with hyperglycemia (H)     Age-related osteoporosis without current pathological fracture     Stress incontinence     Atrophic vaginitis     Intrinsic sphincter deficiency     Overactive bladder     Carpal tunnel syndrome of right wrist     Chronic atrial fibrillation (H)     Osteoarthritis of carpometacarpal (CMC) joint of both thumbs     Thumb pain     Post-operative nausea and vomiting     Type 1 diabetes mellitus without complication (H)     Osteoarthritis of right hip, unspecified osteoarthritis type     Past Surgical History:   Procedure Laterality Date     ANESTHESIA  CARDIOVERSION N/A 9/23/2021    Procedure: ANESTHESIA, FOR CARDIOVERSION@1100;  Surgeon: GENERIC ANESTHESIA PROVIDER;  Location: [facility] OR     ANESTHESIA OUT OF OR MRI N/A 9/14/2022    Procedure: ANESTHESIA OUT OF OR Magnetic resonance imaging right hip and lumbar @0800;  Surgeon: GENERIC ANESTHESIA PROVIDER;  Location: [facility] OR     ARTHROPLASTY HIP Right 1/18/2023    Procedure: ARTHROPLASTY, HIP, TOTAL RIGHT;  Surgeon: Arnaud Ward MD;  Location: [facility] OR     CYSTOSCOPY, INJECT COLLAGEN, COMBINED N/A 5/11/2021    Procedure: CYSTOSCOPY, WITH PERIURETHRAL BULKING AGENT INJECTION;  Surgeon: Alberto Zhang MD;  Location: [facility] OR     CYSTOSCOPY, INJECT COLLAGEN, COMBINED N/A 9/12/2022    Procedure: CYSTOSCOPY, WITH PERIURETHRAL BULKING AGENT INJECTION (look in the bladder and urethra and inject filler into the urethra);  Surgeon: Alberto Zhang MD;  Location: [facility] OR     EYE SURGERY  9/04, 5/11    Retinal detachment, Cataract (both eyes)     IMPLANT STIMULATOR AND LEADS SACRAL NERVE (STAGE ONE AND TWO) Right 7/5/2022    Procedure: INSERTION, SACRAL NERVE STIMULATOR, STAGE 1 & 2 (permanent implant on the RIGHT side with [employer]);  Surgeon: Alberto Zhang MD;  Location: [facility] OR     IMPLANT STIMULATOR SACRAL NERVE PERCUTANEOUS TRIAL N/A 6/14/2022    Procedure: INSERTION, NEUROSTIMULATOR, SACRAL, PERCUTANEOUS, FOR TRIAL;  Surgeon: Alberto Zhang MD;  Location: [facility] OR     RELEASE CARPAL TUNNEL Right 8/6/2021    Procedure: RELEASE, CARPAL TUNNEL, Right;  Surgeon: RADHA Sandoval MD;  Location: [facility] OR     RELEASE TRIGGER FINGER Bilateral 5/10/2019    Procedure: RELEASE TRIGGER FINGER, BILATERAL LONG AND RING FINGERS;  Surgeon: RADHA Sandoval MD;  Location: [facility] OR     VASCULAR SURGERY      Varicose Veins       Social History     Tobacco Use     Smoking status: Never     Smokeless tobacco: Never   Substance Use Topics     Alcohol use: Yes     Comment: 1 to 2 beers or glasses of wine 1 to 2 times per month      Family History   Problem Relation Age of Onset     Hyperlipidemia Mother      Breast Cancer Mother      Other Cancer Father      Leukemia Father      Skin Cancer Father      Coronary Artery Disease Maternal Uncle      Brain Cancer Maternal Uncle      Coronary Artery Disease Maternal Uncle      Stomach Cancer Maternal Aunt      Diabetes No family hx of      Hypertension No family hx of      Cerebrovascular Disease No family hx of      Colon Cancer No family hx of      Thyroid Disease No family hx of      Depression No family hx of      Anxiety Disorder No family hx of          Current Outpatient Medications   Medication Sig Dispense Refill     acetaminophen (TYLENOL) 325 MG tablet Take 2 tablets (650 mg) by mouth every 4 hours as needed for other 60 tablet 0     alendronate (FOSAMAX) 70 MG tablet Take 1 tablet (70 mg) by mouth every 7 days 12 tablet 3     atorvastatin (LIPITOR) 10 MG tablet Take 1 tablet (10 mg) by mouth daily 90 tablet 3     calcium carbonate-vitamin D 500-400 MG-UNIT TABS tablt Take 1 tablet by mouth 2 times daily 180 tablet 3     conjugated estrogens (PREMARIN) 0.625 MG/GM vaginal cream Place 0.5 g vaginally twice a week 30 g 3     Continuous Blood Gluc Sensor (DEXCOM G7 SENSOR) MISC 1 each every 10 days 9 each 3     estradiol (ESTRACE) 0.1 MG/GM vaginal cream INSERT 2G VAGINALLY TWICE PER WEEK 42.5 g 11     insulin aspart (NOVOLOG FLEXPEN) 100 UNIT/ML pen 1 unit per 20 gram of carb for breakfast, 1 unit per 15 grams of carbs for lunch and dinner. Plus 1 unit per 50 mg/dl above 150. Total daily dose approx 30 units. 30 mL 3     insulin glargine (BASAGLAR KWIKPEN) 100 UNIT/ML pen Inject 18 Units Subcutaneous daily Max, 35 units daily 45 mL 1     insulin pen needle (B-D U/F) 31G X 8 MM miscellaneous USE 5 TIMES DAILY  WITH NOVOLOG AND LANTUS 500 each 3     melatonin 3 MG tablet Take 1 tablet (3 mg) by mouth nightly as needed for sleep 30 tablet 0     metoprolol succinate ER (TOPROL XL) 50 MG 24  hr tablet Take 1 tablet (50 mg) by mouth daily 90 tablet 3     Multiple Vitamins-Minerals (MULTIVITAMIN ADULT PO) Take 1 tablet by mouth every morning        amoxicillin (AMOXIL) 500 MG capsule Take 4 caps (2000mg) 30-60 minutes before dental procedure 4 capsule 0     ELIQUIS ANTICOAGULANT 5 MG tablet TAKE ONE TABLET BY MOUTH TWICE A DAY RESUME THE DAY FOLLOWING YOU PROCEDURE (Patient not taking: Reported on 6/20/2023) 180 tablet 1     hydrOXYzine (ATARAX) 25 MG tablet Take 1 tablet (25 mg) by mouth every 6 hours as needed for itching (Patient not taking: Reported on 6/19/2023) 40 tablet 0     Urine Glucose-Ketones Test (KETO-DIASTIX) STRP 1 strip by In Vitro route as needed (Patient not taking: Reported on 6/20/2023) 1 each 11     No Known Allergies  Recent Labs   Lab Test 06/19/23  0000 01/27/23  1254 01/23/23  1305 01/22/23  0738 01/19/23  1034 12/23/22  0924 09/09/22  1254 09/06/22  0928 09/23/21  0939 08/05/21  1019 07/01/21  1054 10/21/20  1251 07/20/20  0817   0000   A1C 8.6*  --   --   --   --  6.9*  --  8.0*   < >  --   --    < > 7.8*  --    LDL  --   --   --   --   --   --   --  104*  --   --  90  --  107*  --    HDL  --   --   --   --   --   --   --  61  --   --  70  --  71  --    TRIG  --   --   --   --   --   --   --  101  --   --  58  --  56  --    ALT  --  14 21 14   < >  --    < >  --   --  40  --   --   --   --    CR  --  0.60 0.56 0.60   < > 0.71   < > 0.69   < > 0.76 0.74  --  0.67   < >   GFRESTIMATED  --  >90 >90 >90   < > 90   < > >90   < > 80 82  --  89   < >   GFRESTBLACK  --   --   --   --   --   --   --   --   --   --  >90  --  >90  --    POTASSIUM  --  4.5 4.1 3.8   < > 4.4   < >  --    < > 4.2  --   --   --   --    TSH  --   --   --   --   --   --   --  1.27  --  1.42  --   --   --   --     < > = values in this interval not displayed.      Review of Systems   Constitutional, HEENT, cardiovascular, pulmonary, gi and gu systems are negative, except as otherwise noted.      Objective    BP  "103/62 (BP Location: Right arm, Patient Position: Sitting, Cuff Size: Adult Regular)   Pulse 84   Temp 100.2  F (37.9  C) (Oral)   Resp 18   Ht 1.651 m (5' 5\")   Wt 71.9 kg (158 lb 8 oz)   LMP  (LMP Unknown)   SpO2 98%   BMI 26.38 kg/m    Body mass index is 26.38 kg/m .  Physical Exam   GENERAL: Patient is well nourished, well developed,in no apparent distress, non-toxic and in no respiratory distress and acyanotic  EYES: Eyes grossly normal to inspection  HENT:ear canals and TM's normal and nose and mouth without ulcers or lesions   NECK:normal and supple  CARDIAC:regular rates and rhythm and no murmur, click or rub  RESP: normal respiratory rate and rhythm, lungs clear to auscultation  unlabored respirations, no intercostal retractions or accessory muscle use  ABD:soft, nontender  SKIN:   Area just below left knee with mild redness and minimal redness, her ankle area is still mildly swtollen and tender to palpation   MS: gait normal and normal muscle tone  NEURO: mentation intact and speech normal  PSYCH: Alert, oriented, thought content appropriate,mentation appears normal., affect and mood normal    Assessment & Plan     Cellulitis of left lower extremity  Elevate and take antibiotics as directed.  If new, worsening or persistent symptoms call or return for a recheck.  Continue current medications as prescribed.   - cephALEXin (KEFLEX) 500 MG capsule  Dispense: 20 capsule; Refill: 0  Patient needs to follow up in if no improvement,or sooner if worsening of symptoms or other symptoms develop.  Follow up in 1 week if not improving.        Type 1 diabetes mellitus without complication (H)  Hemoglobin A1C   Date Value Ref Range Status   06/19/2023 8.6 (A) 0.0 - 5.7 % Final   foot care discussed and Podiatry visits discussed, annual eye examinations at Ophthalmology discussed, glycohemoglobin monitoring discussed and long term diabetic complications discussed  No changes in current regimen  Attempt to improve " "diet  Weight loss advised  FOLLOW UP WITH SPECIALIST :Endocrinology      Ordering of each unique test  Prescription drug management   Time spent by me doing chart review, history and exam, documentation and further activities per the note     MED REC REQUIRED  Post Medication Reconciliation Status: discharge medications reconciled, continue medications without change  BMI:   Estimated body mass index is 26.38 kg/m  as calculated from the following:    Height as of this encounter: 1.651 m (5' 5\").    Weight as of this encounter: 71.9 kg (158 lb 8 oz).       See Patient Instructions  Patient Instructions     PLAN:   1.   Symptomatic therapy suggested: elevation of affected area and call prn if symptoms persist or worsen.  Will extend antibiotic for another 5 days  2.  Orders Placed This Encounter   Medications     cephALEXin (KEFLEX) 500 MG capsule     Sig: Take 1 capsule (500 mg) by mouth 4 times daily for 5 days     Dispense:  20 capsule     Refill:  0     3. Patient needs to follow up in if no improvement,or sooner if worsening of symptoms or other symptoms develop.      URVASHI [facility]              "

## 2023-06-27 PROBLEM — M00.9: Status: ACTIVE | Noted: 2023-06-27

## 2023-06-27 PROBLEM — M00.9: Status: RESOLVED | Noted: 2023-06-27 | Resolved: 2023-06-27

## 2023-06-27 PROBLEM — I72.4 ANEURYSM OF ARTERY OF LOWER EXTREMITY (H): Status: ACTIVE | Noted: 2023-06-27

## 2023-06-29 ENCOUNTER — TELEPHONE (OUTPATIENT)
Dept: EDUCATION SERVICES | Facility: CLINIC | Age: 73
End: 2023-06-29
Payer: COMMERCIAL

## 2023-06-29 NOTE — TELEPHONE ENCOUNTER
See telephone encounter, pt called in reporting low blood sugars.  She saw Dr.Tasma Aguero recently and she increased her Lantus by 2 units and increased her carb ratio at all meal.  It appears she is having low blood sugars after meals and she is walking her dog in the evening.  I will have her change her ICR at breakfast from 1:15 to now 1:20, lunch and supper ICR from 1:13 to 1:15   I tried reaching Latoya but she did not answer so I left her a detailed message with above instructions.  She is to send me a [employer] message on Monday of next week if still having low blood sugars     Humera Caraballo RN, [facility]

## 2023-06-29 NOTE — TELEPHONE ENCOUNTER
" [facility] Call Center    Phone Message    May a detailed message be left on voicemail: yes     Reason for Call: Other: Per Humera's request, patient is calling to report that she has experienced 2 days of \"low readings\" in the last couple days and would like a call back to discuss.     Action Taken: Other: endo    Travel Screening: Not Applicable                                                                      "

## 2023-07-03 ENCOUNTER — ALLIED HEALTH/NURSE VISIT (OUTPATIENT)
Dept: EDUCATION SERVICES | Facility: CLINIC | Age: 73
End: 2023-07-03
Payer: COMMERCIAL

## 2023-07-03 DIAGNOSIS — E10.9 TYPE 1 DIABETES MELLITUS WITHOUT COMPLICATION (H): Primary | ICD-10-CM

## 2023-07-03 PROCEDURE — G0108 DIAB MANAGE TRN  PER INDIV: HCPCS

## 2023-07-03 NOTE — PROGRESS NOTES
DIABETES SELF MANAGEMENT EDUCATION: Previous Diagnosis/Individual Diabetes Review    Latoya Rodríguez presents today for modification of medication(s) and download of continuous glucose monitoring related to Type-1 Diabetes Mellitus   She is accompanied by self    Year of diagnosis: 2016  Referring provider:  Dr.Tasma Aguero     Past Diabetes Education: Yes  Support system: children  Living Situation: Latoya lives alone. She has 2 daughters and 2 grandsons.  Employment: retired     DIABETES RELATED CONCERNS/COMMENTS: Dexcom G7 clarity review and insulin adjustments. She is still having some low blood sugars mainly between 8-9 pm. It takes about an hour for her blood sugars to rise after a low so at that point she has had 3-4 carb choices and then blood sugars run high later.     She applied and is able to get patient assistance for both of her insulins.    Patient's emotional response to diabetes: expresses readiness to learn    ASSESSMENT:  Patient Problem List and Medication List reviewed for relevant medical history, current medical status, and diabetes risk factors.    MEDICATION:    Current Diabetes Management per Patient:  Taking diabetes medications?  Basaglar 20 units at bedtime   Novolog 1:20 grams at breakfast, 1:15 at lunch and 1:15 at supper plus correction at all meals of 1 per 50 over 150 mg/dl.    MONITORING:  Patient glucose self monitoring as follows: continuously using a continuous glucose monitor (CGM)  BG meter: Dexcom G7 continuous glucose monitor   BG results: see blood glucose log attached to this encounter   Blood sugar data:            Patient's most recent   Lab Results   Component Value Date    A1C 6.9 12/23/2022    A1C 7.9 05/02/2022      Patient's A1C goal: <8.0    PHYSICAL ACTIVITY:  not assessed    NUTRITION:  Patient eats 3 meals per day  Beverages:   Cultural/Sikhism diet restrictions: No   Biggest Challenge to Healthy Eating: dosing for  meals    Diabetes knowledge and skills assessment:   Barriers to Learning Assessment: No Barriers identified    Problem Solving:    Patient is at risk of hypoglycemia?: YES  Hospitalizations for hyper or hypoglycemia: No    Healthy Coping and Stress Management:   Sources of stress identified by patient My health    EDUCATION and INSTRUCTION PROVIDED AT THIS VISIT:    -Target blood glucose for pre-meal and 2 hour post-prandial glucose   -Action, timing and potential side-effects of Basaglar and Novolog diabetes medications:     Pt verbalized understanding of concepts discussed and recommendations provided today.       PLAN:  We will decrease her Novolog at lunch and supper from 1:15 to 1:17 grams. She will continue the breakfast at 1:20.  We will decrease her carb ratio at supper if she is walking the dog after supper from 1:17 to 1:20.  No change to the correction scale at all meals.  I discussed with Dr.Tasma Aguero and she said to switch her to Tresiba if it is covered at her next visit.   We can try 18 units of Tresiba to replace Lantus 20 units.    She is having some connection issues with the Dexcom G6 continuous glucose monitor so we reviewed troubleshooting issues: power down phone and back on, go into settings and toggle off and then on bluetooth on the Dexcom G6 setting. Wipe the back of the transmitter with alcohol before placing the transmitter on the next sensor every 10 days.    See patient instructions/AVS    FOLLOW-UP:    Next appointment with me on July 10th.  She is to call me or AppliLog message me for 2 or more low blood sugars less than 70 in one week.    Time Spent: 30  minutes  Encounter Type: Individual    Any diabetes medication dose changes were made via the CDE Protocol and Collaborative Practice Agreement [facility] [place] [facility]  A copy of this encounter was provided to patient's referring provider.    Humera Caraballo RN, [facility]

## 2023-07-13 ENCOUNTER — ALLIED HEALTH/NURSE VISIT (OUTPATIENT)
Dept: EDUCATION SERVICES | Facility: CLINIC | Age: 73
End: 2023-07-13
Payer: COMMERCIAL

## 2023-07-13 DIAGNOSIS — E10.9 TYPE 1 DIABETES MELLITUS WITHOUT COMPLICATION (H): Primary | ICD-10-CM

## 2023-07-13 PROCEDURE — 98968 PH1 ASSMT&MGMT NQHP 21-30: CPT | Mod: 95

## 2023-07-13 NOTE — PROGRESS NOTES
DIABETES SELF MANAGEMENT EDUCATION: Previous Diagnosis/Individual Diabetes Review    Latoya Rodríguez presents today for modification of medication(s) and download of continuous glucose monitoring related to Type-1 Diabetes Mellitus   She is accompanied by self    Year of diagnosis: 2016  Referring provider:  Dr.Tasma Aguero     Past Diabetes Education: Yes  Support system: children  Living Situation: Latoya lives alone. She has 2 daughters and 2 grandsons.  Employment: retired     DIABETES RELATED CONCERNS/COMMENTS: Dexcom G7 clarity review and insulin adjustments.   She applied and is able to get patient assistance for both of her insulins. The plan was to switch her Lantus to Tresiba but she said she just got a 3 month supply of the Lantus so she did not want to switch now.  We were able to finally get rid of her low blood sugars. She was having several lows before starting the Dexcom G7 continuous glucose monitor. She was not in favor of the continuous glucose monitor but Dr.Tasma Aguero convinced her to try it and it's been a very helpful tool for her. She tends to get down around 140 and then without little warning her blood sugars can drop fast and it takes forever to get her sugars back up without going high again. She is not interested in pump therapy. It took a lot of coaching for her to try the continuous glucose monitor.    Patient's emotional response to diabetes: expresses readiness to learn    ASSESSMENT:  Patient Problem List and Medication List reviewed for relevant medical history, current medical status, and diabetes risk factors.    MEDICATION:    Current Diabetes Management per Patient:  Taking diabetes medications?  Basaglar 20 units at bedtime   Novolog 1:20 grams at breakfast, 1:17 at lunch and 1:17 at supper plus correction at all meals of 1 per 50 over 150 mg/dl.    MONITORING:  Patient glucose self monitoring as follows: continuously using a  continuous glucose monitor (CGM)  BG meter: Dexcom G7 continuous glucose monitor   BG results: see blood glucose log attached to this encounter   Blood sugar data:            Patient's most recent   Lab Results   Component Value Date    A1C 6.9 12/23/2022    A1C 7.9 05/02/2022      Patient's A1C goal: <8.0    PHYSICAL ACTIVITY:  not assessed    NUTRITION:  Patient eats 3 meals per day  Beverages:   Cultural/Sikhism diet restrictions: No   Biggest Challenge to Healthy Eating: dosing for meals    Diabetes knowledge and skills assessment:   Barriers to Learning Assessment: No Barriers identified    Problem Solving:    Patient is at risk of hypoglycemia?: YES  Hospitalizations for hyper or hypoglycemia: No    Healthy Coping and Stress Management:   Sources of stress identified by patient My health    EDUCATION and INSTRUCTION PROVIDED AT THIS VISIT:    -Target blood glucose for pre-meal and 2 hour post-prandial glucose   -Action, timing and potential side-effects of Basaglar and Novolog diabetes medications:     Pt verbalized understanding of concepts discussed and recommendations provided today.       PLAN:  We will continue her Novolog at lunch and supper at 1:17 grams. She will continue the breakfast at 1:20.  We will continue her carb ratio at supper if she is walking the dog after supper from 1:17 to 1:20.  We will hold off on the Tresiba for now as she recently got a 3 month supply of Lantus. She will increase the Lantus from 20 to now taking 22 units. If blood sugars continue to stay high during the night after one week she can increase the Lantus from 22 to 24 units.  She is having some connection issues with the Dexcom G6 continuous glucose monitor so we reviewed troubleshooting issues: power down phone and back on, go into settings and toggle off and then on bluetooth on the Dexcom G6 setting. Wipe the back of the transmitter with alcohol before placing the transmitter on the next sensor every 10  days.    See patient instructions/AVS    FOLLOW-UP:    PRN, she is seeing Sarah Palm PA-C the end of Aug. They can discuss switching to Tresiba at this time.  She is to call me or MyChart message me for 2 or more low blood sugars less than 70 in one week.    Time Spent: 25  minutes  Encounter Type: Individual    Any diabetes medication dose changes were made via the CDE Protocol and Collaborative Practice Agreement with [place] and  [facility].  A copy of this encounter was provided to patient's referring provider.    Humera Caraballo RN, [facility]

## 2023-07-19 DIAGNOSIS — E78.5 HYPERLIPIDEMIA WITH TARGET LDL LESS THAN 100: ICD-10-CM

## 2023-07-21 RX ORDER — ATORVASTATIN CALCIUM 10 MG/1
10 TABLET, FILM COATED ORAL DAILY
Qty: 90 TABLET | Refills: 3 | Status: SHIPPED | OUTPATIENT
Start: 2023-07-21 | End: 2023-11-14

## 2023-07-21 NOTE — TELEPHONE ENCOUNTER
ATORVASTATIN CALCIUM 10MG TABS    Office Visit    6/19/2023  [facility]   Edward Aguero MD  Endocrinology, Diabetes, and Metabolism

## 2023-07-24 ENCOUNTER — THERAPY VISIT (OUTPATIENT)
Dept: OCCUPATIONAL THERAPY | Facility: CLINIC | Age: 73
End: 2023-07-24
Attending: STUDENT IN AN ORGANIZED HEALTH CARE EDUCATION/TRAINING PROGRAM
Payer: COMMERCIAL

## 2023-07-24 DIAGNOSIS — I89.0 LYMPHEDEMA: Primary | ICD-10-CM

## 2023-07-24 PROCEDURE — 97140 MANUAL THERAPY 1/> REGIONS: CPT | Mod: GO

## 2023-07-24 PROCEDURE — 97165 OT EVAL LOW COMPLEX 30 MIN: CPT | Mod: GO

## 2023-07-24 NOTE — PROGRESS NOTES
OCCUPATIONAL THERAPY EVALUATION  Type of Visit: Evaluation    See electronic medical record for Abuse and Falls Screening details.    Subjective      Presenting condition or subjective complaint:  left leg swelling  Date of onset: 06/12/23    Relevant medical history:   pt with a fall at the end of May onto her left knee. Initially there was no injury but a week or so later her knee and calf started to swell and turn black and blue. Pt went to urgent care with all imaging being negative, hematoma attributed to pt being on blood thinners.   Dates & types of surgery: 1/18/2023 hip replacement    Prior diagnostic imaging/testing results: CT scan; X-ray     Prior therapy history for the same diagnosis, illness or injury: No      Prior Level of Function  Transfers: Independent  Ambulation: Independent  ADL: Independent  IADL: Driving, Finances, Housekeeping, Laundry, Meal preparation, Medication management    Living Environment  Social support: Alone   Type of home: Multi-level   Stairs to enter the home: Yes 1 Is there a railing: Yes   Ramp: No   Stairs inside the home: Yes 12 Is there a railing: Yes   Help at home: None  Equipment owned:       Employment: No    Hobbies/Interests: yard work, gardening, walking/playing with dog    Patient goals for therapy:      Pain assessment:  Pt reports ongoing tightness in the skin around the L ankle, mikala with walking up stairs     Objective       EDEMA EVALUATION  Additional history:  Body part affected by edema: left leg  If cancer related, treatment:    If not cancer related, problems with veins or cause of swelling:    Distance able to walk: 5 miles  Time able to stand:    Sensation problems in hands/feet: No    Edema etiology: Trauma    FUNCTIONAL SCALES  Lower Extremity Functional Scale (score out of 80). A lower score indicates greater impairment:    Shoulder Pain and Disability Index (score out of 100).  A higher score indicates greater impairment:      Cognitive Status  Examination  Orientation: Oriented to person, place and time   Level of Consciousness: Alert  Follows Commands and Answers Questions: 100% of the time  Personal Safety and Judgement: Intact  Memory: Intact    EDEMA  Skin Condition:  RLE with plus one pitting edema from ankle to knee crease, skin is loose and pliable. LLE with a pocket of swelling on the inner upper calf which is non pitting. Visible demarcation where the swelling stops and tissue from mid calf down is taut with mild pitting edema.   Scar: No    GIRTH MEASUREMENTS: Refer to separate girth measurement flowsheet.   VOLUME LE  Right LE (mL) 2437   Left LE (mL) 2751   LE Volume Comparison LLE volume greater than RLE volume   % Difference    Head/Neck Volume     Trunk Volume    Genital Volume       RANGE OF MOTION: LE ROM WFL  STRENGTH: UE Strength WFL  ACTIVITIES OF DAILY LIVING: indep, mod I for RLE LB dressing as she had a hip replacement in January and is slowly gaining more ROM  BED MOBILITY: Independent  TRANSFERS: Independent  GAIT/LOCOMOTION: indep with no device  BALANCE: WNL  SENSATION: LE Sensation WNL  VASCULAR:  varicose veins    Assessment & Plan   CLINICAL IMPRESSIONS  Medical Diagnosis: M79.89 (ICD-10-CM) - Left leg swelling    Treatment Diagnosis: LE lymphedema    Impression/Assessment: Pt is a 72 year old female presenting to Occupational Therapy due to LLE lymphedema after a fall that lead to a large L knee/calf hematoma.  The following significant findings have been identified: Impaired mobility, Impaired ROM, and impaired fit of clothes/shoes .  These identified deficits interfere with their ability to perform self care tasks, household mobility, and community mobility as compared to previous level of function.     Clinical Decision Making (Complexity):  Assessment of Occupational Performance: 1-3 Performance Deficits  Occupational Performance Limitations: dressing and risk of progression of symptoms including skin breakdown, wounds  and infection  Clinical Decision Making (Complexity): Low complexity    PLAN OF CARE  Treatment Interventions:  Interventions: Gradient Compression Bandaging, Manual Therapy    Long Term Goals   OT Goal 1  Goal Identifier: Volume  Goal Description: Pt will demonstrate a reduction of at least 200 mL in LLE to improve skin integrity, safety with mobility and fit of clothing/shoes.  Rationale: In order to maximize safety and independence with performance of self-care activities  Target Date: 10/21/23  OT Goal 2  Goal Identifier: Garments  Goal Description: Pt will be fit for and demonstrate independence using new compression garments for long term edema management as evidenced by a no more than 20% increase in volume after transitioning into garments.  Rationale: In order to maximize safety and independence with performance of self-care activities  Target Date: 10/21/23  OT Goal 3  Goal Identifier: Home management  Goal Description: Pt will verbalize understanding of long term management/prevention of edema including following recommended wear schedule for compression garments, manual techniques, skin care and exercise, following recommended management techniques 6/7 days.  Rationale: In order to maximize safety and independence with performance of self-care activities  Target Date: 10/21/23      Frequency of Treatment: 2-3 follow up visits  Duration of Treatment: over next 90 days     Education Assessment: Learner/Method: Patient;Listening;Reading;Demonstration     Risks and benefits of evaluation/treatment have been explained.   Patient/Family/caregiver agrees with Plan of Care.     Evaluation Time:    OT Eval, Low Complexity Minutes (87959): 15    Signing Clinician: Jennifer Nash OT      [facility]                                                                                   OUTPATIENT OCCUPATIONAL THERAPY      PLAN OF TREATMENT FOR OUTPATIENT REHABILITATION   Patient's Last  Name, First Name, Latoya Valdez YOB: 1950   Provider's Name   [facility]   Medical Record No.  [id number]     Onset Date: 06/12/23 Start of Care Date: 07/24/23     Medical Diagnosis:  M79.89 (ICD-10-CM) - Left leg swelling      OT Treatment Diagnosis:  LE lymphedema Plan of Treatment  Frequency/Duration:2-3 follow up visits/over next 90 days    Certification date from 07/24/23   To 10/21/23        See note for plan of treatment details and functional goals     Jennifer Nash OT                         I CERTIFY THE NEED FOR THESE SERVICES FURNISHED UNDER        THIS PLAN OF TREATMENT AND WHILE UNDER MY CARE     (Physician attestation of this document indicates review and certification of the therapy plan).                Referring Provider:  Jennifer LANDIN CNP      Initial Assessment  See Epic Evaluation- 07/24/23

## 2023-07-31 ENCOUNTER — THERAPY VISIT (OUTPATIENT)
Dept: OCCUPATIONAL THERAPY | Facility: CLINIC | Age: 73
End: 2023-07-31
Attending: STUDENT IN AN ORGANIZED HEALTH CARE EDUCATION/TRAINING PROGRAM
Payer: COMMERCIAL

## 2023-07-31 DIAGNOSIS — I89.0 LYMPHEDEMA: Primary | ICD-10-CM

## 2023-07-31 PROCEDURE — 97140 MANUAL THERAPY 1/> REGIONS: CPT | Mod: GO

## 2023-08-04 ENCOUNTER — THERAPY VISIT (OUTPATIENT)
Dept: OCCUPATIONAL THERAPY | Facility: CLINIC | Age: 73
End: 2023-08-04
Attending: STUDENT IN AN ORGANIZED HEALTH CARE EDUCATION/TRAINING PROGRAM
Payer: COMMERCIAL

## 2023-08-04 DIAGNOSIS — I89.0 LYMPHEDEMA: Primary | ICD-10-CM

## 2023-08-04 PROCEDURE — 97140 MANUAL THERAPY 1/> REGIONS: CPT | Mod: GO

## 2023-08-07 ENCOUNTER — THERAPY VISIT (OUTPATIENT)
Dept: OCCUPATIONAL THERAPY | Facility: CLINIC | Age: 73
End: 2023-08-07
Attending: STUDENT IN AN ORGANIZED HEALTH CARE EDUCATION/TRAINING PROGRAM
Payer: COMMERCIAL

## 2023-08-07 DIAGNOSIS — I89.0 LYMPHEDEMA: Primary | ICD-10-CM

## 2023-08-07 PROCEDURE — 97140 MANUAL THERAPY 1/> REGIONS: CPT | Mod: GO

## 2023-08-10 ENCOUNTER — THERAPY VISIT (OUTPATIENT)
Dept: OCCUPATIONAL THERAPY | Facility: CLINIC | Age: 73
End: 2023-08-10
Attending: STUDENT IN AN ORGANIZED HEALTH CARE EDUCATION/TRAINING PROGRAM
Payer: COMMERCIAL

## 2023-08-10 DIAGNOSIS — I89.0 LYMPHEDEMA: Primary | ICD-10-CM

## 2023-08-10 PROCEDURE — 97140 MANUAL THERAPY 1/> REGIONS: CPT | Mod: GO

## 2023-08-14 ENCOUNTER — THERAPY VISIT (OUTPATIENT)
Dept: OCCUPATIONAL THERAPY | Facility: CLINIC | Age: 73
End: 2023-08-14
Attending: STUDENT IN AN ORGANIZED HEALTH CARE EDUCATION/TRAINING PROGRAM
Payer: COMMERCIAL

## 2023-08-14 DIAGNOSIS — I89.0 LYMPHEDEMA: Primary | ICD-10-CM

## 2023-08-14 PROCEDURE — 97140 MANUAL THERAPY 1/> REGIONS: CPT | Mod: GO

## 2023-08-21 ENCOUNTER — THERAPY VISIT (OUTPATIENT)
Dept: OCCUPATIONAL THERAPY | Facility: CLINIC | Age: 73
End: 2023-08-21
Attending: STUDENT IN AN ORGANIZED HEALTH CARE EDUCATION/TRAINING PROGRAM
Payer: COMMERCIAL

## 2023-08-21 DIAGNOSIS — I89.0 LYMPHEDEMA: Primary | ICD-10-CM

## 2023-08-21 PROCEDURE — 97140 MANUAL THERAPY 1/> REGIONS: CPT | Mod: GO

## 2023-08-24 NOTE — PROGRESS NOTES
Outcome for 08/24/23 2:28 PM: Data obtained via Dexcom website-Printed   Nell Pena CMA  Adult Endocrinology  [facility]

## 2023-08-31 ENCOUNTER — OFFICE VISIT (OUTPATIENT)
Dept: ENDOCRINOLOGY | Facility: CLINIC | Age: 73
End: 2023-08-31
Payer: COMMERCIAL

## 2023-08-31 VITALS
DIASTOLIC BLOOD PRESSURE: 72 MMHG | HEART RATE: 98 BPM | BODY MASS INDEX: 27.77 KG/M2 | RESPIRATION RATE: 16 BRPM | OXYGEN SATURATION: 98 % | WEIGHT: 166.9 LBS | SYSTOLIC BLOOD PRESSURE: 133 MMHG

## 2023-08-31 DIAGNOSIS — E10.65 TYPE 1 DIABETES MELLITUS WITH HYPERGLYCEMIA (H): Primary | ICD-10-CM

## 2023-08-31 PROCEDURE — 99214 OFFICE O/P EST MOD 30 MIN: CPT | Performed by: PHYSICIAN ASSISTANT

## 2023-08-31 ASSESSMENT — PAIN SCALES - GENERAL: PAINLEVEL: MILD PAIN (2)

## 2023-08-31 NOTE — LETTER
8/31/2023         RE: Latoya Rodríguez  [address] [place] [address]        Dear Colleague,    Thank you for referring your patient, Latoya Rodríguez, to the [facility]. Please see a copy of my visit note below.    Outcome for 08/24/23 2:28 PM: Data obtained via Dexcom website-Printed   Nell Pena CMA  Adult Endocrinology  [facility]         Assessment/Plan :   Type 1 DM. Latoya is frustrated by glucose fluctuations. We discussed her previous laboratory results and we reviewed her current CGMS report. Latoya does have a few episodes of low blood sugars. She also complains that it takes forever to bring the numbers back up when they drop. I think she may be getting a little too much Lantus. I would like her to decrease the dose back down to 20 unit(s). We are also going to change the time of her Lantus to 8-9 pm, instead of 10 pm. She consistently has a small night time snack before taking Lantus. The snack is not large enough for her to take Novolog but it does lead to a spike in her blood sugars. We have a follow-up scheduled for November. If she has any problems before our follow-up visit, she can reach out to me through [employer].      I have independently reviewed and interpreted labs, imaging as indicated.      Chief complaint:  Latoya is a 72 year old female who returns for follow-up of Type 1 DM.    I have reviewed Care Everywhere including [facility], [facility], [facility], [facility], [facility], [facility] , [facility], [place] lab reports, imaging reports and provider notes as indicated.      HISTORY OF PRESENT ILLNESS  Latoya continues to work on managing her blood sugars. She continues to be frustrated by the constant fluctuations. She feels like her blood sugars are either too high or too low. It is hard for her to find any middle ground. At present time she is taking 22 unit(s) of Lantus at 10 pm and she takes Novolog before meals based  on an insulin to carb ratio of 1:20 with breakfast and 1:17 at lunch and dinner. She also uses a correction of 1 per 50 over 150 mg/dl. She is currently monitoring her blood sugars with the Dexcom G7 CGMS device.    Latoya has had diabetes since 2016 when her blood sugars were found to be elevated during a routine physical. She was initially diagnosed as Type 2 but later the diagnosis was changed to Type 1, after she was found to have positive TORRES antibodies and a low c-peptide. She has not had any problems with severe hyperglycemia and/or hypoglycemia. She does struggle to get her blood sugars back up after they have dropped. She denies any problems with worsening vision or an increase in numbness/tingling in her feet. She was struggling with lower extremity edema and she has been working closely with a lymphedema therapist. She has also started wearing compression stockings daily.    Endocrine relevant labs are as follows:     Latest Reference Range & Units 06/19/23 00:00   Hemoglobin A1C 0.0 - 5.7 % 8.6 !   !: Data is abnormal    REVIEW OF SYSTEMS  Answers submitted by the patient for this visit:  Symptoms you have experienced in the last 30 days (Submitted on 8/26/2023)  General Symptoms: No  Skin Symptoms: No  HENT Symptoms: No  EYE SYMPTOMS: No  HEART SYMPTOMS: No  LUNG SYMPTOMS: No  INTESTINAL SYMPTOMS: No  URINARY SYMPTOMS: No  GYNECOLOGIC SYMPTOMS: No  BREAST SYMPTOMS: No  SKELETAL SYMPTOMS: No  BLOOD SYMPTOMS: No  NERVOUS SYSTEM SYMPTOMS: No  MENTAL HEALTH SYMPTOMS: No    Endocrine: positive for diabetes  Skin: negative  Eyes: negative for, visual blurring, redness, tearing  Ears/Nose/Throat: negative for, postnasal drainage, hoarseness  Respiratory: No shortness of breath, dyspnea on exertion, cough, or hemoptysis  Cardiovascular: positive for lower extremity edema, negative for, chest pain, and exercise intolerance  Gastrointestinal: negative for, nausea, vomiting, constipation, and  diarrhea  Genitourinary: negative for, nocturia, dysuria, frequency, and urgency  Musculoskeletal: positive for arthritis and negative for nocturnal cramping  Neurologic: negative for and numbness or tingling of feet  Psychiatric: negative  Hematologic/Lymphatic/Immunologic: negative    Past Medical History  Past Medical History:   Diagnosis Date     Cataract      Chronic atrial fibrillation (H) 06/08/2022     Infectious arthritis (H) 6/27/2023     Osteoarthritis of carpometacarpal (CMC) joint of both thumbs 11/16/2022     Post-operative nausea and vomiting 1/18/2023    Severe. Recommend TIVA     Type 2 diabetes mellitus with hyperglycemia (H) 06/24/2016       Medications  Current Outpatient Medications   Medication Sig Dispense Refill     acetaminophen (TYLENOL) 325 MG tablet Take 2 tablets (650 mg) by mouth every 4 hours as needed for other 60 tablet 0     alendronate (FOSAMAX) 70 MG tablet Take 1 tablet (70 mg) by mouth every 7 days 12 tablet 3     amoxicillin (AMOXIL) 500 MG capsule Take 4 caps (2000mg) 30-60 minutes before dental procedure 4 capsule 0     atorvastatin (LIPITOR) 10 MG tablet Take 1 tablet (10 mg) by mouth daily 90 tablet 3     calcium carbonate-vitamin D 500-400 MG-UNIT TABS tablt Take 1 tablet by mouth 2 times daily 180 tablet 3     conjugated estrogens (PREMARIN) 0.625 MG/GM vaginal cream Place 0.5 g vaginally twice a week 30 g 3     Continuous Blood Gluc Sensor (DEXCOM G7 SENSOR) MISC 1 each every 10 days 9 each 3     estradiol (ESTRACE) 0.1 MG/GM vaginal cream INSERT 2G VAGINALLY TWICE PER WEEK 42.5 g 11     insulin aspart (NOVOLOG FLEXPEN) 100 UNIT/ML pen 1 unit per 20 gram of carb for breakfast, 1 unit per 15 grams of carbs for lunch and dinner. Plus 1 unit per 50 mg/dl above 150. Total daily dose approx 30 units. 30 mL 3     insulin glargine (BASAGLAR KWIKPEN) 100 UNIT/ML pen Inject 18 Units Subcutaneous daily Max, 35 units daily 45 mL 1     insulin pen needle (B-D U/F) 31G X 8 MM  miscellaneous USE 5 TIMES DAILY  WITH NOVOLOG AND LANTUS 500 each 3     melatonin 3 MG tablet Take 1 tablet (3 mg) by mouth nightly as needed for sleep 30 tablet 0     metoprolol succinate ER (TOPROL XL) 50 MG 24 hr tablet Take 1 tablet (50 mg) by mouth daily 90 tablet 3     Multiple Vitamins-Minerals (MULTIVITAMIN ADULT PO) Take 1 tablet by mouth every morning        ELIQUIS ANTICOAGULANT 5 MG tablet TAKE ONE TABLET BY MOUTH TWICE A DAY RESUME THE DAY FOLLOWING YOU PROCEDURE (Patient not taking: Reported on 6/20/2023) 180 tablet 1     hydrOXYzine (ATARAX) 25 MG tablet Take 1 tablet (25 mg) by mouth every 6 hours as needed for itching (Patient not taking: Reported on 6/19/2023) 40 tablet 0     Urine Glucose-Ketones Test (KETO-DIASTIX) STRP 1 strip by In Vitro route as needed (Patient not taking: Reported on 6/20/2023) 1 each 11       Allergies  No Known Allergies      Family History  family history includes Brain Cancer in her maternal uncle; Breast Cancer in her mother; Coronary Artery Disease in her maternal uncle and maternal uncle; Hyperlipidemia in her mother; Leukemia in her father; Other Cancer in her father; Skin Cancer in her father; Stomach Cancer in her maternal aunt.    Social History  Social History     Tobacco Use     Smoking status: Never     Smokeless tobacco: Never   Vaping Use     Vaping Use: Never used   Substance Use Topics     Alcohol use: Yes     Comment: 1 to 2 beers or glasses of wine 1 to 2 times per month     Drug use: No       Physical Exam  /72 (BP Location: Left arm, Patient Position: Sitting, Cuff Size: Adult Regular)   Pulse 98   Resp 16   Wt 75.7 kg (166 lb 14.4 oz)   LMP  (LMP Unknown)   SpO2 98%   BMI 27.77 kg/m    Body mass index is 27.77 kg/m .  GENERAL :  In no apparent distress  SKIN: Normal color, normal temperature, texture.  No hirsutism, alopecia or purple striae.     EYES: PERRL, EOMI, No scleral icterus,  No proptosis, conjunctival redness, stare,  retraction  RESP: Lungs clear to auscultation bilaterally  CARDIAC: Regular rate and rhythm, normal S1 S2, without murmurs, rubs or gallops    NEURO: awake, alert, responds appropriately to questions.  Cranial nerves intact.   Moves all extremities; Gait normal.  No tremor of the outstretched hand.    EXTREMITIES: No clubbing, cyanosis or edema. Pt is wearing compression stockings.    DATA REVIEW  Dexcom Clarity  Time in target range 44%  <1% Very Low and 1% Low  24% High and 30% Very High  Current Ave  mg/dl        Again, thank you for allowing me to participate in the care of your patient.        Sincerely,        Sarah Palm PA-C

## 2023-08-31 NOTE — NURSING NOTE
Latoya WHITESIDE Marcos's goals for this visit include: NONE  She requests these members of her care team be copied on today's visit information: YES    PCP: Pricila Avila    Referring Provider:  No referring provider defined for this encounter.    /72 (BP Location: Left arm, Patient Position: Sitting, Cuff Size: Adult Regular)   Pulse 98   Resp 16   Wt 75.7 kg (166 lb 14.4 oz)   LMP  (LMP Unknown)   SpO2 98%   BMI 27.77 kg/m      Do you need any medication refills at today's visit? NONE    [facility]-Department of Endocrinology  Diabetes Educators:   Tasha Harley RN and Humera Caraballo RN  Clinic Nurse: NESTOR Mckenna  CMA's: Nell Burroughs and Navi   EMT: Jaun  Scheduling/Clinic phone number : [phone]   Clinic Fax: [phone]  On-Call Endocrine at [facility] (after hours/weekends): [phone] option 4    Please call the number below to schedule your labs.  Lab    General [phone]   [facility] [phone]   [place] [phone]   [facility]  [phone]   [facility] [phone]   [place] [phone]   [facility]) [phone]   [facility] [phone]   [place] [phone]   [place] [phone]   [place] [phone]     Please reach out to the following centers to schedule your imaging appointment:  Imaging (DEXA, CT, MRI, XRAY)    [facility] ([facility], [facility] [place]) [phone]   [facility] ([place], [place]) [phone]   [facility] ([facility]) [phone]   [facility] [phone]     Appointment Reminders:  * Please bring meter with for staff to download  * If you are due ONLY for an A1C, it is scheduled with the nurse and will be done in clinic. You do not need to schedule a lab appointment. Fasting is not required for an A1C.  * Refill request should be submitted to your pharmacy. They will contact clinic for approval.    Jaun Dickey,  EMT

## 2023-09-01 NOTE — PROGRESS NOTES
Assessment/Plan :   Type 1 DM. Latoya is frustrated by glucose fluctuations. We discussed her previous laboratory results and we reviewed her current CGMS report. Latoya does have a few episodes of low blood sugars. She also complains that it takes forever to bring the numbers back up when they drop. I think she may be getting a little too much Lantus. I would like her to decrease the dose back down to 20 unit(s). We are also going to change the time of her Lantus to 8-9 pm, instead of 10 pm. She consistently has a small night time snack before taking Lantus. The snack is not large enough for her to take Novolog but it does lead to a spike in her blood sugars. We have a follow-up scheduled for November. If she has any problems before our follow-up visit, she can reach out to me through [employer].      I have independently reviewed and interpreted labs, imaging as indicated.      Chief complaint:  Latoya is a 72 year old female who returns for follow-up of Type 1 DM.    I have reviewed Care Everywhere including [facility], [facility], [facility], [facility], [facility] , [facility], [place] lab reports, imaging reports and provider notes as indicated.      HISTORY OF PRESENT ILLNESS  Latoya continues to work on managing her blood sugars. She continues to be frustrated by the constant fluctuations. She feels like her blood sugars are either too high or too low. It is hard for her to find any middle ground. At present time she is taking 22 unit(s) of Lantus at 10 pm and she takes Novolog before meals based on an insulin to carb ratio of 1:20 with breakfast and 1:17 at lunch and dinner. She also uses a correction of 1 per 50 over 150 mg/dl. She is currently monitoring her blood sugars with the Dexcom G7 CGMS device.    Latoya has had diabetes since 2016 when her blood sugars were found to be elevated during a routine physical. She was initially diagnosed as Type 2 but later the diagnosis was changed to  Type 1, after she was found to have positive TORRES antibodies and a low c-peptide. She has not had any problems with severe hyperglycemia and/or hypoglycemia. She does struggle to get her blood sugars back up after they have dropped. She denies any problems with worsening vision or an increase in numbness/tingling in her feet. She was struggling with lower extremity edema and she has been working closely with a lymphedema therapist. She has also started wearing compression stockings daily.    Endocrine relevant labs are as follows:     Latest Reference Range & Units 06/19/23 00:00   Hemoglobin A1C 0.0 - 5.7 % 8.6 !   !: Data is abnormal    REVIEW OF SYSTEMS  Answers submitted by the patient for this visit:  Symptoms you have experienced in the last 30 days (Submitted on 8/26/2023)  General Symptoms: No  Skin Symptoms: No  HENT Symptoms: No  EYE SYMPTOMS: No  HEART SYMPTOMS: No  LUNG SYMPTOMS: No  INTESTINAL SYMPTOMS: No  URINARY SYMPTOMS: No  GYNECOLOGIC SYMPTOMS: No  BREAST SYMPTOMS: No  SKELETAL SYMPTOMS: No  BLOOD SYMPTOMS: No  NERVOUS SYSTEM SYMPTOMS: No  MENTAL HEALTH SYMPTOMS: No    Endocrine: positive for diabetes  Skin: negative  Eyes: negative for, visual blurring, redness, tearing  Ears/Nose/Throat: negative for, postnasal drainage, hoarseness  Respiratory: No shortness of breath, dyspnea on exertion, cough, or hemoptysis  Cardiovascular: positive for lower extremity edema, negative for, chest pain, and exercise intolerance  Gastrointestinal: negative for, nausea, vomiting, constipation, and diarrhea  Genitourinary: negative for, nocturia, dysuria, frequency, and urgency  Musculoskeletal: positive for arthritis and negative for nocturnal cramping  Neurologic: negative for and numbness or tingling of feet  Psychiatric: negative  Hematologic/Lymphatic/Immunologic: negative    Past Medical History  Past Medical History:   Diagnosis Date    Cataract     Chronic atrial fibrillation (H) 06/08/2022    Infectious  arthritis (H) 6/27/2023    Osteoarthritis of carpometacarpal (CMC) joint of both thumbs 11/16/2022    Post-operative nausea and vomiting 1/18/2023    Severe. Recommend TIVA    Type 2 diabetes mellitus with hyperglycemia (H) 06/24/2016       Medications  Current Outpatient Medications   Medication Sig Dispense Refill    acetaminophen (TYLENOL) 325 MG tablet Take 2 tablets (650 mg) by mouth every 4 hours as needed for other 60 tablet 0    alendronate (FOSAMAX) 70 MG tablet Take 1 tablet (70 mg) by mouth every 7 days 12 tablet 3    amoxicillin (AMOXIL) 500 MG capsule Take 4 caps (2000mg) 30-60 minutes before dental procedure 4 capsule 0    atorvastatin (LIPITOR) 10 MG tablet Take 1 tablet (10 mg) by mouth daily 90 tablet 3    calcium carbonate-vitamin D 500-400 MG-UNIT TABS tablt Take 1 tablet by mouth 2 times daily 180 tablet 3    conjugated estrogens (PREMARIN) 0.625 MG/GM vaginal cream Place 0.5 g vaginally twice a week 30 g 3    Continuous Blood Gluc Sensor (DEXCOM G7 SENSOR) MISC 1 each every 10 days 9 each 3    estradiol (ESTRACE) 0.1 MG/GM vaginal cream INSERT 2G VAGINALLY TWICE PER WEEK 42.5 g 11    insulin aspart (NOVOLOG FLEXPEN) 100 UNIT/ML pen 1 unit per 20 gram of carb for breakfast, 1 unit per 15 grams of carbs for lunch and dinner. Plus 1 unit per 50 mg/dl above 150. Total daily dose approx 30 units. 30 mL 3    insulin glargine (BASAGLAR KWIKPEN) 100 UNIT/ML pen Inject 18 Units Subcutaneous daily Max, 35 units daily 45 mL 1    insulin pen needle (B-D U/F) 31G X 8 MM miscellaneous USE 5 TIMES DAILY  WITH NOVOLOG AND LANTUS 500 each 3    melatonin 3 MG tablet Take 1 tablet (3 mg) by mouth nightly as needed for sleep 30 tablet 0    metoprolol succinate ER (TOPROL XL) 50 MG 24 hr tablet Take 1 tablet (50 mg) by mouth daily 90 tablet 3    Multiple Vitamins-Minerals (MULTIVITAMIN ADULT PO) Take 1 tablet by mouth every morning       ELIQUIS ANTICOAGULANT 5 MG tablet TAKE ONE TABLET BY MOUTH TWICE A DAY RESUME  THE DAY FOLLOWING YOU PROCEDURE (Patient not taking: Reported on 6/20/2023) 180 tablet 1    hydrOXYzine (ATARAX) 25 MG tablet Take 1 tablet (25 mg) by mouth every 6 hours as needed for itching (Patient not taking: Reported on 6/19/2023) 40 tablet 0    Urine Glucose-Ketones Test (KETO-DIASTIX) STRP 1 strip by In Vitro route as needed (Patient not taking: Reported on 6/20/2023) 1 each 11       Allergies  No Known Allergies      Family History  family history includes Brain Cancer in her maternal uncle; Breast Cancer in her mother; Coronary Artery Disease in her maternal uncle and maternal uncle; Hyperlipidemia in her mother; Leukemia in her father; Other Cancer in her father; Skin Cancer in her father; Stomach Cancer in her maternal aunt.    Social History  Social History     Tobacco Use    Smoking status: Never    Smokeless tobacco: Never   Vaping Use    Vaping Use: Never used   Substance Use Topics    Alcohol use: Yes     Comment: 1 to 2 beers or glasses of wine 1 to 2 times per month    Drug use: No       Physical Exam  /72 (BP Location: Left arm, Patient Position: Sitting, Cuff Size: Adult Regular)   Pulse 98   Resp 16   Wt 75.7 kg (166 lb 14.4 oz)   LMP  (LMP Unknown)   SpO2 98%   BMI 27.77 kg/m    Body mass index is 27.77 kg/m .  GENERAL :  In no apparent distress  SKIN: Normal color, normal temperature, texture.  No hirsutism, alopecia or purple striae.     EYES: PERRL, EOMI, No scleral icterus,  No proptosis, conjunctival redness, stare, retraction  RESP: Lungs clear to auscultation bilaterally  CARDIAC: Regular rate and rhythm, normal S1 S2, without murmurs, rubs or gallops    NEURO: awake, alert, responds appropriately to questions.  Cranial nerves intact.   Moves all extremities; Gait normal.  No tremor of the outstretched hand.    EXTREMITIES: No clubbing, cyanosis or edema. Pt is wearing compression stockings.    DATA REVIEW  Dexcom Clarity  Time in target range 44%  <1% Very Low and 1%  Low  24% High and 30% Very High  Current Ave  mg/dl

## 2023-09-28 ENCOUNTER — THERAPY VISIT (OUTPATIENT)
Dept: OCCUPATIONAL THERAPY | Facility: CLINIC | Age: 73
End: 2023-09-28
Attending: STUDENT IN AN ORGANIZED HEALTH CARE EDUCATION/TRAINING PROGRAM
Payer: COMMERCIAL

## 2023-09-28 DIAGNOSIS — I89.0 LYMPHEDEMA: Primary | ICD-10-CM

## 2023-09-28 PROCEDURE — 97140 MANUAL THERAPY 1/> REGIONS: CPT | Mod: GO | Performed by: OCCUPATIONAL THERAPIST

## 2023-09-28 NOTE — PROGRESS NOTES
09/28/23 0500   Appointment Info   Treating Provider Maria Esther Rowland, OTR/L, CLT   Visits Used 8   Medical Diagnosis M79.89 (ICD-10-CM) - Left leg swelling   OT Tx Diagnosis LE lymphedema   Quick Add  Certification   Progress Note/Certification   Start Of Care Date 07/24/23   Onset of Illness/Injury or Date of Surgery 06/12/23   Therapy Frequency 2-3 follow up visits   Predicted Duration over next 90 days   Certification date from 07/24/23   Certification date to 10/21/23   Goals   OT Goals 1;2;3   OT Goal 1   Goal Identifier Volume   Goal Description Pt will demonstrate a reduction of at least 200 mL in LLE to improve skin integrity, safety with mobility and fit of clothing/shoes.   Rationale In order to maximize safety and independence with performance of self-care activities   Goal Progress Overall LLE -253 mL, goal met   Target Date 10/21/23   Date Met 08/10/23   OT Goal 2   Goal Identifier Garments   Goal Description Pt will be fit for and demonstrate independence using new compression garments for long term edema management as evidenced by a no more than 20% increase in volume after transitioning into garments.   Rationale In order to maximize safety and independence with performance of self-care activities   Goal Progress Since transitioning into compression stockings, RLE -138 mL, LLE -107 mL. Goal met   Target Date 10/21/23   Date Met 08/21/23   OT Goal 3   Goal Identifier Home management   Goal Description Pt will verbalize understanding of long term management/prevention of edema including following recommended wear schedule for compression garments, manual techniques, skin care and exercise, following recommended management techniques 6/7 days.   Rationale In order to maximize safety and independence with performance of self-care activities   Goal Progress Pt consistently wearing compression socks, walks dog 3-5 miles per day with dog. She has also gotten new shoes that are more supportive. Takes breaks  "to elevate legs throughout the day. Goal met   Target Date 10/21/23   Date Met 09/28/23   Subjective Report   Subjective Report Pt reports wearing compression socks during the day, nothing at night time. Pt has been using rubber gloves and AE to assist with donning/doffing socks.   Treatment Interventions (OT)   Interventions Manual Therapy   Manual Therapy   Manual Therapy Minutes (46871) 42   Skilled Intervention Pt arrived not wearing compression socks for session but reports she has been wearing consistently. Pt reports being stung by a wasp on L ankle so measurements may be a little different today-she has been applying anti-itch cream to it and redness is contained to ankle area. OT edu. on skin care precautions including signs/sxs of cellulitis, pt verbailizing understanding. OT also noted skin on bottom of both feet dry/flaky. OT recommends pt apply lotion to B LE daily to maintain skin integrity. OT remeasured B LE-see separate girth measurement sheet. OT facilitated MLD with pt in supine clearing SC nodes, L axilla, and R ing nodes. Deep abd tech completed using \"M\" series. OT established ing-axio and ing-ing anastamoses and cleared L LE working proximal to distal to proximal and reclearing anastamoses pathways. SC nodes recleared and lotion applied to B feet. OT edu. on how to obtain a future referral of needed-no further lymphedema needs at time of session, pt discharged.   Patient Response/Progress Pt verbalizing understanding of recommendations-goals met.   Education   Learner/Method Patient;Listening;Demonstration   Plan   Plan for next session assess effectiveness of home management, likely d/c   Total Session Time   Timed Code Treatment Minutes 42   Total Treatment Time (sum of timed and untimed services) 42         DISCHARGE  Reason for Discharge: Patient has met all goals.    Equipment Issued: handouts    Discharge Plan: Patient to continue home program.    Referring Provider:  Jennifer Jimenez " Mc[place]*

## 2023-10-08 ENCOUNTER — ANCILLARY PROCEDURE (OUTPATIENT)
Dept: GENERAL RADIOLOGY | Facility: CLINIC | Age: 73
End: 2023-10-08
Attending: PHYSICIAN ASSISTANT
Payer: COMMERCIAL

## 2023-10-08 ENCOUNTER — OFFICE VISIT (OUTPATIENT)
Dept: URGENT CARE | Facility: URGENT CARE | Age: 73
End: 2023-10-08
Payer: COMMERCIAL

## 2023-10-08 VITALS
HEART RATE: 108 BPM | BODY MASS INDEX: 27.41 KG/M2 | DIASTOLIC BLOOD PRESSURE: 83 MMHG | SYSTOLIC BLOOD PRESSURE: 135 MMHG | OXYGEN SATURATION: 98 % | TEMPERATURE: 98.5 F | WEIGHT: 164.7 LBS | RESPIRATION RATE: 16 BRPM

## 2023-10-08 DIAGNOSIS — S49.92XA INJURY OF LEFT CLAVICLE, INITIAL ENCOUNTER: ICD-10-CM
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
## 2023-10-08 DIAGNOSIS — T14.8XXA CONTUSION OF CLAVICLE, LEFT, INITIAL ENCOUNTER: Primary | ICD-10-CM

## 2023-10-08 PROCEDURE — 73000 X-RAY EXAM OF COLLAR BONE: CPT | Mod: TC | Performed by: RADIOLOGY

## 2023-10-08 PROCEDURE — 99214 OFFICE O/P EST MOD 30 MIN: CPT | Performed by: PHYSICIAN ASSISTANT

## 2023-10-08 RX ORDER — CYCLOBENZAPRINE HCL 5 MG
5 TABLET ORAL 2 TIMES DAILY PRN
Qty: 20 TABLET | Refills: 0 | Status: SHIPPED | OUTPATIENT
Start: 2023-10-08 | End: 2023-10-18

## 2023-10-08 RX ORDER — IBUPROFEN 800 MG/1
800 TABLET, FILM COATED ORAL 2 TIMES DAILY PRN
Qty: 20 TABLET | Refills: 0 | Status: SHIPPED | OUTPATIENT
Start: 2023-10-08 | End: 2023-10-18

## 2023-10-08 NOTE — PROGRESS NOTES
Chief Complaint   Patient presents with    Fall     Pt bounced down couple of steps at home and heard a pop. Onset- Saturday     Arm Pain     Left arm feels sore, limited range of motion.              ASSESSMENT:    ICD-10-CM    1. Contusion of clavicle, left, initial encounter  T14.8XXA Wrist/Arm Supplies Order Sling; Left     Orthopedic  Referral     cyclobenzaprine (FLEXERIL) 5 MG tablet     ibuprofen (ADVIL/MOTRIN) 800 MG tablet      2. Injury of left clavicle, initial encounter  S49.92XA XR Clavicle Left     Wrist/Arm Supplies Order Sling; Left     Orthopedic  Referral     cyclobenzaprine (FLEXERIL) 5 MG tablet     ibuprofen (ADVIL/MOTRIN) 800 MG tablet      3. Fall down stairs, initial encounter  W10.8XXA XR Clavicle Left     Wrist/Arm Supplies Order Sling; Left     Orthopedic  Referral     cyclobenzaprine (FLEXERIL) 5 MG tablet     ibuprofen (ADVIL/MOTRIN) 800 MG tablet              PLAN: Likely left clavicle contusion/sprain/strain.  Ice.  Ibuprofen.  Flexeril, may cause drowsiness, do not take if driving.  May want to start with half a tablet to see how it affects her.  Sling.  Follow-up with Ortho 3-5  days.  Advised about symptoms which might herald more serious problems.            Cristel Prince PA-C      SUBJECTIVE:   Latoya Rodríguez is an 73 year old female who presents for left clavicle  pain.  Yesterday fell down a couple of steps at home and heard a pop left clavicle /L anterior lower neck. She slipped on the second to last wooden step in her house that she was going on him.  She states she fell backward.  She did not hit her head.  Left arm feels sore with limited range of motion.  Type 1 diabetes with hyperglycemia.  Normal kidney function January 2023.    No Known Allergies    Past Medical History:   Diagnosis Date    Cataract     Chronic atrial fibrillation (H) 06/08/2022    Infectious arthritis (H) 6/27/2023    Osteoarthritis of carpometacarpal (CMC) joint of  both thumbs 11/16/2022    Post-operative nausea and vomiting 1/18/2023    Severe. Recommend TIVA    Type 2 diabetes mellitus with hyperglycemia (H) 06/24/2016       acetaminophen (TYLENOL) 325 MG tablet, Take 2 tablets (650 mg) by mouth every 4 hours as needed for other  alendronate (FOSAMAX) 70 MG tablet, Take 1 tablet (70 mg) by mouth every 7 days  amoxicillin (AMOXIL) 500 MG capsule, Take 4 caps (2000mg) 30-60 minutes before dental procedure  atorvastatin (LIPITOR) 10 MG tablet, Take 1 tablet (10 mg) by mouth daily  calcium carbonate-vitamin D 500-400 MG-UNIT TABS tablt, Take 1 tablet by mouth 2 times daily  conjugated estrogens (PREMARIN) 0.625 MG/GM vaginal cream, Place 0.5 g vaginally twice a week  Continuous Blood Gluc Sensor (DEXCOM G7 SENSOR) MISC, 1 each every 10 days  estradiol (ESTRACE) 0.1 MG/GM vaginal cream, INSERT 2G VAGINALLY TWICE PER WEEK  insulin aspart (NOVOLOG FLEXPEN) 100 UNIT/ML pen, 1 unit per 20 gram of carb for breakfast, 1 unit per 15 grams of carbs for lunch and dinner. Plus 1 unit per 50 mg/dl above 150. Total daily dose approx 30 units.  insulin glargine (BASAGLAR KWIKPEN) 100 UNIT/ML pen, Inject 18 Units Subcutaneous daily Max, 35 units daily  insulin pen needle (B-D U/F) 31G X 8 MM miscellaneous, USE 5 TIMES DAILY  WITH NOVOLOG AND LANTUS  melatonin 3 MG tablet, Take 1 tablet (3 mg) by mouth nightly as needed for sleep  metoprolol succinate ER (TOPROL XL) 50 MG 24 hr tablet, Take 1 tablet (50 mg) by mouth daily  Multiple Vitamins-Minerals (MULTIVITAMIN ADULT PO), Take 1 tablet by mouth every morning   Urine Glucose-Ketones Test (KETO-DIASTIX) STRP, 1 strip by In Vitro route as needed (Patient not taking: Reported on 6/20/2023)    No current facility-administered medications on file prior to visit.      Social History     Tobacco Use    Smoking status: Never    Smokeless tobacco: Never   Vaping Use    Vaping Use: Never used   Substance Use Topics    Alcohol use: Yes     Comment: 1 to  2 beers or glasses of wine 1 to 2 times per month    Drug use: No       ROS:  Gen: no fevers  Musculoskel: + as above  Skin: as above    OBJECTIVE:  /83 (BP Location: Right arm, Patient Position: Sitting, Cuff Size: Adult Regular)   Pulse 108   Temp 98.5  F (36.9  C) (Tympanic)   Resp 16   Wt 74.7 kg (164 lb 11.2 oz)   LMP  (LMP Unknown)   SpO2 98%   BMI 27.41 kg/m     General:   awake, alert, and cooperative.  NAD.   Head: Normocephalic, atraumatic.  Eyes: Conjunctiva clear,   MS: Bruising present over the left proximal to mid clavicle area.  Very tender over this area. No palpable step off. Left shoulder joint itself is not tender to palpation.  Neck-C-spine nontender to palpation with increased pain in the left clavicle area with extension of the neck.  Some mild bruising left lower trapezius area.  Very guarded.  When she tries to lift her left arm to scratch her nose she gets of severe pain in the left clavicle area.  Cap refill intact, radial pulse intact  Neuro: Alert and oriented - normal speech.      Cristel Prince PA-C

## 2023-10-11 ENCOUNTER — TELEPHONE (OUTPATIENT)
Dept: ORTHOPEDICS | Facility: CLINIC | Age: 73
End: 2023-10-11
Payer: COMMERCIAL

## 2023-10-11 NOTE — TELEPHONE ENCOUNTER
Called and spoke with Pt where I relayed information from Sandra MALIN about no need for Abx use and to just monitor their hip/incision site for changes. Pt was thankful for call, will be seeing Dr. Ocasio tomorrow for follow up.     Giovanny AZAR ATC

## 2023-10-11 NOTE — TELEPHONE ENCOUNTER
[facility] Call Center    Phone Message    May a detailed message be left on voicemail: no     Reason for Call:  Requesting c/b- had a dentist appt on 10/09 and forgot to take an antibiotic wondering if she should take one now. [facility] [place]  Also had a fall, didn't seem to affect the hip-has appt w/Dr Ocasio on 10/12-wanted to know if anything more needs to be done    Action Taken: Message routed to:  [facility]    Travel Screening: Not Applicable

## 2023-10-12 ENCOUNTER — OFFICE VISIT (OUTPATIENT)
Dept: ORTHOPEDICS | Facility: CLINIC | Age: 73
End: 2023-10-12
Payer: COMMERCIAL

## 2023-10-12 VITALS
BODY MASS INDEX: 27.32 KG/M2 | DIASTOLIC BLOOD PRESSURE: 79 MMHG | HEIGHT: 65 IN | WEIGHT: 164 LBS | SYSTOLIC BLOOD PRESSURE: 121 MMHG | HEART RATE: 54 BPM | OXYGEN SATURATION: 99 %

## 2023-10-12 DIAGNOSIS — T14.8XXA CONTUSION OF CLAVICLE, LEFT, INITIAL ENCOUNTER: ICD-10-CM

## 2023-10-12 DIAGNOSIS — S43.62XA STERNOCLAVICULAR (JOINT) (LIGAMENT) SPRAIN, LEFT, INITIAL ENCOUNTER: Primary | ICD-10-CM

## 2023-10-12 DIAGNOSIS — S49.92XA INJURY OF LEFT CLAVICLE, INITIAL ENCOUNTER: ICD-10-CM
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
## 2023-10-12 PROCEDURE — 99213 OFFICE O/P EST LOW 20 MIN: CPT | Performed by: ORTHOPAEDIC SURGERY

## 2023-10-12 ASSESSMENT — PAIN SCALES - GENERAL: PAINLEVEL: MILD PAIN (3)

## 2023-10-12 NOTE — LETTER
10/12/2023         RE: Latoya Rodríguez  [address]  [place] [address]        Dear Colleague,    Thank you for referring your patient, Latoya Rodríguez, to the [facility]. Please see a copy of my visit note below.    SUBJECTIVE:  Latoya Rodríguez is a 73 year old female is seen as an Urgent Care referral for evaluation of an injury of the left clavicle(s).    Occupation: retired  Injury occurred 5 day(s) ago.    Mechanism   Fall down a couple of steps at home, landed on buttock, may have reached back to break fall, and heard a pop or thump, in the shoulder,  left clavicle /L anterior lower neck.     Neuro symptoms: none    Present Symptoms: pain all along collarbone  Pain with shoulder range of motion   Pain and weakness to lift arm and to reach across body  Has not notice a pec deformity  Has had bruising of chest.    Past Medical History:   Diagnosis Date     Cataract      Chronic atrial fibrillation (H) 06/08/2022     Infectious arthritis (H) 6/27/2023     Osteoarthritis of carpometacarpal (CMC) joint of both thumbs 11/16/2022     Post-operative nausea and vomiting 1/18/2023    Severe. Recommend TIVA     Type 2 diabetes mellitus with hyperglycemia (H) 06/24/2016      Past Surgical History:   Procedure Laterality Date     ANESTHESIA CARDIOVERSION N/A 9/23/2021    Procedure: ANESTHESIA, FOR CARDIOVERSION@1100;  Surgeon: GENERIC ANESTHESIA PROVIDER;  [facility]     ANESTHESIA OUT OF OR MRI N/A 9/14/2022    Procedure: ANESTHESIA OUT OF OR Magnetic resonance imaging right hip and lumbar @0800;  Surgeon: GENERIC ANESTHESIA PROVIDER;  [facility]     ARTHROPLASTY HIP Right 1/18/2023    Procedure: ARTHROPLASTY, HIP, TOTAL RIGHT;  Surgeon: Arnaud Ward MD;  [facility]     CYSTOSCOPY, INJECT COLLAGEN, COMBINED N/A 5/11/2021    Procedure: CYSTOSCOPY, WITH PERIURETHRAL BULKING AGENT INJECTION;  Surgeon: Alberto Zhang MD;  Location: [facility] OR      CYSTOSCOPY, INJECT COLLAGEN, COMBINED N/A 9/12/2022    Procedure: CYSTOSCOPY, WITH PERIURETHRAL BULKING AGENT INJECTION (look in the bladder and urethra and inject filler into the urethra);  Surgeon: Alberto Zhang MD;  Location: [facility] OR     EYE SURGERY  9/04, 5/11    Retinal detachment, Cataract (both eyes)     IMPLANT STIMULATOR AND LEADS SACRAL NERVE (STAGE ONE AND TWO) Right 7/5/2022    Procedure: INSERTION, SACRAL NERVE STIMULATOR, STAGE 1 & 2 (permanent implant on the RIGHT side with [employer]);  Surgeon: Alberto Zhang MD;  Location: [facility] OR     IMPLANT STIMULATOR SACRAL NERVE PERCUTANEOUS TRIAL N/A 6/14/2022    Procedure: INSERTION, NEUROSTIMULATOR, SACRAL, PERCUTANEOUS, FOR TRIAL;  Surgeon: Alberto Zhang MD;  Location: [facility] OR     RELEASE CARPAL TUNNEL Right 8/6/2021    Procedure: RELEASE, CARPAL TUNNEL, Right;  Surgeon: RADHA Sandoval MD;  Location: [facility]     RELEASE TRIGGER FINGER Bilateral 5/10/2019    Procedure: RELEASE TRIGGER FINGER, BILATERAL LONG AND RING FINGERS;  Surgeon: RADHA Sandoval MD;  Location: [facility]     VASCULAR SURGERY      Varicose Veins        REVIEW OF SYSTEMS:    CONSTITUTIONAL:  NEGATIVE for fever, chills, change in weight  INTEGUMENTARY/SKIN:  NEGATIVE for worrisome rashes, moles or lesions  EYES:  NEGATIVE for vision changes or irritation  ENT/MOUTH:  NEGATIVE for ear, mouth and throat problems  RESP:  NEGATIVE for significant cough or SOB  BREAST:  NEGATIVE for masses, tenderness or discharge  CV:  NEGATIVE for chest pain, palpitations or peripheral edema  GI:  NEGATIVE for nausea, abdominal pain, heartburn, or change in bowel habits  :  Negative   MUSCULOSKELETAL:  See HPI above  NEURO:  see HPI above  ENDOCRINE:  NEGATIVE for temperature intolerance, skin/hair changes  HEME/ALLERGY/IMMUNE:  NEGATIVE for bleeding problems  PSYCHIATRIC:  NEGATIVE for changes in mood or affect     Current Outpatient Medications   Medication Sig Dispense Refill      acetaminophen (TYLENOL) 325 MG tablet Take 2 tablets (650 mg) by mouth every 4 hours as needed for other 60 tablet 0     alendronate (FOSAMAX) 70 MG tablet Take 1 tablet (70 mg) by mouth every 7 days 12 tablet 3     amoxicillin (AMOXIL) 500 MG capsule Take 4 caps (2000mg) 30-60 minutes before dental procedure 4 capsule 0     atorvastatin (LIPITOR) 10 MG tablet Take 1 tablet (10 mg) by mouth daily 90 tablet 3     calcium carbonate-vitamin D 500-400 MG-UNIT TABS tablt Take 1 tablet by mouth 2 times daily 180 tablet 3     conjugated estrogens (PREMARIN) 0.625 MG/GM vaginal cream Place 0.5 g vaginally twice a week 30 g 3     Continuous Blood Gluc Sensor (DEXCOM G7 SENSOR) MISC 1 each every 10 days 9 each 3     cyclobenzaprine (FLEXERIL) 5 MG tablet Take 1 tablet (5 mg) by mouth 2 times daily as needed for muscle spasms 20 tablet 0     estradiol (ESTRACE) 0.1 MG/GM vaginal cream INSERT 2G VAGINALLY TWICE PER WEEK 42.5 g 11     ibuprofen (ADVIL/MOTRIN) 800 MG tablet Take 1 tablet (800 mg) by mouth 2 times daily as needed for moderate pain 20 tablet 0     insulin aspart (NOVOLOG FLEXPEN) 100 UNIT/ML pen 1 unit per 20 gram of carb for breakfast, 1 unit per 15 grams of carbs for lunch and dinner. Plus 1 unit per 50 mg/dl above 150. Total daily dose approx 30 units. 30 mL 3     insulin glargine (BASAGLAR KWIKPEN) 100 UNIT/ML pen Inject 18 Units Subcutaneous daily Max, 35 units daily 45 mL 1     insulin pen needle (B-D U/F) 31G X 8 MM miscellaneous USE 5 TIMES DAILY  WITH NOVOLOG AND LANTUS 500 each 3     melatonin 3 MG tablet Take 1 tablet (3 mg) by mouth nightly as needed for sleep 30 tablet 0     metoprolol succinate ER (TOPROL XL) 50 MG 24 hr tablet Take 1 tablet (50 mg) by mouth daily 90 tablet 3     Multiple Vitamins-Minerals (MULTIVITAMIN ADULT PO) Take 1 tablet by mouth every morning        Urine Glucose-Ketones Test (KETO-DIASTIX) STRP 1 strip by In Vitro route as needed (Patient not taking: Reported on 6/20/2023) 1  "each 11       Social History     Tobacco Use     Smoking status: Never     Smokeless tobacco: Never   Substance Use Topics     Alcohol use: Yes     Comment: 1 to 2 beers or glasses of wine 1 to 2 times per month         OBJECTIVE:  /79 (BP Location: Left arm, Patient Position: Sitting, Cuff Size: Adult Regular)   Pulse 54   Ht 1.651 m (5' 5\")   Wt 74.4 kg (164 lb)   LMP  (LMP Unknown)   SpO2 99%   BMI 27.29 kg/m      Physical Exam:    General Appearance: healthy, alert and no distress   Skin: no suspicious lesions or rashes  Neuro: Normal strength and tone, mentation intact and speech normal  Normal sensation   Vascular:  good pulses, and cappillary refill   Lymph: no lymphadenopathy   Psych:  mentation appears normal and affect normal/bright  Resp: no increased work of breathing    MSK:  Ecchymosis medial chest wall and left sternoclavicular joint with some swelling at the left sternoclavicular joint  Most tender over the left sternoclavicular joint  No posterior neck tenderness  Full range of motion of neck without pain  The mild neck pain is SCM area left only  Full range of motion of left shoulder causing pain in medial clavicle area  Pain with rotator cuff tests, but 5/5 all    X-ray:  EXAM: XR CLAVICLE LEFT 2 VIEWS  LOCATION: [facility]  DATE: 10/8/2023  No acute fracture or malalignment. Mild acromioclavicular joint degenerative changes. Osteopenia. Aortic atherosclerosis.  Glenohumeral joint looks normal to me   Sternoclavicular joint appears normal, but difficult on plain films.    IMPRESSION/PLAN:  Left sternoclavicular joint subluxation/sprain vs medial clavicle fracture      PLAN:   Either diagnosis would be treated with non -op treatment. (I doubt a posterior dislocation)  She is very claustrophobic and even after we looked at pictures of a CT scanner, she wanted to avoid a CT, which would admittedly satisfy our curiosity, but probably not aid in our treatment.  I " did add that a fracture would heal in 6-8 weeks, a subluxation could take substantially longer for recovery.  Return to clinic 6 weeks. Consider physical therapy at that time      MARIO Ocasio MD  Dept. Orthopedic Surgery  [facility]        Again, thank you for allowing me to participate in the care of your patient.        Sincerely,        Levon Ocasio MD

## 2023-10-12 NOTE — PROGRESS NOTES
SUBJECTIVE:  Latoya Rodríguez is a 73 year old female is seen as an Urgent Care referral for evaluation of an injury of the left clavicle(s).    Occupation: retired  Injury occurred 5 day(s) ago.    Mechanism   Fall down a couple of steps at home, landed on buttock, may have reached back to break fall, and heard a pop or thump, in the shoulder,  left clavicle /L anterior lower neck.     Neuro symptoms: none    Present Symptoms: pain all along collarbone  Pain with shoulder range of motion   Pain and weakness to lift arm and to reach across body  Has not notice a pec deformity  Has had bruising of chest.    Past Medical History:   Diagnosis Date    Cataract     Chronic atrial fibrillation (H) 06/08/2022    Infectious arthritis (H) 6/27/2023    Osteoarthritis of carpometacarpal (CMC) joint of both thumbs 11/16/2022    Post-operative nausea and vomiting 1/18/2023    Severe. Recommend TIVA    Type 2 diabetes mellitus with hyperglycemia (H) 06/24/2016      Past Surgical History:   Procedure Laterality Date    ANESTHESIA CARDIOVERSION N/A 9/23/2021    Procedure: ANESTHESIA, FOR CARDIOVERSION@1100;  Surgeon: GENERIC ANESTHESIA PROVIDER;  [facility]    ANESTHESIA OUT OF OR MRI N/A 9/14/2022    Procedure: ANESTHESIA OUT OF OR Magnetic resonance imaging right hip and lumbar @0800;  Surgeon: GENERIC ANESTHESIA PROVIDER;  [facility]    ARTHROPLASTY HIP Right 1/18/2023    Procedure: ARTHROPLASTY, HIP, TOTAL RIGHT;  Surgeon: Arnaud Ward MD;  [facility]    CYSTOSCOPY, INJECT COLLAGEN, COMBINED N/A 5/11/2021    Procedure: CYSTOSCOPY, WITH PERIURETHRAL BULKING AGENT INJECTION;  Surgeon: Alberto Zhang MD;  Location: [facility] OR    CYSTOSCOPY, INJECT COLLAGEN, COMBINED N/A 9/12/2022    Procedure: CYSTOSCOPY, WITH PERIURETHRAL BULKING AGENT INJECTION (look in the bladder and urethra and inject filler into the urethra);  Surgeon: Alberto Zhang MD;  Location: [facility]    EYE SURGERY  9/04, 5/11    Retinal  detachment, Cataract (both eyes)    IMPLANT STIMULATOR AND LEADS SACRAL NERVE (STAGE ONE AND TWO) Right 7/5/2022    Procedure: INSERTION, SACRAL NERVE STIMULATOR, STAGE 1 & 2 (permanent implant on the RIGHT side with [employer]);  Surgeon: Alberto Zhang MD;  Location: [facility] OR    IMPLANT STIMULATOR SACRAL NERVE PERCUTANEOUS TRIAL N/A 6/14/2022    Procedure: INSERTION, NEUROSTIMULATOR, SACRAL, PERCUTANEOUS, FOR TRIAL;  Surgeon: Alberto Zhang MD;  Location: [facility] OR    RELEASE CARPAL TUNNEL Right 8/6/2021    Procedure: RELEASE, CARPAL TUNNEL, Right;  Surgeon: RADHA Sandoval MD;  [facility]    RELEASE TRIGGER FINGER Bilateral 5/10/2019    Procedure: RELEASE TRIGGER FINGER, BILATERAL LONG AND RING FINGERS;  Surgeon: RADHA Sandoval MD;  Location[facility]    VASCULAR SURGERY      Varicose Veins        REVIEW OF SYSTEMS:    CONSTITUTIONAL:  NEGATIVE for fever, chills, change in weight  INTEGUMENTARY/SKIN:  NEGATIVE for worrisome rashes, moles or lesions  EYES:  NEGATIVE for vision changes or irritation  ENT/MOUTH:  NEGATIVE for ear, mouth and throat problems  RESP:  NEGATIVE for significant cough or SOB  BREAST:  NEGATIVE for masses, tenderness or discharge  CV:  NEGATIVE for chest pain, palpitations or peripheral edema  GI:  NEGATIVE for nausea, abdominal pain, heartburn, or change in bowel habits  :  Negative   MUSCULOSKELETAL:  See HPI above  NEURO:  see HPI above  ENDOCRINE:  NEGATIVE for temperature intolerance, skin/hair changes  HEME/ALLERGY/IMMUNE:  NEGATIVE for bleeding problems  PSYCHIATRIC:  NEGATIVE for changes in mood or affect     Current Outpatient Medications   Medication Sig Dispense Refill    acetaminophen (TYLENOL) 325 MG tablet Take 2 tablets (650 mg) by mouth every 4 hours as needed for other 60 tablet 0    alendronate (FOSAMAX) 70 MG tablet Take 1 tablet (70 mg) by mouth every 7 days 12 tablet 3    amoxicillin (AMOXIL) 500 MG capsule Take 4 caps (2000mg) 30-60 minutes before  dental procedure 4 capsule 0    atorvastatin (LIPITOR) 10 MG tablet Take 1 tablet (10 mg) by mouth daily 90 tablet 3    calcium carbonate-vitamin D 500-400 MG-UNIT TABS tablt Take 1 tablet by mouth 2 times daily 180 tablet 3    conjugated estrogens (PREMARIN) 0.625 MG/GM vaginal cream Place 0.5 g vaginally twice a week 30 g 3    Continuous Blood Gluc Sensor (DEXCOM G7 SENSOR) MISC 1 each every 10 days 9 each 3    cyclobenzaprine (FLEXERIL) 5 MG tablet Take 1 tablet (5 mg) by mouth 2 times daily as needed for muscle spasms 20 tablet 0    estradiol (ESTRACE) 0.1 MG/GM vaginal cream INSERT 2G VAGINALLY TWICE PER WEEK 42.5 g 11    ibuprofen (ADVIL/MOTRIN) 800 MG tablet Take 1 tablet (800 mg) by mouth 2 times daily as needed for moderate pain 20 tablet 0    insulin aspart (NOVOLOG FLEXPEN) 100 UNIT/ML pen 1 unit per 20 gram of carb for breakfast, 1 unit per 15 grams of carbs for lunch and dinner. Plus 1 unit per 50 mg/dl above 150. Total daily dose approx 30 units. 30 mL 3    insulin glargine (BASAGLAR KWIKPEN) 100 UNIT/ML pen Inject 18 Units Subcutaneous daily Max, 35 units daily 45 mL 1    insulin pen needle (B-D U/F) 31G X 8 MM miscellaneous USE 5 TIMES DAILY  WITH NOVOLOG AND LANTUS 500 each 3    melatonin 3 MG tablet Take 1 tablet (3 mg) by mouth nightly as needed for sleep 30 tablet 0    metoprolol succinate ER (TOPROL XL) 50 MG 24 hr tablet Take 1 tablet (50 mg) by mouth daily 90 tablet 3    Multiple Vitamins-Minerals (MULTIVITAMIN ADULT PO) Take 1 tablet by mouth every morning       Urine Glucose-Ketones Test (KETO-DIASTIX) STRP 1 strip by In Vitro route as needed (Patient not taking: Reported on 6/20/2023) 1 each 11       Social History     Tobacco Use    Smoking status: Never    Smokeless tobacco: Never   Substance Use Topics    Alcohol use: Yes     Comment: 1 to 2 beers or glasses of wine 1 to 2 times per month         OBJECTIVE:  /79 (BP Location: Left arm, Patient Position: Sitting, Cuff Size: Adult  "Regular)   Pulse 54   Ht 1.651 m (5' 5\")   Wt 74.4 kg (164 lb)   LMP  (LMP Unknown)   SpO2 99%   BMI 27.29 kg/m      Physical Exam:    General Appearance: healthy, alert and no distress   Skin: no suspicious lesions or rashes  Neuro: Normal strength and tone, mentation intact and speech normal  Normal sensation   Vascular:  good pulses, and cappillary refill   Lymph: no lymphadenopathy   Psych:  mentation appears normal and affect normal/bright  Resp: no increased work of breathing    MSK:  Ecchymosis medial chest wall and left sternoclavicular joint with some swelling at the left sternoclavicular joint  Most tender over the left sternoclavicular joint  No posterior neck tenderness  Full range of motion of neck without pain  The mild neck pain is SCM area left only  Full range of motion of left shoulder causing pain in medial clavicle area  Pain with rotator cuff tests, but 5/5 all    X-ray:  EXAM: XR CLAVICLE LEFT 2 VIEWS  LOCATION: [facility]  DATE: 10/8/2023  No acute fracture or malalignment. Mild acromioclavicular joint degenerative changes. Osteopenia. Aortic atherosclerosis.  Glenohumeral joint looks normal to me   Sternoclavicular joint appears normal, but difficult on plain films.    IMPRESSION/PLAN:  Left sternoclavicular joint subluxation/sprain vs medial clavicle fracture      PLAN:   Either diagnosis would be treated with non -op treatment. (I doubt a posterior dislocation)  She is very claustrophobic and even after we looked at pictures of a CT scanner, she wanted to avoid a CT, which would admittedly satisfy our curiosity, but probably not aid in our treatment.  I did add that a fracture would heal in 6-8 weeks, a subluxation could take substantially longer for recovery.  Return to clinic 6 weeks. Consider physical therapy at that time      MARIO Ocasio MD  Dept. Orthopedic Surgery  [facility]      "

## 2023-11-13 ASSESSMENT — ENCOUNTER SYMPTOMS
MYALGIAS: 0
FEVER: 0
EYE PAIN: 0
ARTHRALGIAS: 0
HEARTBURN: 0
ABDOMINAL PAIN: 0
NAUSEA: 0
DIARRHEA: 0
DYSURIA: 0
PALPITATIONS: 0
SHORTNESS OF BREATH: 0
HEADACHES: 0
NERVOUS/ANXIOUS: 0
CHILLS: 0
COUGH: 0
SORE THROAT: 0
FREQUENCY: 1
DIZZINESS: 0
PARESTHESIAS: 0
BREAST MASS: 0
HEMATOCHEZIA: 0
JOINT SWELLING: 0
WEAKNESS: 0
HEMATURIA: 0
CONSTIPATION: 1

## 2023-11-13 ASSESSMENT — ACTIVITIES OF DAILY LIVING (ADL): CURRENT_FUNCTION: NO ASSISTANCE NEEDED

## 2023-11-13 NOTE — COMMUNITY RESOURCES LIST (ENGLISH)
11/13/2023   [facility]  N/A  For questions about this resource list or additional care needs, please contact your primary care clinic or care manager.  Phone: [phone]   Email: N/A   Address: [address] [place] [id number]   Hours: N/A        Financial Stability       Utility payment assistance  1  [facility] - Emergency Assistance Program (EAP) - Utilities Distance: 2.68 miles      In-Person, Phone/Virtual   [address] [place] [address]  Language: American Sign Language, English  Hours: Mon - Fri 8:00 AM - 4:00 PM  Fees: Free   Phone: (360) 132-1105 Email: servicelenny@[facility] Website: https://www.[employer]/residents#human-services     2  Community [employer] [facility] ([facility] - Low Income Home Energy Assistance Program (LIHEAP) Distance: 4.55 miles      In-Person   [address] [place] [address]  Language: English  Hours: Mon 8:00 AM - 4:30 PM , Tue 8:00 AM - 7:00 PM , Wed 8:00 AM - 4:30 PM , Thu 8:00 AM - 7:00 PM , Fri 8:00 AM - 4:30 PM  Fees: Free   Phone: (878) 763-3772 Email: info@[facility]."[employer] Website: https://[facility]."[employer]/          Important Numbers & Websites       Emergency Services   911  [facility] Services   311  Poison Control   (234) 227-8253  Suicide Prevention Lifeline   (179) 911-4845 (TALK)  Child Abuse Hotline   (553) 990-7157 (4-A-Child)  Sexual Assault Hotline   (988) 264-2836 (HOPE)  National Runaway Safeline   (289) 393-8320 (RUNAWAY)  All-Options Talkline   (145) 632-2992  Substance Abuse Referral   (994) 850-5277 (HELP)

## 2023-11-14 ENCOUNTER — OFFICE VISIT (OUTPATIENT)
Dept: FAMILY MEDICINE | Facility: CLINIC | Age: 73
End: 2023-11-14
Payer: COMMERCIAL

## 2023-11-14 VITALS
DIASTOLIC BLOOD PRESSURE: 56 MMHG | HEIGHT: 66 IN | BODY MASS INDEX: 27.16 KG/M2 | WEIGHT: 169 LBS | TEMPERATURE: 98.8 F | OXYGEN SATURATION: 97 % | HEART RATE: 78 BPM | SYSTOLIC BLOOD PRESSURE: 118 MMHG

## 2023-11-14 DIAGNOSIS — Z00.00 ENCOUNTER FOR MEDICARE ANNUAL WELLNESS EXAM: Primary | ICD-10-CM

## 2023-11-14 DIAGNOSIS — E78.5 HYPERLIPIDEMIA WITH TARGET LDL LESS THAN 100: ICD-10-CM

## 2023-11-14 DIAGNOSIS — Z12.31 VISIT FOR SCREENING MAMMOGRAM: ICD-10-CM

## 2023-11-14 DIAGNOSIS — I48.20 CHRONIC ATRIAL FIBRILLATION (H): ICD-10-CM

## 2023-11-14 DIAGNOSIS — E10.9 TYPE 1 DIABETES MELLITUS WITHOUT COMPLICATION (H): ICD-10-CM

## 2023-11-14 DIAGNOSIS — Z13.29 SCREENING FOR THYROID DISORDER: ICD-10-CM

## 2023-11-14 PROCEDURE — 99207 PR FOOT EXAM NO CHARGE: CPT | Mod: 25 | Performed by: NURSE PRACTITIONER

## 2023-11-14 PROCEDURE — G0439 PPPS, SUBSEQ VISIT: HCPCS | Performed by: NURSE PRACTITIONER

## 2023-11-14 RX ORDER — ATORVASTATIN CALCIUM 10 MG/1
10 TABLET, FILM COATED ORAL DAILY
Qty: 90 TABLET | Refills: 3 | Status: SHIPPED | OUTPATIENT
Start: 2023-11-14 | End: 2023-12-27

## 2023-11-14 RX ORDER — METOPROLOL SUCCINATE 50 MG/1
50 TABLET, EXTENDED RELEASE ORAL DAILY
Qty: 90 TABLET | Refills: 3 | Status: SHIPPED | OUTPATIENT
Start: 2023-11-14 | End: 2024-08-07

## 2023-11-14 RX ORDER — INSULIN GLARGINE 100 [IU]/ML
18 INJECTION, SOLUTION SUBCUTANEOUS DAILY
Qty: 45 ML | Refills: 1 | Status: SHIPPED | OUTPATIENT
Start: 2023-11-14 | End: 2023-11-30 | Stop reason: ALTCHOICE

## 2023-11-14 RX ORDER — RESPIRATORY SYNCYTIAL VIRUS VACCINE 120MCG/0.5
0.5 KIT INTRAMUSCULAR ONCE
Qty: 1 EACH | Refills: 0 | Status: CANCELLED | OUTPATIENT
Start: 2023-11-14 | End: 2023-11-14

## 2023-11-14 RX ORDER — PEN NEEDLE, DIABETIC 31 GX5/16"
NEEDLE, DISPOSABLE MISCELLANEOUS
Qty: 500 EACH | Refills: 3 | Status: SHIPPED | OUTPATIENT
Start: 2023-11-14 | End: 2024-04-03

## 2023-11-14 ASSESSMENT — ENCOUNTER SYMPTOMS
FEVER: 0
NERVOUS/ANXIOUS: 0
DIARRHEA: 0
DYSURIA: 0
FREQUENCY: 1
CONSTIPATION: 1
NAUSEA: 0
PARESTHESIAS: 0
HEARTBURN: 0
HEMATURIA: 0
BREAST MASS: 0
EYE PAIN: 0
HEMATOCHEZIA: 0
JOINT SWELLING: 0
DIZZINESS: 0
MYALGIAS: 0
WEAKNESS: 0
SORE THROAT: 0
ARTHRALGIAS: 0
COUGH: 0
ABDOMINAL PAIN: 0
HEADACHES: 0
PALPITATIONS: 0
SHORTNESS OF BREATH: 0
CHILLS: 0

## 2023-11-14 ASSESSMENT — ACTIVITIES OF DAILY LIVING (ADL): CURRENT_FUNCTION: NO ASSISTANCE NEEDED

## 2023-11-14 NOTE — PATIENT INSTRUCTIONS
PLAN:   1.   Symptomatic therapy suggested: Continue current medications as prescribed.   2.  Orders Placed This Encounter   Medications    insulin pen needle (B-D U/F) 31G X 8 MM miscellaneous     Sig: USE 5 TIMES DAILY  WITH NOVOLOG AND LANTUS     Dispense:  500 each     Refill:  3    insulin glargine (BASAGLAR KWIKPEN) 100 UNIT/ML pen     Sig: Inject 18 Units Subcutaneous daily Max, 35 units daily     Dispense:  45 mL     Refill:  1     If Basaglar is not covered by insurance, may substitute Lantus or Semglee or other insulin glargine product per insurance preference at same dose and frequency.      atorvastatin (LIPITOR) 10 MG tablet     Sig: Take 1 tablet (10 mg) by mouth daily     Dispense:  90 tablet     Refill:  3    metoprolol succinate ER (TOPROL XL) 50 MG 24 hr tablet     Sig: Take 1 tablet (50 mg) by mouth daily     Dispense:  90 tablet     Refill:  3     Orders Placed This Encounter   Procedures    REVIEW OF HEALTH MAINTENANCE PROTOCOL ORDERS    *MA Screening Digital Bilateral    Lipid panel reflex to direct LDL Fasting    Comprehensive metabolic panel    TSH with free T4 reflex    Hemoglobin A1c    Albumin Random Urine Quantitative with Creat Ratio     3.  FURTHER TESTING:       - mammogram  FUTURE LABS:       - Schedule a fasting blood draw   Will follow up and/or notify patient of  results via My Chart to determine further need for followup  Follow up office visit in one year for annual health maintenance exam, sooner PRN.   Patient needs to follow up in if no improvement,or sooner if worsening of symptoms or other symptoms develop.        Patient Education   Personalized Prevention Plan  You are due for the preventive services outlined below.  Your care team is available to assist you in scheduling these services.  If you have already completed any of these items, please share that information with your care team to update in your medical record.  Health Maintenance Due   Topic Date Due    RSV  VACCINE (Pregnancy & 60+) (1 - 1-dose 60+ series) Never done    Mammogram  08/28/2022    Flu Vaccine (1) 09/01/2023    COVID-19 Vaccine (5 - 2023-24 season) 09/01/2023    Cholesterol Lab  09/06/2023    Kidney Microalbumin Urine Test  09/06/2023    Diabetic Foot Exam  10/25/2023    ANNUAL REVIEW OF HM ORDERS  10/25/2023    Annual Wellness Visit  10/25/2023

## 2023-11-14 NOTE — PROGRESS NOTES
"SUBJECTIVE:   Latoya is a 73 year old who presents for Preventive Visit.      11/14/2023     4:01 PM   Additional Questions   Roomed by Galilea   Accompanied by Self         11/14/2023     4:01 PM   Patient Reported Additional Medications   Patient reports taking the following new medications None       Are you in the first 12 months of your Medicare coverage?  No    Healthy Habits:     In general, how would you rate your overall health?  Good    Frequency of exercise:  6-7 days/week    Duration of exercise:  Greater than 60 minutes    Do you usually eat at least 4 servings of fruit and vegetables a day, include whole grains    & fiber and avoid regularly eating high fat or \"junk\" foods?  Yes    Taking medications regularly:  Yes    Medication side effects:  None    Ability to successfully perform activities of daily living:  No assistance needed    Home Safety:  Throw rugs in the hallway and lack of grab bars in the bathroom    Hearing Impairment:  No hearing concerns    In the past 6 months, have you been bothered by leaking of urine? Yes    In general, how would you rate your overall mental or emotional health?  Excellent    Additional concerns today:  Yes      Today's PHQ-2 Score:       11/13/2023     8:58 AM   PHQ-2 ( 1999 Pfizer)   Q1: Little interest or pleasure in doing things 0   Q2: Feeling down, depressed or hopeless 0   PHQ-2 Score 0   Q1: Little interest or pleasure in doing things Not at all   Q2: Feeling down, depressed or hopeless Not at all   PHQ-2 Score 0     Have you ever done Advance Care Planning? (For example, a Health Directive, POLST, or a discussion with a medical provider or your loved ones about your wishes): Yes, patient states has an Advance Care Planning document and will bring a copy to the clinic.       Fall risk  Fallen 2 or more times in the past year?: Yes  Any fall with injury in the past year?: Yes    Cognitive Screening   1) Repeat 3 items (Leader, Season, Table)    2) Clock draw: " NORMAL  3) 3 item recall: Recalls 3 objects  Results: 3 items recalled: COGNITIVE IMPAIRMENT LESS LIKELY    Mini-CogTM Copyright S Last. Licensed by the author for use in [facility]; reprinted with permission (jose@[facility]). All rights reserved.      Do you have sleep apnea, excessive snoring or daytime drowsiness? : no    Reviewed and updated as needed this visit by clinical staff   Tobacco  Allergies  Meds  Problems             Reviewed and updated as needed this visit by Provider      Problems            Social History     Tobacco Use    Smoking status: Never    Smokeless tobacco: Never   Substance Use Topics    Alcohol use: Yes     Comment: 1 to 2 beers or glasses of wine 1 to 2 times per month             11/13/2023     8:57 AM   Alcohol Use   Prescreen: >3 drinks/day or >7 drinks/week? No     Do you have a current opioid prescription? No  Do you use any other controlled substances or medications that are not prescribed by a provider? None        Current providers sharing in care for this patient include:   Patient Care Team:  Pricila Avila APRN CNP as PCP - General (Family Practice)  Amy Cooper, NESTOR as Registered Nurse  Edward Aguero MD as Assigned Endocrinology Provider  Alberto Zhang MD as Assigned Surgical Provider  Alberto Zhang MD as MD (Urology)  Karina Hoskins, RN as Specialty Care Coordinator (Urology)  Pricila Avila APRN CNP as Referring Physician (Internal Medicine - Pediatrics)  Amadeo Whipple MD as Assigned Heart and Vascular Provider  Pricila Avila APRN CNP as Assigned PCP  Arnaud Ward MD as Assigned Musculoskeletal Provider  Delmis Caraballo, RN as Diabetes Educator    The following health maintenance items are reviewed in Epic and correct as of today:  Health Maintenance   Topic Date Due    RSV VACCINE (Pregnancy & 60+) (1 - 1-dose 60+ series) Never done    MAMMO SCREENING  08/28/2022    INFLUENZA VACCINE (1)  09/01/2023    COVID-19 Vaccine (5 - 2023-24 season) 09/01/2023    LIPID  09/06/2023    MICROALBUMIN  09/06/2023    DIABETIC FOOT EXAM  10/25/2023    MEDICARE ANNUAL WELLNESS VISIT  10/25/2023    A1C  12/19/2023    BMP  01/27/2024    EYE EXAM  07/27/2024    COLORECTAL CANCER SCREENING  09/13/2024    ANNUAL REVIEW OF HM ORDERS  11/14/2024    FALL RISK ASSESSMENT  11/14/2024    DTAP/TDAP/TD IMMUNIZATION (2 - Td or Tdap) 09/13/2026    ADVANCE CARE PLANNING  11/14/2028    DEXA  09/06/2037    HEPATITIS C SCREENING  Completed    PHQ-2 (once per calendar year)  Completed    Pneumococcal Vaccine: 65+ Years  Completed    ZOSTER IMMUNIZATION  Completed    IPV IMMUNIZATION  Aged Out    HPV IMMUNIZATION  Aged Out    MENINGITIS IMMUNIZATION  Aged Out    RSV MONOCLONAL ANTIBODY  Aged Out     Lab work is in process  Labs reviewed in EPIC  BP Readings from Last 3 Encounters:   11/14/23 118/56   10/12/23 121/79   10/08/23 135/83    Wt Readings from Last 3 Encounters:   11/14/23 76.7 kg (169 lb)   10/12/23 74.4 kg (164 lb)   10/08/23 74.7 kg (164 lb 11.2 oz)                  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)    Type 1 diabetes mellitus with hyperglycemia (H)    Age-related osteoporosis without current pathological fracture    Stress incontinence    Atrophic vaginitis    Intrinsic sphincter deficiency    Overactive bladder    Carpal tunnel syndrome of right wrist    Chronic atrial fibrillation (H)    Osteoarthritis of carpometacarpal (CMC) joint of both thumbs    Thumb pain    Post-operative nausea and vomiting    Type 1 diabetes mellitus without complication (H)    Osteoarthritis of right hip, unspecified osteoarthritis type    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)     Past Surgical History:   Procedure Laterality Date    ANESTHESIA  CARDIOVERSION N/A 9/23/2021    Procedure: ANESTHESIA, FOR CARDIOVERSION@1100;  Surgeon: GENERIC ANESTHESIA PROVIDER;  Location: [facility] OR    ANESTHESIA OUT OF OR MRI N/A 9/14/2022    Procedure: ANESTHESIA OUT OF OR Magnetic resonance imaging right hip and lumbar @0800;  Surgeon: GENERIC ANESTHESIA PROVIDER;  Location: [facility]    ARTHROPLASTY HIP Right 1/18/2023    Procedure: ARTHROPLASTY, HIP, TOTAL RIGHT;  Surgeon: Arnaud Ward MD;  Location: [facility]    CYSTOSCOPY, INJECT COLLAGEN, COMBINED N/A 5/11/2021    Procedure: CYSTOSCOPY, WITH PERIURETHRAL BULKING AGENT INJECTION;  Surgeon: Alberto Zhang MD;  Location: [facility] OR    CYSTOSCOPY, INJECT COLLAGEN, COMBINED N/A 9/12/2022    Procedure: CYSTOSCOPY, WITH PERIURETHRAL BULKING AGENT INJECTION (look in the bladder and urethra and inject filler into the urethra);  Surgeon: Alberto Zhang MD;  Location: [facility] OR    EYE SURGERY  9/04, 5/11    Retinal detachment, Cataract (both eyes)    IMPLANT STIMULATOR AND LEADS SACRAL NERVE (STAGE ONE AND TWO) Right 7/5/2022    Procedure: INSERTION, SACRAL NERVE STIMULATOR, STAGE 1 & 2 (permanent implant on the RIGHT side with [employer]on[employer]);  Surgeon: Alberto Zhang MD;  Location: [facility] OR    IMPLANT STIMULATOR SACRAL NERVE PERCUTANEOUS TRIAL N/A 6/14/2022    Procedure: INSERTION, NEUROSTIMULATOR, SACRAL, PERCUTANEOUS, FOR TRIAL;  Surgeon: Alberto Zhang MD;  Location: [facility] OR    RELEASE CARPAL TUNNEL Right 8/6/2021    Procedure: RELEASE, CARPAL TUNNEL, Right;  Surgeon: RADHA Sandoval MD;  Location: [facility]    RELEASE TRIGGER FINGER Bilateral 5/10/2019    Procedure: RELEASE TRIGGER FINGER, BILATERAL LONG AND RING FINGERS;  Surgeon: RADHA Sandoval MD;  Location: [facility] OR    VASCULAR SURGERY      Varicose Veins       Social History     Tobacco Use    Smoking status: Never    Smokeless tobacco: Never   Substance Use Topics    Alcohol use: Yes     Comment: 1 to 2 beers or glasses of wine 1 to 2 times per month     Family  History   Problem Relation Age of Onset    Hyperlipidemia Mother     Breast Cancer Mother     Other Cancer Father     Leukemia Father     Skin Cancer Father     Coronary Artery Disease Maternal Uncle     Brain Cancer Maternal Uncle     Coronary Artery Disease Maternal Uncle     Stomach Cancer Maternal Aunt     Diabetes No family hx of     Hypertension No family hx of     Cerebrovascular Disease No family hx of     Colon Cancer No family hx of     Thyroid Disease No family hx of     Depression No family hx of     Anxiety Disorder No family hx of          Current Outpatient Medications   Medication Sig Dispense Refill    acetaminophen (TYLENOL) 325 MG tablet Take 2 tablets (650 mg) by mouth every 4 hours as needed for other 60 tablet 0    alendronate (FOSAMAX) 70 MG tablet Take 1 tablet (70 mg) by mouth every 7 days 12 tablet 3    amoxicillin (AMOXIL) 500 MG capsule Take 4 caps (2000mg) 30-60 minutes before dental procedure 4 capsule 0    atorvastatin (LIPITOR) 10 MG tablet Take 1 tablet (10 mg) by mouth daily 90 tablet 3    calcium carbonate-vitamin D 500-400 MG-UNIT TABS tablt Take 1 tablet by mouth 2 times daily 180 tablet 3    conjugated estrogens (PREMARIN) 0.625 MG/GM vaginal cream Place 0.5 g vaginally twice a week 30 g 3    Continuous Blood Gluc Sensor (DEXCOM G7 SENSOR) MISC 1 each every 10 days 9 each 3    estradiol (ESTRACE) 0.1 MG/GM vaginal cream INSERT 2G VAGINALLY TWICE PER WEEK 42.5 g 11    insulin aspart (NOVOLOG FLEXPEN) 100 UNIT/ML pen 1 unit per 20 gram of carb for breakfast, 1 unit per 15 grams of carbs for lunch and dinner. Plus 1 unit per 50 mg/dl above 150. Total daily dose approx 30 units. 30 mL 3    insulin glargine (BASAGLAR KWIKPEN) 100 UNIT/ML pen Inject 18 Units Subcutaneous daily Max, 35 units daily 45 mL 1    insulin pen needle (B-D U/F) 31G X 8 MM miscellaneous USE 5 TIMES DAILY  WITH NOVOLOG AND LANTUS 500 each 3    melatonin 3 MG tablet Take 1 tablet (3 mg) by mouth nightly as needed  for sleep 30 tablet 0    metoprolol succinate ER (TOPROL XL) 50 MG 24 hr tablet Take 1 tablet (50 mg) by mouth daily 90 tablet 3    Multiple Vitamins-Minerals (MULTIVITAMIN ADULT PO) Take 1 tablet by mouth every morning       Urine Glucose-Ketones Test (KETO-DIASTIX) STRP 1 strip by In Vitro route as needed 1 each 11    apixaban ANTICOAGULANT (ELIQUIS) 5 MG tablet Take 1 tablet (5 mg) by mouth 2 times daily 180 tablet 3     No Known Allergies  Recent Labs   Lab Test 06/19/23  0000 01/27/23  1254 01/23/23  1305 01/22/23  0738 01/19/23  1034 12/23/22  0924 09/09/22  1254 09/06/22  0928 09/23/21  0939 08/05/21  1019 07/01/21  1054 10/21/20  1251 07/20/20  0817   0000   A1C 8.6*  --   --   --   --  6.9*  --  8.0*   < >  --   --    < > 7.8*  --    LDL  --   --   --   --   --   --   --  104*  --   --  90  --  107*  --    HDL  --   --   --   --   --   --   --  61  --   --  70  --  71  --    TRIG  --   --   --   --   --   --   --  101  --   --  58  --  56  --    ALT  --  14 21 14   < >  --    < >  --   --  40  --   --   --   --    CR  --  0.60 0.56 0.60   < > 0.71   < > 0.69   < > 0.76 0.74  --  0.67   < >   GFRESTIMATED  --  >90 >90 >90   < > 90   < > >90   < > 80 82  --  89   < >   GFRESTBLACK  --   --   --   --   --   --   --   --   --   --  >90  --  >90  --    POTASSIUM  --  4.5 4.1 3.8   < > 4.4   < >  --    < > 4.2  --   --   --   --    TSH  --   --   --   --   --   --   --  1.27  --  1.42  --   --   --   --     < > = values in this interval not displayed.          Mammogram Screening: Mammogram Screening: Recommended mammography every 1-2 years with patient discussion and risk factor consideration    Review of Systems   Constitutional:  Negative for chills and fever.   HENT:  Negative for congestion, ear pain, hearing loss and sore throat.    Eyes:  Negative for pain and visual disturbance.   Respiratory:  Negative for cough and shortness of breath.    Cardiovascular:  Negative for chest pain, palpitations and  "peripheral edema.   Gastrointestinal:  Positive for constipation. Negative for abdominal pain, diarrhea, heartburn, hematochezia and nausea.   Breasts:  Negative for tenderness, breast mass and discharge.   Genitourinary:  Positive for frequency and urgency. Negative for dysuria, genital sores, hematuria, pelvic pain, vaginal bleeding and vaginal discharge.   Musculoskeletal:  Negative for arthralgias, joint swelling and myalgias.   Skin:  Negative for rash.   Neurological:  Negative for dizziness, weakness, headaches and paresthesias.   Psychiatric/Behavioral:  Negative for mood changes. The patient is not nervous/anxious.        OBJECTIVE:   /56 (BP Location: Right arm, Patient Position: Sitting, Cuff Size: Adult Large)   Pulse 78   Ht 1.67 m (5' 5.75\")   Wt 76.7 kg (169 lb)   LMP  (LMP Unknown)   SpO2 97%   BMI 27.49 kg/m   Estimated body mass index is 27.49 kg/m  as calculated from the following:    Height as of this encounter: 1.67 m (5' 5.75\").    Weight as of this encounter: 76.7 kg (169 lb).  Physical Exam  GENERAL APPEARANCE: healthy, alert and no distress  EYES: Eyes grossly normal to inspection and conjunctivae and sclerae normal  HENT: ear canals and TM's normal, nose and mouth without ulcers or lesions, oropharynx clear and oral mucous membranes moist  NECK: no adenopathy, no asymmetry, masses, or scars and thyroid normal to palpation  RESP: lungs clear to auscultation - no rales, rhonchi or wheezes  BREAST: no palpable axillary masses or adenopathy  CV: no murmur, click or rub, no peripheral edema, peripheral pulses strong, irregularly irregular rhythm, and color normal  ABDOMEN: soft, nontender, no hepatosplenomegaly, no masses and bowel sounds normal  MS: no musculoskeletal defects are noted and gait is age appropriate without ataxia  normal DP and PT pulses, no trophic changes or ulcerative lesions, and normal sensory exam    SKIN: no suspicious lesions or rashes  NEURO: Normal strength " and tone, sensory exam grossly normal, mentation intact and speech normal  PSYCH: mentation appears normal and affect normal/bright    Pending orders and results       ASSESSMENT / PLAN:   Latoya was seen today for physical.    Diagnoses and all orders for this visit:    Encounter for Medicare annual wellness exam  -     PRIMARY CARE FOLLOW-UP SCHEDULING; Future  -     REVIEW OF HEALTH MAINTENANCE PROTOCOL ORDERS    Hyperlipidemia with target LDL less than 100  -     Lipid panel reflex to direct LDL Fasting; Future  -     Comprehensive metabolic panel; Future  -     atorvastatin (LIPITOR) 10 MG tablet; Take 1 tablet (10 mg) by mouth daily  The current medical regimen is effective.  Continue current medication regimen unchanged.    Screening for thyroid disorder  -     TSH with free T4 reflex; Future    Type 1 diabetes mellitus without complication (H)  -     Hemoglobin A1c; Future  -     Albumin Random Urine Quantitative with Creat Ratio; Future  -     insulin pen needle (B-D U/F) 31G X 8 MM miscellaneous; USE 5 TIMES DAILY  WITH NOVOLOG AND LANTUS  -     insulin glargine (BASAGLAR KWIKPEN) 100 UNIT/ML pen; Inject 18 Units Subcutaneous daily Max, 35 units daily  -     ND FOOT EXAM NO CHARGE    Visit for screening mammogram  -     *MA Screening Digital Bilateral; Future    Chronic atrial fibrillation (H)  -     metoprolol succinate ER (TOPROL XL) 50 MG 24 hr tablet; Take 1 tablet (50 mg) by mouth daily  The current medical regimen is effective.  Continue current medication regimen unchanged.    PLAN:   FURTHER TESTING:       - mammogram  FUTURE LABS:       - Schedule a fasting blood draw   Will follow up and/or notify patient of  results via My Chart to determine further need for followup  Follow up office visit in one year for annual health maintenance exam, sooner PRN.   Patient needs to follow up in if no improvement,or sooner if worsening of symptoms or other symptoms develop.    Patient has been advised of split  "billing requirements and indicates understanding: Yes      COUNSELING:  Reviewed preventive health counseling, as reflected in patient instructions  Special attention given to:       Regular exercise       Healthy diet/nutrition       Vision screening       Fall risk prevention       Immunizations  Declined: Covid-19 and Influenza due to Other              Aspirin prophylaxis        Colon cancer screening       Hepatitis C screening       The 10-year ASCVD risk score (Brittni PATEL, et al., 2019) is: 20.2%    Values used to calculate the score:      Age: 73 years      Sex: Female      Is Non- : No      Diabetic: Yes      Tobacco smoker: No      Systolic Blood Pressure: 118 mmHg      Is BP treated: No      HDL Cholesterol: 61 mg/dL      Total Cholesterol: 185 mg/dL       Advanced Planning       BMI:   Estimated body mass index is 27.49 kg/m  as calculated from the following:    Height as of this encounter: 1.67 m (5' 5.75\").    Weight as of this encounter: 76.7 kg (169 lb).   Weight management plan: Discussed healthy diet and exercise guidelines      She reports that she has never smoked. She has never used smokeless tobacco.      Appropriate preventive services were discussed with this patient, including applicable screening as appropriate for fall prevention, nutrition, physical activity, Tobacco-use cessation, weight loss and cognition.  Checklist reviewing preventive services available has been given to the patient.    Reviewed patients plan of care and provided an AVS. The Intermediate Care Plan ( asthma action plan, low back pain action plan, and migraine action plan) for Latoya meets the Care Plan requirement. This Care Plan has been established and reviewed with the Patient.          [facility]    Identified Health Risks:  I have reviewed Opioid Use Disorder and Substance Use Disorder risk factors and made any needed referrals.   "

## 2023-11-14 NOTE — COMMUNITY RESOURCES LIST (ENGLISH)
11/14/2023   [facility] [employer]  N/A  For questions about this resource list or additional care needs, please contact your primary care clinic or care manager.  Phone: [phone]   Email: N/A   Address: [address] [place] [id number]   Hours: N/A        Financial Stability       Utility payment assistance  1  [facility] - Emergency Assistance Program (EAP) - Utilities Distance: 2.68 miles      In-Person, Phone/Virtual   [address] [place] [address]  Language: American Sign Language, English  Hours: Mon - Fri 8:00 AM - 4:00 PM  Fees: Free   Phone: (955) 650-5250 Email: servicelenny@[facility] Website: [email or web address]#human-services     2  [employer] [facility] ([facility] - Low Income Home Energy Assistance Program (LIHEAP) Distance: 4.55 miles      In-Person   [address] [place] [address]  Language: English  Hours: Mon 8:00 AM - 4:30 PM , Tue 8:00 AM - 7:00 PM , Wed 8:00 AM - 4:30 PM , Thu 8:00 AM - 7:00 PM , Fri 8:00 AM - 4:30 PM  Fees: Free   Phone: (782) 530-4015 Email: info@[facility].[employer] Website: https://[email or web address]/          Important Numbers & Websites       Emergency Services   911  [facility] Services   311  Poison Control   (165) 508-5159  Suicide Prevention Lifeline   (401) 464-4823 (TALK)  Child Abuse Hotline   (143) 173-2805 (4-A-Child)  Sexual Assault Hotline   (561) 360-2715 (HOPE)  National Runaway Safeline   (372) 600-1918 (RUNAWAY)  All-Options Talkline   (412) 212-8531  Substance Abuse Referral   (193) 107-3788 (HELP)

## 2023-11-17 ENCOUNTER — TELEPHONE (OUTPATIENT)
Dept: ENDOCRINOLOGY | Facility: CLINIC | Age: 73
End: 2023-11-17
Payer: COMMERCIAL

## 2023-11-17 NOTE — TELEPHONE ENCOUNTER
Dexcom printed and placed on Sarah Palm's desk to review and advise.    Heike Adams CMA  Adult Endocrinology  [facility] [place]

## 2023-11-17 NOTE — TELEPHONE ENCOUNTER
[facility] Call Center    Phone Message    May a detailed message be left on voicemail: yes     Reason for Call: Symptoms or Concerns     If patient has red-flag symptoms, warm transfer to triage line    Current symptom or concern: blood sugar concerns      Are there any new or worsening symptoms? Yes: Pt called and stated her blood sugars have been all over, sometimes high sometimes low, requesting call back to discuss

## 2023-11-17 NOTE — TELEPHONE ENCOUNTER
Left message for the patient:  Per  Néstor:    The ups and downs look to be a dexcom issue. Please be sure to verify the readings at this time with a fingerstick blood sugar check. Patient should also contact Dexcom to let them know of the sudden drops and then loss of connection and then blood sugar would be at the same level as it was before the loss of connection.    I asked the patient to call back with any further questions.    Heike Adams CMA  Adult Endocrinology  [facility], [place]

## 2023-11-24 ENCOUNTER — LAB (OUTPATIENT)
Dept: LAB | Facility: CLINIC | Age: 73
End: 2023-11-24
Payer: COMMERCIAL

## 2023-11-24 DIAGNOSIS — Z13.29 SCREENING FOR THYROID DISORDER: ICD-10-CM

## 2023-11-24 DIAGNOSIS — E10.9 TYPE 1 DIABETES MELLITUS WITHOUT COMPLICATION (H): ICD-10-CM

## 2023-11-24 DIAGNOSIS — E78.5 HYPERLIPIDEMIA WITH TARGET LDL LESS THAN 100: ICD-10-CM

## 2023-11-24 LAB
ALBUMIN SERPL BCG-MCNC: 4.3 G/DL (ref 3.5–5.2)
ALP SERPL-CCNC: 87 U/L (ref 40–150)
ALT SERPL W P-5'-P-CCNC: 22 U/L (ref 0–50)
ANION GAP SERPL CALCULATED.3IONS-SCNC: 9 MMOL/L (ref 7–15)
AST SERPL W P-5'-P-CCNC: 23 U/L (ref 0–45)
BILIRUB SERPL-MCNC: 0.5 MG/DL
BUN SERPL-MCNC: 14.7 MG/DL (ref 8–23)
CALCIUM SERPL-MCNC: 9.2 MG/DL (ref 8.8–10.2)
CHLORIDE SERPL-SCNC: 103 MMOL/L (ref 98–107)
CHOLEST SERPL-MCNC: 189 MG/DL
CREAT SERPL-MCNC: 0.68 MG/DL (ref 0.51–0.95)
CREAT UR-MCNC: 69.3 MG/DL
DEPRECATED HCO3 PLAS-SCNC: 25 MMOL/L (ref 22–29)
EGFRCR SERPLBLD CKD-EPI 2021: >90 ML/MIN/1.73M2
GLUCOSE SERPL-MCNC: 288 MG/DL (ref 70–99)
HBA1C MFR BLD: 7.8 % (ref 0–5.6)
HDLC SERPL-MCNC: 75 MG/DL
LDLC SERPL CALC-MCNC: 103 MG/DL
MICROALBUMIN UR-MCNC: <12 MG/L
MICROALBUMIN/CREAT UR: NORMAL MG/G{CREAT}
NONHDLC SERPL-MCNC: 114 MG/DL
POTASSIUM SERPL-SCNC: 4.6 MMOL/L (ref 3.4–5.3)
PROT SERPL-MCNC: 6.6 G/DL (ref 6.4–8.3)
SODIUM SERPL-SCNC: 137 MMOL/L (ref 135–145)
TRIGL SERPL-MCNC: 56 MG/DL
TSH SERPL DL<=0.005 MIU/L-ACNC: 2.12 UIU/ML (ref 0.3–4.2)

## 2023-11-24 PROCEDURE — 82570 ASSAY OF URINE CREATININE: CPT

## 2023-11-24 PROCEDURE — 84443 ASSAY THYROID STIM HORMONE: CPT

## 2023-11-24 PROCEDURE — 83036 HEMOGLOBIN GLYCOSYLATED A1C: CPT

## 2023-11-24 PROCEDURE — 82043 UR ALBUMIN QUANTITATIVE: CPT

## 2023-11-24 PROCEDURE — 80053 COMPREHEN METABOLIC PANEL: CPT

## 2023-11-24 PROCEDURE — 36415 COLL VENOUS BLD VENIPUNCTURE: CPT

## 2023-11-24 PROCEDURE — 80061 LIPID PANEL: CPT

## 2023-11-27 NOTE — PROGRESS NOTES
Outcome for 11/27/23 11:39 AM: Data obtained via Dexcom website  Nell Pena CMA  Adult Endocrinology   [facility]

## 2023-11-27 NOTE — RESULT ENCOUNTER NOTE
Concetta Rodríguez,    Attached are your test results.  -Cholesterol levels are at your goal levels.  ADVISE: continuing your medication, a regular exercise program with at least 150 minutes of aerobic exercise per week, and eating a low saturated fat/low carbohydrate diet.  Also, you should recheck this fasting cholesterol panel in 12 months.  -Liver and gallbladder tests (ALT,AST, Alk phos,bilirubin) are normal.  -Kidney function (GFR) is normal.  -Sodium is normal.  -Potassium is normal.  -Calcium is normal.  -Glucose is elevated due to your diabetes.  -A1C (test of diabetes control the last 2-3 months) is some better but still elevated please keep follow up with your endocrinologist   -TSH (thyroid stimulating hormone) level is normal which indicates normal thyroid function.  -Microalbumin (urine protein) test is normal.  ADVISE: rechecking this annually.   Please contact us if you have any questions.    Pricila Avila, CNP

## 2023-11-30 ENCOUNTER — OFFICE VISIT (OUTPATIENT)
Dept: ENDOCRINOLOGY | Facility: CLINIC | Age: 73
End: 2023-11-30
Payer: COMMERCIAL

## 2023-11-30 VITALS
WEIGHT: 165 LBS | RESPIRATION RATE: 16 BRPM | OXYGEN SATURATION: 97 % | HEART RATE: 87 BPM | BODY MASS INDEX: 26.84 KG/M2 | SYSTOLIC BLOOD PRESSURE: 120 MMHG | DIASTOLIC BLOOD PRESSURE: 55 MMHG

## 2023-11-30 DIAGNOSIS — E10.65 TYPE 1 DIABETES MELLITUS WITH HYPERGLYCEMIA (H): Primary | ICD-10-CM

## 2023-11-30 PROCEDURE — 99213 OFFICE O/P EST LOW 20 MIN: CPT | Performed by: PHYSICIAN ASSISTANT

## 2023-11-30 RX ORDER — INSULIN GLARGINE 100 [IU]/ML
INJECTION, SOLUTION SUBCUTANEOUS AT BEDTIME
COMMUNITY
End: 2024-03-18 | Stop reason: ALTCHOICE

## 2023-11-30 NOTE — LETTER
11/30/2023         RE: Latoya Rodríguez  [address] [facility] [address]        Dear Colleague,    Thank you for referring your patient, Latoya Rodríguez, to the [facility]. Please see a copy of my visit note below.          Outcome for 11/27/23 11:39 AM: Data obtained via Dexcom website  Nell Pena CMA  Adult Endocrinology   [facility]        Assessment/Plan :   Type 1 DM. Latoya continues to work on improving her blood sugars. Her hemoglobin A1C has improved but I would still like her to work on getting her blood sugars under better control. We reviewed her CGMS report and discussed her recent odd readings. The recurring lows have primarily occurred with her current sensor. I am going to give her another Dexcom G7 sensor. I would like her to replace the current sensor with our sample. Hopefully, this will solve the problem. If she continues to have odd readings, she is to contact my office.  Weight gain. We discussed how insulin therapy can contribute to weight gain. We also discussed some medication options including trying metformin. Latoya is worried about the possibility of adverse effects related to the metformin. She feels like she eats healthy and she walks daily. We discussed adding in weight training. This would help prevent osteoporosis and it may contribute to weight loss.      I have independently reviewed and interpreted labs, imaging as indicated.      Chief complaint:  Latoya is a 73 year old female who returns for follow-up of Type 1 DM.    I have reviewed Care Everywhere including [facility], [facility], [facility], [facility], [place], [facility], [facility] , [facility], [place] lab reports, imaging reports and provider notes as indicated.      HISTORY OF PRESENT ILLNESS  Latoya continues to work on managing her blood sugars. She feels like she is doing better but she is concerned about the accuracy of the Dexcom. She is also  worried about ongoing weight gain. At present time she is taking 20 unit(s) of Lantus nightly along with Novolog before meals. She takes Novolog based on an insulin to carb ratio of 1 unit(s) for every 20 grams of cars for breakfast and 1 unit(s) for every 17 grams of carbs for lunch and dinner. She has been using the Dexcom G7 sensor but she has had some very strange readings, so recently she has also been using fingerstick testing. She has not had any severe hyperglycemia and/or hypoglycemia but she did have quite a few low Dexcom readings while the fingerstick testing was elevated. Below are the corresponding fingerstick and sensor readings:    Fingerstick CGMS   267 82   230 75   162 71   170 57   212 58   219 59   204 62   192 52     These readings have occurred at random times throughout the last week. She has also had a few odd sensor alerts at night. She denies any hypoglycemic symptoms.     Latoya denies any problems with blurred vision or worsening numbness/tingling in her feet. She has had diabetes since 2016. She was diagnosed at a routine physical. She was first thought to have Type 2 DM but she was later found to have TORRES antibodies and a low c-peptide. She has been on MDI insulin therapy ever since.    Endocrine relevant labs are as follows:   Latest Reference Range & Units 11/24/23 08:19   Albumin Urine mg/L mg/L <12.0      Latest Reference Range & Units 11/24/23 08:19   Hemoglobin A1C 0.0 - 5.6 % 7.8 (H)   (H): Data is abnormally high    REVIEW OF SYSTEMS    Endocrine: positive for diabetes  Skin: negative  Eyes: negative for, visual blurring, redness, tearing  Ears/Nose/Throat: negative  Respiratory: No shortness of breath, dyspnea on exertion, cough, or hemoptysis  Cardiovascular: negative for, irregular heart beat, chest pain, lower extremity edema, and exercise intolerance  Gastrointestinal: negative for, nausea, vomiting, constipation, and diarrhea  Genitourinary: negative for, nocturia,  dysuria, frequency, and urgency  Musculoskeletal: negative for, muscular weakness, nocturnal cramping, and foot pain  Neurologic: negative for, local weakness, numbness or tingling of hands, and numbness or tingling of feet  Psychiatric: negative  Hematologic/Lymphatic/Immunologic: negative    Past Medical History  Past Medical History:   Diagnosis Date     Cataract      Chronic atrial fibrillation (H) 06/08/2022     Infectious arthritis (H) 6/27/2023     Osteoarthritis of carpometacarpal (CMC) joint of both thumbs 11/16/2022     Post-operative nausea and vomiting 1/18/2023    Severe. Recommend TIVA     Type 2 diabetes mellitus with hyperglycemia (H) 06/24/2016       Medications  Current Outpatient Medications   Medication Sig Dispense Refill     acetaminophen (TYLENOL) 325 MG tablet Take 2 tablets (650 mg) by mouth every 4 hours as needed for other 60 tablet 0     alendronate (FOSAMAX) 70 MG tablet Take 1 tablet (70 mg) by mouth every 7 days 12 tablet 3     apixaban ANTICOAGULANT (ELIQUIS) 5 MG tablet Take 1 tablet (5 mg) by mouth 2 times daily 180 tablet 3     atorvastatin (LIPITOR) 10 MG tablet Take 1 tablet (10 mg) by mouth daily 90 tablet 3     calcium carbonate-vitamin D 500-400 MG-UNIT TABS tablt Take 1 tablet by mouth 2 times daily 180 tablet 3     conjugated estrogens (PREMARIN) 0.625 MG/GM vaginal cream Place 0.5 g vaginally twice a week 30 g 3     Continuous Blood Gluc Sensor (DEXCOM G7 SENSOR) MISC 1 each every 10 days 9 each 3     estradiol (ESTRACE) 0.1 MG/GM vaginal cream INSERT 2G VAGINALLY TWICE PER WEEK 42.5 g 11     insulin aspart (NOVOLOG FLEXPEN) 100 UNIT/ML pen 1 unit per 20 gram of carb for breakfast, 1 unit per 15 grams of carbs for lunch and dinner. Plus 1 unit per 50 mg/dl above 150. Total daily dose approx 30 units. 30 mL 3     insulin glargine (LANTUS VIAL) 100 UNIT/ML vial Inject Subcutaneous at bedtime       insulin pen needle (B-D U/F) 31G X 8 MM miscellaneous USE 5 TIMES DAILY  WITH  NOVOLOG AND LANTUS 500 each 3     melatonin 3 MG tablet Take 1 tablet (3 mg) by mouth nightly as needed for sleep 30 tablet 0     metoprolol succinate ER (TOPROL XL) 50 MG 24 hr tablet Take 1 tablet (50 mg) by mouth daily 90 tablet 3     Multiple Vitamins-Minerals (MULTIVITAMIN ADULT PO) Take 1 tablet by mouth every morning        Urine Glucose-Ketones Test (KETO-DIASTIX) STRP 1 strip by In Vitro route as needed 1 each 11       Allergies  No Known Allergies      Family History  family history includes Brain Cancer in her maternal uncle; Breast Cancer in her mother; Coronary Artery Disease in her maternal uncle and maternal uncle; Hyperlipidemia in her mother; Leukemia in her father; Other Cancer in her father; Skin Cancer in her father; Stomach Cancer in her maternal aunt.    Social History  Social History     Tobacco Use     Smoking status: Never     Smokeless tobacco: Never   Vaping Use     Vaping Use: Never used   Substance Use Topics     Alcohol use: Yes     Comment: 1 to 2 beers or glasses of wine 1 to 2 times per month     Drug use: No       Physical Exam  /55 (BP Location: Left arm, Patient Position: Sitting, Cuff Size: Adult Regular)   Pulse 87   Resp 16   Wt 74.8 kg (165 lb)   LMP  (LMP Unknown)   SpO2 97%   BMI 26.84 kg/m    Body mass index is 26.84 kg/m .  GENERAL :  In no apparent distress  SKIN: Normal color, normal temperature, texture.  No hirsutism, alopecia or purple striae.     EYES: PERRL, EOMI, No scleral icterus,  No proptosis, conjunctival redness, stare, retraction  RESP: Lungs clear to auscultation bilaterally  CARDIAC: Regular rate and rhythm, normal S1 S2, without murmurs, rubs or gallops      NEURO: awake, alert, responds appropriately to questions.  Cranial nerves intact.   Moves all extremities; Gait normal.  No tremor of the outstretched hand.  EXTREMITIES: No clubbing, cyanosis or edema.    DATA REVIEW  Dexcom Clarity  Time in target range 40%  Very Low <1%, Low 1%  High  33% and Very High 25%  Current Ave  mg/dl        Again, thank you for allowing me to participate in the care of your patient.        Sincerely,        Sarah Palm PA-C

## 2023-11-30 NOTE — NURSING NOTE
Latoya Rodríguez's goals for this visit include:   Chief Complaint   Patient presents with    Follow Up     DMI       She requests these members of her care team be copied on today's visit information: yes    PCP: Pricila Avila    Referring Provider:  No referring provider defined for this encounter.    /55 (BP Location: Left arm, Patient Position: Sitting, Cuff Size: Adult Regular)   Pulse 87   Resp 16   Wt 74.8 kg (165 lb)   LMP  (LMP Unknown)   SpO2 97%   BMI 26.84 kg/m      Do you need any medication refills at today's visit? None    Jaun Dickey, EMT

## 2023-11-30 NOTE — PATIENT INSTRUCTIONS
[facility]-Department of Endocrinology  Diabetes Educators:   Tasha Harley, RN and Humera Caraballo RN  Clinic Nurse: NESTOR Mckenna  CMA's: Nell Burroughs and Navi   EMT: Jaun  Scheduling/Clinic phone number : [phone]   Clinic Fax: [phone]  On-Call Endocrine at the [facility] (after hours/weekends): [phone] option 4    Please call the number below to schedule your labs.  [facility] [phone]   [facility] [phone]   [place] [phone]   [facility]  [phone]   [facility] [phone]   [place] [phone]   [facility]) [phone]   [facility] Walk-In Only   [place] [phone]   [place] [phone]   [place] [phone]   [place] [phone]     Please reach out to the following centers to schedule your imaging appointment:  Imaging (DEXA, CT, MRI, XRAY)    [facility] ([facility], [facility], [place]) [phone]   [facility] ([place], [place]) [phone]   [facility] ([facility]) [phone]   [facility] ([facility]) [phone]     Appointment Reminders:  * Please bring meter with for staff to download  * If you are due ONLY for an A1C, it is scheduled with the nurse and will be done in clinic. You do not need to schedule a lab appointment. Fasting is not required for an A1C.  * Refill request should be submitted to your pharmacy. They will contact clinic for approval.

## 2023-12-01 NOTE — PROGRESS NOTES
Assessment/Plan :   Type 1 DM. Latoya continues to work on improving her blood sugars. Her hemoglobin A1C has improved but I would still like her to work on getting her blood sugars under better control. We reviewed her CGMS report and discussed her recent odd readings. The recurring lows have primarily occurred with her current sensor. I am going to give her another Dexcom G7 sensor. I would like her to replace the current sensor with our sample. Hopefully, this will solve the problem. If she continues to have odd readings, she is to contact my office.  Weight gain. We discussed how insulin therapy can contribute to weight gain. We also discussed some medication options including trying metformin. Latoya is worried about the possibility of adverse effects related to the metformin. She feels like she eats healthy and she walks daily. We discussed adding in weight training. This would help prevent osteoporosis and it may contribute to weight loss.      I have independently reviewed and interpreted labs, imaging as indicated.      Chief complaint:  Latoya is a 73 year old female who returns for follow-up of Type 1 DM.    I have reviewed Care Everywhere including [facility], [facility], [facility], [facility], [facility] , [facility], [place] lab reports, imaging reports and provider notes as indicated.      HISTORY OF PRESENT ILLNESS  Latoya continues to work on managing her blood sugars. She feels like she is doing better but she is concerned about the accuracy of the Dexcom. She is also worried about ongoing weight gain. At present time she is taking 20 unit(s) of Lantus nightly along with Novolog before meals. She takes Novolog based on an insulin to carb ratio of 1 unit(s) for every 20 grams of cars for breakfast and 1 unit(s) for every 17 grams of carbs for lunch and dinner. She has been using the Dexcom G7 sensor but she has had some very strange readings, so recently she has also been using  fingerstick testing. She has not had any severe hyperglycemia and/or hypoglycemia but she did have quite a few low Dexcom readings while the fingerstick testing was elevated. Below are the corresponding fingerstick and sensor readings:    Fingerstick CGMS   267 82   230 75   162 71   170 57   212 58   219 59   204 62   192 52     These readings have occurred at random times throughout the last week. She has also had a few odd sensor alerts at night. She denies any hypoglycemic symptoms.     Latoya denies any problems with blurred vision or worsening numbness/tingling in her feet. She has had diabetes since 2016. She was diagnosed at a routine physical. She was first thought to have Type 2 DM but she was later found to have TORRES antibodies and a low c-peptide. She has been on MDI insulin therapy ever since.    Endocrine relevant labs are as follows:   Latest Reference Range & Units 11/24/23 08:19   Albumin Urine mg/L mg/L <12.0      Latest Reference Range & Units 11/24/23 08:19   Hemoglobin A1C 0.0 - 5.6 % 7.8 (H)   (H): Data is abnormally high    REVIEW OF SYSTEMS    Endocrine: positive for diabetes  Skin: negative  Eyes: negative for, visual blurring, redness, tearing  Ears/Nose/Throat: negative  Respiratory: No shortness of breath, dyspnea on exertion, cough, or hemoptysis  Cardiovascular: negative for, irregular heart beat, chest pain, lower extremity edema, and exercise intolerance  Gastrointestinal: negative for, nausea, vomiting, constipation, and diarrhea  Genitourinary: negative for, nocturia, dysuria, frequency, and urgency  Musculoskeletal: negative for, muscular weakness, nocturnal cramping, and foot pain  Neurologic: negative for, local weakness, numbness or tingling of hands, and numbness or tingling of feet  Psychiatric: negative  Hematologic/Lymphatic/Immunologic: negative    Past Medical History  Past Medical History:   Diagnosis Date    Cataract     Chronic atrial fibrillation (H) 06/08/2022     Infectious arthritis (H) 6/27/2023    Osteoarthritis of carpometacarpal (CMC) joint of both thumbs 11/16/2022    Post-operative nausea and vomiting 1/18/2023    Severe. Recommend TIVA    Type 2 diabetes mellitus with hyperglycemia (H) 06/24/2016       Medications  Current Outpatient Medications   Medication Sig Dispense Refill    acetaminophen (TYLENOL) 325 MG tablet Take 2 tablets (650 mg) by mouth every 4 hours as needed for other 60 tablet 0    alendronate (FOSAMAX) 70 MG tablet Take 1 tablet (70 mg) by mouth every 7 days 12 tablet 3    apixaban ANTICOAGULANT (ELIQUIS) 5 MG tablet Take 1 tablet (5 mg) by mouth 2 times daily 180 tablet 3    atorvastatin (LIPITOR) 10 MG tablet Take 1 tablet (10 mg) by mouth daily 90 tablet 3    calcium carbonate-vitamin D 500-400 MG-UNIT TABS tablt Take 1 tablet by mouth 2 times daily 180 tablet 3    conjugated estrogens (PREMARIN) 0.625 MG/GM vaginal cream Place 0.5 g vaginally twice a week 30 g 3    Continuous Blood Gluc Sensor (DEXCOM G7 SENSOR) MISC 1 each every 10 days 9 each 3    estradiol (ESTRACE) 0.1 MG/GM vaginal cream INSERT 2G VAGINALLY TWICE PER WEEK 42.5 g 11    insulin aspart (NOVOLOG FLEXPEN) 100 UNIT/ML pen 1 unit per 20 gram of carb for breakfast, 1 unit per 15 grams of carbs for lunch and dinner. Plus 1 unit per 50 mg/dl above 150. Total daily dose approx 30 units. 30 mL 3    insulin glargine (LANTUS VIAL) 100 UNIT/ML vial Inject Subcutaneous at bedtime      insulin pen needle (B-D U/F) 31G X 8 MM miscellaneous USE 5 TIMES DAILY  WITH NOVOLOG AND LANTUS 500 each 3    melatonin 3 MG tablet Take 1 tablet (3 mg) by mouth nightly as needed for sleep 30 tablet 0    metoprolol succinate ER (TOPROL XL) 50 MG 24 hr tablet Take 1 tablet (50 mg) by mouth daily 90 tablet 3    Multiple Vitamins-Minerals (MULTIVITAMIN ADULT PO) Take 1 tablet by mouth every morning       Urine Glucose-Ketones Test (KETO-DIASTIX) STRP 1 strip by In Vitro route as needed 1 each 11        Allergies  No Known Allergies      Family History  family history includes Brain Cancer in her maternal uncle; Breast Cancer in her mother; Coronary Artery Disease in her maternal uncle and maternal uncle; Hyperlipidemia in her mother; Leukemia in her father; Other Cancer in her father; Skin Cancer in her father; Stomach Cancer in her maternal aunt.    Social History  Social History     Tobacco Use    Smoking status: Never    Smokeless tobacco: Never   Vaping Use    Vaping Use: Never used   Substance Use Topics    Alcohol use: Yes     Comment: 1 to 2 beers or glasses of wine 1 to 2 times per month    Drug use: No       Physical Exam  /55 (BP Location: Left arm, Patient Position: Sitting, Cuff Size: Adult Regular)   Pulse 87   Resp 16   Wt 74.8 kg (165 lb)   LMP  (LMP Unknown)   SpO2 97%   BMI 26.84 kg/m    Body mass index is 26.84 kg/m .  GENERAL :  In no apparent distress  SKIN: Normal color, normal temperature, texture.  No hirsutism, alopecia or purple striae.     EYES: PERRL, EOMI, No scleral icterus,  No proptosis, conjunctival redness, stare, retraction  RESP: Lungs clear to auscultation bilaterally  CARDIAC: Regular rate and rhythm, normal S1 S2, without murmurs, rubs or gallops      NEURO: awake, alert, responds appropriately to questions.  Cranial nerves intact.   Moves all extremities; Gait normal.  No tremor of the outstretched hand.  EXTREMITIES: No clubbing, cyanosis or edema.    DATA REVIEW  Dexcom Clarity  Time in target range 40%  Very Low <1%, Low 1%  High 33% and Very High 25%  Current Ave  mg/dl

## 2023-12-04 ENCOUNTER — ANCILLARY PROCEDURE (OUTPATIENT)
Dept: MAMMOGRAPHY | Facility: CLINIC | Age: 73
End: 2023-12-04
Attending: NURSE PRACTITIONER
Payer: COMMERCIAL

## 2023-12-04 DIAGNOSIS — Z12.31 VISIT FOR SCREENING MAMMOGRAM: ICD-10-CM

## 2023-12-04 PROCEDURE — 77067 SCR MAMMO BI INCL CAD: CPT | Mod: GC | Performed by: RADIOLOGY

## 2023-12-04 PROCEDURE — 77063 BREAST TOMOSYNTHESIS BI: CPT | Mod: GC | Performed by: RADIOLOGY

## 2023-12-20 DIAGNOSIS — Z47.89 ORTHOPEDIC AFTERCARE: Primary | ICD-10-CM

## 2023-12-26 NOTE — PROGRESS NOTES
Chief Complaint: Mala-operative atrial flutter    HPI (06/09/2021): Latoya Rodríguez is a 70 year old female with a past medical history of type II diabetes mellitus who was referred to me for evaluation of mala-operative atrial flutter by the [facility] Anesthesia team.     Briefly, Mrs. Rodríguez underwent cystourethroscopy on 05/11/2021 under [facility]. She was noted to have mala-operative atrial flutter. This was documented on an EKG on 05/11, as noted below. She reverted to NSR without any intervention from what it seems. Her procedure was completed uneventfully. She was referred to me for further evaluation.    At the time of the consultation, she notes an absence of chest pain at rest, dyspnea at rest or with exertion, PND, orthopnea, peripheral edema, palpitations, lightheadedness or syncope. She has never had any symptoms suggestive of atrial flutter, such as exertional dyspnea or dyspnea at rest. Mrs. Rodríguez walks 4-5 miles daily.     A comprehensive ROS was done and the details are included above in the HPI.    Interval History 11/10/2021:  Since her last visit, Mrs. Rodríguez underwent DCCV. She had three attempts that were unsuccessful. Since then, she notes complete absence of symptoms consistent with atrial fibrillation, such as palpitations, chest pain, exertional dyspnea, or fatigue.  She has continued to take her apixaban in uninterrupted fashion without any sequelae of bleeding.    A comprehensive ROS was done and the details are included above in the HPI.    Interval History 03/30/2022:  Since her last visit, Ms. Rodríguez notes that she has been well from a cardiovascular standpoint.  She has had no sequelae of bleeding and has had no palpitations or change in her exercise tolerance.  Her chief complaint is that she has had significant pain related to herniated lumbar intervertebral disc.    A comprehensive ROS was done and the details are included above in the HPI.    Interval History 3/29/23:    Since Ms.  Marcos's last visit, she had a right hip arthroplasty.  Her postoperative course was notable for her having an increase in her ventricular rate, for which cardiology was consulted.  Ms. Rodríguez was started on diltiazem and metoprolol titrate 50 four times daily.  This was de-escalated over the course of her admission as her heart rate improved.  After discharge, Ms. Rodríguez then saw my colleague Ms. Johnson multiple times.  Her metoprolol was continued.    Today, Ms. Rodríguez notes that she feels well. No CERDA, CP, palpitations.     A comprehensive ROS was done and the details are included above in the HPI.    Interval History 12/27/23:    Since Ms. Rodríguez's last visit, she has been doing well. Her home BPs are in the 120s/50s. No atrial flutter or chest pain symptoms. Walking 3-5 miles per day walking her dog.    A comprehensive ROS was done and the details are included above in the HPI.        Past Medical History:  - T2DM, IDDM  - Atrial fibrillation, longstanding persistent  - Aortic plaque (noted on coronary CTA in 2021)  - No prior history of hypertension, dyslipidemia, CAD, TIA/stroke, vascular aneurysms, PAD  Past Medical History:   Diagnosis Date    Cataract     Chronic atrial fibrillation (H) 06/08/2022    Infectious arthritis (H) 6/27/2023    Osteoarthritis of carpometacarpal (CMC) joint of both thumbs 11/16/2022    Post-operative nausea and vomiting 1/18/2023    Severe. Recommend TIVA    Type 2 diabetes mellitus with hyperglycemia (H) 06/24/2016       Past Surgical History:    Past Surgical History:   Procedure Laterality Date    ANESTHESIA CARDIOVERSION N/A 9/23/2021    Procedure: ANESTHESIA, FOR CARDIOVERSION@1100;  Surgeon: GENERIC ANESTHESIA PROVIDER;  Location: [facility] OR    ANESTHESIA OUT OF OR MRI N/A 9/14/2022    Procedure: ANESTHESIA OUT OF OR Magnetic resonance imaging right hip and lumbar @0800;  Surgeon: GENERIC ANESTHESIA PROVIDER;  Location: [facility] OR    ARTHROPLASTY HIP Right 1/18/2023    Procedure:  ARTHROPLASTY, HIP, TOTAL RIGHT;  Surgeon: Arnaud Ward MD;  Location: [facility] OR    CYSTOSCOPY, INJECT COLLAGEN, COMBINED N/A 5/11/2021    Procedure: CYSTOSCOPY, WITH PERIURETHRAL BULKING AGENT INJECTION;  Surgeon: Alberto Zhang MD;  Location: [facility] OR    CYSTOSCOPY, INJECT COLLAGEN, COMBINED N/A 9/12/2022    Procedure: CYSTOSCOPY, WITH PERIURETHRAL BULKING AGENT INJECTION (look in the bladder and urethra and inject filler into the urethra);  Surgeon: Alberto Zhang MD;  Location: [facility] OR    EYE SURGERY  9/04, 5/11    Retinal detachment, Cataract (both eyes)    IMPLANT STIMULATOR AND LEADS SACRAL NERVE (STAGE ONE AND TWO) Right 7/5/2022    Procedure: INSERTION, SACRAL NERVE STIMULATOR, STAGE 1 & 2 (permanent implant on the RIGHT side with [employer]);  Surgeon: Alberto Zhang MD;  Location: [facility] OR    IMPLANT STIMULATOR SACRAL NERVE PERCUTANEOUS TRIAL N/A 6/14/2022    Procedure: INSERTION, NEUROSTIMULATOR, SACRAL, PERCUTANEOUS, FOR TRIAL;  Surgeon: Alberto Zhang MD;  Location: [facility] OR    RELEASE CARPAL TUNNEL Right 8/6/2021    Procedure: RELEASE, CARPAL TUNNEL, Right;  Surgeon: RADHA Sandoval MD;  Location: [facility] OR    RELEASE TRIGGER FINGER Bilateral 5/10/2019    Procedure: RELEASE TRIGGER FINGER, BILATERAL LONG AND RING FINGERS;  Surgeon: RADHA Sandoval MD;  Location: [facility] OR    VASCULAR SURGERY      Varicose Veins       Drug History:  Home cardiac meds: Apixaban 5 mg twice daily, atorvastatin 10 mg q24h, metoprolol succinate 50 mg q24h. No longer on as needed diltiazem.  Current Outpatient Medications   Medication Sig Dispense Refill    acetaminophen (TYLENOL) 325 MG tablet Take 2 tablets (650 mg) by mouth every 4 hours as needed for other 60 tablet 0    alendronate (FOSAMAX) 70 MG tablet Take 1 tablet (70 mg) by mouth every 7 days 12 tablet 3    apixaban ANTICOAGULANT (ELIQUIS) 5 MG tablet Take 1 tablet (5 mg) by mouth 2 times daily 180 tablet 3    atorvastatin (LIPITOR) 10 MG tablet Take  1 tablet (10 mg) by mouth daily 90 tablet 3    calcium carbonate-vitamin D 500-400 MG-UNIT TABS tablt Take 1 tablet by mouth 2 times daily 180 tablet 3    conjugated estrogens (PREMARIN) 0.625 MG/GM vaginal cream Place 0.5 g vaginally twice a week 30 g 3    Continuous Blood Gluc Sensor (DEXCOM G7 SENSOR) MISC 1 each every 10 days 9 each 3    estradiol (ESTRACE) 0.1 MG/GM vaginal cream INSERT 2G VAGINALLY TWICE PER WEEK 42.5 g 11    insulin aspart (NOVOLOG FLEXPEN) 100 UNIT/ML pen 1 unit per 20 gram of carb for breakfast, 1 unit per 15 grams of carbs for lunch and dinner. Plus 1 unit per 50 mg/dl above 150. Total daily dose approx 30 units. 30 mL 3    insulin glargine (LANTUS VIAL) 100 UNIT/ML vial Inject Subcutaneous at bedtime      insulin pen needle (B-D U/F) 31G X 8 MM miscellaneous USE 5 TIMES DAILY  WITH NOVOLOG AND LANTUS 500 each 3    melatonin 3 MG tablet Take 1 tablet (3 mg) by mouth nightly as needed for sleep 30 tablet 0    metoprolol succinate ER (TOPROL XL) 50 MG 24 hr tablet Take 1 tablet (50 mg) by mouth daily 90 tablet 3    Multiple Vitamins-Minerals (MULTIVITAMIN ADULT PO) Take 1 tablet by mouth every morning       Urine Glucose-Ketones Test (KETO-DIASTIX) STRP 1 strip by In Vitro route as needed 1 each 11     Family History:  - No family history of premature CAD, valvular disorders, dysrhythmias  - Mother's brother:  in late 20s of unclear cause, ?'heart attack'  - Mother's second brother:  in late 40s of unclear cause, no autopsy done  Family History   Problem Relation Age of Onset    Hyperlipidemia Mother     Breast Cancer Mother     Other Cancer Father     Leukemia Father     Skin Cancer Father     Coronary Artery Disease Maternal Uncle     Brain Cancer Maternal Uncle     Coronary Artery Disease Maternal Uncle     Stomach Cancer Maternal Aunt     Diabetes No family hx of     Hypertension No family hx of     Cerebrovascular Disease No family hx of     Colon Cancer No family hx of      Thyroid Disease No family hx of     Depression No family hx of     Anxiety Disorder No family hx of        Social History:  Used to work in Management.  Social History     Tobacco Use    Smoking status: Never    Smokeless tobacco: Never   Substance Use Topics    Alcohol use: Yes     Comment: 1 to 2 beers or glasses of wine 1 to 2 times per month       No Known Allergies      Physical Examination:  Vitals: LMP  (LMP Unknown)   BMI= There is no height or weight on file to calculate BMI.    GENERAL: Healthy, alert and no distress  Eyes: No xanthelasmas.  ENT: Moist mucosal membranes.  RESPIRATORY: No signs of resp distress.  Chest clear to auscultation bilaterally.  CARDIOVASCULAR:  No JVD, irregularly irregular, S1 plus S2 without any added heart sounds or murmurs.  EXTREMITIES: Warm, well-perfused, no edema.   NEUROLOGY: GCS 15/15, no focal deficits.  SKIN: No ecchymoses, no rashes.  PSYCH: Cooperative, pleasant affect.       Investigations:  I personally viewed and interpreted the following investigations:    Labs:    CBC RESULTS:  Lab Results   Component Value Date    WBC 7.5 01/21/2023    WBC 3.8 (L) 06/24/2016    RBC 3.46 (L) 01/21/2023    RBC 4.31 06/24/2016    HGB 10.7 (L) 01/21/2023    HGB 14.4 06/24/2016    HCT 31.9 (L) 01/21/2023    HCT 40.7 06/24/2016    MCV 92 01/21/2023    MCV 94 06/24/2016    MCH 30.9 01/21/2023    MCH 33.4 (H) 06/24/2016    MCHC 33.5 01/21/2023    MCHC 35.4 06/24/2016    RDW 13.3 01/21/2023    RDW 13.3 06/24/2016     01/21/2023     06/24/2016       CMP RESULTS:  Lab Results   Component Value Date     11/24/2023     08/02/2019    POTASSIUM 4.6 11/24/2023    POTASSIUM 4.1 01/23/2023    POTASSIUM 4.0 08/02/2019    CHLORIDE 103 11/24/2023    CHLORIDE 104 01/23/2023    CHLORIDE 103 08/02/2019    CO2 25 11/24/2023    CO2 23 01/23/2023    CO2 24 08/02/2019    ANIONGAP 9 11/24/2023    ANIONGAP 10 01/23/2023    ANIONGAP 8 08/02/2019     (H) 11/24/2023      (H) 01/24/2023     (H) 01/23/2023     (H) 08/02/2019    BUN 14.7 11/24/2023    BUN 11 01/23/2023    BUN 16 08/02/2019    CR 0.68 11/24/2023    CR 0.74 07/01/2021    GFRESTIMATED >90 11/24/2023    GFRESTIMATED >60 11/17/2021    GFRESTIMATED 82 07/01/2021    GFRESTBLACK >90 07/01/2021    RENATO 9.2 11/24/2023    RENATO 9.0 08/02/2019    BILITOTAL 0.5 11/24/2023    BILITOTAL 0.7 08/02/2019    ALBUMIN 4.3 11/24/2023    ALBUMIN 2.6 (L) 01/23/2023    ALBUMIN 3.7 08/02/2019    ALKPHOS 87 11/24/2023    ALKPHOS 97 08/02/2019    ALT 22 11/24/2023    ALT 24 08/02/2019    AST 23 11/24/2023    AST 16 08/02/2019      LIPIDS:  Lab Results   Component Value Date    CHOL 189 11/24/2023    CHOL 172 07/01/2021     Lab Results   Component Value Date    HDL 75 11/24/2023    HDL 70 07/01/2021     Lab Results   Component Value Date     11/24/2023    LDL 90 07/01/2021     Lab Results   Component Value Date    TRIG 56 11/24/2023    TRIG 58 07/01/2021       Recent Tests:    Electrocardiogram (05/11/2021):  Typical, counter-clockwise atrial flutter with variable AV-block. Incomplete RBBB.     Electrocardiogram (06/03/2021):  Reviewed with patient. Atrial fibrillation with HR of 104 bpm. Incomplete RBBB noted.     Coronary CTA (11/17/2021):  1.  Widely patent coronary arteries without evidence of atherosclerosis or stenosis.  2.  Total Agatston score 0 placing the patient in the lowest percentile when compared to an age- and gender-matched control group.  3.  Please review the separate Radiology report for incidental noncardiac findings. This report will follow separately.      Assessment and Plan:   Latoya Rodríguez is a 73 year old female with a past medical history of type II diabetes mellitus (insulin dependent) who initially presented to me for evaluation of atrial flutter in 2021.    Ms. Rodríguez continues to do well today.  We talked about raising her atorvastatin to 40 mg dose given her LDL being greater than 70 and her  history of type 2 diabetes mellitus in the context of mild aortic plaque.  She was agreeable to this.    Problems:  Atrial fibrillation, persistent (CHADS2-VASC of 4)   Atrial flutter, persistent   T2DM, insulin-dependent  Mild aortic plaque (noted incidentally on on the CTA and November 2021)    Plan:  - Raise atorvastatin to 40 mg daily  - Continue apixaban 5 mg twice daily and metoprolol succinate 50 mg once daily for atrial fibrillation thromboprophylaxis and rate control, respectively      A total of 30 minutes were spent on the day of the visit for chart review, care coordination, face-to-face consultation with the patient, and documentation.       Amadeo Whipple [facility], [place]    Cardiovascular Division  Pager [phone]    CC  Patient Care Team:  Pricila Avila APRN CNP as PCP - General (Family Practice)  Amy Cooper, RN as Registered Nurse  Edward Aguero MD as Assigned Endocrinology Provider  Alberto Zhang MD as Assigned Surgical Provider  Alberto Zhang MD as MD (Urology)  Karina Hoskins, RN as Specialty Care Coordinator (Urology)  Pricila Avila APRN CNP as Referring Physician (Internal Medicine - Pediatrics)  Amadeo Whipple MD as Assigned Heart and Vascular Provider  Pricila Avila APRN CNP as Assigned PCP  Arnaud Ward MD as Assigned Musculoskeletal Provider  Delmis Caraballo, RN as Diabetes Educator

## 2023-12-27 ENCOUNTER — OFFICE VISIT (OUTPATIENT)
Dept: CARDIOLOGY | Facility: CLINIC | Age: 73
End: 2023-12-27
Payer: COMMERCIAL

## 2023-12-27 VITALS
WEIGHT: 166 LBS | SYSTOLIC BLOOD PRESSURE: 152 MMHG | DIASTOLIC BLOOD PRESSURE: 74 MMHG | BODY MASS INDEX: 26.68 KG/M2 | OXYGEN SATURATION: 96 % | HEIGHT: 66 IN | HEART RATE: 120 BPM

## 2023-12-27 DIAGNOSIS — E78.5 HYPERLIPIDEMIA WITH TARGET LDL LESS THAN 100: ICD-10-CM

## 2023-12-27 PROCEDURE — 99214 OFFICE O/P EST MOD 30 MIN: CPT | Performed by: STUDENT IN AN ORGANIZED HEALTH CARE EDUCATION/TRAINING PROGRAM

## 2023-12-27 RX ORDER — ATORVASTATIN CALCIUM 40 MG/1
40 TABLET, FILM COATED ORAL DAILY
Qty: 90 TABLET | Refills: 3 | Status: SHIPPED | OUTPATIENT
Start: 2023-12-27 | End: 2024-12-21

## 2023-12-27 NOTE — NURSING NOTE
"Chief Complaint   Patient presents with    Atrial Fib     Return General Cardiology for 9 month return for Chronic AFIB       Initial BP (!) 152/74 (BP Location: Right arm, Patient Position: Chair, Cuff Size: Adult Regular)   Pulse 120   Ht 1.67 m (5' 5.75\")   Wt 75.3 kg (166 lb)   LMP  (LMP Unknown)   SpO2 96%   BMI 27.00 kg/m   Estimated body mass index is 27 kg/m  as calculated from the following:    Height as of this encounter: 1.67 m (5' 5.75\").    Weight as of this encounter: 75.3 kg (166 lb)..  BP completed using cuff size: regular    Rianna P., VF    "

## 2023-12-27 NOTE — PATIENT INSTRUCTIONS
Take your medicines every day, as directed     Changes made today:  Increase atorvastatin to 40 mg daily (you can take 10 mg x 4 tablets till you run out)  No other changes        Cardiology Care Coordinators:      Cassi AZAR RN     Cardiology Rooming Staff:  Emiliana PIERCE    Phone  [phone]      Fax [phone]    To Contact us     During Business Hours:  [phone]     If you are needing refills please contact your pharmacy.     For urgent after hour care please call the [facility] at [phone] or the [facility] at [phone] and ask to speak to the cardiologist on call.    If you are having a medical emergency, please call 911.            HOW TO CHECK YOUR BLOOD PRESSURE AT HOME:     Avoid eating, smoking, and exercising for at least 30 minutes before taking a reading.     Be sure you have taken your BP medication at least 2-3 hours before you check it.      Sit quietly for 10 minutes before a reading.      Sit in a chair with your feet flat on the floor. Rest your  arm on a table so that the arm cuff is at the same level as your heart.     Remain still during the reading.  Record your blood pressure and pulse in a log and bring to your next appointment.       Use [employer] allows you to communicate directly with your heart team through secure messaging.  [employer] can be accessed any time on your phone, computer, or tablet.  If you need assistance, we'd be happy to help!             Keep your Heart Appointments:     Follow up with me in 12 months

## 2024-01-13 ASSESSMENT — HOOS JR: HOOS JR TOTAL INTERVAL SCORE: 100

## 2024-01-16 ENCOUNTER — OFFICE VISIT (OUTPATIENT)
Dept: ORTHOPEDICS | Facility: CLINIC | Age: 74
End: 2024-01-16
Payer: COMMERCIAL

## 2024-01-16 ENCOUNTER — ANCILLARY PROCEDURE (OUTPATIENT)
Dept: GENERAL RADIOLOGY | Facility: CLINIC | Age: 74
End: 2024-01-16
Attending: ORTHOPAEDIC SURGERY
Payer: COMMERCIAL

## 2024-01-16 DIAGNOSIS — Z47.89 ORTHOPEDIC AFTERCARE: Primary | ICD-10-CM

## 2024-01-16 DIAGNOSIS — Z47.89 ORTHOPEDIC AFTERCARE: ICD-10-CM

## 2024-01-16 PROCEDURE — 73502 X-RAY EXAM HIP UNI 2-3 VIEWS: CPT | Mod: RT | Performed by: RADIOLOGY

## 2024-01-16 PROCEDURE — 99213 OFFICE O/P EST LOW 20 MIN: CPT | Performed by: ORTHOPAEDIC SURGERY

## 2024-01-16 ASSESSMENT — PAIN SCALES - GENERAL: PAINLEVEL: NO PAIN (0)

## 2024-01-16 NOTE — NURSING NOTE
Latoay Rodríguez's chief complaint for this visit includes:  Chief Complaint   Patient presents with    Surgical Followup     1yr s.p RTHA 1/18/23       Referring Provider:  No referring provider defined for this encounter.    LMP  (LMP Unknown)   No Pain (0)   Global Mental Health Score:    Global Physical Health Score:    PROMIS TOTAL - SUBSCORES:    [facility]: 6  HOOS Jr. 100    Pain increases with: none  Previous surgeries: RTHA 1/18/23  Previous injections within last 6 months: NO  Imaging completed: XR today  Pain: 0/10  Concerns: None, very happy with progress.     Giovanny Ogden, ATC

## 2024-01-16 NOTE — LETTER
1/16/2024         RE: Latoya Rodríguez  [address]  [place] [address]        Dear Colleague,    Thank you for referring your patient, Latoya Rodríguez, to the [facility]. Please see a copy of my visit note below.    Chief Complaint: Surgical Followup (1yr s.p RTHA 1/18/23)       HPI: Latoya Rodríguez returns today in follow-up for her right total hip. She reports that she is doing very well. No issues with the hip. Very happy with how she is doing. Here for routine follow-up. Having some mild symptoms on the contra-lateral side but not worried about it.   HOOS: 100    Medications and allergies are documented in the EMR and have been reviewed.    Current Outpatient Medications:      acetaminophen (TYLENOL) 325 MG tablet, Take 2 tablets (650 mg) by mouth every 4 hours as needed for other (Patient not taking: Reported on 12/27/2023), Disp: 60 tablet, Rfl: 0     alendronate (FOSAMAX) 70 MG tablet, Take 1 tablet (70 mg) by mouth every 7 days, Disp: 12 tablet, Rfl: 3     apixaban ANTICOAGULANT (ELIQUIS) 5 MG tablet, Take 1 tablet (5 mg) by mouth 2 times daily, Disp: 180 tablet, Rfl: 3     atorvastatin (LIPITOR) 40 MG tablet, Take 1 tablet (40 mg) by mouth daily for 360 days, Disp: 90 tablet, Rfl: 3     calcium carbonate-vitamin D 500-400 MG-UNIT TABS tablt, Take 1 tablet by mouth 2 times daily, Disp: 180 tablet, Rfl: 3     conjugated estrogens (PREMARIN) 0.625 MG/GM vaginal cream, Place 0.5 g vaginally twice a week, Disp: 30 g, Rfl: 3     Continuous Blood Gluc Sensor (DEXCOM G7 SENSOR) MISC, 1 each every 10 days, Disp: 9 each, Rfl: 3     estradiol (ESTRACE) 0.1 MG/GM vaginal cream, INSERT 2G VAGINALLY TWICE PER WEEK, Disp: 42.5 g, Rfl: 11     insulin aspart (NOVOLOG FLEXPEN) 100 UNIT/ML pen, 1 unit per 20 gram of carb for breakfast, 1 unit per 15 grams of carbs for lunch and dinner. Plus 1 unit per 50 mg/dl above 150. Total daily dose approx 30 units., Disp: 30 mL, Rfl: 3      insulin glargine (LANTUS VIAL) 100 UNIT/ML vial, Inject Subcutaneous at bedtime, Disp: , Rfl:      insulin pen needle (B-D U/F) 31G X 8 MM miscellaneous, USE 5 TIMES DAILY  WITH NOVOLOG AND LANTUS, Disp: 500 each, Rfl: 3     melatonin 3 MG tablet, Take 1 tablet (3 mg) by mouth nightly as needed for sleep, Disp: 30 tablet, Rfl: 0     metoprolol succinate ER (TOPROL XL) 50 MG 24 hr tablet, Take 1 tablet (50 mg) by mouth daily, Disp: 90 tablet, Rfl: 3     Multiple Vitamins-Minerals (MULTIVITAMIN ADULT PO), Take 1 tablet by mouth every morning , Disp: , Rfl:      Urine Glucose-Ketones Test (KETO-DIASTIX) STRP, 1 strip by In Vitro route as needed, Disp: 1 each, Rfl: 11  Allergies: Patient has no known allergies.    Physical Exam:  On physical examination the patient appears the stated age, is in no acute distress, affect is appropriate, and breathing is non-labored.  LMP  (LMP Unknown)   There is no height or weight on file to calculate BMI.  Gait: normal   ROM: fluild and painless   Distally, the circulatory, motor, and sensation exam is intact with 5/5 EHL, gastroc-soleus, and tibialis anterior.  Sensation to light touch is intact.  Dorsalis pedis and posterior tibialis pulses are palpable.  There are no sores on the feet, no bruising, and no lymphedema.    X-rays:    I reviewed the x-rays dated today.  Previous films reviewed.    Findings:  Normal progression for a total hip arthroplasty without evidence of loosening or subsidence.    Assessment: doing well one year s/ total hip. Discussed activities on total hip and discussed follow-up. Discussed left hip and fine to observe. All the patient's questions were answered to the best of my ability.     Plan: RTC in 5 year right hip, sooner if issues.     No ref. provider found    20 minutes were spent on the patient's visit today.       Again, thank you for allowing me to participate in the care of your patient.        Sincerely,        Arnaud Ward MD

## 2024-01-16 NOTE — PROGRESS NOTES
Chief Complaint: Surgical Followup (1yr s.p RTHA 1/18/23)       HPI: Latoya Rodríguez returns today in follow-up for her right total hip. She reports that she is doing very well. No issues with the hip. Very happy with how she is doing. Here for routine follow-up. Having some mild symptoms on the contra-lateral side but not worried about it.   HOOS: 100    Medications and allergies are documented in the EMR and have been reviewed.    Current Outpatient Medications:     acetaminophen (TYLENOL) 325 MG tablet, Take 2 tablets (650 mg) by mouth every 4 hours as needed for other (Patient not taking: Reported on 12/27/2023), Disp: 60 tablet, Rfl: 0    alendronate (FOSAMAX) 70 MG tablet, Take 1 tablet (70 mg) by mouth every 7 days, Disp: 12 tablet, Rfl: 3    apixaban ANTICOAGULANT (ELIQUIS) 5 MG tablet, Take 1 tablet (5 mg) by mouth 2 times daily, Disp: 180 tablet, Rfl: 3    atorvastatin (LIPITOR) 40 MG tablet, Take 1 tablet (40 mg) by mouth daily for 360 days, Disp: 90 tablet, Rfl: 3    calcium carbonate-vitamin D 500-400 MG-UNIT TABS tablt, Take 1 tablet by mouth 2 times daily, Disp: 180 tablet, Rfl: 3    conjugated estrogens (PREMARIN) 0.625 MG/GM vaginal cream, Place 0.5 g vaginally twice a week, Disp: 30 g, Rfl: 3    Continuous Blood Gluc Sensor (DEXCOM G7 SENSOR) MISC, 1 each every 10 days, Disp: 9 each, Rfl: 3    estradiol (ESTRACE) 0.1 MG/GM vaginal cream, INSERT 2G VAGINALLY TWICE PER WEEK, Disp: 42.5 g, Rfl: 11    insulin aspart (NOVOLOG FLEXPEN) 100 UNIT/ML pen, 1 unit per 20 gram of carb for breakfast, 1 unit per 15 grams of carbs for lunch and dinner. Plus 1 unit per 50 mg/dl above 150. Total daily dose approx 30 units., Disp: 30 mL, Rfl: 3    insulin glargine (LANTUS VIAL) 100 UNIT/ML vial, Inject Subcutaneous at bedtime, Disp: , Rfl:     insulin pen needle (B-D U/F) 31G X 8 MM miscellaneous, USE 5 TIMES DAILY  WITH NOVOLOG AND LANTUS, Disp: 500 each, Rfl: 3    melatonin 3 MG tablet, Take 1 tablet (3 mg) by  mouth nightly as needed for sleep, Disp: 30 tablet, Rfl: 0    metoprolol succinate ER (TOPROL XL) 50 MG 24 hr tablet, Take 1 tablet (50 mg) by mouth daily, Disp: 90 tablet, Rfl: 3    Multiple Vitamins-Minerals (MULTIVITAMIN ADULT PO), Take 1 tablet by mouth every morning , Disp: , Rfl:     Urine Glucose-Ketones Test (KETO-DIASTIX) STRP, 1 strip by In Vitro route as needed, Disp: 1 each, Rfl: 11  Allergies: Patient has no known allergies.    Physical Exam:  On physical examination the patient appears the stated age, is in no acute distress, affect is appropriate, and breathing is non-labored.  LMP  (LMP Unknown)   There is no height or weight on file to calculate BMI.  Gait: normal   ROM: fluild and painless   Distally, the circulatory, motor, and sensation exam is intact with 5/5 EHL, gastroc-soleus, and tibialis anterior.  Sensation to light touch is intact.  Dorsalis pedis and posterior tibialis pulses are palpable.  There are no sores on the feet, no bruising, and no lymphedema.    X-rays:    I reviewed the x-rays dated today.  Previous films reviewed.    Findings:  Normal progression for a total hip arthroplasty without evidence of loosening or subsidence.    Assessment: doing well one year s/ total hip. Discussed activities on total hip and discussed follow-up. Discussed left hip and fine to observe. All the patient's questions were answered to the best of my ability.     Plan: RTC in 5 year right hip, sooner if issues.     No ref. provider found    20 minutes were spent on the patient's visit today.

## 2024-01-17 ENCOUNTER — NURSE TRIAGE (OUTPATIENT)
Dept: FAMILY MEDICINE | Facility: CLINIC | Age: 74
End: 2024-01-17
Payer: COMMERCIAL

## 2024-01-17 NOTE — TELEPHONE ENCOUNTER
Reason for Call:  Appointment Request    Patient requesting this type of appt:  office visit     Requested provider: Pricila Avila    Reason patient unable to be scheduled: Not within requested timeframe    When does patient want to be seen/preferred time: 3-7 days    Comments: having trouble with eyelid, closing, and by end of day its closed and wont stay open     Could we send this information to you in [facility] or would you prefer to receive a phone call?:   Patient would prefer a phone call   Okay to leave a detailed message?: Yes at Home number on file [phone] (home)    Call taken on 1/17/2024 at 1:58 PM by Kristi Gonzales

## 2024-01-17 NOTE — TELEPHONE ENCOUNTER
Please call patient and triage symptoms   Based on this description needs to see her eye doctor as soon as possible. Not sure if she has call her eye doctor office yet

## 2024-01-17 NOTE — TELEPHONE ENCOUNTER
"Nurse Triage SBAR    Is this a 2nd Level Triage? YES, LICENSED PRACTITIONER REVIEW IS REQUIRED    Situation: Patient states she has eyelid concern.    Background: Patient wears glasses. Noticed right eyelid droopy for the past couple of weeks, was unnoticed but now more prominent. No recent fall or injury to the area.     Assessment: Patient reports her right eyelid droops down. Gets worse/lower as the day goes on. She can't see because of it. No vision loss or blurred vision however. Mild intermittent throbbing.     Patient denies any fever, chest pain, breathing difficulty, headache, vision changes, eye discharge, dry eyes, red eyes, or stiff neck. Patient is alert and coherent during the call.     Protocol Recommended Disposition:   Go To Office Now    Recommendation: Patient will follow-up with her eye doctor at this time. If needing to scheduling follow-up appointment with PCP instead, she will call clinic back with updates.     NESTOR Cosme  [facility] Primary Care Triage      Reason for Disposition   Eyelids are very swollen (shut or almost) and no fever    Answer Assessment - Initial Assessment Questions  1. ONSET: \"When did the pain start?\" (e.g., minutes, hours, days)      Mild throbbing, eyelid concern present for a couple of weeks  2. TIMING: \"Does the pain come and go, or has it been constant since it started?\" (e.g., constant, intermittent, fleeting)      Intermittent  3. SEVERITY: \"How bad is the pain?\"   (Scale 1-10; mild, moderate or severe)    - MILD (1-3): doesn't interfere with normal activities     - MODERATE (4-7): interferes with normal activities or awakens from sleep     - SEVERE (8-10): excruciating pain and patient unable to do normal activities      Mild  4. LOCATION: \"Where does it hurt?\"  (e.g., eyelid, eye, cheekbone)      Right eyelid  5. CAUSE: \"What do you think is causing the pain?\"      Unsure  6. VISION: \"Do you have blurred vision or changes in your vision?\"       No " "vision changes, just hard to see out of because the eyelid drops lower  7. EYE DISCHARGE: \"Is there any discharge (pus) from the eye(s)?\"  If Yes, ask: \"What color is it?\"       No  8. FEVER: \"Do you have a fever?\" If Yes, ask: \"What is it, how was it measured, and when did it start?\"       No  9. OTHER SYMPTOMS: \"Do you have any other symptoms?\" (e.g., headache, nasal discharge, facial rash)      No  10. PREGNANCY: \"Is there any chance you are pregnant?\" \"When was your last menstrual period?\"        No    Protocols used: Eye Pain and Other Symptoms-A-OH    "

## 2024-02-02 NOTE — PATIENT INSTRUCTIONS
-Increase Lantus to 22 units at 7 pm  -Change Novolog 1 unit per 18 gram at breakfast  -Novolog 1 unit per 15 gram at lunch or 1 unit per 18 for lunch if going out for a walk  -Novolog 1 unit per 13 gram at dinner  -Novolog 1 unit per 15 gram for snack  -Continue 1:50 Novolog correction if over 150 at breakfast, lunch and dinner, continue 1:50 Novolog correction if BG is >200 at bedtime         [facility]     Our Endocrinology Clinics are here to provide you with a team-based, collaborative approach in the diagnosis and treatment of patients with diabetes and endocrine disorders. The team is made up of Physicians, Physician Assistants, Certified Diabetes Educators, Registered Nurses, Medical Assistants, Emergency Medical Technicians, and many others, all of whom have the unified goal of providing our patients with high quality care.     Please see below for some helpful tips to best navigate and use the [facility] Endocrinology clinic:     [address] Respect: At [facility], we are committed to a respectful and safe space for all patients, visitors, and staff.  We believe that mutual respect between patients and their care team is the foundation of quality care.  It is our expectation that you will be treated with respect by your care team.  In turn, we ask that all communication with the care team (written and verbal) be respectful and free from profanity, threatening, or abusive language.  Disrespectful communication undermines our therapeutic relationship with you and may result in us being unable to continue to provide your care.    Refills: A provider must see you at least annually to prescribe and refill medications. This is to ensure your safety as well as meet insurance and compliance regulations.    Scheduling: Many of our Providers offer both in-person or video visits. Please call to schedule any needed follow ups as soon as possible because our provider  schedules fill up very quickly. Our care team has the right to require an in-person visit when they believe that it is medically necessary. Please remember that for any virtual visits, you must be in the state of [place] at the time of the visit, otherwise we are unable to see you and you will need to be rescheduled.    Missed Appointments: If you need to cancel or miss your scheduled appointment, please call the clinic at [phone] to reschedule.  Please note if you repeatedly miss appointments or repeatedly miss appointments without calling to inform us ahead of time (no-show), the clinic may elect to not allow you to reschedule without speaking to a manager, may require a Partnership In Care Agreement prior to rescheduling, or could result in you no longer being able to receive care from the clinic. Providing the clinic with timely notification if you have to miss an appointment, allows us to better serve the needs of all of our patients.    Primary Care Provider: Our Endocrinologists are Specialists in their field. We expect you to have a Primary Care Provider established to handle any needs outside of your diabetes and endocrine care.  We would be happy to assist you find a Primary Care Provider, if you do not have one.    [employer]: [employer] is a wonderful resource that allows you access to your Care Team via online or the daniel. Please ask a member of the team if you would like help creating an account. Please note that it may take up to 2 business days for a response. [employer] messages are not reviewed on weekends or after business hours.  Emergent or urgent care needs should never be communicated via [employer].  If you experience a medical emergency call 911 or go to the nearest emergency room.    Labs: It is recommended that you stay within the [facility] for labs but you are welcome to obtain ordered labs (with some exceptions) from any location of your choice as long as they are able to complete  and process the needed labs. If you need us to fax orders to your preferred lab, please provide us the name and fax number of the lab you would like to go to so we can fax the orders. If your labs are drawn outside of the [facility], please have them fax the results to [phone] ([place]) or [phone] ([place]) or via [facility]. It is your responsibility to ensure that outside lab results are sent to us.    We look forward to working with you. Please do not hesitate to reach out with any questions.    Thank you,    The Endocrine Team    [facility] Address:   [place] Address:     [address] [place] [address]    Phone: [phone]  Fax: [phone] [address] [place] [address]    Phone: [phone]  Fax: [phone]     [facility] Cost Estimate Phone Number: [phone]    General Lab and Imaging Scheduling Phone Number: [phone]

## 2024-02-05 ENCOUNTER — OFFICE VISIT (OUTPATIENT)
Dept: ENDOCRINOLOGY | Facility: CLINIC | Age: 74
End: 2024-02-05
Payer: COMMERCIAL

## 2024-02-05 VITALS
WEIGHT: 166 LBS | SYSTOLIC BLOOD PRESSURE: 122 MMHG | BODY MASS INDEX: 27 KG/M2 | OXYGEN SATURATION: 97 % | DIASTOLIC BLOOD PRESSURE: 78 MMHG | HEART RATE: 66 BPM

## 2024-02-05 DIAGNOSIS — Z79.4 TYPE 2 DIABETES MELLITUS WITH HYPERGLYCEMIA, WITH LONG-TERM CURRENT USE OF INSULIN (H): ICD-10-CM

## 2024-02-05 DIAGNOSIS — E11.65 TYPE 2 DIABETES MELLITUS WITH HYPERGLYCEMIA, WITH LONG-TERM CURRENT USE OF INSULIN (H): ICD-10-CM

## 2024-02-05 PROCEDURE — 99214 OFFICE O/P EST MOD 30 MIN: CPT | Performed by: INTERNAL MEDICINE

## 2024-02-05 RX ORDER — INSULIN ASPART 100 [IU]/ML
INJECTION, SOLUTION INTRAVENOUS; SUBCUTANEOUS
Qty: 30 ML | Refills: 3 | Status: SHIPPED | OUTPATIENT
Start: 2024-02-05

## 2024-02-05 NOTE — NURSING NOTE
Latoya Rodríguez's goals for this visit include:   Chief Complaint   Patient presents with    Follow Up    Diabetes     She requests these members of her care team be copied on today's visit information: Yes     PCP: Pricila Avila    Referring Provider:  Pricila Avila, APRN CNP  [address] [place] [address]    /78 (BP Location: Left arm, Patient Position: Sitting, Cuff Size: Adult Regular)   Pulse 66   Wt 75.3 kg (166 lb)   LMP  (LMP Unknown)   SpO2 97%   BMI 27.00 kg/m      Do you need any medication refills at today's visit? Yes Pended    Nell Pena, MILVIA  Adult Endocrinology   [facility]

## 2024-02-05 NOTE — LETTER
2/5/2024         RE: Latoya Rodríguez  [address] [facility] [address]        Dear Colleague,    Thank you for referring your patient, Latoya Rodríguez, to the [facility]. Please see a copy of my visit note below.          Chief complaint:  Latoya is a 73 year old female seen in follow up of type 1 Diabetes.  HISTORY OF PRESENT ILLNESS  Latoya is a 73-year-old retiree with a history of type 1 diabetes who has a visit today for a follow-up type 1 diabetes.  She was diagnosed with type 1 diabetes 2016 during routine physical where she was found to have high blood glucose levels.  At that time she was diagnosed and treated as a type II diabetic but it was later determined that she had positive TORRES antibodies and low C-peptide.      Interval history  Last seen Sarah Palm 11/2023    Regarding type 1 diabetes, A1c was 7.8 in November 2023.   Current regimen includes  Lantus 20 units at 9 am  Novolog 1:20 g with breakfast, 1 unit per 15 gram at lunch, and 1 unit per 15 gram at dinner  Novolog correction 1:50 if BG is >150 at meal time, Novolog correction 1:50 if BG is >200 at bedtime    She is currently using Dexcom G6 to track glucose levels. Reviewed BG in the past 4 weeks, sensor usage 97%.  Average glucose 217 (SD 74).  Time in range 32%.  Time above range 66%, time below range 1%.  She has high BG at fasting and after dinner. She usually goes low around 1-3 pm when she goes out for a walk. She usually walks about 3-5 miles. She eats 3 meals per day without snack between meals or before bedtime. Last meal is 6-7 pm. She feels that her carb counting is quite accurate  She eats mostly protein and is very consistent meal daily.     Patient has high risk of fracture, her mother had history of osteoporosis. DXA 9/6/2022 showed the T score -2 at the lumbar spine, T score -1.6 at the left femoral neck, -1.2 at the right femoral neck and -1.1 at the wrist.  Started on Fosamax 70 mg  weekly since April 2023 due to high FRAX. She is due for DXA in 2024.    Past Medical History  Type 1 diabetes, diagnosed 6/2016  Hyperlipidemia  Osteoporosis     Medications  Current Outpatient Medications   Medication Sig Dispense Refill     acetaminophen (TYLENOL) 325 MG tablet Take 2 tablets (650 mg) by mouth every 4 hours as needed for other (Patient not taking: Reported on 12/27/2023) 60 tablet 0     alendronate (FOSAMAX) 70 MG tablet Take 1 tablet (70 mg) by mouth every 7 days 12 tablet 3     apixaban ANTICOAGULANT (ELIQUIS) 5 MG tablet Take 1 tablet (5 mg) by mouth 2 times daily 180 tablet 3     atorvastatin (LIPITOR) 40 MG tablet Take 1 tablet (40 mg) by mouth daily for 360 days 90 tablet 3     calcium carbonate-vitamin D 500-400 MG-UNIT TABS tablt Take 1 tablet by mouth 2 times daily 180 tablet 3     conjugated estrogens (PREMARIN) 0.625 MG/GM vaginal cream Place 0.5 g vaginally twice a week 30 g 3     Continuous Blood Gluc Sensor (DEXCOM G7 SENSOR) MISC 1 each every 10 days 9 each 3     estradiol (ESTRACE) 0.1 MG/GM vaginal cream INSERT 2G VAGINALLY TWICE PER WEEK 42.5 g 11     insulin aspart (NOVOLOG FLEXPEN) 100 UNIT/ML pen 1 unit per 20 gram of carb for breakfast, 1 unit per 15 grams of carbs for lunch and dinner. Plus 1 unit per 50 mg/dl above 150. Total daily dose approx 30 units. 30 mL 3     insulin glargine (LANTUS VIAL) 100 UNIT/ML vial Inject Subcutaneous at bedtime       insulin pen needle (B-D U/F) 31G X 8 MM miscellaneous USE 5 TIMES DAILY  WITH NOVOLOG AND LANTUS 500 each 3     melatonin 3 MG tablet Take 1 tablet (3 mg) by mouth nightly as needed for sleep 30 tablet 0     metoprolol succinate ER (TOPROL XL) 50 MG 24 hr tablet Take 1 tablet (50 mg) by mouth daily 90 tablet 3     Multiple Vitamins-Minerals (MULTIVITAMIN ADULT PO) Take 1 tablet by mouth every morning        Urine Glucose-Ketones Test (KETO-DIASTIX) STRP 1 strip by In Vitro route as needed 1 each 11     Allergies  No Known  Allergies    Family History  family history includes Brain Cancer in her maternal uncle; Breast Cancer in her mother; Coronary Artery Disease in her maternal uncle and maternal uncle; Hyperlipidemia in her mother; Leukemia in her father; Other Cancer in her father; Skin Cancer in her father; Stomach Cancer in her maternal aunt.  No thyroid disease or type 1 diabetes in the family    Social History  Social History     Socioeconomic History     Marital status: Single     Spouse name: Not on file     Number of children: Not on file     Years of education: Not on file     Highest education level: Not on file   Occupational History     Not on file   Tobacco Use     Smoking status: Never     Smokeless tobacco: Never   Vaping Use     Vaping Use: Never used   Substance and Sexual Activity     Alcohol use: Yes     Comment: 1 to 2 beers or glasses of wine 1 to 2 times per month     Drug use: No     Sexual activity: Not Currently     Partners: Female     Birth control/protection: Post-menopausal   Other Topics Concern     Parent/sibling w/ CABG, MI or angioplasty before 65F 55M? No   Social History Narrative     Not on file     Social Determinants of Health     Financial Resource Strain: High Risk (11/13/2023)    Financial Resource Strain      Within the past 12 months, have you or your family members you live with been unable to get utilities (heat, electricity) when it was really needed?: Yes   Food Insecurity: Low Risk  (11/13/2023)    Food Insecurity      Within the past 12 months, did you worry that your food would run out before you got money to buy more?: No      Within the past 12 months, did the food you bought just not last and you didn t have money to get more?: No   Transportation Needs: Low Risk  (11/13/2023)    Transportation Needs      Within the past 12 months, has lack of transportation kept you from medical appointments, getting your medicines, non-medical meetings or appointments, work, or from getting  "things that you need?: No   Physical Activity: Not on file   Stress: Not on file   Social Connections: Not on file   Interpersonal Safety: Low Risk  (11/14/2023)    Interpersonal Safety      Do you feel physically and emotionally safe where you currently live?: Yes      Within the past 12 months, have you been hit, slapped, kicked or otherwise physically hurt by someone?: No      Within the past 12 months, have you been humiliated or emotionally abused in other ways by your partner or ex-partner?: No   Housing Stability: Low Risk  (11/13/2023)    Housing Stability      Do you have housing? : Yes      Are you worried about losing your housing?: No       REVIEW OF SYSTEMS  10 points ROS were negative otherwise mentioned in HPI    Physical Exam  Vital signs:   /78 (BP Location: Left arm, Patient Position: Sitting, Cuff Size: Adult Regular)   Pulse 66   Wt 75.3 kg (166 lb)   LMP  (LMP Unknown)   SpO2 97%   BMI 27.00 kg/m    Estimated body mass index is 27 kg/m  as calculated from the following:    Height as of 12/27/23: 1.67 m (5' 5.75\").    Weight as of this encounter: 75.3 kg (166 lb).  Constitutional: no distress, comfortable, pleasant   Eyes: anicteric  Musculoskeletal:+2 edema after a fall  Skin:  no jaundice   Neurological: normal gait, intact sensation per monofilament bilaterally exam dated 10/24/2022, no tremor on outstretched hands bilaterally  Psychological: appropriate mood       DATA REVIEW    Lab Results   Component Value Date    A1C 7.8 11/24/2023    A1C 8.6 06/19/2023    A1C 6.9 12/23/2022    A1C 8.0 09/06/2022    A1C 7.9 05/02/2022    A1C 7.6 11/15/2021    A1C 6.7 07/01/2021    A1C 8.4 02/10/2021      Latest Ref Rng 11/24/2023  8:19 AM   ENDO DIABETES     ALT 0 - 50 U/L 22    AST 0 - 45 U/L 23    Cholesterol <200 mg/dL 189    LDL Cholesterol Calculated <=100 mg/dL 103 (H)    HDL Cholesterol >=50 mg/dL 75    Non HDL Cholesterol <130 mg/dL 114    Triglycerides <150 mg/dL 56    TRIG (EXT) <150 " mg/dL 56    Creatinine 0.51 - 0.95 mg/dL 0.68    Hematocrit 35.0 - 47.0 %    Hemoglobin 11.7 - 15.7 g/dL    Albumin Urine mg/L mg/L    Albumin Urine mg/L mg/L <12.0    Albumin Urine mg/g Cr 0.00 - 25.00 mg/g Cr    Albumin Urine mg/g Cr  --    Potassium 3.4 - 5.3 mmol/L 4.6       Legend:  (H) High                                                                                                                                                     DXA 8/28/2020  Lumbar spine T-score in region of L2-L4 = -2.4   L1-4 percent change: Not significant%      HIPS:  Mean total hip T-score: -1  Mean total hip percent change: Not significant%      Left femoral neck T-score = -1.9  Right femoral neck T-score = -1.6      Radius 33% T-score = -1.6     FRAX:  10 year probability of major osteoporotic fracture: 11.3%  10 year probability of hip fracture: 2.1%  The 10 year probability of fracture may be lower than reported if the  patient has received treatment. FRAX data should be disregarded in patient's taking bisphosphonates.                                                                 IMPRESSION:    Low bone mass (osteopenia).    DXA 9/6/2022  Lumbar spine T-score in region of L1-L4 = -2   L1-4 percent change: 3.7%      HIPS:  Mean total hip T-score: -1.1  Mean total hip percent change: Not significant%      Left femoral neck T-score = -1.6  Right femoral neck T-score = -1.2      Radius 33% T-score = -1.1  Radius 33% percent change: Not significant%      FRAX:  10 year probability of major osteoporotic fracture: 17.2%  10 year probability of hip fracture: 5.4%  The 10 year probability of fracture may be lower than reported if the patient has received treatment. FRAX data should be disregarded in patient's taking bisphosphonates.                                                                    IMPRESSION:    Low bone mass (osteopenia).       ASSESSMENT/PLAN:   Latoya is a 73-year-old retiree with a history of type 1 diabetes who  is here for T1 diabetes management.    1.  Type 1 diabetes: A1c is 7.8% today. Latoya's blood glucose readings over the last 4 weeks through Dexcom showed frequent high glucoses at fasting and after meals. She had low BG in the afternoon when she is exercising.     Recommendations:  -Increase Lantus to 22 units at bedtime  -Novolog 1 unit per 15 gram at lunch or 1 unit per 18 for lunch if going out for a walk  -Novolog 1 unit per 13 gram at dinner  -Novolog 1 unit per 15 gram for snack  -Continue 1:50 Novolog correction if over 150 at breakfast, lunch and dinner, continue 1:50 Novolog correction if BG is >200 at bedtime      2) DM complications:  Retinopathy: normal exam - no DR July 2023   Nephropathy: negative urine microalbumin 11/2023  Neuropathy: none per exam 2023     3) Hyperlipidemia: , HDL 75, TG 56, total cholesterol 189 on 11/2023. Continue Lipitor 10 mg daily.    4) Osteopenia: DXA 9/6/2022 showed the T score -2 at the lumbar spine, T score -1.6 at the left femoral neck, -1.2 at the right femoral neck and -1.1 at the wrist.    10 year probability of major osteoporotic fracture: 17.2%  10 year probability of hip fracture: 5.4%    She is indicated for considering bisphosphonates to reduce fracture risk.  Started on Fosamax 70 mg weekly since April 2023.  Continue calcium and vitamin D  Follow up with PCP for osteoporosis.   Should have repeat DXA in the fall 2024    Plan:  -Follow-up with CDE 6 weeks  -Follow-up in 3 months with PA  -Follow-up 6 months with MD    External notes/medical records independently reviewed, labs and imaging independently reviewed, medical management and tests to be discussed/communicated to patient.    Time: I spent 32  minutes spent on the date of the encounter preparing to see patient (including chart review and preparation), obtaining and or reviewing additional medical history, performing a physical exam and evaluation, documenting clinical information in the electronic  health record, independently interpreting results, communicating results to the patient and coordinating care.    Edward Aguero MD  Division of Diabetes and Endocrinology  Department of Medicine            Again, thank you for allowing me to participate in the care of your patient.        Sincerely,        Edward Aguero MD

## 2024-02-05 NOTE — PROGRESS NOTES
Chief complaint:  Latoya is a 73 year old female seen in follow up of type 1 Diabetes.  HISTORY OF PRESENT ILLNESS  Latoya is a 73-year-old retiree with a history of type 1 diabetes who has a visit today for a follow-up type 1 diabetes.  She was diagnosed with type 1 diabetes 2016 during routine physical where she was found to have high blood glucose levels.  At that time she was diagnosed and treated as a type II diabetic but it was later determined that she had positive TORRES antibodies and low C-peptide.      Interval history  Last seen Sarah Palm 11/2023    Regarding type 1 diabetes, A1c was 7.8 in November 2023.   Current regimen includes  Lantus 20 units at 9 am  Novolog 1:20 g with breakfast, 1 unit per 15 gram at lunch, and 1 unit per 15 gram at dinner  Novolog correction 1:50 if BG is >150 at meal time, Novolog correction 1:50 if BG is >200 at bedtime    She is currently using Dexcom G6 to track glucose levels. Reviewed BG in the past 4 weeks, sensor usage 97%.  Average glucose 217 (SD 74).  Time in range 32%.  Time above range 66%, time below range 1%.  She has high BG at fasting and after dinner. She usually goes low around 1-3 pm when she goes out for a walk. She usually walks about 3-5 miles. She eats 3 meals per day without snack between meals or before bedtime. Last meal is 6-7 pm. She feels that her carb counting is quite accurate  She eats mostly protein and is very consistent meal daily.     Patient has high risk of fracture, her mother had history of osteoporosis. DXA 9/6/2022 showed the T score -2 at the lumbar spine, T score -1.6 at the left femoral neck, -1.2 at the right femoral neck and -1.1 at the wrist.  Started on Fosamax 70 mg weekly since April 2023 due to high FRAX. She is due for DXA in 2024.    Past Medical History  Type 1 diabetes, diagnosed 6/2016  Hyperlipidemia  Osteoporosis     Medications  Current Outpatient Medications   Medication Sig Dispense Refill    acetaminophen (TYLENOL) 325  MG tablet Take 2 tablets (650 mg) by mouth every 4 hours as needed for other (Patient not taking: Reported on 12/27/2023) 60 tablet 0    alendronate (FOSAMAX) 70 MG tablet Take 1 tablet (70 mg) by mouth every 7 days 12 tablet 3    apixaban ANTICOAGULANT (ELIQUIS) 5 MG tablet Take 1 tablet (5 mg) by mouth 2 times daily 180 tablet 3    atorvastatin (LIPITOR) 40 MG tablet Take 1 tablet (40 mg) by mouth daily for 360 days 90 tablet 3    calcium carbonate-vitamin D 500-400 MG-UNIT TABS tablt Take 1 tablet by mouth 2 times daily 180 tablet 3    conjugated estrogens (PREMARIN) 0.625 MG/GM vaginal cream Place 0.5 g vaginally twice a week 30 g 3    Continuous Blood Gluc Sensor (DEXCOM G7 SENSOR) MISC 1 each every 10 days 9 each 3    estradiol (ESTRACE) 0.1 MG/GM vaginal cream INSERT 2G VAGINALLY TWICE PER WEEK 42.5 g 11    insulin aspart (NOVOLOG FLEXPEN) 100 UNIT/ML pen 1 unit per 20 gram of carb for breakfast, 1 unit per 15 grams of carbs for lunch and dinner. Plus 1 unit per 50 mg/dl above 150. Total daily dose approx 30 units. 30 mL 3    insulin glargine (LANTUS VIAL) 100 UNIT/ML vial Inject Subcutaneous at bedtime      insulin pen needle (B-D U/F) 31G X 8 MM miscellaneous USE 5 TIMES DAILY  WITH NOVOLOG AND LANTUS 500 each 3    melatonin 3 MG tablet Take 1 tablet (3 mg) by mouth nightly as needed for sleep 30 tablet 0    metoprolol succinate ER (TOPROL XL) 50 MG 24 hr tablet Take 1 tablet (50 mg) by mouth daily 90 tablet 3    Multiple Vitamins-Minerals (MULTIVITAMIN ADULT PO) Take 1 tablet by mouth every morning       Urine Glucose-Ketones Test (KETO-DIASTIX) STRP 1 strip by In Vitro route as needed 1 each 11     Allergies  No Known Allergies    Family History  family history includes Brain Cancer in her maternal uncle; Breast Cancer in her mother; Coronary Artery Disease in her maternal uncle and maternal uncle; Hyperlipidemia in her mother; Leukemia in her father; Other Cancer in her father; Skin Cancer in her father;  Stomach Cancer in her maternal aunt.  No thyroid disease or type 1 diabetes in the family    Social History  Social History     Socioeconomic History    Marital status: Single     Spouse name: Not on file    Number of children: Not on file    Years of education: Not on file    Highest education level: Not on file   Occupational History    Not on file   Tobacco Use    Smoking status: Never    Smokeless tobacco: Never   Vaping Use    Vaping Use: Never used   Substance and Sexual Activity    Alcohol use: Yes     Comment: 1 to 2 beers or glasses of wine 1 to 2 times per month    Drug use: No    Sexual activity: Not Currently     Partners: Female     Birth control/protection: Post-menopausal   Other Topics Concern    Parent/sibling w/ CABG, MI or angioplasty before 65F 55M? No   Social History Narrative    Not on file     Social Determinants of Health     Financial Resource Strain: High Risk (11/13/2023)    Financial Resource Strain     Within the past 12 months, have you or your family members you live with been unable to get utilities (heat, electricity) when it was really needed?: Yes   Food Insecurity: Low Risk  (11/13/2023)    Food Insecurity     Within the past 12 months, did you worry that your food would run out before you got money to buy more?: No     Within the past 12 months, did the food you bought just not last and you didn t have money to get more?: No   Transportation Needs: Low Risk  (11/13/2023)    Transportation Needs     Within the past 12 months, has lack of transportation kept you from medical appointments, getting your medicines, non-medical meetings or appointments, work, or from getting things that you need?: No   Physical Activity: Not on file   Stress: Not on file   Social Connections: Not on file   Interpersonal Safety: Low Risk  (11/14/2023)    Interpersonal Safety     Do you feel physically and emotionally safe where you currently live?: Yes     Within the past 12 months, have you been hit,  "slapped, kicked or otherwise physically hurt by someone?: No     Within the past 12 months, have you been humiliated or emotionally abused in other ways by your partner or ex-partner?: No   Housing Stability: Low Risk  (11/13/2023)    Housing Stability     Do you have housing? : Yes     Are you worried about losing your housing?: No       REVIEW OF SYSTEMS  10 points ROS were negative otherwise mentioned in HPI    Physical Exam  Vital signs:   /78 (BP Location: Left arm, Patient Position: Sitting, Cuff Size: Adult Regular)   Pulse 66   Wt 75.3 kg (166 lb)   LMP  (LMP Unknown)   SpO2 97%   BMI 27.00 kg/m    Estimated body mass index is 27 kg/m  as calculated from the following:    Height as of 12/27/23: 1.67 m (5' 5.75\").    Weight as of this encounter: 75.3 kg (166 lb).  Constitutional: no distress, comfortable, pleasant   Eyes: anicteric  Musculoskeletal:+2 edema after a fall  Skin:  no jaundice   Neurological: normal gait, intact sensation per monofilament bilaterally exam dated 10/24/2022, no tremor on outstretched hands bilaterally  Psychological: appropriate mood       DATA REVIEW    Lab Results   Component Value Date    A1C 7.8 11/24/2023    A1C 8.6 06/19/2023    A1C 6.9 12/23/2022    A1C 8.0 09/06/2022    A1C 7.9 05/02/2022    A1C 7.6 11/15/2021    A1C 6.7 07/01/2021    A1C 8.4 02/10/2021      Latest Ref Rng 11/24/2023  8:19 AM   ENDO DIABETES     ALT 0 - 50 U/L 22    AST 0 - 45 U/L 23    Cholesterol <200 mg/dL 189    LDL Cholesterol Calculated <=100 mg/dL 103 (H)    HDL Cholesterol >=50 mg/dL 75    Non HDL Cholesterol <130 mg/dL 114    Triglycerides <150 mg/dL 56    TRIG (EXT) <150 mg/dL 56    Creatinine 0.51 - 0.95 mg/dL 0.68    Hematocrit 35.0 - 47.0 %    Hemoglobin 11.7 - 15.7 g/dL    Albumin Urine mg/L mg/L    Albumin Urine mg/L mg/L <12.0    Albumin Urine mg/g Cr 0.00 - 25.00 mg/g Cr    Albumin Urine mg/g Cr  --    Potassium 3.4 - 5.3 mmol/L 4.6       Legend:  (H) High                         "                                                                                                                             DXA 8/28/2020  Lumbar spine T-score in region of L2-L4 = -2.4   L1-4 percent change: Not significant%      HIPS:  Mean total hip T-score: -1  Mean total hip percent change: Not significant%      Left femoral neck T-score = -1.9  Right femoral neck T-score = -1.6      Radius 33% T-score = -1.6     FRAX:  10 year probability of major osteoporotic fracture: 11.3%  10 year probability of hip fracture: 2.1%  The 10 year probability of fracture may be lower than reported if the  patient has received treatment. FRAX data should be disregarded in patient's taking bisphosphonates.                                                                 IMPRESSION:    Low bone mass (osteopenia).    DXA 9/6/2022  Lumbar spine T-score in region of L1-L4 = -2   L1-4 percent change: 3.7%      HIPS:  Mean total hip T-score: -1.1  Mean total hip percent change: Not significant%      Left femoral neck T-score = -1.6  Right femoral neck T-score = -1.2      Radius 33% T-score = -1.1  Radius 33% percent change: Not significant%      FRAX:  10 year probability of major osteoporotic fracture: 17.2%  10 year probability of hip fracture: 5.4%  The 10 year probability of fracture may be lower than reported if the patient has received treatment. FRAX data should be disregarded in patient's taking bisphosphonates.                                                                    IMPRESSION:    Low bone mass (osteopenia).       ASSESSMENT/PLAN:   Latoya is a 73-year-old retiree with a history of type 1 diabetes who is here for T1 diabetes management.    1.  Type 1 diabetes: A1c is 7.8% today. Latyoa's blood glucose readings over the last 4 weeks through Dexcom showed frequent high glucoses at fasting and after meals. She had low BG in the afternoon when she is exercising.     Recommendations:  -Increase Lantus to 22 units at  bedtime  -Novolog 1 unit per 15 gram at lunch or 1 unit per 18 for lunch if going out for a walk  -Novolog 1 unit per 13 gram at dinner  -Novolog 1 unit per 15 gram for snack  -Continue 1:50 Novolog correction if over 150 at breakfast, lunch and dinner, continue 1:50 Novolog correction if BG is >200 at bedtime      2) DM complications:  Retinopathy: normal exam - no DR July 2023   Nephropathy: negative urine microalbumin 11/2023  Neuropathy: none per exam 2023     3) Hyperlipidemia: , HDL 75, TG 56, total cholesterol 189 on 11/2023. Continue Lipitor 10 mg daily.    4) Osteopenia: DXA 9/6/2022 showed the T score -2 at the lumbar spine, T score -1.6 at the left femoral neck, -1.2 at the right femoral neck and -1.1 at the wrist.    10 year probability of major osteoporotic fracture: 17.2%  10 year probability of hip fracture: 5.4%    She is indicated for considering bisphosphonates to reduce fracture risk.  Started on Fosamax 70 mg weekly since April 2023.  Continue calcium and vitamin D  Follow up with PCP for osteoporosis.   Should have repeat DXA in the fall 2024    Plan:  -Follow-up with CDE 6 weeks  -Follow-up in 3 months with PA  -Follow-up 6 months with MD    External notes/medical records independently reviewed, labs and imaging independently reviewed, medical management and tests to be discussed/communicated to patient.    Time: I spent 32  minutes spent on the date of the encounter preparing to see patient (including chart review and preparation), obtaining and or reviewing additional medical history, performing a physical exam and evaluation, documenting clinical information in the electronic health record, independently interpreting results, communicating results to the patient and coordinating care.    Edward Aguero MD  Division of Diabetes and Endocrinology  Department of Medicine

## 2024-02-28 DIAGNOSIS — M85.80 OSTEOPENIA, UNSPECIFIED LOCATION: ICD-10-CM

## 2024-02-28 RX ORDER — ALENDRONATE SODIUM 70 MG/1
70 TABLET ORAL
Qty: 12 TABLET | Refills: 3 | Status: SHIPPED | OUTPATIENT
Start: 2024-02-28

## 2024-03-15 DIAGNOSIS — E10.65 TYPE 1 DIABETES MELLITUS WITH HYPERGLYCEMIA (H): ICD-10-CM

## 2024-03-15 RX ORDER — ACYCLOVIR 400 MG/1
1 TABLET ORAL
Qty: 9 EACH | Refills: 3 | Status: SHIPPED | OUTPATIENT
Start: 2024-03-15

## 2024-03-18 ENCOUNTER — ALLIED HEALTH/NURSE VISIT (OUTPATIENT)
Dept: EDUCATION SERVICES | Facility: CLINIC | Age: 74
End: 2024-03-18
Payer: COMMERCIAL

## 2024-03-18 DIAGNOSIS — E10.9 TYPE 1 DIABETES MELLITUS WITHOUT COMPLICATION (H): Primary | ICD-10-CM

## 2024-03-18 PROCEDURE — G0108 DIAB MANAGE TRN  PER INDIV: HCPCS

## 2024-03-18 RX ORDER — INSULIN DEGLUDEC 100 U/ML
22 INJECTION, SOLUTION SUBCUTANEOUS DAILY
COMMUNITY
Start: 2024-03-18 | End: 2024-04-03

## 2024-03-18 NOTE — PROGRESS NOTES
DIABETES SELF MANAGEMENT EDUCATION: Previous Diagnosis/Individual Diabetes Review    Latoya Rodríguez presents today for modification of medication(s) and download of continuous glucose monitoring related to Type-1 Diabetes Mellitus   She is accompanied by self    Year of diagnosis: 2016  Referring provider:  Dr.Tasma Aguero     Past Diabetes Education: Yes  Support system: children  Living Situation: Latoya lives alone. She has 2 daughters and 2 grandsons.  Employment: retired     DIABETES RELATED CONCERNS/COMMENTS: Dexcom G7 clarity review and insulin adjustments.   She applied and is able to get patient assistance for both of her insulins. The plan was to switch her Lantus to Tresiba but she said she just got a 3 month supply of the Lantus so she did not want to switch now.  We were able to finally get rid of her low blood sugars. She was having several lows before starting the Dexcom G7 continuous glucose monitor. She was not in favor of the continuous glucose monitor but Dr.Tasma Aguero convinced her to try it and it's been a very helpful tool for her. She tends to get down around 140 and then without little warning her blood sugars can drop fast and it takes forever to get her sugars back up without going high again. She is not interested in pump therapy. It took a lot of coaching for her to try the continuous glucose monitor.    Patient's emotional response to diabetes: expresses readiness to learn    ASSESSMENT:  Patient Problem List and Medication List reviewed for relevant medical history, current medical status, and diabetes risk factors.    MEDICATION:    Current Diabetes Management per Patient:  Taking diabetes medications?  Tresiba 22 units daily at 7 pm.  Novolog 1:18 grams at breakfast, 1:15 at lunch and 1:13 at supper plus correction at all meals of 1 per 50 over 150 mg/dl.    MONITORING:  Patient glucose self monitoring as follows: continuously using a  continuous glucose monitor (CGM)  BG meter: Dexcom G7 continuous glucose monitor   BG results: see blood glucose log attached to this encounter   Blood sugar data:            Patient's most recent   Lab Results   Component Value Date    A1C 6.9 12/23/2022    A1C 7.9 05/02/2022      Patient's A1C goal: <8.0    PHYSICAL ACTIVITY:  not assessed    NUTRITION:  Patient eats 3 meals per day  Beverages:   Cultural/Mu-ism diet restrictions: No   Biggest Challenge to Healthy Eating: dosing for meals    Diabetes knowledge and skills assessment:   Barriers to Learning Assessment: No Barriers identified    Problem Solving:    Patient is at risk of hypoglycemia?: YES  Hospitalizations for hyper or hypoglycemia: No    Healthy Coping and Stress Management:   Sources of stress identified by patient My health    EDUCATION and INSTRUCTION PROVIDED AT THIS VISIT:    -Target blood glucose for pre-meal and 2 hour post-prandial glucose   -Action, timing and potential side-effects of Basaglar and Novolog diabetes medications:     Pt verbalized understanding of concepts discussed and recommendations provided today.       PLAN:  We will continue her Novolog and Tresiba as above.   She is still having some low blood sugars walking the dog 3-4 miles around 3 pm. She is trying to not over correct and then have high blood sugars.  She is seeing an ophthalmologist for right droopy eyelid. She sees a podiatrist for issues with toenails on both great toes and nail fungus.     See patient instructions/AVS    FOLLOW-UP:    Follow up with Sarah Palm PA-C in May.    Time Spent: 45  minutes  Encounter Type: Individual    Any diabetes medication dose changes were made via the CDE Protocol and Collaborative Practice Agreement with [place] and  [employer].  A copy of this encounter was provided to patient's referring provider.    Humera Caraballo RN, [facility]

## 2024-03-19 ENCOUNTER — MYC MEDICAL ADVICE (OUTPATIENT)
Dept: EDUCATION SERVICES | Facility: CLINIC | Age: 74
End: 2024-03-19
Payer: COMMERCIAL

## 2024-03-19 DIAGNOSIS — E10.9 TYPE 1 DIABETES MELLITUS WITHOUT COMPLICATION (H): Primary | ICD-10-CM

## 2024-03-19 NOTE — TELEPHONE ENCOUNTER
I met with Latoya yesterday. She needs refill of test strips. Unsure of brand.  MyChart message sent stating she is using [facility] so prescription sent to [facility].    Humera Caraballo RN, [facility]

## 2024-04-03 ENCOUNTER — MYC REFILL (OUTPATIENT)
Dept: FAMILY MEDICINE | Facility: CLINIC | Age: 74
End: 2024-04-03
Payer: COMMERCIAL

## 2024-04-03 DIAGNOSIS — E10.9 TYPE 1 DIABETES MELLITUS WITHOUT COMPLICATION (H): ICD-10-CM

## 2024-04-03 RX ORDER — INSULIN DEGLUDEC 100 U/ML
22 INJECTION, SOLUTION SUBCUTANEOUS DAILY
Qty: 30 ML | Refills: 1 | Status: SHIPPED | OUTPATIENT
Start: 2024-04-03 | End: 2024-04-18

## 2024-04-03 RX ORDER — PEN NEEDLE, DIABETIC 31 GX5/16"
NEEDLE, DISPOSABLE MISCELLANEOUS
Qty: 500 EACH | Refills: 0 | Status: SHIPPED | OUTPATIENT
Start: 2024-04-03

## 2024-04-08 ENCOUNTER — DOCUMENTATION ONLY (OUTPATIENT)
Dept: ENDOCRINOLOGY | Facility: CLINIC | Age: 74
End: 2024-04-08
Payer: COMMERCIAL

## 2024-04-08 NOTE — PROGRESS NOTES
[facility] looking for more information regarding Medication request for Tresiba Flextouch.   Fax back: [phone]    3 questions for provider to answer     Labeled and placed in file for Dr. Leon to address when back in office on 4/14/2024.    Provider Sarah Palm, PAC is out of office till 4/18/2024.    Nell Pena [facility]  Adult Endocrinology   [facility]

## 2024-04-09 NOTE — PROGRESS NOTES
A&Ox4, calm and cooperative, able to make needs known with call light in reach.  VSS x HR, on RA, urgency incontinence, LBM 1/21.  Denied SOB, CP, n/t, n/v, dizziness, lightheadedness.  Pt HR was sustaining 130's-150's in the evening, IV metoprolol given, provider paged.  Scheduled metoprolol dose increased and given per provider orders.  LPIV SL.  PRN IV metoprolol given again this morning for HR sustaining above 130's.  R hip incision site CDI.  SBA with walker and gait belt.  PCD's on overnight.  Purewick placed overnight for urgency incontinence.  BG a little low this morning, given apple juice.  Continu POC.     LMOM with Koki's recommendation re the referral.

## 2024-04-10 ENCOUNTER — MYC MEDICAL ADVICE (OUTPATIENT)
Dept: EDUCATION SERVICES | Facility: CLINIC | Age: 74
End: 2024-04-10
Payer: COMMERCIAL

## 2024-04-10 ENCOUNTER — TELEPHONE (OUTPATIENT)
Dept: EDUCATION SERVICES | Facility: CLINIC | Age: 74
End: 2024-04-10
Payer: COMMERCIAL

## 2024-04-10 DIAGNOSIS — E13.9 LADA (LATENT AUTOIMMUNE DIABETES MELLITUS IN ADULTS) (H): Primary | ICD-10-CM

## 2024-04-10 NOTE — TELEPHONE ENCOUNTER
[facility] Call Center    Phone Message    May a detailed message be left on voicemail: yes     Reason for Call: Other: Patient would like a call back from Tasha she stated its easier then going through the my chart please advise.     Action Taken: Message routed to:  [facility] diab ed    Travel Screening: Not Applicable

## 2024-04-11 NOTE — TELEPHONE ENCOUNTER
Patient read 4/10 My Chart Message.    Tasha Harley RN, [facility]  Diabetes Education Department  [facility], [place]  Phone: [phone]  [phone]

## 2024-04-18 ENCOUNTER — TELEPHONE (OUTPATIENT)
Dept: EDUCATION SERVICES | Facility: CLINIC | Age: 74
End: 2024-04-18
Payer: COMMERCIAL

## 2024-04-18 DIAGNOSIS — E13.9 LADA (LATENT AUTOIMMUNE DIABETES MELLITUS IN ADULTS) (H): ICD-10-CM

## 2024-04-18 DIAGNOSIS — E10.9 TYPE 1 DIABETES MELLITUS WITHOUT COMPLICATION (H): ICD-10-CM

## 2024-04-18 RX ORDER — INSULIN DEGLUDEC 100 U/ML
22 INJECTION, SOLUTION SUBCUTANEOUS DAILY
COMMUNITY
Start: 2024-04-18 | End: 2024-09-26 | Stop reason: ALTCHOICE

## 2024-04-18 NOTE — TELEPHONE ENCOUNTER
I called and spoke to Latoya this morning.  She has both Lantus and Tresiba on her med list.  She already gets Novolog through the [employer] Pt Assistance program.   She is applying for Tresiba through this same company but she has not heard if she qualifies. At the first of the year she applied for Tresiba. Hoping to get this in May.  Her insurance does not cover Tresiba so she uses Lantus as a bridge to when her Tresiba arrives.  She has a prescription for Lantus sent to her local pharmacy.    Humera Caraballo RN, [facility]

## 2024-04-19 ENCOUNTER — TELEPHONE (OUTPATIENT)
Dept: FAMILY MEDICINE | Facility: CLINIC | Age: 74
End: 2024-04-19
Payer: COMMERCIAL

## 2024-04-19 NOTE — TELEPHONE ENCOUNTER
"Pharmacist from [employer] Patient Assistance Program is calling to clarify the form received on 3/23.    \"Novolog 34 units is listed as daily.  It should be three times a day. Is patient on a sliding scale.\"    [phone] Ref# [id number]    Please review and advise  Natalia Rodriguez RN, BSN  [facility]    "

## 2024-04-24 ENCOUNTER — TELEPHONE (OUTPATIENT)
Dept: FAMILY MEDICINE | Facility: CLINIC | Age: 74
End: 2024-04-24
Payer: COMMERCIAL

## 2024-04-24 NOTE — TELEPHONE ENCOUNTER
Provider:  Please see message below.  It looks like you may have filled out these forms for the patient.  Please see telephone encounter dated 2/23/2024. Please let us know if endocrine should be handling this.  Thank you. Ryanne Giles (pharmacist) is calling reporting that he received a patient assistance form, dated 2/23/2024,that was hand written on and faxed to them for Latoya's Novolog. The form states 34 units daily.  This is not usually a once daily dosed medication. He is requesting more specific directions be given and then they can be called back to them using the reference number below.     Reference # [id number]

## 2024-04-26 ENCOUNTER — DOCUMENTATION ONLY (OUTPATIENT)
Dept: ENDOCRINOLOGY | Facility: CLINIC | Age: 74
End: 2024-04-26

## 2024-04-26 ENCOUNTER — LAB (OUTPATIENT)
Dept: LAB | Facility: CLINIC | Age: 74
End: 2024-04-26
Payer: COMMERCIAL

## 2024-04-26 DIAGNOSIS — H02.403 PTOSIS, BILATERAL: Primary | ICD-10-CM

## 2024-04-26 PROCEDURE — 36415 COLL VENOUS BLD VENIPUNCTURE: CPT

## 2024-04-26 PROCEDURE — 83516 IMMUNOASSAY NONANTIBODY: CPT | Mod: 90

## 2024-04-26 PROCEDURE — 86255 FLUORESCENT ANTIBODY SCREEN: CPT | Mod: 90

## 2024-04-26 PROCEDURE — 99000 SPECIMEN HANDLING OFFICE-LAB: CPT

## 2024-04-26 PROCEDURE — 86256 FLUORESCENT ANTIBODY TITER: CPT | Mod: 90

## 2024-04-26 PROCEDURE — 83519 RIA NONANTIBODY: CPT | Mod: 90

## 2024-04-26 NOTE — PROGRESS NOTES
Medicare requires that the MD/DO treating the patient for diabetes answers all of the questions and signs the pended order for the patient to qualify for diabetic shoes. Once the order is completed, please route the encounter back to sender.    Apple French

## 2024-04-29 LAB
ACHR BLOCK AB/ACHR TOTAL SFR SER: 41 %
ACHR MOD AB/ACHR TOTAL SFR SER: 74 %
MUSK AB SER QL: NORMAL
STRIA MUS IGG SER QL IF: DETECTED
STRIA MUS IGG TITR SER IF: ABNORMAL {TITER}

## 2024-04-29 NOTE — TELEPHONE ENCOUNTER
Please see attached message. Latoya's total daily does of Novolog is 34 unit(s). She does follow the copied instructions. Can someone please call patient assistance and clarify her daily dosing?     Thanks,   Sarah Anderson, pharmacist at [facility].... the order was clarified by Alecia on 4/24/24. Nothing else is needed.    Heike Adams, [facility]  Adult Endocrinology  [facility], [place]

## 2024-05-02 ENCOUNTER — TELEPHONE (OUTPATIENT)
Dept: FAMILY MEDICINE | Facility: CLINIC | Age: 74
End: 2024-05-02
Payer: COMMERCIAL

## 2024-05-02 LAB — ACHR BIND AB SER-SCNC: 7.9 NMOL/L

## 2024-05-02 NOTE — TELEPHONE ENCOUNTER
Latoya has been approved to the [employer] assistance program for Novolog pens, Tresiba pens & pen needles through December 2024. She will receive this medication at no cost through the enrollment period. She has been informed of this approval and delivery.    A 120 day supply of Novolog pens, Tresiba pens & pen needles will be delivered to Latoya's home. [facility] will contact Latoya to schedule delivery.    Latoya will contact my office for refills as we must work directly with the .  I will note EPIC as each refill is requested.    A detailed letter with refill information will be sent to Latoya.    Thanks so much for your help!    Jennifer Suarez  Prescription Assistance Supervisor  Pharmacy Assistance

## 2024-05-20 ENCOUNTER — TELEPHONE (OUTPATIENT)
Dept: FAMILY MEDICINE | Facility: CLINIC | Age: 74
End: 2024-05-20
Payer: COMMERCIAL

## 2024-05-20 NOTE — TELEPHONE ENCOUNTER
"Please refer to Mychart.    Patient is calling to be seen today instead of tomorrow.    \"It is hard for me to speak, I am gambling.\"    There are no appointments today.  Patient to be seen tomorrow.    Natalia Rodriguez RN, BSN  [facility]    "

## 2024-05-21 ENCOUNTER — TELEPHONE (OUTPATIENT)
Dept: FAMILY MEDICINE | Facility: CLINIC | Age: 74
End: 2024-05-21

## 2024-05-21 ENCOUNTER — OFFICE VISIT (OUTPATIENT)
Dept: FAMILY MEDICINE | Facility: CLINIC | Age: 74
End: 2024-05-21
Payer: COMMERCIAL

## 2024-05-21 VITALS
WEIGHT: 161.9 LBS | HEIGHT: 65 IN | OXYGEN SATURATION: 97 % | BODY MASS INDEX: 26.98 KG/M2 | DIASTOLIC BLOOD PRESSURE: 66 MMHG | TEMPERATURE: 98.1 F | SYSTOLIC BLOOD PRESSURE: 134 MMHG | RESPIRATION RATE: 16 BRPM | HEART RATE: 71 BPM

## 2024-05-21 DIAGNOSIS — R13.13 PHARYNGEAL DYSPHAGIA: ICD-10-CM

## 2024-05-21 DIAGNOSIS — Z11.59 ENCOUNTER FOR SCREENING FOR OTHER VIRAL DISEASES: ICD-10-CM

## 2024-05-21 DIAGNOSIS — I48.20 CHRONIC ATRIAL FIBRILLATION (H): ICD-10-CM

## 2024-05-21 DIAGNOSIS — E78.5 HYPERLIPIDEMIA WITH TARGET LDL LESS THAN 100: ICD-10-CM

## 2024-05-21 DIAGNOSIS — E13.9 LADA (LATENT AUTOIMMUNE DIABETES MELLITUS IN ADULTS) (H): ICD-10-CM

## 2024-05-21 DIAGNOSIS — E10.65 TYPE 1 DIABETES MELLITUS WITH HYPERGLYCEMIA (H): ICD-10-CM

## 2024-05-21 DIAGNOSIS — Z13.29 SCREENING FOR THYROID DISORDER: ICD-10-CM

## 2024-05-21 DIAGNOSIS — Z13.0 SCREENING FOR DISORDER OF BLOOD AND BLOOD-FORMING ORGANS: ICD-10-CM

## 2024-05-21 DIAGNOSIS — I72.4 ANEURYSM OF ARTERY OF LOWER EXTREMITY (H): ICD-10-CM

## 2024-05-21 DIAGNOSIS — E10.9 TYPE 1 DIABETES MELLITUS WITHOUT COMPLICATION (H): ICD-10-CM

## 2024-05-21 DIAGNOSIS — G70.00 MYASTHENIA GRAVIS (H): Primary | ICD-10-CM

## 2024-05-21 DIAGNOSIS — R79.89 ELEVATED LFTS: ICD-10-CM

## 2024-05-21 LAB
ALBUMIN SERPL BCG-MCNC: 4.4 G/DL (ref 3.5–5.2)
ALP SERPL-CCNC: 491 U/L (ref 40–150)
ALT SERPL W P-5'-P-CCNC: 142 U/L (ref 0–50)
ANION GAP SERPL CALCULATED.3IONS-SCNC: 13 MMOL/L (ref 7–15)
AST SERPL W P-5'-P-CCNC: 165 U/L (ref 0–45)
BILIRUB SERPL-MCNC: 0.8 MG/DL
BUN SERPL-MCNC: 18.4 MG/DL (ref 8–23)
CALCIUM SERPL-MCNC: 10.2 MG/DL (ref 8.8–10.2)
CHLORIDE SERPL-SCNC: 103 MMOL/L (ref 98–107)
CREAT SERPL-MCNC: 0.58 MG/DL (ref 0.51–0.95)
DEPRECATED HCO3 PLAS-SCNC: 23 MMOL/L (ref 22–29)
EGFRCR SERPLBLD CKD-EPI 2021: >90 ML/MIN/1.73M2
ERYTHROCYTE [DISTWIDTH] IN BLOOD BY AUTOMATED COUNT: 12.6 % (ref 10–15)
GLUCOSE SERPL-MCNC: 256 MG/DL (ref 70–99)
HBA1C MFR BLD: 7.6 % (ref 0–5.6)
HCT VFR BLD AUTO: 40.2 % (ref 35–47)
HGB BLD-MCNC: 13.6 G/DL (ref 11.7–15.7)
MCH RBC QN AUTO: 32.2 PG (ref 26.5–33)
MCHC RBC AUTO-ENTMCNC: 33.8 G/DL (ref 31.5–36.5)
MCV RBC AUTO: 95 FL (ref 78–100)
PLATELET # BLD AUTO: 218 10E3/UL (ref 150–450)
POTASSIUM SERPL-SCNC: 4.7 MMOL/L (ref 3.4–5.3)
PROT SERPL-MCNC: 7 G/DL (ref 6.4–8.3)
RBC # BLD AUTO: 4.23 10E6/UL (ref 3.8–5.2)
SODIUM SERPL-SCNC: 139 MMOL/L (ref 135–145)
TSH SERPL DL<=0.005 MIU/L-ACNC: 1.67 UIU/ML (ref 0.3–4.2)
WBC # BLD AUTO: 6.2 10E3/UL (ref 4–11)

## 2024-05-21 PROCEDURE — G2211 COMPLEX E/M VISIT ADD ON: HCPCS | Performed by: NURSE PRACTITIONER

## 2024-05-21 PROCEDURE — 99214 OFFICE O/P EST MOD 30 MIN: CPT | Performed by: NURSE PRACTITIONER

## 2024-05-21 PROCEDURE — 80053 COMPREHEN METABOLIC PANEL: CPT | Performed by: NURSE PRACTITIONER

## 2024-05-21 PROCEDURE — 84443 ASSAY THYROID STIM HORMONE: CPT | Performed by: NURSE PRACTITIONER

## 2024-05-21 PROCEDURE — 36415 COLL VENOUS BLD VENIPUNCTURE: CPT | Performed by: NURSE PRACTITIONER

## 2024-05-21 PROCEDURE — 85027 COMPLETE CBC AUTOMATED: CPT | Performed by: NURSE PRACTITIONER

## 2024-05-21 PROCEDURE — 83036 HEMOGLOBIN GLYCOSYLATED A1C: CPT | Performed by: NURSE PRACTITIONER

## 2024-05-21 RX ORDER — PYRIDOSTIGMINE BROMIDE 60 MG/1
30 TABLET ORAL EVERY 6 HOURS
COMMUNITY

## 2024-05-21 ASSESSMENT — PAIN SCALES - GENERAL: PAINLEVEL: NO PAIN (0)

## 2024-05-21 NOTE — PATIENT INSTRUCTIONS
PLAN:   1.   Symptomatic therapy suggested: Continue current medications as prescribed.    2.  Orders Placed This Encounter   Medications    pyRIDostigmine (MESTINON) 60 MG tablet     Sig: Take 30 mg by mouth 3 times daily     Orders Placed This Encounter   Procedures    CBC with platelets    Comprehensive metabolic panel    TSH with free T4 reflex    Hemoglobin A1c    Adult Neurology  Referral    Speech Therapy  Referral       3.  CONSULTATION/REFERRAL to swallowing study and neurology   Will follow up and/or notify patient of  results via My Chart to determine further need for followup   Patient needs to follow up in if no improvement,or sooner if worsening of symptoms or other symptoms develop.

## 2024-05-21 NOTE — TELEPHONE ENCOUNTER
Patient calling in related to [employer] message sent on 5/19/24. Patient states went to ER recently to rule out stroke due to eyelid drooping, difficulty swallowing, eating, and talking. Patient states she was diagnosed with myasthenia gravis. Patient stated she sent a [employer] message to PCP to see if she still needs to have upcoming eyelid surgery since eyelid drooping is related to myasthenia gravis but did not hear a response back.    Patient states she had appointment for pre-op today but was cancelled due to visit is suppose be within 30 days of surgery(patient states per pre-op team) instead of 60 days like surgery team informed her. Surgery is set for 7/17. Patient is requesting to schedule a ED follow up instead to manage current sx as her daughter did already took off work. Noted patient having difficulty speaking and speaking in short phrases. Patient denies any difficulty breathing.  Writer able to schedule appointment for today for follow up, advise to arrive to clinic at 8:40 for 9 am appointment. Advise to schedule pre-op appointment after discussion with PCP. Patient verbalized understanding.     Routing to PCP as NORA Pitts RN  [facility]

## 2024-05-21 NOTE — PROGRESS NOTES
Pablito Klein is a 73 year old, presenting for the following health issues:  Hospital F/U        5/21/2024     8:31 AM   Additional Questions   Roomed by Gino   Accompanied by Self         5/21/2024     8:31 AM   Patient Reported Additional Medications   Patient reports taking the following new medications None     HPI     Hospital Follow-up Visit:    Hospital/Nursing Home/IP Rehab Facility:  [facility]  Date of Admission: 5/11/2024  Date of Discharge: 5/11/2024  Reason(s) for Admission: Stroke like symptoms  Was the patient in the ICU or did the patient experience delirium during hospitalization?  No  Do you have any other stressors you would like to discuss with your provider? Health Concerns    Problems taking medications regularly:  None  Medication changes since discharge: pyRIDostigmine 60mg tablets  Problems adhering to non-medication therapy:  None    Summary of hospitalization:  CareEverywhere information obtained and reviewed  Diagnostic Tests/Treatments reviewed.  Follow up needed: neurologist   Other Healthcare Providers Involved in Patient s Care:         Specialist appointment - neurology   Update since discharge: fluctuating course.   Went to eye MD in January and started with one eye and the by March was both eye when got into eye specialist   Feels like air bubbles on her throat   Will have issues with periodically difficulty with eating and swallowing   Stuff would get caught in her throat now in the last few  weeks  Feels like symptoms   Eggs were OK   Dribbles when she eats or drinks      Has appointment on June 14 with Dr Isaac at [facility]   Eye lid was on and off symptome   Last week daughter states saw some subtle changes but symptoms progressed fairly rapidly   Daughter is a neuro physical therapist   Last time she had to have an MRI had to completely sedated   Was started on mestinon but finds only minimal benefit   No MRI completed has  significant anxiety in past with MRI and had to be completely sedated to accomplish this in her past     Plan of care communicated with patient and family     MDM  Latoya Rodríguez is a 73 y.o. female who presents to the emergency department for stroke symptoms. Neighbor noticed the thick garbled speech and drove her to the ER. She was also concerned for some facial asymmetry. The patient states she first noticed the speech change yesterday but it improved with rest. She was fine this morning and then it occurred again this afternoon. The patient has bilateral ptosis on exam slightly worse on the left. She states she saw an eye doctor for this and is scheduled to have surgery in July. The patient is anticoagulated for atrial fibrillation and denies any missed doses of the Eliquis making ischemic stroke unlikely. Apart from her face and speech, her neurological exam is nonfocal. Her glucose was normal in route. Basic labs were ordered along with a head CT to evaluate for hemorrhagic stroke because of the Eliquis. She does have a mild headache which she attributes to straining her eyes due to the ptosis for the last couple months. These test all returned unremarkable. Meanwhile I dug through care everywhere and found laboratory work done about 2 weeks ago to evaluate for myasthenia gravis. I couldn't find a clinical note or documentation regarding the ptosis, only these labs. All of the tests are elevated and abnormal including acetylcholine receptors and striated muscle antibodies. The patient tells me her eye doctor, Dr. Sonia Lilly at [facility] ordered these tests. She never heard the results but has a follow-up appointment with her on May 23 and surgery scheduled for July for lid lift. When I reevaluated the patient her speech changes are now gone. She states she feels better after she rested. Therefore I think all of her symptoms are from myasthenia gravis. Of note, I did talk to the  patient about a brain MRI and she states she cannot tolerate it unless she is fully sedated under anesthesia. Indeed her last MRI on file of her back from a couple years ago was done under anesthesia. The patient states that the bilateral ptosis has been going on since probably January. She denies trouble breathing or swallowing or with choking. No diplopia. I called and spoke to the neurologist on-call Dr Isaac who fortunately is a neuro-ophthalmologist. She agreed this all seems consistent with myasthenia gravis. Dr. Isaac recommends doing the ice pack to the face challenge and if that improves her symptoms then this is certainly consistent with myasthenia gravis. Indeed an ice pack was applied to the patient's eyes for 5 minutes and following this her ptosis had resolved, she was able to fully open her eyes and raise her eyebrows. Therefore Dr Isaac recommends starting pyridostigmine 30 mg 3 times a day with the plan to work up to 60 mg. She t did advise the patient to take it with food to minimize GI symptoms and to monitor for bradycardia. I did tell the patient if she develops trouble breathing to come back to the emergency room because she may need to be admitted for IVIG. The patient adamantly denies trouble breathing or any trouble swallowing. Her speech changes have resolved. She feels very comfortable going home. The pyridostigmine was prescribed. Dr Isaac said her  will call the patient Monday morning to schedule an appointment but I also give the patient the neurology clinic phone number. She was told to update her primary care physician and ophthalmologist. Or again return to the emergency department for worsening symptoms, persistent speech changes, trouble breathing or swallowing.    Complex medical decision making was used in this case given that significant risks were present and assessed and the complicated differential diagnosed involved.     Disposition  Discharged    Discharge Medication  List as of 5/11/2024 10:25 PM       START taking these medications   Details   pyRIDostigmine (MESTINON) 60 mg oral tablet Take 0.5 tablets (30 mg) by mouth three times a day for 90 days., Disp-135 tablet, R-0, e-Prescribe       Lab work is in process  Labs reviewed in EPIC  BP Readings from Last 3 Encounters:   05/21/24 134/66   02/05/24 122/78   12/27/23 (!) 152/74    Wt Readings from Last 3 Encounters:   05/21/24 73.4 kg (161 lb 14.4 oz)   02/05/24 75.3 kg (166 lb)   12/27/23 75.3 kg (166 lb)                  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)    Type 1 diabetes mellitus with hyperglycemia (H)    Age-related osteoporosis without current pathological fracture    Stress incontinence    Atrophic vaginitis    Intrinsic sphincter deficiency    Overactive bladder    Carpal tunnel syndrome of right wrist    Chronic atrial fibrillation (H)    Osteoarthritis of carpometacarpal (CMC) joint of both thumbs    Thumb pain    Post-operative nausea and vomiting    Type 1 diabetes mellitus without complication (H)    Osteoarthritis of right hip, unspecified osteoarthritis type    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)     Past Surgical History:   Procedure Laterality Date    ANESTHESIA CARDIOVERSION N/A 9/23/2021    Procedure: ANESTHESIA, FOR CARDIOVERSION@1100;  Surgeon: GENERIC ANESTHESIA PROVIDER;  Location: [facility] OR    ANESTHESIA OUT OF OR MRI N/A 9/14/2022    Procedure: ANESTHESIA OUT OF OR Magnetic resonance imaging right hip and lumbar @0800;  Surgeon: GENERIC ANESTHESIA PROVIDER;  Location: [facility] OR    ARTHROPLASTY HIP Right 1/18/2023    Procedure: ARTHROPLASTY, HIP, TOTAL RIGHT;  Surgeon: Arnaud Ward MD;  Location: [facility] OR    CYSTOSCOPY, INJECT COLLAGEN, COMBINED N/A 5/11/2021    Procedure: CYSTOSCOPY, WITH PERIURETHRAL  BULKING AGENT INJECTION;  Surgeon: Alberto Zhang MD;  Location: [facility] OR    CYSTOSCOPY, INJECT COLLAGEN, COMBINED N/A 9/12/2022    Procedure: CYSTOSCOPY, WITH PERIURETHRAL BULKING AGENT INJECTION (look in the bladder and urethra and inject filler into the urethra);  Surgeon: Alberto Zhang MD;  Location: [facility] OR    EYE SURGERY  9/04, 5/11    Retinal detachment, Cataract (both eyes)    IMPLANT STIMULATOR AND LEADS SACRAL NERVE (STAGE ONE AND TWO) Right 7/5/2022    Procedure: INSERTION, SACRAL NERVE STIMULATOR, STAGE 1 & 2 (permanent implant on the RIGHT side with [employer]);  Surgeon: Alberto Zhang MD;  Location: [facility] OR    IMPLANT STIMULATOR SACRAL NERVE PERCUTANEOUS TRIAL N/A 6/14/2022    Procedure: INSERTION, NEUROSTIMULATOR, SACRAL, PERCUTANEOUS, FOR TRIAL;  Surgeon: Alberto Zhang MD;  Location: [facility] OR    RELEASE CARPAL TUNNEL Right 8/6/2021    Procedure: RELEASE, CARPAL TUNNEL, Right;  Surgeon: RADHA Sandoval MD;  Location: [facility] OR    RELEASE TRIGGER FINGER Bilateral 5/10/2019    Procedure: RELEASE TRIGGER FINGER, BILATERAL LONG AND RING FINGERS;  Surgeon: RADHA Sandoval MD;  Location: [facility] OR    VASCULAR SURGERY      Varicose Veins       Social History     Tobacco Use    Smoking status: Never    Smokeless tobacco: Never   Substance Use Topics    Alcohol use: Yes     Comment: 1 to 2 beers or glasses of wine 1 to 2 times per month     Family History   Problem Relation Age of Onset    Hyperlipidemia Mother     Breast Cancer Mother     Other Cancer Father     Leukemia Father     Skin Cancer Father     Coronary Artery Disease Maternal Uncle     Brain Cancer Maternal Uncle     Coronary Artery Disease Maternal Uncle     Stomach Cancer Maternal Aunt     Diabetes No family hx of     Hypertension No family hx of     Cerebrovascular Disease No family hx of     Colon Cancer No family hx of     Thyroid Disease No family hx of     Depression No family hx of     Anxiety Disorder No family hx of           Current Outpatient Medications   Medication Sig Dispense Refill    alendronate (FOSAMAX) 70 MG tablet Take 1 tablet (70 mg) by mouth every 7 days 12 tablet 3    amoxicillin (AMOXIL) 500 MG capsule Take 4 caps (2000mg) 30-60 minutes before dental procedure 4 capsule 0    apixaban ANTICOAGULANT (ELIQUIS) 5 MG tablet Take 1 tablet (5 mg) by mouth 2 times daily 180 tablet 3    atorvastatin (LIPITOR) 40 MG tablet Take 1 tablet (40 mg) by mouth daily for 360 days 90 tablet 3    blood glucose (NO BRAND SPECIFIED) test strip Use to test blood sugar 4 times daily or as directed. Accu Chek Guide test strips. 150 strip 3    calcium carbonate-vitamin D 500-400 MG-UNIT TABS tablt Take 1 tablet by mouth 2 times daily 180 tablet 3    Continuous Blood Gluc Sensor (DEXCOM G7 SENSOR) MISC 1 each every 10 days 9 each 3    estradiol (ESTRACE) 0.1 MG/GM vaginal cream INSERT 2G VAGINALLY TWICE PER WEEK 42.5 g 11    insulin aspart (NOVOLOG FLEXPEN) 100 UNIT/ML pen 1 unit per 18 gram of carb for breakfast, 1 unit per 15 grams of carbs for lunch and  1 unit per 13 gram for dinner. Plus 1 unit per 50 mg/dl above 150. Total daily dose approx 30 units. 30 mL 3    insulin degludec (TRESIBA FLEXTOUCH) 100 UNIT/ML pen Inject 22 Units Subcutaneous daily Once daily at 7 pm. Getting this through [employer] Patient Assistance Program. Should be coming in May.  Using Lantus (this is what her insurance covers) as a bridge to when she can get her Tresiba.      insulin glargine (LANTUS PEN) 100 UNIT/ML pen Inject 22 units daily + 2 units for priming of pen. Using Lantus (this is what her insurance covers) as a bridge to when she can get her Tresiba through Pt Assistance.      insulin pen needle (B-D U/F) 31G X 8 MM miscellaneous USE 5 TIMES DAILY  WITH NOVOLOG AND LANTUS 500 each 0    melatonin 3 MG tablet Take 1 tablet (3 mg) by mouth nightly as needed for sleep 30 tablet 0    metoprolol succinate ER (TOPROL XL) 50 MG 24 hr tablet Take 1 tablet  (50 mg) by mouth daily 90 tablet 3    Multiple Vitamins-Minerals (MULTIVITAMIN ADULT PO) Take 1 tablet by mouth every morning       Urine Glucose-Ketones Test (KETO-DIASTIX) STRP 1 strip by In Vitro route as needed 1 each 11    pyRIDostigmine (MESTINON) 60 MG tablet Take 30 mg by mouth 3 times daily       No Known Allergies  Recent Labs   Lab Test 05/21/24  0945 11/24/23  0819 06/19/23  0000 01/27/23  1254 01/23/23  1305 09/09/22  1254 09/06/22  0928 08/05/21  1019 07/01/21  1054 10/21/20  1251 07/20/20  0817   A1C 7.6* 7.8* 8.6*  --   --    < > 8.0*   < >  --    < > 7.8*   LDL  --  103*  --   --   --   --  104*  --  90  --  107*   HDL  --  75  --   --   --   --  61  --  70  --  71   TRIG  --  56  --   --   --   --  101  --  58  --  56   ALT  --  22  --  14 21   < >  --    < >  --   --   --    CR  --  0.68  --  0.60 0.56   < > 0.69   < > 0.74  --  0.67   GFRESTIMATED  --  >90  --  >90 >90   < > >90   < > 82  --  89   GFRESTBLACK  --   --   --   --   --   --   --   --  >90  --  >90   POTASSIUM  --  4.6  --  4.5 4.1   < >  --    < >  --   --   --    TSH  --  2.12  --   --   --   --  1.27   < >  --   --   --     < > = values in this interval not displayed.        CONSTITUTIONAL:NEGATIVE for fever, chills, change in weight and POSITIVE  for fatigue  EYES: POSITIVE for lid problem bilaterally drooping intermittently  and NEGATIVE for diplopia and Hx cataracts  ENT/MOUTH: NEGATIVE for ear, mouth and throat problems  RESP:NEGATIVE for significant cough or SOB  CV: NEGATIVE for chest pain, palpitations or peripheral edema  GI: POSITIVE for dysphagia and NEGATIVE for jaundice, melena, nausea, poor appetite, and vomiting  MUSCULOSKELETAL: NEGATIVE for significant arthralgias or myalgia  NEURO: POSITIVE for dysphagia and eye with  and NEGATIVE for gait disturbance, loss of consciousness, and syncope  ENDOCRINE: NEGATIVE for temperature intolerance, skin/hair changes          Objective    /66 (BP Location: Right arm,  "Patient Position: Sitting, Cuff Size: Adult Regular)   Pulse 71   Temp 98.1  F (36.7  C) (Oral)   Resp 16   Ht 1.651 m (5' 5\")   Wt 73.4 kg (161 lb 14.4 oz)   LMP  (LMP Unknown)   SpO2 97%   BMI 26.94 kg/m    Body mass index is 26.94 kg/m .  Physical Exam   GENERAL: Patient is well nourished, well developed,in no apparent distress, non-toxic, in no respiratory distress and acyanotic, alert, cooperative, and well hydrated  EYES: eyelids- ptosis OU and conjunctiva/corneas- normal   HENT:ear canals and TM's normal and nose and mouth without ulcers or lesions   NECK:normal and supple  CARDIAC:irregular rates and rhythm, no murmur, click or rub, and no irregular beats  without LE edema bilaterally  RESP: normal respiratory rate and rhythm, lungs clear to auscultation  unlabored respirations, no intercostal retractions or accessory muscle use  ABD:soft, nontender  SKIN: Skin color, texture, turgor normal. No rashes or lesions.  MS: extremities normal- no gross deformities noted, gait normal, and normal muscle tone  NEURO: mentation intact and speech slow   PSYCH: Alert, oriented, thought content appropriate,mentation appears normal., affect and mood normal        Lab on 04/26/2024   Component Date Value Ref Range Status    Acetylcholine Binding Antibody 04/26/2024 7.9 (H)  0.0 - 0.4 nmol/L Final    Comment: Repeated and verified  INTERPRETIVE INFORMATION: Acetylcholine Binding Ab      Negative ....... 0.0 - 0.4 nmol/L    Positive ....... 0.5 nmol/L or greater    Approximately 85-90 percent of patients with myasthenia   gravis (MG) express antibodies to the acetylcholine   receptor (AChR), which can be divided into binding,   blocking, and modulating antibodies. Binding antibody can   activate complement and lead to loss of AChR. Blocking   antibody may impair binding of acetylcholine to the   receptor, leading to poor muscle contraction. Modulating   antibody causes receptor endocytosis resulting in loss of   AChR " expression, which correlates most closely with   clinical severity of disease. Approximately 10-15 percent   of individuals with confirmed myasthenia gravis have no   measurable binding, blocking, or modulating antibodies.    This test was developed and its performance characteristics   determined by [employer]. It has not been cleared or   approved by the [place] Food and                            Drug Administration. This test   was performed in a CLIA certified laboratory and is   intended for clinical purposes.  Performed By: [facility]  [address] [place] [address]  : Yuri Beltre MD, PhD  CLIA Number: [id number]    Striated Muscle Antibodies, IgG Sc* 04/26/2024 Detected (H)  <1:40 Final    Comment:   Striated Muscle Antibodies, IgG detected. Titer results to   follow.  INTERPRETIVE DATA:  Striated Muscle Antibodies, IgG Screen    In the presence of acetylcholine receptor (AChR) antibody,   striated muscle antibodies, which bind in a   cross-striational pattern to skeletal and heart muscle   tissue sections, are associated with late-onset myasthenia   gravis (MG). Striated muscle antibodies recognize epitopes   on three major muscle proteins, including: titin, ryanodine   receptor (RyR) and Kv1.4 (an alpha subunit of voltage-gated   potassium channel [VGKC]). Isolated cases of striated   muscle antibodies may be seen in patients with certain   autoimmune diseases, rheumatic fever, myocardial   infarction, and following some cardiotomy procedures.    This test was developed and its performance characteristics   determined by [employer]. It has not been cleared or   approved by the US Food and Drug Administration. This test   was performed in a CLIA certified laboratory and                            is   intended for clinical purposes.  Performed By: [facility]  [address] [place], [place] [id number]  : Yuri Beltre MD,  PhD  CLIA Number: [id number]    AcetChol Modul Zita 04/26/2024 74 (H)  <=45 % Final    Comment: Repeated and Verified  INTERPRETIVE INFORMATION: Acetylcholine Modulating Ab      Negative ..........  0-45 percent modulating    Positive ..........  46 percent or greater modulating    Approximately 85-90 percent of patients with myasthenia   gravis (MG) express antibodies to the acetylcholine   receptor (AChR), which can be divided into binding,   blocking, and modulating antibodies. Binding antibody can   activate complement and lead to loss of AChR. Blocking   antibody may impair binding of acetylcholine to the   receptor, leading to poor muscle contraction. Modulating   antibody causes receptor endocytosis resulting in loss of   AChR expression, which correlates most closely with   clinical severity of disease. Approximately 10-15 percent   of individuals with confirmed myasthenia gravis have no   measurable binding, blocking, or modulating antibodies.    This test was developed and its performance characteristics   determined by [employer]. It has not been cleared or                              approved by the US Food and Drug Administration. This test   was performed in a CLIA certified laboratory and is   intended for clinical purposes.  Performed By: [facility]  [address] [place] [address]  : Yuri Beltre MD, PhD  CLIA Number: [id number]    AcetChol Block Zita 04/26/2024 41 (H)  0 - 26 % Final    Comment: Repeated and Verified  INTERPRETIVE INFORMATION: Acetylcholine Blocking Ab      Negative ............  0-26 percent blocking    Indeterminate ....... 27-41 percent blocking    Positive ............ 42 percent or greater blocking    Approximately 85-90 percent of patients with myasthenia   gravis (MG) express antibodies to the acetylcholine   receptor (AChR), which can be divided into binding,   blocking, and modulating antibodies. Binding antibody can    activate complement and lead to loss of AChR. Blocking   antibody may impair binding of acetylcholine to the   receptor, leading to poor muscle contraction. Modulating   antibody causes receptor endocytosis resulting in loss of   AChR expression, which correlates most closely with   clinical severity of disease. Approximately 10-15 percent   of individuals with confirmed myasthenia gravis have no   measurable binding, blocking, or modulating antibodies.    This test was developed and its performance characteristics   determined by [employer]. It has not been cleared or   approved by the [place] Food and Drug Administration. This test   was performed in a CLIA certified laboratory and is   intended for clinical purposes.  Performed By: [facility]  [address] [place] [id number]  : Yuri Beltre MD, PhD  CLIA Number: [id number]    Muscle-Specific Kinase Antibody Sc* 04/26/2024 <1:10  <1:10 Final    Comment: MuSK Antibody, IgG is not detected. No further testing will   be performed.  INTERPRETIVE INFORMATION: MuSK IgG Ab CBA, Serum, with Rflx    Muscle-specific kinase (MuSK) antibody is found in a subset   of patients with myasthenia gravis, primarily those   seronegative for muscle acetylcholine receptor (AChR)   antibody. Decreasing antibody levels may be associated with   therapeutic response; therefore, clinical correlation must   be strongly considered. A negative test result does not   rule out a diagnosis of myasthenia gravis.    This indirect fluorescent antibody cell-based assay (CBA)   utilizes muscle-specific kinase (MuSK) transfected cells   for the detection of the MuSK IgG antibody.    This test was developed and its performance characteristics   determined by [facility]. It has not been cleared or   approved by the U.S. Food and Drug Administration. This   test was performed in a CLIA-certified laboratory and is   intended  for clinical purposes.  Performed By: [facility]  [address] [place] [address]  : Yuri Beltre MD, PhD  CLIA Number: [id number]    Striated Muscle Antibodies, IgG Ti* 04/26/2024 >1:2560 (H)  <1:40 Final    INTREPRETIVE INFORMATION: Striated Muscle Abs, IgG Titer    This test was developed and its performance characteristics   determined by [facility]. It has not been cleared or   approved by the US Food and Drug Administration. This test   was performed in a CLIA certified laboratory and is   intended for clinical purposes.  Performed By: [facility]  [address] [place] [address]  : Yuri Beltre MD, PhD  CLIA Number: [id number]     Results for orders placed or performed in visit on 05/21/24   CBC with platelets     Status: Normal   Result Value Ref Range    WBC Count 6.2 4.0 - 11.0 10e3/uL    RBC Count 4.23 3.80 - 5.20 10e6/uL    Hemoglobin 13.6 11.7 - 15.7 g/dL    Hematocrit 40.2 35.0 - 47.0 %    MCV 95 78 - 100 fL    MCH 32.2 26.5 - 33.0 pg    MCHC 33.8 31.5 - 36.5 g/dL    RDW 12.6 10.0 - 15.0 %    Platelet Count 218 150 - 450 10e3/uL   Comprehensive metabolic panel     Status: Abnormal   Result Value Ref Range    Sodium 139 135 - 145 mmol/L    Potassium 4.7 3.4 - 5.3 mmol/L    Carbon Dioxide (CO2) 23 22 - 29 mmol/L    Anion Gap 13 7 - 15 mmol/L    Urea Nitrogen 18.4 8.0 - 23.0 mg/dL    Creatinine 0.58 0.51 - 0.95 mg/dL    GFR Estimate >90 >60 mL/min/1.73m2    Calcium 10.2 8.8 - 10.2 mg/dL    Chloride 103 98 - 107 mmol/L    Glucose 256 (H) 70 - 99 mg/dL    Alkaline Phosphatase 491 (H) 40 - 150 U/L     (H) 0 - 45 U/L     (H) 0 - 50 U/L    Protein Total 7.0 6.4 - 8.3 g/dL    Albumin 4.4 3.5 - 5.2 g/dL    Bilirubin Total 0.8 <=1.2 mg/dL   TSH with free T4 reflex     Status: Normal   Result Value Ref Range    TSH 1.67 0.30 - 4.20 uIU/mL   Hemoglobin A1c     Status: Abnormal  "  Result Value Ref Range    Hemoglobin A1C 7.6 (H) 0.0 - 5.6 %   '  Assessment & Plan     Myasthenia gravis (H)  Will increase mestinon to 60 mg TID   - Adult Neurology  Referral  - Speech Therapy  Referral    Pharyngeal dysphagia  Will refer for swallowing study   - Speech Therapy  Referral    Type 1 diabetes mellitus without complication (H)  FOLLOW UP WITH SPECIALIST :Endocrinology      Hyperlipidemia with target LDL less than 100  The current medical regimen is effective.  Continue current medication regimen unchanged.      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)  FOLLOW UP WITH SPECIALIST :Endocrinology      Type 1 diabetes mellitus with hyperglycemia (H)  Will follow up and/or notify patient of  results via My Chart to determine further need for followup -   FOLLOW UP WITH SPECIALIST :Neurology  Comprehensive metabolic panel  - Hemoglobin A1c    Screening for thyroid disorder  - TSH with free T4 reflex    Screening for disorder of blood and blood-forming organs  - CBC with platelets    Elevated LFTs  Will follow up and/or notify patient of  results via My Chart to determine further need for followup   - Hepatitis C Screen Reflex to HCV RNA Quant and Genotype  - Hepatitis B Surface Antibody  - HEPATITIS B CORE ANTIBODY  - HEPATITIS B SURFACE ANTIGEN  - US Abdomen Complete    Encounter for screening for other viral diseases  - Hepatitis B Surface Antibody  - HEPATITIS B CORE ANTIBODY  - HEPATITIS B SURFACE ANTIGEN    Chronic atrial fibrillation (H)  Continue Eliquis and cardiac medications for rate control     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    Weight as of this encounter: 73.4 kg (161 lb " 14.4 oz).         See Patient Instructions  Patient Instructions   PLAN:   1.   Symptomatic therapy suggested: Continue current medications as prescribed.    2.  Orders Placed This Encounter   Medications    pyRIDostigmine (MESTINON) 60 MG tablet     Sig: Take 30 mg by mouth 3 times daily     Orders Placed This Encounter   Procedures    CBC with platelets    Comprehensive metabolic panel    TSH with free T4 reflex    Hemoglobin A1c    Adult Neurology  Referral    Speech Therapy  Referral       3.  CONSULTATION/REFERRAL to swallowing study and neurology   Will follow up and/or notify patient of  results via My Chart to determine further need for followup   Patient needs to follow up in if no improvement,or sooner if worsening of symptoms or other symptoms develop.              Signed Electronically by: URVASHI Hunter CNP

## 2024-05-22 NOTE — RESULT ENCOUNTER NOTE
Concetta Rodríguez,  Go ahead an increase your pyRIDostigmine (MESTINON) 60 MG to whole tablet 3 times a day   We are calling [facility] of neurology to see if can get  you in sooner  Attached are your test results.  -Normal red blood cell (hgb) levels, normal white blood cell count and normal platelet levels.  -Liver and gallbladder tests (ALT,AST, Alk phos,bilirubin) are significantly elevated. ADVISE: lets recheck end of this week like Thursday   I will place order. Please call [phone] to schedule.  Also added some labs to this as well   Will ultrasound liver   I will place order. Please call [phone] to schedule.  -Kidney function (GFR) is normal.  -Sodium is normal.  -Potassium is normal.  -Calcium is normal.  -Glucose is normal.  -A1C (test of diabetes control the last 2-3 months) is mildly elevated. Please keep appointment with endocrinology  Please recheck your A1C test in 3 months.   -TSH (thyroid stimulating hormone) level is normal which indicates normal thyroid function.   Please contact us if you have any questions.    Pricila Avila, CNP

## 2024-05-24 ENCOUNTER — OFFICE VISIT (OUTPATIENT)
Dept: ENDOCRINOLOGY | Facility: CLINIC | Age: 74
End: 2024-05-24
Payer: COMMERCIAL

## 2024-05-24 ENCOUNTER — LAB (OUTPATIENT)
Dept: LAB | Facility: CLINIC | Age: 74
End: 2024-05-24
Payer: COMMERCIAL

## 2024-05-24 VITALS
SYSTOLIC BLOOD PRESSURE: 135 MMHG | BODY MASS INDEX: 26.39 KG/M2 | OXYGEN SATURATION: 97 % | WEIGHT: 158.6 LBS | RESPIRATION RATE: 18 BRPM | HEART RATE: 71 BPM | DIASTOLIC BLOOD PRESSURE: 61 MMHG

## 2024-05-24 DIAGNOSIS — Z11.59 ENCOUNTER FOR SCREENING FOR OTHER VIRAL DISEASES: ICD-10-CM

## 2024-05-24 DIAGNOSIS — E10.65 TYPE 1 DIABETES MELLITUS WITH HYPERGLYCEMIA (H): Primary | ICD-10-CM

## 2024-05-24 DIAGNOSIS — R79.89 ELEVATED LFTS: ICD-10-CM

## 2024-05-24 PROCEDURE — 36415 COLL VENOUS BLD VENIPUNCTURE: CPT

## 2024-05-24 PROCEDURE — 86706 HEP B SURFACE ANTIBODY: CPT

## 2024-05-24 PROCEDURE — 87340 HEPATITIS B SURFACE AG IA: CPT

## 2024-05-24 PROCEDURE — 86803 HEPATITIS C AB TEST: CPT

## 2024-05-24 PROCEDURE — 86704 HEP B CORE ANTIBODY TOTAL: CPT

## 2024-05-24 PROCEDURE — 99214 OFFICE O/P EST MOD 30 MIN: CPT | Performed by: PHYSICIAN ASSISTANT

## 2024-05-24 NOTE — PATIENT INSTRUCTIONS
Welcome to the [facility] Endocrinology and Diabetes Clinics     Our Endocrinology Clinics are here to provide you with a team-based, collaborative approach in the diagnosis and treatment of patients with diabetes and endocrine disorders. The team is made up of Physicians, Physician Assistants, Certified Diabetes Educators, Registered Nurses, Medical Assistants, Emergency Medical Technicians, and many others, all of whom have the unified goal of providing our patients with high quality care.     Please see below for some helpful tips to best navigate and use the [facility] Endocrinology clinic:     [place] Respect: At [facility], we are committed to a respectful and safe space for all patients, visitors, and staff.  We believe that mutual respect between patients and their care team is the foundation of quality care.  It is our expectation that you will be treated with respect by your care team.  In turn, we ask that all communication with the care team (written and verbal) be respectful and free from profanity, threatening, or abusive language.  Disrespectful communication undermines our therapeutic relationship with you and may result in us being unable to continue to provide your care.    Refills: A provider must see you at least annually to prescribe and refill medications. This is to ensure your safety as well as meet insurance and compliance regulations.    Scheduling: Many of our Providers offer both in-person or video visits. Please call to schedule any needed follow ups as soon as possible because our provider schedules fill up very quickly. Our care team has the right to require an in-person visit when they believe that it is medically necessary. Please remember that for any virtual visits, you must be in the [facility] at the time of the visit, otherwise we are unable to see you and you will need to be rescheduled.    Missed Appointments: If you need to cancel or miss your  scheduled appointment, please call the clinic at [phone] to reschedule.  Please note if you repeatedly miss appointments or repeatedly miss appointments without calling to inform us ahead of time (no-show), the clinic may elect to not allow you to reschedule without speaking to a manager, may require a Partnership In Care Agreement prior to rescheduling, or could result in you no longer being able to receive care from the clinic. Providing the clinic with timely notification if you have to miss an appointment, allows us to better serve the needs of all of our patients.    Primary Care Provider: Our Endocrinologists are Specialists in their field. We expect you to have a Primary Care Provider established to handle any needs outside of your diabetes and endocrine care.  We would be happy to assist you find a Primary Care Provider, if you do not have one.    [employer]: [employer] is a wonderful resource that allows you access to your Care Team via online or the daniel. Please ask a member of the team if you would like help creating an account. Please note that it may take up to 2 business days for a response. [employer] messages are not reviewed on weekends or after business hours.  Emergent or urgent care needs should never be communicated via [employer].  If you experience a medical emergency call 911 or go to the nearest emergency room.    Labs: It is recommended that you stay within the [facility] for labs but you are welcome to obtain ordered labs (with some exceptions) from any location of your choice as long as they are able to complete and process the needed labs. If you need us to fax orders to your preferred lab, please provide us the name and fax number of the lab you would like to go to so we can fax the orders. If your labs are drawn outside of the [facility], please have them fax the results to [phone] ([place]) or [phone] ([place]) or via [facility]. It is your  responsibility to ensure that outside lab results are sent to us.    We look forward to working with you. Please do not hesitate to reach out with any questions.    Thank you,    The Endocrine Team    [facility] Address:   [place] Address:     [address] [place] [address]    Phone: [phone]  Fax: [phone] [address] [place] [address]    Phone: [phone]  Fax: [phone]     [facility] Cost Estimate Phone Number: [phone]    General Lab and Imaging Scheduling Phone Number: [phone]

## 2024-05-24 NOTE — NURSING NOTE
Latoya Rodríguez's goals for this visit include:   Chief Complaint   Patient presents with    Follow Up     DM       She requests these members of her care team be copied on today's visit information: yes    PCP: Pricila Avila    Referring Provider:  Pricila Avila, APRN CNP  [address] [place] [address]    /61 (BP Location: Left arm, Patient Position: Sitting, Cuff Size: Adult Regular)   Pulse 71   Resp 18   Wt 71.9 kg (158 lb 9.6 oz)   LMP  (LMP Unknown)   SpO2 97%   BMI 26.39 kg/m      Do you need any medication refills at today's visit? None    Jaun Dickey, EMT

## 2024-05-24 NOTE — LETTER
5/24/2024         RE: Latoya Rodríguez  [address]  [place] [address]        Dear Colleague,    Thank you for referring your patient, Latoya Rodríguez, to the [facility]. Please see a copy of my visit note below.                Assessment/Plan :   Type 1 DM. Latoya is doing okay. We discussed her recent CGMS report and I can see where her overnight readings are elevated. We will increase the Lantus to 23 unit(s) at 7 pm. This is not a significant change but I am hopeful that it will bring those numbers down slightly without increasing the incidence of hypoglycemia. We also discussed decreasing her dinner time bolus by about 2 unit(s). We will plan on a follow-up appt in 3-4 mos or sooner if needed.  Myasthenia gravis. Latoya mentioned that there is a chance that she may need steroid therapy in the near future. We discussed how this would affect her blood sugars. I am not recommending against it but if she is started on steroids, she needs to contact our office for insulin adjustment.       I have independently reviewed and interpreted labs, imaging as indicated.      Chief complaint:  Latoya is a 73 year old female who returns for follow-up of Type 1 DM.    I have reviewed Care Everywhere including [facility], [facility] , [facility], [facility] lab reports, imaging reports and provider notes as indicated.      HISTORY OF PRESENT ILLNESS  Latoya is worried about her blood sugars. She is no longer having trouble with lows. Now her overnight readings are consistently elevated. She is currently taking 22 unit(s) of Lantus at 7 pm and she continues to take Novolog before meals based on an insulin to carb ratio. She monitors her blood sugars closely with the Dexcom G7 sensor.     Latoya was recently diagnosed with myasthenia gravis. A couple weeks ago, she developed speech difficulties along with a more pronounced facial  droop. She thought that she may have had a stroke and she was evaluated at the ER. They determined that it was MG and she was started on pyridostigmine. She doesn't feel like the medication has had any impact on her blood sugars but she does feel like the increased stress and change to her diet, may be affecting the numbers. She is struggling with swallowing so she has been relying on softer foods until she has an upcoming swallow test.    Latoya has not had any problems with severe hyperglycemia and/or hypoglycemia. She also has not had any problems with blurred vision. She does have a history of neuropathy but this seems to be stable.     She has had diabetes since 2016. She was diagnosed at a routine physical. She was first thought to have Type 2 DM but she was later found to have TORRES antibodies and a low c-peptide. She has been on MDI insulin therapy ever since. Her diabetes is complicated by osteoporosis, hyperlipidemia, chronic atrial fibrillation on Eliquis, OAB, and osteoarthritis.    Endocrine relevant labs are as follows:   Latest Reference Range & Units 05/21/24 09:45   Hemoglobin A1C 0.0 - 5.6 % 7.6 (H)   (H): Data is abnormally high   Latest Reference Range & Units 11/24/23 08:19   Albumin Urine mg/L mg/L <12.0      Latest Reference Range & Units 11/24/23 08:19   Hemoglobin A1C 0.0 - 5.6 % 7.8 (H)   (H): Data is abnormally high    REVIEW OF SYSTEMS    Endocrine: positive for diabetes  Skin: negative  Eyes: negative for, visual blurring, redness, tearing  Ears/Nose/Throat: positive for difficulty swallowing, negative for, postnasal drainage, hoarseness  Respiratory: No shortness of breath, dyspnea on exertion, cough, or hemoptysis  Cardiovascular: negative for, chest pain, dyspnea on exertion, lower extremity edema, and exercise intolerance  Gastrointestinal: negative for, nausea, vomiting, constipation, and diarrhea  Genitourinary: negative for, nocturia, dysuria, frequency, and urgency  Musculoskeletal:  positive for muscular weakness, negative for, nocturnal cramping, and foot pain  Neurologic: positive for local weakness, negative for, numbness or tingling of hands, and numbness or tingling of feet  Psychiatric: negative  Hematologic/Lymphatic/Immunologic: negative    Past Medical History  Past Medical History:   Diagnosis Date     Cataract      Chronic atrial fibrillation (H) 06/08/2022     Infectious arthritis (H) 6/27/2023     Osteoarthritis of carpometacarpal (CMC) joint of both thumbs 11/16/2022     Post-operative nausea and vomiting 1/18/2023    Severe. Recommend TIVA     Type 2 diabetes mellitus with hyperglycemia (H) 06/24/2016       Medications  Current Outpatient Medications   Medication Sig Dispense Refill     alendronate (FOSAMAX) 70 MG tablet Take 1 tablet (70 mg) by mouth every 7 days 12 tablet 3     amoxicillin (AMOXIL) 500 MG capsule Take 4 caps (2000mg) 30-60 minutes before dental procedure 4 capsule 0     apixaban ANTICOAGULANT (ELIQUIS) 5 MG tablet Take 1 tablet (5 mg) by mouth 2 times daily 180 tablet 3     atorvastatin (LIPITOR) 40 MG tablet Take 1 tablet (40 mg) by mouth daily for 360 days 90 tablet 3     blood glucose (NO BRAND SPECIFIED) test strip Use to test blood sugar 4 times daily or as directed. Accu Chek Guide test strips. 150 strip 3     calcium carbonate-vitamin D 500-400 MG-UNIT TABS tablt Take 1 tablet by mouth 2 times daily 180 tablet 3     Continuous Blood Gluc Sensor (DEXCOM G7 SENSOR) MISC 1 each every 10 days 9 each 3     estradiol (ESTRACE) 0.1 MG/GM vaginal cream INSERT 2G VAGINALLY TWICE PER WEEK 42.5 g 11     insulin aspart (NOVOLOG FLEXPEN) 100 UNIT/ML pen 1 unit per 18 gram of carb for breakfast, 1 unit per 15 grams of carbs for lunch and  1 unit per 13 gram for dinner. Plus 1 unit per 50 mg/dl above 150. Total daily dose approx 30 units. 30 mL 3     insulin degludec (TRESIBA FLEXTOUCH) 100 UNIT/ML pen Inject 22 Units Subcutaneous daily Once daily at 7 pm. Getting this  through [employer] Patient Assistance Program. Should be coming in May.  Using Lantus (this is what her insurance covers) as a bridge to when she can get her Tresiba.       insulin glargine (LANTUS PEN) 100 UNIT/ML pen Inject 22 units daily + 2 units for priming of pen. Using Lantus (this is what her insurance covers) as a bridge to when she can get her Tresiba through Pt Assistance.       insulin pen needle (B-D U/F) 31G X 8 MM miscellaneous USE 5 TIMES DAILY  WITH NOVOLOG AND LANTUS 500 each 0     melatonin 3 MG tablet Take 1 tablet (3 mg) by mouth nightly as needed for sleep 30 tablet 0     metoprolol succinate ER (TOPROL XL) 50 MG 24 hr tablet Take 1 tablet (50 mg) by mouth daily 90 tablet 3     Multiple Vitamins-Minerals (MULTIVITAMIN ADULT PO) Take 1 tablet by mouth every morning        pyRIDostigmine (MESTINON) 60 MG tablet Take 30 mg by mouth 3 times daily       Urine Glucose-Ketones Test (KETO-DIASTIX) STRP 1 strip by In Vitro route as needed 1 each 11       Allergies  No Known Allergies      Family History  family history includes Brain Cancer in her maternal uncle; Breast Cancer in her mother; Coronary Artery Disease in her maternal uncle and maternal uncle; Hyperlipidemia in her mother; Leukemia in her father; Other Cancer in her father; Skin Cancer in her father; Stomach Cancer in her maternal aunt.    Social History  Social History     Tobacco Use     Smoking status: Never     Smokeless tobacco: Never   Vaping Use     Vaping status: Never Used   Substance Use Topics     Alcohol use: Yes     Comment: 1 to 2 beers or glasses of wine 1 to 2 times per month     Drug use: No       Physical Exam  /61 (BP Location: Left arm, Patient Position: Sitting, Cuff Size: Adult Regular)   Pulse 71   Resp 18   Wt 71.9 kg (158 lb 9.6 oz)   LMP  (LMP Unknown)   SpO2 97%   BMI 26.39 kg/m    Body mass index is 26.39 kg/m .  GENERAL :  In no apparent distress  SKIN: Normal color, normal temperature, texture.   No hirsutism, alopecia or purple striae.     EYES: PERRL, EOMI, No scleral icterus,  No proptosis, conjunctival redness, stare, retraction  RESP: Lungs clear to auscultation bilaterally  CARDIAC: Regular rate and rhythm, normal S1 S2, without murmurs, rubs or gallops       NEURO: awake, alert, responds appropriately to questions.  Cranial nerves intact.   Moves all extremities; Gait normal.  No tremor of the outstretched hand.    EXTREMITIES: No clubbing, cyanosis or edema.  FOOT EXAM: Strong pedal pulses bilaterally. Toenails are thick due to fungus. Callus formation at base of great toe and heel. Sensation intact to monofilament except over calluses    DATA REVIEW  Dexcom Clarity  Time in target range 42%  Very Low<1%, Low 1%  High 31% and Very High 25%  Current Ave  mg/dl        Again, thank you for allowing me to participate in the care of your patient.        Sincerely,        Sarah Palm PA-C

## 2024-05-24 NOTE — PROGRESS NOTES
Assessment/Plan :   Type 1 DM. Latoya is doing okay. We discussed her recent CGMS report and I can see where her overnight readings are elevated. We will increase the Lantus to 23 unit(s) at 7 pm. This is not a significant change but I am hopeful that it will bring those numbers down slightly without increasing the incidence of hypoglycemia. We also discussed decreasing her dinner time bolus by about 2 unit(s). We will plan on a follow-up appt in 3-4 mos or sooner if needed.  Myasthenia gravis. Latoya mentioned that there is a chance that she may need steroid therapy in the near future. We discussed how this would affect her blood sugars. I am not recommending against it but if she is started on steroids, she needs to contact our office for insulin adjustment.       I have independently reviewed and interpreted labs, imaging as indicated.      Chief complaint:  Latoya is a 73 year old female who returns for follow-up of Type 1 DM.    I have reviewed Care Everywhere including [facility], [facility], [facility], [facility] , [facility], [place] lab reports, imaging reports and provider notes as indicated.      HISTORY OF PRESENT ILLNESS  Latoya is worried about her blood sugars. She is no longer having trouble with lows. Now her overnight readings are consistently elevated. She is currently taking 22 unit(s) of Lantus at 7 pm and she continues to take Novolog before meals based on an insulin to carb ratio. She monitors her blood sugars closely with the Dexcom G7 sensor.     Latoya was recently diagnosed with myasthenia gravis. A couple weeks ago, she developed speech difficulties along with a more pronounced facial droop. She thought that she may have had a stroke and she was evaluated at the ER. They determined that it was MG and she was started on pyridostigmine. She doesn't feel like the medication has had any impact on her blood sugars but she does feel like the increased stress and  change to her diet, may be affecting the numbers. She is struggling with swallowing so she has been relying on softer foods until she has an upcoming swallow test.    Latoya has not had any problems with severe hyperglycemia and/or hypoglycemia. She also has not had any problems with blurred vision. She does have a history of neuropathy but this seems to be stable.     She has had diabetes since 2016. She was diagnosed at a routine physical. She was first thought to have Type 2 DM but she was later found to have TORRES antibodies and a low c-peptide. She has been on MDI insulin therapy ever since. Her diabetes is complicated by osteoporosis, hyperlipidemia, chronic atrial fibrillation on Eliquis, OAB, and osteoarthritis.    Endocrine relevant labs are as follows:   Latest Reference Range & Units 05/21/24 09:45   Hemoglobin A1C 0.0 - 5.6 % 7.6 (H)   (H): Data is abnormally high   Latest Reference Range & Units 11/24/23 08:19   Albumin Urine mg/L mg/L <12.0      Latest Reference Range & Units 11/24/23 08:19   Hemoglobin A1C 0.0 - 5.6 % 7.8 (H)   (H): Data is abnormally high    REVIEW OF SYSTEMS    Endocrine: positive for diabetes  Skin: negative  Eyes: negative for, visual blurring, redness, tearing  Ears/Nose/Throat: positive for difficulty swallowing, negative for, postnasal drainage, hoarseness  Respiratory: No shortness of breath, dyspnea on exertion, cough, or hemoptysis  Cardiovascular: negative for, chest pain, dyspnea on exertion, lower extremity edema, and exercise intolerance  Gastrointestinal: negative for, nausea, vomiting, constipation, and diarrhea  Genitourinary: negative for, nocturia, dysuria, frequency, and urgency  Musculoskeletal: positive for muscular weakness, negative for, nocturnal cramping, and foot pain  Neurologic: positive for local weakness, negative for, numbness or tingling of hands, and numbness or tingling of feet  Psychiatric: negative  Hematologic/Lymphatic/Immunologic: negative    Past  Medical History  Past Medical History:   Diagnosis Date    Cataract     Chronic atrial fibrillation (H) 06/08/2022    Infectious arthritis (H) 6/27/2023    Osteoarthritis of carpometacarpal (CMC) joint of both thumbs 11/16/2022    Post-operative nausea and vomiting 1/18/2023    Severe. Recommend TIVA    Type 2 diabetes mellitus with hyperglycemia (H) 06/24/2016       Medications  Current Outpatient Medications   Medication Sig Dispense Refill    alendronate (FOSAMAX) 70 MG tablet Take 1 tablet (70 mg) by mouth every 7 days 12 tablet 3    amoxicillin (AMOXIL) 500 MG capsule Take 4 caps (2000mg) 30-60 minutes before dental procedure 4 capsule 0    apixaban ANTICOAGULANT (ELIQUIS) 5 MG tablet Take 1 tablet (5 mg) by mouth 2 times daily 180 tablet 3    atorvastatin (LIPITOR) 40 MG tablet Take 1 tablet (40 mg) by mouth daily for 360 days 90 tablet 3    blood glucose (NO BRAND SPECIFIED) test strip Use to test blood sugar 4 times daily or as directed. Accu Chek Guide test strips. 150 strip 3    calcium carbonate-vitamin D 500-400 MG-UNIT TABS tablt Take 1 tablet by mouth 2 times daily 180 tablet 3    Continuous Blood Gluc Sensor (DEXCOM G7 SENSOR) MISC 1 each every 10 days 9 each 3    estradiol (ESTRACE) 0.1 MG/GM vaginal cream INSERT 2G VAGINALLY TWICE PER WEEK 42.5 g 11    insulin aspart (NOVOLOG FLEXPEN) 100 UNIT/ML pen 1 unit per 18 gram of carb for breakfast, 1 unit per 15 grams of carbs for lunch and  1 unit per 13 gram for dinner. Plus 1 unit per 50 mg/dl above 150. Total daily dose approx 30 units. 30 mL 3    insulin degludec (TRESIBA FLEXTOUCH) 100 UNIT/ML pen Inject 22 Units Subcutaneous daily Once daily at 7 pm. Getting this through [employer] Assistance [employer]. Should be coming in May.  Using Lantus (this is what her insurance covers) as a bridge to when she can get her Tresiba.      insulin glargine (LANTUS PEN) 100 UNIT/ML pen Inject 22 units daily + 2 units for priming of pen. Using Lantus (this  is what her insurance covers) as a bridge to when she can get her Tresiba through Pt Assistance.      insulin pen needle (B-D U/F) 31G X 8 MM miscellaneous USE 5 TIMES DAILY  WITH NOVOLOG AND LANTUS 500 each 0    melatonin 3 MG tablet Take 1 tablet (3 mg) by mouth nightly as needed for sleep 30 tablet 0    metoprolol succinate ER (TOPROL XL) 50 MG 24 hr tablet Take 1 tablet (50 mg) by mouth daily 90 tablet 3    Multiple Vitamins-Minerals (MULTIVITAMIN ADULT PO) Take 1 tablet by mouth every morning       pyRIDostigmine (MESTINON) 60 MG tablet Take 30 mg by mouth 3 times daily      Urine Glucose-Ketones Test (KETO-DIASTIX) STRP 1 strip by In Vitro route as needed 1 each 11       Allergies  No Known Allergies      Family History  family history includes Brain Cancer in her maternal uncle; Breast Cancer in her mother; Coronary Artery Disease in her maternal uncle and maternal uncle; Hyperlipidemia in her mother; Leukemia in her father; Other Cancer in her father; Skin Cancer in her father; Stomach Cancer in her maternal aunt.    Social History  Social History     Tobacco Use    Smoking status: Never    Smokeless tobacco: Never   Vaping Use    Vaping status: Never Used   Substance Use Topics    Alcohol use: Yes     Comment: 1 to 2 beers or glasses of wine 1 to 2 times per month    Drug use: No       Physical Exam  /61 (BP Location: Left arm, Patient Position: Sitting, Cuff Size: Adult Regular)   Pulse 71   Resp 18   Wt 71.9 kg (158 lb 9.6 oz)   LMP  (LMP Unknown)   SpO2 97%   BMI 26.39 kg/m    Body mass index is 26.39 kg/m .  GENERAL :  In no apparent distress  SKIN: Normal color, normal temperature, texture.  No hirsutism, alopecia or purple striae.     EYES: PERRL, EOMI, No scleral icterus,  No proptosis, conjunctival redness, stare, retraction  RESP: Lungs clear to auscultation bilaterally  CARDIAC: Regular rate and rhythm, normal S1 S2, without murmurs, rubs or gallops       NEURO: awake, alert, responds  appropriately to questions.  Cranial nerves intact.   Moves all extremities; Gait normal.  No tremor of the outstretched hand.    EXTREMITIES: No clubbing, cyanosis or edema.  FOOT EXAM: Strong pedal pulses bilaterally. Toenails are thick due to fungus. Callus formation at base of great toe and heel. Sensation intact to monofilament except over calluses    DATA REVIEW  Dexcom Clarity  Time in target range 42%  Very Low<1%, Low 1%  High 31% and Very High 25%  Current Ave  mg/dl

## 2024-05-25 LAB
HBV CORE AB SERPL QL IA: NONREACTIVE
HBV SURFACE AB SERPL IA-ACNC: <3.5 M[IU]/ML
HBV SURFACE AB SERPL IA-ACNC: NONREACTIVE M[IU]/ML
HBV SURFACE AG SERPL QL IA: NONREACTIVE
HCV AB SERPL QL IA: NONREACTIVE

## 2024-05-27 NOTE — RESULT ENCOUNTER NOTE
Concetta Rodríguez,    Attached are your test results.  Labs negative for hepatitis    Please contact us if you have any questions.    Pricila Avila, CNP

## 2024-06-06 ENCOUNTER — TRANSFERRED RECORDS (OUTPATIENT)
Dept: HEALTH INFORMATION MANAGEMENT | Facility: CLINIC | Age: 74
End: 2024-06-06
Payer: COMMERCIAL

## 2024-06-07 ENCOUNTER — TELEPHONE (OUTPATIENT)
Dept: CARDIOLOGY | Facility: CLINIC | Age: 74
End: 2024-06-07
Payer: COMMERCIAL

## 2024-06-07 ENCOUNTER — TRANSFERRED RECORDS (OUTPATIENT)
Dept: HEALTH INFORMATION MANAGEMENT | Facility: CLINIC | Age: 74
End: 2024-06-07
Payer: COMMERCIAL

## 2024-06-07 NOTE — TELEPHONE ENCOUNTER
[facility] Call Center    Phone Message    May a detailed message be left on voicemail: yes     Reason for Call: Other: Latoya would like a call back to discuss the possibility that she does not have afib anymore.     Action Taken: Other: Cardiology    Travel Screening: Not Applicable    Thank you!  [facility]

## 2024-06-10 NOTE — TELEPHONE ENCOUNTER
Spoke with Latoya and daughter. They also were answered in a [employer] message today as well. She is currently admitted to [facility] and her EKG now she's bradycardia (possibly due to her myasthenia gravis). Let them know that at this time, decisions should be made with her admitting/inpatient team. We are happy to help/see Latoya after discharge is neccessary to go over any changes.

## 2024-06-11 ENCOUNTER — PATIENT OUTREACH (OUTPATIENT)
Dept: CARE COORDINATION | Facility: CLINIC | Age: 74
End: 2024-06-11
Payer: COMMERCIAL

## 2024-06-11 NOTE — PROGRESS NOTES
Clinical Product Navigator RN reviewed chart; patient on payer product coverage.  Review results:   CPN Initial Information Gathering  Referral Source: Health Plan    Patient identified by [facility] for RN Clinical Product Navigator review.  Patient is currently admitted at [facility] as of 6/9/24 for myasthenic gravis crisis.  RN Clinical Product Navigator will monitor for discharge disposition.    Melissa Behl BSN, RN, PHN, CCM  RN Clinical Product Navigator  [phone]

## 2024-06-14 ENCOUNTER — MYC MEDICAL ADVICE (OUTPATIENT)
Dept: FAMILY MEDICINE | Facility: CLINIC | Age: 74
End: 2024-06-14

## 2024-06-14 ENCOUNTER — TELEPHONE (OUTPATIENT)
Dept: FAMILY MEDICINE | Facility: CLINIC | Age: 74
End: 2024-06-14

## 2024-06-14 NOTE — TELEPHONE ENCOUNTER
We can do a virtual visit.  More importantly they need follow up with her neurologist   Certainly I can help her daughter with FMLA papers   They can send to me in the meantime via my chart attachment

## 2024-06-14 NOTE — TELEPHONE ENCOUNTER
Reason for Call:  Appointment Request    Patient requesting this type of appt:  Hospital/ED Follow-Up     Requested provider: Pricila Avila    Reason patient unable to be scheduled: Not within requested timeframe    When does patient want to be seen/preferred time: 1-2 weeks    Comments: Pt will be discharged today for [facility]     Could we send this information to you in [employer] or would you prefer to receive a phone call?:   Patient would prefer a phone call   Okay to leave a detailed message?: Yes at Home number on file [phone] (home)    Call taken on 6/14/2024 at 11:39 AM by Elissa Viveros

## 2024-06-17 NOTE — TELEPHONE ENCOUNTER
If patient's Daughter Janet needs the FMLA paperwork completed for her we need to schedule a video appointment under her name. Can we schedule same time as patient on 6/24 or should we schedule virtual fo Janet back to back at 5:00pm for FMLA?

## 2024-06-18 ENCOUNTER — TELEPHONE (OUTPATIENT)
Dept: FAMILY MEDICINE | Facility: CLINIC | Age: 74
End: 2024-06-18
Payer: COMMERCIAL

## 2024-06-18 NOTE — TELEPHONE ENCOUNTER
[facility] called Pt needed a hospital follow up scheduled Pt for same day June 25th sending to the provider for same day approval.       Cassi Velez Facilitator

## 2024-06-19 ENCOUNTER — PATIENT OUTREACH (OUTPATIENT)
Dept: CARE COORDINATION | Facility: CLINIC | Age: 74
End: 2024-06-19
Payer: COMMERCIAL

## 2024-06-19 ENCOUNTER — NURSE TRIAGE (OUTPATIENT)
Dept: FAMILY MEDICINE | Facility: CLINIC | Age: 74
End: 2024-06-19
Payer: COMMERCIAL

## 2024-06-19 NOTE — TELEPHONE ENCOUNTER
RN called patient to follow up on message received below.     Patient states she was discharged from the hospital yesterday, 6/18/24. Was admitted 6/9/24.     She states last Sunday, 6/9/24, she had trouble swallowing her pills and it led her to be admitted at [facility]. She was found to be in myasthenic crisis. Patient has myasthenia gravis. Patient is concerned that this happened again this morning. She is asking when she should head in to the hospital.     This morning, she initially had difficulty swallowing her pills. She was able to cut some of her pills in half and swallow them with water. She did cough after drinking water and swallowing the pills. However, it feels like something is still stuck in her throat. She also noticed her right eye drooping and feels fatigued.     She says her speech is okay, she can walk. She denies weakness, difficulty breathing, drooling.     RN advised she could maybe try taking pills with pudding or apple sauce to see if it helps her swallow pills. If possible, patient could maybe crush them? Will run this by provider to confirm.    RN reviewed when to call 911 and/or go to the emergency room. She verbalized understanding.    She has hospital follow up on Monday, 6/24/24. RN checked to see if PCP has sooner openings, but that is the soonest opening.    Routing to provider to review and advise on how to take pills and if patient should be seen in the ED at this time with difficulty swallowing again.     Catrachita Muller, DAVIDN, RN  [facility]    Reason for Disposition   Symptoms of pill stuck in throat or esophagus (e.g., pain in throat or chest, FB sensation) and no relief after using Care Advice    Additional Information   Negative: SEVERE difficulty swallowing (e.g., drooling or spitting) and started suddenly after taking a medicine or allergic foods   Negative: Wheezing, stridor, hoarseness, or difficulty breathing   Negative: Swollen tongue and sudden onset    Negative: Sounds like a life-threatening emergency to the triager   Negative: Sore throat (throat pain with swallowing)   Negative: SEVERE difficulty swallowing (e.g., drooling or spitting, can't swallow water)   Negative: Symptoms of food or bone stuck in throat or esophagus (e.g., pain in throat or chest, FB sensation, blood-tinged saliva)   Negative: SEVERE symptoms of pill stuck in throat or esophagus (e.g., severe pain, bleeding, or difficulty swallowing liquids)   Negative: Drinking very little and dehydration suspected (e.g., no urine > 12 hours, very dry mouth, very lightheaded)   Negative: Refuses to drink anything for > 12 hours   Negative: Patient sounds very sick or weak to the triager   Negative: Fever > 100.4 F (38.0 C)   Negative: Coughing spells occur during or within 2 hours after eating/feedings    Protocols used: Swallowing Difficulty-A-OH

## 2024-06-19 NOTE — PROGRESS NOTES
S-(situation): RN Clinical Product Navigator chart review    B-(background): Patient was for RN Clinical Product Navigator review by her health plan due to admission at [facility] 6/9/24 for Myastenia gravis crisis.  RN Clinical Product Navigator monitoring for discharge disposition.    A-(assessment): Patient discharged on 6/18/24 to home.  Note patient contacted triage today due to difficulty swallowing and was advised to be seen today.    R-(recommendations/plan): RN Clinical Product Navigator will monitor for outcome of today.  Note patient has a future speech therapy appointment 6/24/24 and PCP appointment 6/25/24.    Melissa Behl BSN, RN, PHN, CCM  RN Clinical Product Navigator  [phone]

## 2024-06-19 NOTE — TELEPHONE ENCOUNTER
Provider Response to 2nd Level Triage Request    I have reviewed the RN documentation. My recommendation is:  Okay to crush the tablets, but if patient continues to have trouble swallowing and increasing droopy eyes with vision symptoms, recommend to go to the ER

## 2024-06-19 NOTE — TELEPHONE ENCOUNTER
She has appointment for June 24 if Ok with patient can switch to June 25 virtual spot ro several opening on June 28   OK to do virtual so we can switch to hospital follow up

## 2024-06-19 NOTE — TELEPHONE ENCOUNTER
RN called patient and relayed message. She verbalized understanding and denies further questions at this time.     Catrachita Muller RN, BSN, PHN  [facility]  Nurse Triage, Family Practice

## 2024-06-20 RX ORDER — PREDNISONE 20 MG/1
40 TABLET ORAL DAILY
COMMUNITY
Start: 2024-06-19 | End: 2024-09-26

## 2024-06-20 ASSESSMENT — ACTIVITIES OF DAILY LIVING (ADL): DEPENDENT_IADLS:: CLEANING;SHOPPING;TRANSPORTATION

## 2024-06-20 NOTE — PROGRESS NOTES
Clinic Care Coordination Contact  Clinic Care Coordination Contact  OUTREACH with Post Discharge Assessment    Referral Information:  Referral Source: Health Plan    Primary Diagnosis: Neurological Disorders    Chief Complaint   Patient presents with    Clinic Care Coordination - Initial    Clinic Care Coordination - Post Hospital        [place] Utilization:   Clinic Utilization  Difficulty keeping appointments:: No  Compliance Concerns: No  No-Show Concerns: No  No PCP office visit in Past Year: No  Utilization      No Show Count (past year)  1             ED Visits  0             Hospital Admissions  0                    Current as of: 6/20/2024 12:24 PM                Clinical Concerns:  Current Medical Concerns:  Patient requests additional information on how to manager her Myasthenia Gravis.      Current Behavioral Concerns: n/a    Education Provided to patient: RN Clinical Product Navigator reviewed hospital discharge instructions, provided education on RN Clinical Product Navigator role and sent a [employer] message with information on the Myasthenia Gravis [employer] [place]; which writer received as a resource for a neurology RN care coordinator.    Pain  Pain (GOAL):: No  Health Maintenance Reviewed: Up to date  Clinical Pathway: None    Transitions of Care Outreach    Most Recent Admission Date: 6/9/24  Most Recent Admission Diagnosis: Myasthenia Gravis, dysphagia, dysarthria    Most Recent Discharge Date: 6/18/24  Most Recent Discharge Diagnosis: Myasthenia Gravis, dysphagia, dysarthria    Transitions of Care Assessment    Discharge Assessment  How are you doing now that you are home?: Patient reports she improved since hospitalization and yesterday when she spoke with triage.  Patient states she has been able to swallow her medications and breakfast.  Speech is within normal limits.  Patient states her blood glucose has had a 300 reading and a 47 reading since being home (as well as readings  in-between).  Patient states her blood glucose was more labile while inpatient.  Patient will contact the clinic if blood glucose does not normalize and if she continues experience labile readings.  How are your symptoms? (Red Flag symptoms escalate to triage hotline per guidelines): Improved  Do you know how to contact your clinic care team if you have future questions or changes to your health status? : Yes  Does the patient have their discharge instructions? : Yes  Does the patient have questions regarding their discharge instructions? : No  Were you started on any new medications or were there changes to any of your previous medications? : Yes  Does the patient have all of their medications?: Yes  Do you have questions regarding any of your medications? : No  Do you have all of your needed medical supplies or equipment (DME)?  (i.e. oxygen tank, CPAP, cane, etc.): Yes         Post-op (Clinicians Only)  Did the patient have surgery or a procedure: No  Eating & Drinking: eating and drinking without complaints/concerns  PO Intake: soft foods    Care Management       Care Mgmt General Assessment  Referral  Referral Source: Health Plan  Health Care Home/Utilization  Preferred Hospital: [facility]  [phone]  Preferred Urgent Care: Other  Living Situation  Current living arrangement:: I live in a private home  Type of residence:: Private home - stairs  Resources  Patient receiving home care services:: No  Community Resources: None  Supplies Currently Used at Home: Diabetic Supplies  Equipment Currently Used at Home: shower chair;cane, straight;wheelchair, manual;glucometer;walker, rolling;dressing device  Referrals Placed: None  Employment Status: retired  Psychosocial  Informal Support system:: Children;Family;Friends;Neighbors  Functional Status  Dependent ADLs:: Ambulation-walker  Dependent IADLs:: Cleaning;Shopping;Transportation  Bed or wheelchair confined:: No  Mobility Status:  Independent w/Device    and     Care Mgmt Encounter Assessment  Preventative Care  Routine Health maintenance Reviewed: Up to date  Clinic Utilization  Difficulty keeping appointments:: No  Compliance Concerns: No  No-Show Concerns: No  No PCP office visit in Past Year: No  Transportation  Transportation means:: Family;Regular car     Primary Diagnosis  Primary Diagnosis: Neurological Disorders     Pain  Pain (GOAL):: No  Medication Review  Medication adherence problem (GOAL):: No  Knowledgeable about how to use meds:: Yes  Medication side effects suspected:: No  Diet/Exercise/Sleep  Diet:: Diabetic diet  Inadequate nutrition (GOAL):: No  Tube Feeding: No  Inadequate activity/exercise (GOAL):: No  Significant changes in sleep pattern (GOAL): No    Medication Management:  Medication review status: Medications reviewed and no changes reported per patient.        Medications updated per care everywhere     Functional Status:  Dependent ADLs:: Ambulation-walker  Dependent IADLs:: Cleaning, Shopping, Transportation  Bed or wheelchair confined:: No  Mobility Status: Independent w/Device    Living Situation:  Current living arrangement:: I live in a private home  Type of residence:: Private home - stairs    Lifestyle & Psychosocial Needs:    Social Determinants of Health     Food Insecurity: No Food Insecurity (6/9/2024)    Received from [facility]     Hunger Vital Sign     Worried About Running Out of Food in the Last Year: Never true     Ran Out of Food in the Last Year: Never true   Depression: Not at risk (1/16/2024)    PHQ-2     PHQ-2 Score: 0   Housing Stability: Low Risk  (6/9/2024)    Received from [facility]     Housing Stability Vital Sign     Unable to Pay for Housing in the Last Year: No     Number of Times Moved in the Last Year: 1     Homeless in the Last Year: No   Tobacco Use: Low Risk  (6/9/2024)    Received from [facility]     Patient History     Smoking Tobacco Use: Never      Smokeless Tobacco Use: Never     Passive Exposure: Never   Financial Resource Strain: Low Risk  (6/20/2024)    Financial Resource Strain     Within the past 12 months, have you or your family members you live with been unable to get utilities (heat, electricity) when it was really needed?: No   Alcohol Use: Not on file   Transportation Needs: No Transportation Needs (6/9/2024)    Received from [facility]     PRAPARE - Transportation     Lack of Transportation (Medical): No     Lack of Transportation (Non-Medical): No   Physical Activity: Not on file   Interpersonal Safety: Not At Risk (6/9/2024)    Received from [facility]     Humiliation, Afraid, Rape, and Kick questionnaire     Fear of Current or Ex-Partner: No     Emotionally Abused: No     Physically Abused: No     Sexually Abused: No   Stress: Not on file   Social Connections: Not on file   Health Literacy: Not on file     Diet:: Diabetic diet  Inadequate nutrition (GOAL):: No  Tube Feeding: No  Inadequate activity/exercise (GOAL):: No  Significant changes in sleep pattern (GOAL): No  Transportation means:: Family, Regular car     Informal Support system:: Children, Family, Friends, Neighbors        Resources and Interventions:  Current Resources:      Community Resources: None  Supplies Currently Used at Home: Diabetic Supplies  Equipment Currently Used at Home: shower chair, cane, straight, wheelchair, manual, glucometer, walker, rolling, dressing device  Employment Status: retired              Referrals Placed: None    Patient/Caregiver understanding: Patient verbalized understanding and denied ongoing needs, but has RN Clinical Product Navigator's phone number if needs arise.       Future Appointments                In 4 days Amy Devlin SLP [facility] MG    In 5 days Pricila Avila APRN CNP [facility] CL    In 1 week Pricila Avila  URVASHI Naranjo CNP [facility] CL    In 1 week Kayode Cox MD [facility] [place]    In 3 months Sarah Palm PA-C; MG ENDO NURSE [facility]    In 7 months Edward Aguero MD; MG ENDO NURSE [facility]            Follow up Plan     Discharge Follow-Up  Discharge follow up appointment scheduled in alignment with recommended follow up timeframe or Transitions of Risk Category? (Low = within 30 days; Moderate= within 14 days; High= within 7 days): Yes  Discharge Follow Up Appointment Date: 06/24/24  Discharge Follow Up Appointment Scheduled with?: Primary Care Provider    Future Appointments   Date Time Provider Department Center   6/24/2024 12:45 PM Amy Devlin, SLP [facility]   6/25/2024  3:30 PM Pricila Avila APRN CNP [facility]   6/28/2024  2:30 PM Pricila Avila APRN CNP [facility]   7/3/2024  8:30 AM Kayode Cox MD [facility]   9/26/2024  2:10 PM [facility]   2/10/2025  9:40 AM [facility]       Outpatient Plan as outlined on AVS reviewed with patient.    For any urgent concerns, please contact our 24 hour nurse triage line: [phone] ([phone])

## 2024-06-24 ENCOUNTER — THERAPY VISIT (OUTPATIENT)
Dept: SPEECH THERAPY | Facility: CLINIC | Age: 74
End: 2024-06-24
Attending: NURSE PRACTITIONER
Payer: COMMERCIAL

## 2024-06-24 DIAGNOSIS — R13.13 PHARYNGEAL DYSPHAGIA: Primary | ICD-10-CM

## 2024-06-24 PROCEDURE — 92526 ORAL FUNCTION THERAPY: CPT | Mod: GN | Performed by: SPEECH-LANGUAGE PATHOLOGIST

## 2024-06-24 PROCEDURE — 92610 EVALUATE SWALLOWING FUNCTION: CPT | Mod: GN | Performed by: SPEECH-LANGUAGE PATHOLOGIST

## 2024-06-24 NOTE — PROGRESS NOTES
"SPEECH LANGUAGE PATHOLOGY EVALUATION     Fall Risk Screen:  Fall screen completed by: SLP  Have you fallen 2 or more times in the past year?: Yes  Have you fallen and had an injury in the past year?: Yes  Is patient a fall risk?: No  Fall screen comments: Known cause of falls, not balance related (rug/socks on stairs).    Subjective      Presenting condition or subjective complaint: Occasionally having difficulty swallowing pills and food  Date of onset: 05/21/24    Relevant medical history: Diabetes; Heart problems; Implanted device; Incontinence     Prior diagnostic imaging/testing results: Video fluoroscopic swallow study     Prior therapy history for the same diagnosis, illness or injury: No      Living Environment  Social support: Alone   Help at home: Home and Yard maintenance tasks  Equipment owned: Four-point cane; Walker; Walker with wheels; Standard wheelchair; Bedrail; Commode; Raised toilet seat; Bath bench; Dressing equipment     Employment: No    Hobbies/Interests: pets, sewing, grandsons, gardening, lawn work    Patient goals for therapy: Swallow without fear that something will get lodged in my throut     Objective     SWALLOW EVALUTION  Dysphagia history: Latoya reports a sensation of solids \"catching\" in her throat and having to take small bites. She has also been primarily having soft foods and smaller bites to manage. She was recently hospitalized with a Myasthenia Gravis flare up  during which she was not able to eat. She had an NG tube placed for approximately 5 days at that time. She also had VFSS completed before and after the NG tube. She was not recommended any exercises during her hospitalization.  Current Diet/Method of Nutritional Intake: oral diet, thin liquids (level 0), soft & bite-sized (level 6), self-initiated soft diet.       CLINICAL SWALLOW EVALUATION  Oral Motor Function: generally intact  Dentition: natural dentition, adequate dentition  Secretion management: WFL  Mucosal " "quality: good  Mandibular function: intact  Oral labial function: WFL  Lingual function: WFL  Velar function: intact   Buccal function: intact  Laryngeal function: cough, throat clear, volitional swallow, voicing WFL     Level of assist required for feeding: no assistance needed   Textures Trialed:   Clinical Swallow Eval: Thin Liquids  Mode of presentation: cup, self-fed   Volume presented: 3 sips of cola she brought with her.  Preparatory Phase: WFL  Oral Phase: WFL  Pharyngeal phase of swallow: intact   Strategies trialed during procedure: BRYSON SLP CLINICAL EVAL STRATEGIES: None.    Diagnostic statement: Latoya showed no signs or symptoms of aspiration with liquid consistency. She reports occasional \"down the wrong tube\" sensation but does not feel this is more than normal and has not increased in frequency.     ADDITIONAL EVAL COMPLETED TODAY : No.    ESOPHAGEAL PHASE OF SWALLOW  no observed or reported concerns related to esophageal function     SWALLOW ASSESSMENT CLINICAL IMPRESSIONS AND RATIONALE  Diet Consistency Recommendations: oral diet, thin liquids (level 0), regular diet, as able to tolerate. Softer foods and increased moisture as needed.     Recommended Feeding/Eating Techniques:  No techniques needed at this time.    Medication Administration Recommendations: Continue medications with puree.  Instrumental Assessment Recommendations: instrumental evaluation not recommended at this time     Assessment & Plan   CLINICAL IMPRESSIONS   Medical Diagnosis: Myasthenia Gravis    Treatment Diagnosis: Mild pharyngeal dysphagia   Impression/Assessment: Pt is a 73 year old female with dysphagia complaints following a recent hospitalization. The following significant findings have been identified: impaired swallowing, characterized by a sensation of food \"catching\" in her throat, self-initiated small bites and soft/moist diet. Latoya also reports increased caution while eating. Identified deficits interfere with " their ability to  eat all consistencies comfortably  as compared to previous level of function.    PLAN OF CARE  Treatment Interventions: Swallowing dysfunction and/or oral function for feeding    Prognosis to achieve stated therapy goals is good   Rehab potential is impacted by: comorbidities    Long Term Goals:   SLP Goal 1  Goal Identifier: Oropharyngeal Exercises  Goal Description: Latoya will demonstrate understanding of oropharyngeal exercises (effortful swallow, Mendelsohn Maneuver, tongue hold) to be completed at least 6/7 days/week provided written descriptions, explanation and demonstration for improved strength and safety of swallow.  Target Date: 06/24/24  Date Met: 06/24/24      Frequency of Treatment: One treatment following evaluation.  Duration of Treatment: One treatment only.     Recommended Referrals to Other Professionals: Speech Language Pathology  Education Assessment:   Learner/Method: Patient;Listening;Reading;Demonstration;No Barriers to Learning  Education Comments: Latoya was educated in evaluation results and recommendations.    Risks and benefits of evaluation/treatment have been explained.   Patient/Family/caregiver agrees with Plan of Care.     Evaluation Time:    SLP Eval: oral/pharyngeal swallow function, clinical minutes (01090): 25    Signing Clinician: Amy Devlin, EARLINE      [facility]                                                                                   OUTPATIENT SPEECH LANGUAGE PATHOLOGY      PLAN OF TREATMENT FOR OUTPATIENT REHABILITATION   Patient's Last Name, First Name, RADHADanielANADaniel  Latoya Rodríguez YOB: 1950   Provider's Name   [facility]   Medical Record No.  [id number]     Onset Date: 05/21/24 Start of Care Date: 06/24/24     Medical Diagnosis:  Myasthenia Gravis      SLP Treatment Diagnosis: Mild pharyngeal dysphagia  Plan of Treatment  Frequency/Duration: One treatment following  evaluation.  / One treatment only.     Certification date from 06/24/24   To 06/24/24          See note for plan of treatment details and functional goals     Amy Devlin, SLP                         I CERTIFY THE NEED FOR THESE SERVICES FURNISHED UNDER        THIS PLAN OF TREATMENT AND WHILE UNDER MY CARE     (Physician attestation of this document indicates review and certification of the therapy plan).              Referring Provider:  Pricila Avila    Initial Assessment  See Epic Evaluation- 06/24/24

## 2024-06-25 ENCOUNTER — OFFICE VISIT (OUTPATIENT)
Dept: FAMILY MEDICINE | Facility: CLINIC | Age: 74
End: 2024-06-25
Payer: COMMERCIAL

## 2024-06-25 VITALS
WEIGHT: 158 LBS | DIASTOLIC BLOOD PRESSURE: 73 MMHG | SYSTOLIC BLOOD PRESSURE: 118 MMHG | RESPIRATION RATE: 15 BRPM | BODY MASS INDEX: 26.33 KG/M2 | TEMPERATURE: 98.2 F | OXYGEN SATURATION: 96 % | HEART RATE: 60 BPM | HEIGHT: 65 IN

## 2024-06-25 DIAGNOSIS — G70.00 MYASTHENIA GRAVIS (H): Primary | ICD-10-CM

## 2024-06-25 DIAGNOSIS — E10.42 DM TYPE 1 WITH DIABETIC PERIPHERAL NEUROPATHY (H): ICD-10-CM

## 2024-06-25 DIAGNOSIS — I48.20 CHRONIC ATRIAL FIBRILLATION (H): ICD-10-CM

## 2024-06-25 DIAGNOSIS — R79.89 ELEVATED LFTS: ICD-10-CM

## 2024-06-25 PROCEDURE — 99214 OFFICE O/P EST MOD 30 MIN: CPT | Performed by: NURSE PRACTITIONER

## 2024-06-25 PROCEDURE — 80053 COMPREHEN METABOLIC PANEL: CPT | Performed by: NURSE PRACTITIONER

## 2024-06-25 PROCEDURE — G2211 COMPLEX E/M VISIT ADD ON: HCPCS | Performed by: NURSE PRACTITIONER

## 2024-06-25 PROCEDURE — 36415 COLL VENOUS BLD VENIPUNCTURE: CPT | Performed by: NURSE PRACTITIONER

## 2024-06-25 ASSESSMENT — PAIN SCALES - GENERAL: PAINLEVEL: NO PAIN (0)

## 2024-06-25 NOTE — PROGRESS NOTES
Subjective   Latoya is a 73 year old, presenting for the following health issues:  Hospital F/U      6/25/2024     2:54 PM   Additional Questions   Roomed by Jaime   Accompanied by self         6/25/2024     2:54 PM   Patient Reported Additional Medications   Patient reports taking the following new medications no     HPI     ED/UC Followup:    Facility:  [facility]   Date of visit: 6/9/2024  Reason for visit: Difficulty swallowing   Current Status: Good    Patient Name: Latoya Rodríguez YOB: 1950 Medical Record Number: [id number]   Admission Date: 6/9/2024 Discharge Date: 6/18/2024  She will be discharged to home.  Primary care: Pricila Avila, APRN, CNP     DISCHARGE DIAGNOSES:  See hospital course summary below.    FOLLOWUP:  PCP in 7- 10 days   HOSPITAL COURSE:    Latoya Rodríguez,73 y.o female DM1, paroxysmal A-fib on Eliquis, MG admitted with weakness. Treating myasthenia gravis flareup       PLAN:  Myasthenia gravis flareup  -Neurology consulted   -Completed IVIG 0.5 g/kg IV for 5 days  -Pyridostigmine 60 mg q6h   -Chest CT negative for mediastinal mass (thymoma)  -RT monitored for vital capacity and NIF.  -Patient passed swallowed eval diet advanced   -Discharge prednisone 40mg qd and Mestinon 60mg QID   - Follow up outpatient with Neurology     Abnormal LFTs  -Suspect this secondary to reduced oral intake prior to admission.   -Improved daily with volume.   -Suspect elevated LFT's from hypoperfusion related to reduced oral intake.   -Elevated GGT suggesting liver source for abnormal alk phos  -Hepatitis screen PTA negative  -Liver ultrasound liver is unremarkable.     DM 1  Hypoglycemia- resolved   -Continue Lantus 25 units qhs and 20 units qam.       Paroxysmal A-fib  Chronically anticoagulated on Eliquis  -Resume PTA metoprolol as able    Dyslipidemia  -Resume PTA statin     Osteoporosis  -PTA alendronate held    DISCHARGE MEDICATIONS:  Current Discharge Medication List  "      MEDICATIONS CONTINUED WITH CHANGES   Details   predniSONE (DELTASONE) 20 mg oral tablet Take 2 tablets (40 mg) by mouth once daily.  Qty: 60 tablet, Refills: 0     pyRIDostigmine (MESTINON) 60 mg oral tablet Take 1 tablet (60 mg) by mouth every 6 (six) hours for 30 days.  Qty: 120 tablet, Refills: 0         MEDICATIONS CONTINUED UNCHANGED   Details   alendronate (FOSAMAX) 70 mg oral tablet Take 1 tablet (70 mg) by mouth every 7 (seven) days.     amoxicillin (AMOXIL) 500 mg oral capsule Take 4 capsules (2,000 mg) by mouth as directed. Before dental procedure     atorvastatin (LIPITOR) 40 mg oral tablet Take 1 tablet (40 mg) by mouth once daily.     BD INSULIN PEN NEEDLE UF 31 gauge x 5/16\" needle as directed.     calcium carbonate-vitamin D3 500 mg, 1250 mg,-5 mcg (OSCAL-D) oral tablet Take 1 tablet by mouth twice a day.     ELIQUIS 5 mg oral tablet Take 1 tablet (5 mg) by mouth Twice a Day.     estradioL (ESTRACE) 0.01 % (0.1 mg/gram) Vagl cream vaginal cream INSERT 2 GRAMS VAGINALLY TWICE WEEKLY     insulin aspart, pen, (NOVOLOG FLEXPEN U-100 INSULIN) 100 units/mL SubQ pen Inject 1-3 Units under the skin three times a day with meals. Breakfast and Lunch:  150-210: 1 units  211-270: 2 units  271+ 3 units    Dinner  150-200: 1 unit  201-250: 2 units  251+ 3 units    Bedtime  200-250: 1 unit  251-300: 2 units  301+ 3 units    Breakfast: 1 units per 20g carbs  Lunch:1 unit per 17g carbs     insulin glargine, pen, (LANTUS) 100 unit/mL SubQ pen Inject 23 Units under the skin at bedtime. At 1900     melatonin 3 mg oral tablet Take 1 tablet (3 mg) by mouth at bedtime.     metoprolol succinate, XL, (TOPROL XL) 50 mg oral extended release tablet 24 HR Take 1 tablet (50 mg) by mouth once daily.         DISCONTINUED MEDICATIONS     Prednisone 5 mg/5 mL oral     pyRIDostigmine bromide (MESTINON) 60 mg/5 mL oral Syrp (conc: 60mg/5 mL) oral syrup     Here for follow up hospitalization after acute exacerbation   Sees " [facility]   She is asking about release to drive but let patient know will defer to neurology as she is not stabilized in treatment quite yet though significant improvement is noted     2. Daughter needs FMLA appointment     3.  Needs follow up appointment with cardiology due to heart rhythm issues with this new diagnosis  No heart palpitations now     4. Is concerned about liver test    5. 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)    Type 1 diabetes mellitus with hyperglycemia (H)    Age-related osteoporosis without current pathological fracture    Stress incontinence    Atrophic vaginitis    Intrinsic sphincter deficiency    Overactive bladder    Carpal tunnel syndrome of right wrist    Chronic atrial fibrillation (H)    Osteoarthritis of carpometacarpal (CMC) joint of both thumbs    Thumb pain    Post-operative nausea and vomiting    Type 1 diabetes mellitus without complication (H)    Osteoarthritis of right hip, unspecified osteoarthritis type    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)     Past Surgical History:   Procedure Laterality Date    ANESTHESIA CARDIOVERSION N/A 9/23/2021    Procedure: ANESTHESIA, FOR CARDIOVERSION@1100;  Surgeon: GENERIC ANESTHESIA PROVIDER;  Location: [facility] OR    ANESTHESIA OUT OF OR MRI N/A 9/14/2022    Procedure: ANESTHESIA OUT OF OR Magnetic resonance imaging right hip and lumbar @0800;  Surgeon: GENERIC  ANESTHESIA PROVIDER;  Location: [facility] OR    ARTHROPLASTY HIP Right 1/18/2023    Procedure: ARTHROPLASTY, HIP, TOTAL RIGHT;  Surgeon: Arnaud Ward MD;  Location: [facility] OR    CYSTOSCOPY, INJECT COLLAGEN, COMBINED N/A 5/11/2021    Procedure: CYSTOSCOPY, WITH PERIURETHRAL BULKING AGENT INJECTION;  Surgeon: Alberto Zhang MD;  Location: [facility] OR    CYSTOSCOPY, INJECT COLLAGEN, COMBINED N/A 9/12/2022    Procedure: CYSTOSCOPY, WITH PERIURETHRAL BULKING AGENT INJECTION (look in the bladder and urethra and inject filler into the urethra);  Surgeon: Alberto Zhang MD;  Location: [facility] OR    EYE SURGERY  9/04, 5/11    Retinal detachment, Cataract (both eyes)    IMPLANT STIMULATOR AND LEADS SACRAL NERVE (STAGE ONE AND TWO) Right 7/5/2022    Procedure: INSERTION, SACRAL NERVE STIMULATOR, STAGE 1 & 2 (permanent implant on the RIGHT side with [employer]on[employer]);  Surgeon: Alberto Zhang MD;  Location: [facility] OR    IMPLANT STIMULATOR SACRAL NERVE PERCUTANEOUS TRIAL N/A 6/14/2022    Procedure: INSERTION, NEUROSTIMULATOR, SACRAL, PERCUTANEOUS, FOR TRIAL;  Surgeon: Alberto Zhang MD;  Location: [facility] OR    RELEASE CARPAL TUNNEL Right 8/6/2021    Procedure: RELEASE, CARPAL TUNNEL, Right;  Surgeon: RADHA Sandoval MD;  Location: [facility] OR    RELEASE TRIGGER FINGER Bilateral 5/10/2019    Procedure: RELEASE TRIGGER FINGER, BILATERAL LONG AND RING FINGERS;  Surgeon: RADHA Sandoval MD;  Location: [facility] OR    VASCULAR SURGERY      Varicose Veins       Social History     Tobacco Use    Smoking status: Never     Passive exposure: Never    Smokeless tobacco: Never   Substance Use Topics    Alcohol use: Yes     Comment: 1 to 2 beers or glasses of wine 1 to 2 times per month     Family History   Problem Relation Age of Onset    Hyperlipidemia Mother     Breast Cancer Mother     Other Cancer Father     Leukemia Father     Skin Cancer Father     Coronary Artery Disease Maternal Uncle     Brain Cancer Maternal Uncle     Coronary Artery  Disease Maternal Uncle     Stomach Cancer Maternal Aunt     Diabetes No family hx of     Hypertension No family hx of     Cerebrovascular Disease No family hx of     Colon Cancer No family hx of     Thyroid Disease No family hx of     Depression No family hx of     Anxiety Disorder No family hx of          Current Outpatient Medications   Medication Sig Dispense Refill    alendronate (FOSAMAX) 70 MG tablet Take 1 tablet (70 mg) by mouth every 7 days 12 tablet 3    apixaban ANTICOAGULANT (ELIQUIS) 5 MG tablet Take 1 tablet (5 mg) by mouth 2 times daily 180 tablet 3    atorvastatin (LIPITOR) 40 MG tablet Take 1 tablet (40 mg) by mouth daily for 360 days 90 tablet 3    blood glucose (NO BRAND SPECIFIED) test strip Use to test blood sugar 4 times daily or as directed. Accu Chek Guide test strips. 150 strip 3    calcium carbonate-vitamin D 500-400 MG-UNIT TABS tablt Take 1 tablet by mouth 2 times daily 180 tablet 3    Continuous Blood Gluc Sensor (DEXCOM G7 SENSOR) MISC 1 each every 10 days 9 each 3    estradiol (ESTRACE) 0.1 MG/GM vaginal cream INSERT 2G VAGINALLY TWICE PER WEEK 42.5 g 11    insulin aspart (NOVOLOG FLEXPEN) 100 UNIT/ML pen 1 unit per 18 gram of carb for breakfast, 1 unit per 15 grams of carbs for lunch and  1 unit per 13 gram for dinner. Plus 1 unit per 50 mg/dl above 150. Total daily dose approx 30 units. 30 mL 3    insulin degludec (TRESIBA FLEXTOUCH) 100 UNIT/ML pen Inject 22 Units Subcutaneous daily Once daily at 7 pm. Getting this through [employer]. Should be coming in May.  Using Lantus (this is what her insurance covers) as a bridge to when she can get her Tresiba.      insulin glargine (LANTUS PEN) 100 UNIT/ML pen Inject 22 units daily + 2 units for priming of pen. Using Lantus (this is what her insurance covers) as a bridge to when she can get her Tresiba through Pt Assistance.      insulin pen needle (B-D U/F) 31G X 8 MM miscellaneous USE 5 TIMES DAILY  WITH  "NOVOLOG AND LANTUS 500 each 0    melatonin 3 MG tablet Take 1 tablet (3 mg) by mouth nightly as needed for sleep 30 tablet 0    metoprolol succinate ER (TOPROL XL) 50 MG 24 hr tablet Take 1 tablet (50 mg) by mouth daily 90 tablet 3    Multiple Vitamins-Minerals (MULTIVITAMIN ADULT PO) Take 1 tablet by mouth every morning       predniSONE (DELTASONE) 20 MG tablet Take 40 mg by mouth daily      pyRIDostigmine (MESTINON) 60 MG tablet Take 30 mg by mouth every 6 hours      Urine Glucose-Ketones Test (KETO-DIASTIX) STRP 1 strip by In Vitro route as needed 1 each 11    amoxicillin (AMOXIL) 500 MG capsule Take 4 caps (2000mg) 30-60 minutes before dental procedure (Patient not taking: Reported on 6/25/2024) 4 capsule 0     No Known Allergies  Recent Labs   Lab Test 05/21/24  0945 11/24/23  0819 06/19/23  0000 01/27/23  1254 09/09/22  1254 09/06/22  0928 08/05/21  1019 07/01/21  1054 10/21/20  1251 07/20/20  0817   A1C 7.6* 7.8* 8.6*  --    < > 8.0*   < >  --    < > 7.8*   LDL  --  103*  --   --   --  104*  --  90  --  107*   HDL  --  75  --   --   --  61  --  70  --  71   TRIG  --  56  --   --   --  101  --  58  --  56   * 22  --  14   < >  --    < >  --   --   --    CR 0.58 0.68  --  0.60   < > 0.69   < > 0.74  --  0.67   GFRESTIMATED >90 >90  --  >90   < > >90   < > 82  --  89   GFRESTBLACK  --   --   --   --   --   --   --  >90  --  >90   POTASSIUM 4.7 4.6  --  4.5   < >  --    < >  --   --   --    TSH 1.67 2.12  --   --   --  1.27   < >  --   --   --     < > = values in this interval not displayed.              Review of Systems  Constitutional, neuro, ENT, endocrine, pulmonary, cardiac, gastrointestinal, genitourinary, musculoskeletal, integument and psychiatric systems are negative, except as otherwise noted.      Objective    /73 (BP Location: Right arm, Patient Position: Sitting, Cuff Size: Adult Regular)   Pulse 60   Temp 98.2  F (36.8  C) (Temporal)   Resp 15   Ht 1.651 m (5' 5\")   Wt 71.7 kg " (158 lb)   LMP  (LMP Unknown)   SpO2 96%   BMI 26.29 kg/m    Body mass index is 26.29 kg/m .  Physical Exam   GENERAL: Patient is well nourished, well developed,in no apparent distress, non-toxic, in no respiratory distress and acyanotic, alert, cooperative, and well hydrated  EYES: Eyes grossly normal to inspection and conjunctivae and sclerae normal  HENT:ear canals and TM's normal and nose and mouth without ulcers or lesions   NECK:normal and supple  CARDIAC: color normal and irregular rhythm  no murmur, click or rub   without LE edema bilaterally  RESP: normal respiratory rate and rhythm, lungs clear to auscultation  unlabored respirations, no intercostal retractions or accessory muscle use  ABD:soft, nontender  SKIN: Skin color, texture, turgor normal. No rashes or lesions.  MS: extremities normal- no gross deformities noted, gait normal, and normal muscle tone  NEURO: Normal strength and tone, sensory exam grossly normal, mentation intact, and speech normal  PSYCH: Alert, oriented, thought content appropriate,mentation appears normal., affect and mood normal        Results for orders placed or performed in visit on 06/25/24   Comprehensive metabolic panel     Status: Abnormal   Result Value Ref Range    Sodium 132 (L) 135 - 145 mmol/L    Potassium 4.7 3.4 - 5.3 mmol/L    Carbon Dioxide (CO2) 26 22 - 29 mmol/L    Anion Gap 9 7 - 15 mmol/L    Urea Nitrogen 15.6 8.0 - 23.0 mg/dL    Creatinine 0.61 0.51 - 0.95 mg/dL    GFR Estimate >90 >60 mL/min/1.73m2    Calcium 9.5 8.8 - 10.2 mg/dL    Chloride 97 (L) 98 - 107 mmol/L    Glucose 337 (H) 70 - 99 mg/dL    Alkaline Phosphatase 181 (H) 40 - 150 U/L    AST 43 0 - 45 U/L    ALT 45 0 - 50 U/L    Protein Total 8.1 6.4 - 8.3 g/dL    Albumin 3.9 3.5 - 5.2 g/dL    Bilirubin Total 0.4 <=1.2 mg/dL         Assessment & Plan     Myasthenia gravis (H)  Continue current medications as prescribed.   Is significantly improved since last visit  Follow up with neurology as planned    - Adult Neurology  Referral  - Adult Cardiology Eval  Referral    Chronic atrial fibrillation (H)  FOLLOW UP WITH SPECIALIST :Cardiology  Continue current medications as prescribed.    - Adult Cardiology Eval  Referral    Elevated LFTs  Will recheck LFTs likely elevated with stress of recent illness. Hepatitis panel is normal   Abdomen ultrasound done at the hospital which was normal appearing liver   - - Comprehensive metabolic panel    DM type 1 with diabetic peripheral neuropathy (H)  FOLLOW UP WITH SPECIALIST :Endocrinology      MED REC REQUIRED  Post Medication Reconciliation Status: discharge medications reconciled and changed, per note/orders    See Patient Instructions  Patient Instructions   PLAN:   1.   Symptomatic therapy suggested: Continue current medications as prescribed.    2.  Orders Placed This Encounter   Procedures    Comprehensive metabolic panel    Adult Neurology  Referral    Adult Cardiology Eval  Referral     3.  CONSULTATION/REFERRAL to follow with specialist neurology and Cardiology    Patient needs to follow up in if no improvement,or sooner if worsening of symptoms or other symptoms develop.          Signed Electronically by: URVASHI Hunter CNP

## 2024-06-26 LAB
ALBUMIN SERPL BCG-MCNC: 3.9 G/DL (ref 3.5–5.2)
ALP SERPL-CCNC: 181 U/L (ref 40–150)
ALT SERPL W P-5'-P-CCNC: 45 U/L (ref 0–50)
ANION GAP SERPL CALCULATED.3IONS-SCNC: 9 MMOL/L (ref 7–15)
AST SERPL W P-5'-P-CCNC: 43 U/L (ref 0–45)
BILIRUB SERPL-MCNC: 0.4 MG/DL
BUN SERPL-MCNC: 15.6 MG/DL (ref 8–23)
CALCIUM SERPL-MCNC: 9.5 MG/DL (ref 8.8–10.2)
CHLORIDE SERPL-SCNC: 97 MMOL/L (ref 98–107)
CREAT SERPL-MCNC: 0.61 MG/DL (ref 0.51–0.95)
DEPRECATED HCO3 PLAS-SCNC: 26 MMOL/L (ref 22–29)
EGFRCR SERPLBLD CKD-EPI 2021: >90 ML/MIN/1.73M2
GLUCOSE SERPL-MCNC: 337 MG/DL (ref 70–99)
POTASSIUM SERPL-SCNC: 4.7 MMOL/L (ref 3.4–5.3)
PROT SERPL-MCNC: 8.1 G/DL (ref 6.4–8.3)
SODIUM SERPL-SCNC: 132 MMOL/L (ref 135–145)

## 2024-06-30 NOTE — RESULT ENCOUNTER NOTE
Concetta Rodríguez,    Attached are your test results.  -Liver and gallbladder tests (ALT,AST, Alk phos,bilirubin) almost normalized which is reassuring. Let recheck again in another couple weeks not fasting   -Kidney function (GFR) is normal.  -Sodium is decreased.  ADVISE: recheck again in about 2 weeks .  -Potassium is normal.  -Calcium is normal.  -Glucose is elevated due to your diabetes.   Please contact us if you have any questions.    Pricila Avila, CNP

## 2024-07-03 PROBLEM — E10.42 DM TYPE 1 WITH DIABETIC PERIPHERAL NEUROPATHY (H): Status: ACTIVE | Noted: 2024-07-03

## 2024-07-16 ENCOUNTER — TELEPHONE (OUTPATIENT)
Dept: CARDIOLOGY | Facility: CLINIC | Age: 74
End: 2024-07-16
Payer: COMMERCIAL

## 2024-07-16 NOTE — TELEPHONE ENCOUNTER
Offered patient 7/23 spot with Maria Esther Garcia for a new opening. Patient did not answer, LVM.

## 2024-07-29 ENCOUNTER — TRANSFERRED RECORDS (OUTPATIENT)
Dept: HEALTH INFORMATION MANAGEMENT | Facility: CLINIC | Age: 74
End: 2024-07-29
Payer: COMMERCIAL

## 2024-07-29 LAB — RETINOPATHY: NEGATIVE

## 2024-08-02 NOTE — PROGRESS NOTES
[facility] Cardiology   Cardiology Clinic Note      HPI:   Ms. Latoya Rodríguez is a pleasant 73 year old female with medical history pertinent for DM2, paroxysmal AF, T2DM, HLD, mild aortic plaque on CTA (2021) and myasthenia gravis. She presents to cardiology clinic for post hospitalization follow up.    Patient generally follows with Dr. Whipple and was last seen 12/27/23. At that time she was doing well and denied any cardiac symptoms or limitations.     Since that time, patient recently diagnosed with myasthenia gravis. She was also admitted 6/9-6/18 with myasthenic crisis and presented with difficulty swallowing, weakness, and dysarthria. During this admission, she completed IVIG 0.5 g/kg IV for 5 days. Chest CT negative for thymoma. She was discharged on prednisone 40mg daily (with taper) and mestinon 60mg QID    Today she presents to clinic with concerns regarding bradycardia. She denies any weakness, dizziness, or syncope and does not tend to feel when her heart rates are low. She does not regularly check her HR at home.   Low blood sugars are usually the primary issue when not feeling.     Today in clinic, she denies chest pain, palpitations, dizziness, syncope, or lower extremity edema.         PAST MEDICAL HISTORY:  Past Medical History:   Diagnosis Date    Cataract     Chronic atrial fibrillation (H) 06/08/2022    Infectious arthritis (H) 6/27/2023    Osteoarthritis of carpometacarpal (CMC) joint of both thumbs 11/16/2022    Post-operative nausea and vomiting 1/18/2023    Severe. Recommend TIVA    Type 2 diabetes mellitus with hyperglycemia (H) 06/24/2016       FAMILY HISTORY:  Family History   Problem Relation Age of Onset    Hyperlipidemia Mother     Breast Cancer Mother     Other Cancer Father     Leukemia Father     Skin Cancer Father     Coronary Artery Disease Maternal Uncle     Brain Cancer Maternal Uncle     Coronary Artery Disease Maternal Uncle     Stomach Cancer Maternal Aunt     Diabetes No family  hx of     Hypertension No family hx of     Cerebrovascular Disease No family hx of     Colon Cancer No family hx of     Thyroid Disease No family hx of     Depression No family hx of     Anxiety Disorder No family hx of        SOCIAL HISTORY:  Social History     Socioeconomic History    Marital status: Single   Tobacco Use    Smoking status: Never     Passive exposure: Never    Smokeless tobacco: Never   Vaping Use    Vaping status: Never Used   Substance and Sexual Activity    Alcohol use: Yes     Comment: 1 to 2 beers or glasses of wine 1 to 2 times per month    Drug use: No    Sexual activity: Not Currently     Partners: Female     Birth control/protection: Post-menopausal   Other Topics Concern    Parent/sibling w/ CABG, MI or angioplasty before 65F 55M? No     Social Determinants of Health     Financial Resource Strain: Low Risk  (6/20/2024)    Financial Resource Strain     Within the past 12 months, have you or your family members you live with been unable to get utilities (heat, electricity) when it was really needed?: No   Food Insecurity: No Food Insecurity (6/9/2024)    Received from [facility]     Hunger Vital Sign     Worried About Running Out of Food in the Last Year: Never true     Ran Out of Food in the Last Year: Never true   Transportation Needs: No Transportation Needs (6/9/2024)    Received from [facility]     PRAPARE - Transportation     Lack of Transportation (Medical): No     Lack of Transportation (Non-Medical): No   Interpersonal Safety: Not At Risk (6/9/2024)    Received from [facility]     Humiliation, Afraid, Rape, and Kick questionnaire     Fear of Current or Ex-Partner: No     Emotionally Abused: No     Physically Abused: No     Sexually Abused: No   Housing Stability: Low Risk  (6/9/2024)    Received from [facility]     Housing Stability Vital Sign     Unable to Pay for Housing in the Last Year: No     Number of Times Moved in the Last Year: 1      Homeless in the Last Year: No       CURRENT MEDICATIONS:  Current Outpatient Medications   Medication Sig Dispense Refill    alendronate (FOSAMAX) 70 MG tablet Take 1 tablet (70 mg) by mouth every 7 days 12 tablet 3    apixaban ANTICOAGULANT (ELIQUIS) 5 MG tablet Take 1 tablet (5 mg) by mouth 2 times daily 180 tablet 3    atorvastatin (LIPITOR) 40 MG tablet Take 1 tablet (40 mg) by mouth daily for 360 days 90 tablet 3    blood glucose (NO BRAND SPECIFIED) test strip Use to test blood sugar 4 times daily or as directed. Accu Chek Guide test strips. 150 strip 3    calcium carbonate-vitamin D 500-400 MG-UNIT TABS tablt Take 1 tablet by mouth 2 times daily 180 tablet 3    Continuous Blood Gluc Sensor (DEXCOM G7 SENSOR) MISC 1 each every 10 days 9 each 3    estradiol (ESTRACE) 0.1 MG/GM vaginal cream INSERT 2G VAGINALLY TWICE PER WEEK 42.5 g 11    insulin aspart (NOVOLOG FLEXPEN) 100 UNIT/ML pen 1 unit per 18 gram of carb for breakfast, 1 unit per 15 grams of carbs for lunch and  1 unit per 13 gram for dinner. Plus 1 unit per 50 mg/dl above 150. Total daily dose approx 30 units. 30 mL 3    insulin degludec (TRESIBA FLEXTOUCH) 100 UNIT/ML pen Inject 22 Units Subcutaneous daily Once daily at 7 pm. Getting this through [employer] Patient Assistance Program. Should be coming in May.  Using Lantus (this is what her insurance covers) as a bridge to when she can get her Tresiba.      insulin glargine (LANTUS PEN) 100 UNIT/ML pen Inject 22 units daily + 2 units for priming of pen. Using Lantus (this is what her insurance covers) as a bridge to when she can get her Tresiba through Pt Assistance.      insulin pen needle (B-D U/F) 31G X 8 MM miscellaneous USE 5 TIMES DAILY  WITH NOVOLOG AND LANTUS 500 each 0    melatonin 3 MG tablet Take 1 tablet (3 mg) by mouth nightly as needed for sleep 30 tablet 0    metoprolol succinate ER (TOPROL XL) 50 MG 24 hr tablet Take 1 tablet (50 mg) by mouth daily 90 tablet 3    Multiple  "Vitamins-Minerals (MULTIVITAMIN ADULT PO) Take 1 tablet by mouth every morning       predniSONE (DELTASONE) 20 MG tablet Take 40 mg by mouth daily      pyRIDostigmine (MESTINON) 60 MG tablet Take 30 mg by mouth every 6 hours      Urine Glucose-Ketones Test (KETO-DIASTIX) STRP 1 strip by In Vitro route as needed 1 each 11    amoxicillin (AMOXIL) 500 MG capsule Take 4 caps (2000mg) 30-60 minutes before dental procedure (Patient not taking: Reported on 6/25/2024) 4 capsule 0     No current facility-administered medications for this visit.       ROS:   Refer to HPI    EXAM:  /72 (BP Location: Right arm, Patient Position: Chair, Cuff Size: Adult Regular)   Pulse 66   Ht 1.651 m (5' 5\")   Wt 71.2 kg (157 lb)   LMP  (LMP Unknown)   SpO2 96%   BMI 26.13 kg/m      GENERAL: Appears comfortable, in no acute distress.   HEENT: Eye symmetrical, no discharge or icterus bilaterally.   CV: RRR- bradycardic, +S1S2, no murmur, rub, or gallop.    RESPIRATORY: Respirations regular, even, and unlabored. Lungs CTA throughout.   EXTREMITIES: trace peripheral edema.    NEUROLOGIC: Alert and oriented x 3. No focal deficits.   MUSCULOSKELETAL: No joint swelling or tenderness.   SKIN: No jaundice. No rashes or lesions.     Labs, reviewed with patient in clinic today:  CBC RESULTS:  Lab Results   Component Value Date    WBC 6.2 05/21/2024    WBC 3.8 (L) 06/24/2016    RBC 4.23 05/21/2024    RBC 4.31 06/24/2016    HGB 13.6 05/21/2024    HGB 14.4 06/24/2016    HCT 40.2 05/21/2024    HCT 40.7 06/24/2016    MCV 95 05/21/2024    MCV 94 06/24/2016    MCH 32.2 05/21/2024    MCH 33.4 (H) 06/24/2016    MCHC 33.8 05/21/2024    MCHC 35.4 06/24/2016    RDW 12.6 05/21/2024    RDW 13.3 06/24/2016     05/21/2024     06/24/2016       CMP RESULTS:  Lab Results   Component Value Date     (L) 06/25/2024     08/02/2019    POTASSIUM 4.7 06/25/2024    POTASSIUM 4.1 01/23/2023    POTASSIUM 4.0 08/02/2019    CHLORIDE 97 (L) " "06/25/2024    CHLORIDE 104 01/23/2023    CHLORIDE 103 08/02/2019    CO2 26 06/25/2024    CO2 23 01/23/2023    CO2 24 08/02/2019    ANIONGAP 9 06/25/2024    ANIONGAP 10 01/23/2023    ANIONGAP 8 08/02/2019     (H) 06/25/2024     (H) 01/24/2023     (H) 01/23/2023     (H) 08/02/2019    BUN 15.6 06/25/2024    BUN 11 01/23/2023    BUN 16 08/02/2019    CR 0.61 06/25/2024    CR 0.74 07/01/2021    GFRESTIMATED >90 06/25/2024    GFRESTIMATED >60 11/17/2021    GFRESTIMATED 82 07/01/2021    GFRESTBLACK >90 07/01/2021    RENATO 9.5 06/25/2024    RENATO 9.0 08/02/2019    BILITOTAL 0.4 06/25/2024    BILITOTAL 0.7 08/02/2019    ALBUMIN 3.9 06/25/2024    ALBUMIN 2.6 (L) 01/23/2023    ALBUMIN 3.7 08/02/2019    ALKPHOS 181 (H) 06/25/2024    ALKPHOS 97 08/02/2019    ALT 45 06/25/2024    ALT 24 08/02/2019    AST 43 06/25/2024    AST 16 08/02/2019        INR RESULTS:  No results found for: \"INR\"    Lab Results   Component Value Date    MAG 2.0 01/23/2023     No results found for: \"NTBNPI\"  No results found for: \"NTBNP\"    LIPIDS:  Lab Results   Component Value Date    CHOL 189 11/24/2023    CHOL 172 07/01/2021     Lab Results   Component Value Date    HDL 75 11/24/2023    HDL 70 07/01/2021     Lab Results   Component Value Date     11/24/2023    LDL 90 07/01/2021     Lab Results   Component Value Date    TRIG 56 11/24/2023    TRIG 58 07/01/2021         Assessment and Plan:   Ms. Rodríguez is a 73 year old female with a PMH of  DM2, paroxysmal AF, T2DM, HLD, mild aortic plaque on CTA (2021) and myasthenia gravis.      # Paroxysmal AF   # Bradycardia  # Myasthenia Gravis  Patient presented to discuss bradycardia after starting pyridostigmine. She reports her HR has primarily been in the 50s since starting that medication. She is asymptomatic with lower heart rates and denies syncope, pre-syncope, and dizziness. ECG in office showing Sinus bradycardia with RBBB.   - Spot check HR at home or if feeling unwell. If HR " 30s or lower during the day, seek medical attention  - Per review on [employer], side effect management for pyridostigmine can be done with Glycopyrrolate, Propantheline, or Hyoscyamine. Would defer any further medication adjustment regarding patient's myasthenia gravis to her neurology team.   - Continue Eliquis 5mg BID   - Stop Toprol 50mg daily       Follow up:  1 year with Dr. Whipple   Chart review time today: 10 minutes  Visit time today: 15 minutes  Total time spent today: 25 minutes        Maria Esther Garcia PA-C  General Cardiology   08/06/24

## 2024-08-06 ENCOUNTER — OFFICE VISIT (OUTPATIENT)
Dept: CARDIOLOGY | Facility: CLINIC | Age: 74
End: 2024-08-06
Attending: NURSE PRACTITIONER
Payer: COMMERCIAL

## 2024-08-06 VITALS
HEART RATE: 66 BPM | DIASTOLIC BLOOD PRESSURE: 72 MMHG | OXYGEN SATURATION: 96 % | BODY MASS INDEX: 26.16 KG/M2 | WEIGHT: 157 LBS | HEIGHT: 65 IN | SYSTOLIC BLOOD PRESSURE: 128 MMHG

## 2024-08-06 DIAGNOSIS — G70.00 MYASTHENIA GRAVIS (H): ICD-10-CM

## 2024-08-06 DIAGNOSIS — I48.20 CHRONIC ATRIAL FIBRILLATION (H): Primary | ICD-10-CM

## 2024-08-06 PROCEDURE — 99213 OFFICE O/P EST LOW 20 MIN: CPT

## 2024-08-06 PROCEDURE — 93000 ELECTROCARDIOGRAM COMPLETE: CPT

## 2024-08-06 NOTE — NURSING NOTE
"Chief Complaint   Patient presents with    Follow Up       Initial /72 (BP Location: Right arm, Patient Position: Chair, Cuff Size: Adult Regular)   Pulse 66   Ht 1.651 m (5' 5\")   Wt 71.2 kg (157 lb)   LMP  (LMP Unknown)   SpO2 96%   BMI 26.13 kg/m   Estimated body mass index is 26.13 kg/m  as calculated from the following:    Height as of this encounter: 1.651 m (5' 5\").    Weight as of this encounter: 71.2 kg (157 lb)..  BP completed using cuff size: regular    Emiliana PAIGE CNA  "

## 2024-08-06 NOTE — NURSING NOTE
Med Reconcile: Reviewed and verified all current medications with the patient. The updated medication list was printed and given to the patient./ No medication changes.    Return Appointment  -Follow-up in 1 year with Dr Whipple

## 2024-08-06 NOTE — PATIENT INSTRUCTIONS
Take your medicines every day, as directed     Changes made today:  No medication changes   Monitor heart rate at home and seek medical attention/call clinic if heart rate under 40 beats per minute       Cardiology Care Coordinators:      Sruthi AZAR RN     Cardiology Rooming Staff:  Emiliana DOMINGUEZ EMT    Phone  [phone]      Fax [phone]    To Contact us     During Business Hours:  [phone]     If you are needing refills please contact your pharmacy.     For urgent after hour care please call the [facility] at [phone] or the [facility] at [phone] and ask to speak to the cardiologist on call.    If you are having a medical emergency, please call 911.            HOW TO CHECK YOUR BLOOD PRESSURE AT HOME:     Avoid eating, smoking, and exercising for at least 30 minutes before taking a reading.     Be sure you have taken your BP medication at least 2-3 hours before you check it.      Sit quietly for 10 minutes before a reading.      Sit in a chair with your feet flat on the floor. Rest your  arm on a table so that the arm cuff is at the same level as your heart.     Remain still during the reading.  Record your blood pressure and pulse in a log and bring to your next appointment.       [employer] allows you to communicate directly with your heart team through secure messaging.  [employer] can be accessed any time on your phone, computer, or tablet.  If you need assistance, we'd be happy to help!             Keep your Heart Appointments:     Follow-up in 1 year with Dr Whipple

## 2024-08-07 ENCOUNTER — TELEPHONE (OUTPATIENT)
Dept: CARDIOLOGY | Facility: CLINIC | Age: 74
End: 2024-08-07
Payer: COMMERCIAL

## 2024-08-07 DIAGNOSIS — Z47.89 ORTHOPEDIC AFTERCARE: ICD-10-CM

## 2024-08-07 LAB
ATRIAL RATE - MUSE: 59 BPM
DIASTOLIC BLOOD PRESSURE - MUSE: NORMAL MMHG
INTERPRETATION ECG - MUSE: NORMAL
P AXIS - MUSE: 66 DEGREES
PR INTERVAL - MUSE: 150 MS
QRS DURATION - MUSE: 108 MS
QT - MUSE: 450 MS
QTC - MUSE: 445 MS
R AXIS - MUSE: -14 DEGREES
SYSTOLIC BLOOD PRESSURE - MUSE: NORMAL MMHG
T AXIS - MUSE: 45 DEGREES
VENTRICULAR RATE- MUSE: 59 BPM

## 2024-08-07 RX ORDER — AMOXICILLIN 500 MG/1
CAPSULE ORAL
Qty: 4 CAPSULE | Refills: 0 | Status: CANCELLED | OUTPATIENT
Start: 2024-08-07

## 2024-08-07 NOTE — TELEPHONE ENCOUNTER
Pt was transferred to this RN. Amoxicillin was previously sent in by pt's surgery team, but patient thought she might be able to get ahold of primary care provider sooner.  Routing to surgeon/primary care provider to see if this can be filled.    Patient would like a call back if prescription is sent before her appointment at 10am. If it is unable to be sent before, she is wondering if she can take the prescription after the dental appointment.    Alesia Simon RN    [facility]- Primary Care

## 2024-08-07 NOTE — TELEPHONE ENCOUNTER
Provider recommendation  <<Message from Maria Esther Garcia sent at 8/7/2024 11:32 AM CDT -----  Jossie Gates, I completely forgot to discuss this with the patient yesterday, but can we have her stop her Toprol? If she feels palpitations after stopping, she can take a half dose for a week before stopping completely.   Thanks,   Maria Esther>>         Patient contacted to review the above recommendations. Pt expressed understanding and agreeable with the plan. Written notes sent via [employer] as requested.

## 2024-08-07 NOTE — TELEPHONE ENCOUNTER
Patient has dental appt at 10am today. She forgot - She needs amoxicillin before any dental procedure. (Hip replaced 1 1/2 years ) is it ok to take after procedure?  Sent patient to nurse

## 2024-08-08 ENCOUNTER — MYC MEDICAL ADVICE (OUTPATIENT)
Dept: FAMILY MEDICINE | Facility: CLINIC | Age: 74
End: 2024-08-08
Payer: COMMERCIAL

## 2024-08-08 NOTE — CONFIDENTIAL NOTE
Called and left message for patient to let her know we did not receive the message before her dental appointment yesterday.   With her health history of diabetes we would like her to be on antibiotics for lifetime before dental appointments.  Asked her to call back if she has future dental appointments and we will prescribe antibiotics.  Sun Patino RN

## 2024-08-09 DIAGNOSIS — Z47.89 ORTHOPEDIC AFTERCARE: ICD-10-CM

## 2024-08-09 RX ORDER — AMOXICILLIN 500 MG/1
CAPSULE ORAL
Qty: 4 CAPSULE | Refills: 2 | Status: SHIPPED | OUTPATIENT
Start: 2024-08-09

## 2024-08-15 NOTE — TELEPHONE ENCOUNTER
Spoke to pt and she is requesting a copy be sent via [employer]. Sent to pt and placed forms in Aug TC folder for scanning  Maria Esther Osborne CMA

## 2024-08-23 ENCOUNTER — MYC MEDICAL ADVICE (OUTPATIENT)
Dept: FAMILY MEDICINE | Facility: CLINIC | Age: 74
End: 2024-08-23
Payer: COMMERCIAL

## 2024-08-23 NOTE — TELEPHONE ENCOUNTER
Form printed and refaxed to [phone]. Fax number not going thru so writer call pt and pt stated to just sent form on [employer] so she can take to employer.

## 2024-09-13 ENCOUNTER — TRANSFERRED RECORDS (OUTPATIENT)
Dept: HEALTH INFORMATION MANAGEMENT | Facility: CLINIC | Age: 74
End: 2024-09-13
Payer: COMMERCIAL

## 2024-09-26 ENCOUNTER — OFFICE VISIT (OUTPATIENT)
Dept: ENDOCRINOLOGY | Facility: CLINIC | Age: 74
End: 2024-09-26
Payer: COMMERCIAL

## 2024-09-26 ENCOUNTER — TELEPHONE (OUTPATIENT)
Dept: ENDOCRINOLOGY | Facility: CLINIC | Age: 74
End: 2024-09-26

## 2024-09-26 VITALS
RESPIRATION RATE: 18 BRPM | HEART RATE: 70 BPM | OXYGEN SATURATION: 98 % | DIASTOLIC BLOOD PRESSURE: 75 MMHG | SYSTOLIC BLOOD PRESSURE: 124 MMHG

## 2024-09-26 DIAGNOSIS — E13.9 LADA (LATENT AUTOIMMUNE DIABETES MELLITUS IN ADULTS) (H): ICD-10-CM

## 2024-09-26 DIAGNOSIS — T38.0X5A STEROID-INDUCED HYPERGLYCEMIA: Primary | ICD-10-CM

## 2024-09-26 DIAGNOSIS — R73.9 STEROID-INDUCED HYPERGLYCEMIA: Primary | ICD-10-CM

## 2024-09-26 LAB
EST. AVERAGE GLUCOSE BLD GHB EST-MCNC: 214 MG/DL
HBA1C MFR BLD: 9.1 %

## 2024-09-26 PROCEDURE — 99214 OFFICE O/P EST MOD 30 MIN: CPT | Performed by: PHYSICIAN ASSISTANT

## 2024-09-26 PROCEDURE — 83036 HEMOGLOBIN GLYCOSYLATED A1C: CPT | Performed by: PHYSICIAN ASSISTANT

## 2024-09-26 RX ORDER — PREDNISONE 20 MG/1
10 TABLET ORAL DAILY
COMMUNITY
Start: 2024-09-26

## 2024-09-26 NOTE — PROGRESS NOTES
Assessment/Plan :   Type 1 DM. We reviewed Latoya's recent CGMS report and her blood sugars are very elevated. We reviewed the impact of prednisone on blood sugars and we reviewed that these numbers will improve, as the dose is decreased. However, at present time she is not getting enough Lantus. Her blood sugars are very elevated throughout the day and overnight. We will increase Lantus to 30 unit(s) daily. She does have some hypoglycemia after administering Novolog, so we will make adjustments to her current insulin to carb ratio. I would like her to decrease the breakfast from 1 unit(s) for every 18 grams down to 1 unit(s) for every 20 grams of carbs, lunch she will adjust down to 1 unit(s) for every 18 grams and dinner down to 1 unit(s) for every 15 grams. If she continues to have issues with hypoglycemia, she is to contact our office. We will follow-up in 4 mos.       I have independently reviewed and interpreted labs, imaging as indicated.      Chief complaint:  Latoya is a 74 year old female who returns for follow-up of Type 2 DM.    I have reviewed Care Everywhere including [facility], [facility], [facility], [facility] , [facility], [employer] lab reports, imaging reports and provider notes as indicated.      HISTORY OF PRESENT ILLNESS  Latoya was doing great until she was started on daily prednisone for the treatment of myasthenia gravis. This has led to a significant spike in her blood sugars. She has continued to take her MDI therapy as directed but the blood sugars are consistently elevated. At present time she is taking 26 unit(s) of Lantus daily along with Novolog 1 unit(s) for every 18 grams for breakfast, 1 unit(s) for every 15 grams for lunch and 1 unit(s) for every 13 grams for dinner. She is currently taking 10 mg of prednisone daily and she is hopeful that they will decrease the dose to 5 mg daily in a couple weeks.     Latoya is currently monitoring her blood sugars  with the Dexcom G7 sensor. Her numbers are consistently over 200 mg/dl but will occasionally drop to under 70 mg/dl with even minimal activity. She has not had any symptoms associated with hyperglycemia but she is worried about her risk of developing future complications. She has not had any problems with blurred vision or an increase in numbness/tingling in her feet. She also reports that her afib cleared up due to the myasthenia gravis.    She has had diabetes since 2016. She was diagnosed at a routine physical. She was first thought to have Type 2 DM but she was later found to have TORRES antibodies and a low c-peptide. She has been on MDI insulin therapy ever since. Her diabetes is complicated by myasthenia gravis, osteoporosis, hyperlipidemia, chronic atrial fibrillation on Eliquis, OAB, and osteoarthritis.     Endocrine relevant labs are as follows:   Latest Reference Range & Units 09/26/24 14:15   Afinion Hemoglobin A1c POCT <=5.7 % 9.1 (H)   (H): Data is abnormally high   Latest Reference Range & Units 05/21/24 09:45   Hemoglobin A1C 0.0 - 5.6 % 7.6 (H)   (H): Data is abnormally high    REVIEW OF SYSTEMS    Endocrine: positive for diabetes  Skin: negative  Eyes: negative for, visual blurring, redness, tearing  Ears/Nose/Throat: negative  Respiratory: No shortness of breath, dyspnea on exertion, cough, or hemoptysis  Cardiovascular: negative for, chest pain, dyspnea on exertion, lower extremity edema, and exercise intolerance  Gastrointestinal: negative for, nausea, vomiting, constipation, and diarrhea  Genitourinary: negative for, nocturia, dysuria, frequency, and urgency  Musculoskeletal: positive for muscular weakness, negative for, nocturnal cramping, and foot pain  Neurologic: positive for local weakness, negative for, numbness or tingling of hands, and numbness or tingling of feet  Psychiatric: negative  Hematologic/Lymphatic/Immunologic: negative    Past Medical History  Past Medical History:   Diagnosis  Date    Cataract     Chronic atrial fibrillation (H) 06/08/2022    Infectious arthritis (H) 6/27/2023    Osteoarthritis of carpometacarpal (CMC) joint of both thumbs 11/16/2022    Post-operative nausea and vomiting 1/18/2023    Severe. Recommend TIVA    Type 2 diabetes mellitus with hyperglycemia (H) 06/24/2016       Medications  Current Outpatient Medications   Medication Sig Dispense Refill    alendronate (FOSAMAX) 70 MG tablet Take 1 tablet (70 mg) by mouth every 7 days 12 tablet 3    amoxicillin (AMOXIL) 500 MG capsule TAKE 4 CAPS 30-60 MIN BEFORE DENTAL PROCEDURE 4 capsule 2    apixaban ANTICOAGULANT (ELIQUIS) 5 MG tablet Take 1 tablet (5 mg) by mouth 2 times daily 180 tablet 3    atorvastatin (LIPITOR) 40 MG tablet Take 1 tablet (40 mg) by mouth daily for 360 days 90 tablet 3    blood glucose (NO BRAND SPECIFIED) test strip Use to test blood sugar 4 times daily or as directed. Accu Chek Guide test strips. 150 strip 3    calcium carbonate-vitamin D 500-400 MG-UNIT TABS tablt Take 1 tablet by mouth 2 times daily 180 tablet 3    Continuous Blood Gluc Sensor (DEXCOM G7 SENSOR) MISC 1 each every 10 days 9 each 3    estradiol (ESTRACE) 0.1 MG/GM vaginal cream INSERT 2G VAGINALLY TWICE PER WEEK 42.5 g 11    insulin aspart (NOVOLOG FLEXPEN) 100 UNIT/ML pen 1 unit per 18 gram of carb for breakfast, 1 unit per 15 grams of carbs for lunch and  1 unit per 13 gram for dinner. Plus 1 unit per 50 mg/dl above 150. Total daily dose approx 30 units. 30 mL 3    insulin degludec (TRESIBA FLEXTOUCH) 100 UNIT/ML pen Inject 22 Units Subcutaneous daily Once daily at 7 pm. Getting this through [employer] Patient Assistance Program. Should be coming in May.  Using Lantus (this is what her insurance covers) as a bridge to when she can get her Tresiba.      insulin glargine (LANTUS PEN) 100 UNIT/ML pen Inject 22 units daily + 2 units for priming of pen. Using Lantus (this is what her insurance covers) as a bridge to when she can get  her Tresiba through Pt Assistance.      insulin pen needle (B-D U/F) 31G X 8 MM miscellaneous USE 5 TIMES DAILY  WITH NOVOLOG AND LANTUS 500 each 0    melatonin 3 MG tablet Take 1 tablet (3 mg) by mouth nightly as needed for sleep 30 tablet 0    Multiple Vitamins-Minerals (MULTIVITAMIN ADULT PO) Take 1 tablet by mouth every morning       predniSONE (DELTASONE) 20 MG tablet Take 40 mg by mouth daily      pyRIDostigmine (MESTINON) 60 MG tablet Take 30 mg by mouth every 6 hours      Urine Glucose-Ketones Test (KETO-DIASTIX) STRP 1 strip by In Vitro route as needed 1 each 11       Allergies  No Known Allergies      Family History  family history includes Brain Cancer in her maternal uncle; Breast Cancer in her mother; Coronary Artery Disease in her maternal uncle and maternal uncle; Hyperlipidemia in her mother; Leukemia in her father; Other Cancer in her father; Skin Cancer in her father; Stomach Cancer in her maternal aunt.    Social History  Social History     Tobacco Use    Smoking status: Never     Passive exposure: Never    Smokeless tobacco: Never   Vaping Use    Vaping status: Never Used   Substance Use Topics    Alcohol use: Yes     Comment: 1 to 2 beers or glasses of wine 1 to 2 times per month    Drug use: No       Physical Exam  /75 (BP Location: Left arm, Patient Position: Sitting, Cuff Size: Adult Regular)   Pulse 70   Resp 18   LMP  (LMP Unknown)   SpO2 98%   There is no height or weight on file to calculate BMI.  GENERAL :  In no apparent distress  SKIN: Normal color, normal temperature, texture.  No hirsutism, alopecia or purple striae.     EYES: PERRL, EOMI, No scleral icterus,  No proptosis, conjunctival redness, stare, retraction  RESP: Lungs clear to auscultation bilaterally  CARDIAC: Regular rate and rhythm, normal S1 S2, without murmurs, rubs or gallops     NEURO: awake, alert, responds appropriately to questions.  Cranial nerves intact.   Moves all extremities; Gait normal.  No tremor  of the outstretched hand.    EXTREMITIES: No clubbing, cyanosis or edema.    DATA REVIEW  Dexcom Clarity  Time in target range 28%  Very Low<1%, Low 2%  High 23%, Very High 47%  Current Ave  mg/dl

## 2024-09-26 NOTE — LETTER
9/26/2024      Latoya Rodríguez  [address] [facility] [address]      Dear Colleague,    Thank you for referring your patient, Latoya Rodríguez, to the [facility]. Please see a copy of my visit note below.              Assessment/Plan :   Type 1 DM. We reviewed Latoya's recent CGMS report and her blood sugars are very elevated. We reviewed the impact of prednisone on blood sugars and we reviewed that these numbers will improve, as the dose is decreased. However, at present time she is not getting enough Lantus. Her blood sugars are very elevated throughout the day and overnight. We will increase Lantus to 30 unit(s) daily. She does have some hypoglycemia after administering Novolog, so we will make adjustments to her current insulin to carb ratio. I would like her to decrease the breakfast from 1 unit(s) for every 18 grams down to 1 unit(s) for every 20 grams of carbs, lunch she will adjust down to 1 unit(s) for every 18 grams and dinner down to 1 unit(s) for every 15 grams. If she continues to have issues with hypoglycemia, she is to contact our office. We will follow-up in 4 mos.       I have independently reviewed and interpreted labs, imaging as indicated.      Chief complaint:  Latoya is a 74 year old female who returns for follow-up of Type 2 DM.    I have reviewed Care Everywhere including [facility], [facility], [facility], [facility], Critical access hospital , [facility], [place] lab reports, imaging reports and provider notes as indicated.      HISTORY OF PRESENT ILLNESS  Latoya was doing great until she was started on daily prednisone for the treatment of myasthenia gravis. This has led to a significant spike in her blood sugars. She has continued to take her MDI therapy as directed but the blood sugars are consistently elevated. At present time she is taking 26 unit(s) of Lantus daily along with Novolog 1 unit(s) for every 18 grams for breakfast, 1 unit(s)  for every 15 grams for lunch and 1 unit(s) for every 13 grams for dinner. She is currently taking 10 mg of prednisone daily and she is hopeful that they will decrease the dose to 5 mg daily in a couple weeks.     Latoya is currently monitoring her blood sugars with the Dexcom G7 sensor. Her numbers are consistently over 200 mg/dl but will occasionally drop to under 70 mg/dl with even minimal activity. She has not had any symptoms associated with hyperglycemia but she is worried about her risk of developing future complications. She has not had any problems with blurred vision or an increase in numbness/tingling in her feet. She also reports that her afib cleared up due to the myasthenia gravis.    She has had diabetes since 2016. She was diagnosed at a routine physical. She was first thought to have Type 2 DM but she was later found to have TORRES antibodies and a low c-peptide. She has been on MDI insulin therapy ever since. Her diabetes is complicated by myasthenia gravis, osteoporosis, hyperlipidemia, chronic atrial fibrillation on Eliquis, OAB, and osteoarthritis.     Endocrine relevant labs are as follows:   Latest Reference Range & Units 09/26/24 14:15   Afinion Hemoglobin A1c POCT <=5.7 % 9.1 (H)   (H): Data is abnormally high   Latest Reference Range & Units 05/21/24 09:45   Hemoglobin A1C 0.0 - 5.6 % 7.6 (H)   (H): Data is abnormally high    REVIEW OF SYSTEMS    Endocrine: positive for diabetes  Skin: negative  Eyes: negative for, visual blurring, redness, tearing  Ears/Nose/Throat: negative  Respiratory: No shortness of breath, dyspnea on exertion, cough, or hemoptysis  Cardiovascular: negative for, chest pain, dyspnea on exertion, lower extremity edema, and exercise intolerance  Gastrointestinal: negative for, nausea, vomiting, constipation, and diarrhea  Genitourinary: negative for, nocturia, dysuria, frequency, and urgency  Musculoskeletal: positive for muscular weakness, negative for, nocturnal cramping,  and foot pain  Neurologic: positive for local weakness, negative for, numbness or tingling of hands, and numbness or tingling of feet  Psychiatric: negative  Hematologic/Lymphatic/Immunologic: negative    Past Medical History  Past Medical History:   Diagnosis Date     Cataract      Chronic atrial fibrillation (H) 06/08/2022     Infectious arthritis (H) 6/27/2023     Osteoarthritis of carpometacarpal (CMC) joint of both thumbs 11/16/2022     Post-operative nausea and vomiting 1/18/2023    Severe. Recommend TIVA     Type 2 diabetes mellitus with hyperglycemia (H) 06/24/2016       Medications  Current Outpatient Medications   Medication Sig Dispense Refill     alendronate (FOSAMAX) 70 MG tablet Take 1 tablet (70 mg) by mouth every 7 days 12 tablet 3     amoxicillin (AMOXIL) 500 MG capsule TAKE 4 CAPS 30-60 MIN BEFORE DENTAL PROCEDURE 4 capsule 2     apixaban ANTICOAGULANT (ELIQUIS) 5 MG tablet Take 1 tablet (5 mg) by mouth 2 times daily 180 tablet 3     atorvastatin (LIPITOR) 40 MG tablet Take 1 tablet (40 mg) by mouth daily for 360 days 90 tablet 3     blood glucose (NO BRAND SPECIFIED) test strip Use to test blood sugar 4 times daily or as directed. Accu Chek Guide test strips. 150 strip 3     calcium carbonate-vitamin D 500-400 MG-UNIT TABS tablt Take 1 tablet by mouth 2 times daily 180 tablet 3     Continuous Blood Gluc Sensor (DEXCOM G7 SENSOR) MISC 1 each every 10 days 9 each 3     estradiol (ESTRACE) 0.1 MG/GM vaginal cream INSERT 2G VAGINALLY TWICE PER WEEK 42.5 g 11     insulin aspart (NOVOLOG FLEXPEN) 100 UNIT/ML pen 1 unit per 18 gram of carb for breakfast, 1 unit per 15 grams of carbs for lunch and  1 unit per 13 gram for dinner. Plus 1 unit per 50 mg/dl above 150. Total daily dose approx 30 units. 30 mL 3     insulin degludec (TRESIBA FLEXTOUCH) 100 UNIT/ML pen Inject 22 Units Subcutaneous daily Once daily at 7 pm. Getting this through [employer] Patient Assistance [employer]. Should be coming in  May.  Using Lantus (this is what her insurance covers) as a bridge to when she can get her Tresiba.       insulin glargine (LANTUS PEN) 100 UNIT/ML pen Inject 22 units daily + 2 units for priming of pen. Using Lantus (this is what her insurance covers) as a bridge to when she can get her Tresiba through Pt Assistance.       insulin pen needle (B-D U/F) 31G X 8 MM miscellaneous USE 5 TIMES DAILY  WITH NOVOLOG AND LANTUS 500 each 0     melatonin 3 MG tablet Take 1 tablet (3 mg) by mouth nightly as needed for sleep 30 tablet 0     Multiple Vitamins-Minerals (MULTIVITAMIN ADULT PO) Take 1 tablet by mouth every morning        predniSONE (DELTASONE) 20 MG tablet Take 40 mg by mouth daily       pyRIDostigmine (MESTINON) 60 MG tablet Take 30 mg by mouth every 6 hours       Urine Glucose-Ketones Test (KETO-DIASTIX) STRP 1 strip by In Vitro route as needed 1 each 11       Allergies  No Known Allergies      Family History  family history includes Brain Cancer in her maternal uncle; Breast Cancer in her mother; Coronary Artery Disease in her maternal uncle and maternal uncle; Hyperlipidemia in her mother; Leukemia in her father; Other Cancer in her father; Skin Cancer in her father; Stomach Cancer in her maternal aunt.    Social History  Social History     Tobacco Use     Smoking status: Never     Passive exposure: Never     Smokeless tobacco: Never   Vaping Use     Vaping status: Never Used   Substance Use Topics     Alcohol use: Yes     Comment: 1 to 2 beers or glasses of wine 1 to 2 times per month     Drug use: No       Physical Exam  /75 (BP Location: Left arm, Patient Position: Sitting, Cuff Size: Adult Regular)   Pulse 70   Resp 18   LMP  (LMP Unknown)   SpO2 98%   There is no height or weight on file to calculate BMI.  GENERAL :  In no apparent distress  SKIN: Normal color, normal temperature, texture.  No hirsutism, alopecia or purple striae.     EYES: PERRL, EOMI, No scleral icterus,  No proptosis,  conjunctival redness, stare, retraction  RESP: Lungs clear to auscultation bilaterally  CARDIAC: Regular rate and rhythm, normal S1 S2, without murmurs, rubs or gallops     NEURO: awake, alert, responds appropriately to questions.  Cranial nerves intact.   Moves all extremities; Gait normal.  No tremor of the outstretched hand.    EXTREMITIES: No clubbing, cyanosis or edema.    DATA REVIEW  Dexcom Clarity  Time in target range 28%  Very Low<1%, Low 2%  High 23%, Very High 47%  Current Ave  mg/dl        Again, thank you for allowing me to participate in the care of your patient.        Sincerely,        Sarah Palm PA-C

## 2024-09-26 NOTE — TELEPHONE ENCOUNTER
[facility] Call Center    Phone Message    May a detailed message be left on voicemail: yes     Reason for Call: Medication Question or concern regarding medication   Prescription Clarification  Name of Medication:   insulin glargine (LANTUS PEN) 100 UNIT/ML pen       Prescribing Provider: Sarah Palm PA-C    Pharmacy:   [id number] [address]      What on the order needs clarification?   The pharmacy was calling to to get medication direction clarification thank you.      Action Taken: Message routed to:  Adult Clinics: Endocrinology p [phone]    Travel Screening: Not Applicable     Date of Service:

## 2024-09-26 NOTE — NURSING NOTE
Latoya Rodríguez's goals for this visit include:   Chief Complaint   Patient presents with    Follow Up    Diabetes       She requests these members of her care team be copied on today's visit information: yes    PCP: Pricila vAila    Referring Provider:  Pricila Avila, APRN CNP  [address] [place] [address]    /75 (BP Location: Left arm, Patient Position: Sitting, Cuff Size: Adult Regular)   Pulse 70   Resp 18   LMP  (LMP Unknown)   SpO2 98%     Do you need any medication refills at today's visit? YES    Jaun Dickey, EMT

## 2024-09-26 NOTE — PATIENT INSTRUCTIONS
Let's increase Lantus to 30 unit(s) in the evening.    Decrease the Novolog down to 1 unit(s) for every 20 grams for breakfast plus correction.     For lunch decrease carb ratio down to 1 unit(s) for every 18 grams of carbs plus correction.     For dinner decrease to 1 unit(s) for every 15 grams of carbs plus correction.  
hair removal not indicated

## 2024-09-27 NOTE — CONFIDENTIAL NOTE
utpatient Medication Detail     Disp Refills Start End HARDIK   insulin glargine (LANTUS PEN) 100 UNIT/ML pen 30 mL 0 9/26/2024 -- No   Sig - Route: Inject 26 Units subcutaneously every morning. Inject 22 units daily + 2 units for priming of pen. Using Lantus (this is what her insurance covers) as a bridge to when she can get her Tresiba through Pt Assistance. - Subcutaneous   Sent to pharmacy as: Insulin Glargine 100 UNIT/ML Subcutaneous Solution Pen-injector (LANTUS PEN)   Class: E-Prescribe   Notes to Pharmacy: If Lantus is not covered by insurance, may substitute Basaglar or Semglee or other insulin glargine product per insurance preference at same dose and frequency.     Order: [id number]   E-Prescribing Status: Receipt confirmed by pharmacy (9/26/2024  2:39 PM CDT)         Per 9/26/24 progress note from Sarah Palm PA-C:  Let's increase Lantus to 30 unit(s) in the evening.           Writer will send to Sarah Palm PA-C to clarify order above.        Clarisa Nunez RN  Endocrine Care Coordinator  [facility]

## 2024-10-03 DIAGNOSIS — Z12.11 COLON CANCER SCREENING: ICD-10-CM

## 2024-10-09 ENCOUNTER — NURSE TRIAGE (OUTPATIENT)
Dept: FAMILY MEDICINE | Facility: CLINIC | Age: 74
End: 2024-10-09
Payer: COMMERCIAL

## 2024-10-09 DIAGNOSIS — R19.8 ALTERED BOWEL FUNCTION: Primary | ICD-10-CM

## 2024-10-09 DIAGNOSIS — Z12.11 SCREEN FOR COLON CANCER: ICD-10-CM

## 2024-10-09 DIAGNOSIS — Z47.89 ORTHOPEDIC AFTERCARE: ICD-10-CM

## 2024-10-09 NOTE — TELEPHONE ENCOUNTER
Patient notified per Pricila Avila NP colonoscopy order placed. Phone number provided in case they do not get a hold of her. Patient verbalized understanding and agrees with plan. Advised to call with additional questions or concerns. Brant Bravo RN, BSN

## 2024-10-09 NOTE — TELEPHONE ENCOUNTER
Will place order for colonoscopy for altered bowel pattern   Please be aware that coverage of these services is subject to the terms and limitations of your health insurance plan.  Call member services at your health plan with any benefit or coverage questions.[facility] will call you to coordinate your care as prescribed by the provider.  If you don t hear from a representative within 2 business days, please call [phone].

## 2024-10-09 NOTE — TELEPHONE ENCOUNTER
Nurse Triage SBAR    Is this a 2nd Level Triage? YES, LICENSED PRACTITIONER REVIEW IS REQUIRED    Situation: patient received notification that she was due for her cologard and was advised to call with any changes.     Background: patient reports that she has been having an ongoing issue with bowel movements that are have been worsening over the past year. States that previously she would have episodes of diarrhea every couple of weeks and then started having constipation where she would only have a BM once per week. Patient states that now she will have a small, hard, pebble like bowel movement in the morning and then later in the day will have episode of explosive diarrhea. Today, an hour into walking her dog on routine 2 hour walk, she had an episode of explosive diarrhea where the stool went down her leg.    Assessment: patient reports that she is staying hydrated, is urinating every hour during the day and every 2 hours over night. Denies pain with bowel movements, denies abdominal pain/distention. Denies blood in the stool, will occasionally see bright red spotting on toilet paper when cleaning self up following a BM but doesn't have any blood on the stool or mixed in with the stool    Protocol Recommended Disposition:   See in Office Within 2 Weeks    Recommendation: disposition recommends visit within 2 weeks. Patient is agreeable to this. Routing to the provider, requesting visit type, is this something that can be discussed virtually or do you want patient evaluated in person? Please advise.     Routed to provider    Does the patient meet one of the following criteria for ADS visit consideration? 16+ years old, with an [facility] PCP     TIP  Providers, please consider if this condition is appropriate for management at one of our Acute and Diagnostic Services sites.     If patient is a good candidate, please use dotphrase <dot>triageresponse and select Refer to ADS to document.    Reason for Disposition    "Diarrhea is a chronic symptom (recurrent or ongoing AND lasting > 4 weeks)    Additional Information   Negative: Abdomen pain is main symptom and male   Negative: Abdomen pain is main symptom and female   Negative: Rectal bleeding or blood in stool is main symptom   Negative: Vomiting bile (green color)   Negative: Patient sounds very sick or weak to the triager   Negative: Constant abdominal pain lasting > 2 hours   Negative: Vomiting and abdomen looks much more swollen than usual   Negative: Rectal pain or fullness from fecal impaction (rectum full of stool) and NOT better after SITZ bath, suppository or enema   Negative: Abdomen is more swollen than usual   Negative: Last bowel movement (BM) > 4 days ago   Negative: Leaking stool   Negative: Intermittent mild abdominal pain and fever    Answer Assessment - Initial Assessment Questions  1. STOOL PATTERN OR FREQUENCY: \"How often do you have a bowel movement (BM)?\"  (Normal range: 3 times a day to every 3 days)  \"When was your last BM?\"        Had BM this morning, was pebble like and then had a blow out of diarrhea during walk with dog  2. STRAINING: \"Do you have to strain to have a BM?\"       Will have to strain during constipation episodes, no pain  3. RECTAL PAIN: \"Does your rectum hurt when the stool comes out?\" If Yes, ask: \"Do you have hemorrhoids? How bad is the pain?\"  (Scale 1-10; or mild, moderate, severe)      No  4. STOOL COMPOSITION: \"Are the stools hard?\"       Pebble like stools or very soft but will also have episodes of unexpected diarrhea  5. BLOOD ON STOOLS: \"Has there been any blood on the toilet tissue or on the surface of the BM?\" If Yes, ask: \"When was the last time?\"      No  6. CHRONIC CONSTIPATION: \"Is this a new problem for you?\"  If No, ask: \"How long have you had this problem?\" (days, weeks, months)       Has been consistently getting worse over the year  7. CHANGES IN DIET OR HYDRATION: \"Have there been any recent changes in your diet?\" " "\"How much fluids are you drinking on a daily basis?\"  \"How much have you had to drink today?\"      No. Does try to drink a lot of water, does have issues with urinary incontinence, is going to the bathroom every hour during the day and every couple of hours at night  8. MEDICINES: \"Have you been taking any new medicines?\" \"Are you taking any narcotic pain medicines?\" (e.g., Dilaudid, morphine, Percocet, Vicodin)      Yes, diagnosed with Myasthenia Gravis, was hospitalized and place on steroid, is tapering off. Diarrhea and constipation was happening before the medications.   9. LAXATIVES: \"Have you been using any stool softeners, laxatives, or enemas?\"  If Yes, ask \"What, how often, and when was the last time?\"      No  10. ACTIVITY:  \"How much walking do you do every day?\"  \"Has your activity level decreased in the past week?\"         Walk the dog for 2 hours every day   11. CAUSE: \"What do you think is causing the constipation?\"         No  12. OTHER SYMPTOMS: \"Do you have any other symptoms?\" (e.g., abdomen pain, bloating, fever, vomiting)        Will get diarrhea every other day, explosive episodes. Is not consistent. Constipation is daily  13. MEDICAL HISTORY: \"Do you have a history of hemorrhoids, rectal fissures, or rectal surgery or rectal abscess?\"          No  14. PREGNANCY: \"Is there any chance you are pregnant?\" \"When was your last menstrual period?\"        Not applicable    Answer Assessment - Initial Assessment Questions  1. DIARRHEA SEVERITY: \"How bad is the diarrhea?\" \"How many more stools have you had in the past 24 hours than normal?\"     - NO DIARRHEA (SCALE 0)    - MILD (SCALE 1-3): Few loose or mushy BMs; increase of 1-3 stools over normal daily number of stools; mild increase in ostomy output.    -  MODERATE (SCALE 4-7): Increase of 4-6 stools daily over normal; moderate increase in ostomy output.    -  SEVERE (SCALE 8-10; OR \"WORST POSSIBLE\"): Increase of 7 or more stools daily over normal; " "moderate increase in ostomy output; incontinence.      Will have issues of constipation where stools are pebble like in consistency every other day but will also have episodes of explosive diarrhea every other day  2. ONSET: \"When did the diarrhea begin?\"       Bowel irregularity has been on going for a while. Previously would have an episode of diarrhea every other week and then went to have one BM per week, and now is having bowel movements every other day  3. BM CONSISTENCY: \"How loose or watery is the diarrhea?\"       Will have hard, pebble like BM in the morning where needs to strain but then later in the day will have explosive diarrhea that drips down her leg  4. VOMITING: \"Are you also vomiting?\" If Yes, ask: \"How many times in the past 24 hours?\"       no  5. ABDOMEN PAIN: \"Are you having any abdomen pain?\" If Yes, ask: \"What does it feel like?\" (e.g., crampy, dull, intermittent, constant)       no  6. ABDOMEN PAIN SEVERITY: If present, ask: \"How bad is the pain?\"  (e.g., Scale 1-10; mild, moderate, or severe)    - MILD (1-3): doesn't interfere with normal activities, abdomen soft and not tender to touch     - MODERATE (4-7): interferes with normal activities or awakens from sleep, abdomen tender to touch     - SEVERE (8-10): excruciating pain, doubled over, unable to do any normal activities        Not applicable  7. ORAL INTAKE: If vomiting, \"Have you been able to drink liquids?\" \"How much liquids have you had in the past 24 hours?\"      Not applicable  8. HYDRATION: \"Any signs of dehydration?\" (e.g., dry mouth [not just dry lips], too weak to stand, dizziness, new weight loss) \"When did you last urinate?\"      Patient has increased her water intake, is urinating every hour during the day and every couple of hours over night.  9. EXPOSURE: \"Have you traveled to a foreign country recently?\" \"Have you been exposed to anyone with diarrhea?\" \"Could you have eaten any food that was spoiled?\"      no  10. " "ANTIBIOTIC USE: \"Are you taking antibiotics now or have you taken antibiotics in the past 2 months?\"        No. Patient is taking prednisone, is working on tapering off of it. Patient is also taking pyridostigmine 60 mg four times per day  11. OTHER SYMPTOMS: \"Do you have any other symptoms?\" (e.g., fever, blood in stool)        no  12. PREGNANCY: \"Is there any chance you are pregnant?\" \"When was your last menstrual period?\"        Not applicable    Protocols used: Constipation-A-OH, Diarrhea-A-OH    "

## 2024-10-09 NOTE — TELEPHONE ENCOUNTER
[facility] Call Center    Phone Message    May a detailed message be left on voicemail: yes     Reason for Call: Patient asking for Antibiotics for Dental Procedure Next Wednesday. Patient had Surgery with Doctor January 16 2023 ( Right Hip )     Action Taken: Message routed to:  [facility] sean    Travel Screening: Not Applicable     Date of Service:

## 2024-10-09 NOTE — TELEPHONE ENCOUNTER
Prescription refill requested for:   amoxicillin (AMOXIL) 500 MG capsule       Last Written Prescription Date:  8/9/24  Last Fill Quantity: 4,   # refills: 2  Last Office Visit: 1/16/24  Future Office visit:           Giovanny Ogden ATC

## 2024-10-09 NOTE — TELEPHONE ENCOUNTER
Patient updated on the below, no questions at this time, verbalized understanding.      Cologuard is in the mail.  Patient is still planning to do that.    Per chart review, no future order found for Colonoscopy.    Please place Colonoscopy order      Natalia Rodriguez RN

## 2024-10-09 NOTE — TELEPHONE ENCOUNTER
Based on this should really have a colonoscopy   And not cologuard   I can not see that she has had one before

## 2024-10-10 RX ORDER — AMOXICILLIN 500 MG/1
CAPSULE ORAL
Qty: 4 CAPSULE | Refills: 2 | Status: SHIPPED | OUTPATIENT
Start: 2024-10-10

## 2024-10-17 ENCOUNTER — ORDERS ONLY (AUTO-RELEASED) (OUTPATIENT)
Dept: ADMISSION | Facility: CLINIC | Age: 74
End: 2024-10-17
Payer: COMMERCIAL

## 2024-10-17 DIAGNOSIS — Z12.11 COLON CANCER SCREENING: ICD-10-CM

## 2024-10-18 ENCOUNTER — TELEPHONE (OUTPATIENT)
Dept: GASTROENTEROLOGY | Facility: CLINIC | Age: 74
End: 2024-10-18
Payer: COMMERCIAL

## 2024-10-18 DIAGNOSIS — R19.8 ALTERED BOWEL FUNCTION: Primary | ICD-10-CM

## 2024-10-18 RX ORDER — BISACODYL 5 MG/1
TABLET, DELAYED RELEASE ORAL
Qty: 4 TABLET | Refills: 0 | Status: SHIPPED | OUTPATIENT
Start: 2024-10-18

## 2024-10-18 NOTE — TELEPHONE ENCOUNTER
First Attempt to contact patient in order to complete pre assessment questions.     Pt unable to talk as she was walking the dog. Pt will call back.    Callback required communication sent via FoxyTasks.  --------------------------------------------------------------------------------------------------------------------      Procedure details:    Patient scheduled for Colonoscopy on 11/1/24.     Arrival time: 0945. Procedure time 1030    Facility location: [facility]; [address] Floor, [place] [address]. Check in location: 2nd Floor at Surgery desk.    Sedation type: Conscious sedation     Pre op exam needed? No.    Indication for procedure: Altered bowel function       Chart review:     Electronic implanted devices? No    Recent diagnosis of diverticulitis within the last 6 weeks? No      Medication review:    Diabetic? Yes. Insulin: Consult with managing provider.     Anticoagulants? Yes Apixaban (Eliquis): Recommended HOLD 2 days before procedure.  Consult with your managing provider.    Weight loss medication/injectable? No GLP-1 medication per patient's medication list.  RN will verify with pre-assessment call.    Other medication HOLDING recommendations:  N/A      Prep for procedure:     Bowel prep recommendation: Standard Golytely. Bowel prep prescription sent to    [id number] [place] [id number] [facility]  Due to: diabetes.     Prep instructions sent via FoxyTasks         Cassi Lin RN  Endoscopy Procedure Pre Assessment RN  [phone] option 2

## 2024-10-18 NOTE — TELEPHONE ENCOUNTER
Pre assessment completed for upcoming procedure.   (Please see previous telephone encounter notes for complete details)    Patient  returned call.       Procedure details:    Arrival time and facility location reviewed.    Pre op exam needed? No.    Designated  policy reviewed. Instructed to have someone stay 6  hours post procedure.       Medication review:    Blood thinner/Anti-platelet medication(s):  Apixaban (Eliquis): Recommended HOLD 2 days before procedure.  Consult with your managing provider.  Insulin.  Consult with your managing provider.       Prep for procedure:     Procedure prep instructions reviewed.        Any additional information needed:  Patient has bladder control device with remote. Will bring with to shut off if needed.      Patient  verbalized understanding and had no questions or concerns at this time.      Kady Hernandez RN  Endoscopy Procedure Pre Assessment   [phone] option 2

## 2024-10-18 NOTE — TELEPHONE ENCOUNTER
"Endoscopy Scheduling Screen    Have you had any respiratory illness or flu-like symptoms in the last 10 days?  No    What is your communication preference for Instructions and/or Bowel Prep?   MyChart    What insurance is in the chart?  Other:  [facility], medicare    Ordering/Referring Provider: Pricila Avila AP   (If ordering provider performs procedure, schedule with ordering provider unless otherwise instructed. )    BMI: Estimated body mass index is 26.13 kg/m  as calculated from the following:    Height as of 8/6/24: 1.651 m (5' 5\").    Weight as of 8/6/24: 71.2 kg (157 lb).     Sedation Ordered  moderate sedation.   If patient BMI > 50 do not schedule in [facility].    If patient BMI > 45 do not schedule at [facility].    Are you taking methadone or Suboxone?  NO, No RN review required.    Have you been diagnosed and are being treated for severe PTSD or severe anxiety?  NO, No RN review required.    Are you taking any prescription medications for pain 3 or more times per week?   NO, No RN review required.    Do you have a history of malignant hyperthermia?  No    (Females) Are you currently pregnant?   No     Have you been diagnosed or told you have pulmonary hypertension?   No    Do you have an LVAD?  No    Have you been told you have moderate to severe sleep apnea?  No.    Have you been told you have COPD, asthma, or any other lung disease?  No    Do you have any heart conditions?  Yes - AFIB    In the past year, have you had any hospitalizations for heart related issues including cardiomyopathy, heart attack, or stent placement?  No    Do you have any implantable devices in your body (pacemaker, ICD)?  No    Do you take nitroglycerine?  No    Have you ever had or are you waiting for an organ transplant?  No. Continue scheduling, no site restrictions.    Have you had a stroke or transient ischemic attack (TIA aka \"mini stroke\" in the last 6 months?   No    Have you been diagnosed with or been told you have cirrhosis " "of the liver?   No.    Are you currently on dialysis?   No    Do you need assistance transferring?   No    BMI: Estimated body mass index is 26.13 kg/m  as calculated from the following:    Height as of 8/6/24: 1.651 m (5' 5\").    Weight as of 8/6/24: 71.2 kg (157 lb).     Is patients BMI > 40 and scheduling location UP?  No    Do you take an injectable or oral medication for weight loss or diabetes (excluding insulin)?  No    Do you take the medication Naltrexone?  No    Do you take blood thinners?  Yes, you must contact your prescribing provider for directions on holding or bridging with a different medication.       Prep   Are you currently on dialysis or do you have chronic kidney disease?  No    Do you have a diagnosis of diabetes?  Yes (Mark Prep)    Do you have a diagnosis of cystic fibrosis (CF)?  No    On a regular basis do you go 3 -5 days between bowel movements?  No    BMI > 40?  No    Preferred Pharmacy:    [facility] [address] [address]  Phone: [phone] Fax: [phone]          Final Scheduling Details     Procedure scheduled  Colonoscopy    Surgeon:  Nel     Date of procedure:  11/01/2024     Pre-OP / PAC:   No - Not required for this site.    Location  [facility] - [facility] - Patient preference.    Sedation   Moderate Sedation - Per order.      Patient Reminders:   You will receive a call from a Nurse to review instructions and health history.  This assessment must be completed prior to your procedure.  Failure to complete the Nurse assessment may result in the procedure being cancelled.      On the day of your procedure, please designate an adult(s) who can drive you home stay with you for the next 24 hours. The medicines used in the exam will make you sleepy. You will not be able to drive.      You cannot take public transportation, ride share services, or non-medical taxi service without a responsible caregiver.  " Medical transport services are allowed with the requirement that a responsible caregiver will receive you at your destination.  We require that drivers and caregivers are confirmed prior to your procedure.

## 2024-10-21 ENCOUNTER — TELEPHONE (OUTPATIENT)
Dept: FAMILY MEDICINE | Facility: CLINIC | Age: 74
End: 2024-10-21
Payer: COMMERCIAL

## 2024-10-21 ENCOUNTER — TELEPHONE (OUTPATIENT)
Dept: GASTROENTEROLOGY | Facility: CLINIC | Age: 74
End: 2024-10-21
Payer: COMMERCIAL

## 2024-10-21 NOTE — TELEPHONE ENCOUNTER
Caller: Patient    Reason for Reschedule/Cancellation   (please be detailed, any staff messages or encounters to note?): Patient  is not available. Requested to reschedule on a Friday.      Prior to reschedule please review:  Ordering Provider:  Pricila Avila APRN CNP  Sedation Determined: Moderate  Does patient have any ASC Exclusions, please identify?: N      Notes on Cancelled Procedure:  Procedure: Lower Endoscopy [Colonoscopy]   Date: 11/01  Location: [facility]; [address], [place] [address]   Surgeon: Nel      Rescheduled: Yes,   Procedure: Lower Endoscopy [Colonoscopy]    Date: 11/08/24   Location: [facility]; [address], [place] [address]    Surgeon: Celestine   Sedation Level Scheduled  Moderate ,  Reason for Sedation Level Referral   Instructions updated and sent: Lazhart     Does patient need PAC or Pre -Op Rescheduled? : No       Did you cancel or rescheduled an EUS procedure? No.

## 2024-10-21 NOTE — TELEPHONE ENCOUNTER
Oct 21, 2024 I have interoffice mailed to Pricila Avila NP, the [employer] refill/change form for Latoya's refills.    Please complete, sign and return to me in the enclosed, addressed interoffice envelope.    Thanks so much for your help!    Jennifer Suarez  Prescription Assistance Supervisor  Pharmacy Assistance

## 2024-10-24 ENCOUNTER — MYC REFILL (OUTPATIENT)
Dept: FAMILY MEDICINE | Facility: CLINIC | Age: 74
End: 2024-10-24
Payer: COMMERCIAL

## 2024-10-24 ENCOUNTER — TELEPHONE (OUTPATIENT)
Dept: FAMILY MEDICINE | Facility: CLINIC | Age: 74
End: 2024-10-24
Payer: COMMERCIAL

## 2024-10-24 ENCOUNTER — TRANSFERRED RECORDS (OUTPATIENT)
Dept: HEALTH INFORMATION MANAGEMENT | Facility: CLINIC | Age: 74
End: 2024-10-24
Payer: COMMERCIAL

## 2024-10-24 ENCOUNTER — MYC REFILL (OUTPATIENT)
Dept: ENDOCRINOLOGY | Facility: CLINIC | Age: 74
End: 2024-10-24
Payer: COMMERCIAL

## 2024-10-24 ENCOUNTER — MYC MEDICAL ADVICE (OUTPATIENT)
Dept: FAMILY MEDICINE | Facility: CLINIC | Age: 74
End: 2024-10-24
Payer: COMMERCIAL

## 2024-10-24 DIAGNOSIS — Z79.4 TYPE 2 DIABETES MELLITUS WITH HYPERGLYCEMIA, WITH LONG-TERM CURRENT USE OF INSULIN (H): ICD-10-CM

## 2024-10-24 DIAGNOSIS — E10.9 TYPE 1 DIABETES MELLITUS WITHOUT COMPLICATION (H): ICD-10-CM

## 2024-10-24 DIAGNOSIS — E11.65 TYPE 2 DIABETES MELLITUS WITH HYPERGLYCEMIA, WITH LONG-TERM CURRENT USE OF INSULIN (H): ICD-10-CM

## 2024-10-24 RX ORDER — PEN NEEDLE, DIABETIC 31 GX5/16"
NEEDLE, DISPOSABLE MISCELLANEOUS
Qty: 500 EACH | Refills: 1 | Status: SHIPPED | OUTPATIENT
Start: 2024-10-24

## 2024-10-24 RX ORDER — INSULIN ASPART 100 [IU]/ML
INJECTION, SOLUTION INTRAVENOUS; SUBCUTANEOUS
Qty: 30 ML | Refills: 3 | Status: SHIPPED | OUTPATIENT
Start: 2024-10-24

## 2024-10-24 RX ORDER — PEN NEEDLE, DIABETIC 31 GX5/16"
NEEDLE, DISPOSABLE MISCELLANEOUS
Qty: 500 EACH | Refills: 1 | Status: SHIPPED | OUTPATIENT
Start: 2024-10-24 | End: 2024-10-24

## 2024-10-24 NOTE — TELEPHONE ENCOUNTER
Caller: Latoya Rodríguez     Reason for Reschedule/Cancellation   (please be detailed, any staff messages or encounters to note?): wants earlier in the day due to being Diabetic       Prior to reschedule please review:  Ordering Provider: Pricila Avila  Sedation Determined: mod  Does patient have any ASC Exclusions, please identify?: n      Notes on Cancelled Procedure:  Procedure: Lower Endoscopy [Colonoscopy]   Date: 11/8  Location: [facility]; [address], [place] [address]   Surgeon: Iraj      Rescheduled: Yes,   Procedure: Lower Endoscopy [Colonoscopy]    Date: 11/6   Location: [facility]; [address], [place] [address]    Surgeon: Ricky   Sedation Level Scheduled  mod ,  Reason for Sedation Level ordered   Instructions updated and sent: y     Does patient need PAC or Pre -Op Rescheduled? : no       Did you cancel or rescheduled an EUS procedure? No.

## 2024-10-24 NOTE — TELEPHONE ENCOUNTER
Forms/Letter Request    Type of form/letter: OTHER: [facility]       Do we have the form/letter: YES- Placed on provider's desk    Who is the form from? [facility]     Where did/will the form come from? form was mailed in    How would you like the form/letter returned:  Inter-office to Jennifer Suarez

## 2024-11-02 ENCOUNTER — PRE VISIT (OUTPATIENT)
Dept: UROLOGY | Facility: CLINIC | Age: 74
End: 2024-11-02
Payer: COMMERCIAL

## 2024-11-02 NOTE — TELEPHONE ENCOUNTER
Reason for visit: follow up     Relevant information: follow up after hip surgery, urinary frequency, urge, stress incontinence, vaginal atrophy    Records/imaging/labs/orders: in Psychiatric    Pt called: No need for a call    At Rooming: yair Kennedy  11/2/2024  9:01 AM

## 2024-11-06 ENCOUNTER — TELEPHONE (OUTPATIENT)
Dept: FAMILY MEDICINE | Facility: CLINIC | Age: 74
End: 2024-11-06
Payer: COMMERCIAL

## 2024-11-06 ENCOUNTER — HOSPITAL ENCOUNTER (OUTPATIENT)
Facility: AMBULATORY SURGERY CENTER | Age: 74
Discharge: HOME | End: 2024-11-06
Attending: COLON & RECTAL SURGERY | Admitting: COLON & RECTAL SURGERY
Payer: COMMERCIAL

## 2024-11-06 VITALS
RESPIRATION RATE: 16 BRPM | HEART RATE: 63 BPM | OXYGEN SATURATION: 97 % | DIASTOLIC BLOOD PRESSURE: 75 MMHG | SYSTOLIC BLOOD PRESSURE: 150 MMHG | TEMPERATURE: 98 F

## 2024-11-06 LAB
COLONOSCOPY: NORMAL
GLUCOSE BLDC GLUCOMTR-MCNC: 51 MG/DL (ref 70–99)
GLUCOSE BLDC GLUCOMTR-MCNC: 74 MG/DL (ref 70–99)
GLUCOSE BLDC GLUCOMTR-MCNC: 87 MG/DL (ref 70–99)

## 2024-11-06 PROCEDURE — 88305 TISSUE EXAM BY PATHOLOGIST: CPT | Performed by: PATHOLOGY

## 2024-11-06 PROCEDURE — 45380 COLONOSCOPY AND BIOPSY: CPT

## 2024-11-06 PROCEDURE — G8907 PT DOC NO EVENTS ON DISCHARG: HCPCS

## 2024-11-06 PROCEDURE — G8918 PT W/O PREOP ORDER IV AB PRO: HCPCS

## 2024-11-06 RX ORDER — ONDANSETRON 2 MG/ML
4 INJECTION INTRAMUSCULAR; INTRAVENOUS EVERY 6 HOURS PRN
Status: DISCONTINUED | OUTPATIENT
Start: 2024-11-06 | End: 2024-11-07 | Stop reason: HOSPADM

## 2024-11-06 RX ORDER — NALOXONE HYDROCHLORIDE 0.4 MG/ML
0.2 INJECTION, SOLUTION INTRAMUSCULAR; INTRAVENOUS; SUBCUTANEOUS
Status: DISCONTINUED | OUTPATIENT
Start: 2024-11-06 | End: 2024-11-07 | Stop reason: HOSPADM

## 2024-11-06 RX ORDER — NALOXONE HYDROCHLORIDE 0.4 MG/ML
0.4 INJECTION, SOLUTION INTRAMUSCULAR; INTRAVENOUS; SUBCUTANEOUS
Status: DISCONTINUED | OUTPATIENT
Start: 2024-11-06 | End: 2024-11-07 | Stop reason: HOSPADM

## 2024-11-06 RX ORDER — ONDANSETRON 4 MG/1
4 TABLET, ORALLY DISINTEGRATING ORAL EVERY 6 HOURS PRN
Status: DISCONTINUED | OUTPATIENT
Start: 2024-11-06 | End: 2024-11-07 | Stop reason: HOSPADM

## 2024-11-06 RX ORDER — FENTANYL CITRATE 50 UG/ML
INJECTION, SOLUTION INTRAMUSCULAR; INTRAVENOUS PRN
Status: DISCONTINUED | OUTPATIENT
Start: 2024-11-06 | End: 2024-11-06 | Stop reason: HOSPADM

## 2024-11-06 RX ORDER — ONDANSETRON 2 MG/ML
4 INJECTION INTRAMUSCULAR; INTRAVENOUS
Status: DISCONTINUED | OUTPATIENT
Start: 2024-11-06 | End: 2024-11-07 | Stop reason: HOSPADM

## 2024-11-06 RX ORDER — PROCHLORPERAZINE MALEATE 5 MG/1
5 TABLET ORAL EVERY 6 HOURS PRN
Status: DISCONTINUED | OUTPATIENT
Start: 2024-11-06 | End: 2024-11-07 | Stop reason: HOSPADM

## 2024-11-06 RX ORDER — FLUMAZENIL 0.1 MG/ML
0.2 INJECTION, SOLUTION INTRAVENOUS
Status: ACTIVE | OUTPATIENT
Start: 2024-11-06 | End: 2024-11-06

## 2024-11-06 RX ORDER — LIDOCAINE 40 MG/G
CREAM TOPICAL
Status: DISCONTINUED | OUTPATIENT
Start: 2024-11-06 | End: 2024-11-07 | Stop reason: HOSPADM

## 2024-11-06 RX ORDER — DEXTROSE MONOHYDRATE 50 MG/ML
INJECTION, SOLUTION INTRAVENOUS CONTINUOUS
Status: DISCONTINUED | OUTPATIENT
Start: 2024-11-06 | End: 2024-11-07 | Stop reason: HOSPADM

## 2024-11-06 RX ADMIN — DEXTROSE MONOHYDRATE: 50 INJECTION, SOLUTION INTRAVENOUS at 08:10

## 2024-11-06 NOTE — H&P
Pre-Endoscopy History and Physical     Latoya Rodríguez MRN# [id number]   YOB: 1950 Age: 74 year old     Date of Procedure: 11/06/24  Primary care provider: Pricila Avila  Type of Endoscopy: Colonoscopy  Reason for Procedure: screening  Type of Anesthesia Anticipated: Moderate Sedation    HPI:    Latoya is a 74 year old female who will be undergoing the above procedure.      Prior colonoscopy: none    A history and physical has been performed, notable for diabetes. . The patient's medications and allergies have been reviewed. The risks and benefits of the procedure and the sedation options and risks were discussed with the patient.  All questions were answered and informed consent was obtained.  She has been having more frequent diarrhea.     She denies a personal or family history of anesthesia complications or bleeding disorders.     No Known Allergies   Allergy to Latex: NO   Allergy to tape: NO   Intolerances: NO     Current Outpatient Medications   Medication Sig Dispense Refill    alendronate (FOSAMAX) 70 MG tablet Take 1 tablet (70 mg) by mouth every 7 days 12 tablet 3    amoxicillin (AMOXIL) 500 MG capsule TAKE 4 CAPS 30-60 MIN BEFORE DENTAL PROCEDURE 4 capsule 2    atorvastatin (LIPITOR) 40 MG tablet Take 1 tablet (40 mg) by mouth daily for 360 days 90 tablet 3    calcium carbonate-vitamin D 500-400 MG-UNIT TABS tablt Take 1 tablet by mouth 2 times daily 180 tablet 3    estradiol (ESTRACE) 0.1 MG/GM vaginal cream INSERT 2G VAGINALLY TWICE PER WEEK 42.5 g 11    insulin aspart (NOVOLOG FLEXPEN) 100 UNIT/ML pen 1 unit per 18 gram of carb for breakfast, 1 unit per 15 grams of carbs for lunch and  1 unit per 13 gram for dinner. Plus 1 unit per 50 mg/dl above 150. Total daily dose approx 30 units. 30 mL 3    insulin glargine (LANTUS PEN) 100 UNIT/ML pen Inject 30 Units subcutaneously every morning. 30 mL 1    Multiple Vitamins-Minerals (MULTIVITAMIN ADULT PO) Take 1 tablet by mouth every  morning       pyRIDostigmine (MESTINON) 60 MG tablet Take 30 mg by mouth every 6 hours      apixaban ANTICOAGULANT (ELIQUIS) 5 MG tablet Take 1 tablet (5 mg) by mouth 2 times daily 180 tablet 3    bisacodyl (DULCOLAX) 5 MG EC tablet Take 2 tablets at 3 pm the day before your procedure. If your procedure is before 11 am, take 2 additional tablets at 11 pm. If your procedure is after 11 am, take 2 additional tablets at 6 am. For additional instructions refer to your colonoscopy prep instructions. 4 tablet 0    blood glucose (NO BRAND SPECIFIED) test strip Use to test blood sugar 4 times daily or as directed. Accu Chek Guide test strips. 150 strip 3    Continuous Blood Gluc Sensor (DEXCOM G7 SENSOR) MISC 1 each every 10 days 9 each 3    insulin pen needle (B-D U/F) 31G X 8 MM miscellaneous USE 5 TIMES DAILY  WITH NOVOLOG AND LANTUS 500 each 1    melatonin 3 MG tablet Take 1 tablet (3 mg) by mouth nightly as needed for sleep 30 tablet 0    polyethylene glycol (GOLYTELY) 236 g suspension The night before the exam at 6 pm drink an 8-ounce glass every 15 minutes until the jug is half empty. If you arrive before 11 AM: Drink the other half of the Golytely jug at 11 PM night before procedure. If you arrive after 11 AM: Drink the other half of the Golytely jug at 6 AM day of procedure. For additional instructions refer to your colonoscopy prep instructions. 4000 mL 0    predniSONE (DELTASONE) 20 MG tablet Take 0.5 tablets (10 mg) by mouth daily.      Urine Glucose-Ketones Test (KETO-DIASTIX) STRP 1 strip by In Vitro route as needed 1 each 11     Current Facility-Administered Medications   Medication Dose Route Frequency Provider Last Rate Last Admin    dextrose 5% infusion   Intravenous Continuous Gilda García MD 50 mL/hr at 11/06/24 0810 New Bag at 11/06/24 0810    lidocaine (LMX4) kit   Topical Q1H PRN Janet Neumann,         lidocaine 1 % 0.1-1 mL  0.1-1 mL Other Q1H PRN Janet Neumann, DO        ondansetron  (ZOFRAN) injection 4 mg  4 mg Intravenous Once PRN McBeath, Janet, DO        sodium chloride (PF) 0.9% PF flush 3 mL  3 mL Intracatheter Q8H McBeath, Janet, DO        sodium chloride (PF) 0.9% PF flush 3 mL  3 mL Intracatheter q1 min prn McBeath, Janet, DO           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)    Type 1 diabetes mellitus with hyperglycemia (H)    Age-related osteoporosis without current pathological fracture    Stress incontinence    Atrophic vaginitis    Intrinsic sphincter deficiency    Overactive bladder    Carpal tunnel syndrome of right wrist    Chronic atrial fibrillation (H)    Osteoarthritis of carpometacarpal (CMC) joint of both thumbs    Thumb pain    Post-operative nausea and vomiting    Type 1 diabetes mellitus without complication (H)    Osteoarthritis of right hip, unspecified osteoarthritis type    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 Recommend TIVA    Type 2 diabetes mellitus with hyperglycemia (H) 06/24/2016        Past Surgical History:   Procedure Laterality Date    ANESTHESIA CARDIOVERSION N/A 9/23/2021    Procedure: ANESTHESIA, FOR CARDIOVERSION@1100;  Surgeon: GENERIC ANESTHESIA PROVIDER;  Location: [facility] OR    ANESTHESIA OUT OF OR MRI N/A 9/14/2022    Procedure: ANESTHESIA OUT OF OR Magnetic resonance imaging right hip and lumbar @0800;  Surgeon: GENERIC ANESTHESIA PROVIDER;  Location: [facility] OR    ARTHROPLASTY HIP Right 1/18/2023    Procedure: ARTHROPLASTY, HIP,  TOTAL RIGHT;  Surgeon: Arnaud Ward MD;  Location: [facility]    CYSTOSCOPY, INJECT COLLAGEN, COMBINED N/A 5/11/2021    Procedure: CYSTOSCOPY, WITH PERIURETHRAL BULKING AGENT INJECTION;  Surgeon: Alberto Zhang MD;  Location: [facility] OR    CYSTOSCOPY, INJECT COLLAGEN, COMBINED N/A 9/12/2022    Procedure: CYSTOSCOPY, WITH PERIURETHRAL BULKING AGENT INJECTION (look in the bladder and urethra and inject filler into the urethra);  Surgeon: Alberto Zhang MD;  Location: [facility] OR    EYE SURGERY  9/04, 5/11    Retinal detachment, Cataract (both eyes)    IMPLANT STIMULATOR AND LEADS SACRAL NERVE (STAGE ONE AND TWO) Right 7/5/2022    Procedure: INSERTION, SACRAL NERVE STIMULATOR, STAGE 1 & 2 (permanent implant on the RIGHT side with [employer]onics);  Surgeon: Alberto Zhang MD;  Location: [facility] OR    IMPLANT STIMULATOR SACRAL NERVE PERCUTANEOUS TRIAL N/A 6/14/2022    Procedure: INSERTION, NEUROSTIMULATOR, SACRAL, PERCUTANEOUS, FOR TRIAL;  Surgeon: Alberto Zhang MD;  Location: [facility] OR    RELEASE CARPAL TUNNEL Right 8/6/2021    Procedure: RELEASE, CARPAL TUNNEL, Right;  Surgeon: RADHA Sandoval MD;  Location: [facility]    RELEASE TRIGGER FINGER Bilateral 5/10/2019    Procedure: RELEASE TRIGGER FINGER, BILATERAL LONG AND RING FINGERS;  Surgeon: RADHA Sandoval MD;  Location: [facility] OR    VASCULAR SURGERY      Varicose Veins       Social History     Tobacco Use    Smoking status: Never     Passive exposure: Never    Smokeless tobacco: Never   Substance Use Topics    Alcohol use: Yes     Comment: 1 to 2 beers or glasses of wine 1 to 2 times per month       Family History   Problem Relation Age of Onset    Hyperlipidemia Mother     Breast Cancer Mother     Other Cancer Father     Leukemia Father     Skin Cancer Father     Coronary Artery Disease Maternal Uncle     Brain Cancer Maternal Uncle     Coronary Artery Disease Maternal Uncle     Stomach Cancer Maternal Aunt     Diabetes No family hx of     Hypertension No family  "hx of     Cerebrovascular Disease No family hx of     Colon Cancer No family hx of     Thyroid Disease No family hx of     Depression No family hx of     Anxiety Disorder No family hx of        REVIEW OF SYSTEMS:     5 point ROS negative except as noted above in HPI, including Gen., Resp., CV, GI &  system review.      PHYSICAL EXAM:   LMP  (LMP Unknown)  Estimated body mass index is 26.13 kg/m  as calculated from the following:    Height as of 8/6/24: 1.651 m (5' 5\").    Weight as of 8/6/24: 71.2 kg (157 lb).   All Vitals have been reviewed.    GENERAL APPEARANCE: healthy and alert  MENTAL STATUS: alert  AIRWAY EXAM: Mallampatti Class I (visualization of the soft palate, fauces, uvula, anterior and posterior pillars)  RESP: lungs clear to auscultation - no rales, rhonchi or wheezes  CV: regular rates and rhythm      IMPRESSION   ASA Class 3 - Severe systemic disease, but not incapacitating        PLAN:     Plan for colonoscopy. We discussed the risks, benefits and alternatives and the patient wished to proceed.    The above has been forwarded to the consulting provider.    Gilda García MD, MS, FACS, FASCRS  [facility]  Office: [phone]  11/6/2024 8:39 AM         "

## 2024-11-06 NOTE — TELEPHONE ENCOUNTER
A refill request has been made to the [employer] assistance program for NovoLog and needles.    [facility] will contact Latoya to set up delivery to home for the medication within 10-14 business days.     Thank you!    Jamia Kasper   Prescription Assistance Assistant

## 2024-11-06 NOTE — PROGRESS NOTES
Pt presented for a colonoscopy today. Pt reports dizziness and lightheadedness in the lobby. Per pt report on their glucose monitor their blood sugar is 44. Pt brought back and assessed-glucose was 52. IV started; order for IV dextrose obtained from Dr. García. After dextrose sugar improved to 86. Pt reports feeling better. Care ongoing.

## 2024-11-07 ENCOUNTER — TELEPHONE (OUTPATIENT)
Dept: FAMILY MEDICINE | Facility: CLINIC | Age: 74
End: 2024-11-07
Payer: COMMERCIAL

## 2024-11-07 NOTE — TELEPHONE ENCOUNTER
Today - November 7, 2024 I have interoffice mailed to Pricila Avila CNP the [employer] refill/change form for Tresiba pens.      This was missed on the previous refill/change form.    Please complete, sign and FAX to [employer] [phone]. This will expedite the refill process.    AFTER you have Faxed the form to [employer], return the original completed [employer] refill/change form to me in the enclosed, addressed interoffice envelope.    Thanks so much for your help!    Jennifer Suarez  Prescription Assistance Supervisor  Pharmacy Assistance   Pool: [facility]

## 2024-11-11 LAB
PATH REPORT.COMMENTS IMP SPEC: NORMAL
PATH REPORT.COMMENTS IMP SPEC: NORMAL
PATH REPORT.FINAL DX SPEC: NORMAL
PATH REPORT.GROSS SPEC: NORMAL
PATH REPORT.MICROSCOPIC SPEC OTHER STN: NORMAL
PATH REPORT.RELEVANT HX SPEC: NORMAL
PHOTO IMAGE: NORMAL

## 2024-11-11 NOTE — TELEPHONE ENCOUNTER
This [employer] refill/change form was just put in the interoffice mail on Nov 7, give it few days!    We have not done a Lantus application, we have only been working with Malinda.    Jennifer Suarez  Prescription Assistance Supervisor  Pharmacy Assistance   Pool: [facility]

## 2024-11-13 ENCOUNTER — OFFICE VISIT (OUTPATIENT)
Dept: UROLOGY | Facility: CLINIC | Age: 74
End: 2024-11-13
Payer: COMMERCIAL

## 2024-11-13 VITALS
HEIGHT: 65 IN | HEART RATE: 68 BPM | DIASTOLIC BLOOD PRESSURE: 75 MMHG | OXYGEN SATURATION: 97 % | BODY MASS INDEX: 26.66 KG/M2 | WEIGHT: 160 LBS | SYSTOLIC BLOOD PRESSURE: 115 MMHG

## 2024-11-13 DIAGNOSIS — R39.15 URGENCY OF URINATION: Primary | ICD-10-CM

## 2024-11-13 DIAGNOSIS — N39.41 URGE INCONTINENCE: ICD-10-CM

## 2024-11-13 DIAGNOSIS — R35.0 FREQUENCY OF URINATION: ICD-10-CM

## 2024-11-13 DIAGNOSIS — N95.8 GENITOURINARY SYNDROME OF MENOPAUSE: ICD-10-CM

## 2024-11-13 RX ORDER — ESTRADIOL 0.1 MG/G
2 CREAM VAGINAL
Qty: 42.5 G | Refills: 11 | Status: SHIPPED | OUTPATIENT
Start: 2024-11-14

## 2024-11-13 ASSESSMENT — PAIN SCALES - GENERAL: PAINLEVEL_OUTOF10: NO PAIN (0)

## 2024-11-13 NOTE — PROGRESS NOTES
Reason for visit: F/u on urinary symptoms.  Clinical Data: Ms. Rodríguez is a 75 y/o female with the above. She was started on oxybutynin 15 mg xl as well as estrace cream and referred to pelvic floor PT. She did better on the anticholinergic however continued to have a lot of urgency and some urge incontinence. She was then switched to Detrol which did not work, then Sanctura which did but was too expensive and tried Vesicare but then Toviaz was less expensive and using Myrbetriq and that was working better than anything else but then it was not.  She would go for a walk with her dog and after an hour she would start to leak and after 1.5 hours she would just gush.  Another time that she will gush is when she sits up from a seated position.  This was confirmed by UDS.    She then underwent periurethral bulking for ISD on 5/11/21 and notes that her urinary frequency and incontinence have improved but she continues to have a little leakage associated with urgency but not as bad as the previous gushes.  HgA1 C is 7.2 but on a daily basis her sugars fluctuate quite a bit.  Of note she has been using her estrogen cream.  She underwent an Axonics PNE on 6/14/22 and reports three nights where she was able to wait over 5 hours at night.  Also, her urgency went from a level 4 to a level 2.  Frequency also improved.  She would report at least 60% improvement.  The RIGHT side worked best for her.  Although the first night the left side worked well.    She underwent SNS placement and reports continued improvement in her urgency and frequency.  She is on program 1 with the 4 lights on.  She feels the stimulation in the bicycle seat area on the right but more posteriorly  However then reported some continued incontinence when getting up from a seated position.  She had a bulking procedure on 5/11/22 with macroplastique which helped very minimally so she underwent some periurethral bulking on 9/12/22 but doesn't think it helped  much.  Her nocturia is more frequent now as has her day time urgency.  She is also limited by severe pain in her hip which she will have surgery in January but is hoping to heave surgery sooner because of her pain and limited mobility.  UDS on 2/24/21:  -Normal bladder capacity (635 mL) with slightly delayed filling sensations (FS: 328 mL, FD: 333 mL, SD: 452 mL).  -Good bladder compliance.  -No DO or stress urinary incontinence while seated despite max Pabd 99 cm H2O at a volume of 349 mL.  -Patient is then asked to stand. There is no incontinence with the motion of standing. However, after a few seconds of standing in place, she then starts to experience mild terminal DO with incontinence when Pdet reaches 4.4 cm H2O. This leakage starts as just a trickle but quickly progresses to more of a gush at which point she is given permission to void.  -Good detrusor contraction during voiding to max Pdet 43 cm H2O with a brisk flow rate (Qmax 32 ml/s), bell-shape flow curve, and complete bladder emptying (PVR 0 mL).  -Mildly increased EMG activity during voiding.  -No evidence for bladder outlet obstruction.  -Fluoroscopy reveals a mildly trabeculated bladder wall without diverticuli or VUR. The bladder neck is closed during filling. Voiding images unavailable as patient transferred to [facility] to void.    ASSESSMENT and PLAN: This is a 74 year old female with urinary urgency, frequency and urge incontinence as well as a possible stress incontinence component.   She was helped to some degree with the periurthral bulking but continued to have urinary urgency and was frustrated.  She underwent a right sided SNS which helps with the urgency frequency a fair bit but contiues to leak with standing from sitting position indicating some intrinsic sphincter deficiency.  She didn't find much relief from the periurethral bulking in terms of leakage.  We increased her setting today.  She was also diagnosed with Myasthenia  Gravid.  -resume estrogen cream   -sacral Xray  -try the higher level of stimulation  -f/u in a month with [employer] rep, to interrogate device  -could consider Beta agonist to help with symptoms if all looks well, pt. Cannot be on anticholinergic due to newly diagnosed Myasthenia gravid.    Thank you for allowing me to participate in the care of Ms. Latoya Rodríguez and I will keep you updated on her progress.   Alberto Zhang MD

## 2024-11-13 NOTE — LETTER
11/13/2024       RE: Latoya Rodríguez  [address] [facility] [address]     Dear Colleague,    Thank you for referring your patient, Latoya Rodríguez, to the [facility] [place] at [facility]. Please see a copy of my visit note below.    Reason for visit: F/u on urinary symptoms.  Clinical Data: Ms. Rodríguez is a 73 y/o female with the above. She was started on oxybutynin 15 mg xl as well as estrace cream and referred to pelvic floor PT. She did better on the anticholinergic however continued to have a lot of urgency and some urge incontinence. She was then switched to Detrol which did not work, then Sanctura which did but was too expensive and tried Vesicare but then Toviaz was less expensive and using Myrbetriq and that was working better than anything else but then it was not.  She would go for a walk with her dog and after an hour she would start to leak and after 1.5 hours she would just gush.  Another time that she will gush is when she sits up from a seated position.  This was confirmed by UDS.    She then underwent periurethral bulking for ISD on 5/11/21 and notes that her urinary frequency and incontinence have improved but she continues to have a little leakage associated with urgency but not as bad as the previous gushes.  HgA1 C is 7.2 but on a daily basis her sugars fluctuate quite a bit.  Of note she has been using her estrogen cream.  She underwent an [employer] PNE on 6/14/22 and reports three nights where she was able to wait over 5 hours at night.  Also, her urgency went from a level 4 to a level 2.  Frequency also improved.  She would report at least 60% improvement.  The RIGHT side worked best for her.  Although the first night the left side worked well.    She underwent SNS placement and reports continued improvement in her urgency and frequency.  She is on program 1 with the 4 lights on.  She feels the stimulation  in the bicycle seat area on the right but more posteriorly  However then reported some continued incontinence when getting up from a seated position.  She had a bulking procedure on 5/11/22 with macroplastique which helped very minimally so she underwent some periurethral bulking on 9/12/22 but doesn't think it helped much.  Her nocturia is more frequent now as has her day time urgency.  She is also limited by severe pain in her hip which she will have surgery in January but is hoping to heave surgery sooner because of her pain and limited mobility.  UDS on 2/24/21:  -Normal bladder capacity (635 mL) with slightly delayed filling sensations (FS: 328 mL, FD: 333 mL, SD: 452 mL).  -Good bladder compliance.  -No DO or stress urinary incontinence while seated despite max Pabd 99 cm H2O at a volume of 349 mL.  -Patient is then asked to stand. There is no incontinence with the motion of standing. However, after a few seconds of standing in place, she then starts to experience mild terminal DO with incontinence when Pdet reaches 4.4 cm H2O. This leakage starts as just a trickle but quickly progresses to more of a gush at which point she is given permission to void.  -Good detrusor contraction during voiding to max Pdet 43 cm H2O with a brisk flow rate (Qmax 32 ml/s), bell-shape flow curve, and complete bladder emptying (PVR 0 mL).  -Mildly increased EMG activity during voiding.  -No evidence for bladder outlet obstruction.  -Fluoroscopy reveals a mildly trabeculated bladder wall without diverticuli or VUR. The bladder neck is closed during filling. Voiding images unavailable as patient transferred to [facility] to void.    ASSESSMENT and PLAN: This is a 74 year old female with urinary urgency, frequency and urge incontinence as well as a possible stress incontinence component.   She was helped to some degree with the periurthral bulking but continued to have urinary urgency and was frustrated.  She underwent a right sided SNS  which helps with the urgency frequency a fair bit but contiues to leak with standing from sitting position indicating some intrinsic sphincter deficiency.  She didn't find much relief from the periurethral bulking in terms of leakage.  We increased her setting today.  She was also diagnosed with Myasthenia Gravid.  -resume estrogen cream   -sacral Xray  -try the higher level of stimulation  -f/u in a month with [employer] rep, to interrogate device  -could consider Beta agonist to help with symptoms if all looks well, pt. Cannot be on anticholinergic due to newly diagnosed Myasthenia gravid.    Thank you for allowing me to participate in the care of Ms. Latoya Rodríguez and I will keep you updated on her progress.   Alberto Zhang MD             Again, thank you for allowing me to participate in the care of your patient.      Sincerely,    Alberto Zhang MD

## 2024-11-13 NOTE — PATIENT INSTRUCTIONS
-sacral Xray  -try the higher level of stimulation  -f/u in a month with [employer] rep, to interrogate device  -could consider Beta agonist to help with symptoms if all looks well, pt. Cannot be on anticholinergic due to newly diagnosed Myasthenia gravid.   Declined

## 2024-11-13 NOTE — NURSING NOTE
"Latoya Rodríguez is a 74 year old female patient that presents today in clinic for the following:    Chief Complaint   Patient presents with    Follow Up     urinary frequency, urge, stress incontinence, vaginal atrophy       The patient's allergies and medications were reviewed as noted. A set of vitals were recorded as noted without incident. The patient does not have any other questions for the provider.    Blood pressure 115/75, pulse 68, height 1.651 m (5' 5\"), weight 72.6 kg (160 lb), SpO2 97%, not currently breastfeeding. Body mass index is 26.63 kg/m .    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)    Type 1 diabetes mellitus with hyperglycemia (H)    Age-related osteoporosis without current pathological fracture    Stress incontinence    Atrophic vaginitis    Intrinsic sphincter deficiency    Overactive bladder    Carpal tunnel syndrome of right wrist    Chronic atrial fibrillation (H)    Osteoarthritis of carpometacarpal (CMC) joint of both thumbs    Thumb pain    Post-operative nausea and vomiting    Type 1 diabetes mellitus without complication (H)    Osteoarthritis of right hip, unspecified osteoarthritis type    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)    DM type 1 with diabetic peripheral neuropathy (H)       No Known Allergies    Current Outpatient Medications   Medication Sig Dispense Refill    alendronate (FOSAMAX) 70 MG tablet Take 1 tablet (70 mg) by mouth every 7 days 12 tablet 3    amoxicillin (AMOXIL) 500 MG capsule TAKE 4 CAPS 30-60 MIN BEFORE DENTAL PROCEDURE 4 capsule 2    apixaban ANTICOAGULANT (ELIQUIS) 5 MG tablet Take 1 tablet (5 mg) by mouth 2 times daily 180 tablet 3    atorvastatin (LIPITOR) 40 MG tablet Take 1 tablet (40 mg) by mouth daily for 360 days 90 tablet 3    bisacodyl (DULCOLAX) 5 MG EC " tablet Take 2 tablets at 3 pm the day before your procedure. If your procedure is before 11 am, take 2 additional tablets at 11 pm. If your procedure is after 11 am, take 2 additional tablets at 6 am. For additional instructions refer to your colonoscopy prep instructions. 4 tablet 0    blood glucose (NO BRAND SPECIFIED) test strip Use to test blood sugar 4 times daily or as directed. Accu Chek Guide test strips. 150 strip 3    calcium carbonate-vitamin D 500-400 MG-UNIT TABS tablt Take 1 tablet by mouth 2 times daily 180 tablet 3    Continuous Blood Gluc Sensor (DEXCOM G7 SENSOR) MISC 1 each every 10 days 9 each 3    estradiol (ESTRACE) 0.1 MG/GM vaginal cream INSERT 2G VAGINALLY TWICE PER WEEK 42.5 g 11    insulin aspart (NOVOLOG FLEXPEN) 100 UNIT/ML pen 1 unit per 18 gram of carb for breakfast, 1 unit per 15 grams of carbs for lunch and  1 unit per 13 gram for dinner. Plus 1 unit per 50 mg/dl above 150. Total daily dose approx 30 units. 30 mL 3    insulin glargine (LANTUS PEN) 100 UNIT/ML pen Inject 30 Units subcutaneously every morning. 30 mL 1    insulin pen needle (B-D U/F) 31G X 8 MM miscellaneous USE 5 TIMES DAILY  WITH NOVOLOG AND LANTUS 500 each 1    melatonin 3 MG tablet Take 1 tablet (3 mg) by mouth nightly as needed for sleep 30 tablet 0    Multiple Vitamins-Minerals (MULTIVITAMIN ADULT PO) Take 1 tablet by mouth every morning       polyethylene glycol (GOLYTELY) 236 g suspension The night before the exam at 6 pm drink an 8-ounce glass every 15 minutes until the jug is half empty. If you arrive before 11 AM: Drink the other half of the Golytely jug at 11 PM night before procedure. If you arrive after 11 AM: Drink the other half of the Golytely jug at 6 AM day of procedure. For additional instructions refer to your colonoscopy prep instructions. 4000 mL 0    predniSONE (DELTASONE) 20 MG tablet Take 0.5 tablets (10 mg) by mouth daily.      pyRIDostigmine (MESTINON) 60 MG tablet Take 30 mg by mouth every 6  hours      Urine Glucose-Ketones Test (KETO-DIASTIX) STRP 1 strip by In Vitro route as needed 1 each 11       Social History     Tobacco Use    Smoking status: Never     Passive exposure: Never    Smokeless tobacco: Never   Vaping Use    Vaping status: Never Used   Substance Use Topics    Alcohol use: Yes     Comment: 1 to 2 beers or glasses of wine 1 to 2 times per month    Drug use: No       Jess Vasquez LPN  11/13/2024  10:19 AM

## 2024-11-17 SDOH — HEALTH STABILITY: PHYSICAL HEALTH: ON AVERAGE, HOW MANY MINUTES DO YOU ENGAGE IN EXERCISE AT THIS LEVEL?: 90 MIN

## 2024-11-17 SDOH — HEALTH STABILITY: PHYSICAL HEALTH: ON AVERAGE, HOW MANY DAYS PER WEEK DO YOU ENGAGE IN MODERATE TO STRENUOUS EXERCISE (LIKE A BRISK WALK)?: 7 DAYS

## 2024-11-17 ASSESSMENT — SOCIAL DETERMINANTS OF HEALTH (SDOH): HOW OFTEN DO YOU GET TOGETHER WITH FRIENDS OR RELATIVES?: ONCE A WEEK

## 2024-11-18 ENCOUNTER — ANCILLARY PROCEDURE (OUTPATIENT)
Dept: GENERAL RADIOLOGY | Facility: CLINIC | Age: 74
End: 2024-11-18
Attending: UROLOGY
Payer: COMMERCIAL

## 2024-11-18 DIAGNOSIS — N39.41 URGE INCONTINENCE: ICD-10-CM

## 2024-11-18 DIAGNOSIS — R35.0 FREQUENCY OF URINATION: ICD-10-CM

## 2024-11-18 DIAGNOSIS — R39.15 URGENCY OF URINATION: ICD-10-CM

## 2024-11-18 PROCEDURE — 72220 X-RAY EXAM SACRUM TAILBONE: CPT | Performed by: RADIOLOGY

## 2024-11-21 ENCOUNTER — OFFICE VISIT (OUTPATIENT)
Dept: FAMILY MEDICINE | Facility: CLINIC | Age: 74
End: 2024-11-21
Attending: NURSE PRACTITIONER
Payer: COMMERCIAL

## 2024-11-21 ENCOUNTER — TELEPHONE (OUTPATIENT)
Dept: FAMILY MEDICINE | Facility: CLINIC | Age: 74
End: 2024-11-21

## 2024-11-21 VITALS
SYSTOLIC BLOOD PRESSURE: 139 MMHG | WEIGHT: 168.7 LBS | BODY MASS INDEX: 27.11 KG/M2 | RESPIRATION RATE: 16 BRPM | HEIGHT: 66 IN | OXYGEN SATURATION: 98 % | DIASTOLIC BLOOD PRESSURE: 81 MMHG | HEART RATE: 66 BPM | TEMPERATURE: 97.9 F

## 2024-11-21 DIAGNOSIS — Z12.31 ENCOUNTER FOR SCREENING MAMMOGRAM FOR BREAST CANCER: ICD-10-CM

## 2024-11-21 DIAGNOSIS — I48.11 LONGSTANDING PERSISTENT ATRIAL FIBRILLATION (H): ICD-10-CM

## 2024-11-21 DIAGNOSIS — G70.00 MYASTHENIA GRAVIS (H): ICD-10-CM

## 2024-11-21 DIAGNOSIS — Z13.29 SCREENING FOR THYROID DISORDER: ICD-10-CM

## 2024-11-21 DIAGNOSIS — E28.39 MENOPAUSE OVARIAN FAILURE: ICD-10-CM

## 2024-11-21 DIAGNOSIS — Z00.00 ENCOUNTER FOR MEDICARE ANNUAL WELLNESS EXAM: Primary | ICD-10-CM

## 2024-11-21 DIAGNOSIS — E10.9 TYPE 1 DIABETES MELLITUS WITHOUT COMPLICATION (H): ICD-10-CM

## 2024-11-21 DIAGNOSIS — Z13.0 SCREENING FOR DISORDER OF BLOOD AND BLOOD-FORMING ORGANS: ICD-10-CM

## 2024-11-21 DIAGNOSIS — E78.5 HYPERLIPIDEMIA WITH TARGET LDL LESS THAN 100: ICD-10-CM

## 2024-11-21 LAB
ALBUMIN SERPL BCG-MCNC: 4 G/DL (ref 3.5–5.2)
ALP SERPL-CCNC: 97 U/L (ref 40–150)
ALT SERPL W P-5'-P-CCNC: 27 U/L (ref 0–50)
ANION GAP SERPL CALCULATED.3IONS-SCNC: 8 MMOL/L (ref 7–15)
AST SERPL W P-5'-P-CCNC: 30 U/L (ref 0–45)
BILIRUB SERPL-MCNC: 0.5 MG/DL
BUN SERPL-MCNC: 15.5 MG/DL (ref 8–23)
CALCIUM SERPL-MCNC: 9.5 MG/DL (ref 8.8–10.4)
CHLORIDE SERPL-SCNC: 105 MMOL/L (ref 98–107)
CHOLEST SERPL-MCNC: 232 MG/DL
CREAT SERPL-MCNC: 0.63 MG/DL (ref 0.51–0.95)
CREAT UR-MCNC: 24.8 MG/DL
EGFRCR SERPLBLD CKD-EPI 2021: >90 ML/MIN/1.73M2
ERYTHROCYTE [DISTWIDTH] IN BLOOD BY AUTOMATED COUNT: 12.5 % (ref 10–15)
EST. AVERAGE GLUCOSE BLD GHB EST-MCNC: 212 MG/DL
FASTING STATUS PATIENT QL REPORTED: YES
FASTING STATUS PATIENT QL REPORTED: YES
GLUCOSE SERPL-MCNC: 138 MG/DL (ref 70–99)
HBA1C MFR BLD: 9 % (ref 0–5.6)
HCO3 SERPL-SCNC: 29 MMOL/L (ref 22–29)
HCT VFR BLD AUTO: 39.9 % (ref 35–47)
HDLC SERPL-MCNC: 87 MG/DL
HGB BLD-MCNC: 13.2 G/DL (ref 11.7–15.7)
LDLC SERPL CALC-MCNC: 132 MG/DL
MCH RBC QN AUTO: 32 PG (ref 26.5–33)
MCHC RBC AUTO-ENTMCNC: 33.1 G/DL (ref 31.5–36.5)
MCV RBC AUTO: 97 FL (ref 78–100)
MICROALBUMIN UR-MCNC: <12 MG/L
MICROALBUMIN/CREAT UR: NORMAL MG/G{CREAT}
NONHDLC SERPL-MCNC: 145 MG/DL
PLATELET # BLD AUTO: 194 10E3/UL (ref 150–450)
POTASSIUM SERPL-SCNC: 4.1 MMOL/L (ref 3.4–5.3)
PROT SERPL-MCNC: 6.1 G/DL (ref 6.4–8.3)
RBC # BLD AUTO: 4.12 10E6/UL (ref 3.8–5.2)
SODIUM SERPL-SCNC: 142 MMOL/L (ref 135–145)
TRIGL SERPL-MCNC: 67 MG/DL
TSH SERPL DL<=0.005 MIU/L-ACNC: 1.56 UIU/ML (ref 0.3–4.2)
WBC # BLD AUTO: 6.9 10E3/UL (ref 4–11)

## 2024-11-21 PROCEDURE — 82043 UR ALBUMIN QUANTITATIVE: CPT | Performed by: NURSE PRACTITIONER

## 2024-11-21 PROCEDURE — 84443 ASSAY THYROID STIM HORMONE: CPT | Performed by: NURSE PRACTITIONER

## 2024-11-21 PROCEDURE — 82570 ASSAY OF URINE CREATININE: CPT | Performed by: NURSE PRACTITIONER

## 2024-11-21 PROCEDURE — 83036 HEMOGLOBIN GLYCOSYLATED A1C: CPT | Performed by: NURSE PRACTITIONER

## 2024-11-21 PROCEDURE — 80061 LIPID PANEL: CPT | Performed by: NURSE PRACTITIONER

## 2024-11-21 PROCEDURE — 80053 COMPREHEN METABOLIC PANEL: CPT | Performed by: NURSE PRACTITIONER

## 2024-11-21 PROCEDURE — 36415 COLL VENOUS BLD VENIPUNCTURE: CPT | Performed by: NURSE PRACTITIONER

## 2024-11-21 PROCEDURE — 85027 COMPLETE CBC AUTOMATED: CPT | Performed by: NURSE PRACTITIONER

## 2024-11-21 RX ORDER — ATORVASTATIN CALCIUM 40 MG/1
40 TABLET, FILM COATED ORAL DAILY
Qty: 90 TABLET | Refills: 3 | Status: SHIPPED | OUTPATIENT
Start: 2024-11-21

## 2024-11-21 NOTE — TELEPHONE ENCOUNTER
A refill request has been made to the [employer] assistance program for Tresiba and needles.    [facility] will contact Latoya to set up delivery to home within 10-14 BUSINESS days.     Thank you!    Jamia Kasper   Prescription

## 2024-11-21 NOTE — PATIENT INSTRUCTIONS
PLAN:   1.   Symptomatic therapy suggested: Continue current medications as prescribed.    Increase calcium to 1000mg and 1000 international unit(s) Vit D   Will try Miralax one scoop a day   2.  Orders Placed This Encounter   Medications    apixaban ANTICOAGULANT (ELIQUIS) 5 MG tablet     Sig: Take 1 tablet (5 mg) by mouth 2 times daily.     Dispense:  180 tablet     Refill:  3    atorvastatin (LIPITOR) 40 MG tablet     Sig: Take 1 tablet (40 mg) by mouth daily.     Dispense:  90 tablet     Refill:  3     Orders Placed This Encounter   Procedures    REVIEW OF HEALTH MAINTENANCE PROTOCOL ORDERS    Lipid panel reflex to direct LDL Fasting    Albumin Random Urine Quantitative with Creat Ratio    CBC with platelets    Comprehensive metabolic panel    Hemoglobin A1c    TSH with free T4 reflex       3.  FURTHER TESTING:       - DXA (bone density) scan       - mammogram  Will follow up and/or notify patient of  results via My Chart to determine further need for followup   Follow up office visit in one year for annual health maintenance exam, sooner PRN.   Patient needs to follow up in if no improvement,or sooner if worsening of symptoms or other symptoms develop.        Patient Education   Preventive Care Advice   This is general advice given by our system to help you stay healthy. However, your care team may have specific advice just for you. Please talk to your care team about your preventive care needs.  Nutrition  Eat 5 or more servings of fruits and vegetables each day.  Try wheat bread, brown rice and whole grain pasta (instead of white bread, rice, and pasta).  Get enough calcium and vitamin D. Check the label on foods and aim for 100% of the RDA (recommended daily allowance).  Lifestyle  Exercise at least 150 minutes each week  (30 minutes a day, 5 days a week).  Do muscle strengthening activities 2 days a week. These help control your weight and prevent disease.  No smoking.  Wear sunscreen to prevent skin  cancer.  Have a dental exam and cleaning every 6 months.  Yearly exams  See your health care team every year to talk about:  Any changes in your health.  Any medicines your care team has prescribed.  Preventive care, family planning, and ways to prevent chronic diseases.  Shots (vaccines)   HPV shots (up to age 26), if you've never had them before.  Hepatitis B shots (up to age 59), if you've never had them before.  COVID-19 shot: Get this shot when it's due.  Flu shot: Get a flu shot every year.  Tetanus shot: Get a tetanus shot every 10 years.  Pneumococcal, hepatitis A, and RSV shots: Ask your care team if you need these based on your risk.  Shingles shot (for age 50 and up)  General health tests  Diabetes screening:  Starting at age 35, Get screened for diabetes at least every 3 years.  If you are younger than age 35, ask your care team if you should be screened for diabetes.  Cholesterol test: At age 39, start having a cholesterol test every 5 years, or more often if advised.  Bone density scan (DEXA): At age 50, ask your care team if you should have this scan for osteoporosis (brittle bones).  Hepatitis C: Get tested at least once in your life.  STIs (sexually transmitted infections)  Before age 24: Ask your care team if you should be screened for STIs.  After age 24: Get screened for STIs if you're at risk. You are at risk for STIs (including HIV) if:  You are sexually active with more than one person.  You don't use condoms every time.  You or a partner was diagnosed with a sexually transmitted infection.  If you are at risk for HIV, ask about PrEP medicine to prevent HIV.  Get tested for HIV at least once in your life, whether you are at risk for HIV or not.  Cancer screening tests  Cervical cancer screening: If you have a cervix, begin getting regular cervical cancer screening tests starting at age 21.  Breast cancer scan (mammogram): If you've ever had breasts, begin having regular mammograms starting at  age 40. This is a scan to check for breast cancer.  Colon cancer screening: It is important to start screening for colon cancer at age 45.  Have a colonoscopy test every 10 years (or more often if you're at risk) Or, ask your provider about stool tests like a FIT test every year or Cologuard test every 3 years.  To learn more about your testing options, visit:   .  For help making a decision, visit:   https://bit.ly/rb06514.  Prostate cancer screening test: If you have a prostate, ask your care team if a prostate cancer screening test (PSA) at age 55 is right for you.  Lung cancer screening: If you are a current or former smoker ages 50 to 80, ask your care team if ongoing lung cancer screenings are right for you.  For informational purposes only. Not to replace the advice of your health care provider. Copyright   2023 [employer]. All rights reserved. Clinically reviewed by the [facility] Transitions Program. [employer] [id number] - REV 01/24.  Learning About Activities of Daily Living  What are activities of daily living?     Activities of daily living (ADLs) are the basic self-care tasks you do every day. These include eating, bathing, dressing, and moving around.  As you age, and if you have health problems, you may find that it's harder to do some of these tasks. If so, your doctor can suggest ideas that may help.  To measure what kind of help you may need, your doctor will ask how well you are able to do ADLs. Let your doctor know if there are any tasks that you are having trouble doing. This is an important first step to getting help. And when you have the help you need, you can stay as independent as possible.  How will a doctor assess your ADLs?  Asking about ADLs is part of a routine health checkup your doctor will likely do as you age. Your health check might be done in a doctor's office, in your home, or at a hospital. The goal is to find out if you are having any problems that could make  it hard to care for yourself or that make it unsafe for you to be on your own.  To measure your ADLs, your doctor will ask how hard it is for you to do routine tasks. Your doctor may also want to know if you have changed the way you do a task because of a health problem. Your doctor may watch how you:  Walk back and forth.  Keep your balance while you stand or walk.  Move from sitting to standing or from a bed to a chair.  Button or unbutton a shirt or sweater.  Remove and put on your shoes.  It's common to feel a little worried or anxious if you find you can't do all the things you used to be able to do. Talking with your doctor about ADLs is a way to make sure you're as safe as possible and able to care for yourself as well as you can. You may want to bring a caregiver, friend, or family member to your checkup. They can help you talk to your doctor.  Follow-up care is a key part of your treatment and safety. Be sure to make and go to all appointments, and call your doctor if you are having problems. It's also a good idea to know your test results and keep a list of the medicines you take.  Current as of: October 24, 2023  Content Version: 14.2 2024 [facility] [employer].   Care instructions adapted under license by your healthcare professional. If you have questions about a medical condition or this instruction, always ask your healthcare professional. [employer] disclaims any warranty or liability for your use of this information.    Preventing Falls: Care Instructions  Injuries and health problems such as trouble walking or poor eyesight can increase your risk of falling. So can some medicines. But there are things you can do to help prevent falls. You can exercise to get stronger. You can also arrange your home to make it safer.    Talk to your doctor about the medicines you take. Ask if any of them increase the risk of falls and whether they can be changed or stopped.   Try to exercise  "regularly. It can help improve your strength and balance. This can help lower your risk of falling.         Practice fall safety and prevention.   Wear low-heeled shoes that fit well and give your feet good support. Talk to your doctor if you have foot problems that make this hard.  Carry a cellphone or wear a medical alert device that you can use to call for help.  Use stepladders instead of chairs to reach high objects. Don't climb if you're at risk for falls. Ask for help, if needed.  Wear the correct eyeglasses, if you need them.        Make your home safer.   Remove rugs, cords, clutter, and furniture from walkways.  Keep your house well lit. Use night-lights in hallways and bathrooms.  Install and use sturdy handrails on stairways.  Wear nonskid footwear, even inside. Don't walk barefoot or in socks without shoes.        Be safe outside.   Use handrails, curb cuts, and ramps whenever possible.  Keep your hands free by using a shoulder bag or backpack.  Try to walk in well-lit areas. Watch out for uneven ground, changes in pavement, and debris.  Be careful in the winter. Walk on the grass or gravel when sidewalks are slippery. Use de-icer on steps and walkways. Add non-slip devices to shoes.    Put grab bars and nonskid mats in your shower or tub and near the toilet. Try to use a shower chair or bath bench when bathing.   Get into a tub or shower by putting in your weaker leg first. Get out with your strong side first. Have a phone or medical alert device in the bathroom with you.   Where can you learn more?  Go to https://www.[employer].net/patiented  Enter G117 in the search box to learn more about \"Preventing Falls: Care Instructions.\"  Current as of: July 17, 2023  Content Version: 14.2 2024 [employer].   Care instructions adapted under license by your healthcare professional. If you have questions about a medical condition or this instruction, always ask your healthcare professional. " [employer], [facility] disclaims any warranty or liability for your use of this information.    Bladder Training: Care Instructions  Your Care Instructions     Bladder training is used to treat urge incontinence and stress incontinence. Urge incontinence means that the need to urinate comes on so fast that you can't get to a toilet in time. Stress incontinence means that you leak urine because of pressure on your bladder. For example, it may happen when you laugh, cough, or lift something heavy.  Bladder training can increase how long you can wait before you have to urinate. It can also help your bladder hold more urine. And it can give you better control over the urge to urinate.  It is important to remember that bladder training takes a few weeks to a few months to make a difference. You may not see results right away, but don't give up.  Follow-up care is a key part of your treatment and safety. Be sure to make and go to all appointments, and call your doctor if you are having problems. It's also a good idea to know your test results and keep a list of the medicines you take.  How can you care for yourself at home?  Work with your doctor to come up with a bladder training program that is right for you. You may use one or more of the following methods.  Delayed urination  In the beginning, try to keep from urinating for 5 minutes after you first feel the need to go.  While you wait, take deep, slow breaths to relax. Kegel exercises can also help you delay the need to go to the bathroom.  After some practice, when you can easily wait 5 minutes to urinate, try to wait 10 minutes before you urinate.  Slowly increase the waiting period until you are able to control when you have to urinate.  Scheduled urination  Empty your bladder when you first wake up in the morning.  Schedule times throughout the day when you will urinate.  Start by going to the bathroom every hour, even if you don't need to go.  Slowly increase  "the time between trips to the bathroom.  When you have found a schedule that works well for you, keep doing it.  If you wake up during the night and have to urinate, do it. Apply your schedule to waking hours only.  Kegel exercises  These tighten and strengthen pelvic muscles, which can help you control the flow of urine. (If doing these exercises causes pain, stop doing them and talk with your doctor.) To do Kegel exercises:  Squeeze your muscles as if you were trying not to pass gas. Or squeeze your muscles as if you were stopping the flow of urine. Your belly, legs, and buttocks shouldn't move.  Hold the squeeze for 3 seconds, then relax for 5 to 10 seconds.  Start with 3 seconds, then add 1 second each week until you are able to squeeze for 10 seconds.  Repeat the exercise 10 times a session. Do 3 to 8 sessions a day.  When should you call for help?  Watch closely for changes in your health, and be sure to contact your doctor if:    Your incontinence is getting worse.     You do not get better as expected.   Where can you learn more?  Go to https://www.[employer].net/patiented  Enter V684 in the search box to learn more about \"Bladder Training: Care Instructions.\"  Current as of: November 15, 2023  Content Version: 14.2 2024 [facility] [employer].   Care instructions adapted under license by your healthcare professional. If you have questions about a medical condition or this instruction, always ask your healthcare professional. [employer] disclaims any warranty or liability for your use of this information.       "

## 2024-11-21 NOTE — PROGRESS NOTES
"Preventive Care Visit  [facility] IVET Mcnulty URVASHI Dodd CNP, Family Medicine  Nov 21, 2024      Assessment & Plan     Encounter for Medicare annual wellness exam  - REVIEW OF HEALTH MAINTENANCE PROTOCOL ORDERS  - PRIMARY CARE FOLLOW-UP SCHEDULING    Screening for thyroid disorder  - TSH with free T4 reflex    Screening for disorder of blood and blood-forming organs  - CBC with platelets    Type 1 diabetes mellitus without complication (H)  FOLLOW UP WITH SPECIALIST :Endocrinology\-   Albumin Random Urine Quantitative with Creat Ratio  - Comprehensive metabolic panel  - Hemoglobin A1c    Hyperlipidemia with target LDL less than 100  - The current medical regimen is effective.  Continue current medication regimen unchanged.  - atorvastatin (LIPITOR) 40 MG tablet  Dispense: 90 tablet; Refill: 3  - Lipid panel reflex to direct LDL Fasting  - Comprehensive metabolic panel    Menopause ovarian failure  - DX Bone Density    Encounter for screening mammogram for breast cancer  - MA Screen Bilateral w/Yony    Longstanding persistent atrial fibrillation (H)  - apixaban ANTICOAGULANT (ELIQUIS) 5 MG tablet  Dispense: 180 tablet; Refill: 3  FOLLOW UP WITH SPECIALIST :Cardiology    Myasthenia gravis (H)  FOLLOW UP WITH SPECIALIST :Neurology        Patient has been advised of split billing requirements and indicates understanding: Yes        BMI  Estimated body mass index is 27.23 kg/m  as calculated from the following:    Height as of this encounter: 1.676 m (5' 6\").    Weight as of this encounter: 76.5 kg (168 lb 11.2 oz).       Counseling  Appropriate preventive services were addressed with this patient via screening, questionnaire, or discussion as appropriate for fall prevention, nutrition, physical activity, Tobacco-use cessation, social engagement, weight loss and cognition.  Checklist reviewing preventive services available has been given to the patient.  Reviewed patient's diet, addressing " concerns and/or questions.   Patient reported safety concerns were addressed today.Information on urinary incontinence and treatment options given to patient.       FUTURE APPOINTMENTS:       - Follow-up for annual visit or as needed  See Patient Instructions    Pablito Klein is a 74 year old, presenting for the following:  Physical        11/21/2024     8:07 AM   Additional Questions   Roomed by Galilea REES  Health Care Directive  Patient does not have a Health Care Directive: Patient states has Advance Directive and will bring in a copy to clinic.      11/17/2024   General Health   How would you rate your overall physical health? Good   Feel stress (tense, anxious, or unable to sleep) Not at all            11/17/2024   Nutrition   Diet: Diabetic    Carbohydrate counting    Vegetarian/vegan       Multiple values from one day are sorted in reverse-chronological order         11/17/2024   Exercise   Days per week of moderate/strenous exercise 7 days   Average minutes spent exercising at this level 90 min            11/17/2024   Social Factors   Frequency of gathering with friends or relatives Once a week   Worry food won't last until get money to buy more No   Food not last or not have enough money for food? No   Do you have housing? (Housing is defined as stable permanent housing and does not include staying ouside in a car, in a tent, in an abandoned building, in an overnight shelter, or couch-surfing.) Yes   Are you worried about losing your housing? No   Lack of transportation? No   Unable to get utilities (heat,electricity)? No            11/21/2024   Fall Risk   Gait Speed Test (Document in seconds) 3.1   Gait Speed Test Interpretation Less than or equal to 5.00 seconds - PASS             11/17/2024   Activities of Daily Living- Home Safety   Needs help with the following daily activites None of the above   Safety concerns in the home Throw rugs in the hallway    No grab bars in the bathroom        Multiple values from one day are sorted in reverse-chronological order         11/17/2024   Dental   Dentist two times every year? Yes            11/17/2024   Hearing Screening   Hearing concerns? None of the above            11/17/2024   Driving Risk Screening   Patient/family members have concerns about driving No            11/17/2024   General Alertness/Fatigue Screening   Have you been more tired than usual lately? No            11/17/2024   Urinary Incontinence Screening   Bothered by leaking urine in past 6 months Yes            11/17/2024   TB Screening   Were you born outside of the [place]? No            Today's PHQ-2 Score:       11/20/2024    11:17 AM   PHQ-2 ( 1999 Pfizer)   Q1: Little interest or pleasure in doing things 0    Q2: Feeling down, depressed or hopeless 0    PHQ-2 Score 0    Q1: Little interest or pleasure in doing things Not at all   Q2: Feeling down, depressed or hopeless Not at all   PHQ-2 Score 0       Patient-reported           11/17/2024   Substance Use   Alcohol more than 3/day or more than 7/wk No   Do you have a current opioid prescription? No   How severe/bad is pain from 1 to 10? 0/10 (No Pain)   Do you use any other substances recreationally? No        Social History     Tobacco Use    Smoking status: Never     Passive exposure: Never    Smokeless tobacco: Never   Vaping Use    Vaping status: Never Used   Substance Use Topics    Alcohol use: Yes     Comment: 1 to 2 beers or glasses of wine 1 to 2 times per month    Drug use: No           12/4/2023   LAST FHS-7 RESULTS   1st degree relative breast or ovarian cancer Yes   Any relative bilateral breast cancer No   Any male have breast cancer No   Any ONE woman have BOTH breast AND ovarian cancer No   Any woman with breast cancer before 50yrs No   2 or more relatives with breast AND/OR ovarian cancer No   2 or more relatives with breast AND/OR bowel cancer No           Mammogram Screening - Mammogram every 1-2 years updated in Health  Maintenance based on mutual decision making    ASCVD Risk   The 10-year ASCVD risk score (Brittni PATEL, et al., 2019) is: 29.5%    Values used to calculate the score:      Age: 74 years      Sex: Female      Is Non- : No      Diabetic: Yes      Tobacco smoker: No      Systolic Blood Pressure: 139 mmHg      Is BP treated: No      HDL Cholesterol: 75 mg/dL      Total Cholesterol: 189 mg/dL          Reviewed and updated as needed this visit by Provider                    Past Medical History:   Diagnosis Date    Cataract     Chronic atrial fibrillation (H) 06/08/2022    Infectious arthritis (H) 6/27/2023    Osteoarthritis of carpometacarpal (CMC) joint of both thumbs 11/16/2022    Post-operative nausea and vomiting 1/18/2023    Severe. Recommend TIVA    Type 2 diabetes mellitus with hyperglycemia (H) 06/24/2016     Past Surgical History:   Procedure Laterality Date    ANESTHESIA CARDIOVERSION N/A 9/23/2021    Procedure: ANESTHESIA, FOR CARDIOVERSION@1100;  Surgeon: GENERIC ANESTHESIA PROVIDER;  [facility]    ANESTHESIA OUT OF OR MRI N/A 9/14/2022    Procedure: ANESTHESIA OUT OF OR Magnetic resonance imaging right hip and lumbar @0800;  Surgeon: GENERIC ANESTHESIA PROVIDER;  [facility]: [facility]    ARTHROPLASTY HIP Right 1/18/2023    Procedure: ARTHROPLASTY, HIP, TOTAL RIGHT;  Surgeon: Arnaud Ward MD;  [facility]    COLONOSCOPY N/A 11/6/2024    Procedure: COLONOSCOPY, WITH POLYPECTOMY AND BIOPSY;  Surgeon: Gilda García MD;  Location: [facility]    COLONOSCOPY WITH CO2 INSUFFLATION N/A 11/6/2024    Procedure: Colonoscopy with CO2 insufflation;  Surgeon: Gilda García MD;  Location: [facility]    CYSTOSCOPY, INJECT COLLAGEN, COMBINED N/A 5/11/2021    Procedure: CYSTOSCOPY, WITH PERIURETHRAL BULKING AGENT INJECTION;  Surgeon: Alberto Zhang MD;  Location: [facility] OR    CYSTOSCOPY, INJECT COLLAGEN, COMBINED N/A 9/12/2022    Procedure: CYSTOSCOPY, WITH PERIURETHRAL BULKING  AGENT INJECTION (look in the bladder and urethra and inject filler into the urethra);  Surgeon: Alberto Zhang MD;  Location: [facility]    EYE SURGERY  9/04, 5/11    Retinal detachment, Cataract (both eyes)    IMPLANT STIMULATOR AND LEADS SACRAL NERVE (STAGE ONE AND TWO) Right 7/5/2022    Procedure: INSERTION, SACRAL NERVE STIMULATOR, STAGE 1 & 2 (permanent implant on the RIGHT side with Axonics);  Surgeon: Alberto Zhang MD;  Location: [facility] OR    IMPLANT STIMULATOR SACRAL NERVE PERCUTANEOUS TRIAL N/A 6/14/2022    Procedure: INSERTION, NEUROSTIMULATOR, SACRAL, PERCUTANEOUS, FOR TRIAL;  Surgeon: Alberto Zhang MD;  Location: [facility] OR    RELEASE CARPAL TUNNEL Right 8/6/2021    Procedure: RELEASE, CARPAL TUNNEL, Right;  Surgeon: RADHA Sandoval MD;  [facility]    RELEASE TRIGGER FINGER Bilateral 5/10/2019    Procedure: RELEASE TRIGGER FINGER, BILATERAL LONG AND RING FINGERS;  Surgeon: RADHA Sandoval MD;  [facility]: [facility]    VASCULAR SURGERY      Varicose Veins     Lab work is in process  Labs reviewed in EPIC  BP Readings from Last 3 Encounters:   11/21/24 139/81   11/13/24 115/75   11/06/24 (!) 150/75    Wt Readings from Last 3 Encounters:   11/21/24 76.5 kg (168 lb 11.2 oz)   11/13/24 72.6 kg (160 lb)   08/06/24 71.2 kg (157 lb)                  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)    Type 1 diabetes mellitus with hyperglycemia (H)    Age-related osteoporosis without current pathological fracture    Stress incontinence    Atrophic vaginitis    Intrinsic sphincter deficiency    Overactive bladder    Carpal tunnel syndrome of right wrist    Chronic atrial fibrillation (H)    Osteoarthritis of carpometacarpal (CMC) joint of both thumbs    Thumb pain    Post-operative nausea and vomiting    Type 1 diabetes  mellitus without complication (H)    Osteoarthritis of right hip, unspecified osteoarthritis type    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)    DM type 1 with diabetic peripheral neuropathy (H)     Past Surgical History:   Procedure Laterality Date    ANESTHESIA CARDIOVERSION N/A 9/23/2021    Procedure: ANESTHESIA, FOR CARDIOVERSION@1100;  Surgeon: GENERIC ANESTHESIA PROVIDER;  [facility]    ANESTHESIA OUT OF OR MRI N/A 9/14/2022    Procedure: ANESTHESIA OUT OF OR Magnetic resonance imaging right hip and lumbar @0800;  Surgeon: GENERIC ANESTHESIA PROVIDER;  [facility]    ARTHROPLASTY HIP Right 1/18/2023    Procedure: ARTHROPLASTY, HIP, TOTAL RIGHT;  Surgeon: Arnaud Ward MD;  [facility]    COLONOSCOPY N/A 11/6/2024    Procedure: COLONOSCOPY, WITH POLYPECTOMY AND BIOPSY;  Surgeon: Gilda García MD;  [facility]    COLONOSCOPY WITH CO2 INSUFFLATION N/A 11/6/2024    Procedure: Colonoscopy with CO2 insufflation;  Surgeon: Gilda García MD;  [facility]    CYSTOSCOPY, INJECT COLLAGEN, COMBINED N/A 5/11/2021    Procedure: CYSTOSCOPY, WITH PERIURETHRAL BULKING AGENT INJECTION;  Surgeon: Alberto Zhang MD;  Location: [facility] OR    CYSTOSCOPY, INJECT COLLAGEN, COMBINED N/A 9/12/2022    Procedure: CYSTOSCOPY, WITH PERIURETHRAL BULKING AGENT INJECTION (look in the bladder and urethra and inject filler into the urethra);  Surgeon: Alberto Zhang MD;  [facility]    EYE SURGERY  9/04, 5/11    Retinal detachment, Cataract (both eyes)    IMPLANT STIMULATOR AND LEADS SACRAL NERVE (STAGE ONE AND TWO) Right 7/5/2022    Procedure: INSERTION, SACRAL NERVE STIMULATOR, STAGE 1 & 2 (permanent implant on the RIGHT side with Axonics);  Surgeon: Alberto Zhang MD;  Location: [facility] OR    IMPLANT STIMULATOR SACRAL NERVE PERCUTANEOUS TRIAL N/A 6/14/2022    Procedure: INSERTION, NEUROSTIMULATOR, SACRAL, PERCUTANEOUS, FOR TRIAL;  Surgeon: Alberto Zhang MD;  Location: [facility] OR     RELEASE CARPAL TUNNEL Right 8/6/2021    Procedure: RELEASE, CARPAL TUNNEL, Right;  Surgeon: RADHA Sandoval MD;  [facility]    RELEASE TRIGGER FINGER Bilateral 5/10/2019    Procedure: RELEASE TRIGGER FINGER, BILATERAL LONG AND RING FINGERS;  Surgeon: RADHA Sandoval MD;  [facility]    VASCULAR SURGERY      Varicose Veins       Social History     Tobacco Use    Smoking status: Never     Passive exposure: Never    Smokeless tobacco: Never   Substance Use Topics    Alcohol use: Yes     Comment: 1 to 2 beers or glasses of wine 1 to 2 times per month     Family History   Problem Relation Age of Onset    Hyperlipidemia Mother     Breast Cancer Mother     Other Cancer Father     Leukemia Father     Skin Cancer Father     Coronary Artery Disease Maternal Uncle     Brain Cancer Maternal Uncle     Coronary Artery Disease Maternal Uncle     Stomach Cancer Maternal Aunt     Diabetes No family hx of     Hypertension No family hx of     Cerebrovascular Disease No family hx of     Colon Cancer No family hx of     Thyroid Disease No family hx of     Depression No family hx of     Anxiety Disorder No family hx of          Current Outpatient Medications   Medication Sig Dispense Refill    alendronate (FOSAMAX) 70 MG tablet Take 1 tablet (70 mg) by mouth every 7 days 12 tablet 3    amoxicillin (AMOXIL) 500 MG capsule TAKE 4 CAPS 30-60 MIN BEFORE DENTAL PROCEDURE 4 capsule 2    apixaban ANTICOAGULANT (ELIQUIS) 5 MG tablet Take 1 tablet (5 mg) by mouth 2 times daily. 180 tablet 3    atorvastatin (LIPITOR) 40 MG tablet Take 1 tablet (40 mg) by mouth daily. 90 tablet 3    blood glucose (NO BRAND SPECIFIED) test strip Use to test blood sugar 4 times daily or as directed. Accu Chek Guide test strips. 150 strip 3    calcium carbonate-vitamin D 500-400 MG-UNIT TABS tablt Take 1 tablet by mouth 2 times daily 180 tablet 3    Continuous Blood Gluc Sensor (DEXCOM G7 SENSOR) MISC 1 each every 10 days 9 each 3    estradiol  (ESTRACE) 0.1 MG/GM vaginal cream Place 2 g vaginally twice a week. 42.5 g 11    estradiol (ESTRACE) 0.1 MG/GM vaginal cream INSERT 2G VAGINALLY TWICE PER WEEK 42.5 g 11    insulin aspart (NOVOLOG FLEXPEN) 100 UNIT/ML pen 1 unit per 18 gram of carb for breakfast, 1 unit per 15 grams of carbs for lunch and  1 unit per 13 gram for dinner. Plus 1 unit per 50 mg/dl above 150. Total daily dose approx 30 units. 30 mL 3    insulin glargine (LANTUS PEN) 100 UNIT/ML pen Inject 30 Units subcutaneously every morning. 30 mL 1    insulin pen needle (B-D U/F) 31G X 8 MM miscellaneous USE 5 TIMES DAILY  WITH NOVOLOG AND LANTUS 500 each 1    melatonin 3 MG tablet Take 1 tablet (3 mg) by mouth nightly as needed for sleep 30 tablet 0    Multiple Vitamins-Minerals (MULTIVITAMIN ADULT PO) Take 1 tablet by mouth every morning       predniSONE (DELTASONE) 20 MG tablet Take 0.5 tablets (10 mg) by mouth daily.      pyRIDostigmine (MESTINON) 60 MG tablet Take 30 mg by mouth every 6 hours      Urine Glucose-Ketones Test (KETO-DIASTIX) STRP 1 strip by In Vitro route as needed 1 each 11     No Known Allergies  Current providers sharing in care for this patient include:  Patient Care Team:  Pricila Avila APRN CNP as PCP - General (Family Practice)  Amy Cooper, RN as Registered Nurse  Alberto Zhang MD as MD (Urology)  Karina Hoskins, RN as Specialty Care Coordinator (Urology)  Pricila Avila APRN CNP as Referring Physician (Internal Medicine - Pediatrics)  Pricila Avila APRN CNP as Assigned PCP  Arnaud Ward MD as Assigned Musculoskeletal Provider  Delmis Caraballo, RN as Diabetes Educator  Kayode Cox MD as MD (Neurology)  Sarah Palm PA-C as Assigned Endocrinology Provider  Maria Esther Garcia PA-C as Physician Assistant (Physician Assistant - Medical)  Luis Enrique Vasquez MD as MD (Neurology)  Alberto Zhang MD as MD (Urology)  Maria Esther Garcia PA-C as Assigned Heart and Vascular  Provider    The following health maintenance items are reviewed in Epic and correct as of today:  Health Maintenance   Topic Date Due    RSV VACCINE (1 - Risk 60-74 years 1-dose series) Never done    DIABETIC FOOT EXAM  11/21/2024    A1C  05/21/2025    EYE EXAM  07/29/2025    ANNUAL REVIEW OF HM ORDERS  11/20/2025    MEDICARE ANNUAL WELLNESS VISIT  11/21/2025    BMP  11/21/2025    LIPID  11/21/2025    MICROALBUMIN  11/21/2025    FALL RISK ASSESSMENT  11/21/2025    MAMMO SCREENING  12/04/2025    DTAP/TDAP/TD IMMUNIZATION (2 - Td or Tdap) 09/13/2026    ADVANCE CARE PLANNING  11/21/2029    DEXA  09/06/2037    HEPATITIS C SCREENING  Completed    PHQ-2 (once per calendar year)  Completed    INFLUENZA VACCINE  Completed    Pneumococcal Vaccine: 65+ Years  Completed    ZOSTER IMMUNIZATION  Completed    COVID-19 Vaccine  Completed    HPV IMMUNIZATION  Aged Out    MENINGITIS IMMUNIZATION  Aged Out    RSV MONOCLONAL ANTIBODY  Aged Out    COLORECTAL CANCER SCREENING  Discontinued       CONSTITUTIONAL:NEGATIVE for fever, chills, change in weight  INTEGUMENTARY/SKIN: NEGATIVE for worrisome rashes, moles or lesions  EYES: NEGATIVE for vision changes or irritation  ENT: NEGATIVE for ear, mouth and throat problems  RESP:NEGATIVE for significant cough or SOB  BREAST: NEGATIVE for masses, tenderness or discharge  CV: NEGATIVE for chest pain, palpitations or peripheral edema  GI: POSITIVE for constipation and eats a lot of fiber as she is vegetarian.  and NEGATIVE for jaundice, melena, nausea, poor appetite, and vomiting   menopausal female: amenorrhea, no unusual urinary symptoms, no unusual vaginal symptoms, and vaginal dryness  MUSCULOSKELETAL:NEGATIVE for significant arthralgias or myalgia  NEURO: POSITIVE for myasthenia gravis  and  sees  Dr Melvin at [facility]y NEGATIVE for HX CVA, HX TIA, and HX seizure D/O  ENDOCRINE: NEGATIVE for temperature intolerance, skin/hair changes  HEME/ALLERGY/IMMUNE: NEGATIVE  "for bleeding problems  PSYCHIATRIC: NEGATIVE for changes in mood or affect          Objective    Exam  /81 (BP Location: Right arm, Patient Position: Sitting, Cuff Size: Adult Regular)   Pulse 66   Temp 97.9  F (36.6  C) (Oral)   Resp 16   Ht 1.676 m (5' 6\")   Wt 76.5 kg (168 lb 11.2 oz)   LMP  (LMP Unknown)   SpO2 98%   BMI 27.23 kg/m     Estimated body mass index is 27.23 kg/m  as calculated from the following:    Height as of this encounter: 1.676 m (5' 6\").    Weight as of this encounter: 76.5 kg (168 lb 11.2 oz).    Physical Exam  GENERAL: alert and no distress  EYES: Eyes grossly normal to inspection and conjunctivae and sclerae normal  HENT: ear canals and TM's normal, nose and mouth without ulcers or lesions  NECK: no adenopathy, no asymmetry, masses, or scars  RESP: lungs clear to auscultation - no rales, rhonchi or wheezes  BREAST: normal without masses, tenderness or nipple discharge and no palpable axillary masses or adenopathy  CV: regular rates and rhythm, no murmur, click or rub, peripheral pulses strong, and no peripheral edema  ABDOMEN: soft, nontender, no hepatosplenomegaly, no masses and bowel sounds normal   (female): deferred  MS: no gross musculoskeletal defects noted, no edema  SKIN: no suspicious lesions or rashes  NEURO: Normal strength and tone, mentation intact and speech normal  PSYCH: mentation appears normal, affect normal/bright  LYMPH: no cervical, supraclavicular, axillary, or inguinal adenopathy         11/21/2024   Mini Cog   Clock Draw Score 2 Normal   3 Item Recall 3 objects recalled   Mini Cog Total Score 5                 Signed Electronically by: URVASHI Hunter CNP    "

## 2024-11-24 NOTE — RESULT ENCOUNTER NOTE
Concetta Rodríguez,    Attached are your test results.  -Normal red blood cell (hgb) levels, normal white blood cell count and normal platelet levels.  -Liver and gallbladder tests (ALT,AST, Alk phos,bilirubin) are normal.  -Kidney function (GFR) is normal.  -Sodium is normal.  -Potassium is normal.  -Calcium is normal.  -Glucose is elevated due to your diabetes.  -LDL(bad) cholesterol level is elevated, and your triglycerides are elevated which can increase your heart disease risk.  A diet high in fat and simple carbohydrates, genetics and being overweight can contribute to this. ADVISE: are you taking your cholesterol medications regularly?    -A1C (test of diabetes control the last 2-3 months) is elevated. I have routed you level to your endocrinology provider.  Please recheck your A1C test in 3 months.   -TSH (thyroid stimulating hormone) level is normal which indicates normal thyroid function.  -Microalbumin (urine protein) test is normal.  ADVISE: rechecking this annually.   Please contact us if you have any questions.    Pricila Avila, CNP

## 2024-12-09 NOTE — TELEPHONE ENCOUNTER
Called pt, phone line is busy.    [facility] Call [facility]    Phone Message    May a detailed message be left on voicemail: yes     Reason for Call: Other: Per Caller are will not cover patients blood glucose (ACCU-CHEK MICKY PLUS) test strip. In order for patient to get this RX covered a letter of exception from Doctor needs to be faxed to [phone]     Action Taken: Other: ENDO    Travel Screening: Not Applicable                                                                       Detail Level: Simple Detail Level: Detailed

## 2024-12-24 ENCOUNTER — TELEPHONE (OUTPATIENT)
Dept: FAMILY MEDICINE | Facility: CLINIC | Age: 74
End: 2024-12-24

## 2025-01-07 DIAGNOSIS — E13.9 LADA (LATENT AUTOIMMUNE DIABETES MELLITUS IN ADULTS) (H): ICD-10-CM

## 2025-01-07 DIAGNOSIS — E10.9 TYPE 1 DIABETES MELLITUS WITHOUT COMPLICATION (H): ICD-10-CM

## 2025-01-07 RX ORDER — PEN NEEDLE, DIABETIC 31 GX5/16"
NEEDLE, DISPOSABLE MISCELLANEOUS
Qty: 500 EACH | Refills: 1 | Status: SHIPPED | OUTPATIENT
Start: 2025-01-07

## 2025-01-07 NOTE — TELEPHONE ENCOUNTER
[facility] Call Center    Phone Message    May a detailed message be left on voicemail: yes     Reason for Call: Medication Refill Request    Has the patient contacted the pharmacy for the refill? Yes   Name of medication being requested: insulin aspart (NOVOLOG FLEXPEN) 100 UNIT/ML pen and   Provider who prescribed the medication:   insulin pen needle (B-D U/F) 31G X 8 MM miscellaneous    Pharmacy:    [id number] [address]    Date medication is needed: ASAP  today would be nice  she will run out this week.      Action Taken: Message routed to:  [facility] ENDO    Travel Screening: Not Applicable     Date of Service:

## 2025-01-07 NOTE — TELEPHONE ENCOUNTER
insulin aspart (NOVOLOG FLEXPEN) 100 UNIT/ML pen 30 mL 3 10/24/2024     Last Office Visit : 9/26/24  Future Office visit:  1/30/25    Routing refill request to provider for review/approval because:  Insulin   send to clinic                          insulin pen needle (B-D U/F) 31G X 8 MM miscellaneous 500 each 1 10/24/2024   Last Clinic Visit: 9/26/2024  [facility] Sarah Rudd PA-C  Endocrinology, Diabetes, and Metabolism

## 2025-01-10 ENCOUNTER — TRANSFERRED RECORDS (OUTPATIENT)
Dept: HEALTH INFORMATION MANAGEMENT | Facility: CLINIC | Age: 75
End: 2025-01-10
Payer: COMMERCIAL

## 2025-01-27 ENCOUNTER — OFFICE VISIT (OUTPATIENT)
Dept: ENDOCRINOLOGY | Facility: CLINIC | Age: 75
End: 2025-01-27
Payer: COMMERCIAL

## 2025-01-27 VITALS
SYSTOLIC BLOOD PRESSURE: 144 MMHG | DIASTOLIC BLOOD PRESSURE: 77 MMHG | OXYGEN SATURATION: 97 % | HEART RATE: 71 BPM | BODY MASS INDEX: 26.68 KG/M2 | WEIGHT: 166 LBS | HEIGHT: 66 IN

## 2025-01-27 DIAGNOSIS — Z79.4 TYPE 2 DIABETES MELLITUS WITH HYPERGLYCEMIA, WITH LONG-TERM CURRENT USE OF INSULIN (H): ICD-10-CM

## 2025-01-27 DIAGNOSIS — E11.65 TYPE 2 DIABETES MELLITUS WITH HYPERGLYCEMIA, WITH LONG-TERM CURRENT USE OF INSULIN (H): ICD-10-CM

## 2025-01-27 PROCEDURE — 99214 OFFICE O/P EST MOD 30 MIN: CPT | Performed by: INTERNAL MEDICINE

## 2025-01-27 PROCEDURE — G2211 COMPLEX E/M VISIT ADD ON: HCPCS | Performed by: INTERNAL MEDICINE

## 2025-01-27 RX ORDER — INSULIN ASPART 100 [IU]/ML
INJECTION, SOLUTION INTRAVENOUS; SUBCUTANEOUS
Qty: 45 ML | Refills: 3 | Status: SHIPPED | OUTPATIENT
Start: 2025-01-27

## 2025-01-27 NOTE — NURSING NOTE
"Latoya Rodríguez's goals for this visit include:   Chief Complaint   Patient presents with    Follow Up    Diabetes     She requests these members of her care team be copied on today's visit information: Yes     PCP: Pricila Avila    Referring Provider:  Referred Self, MD  No address on file    BP (!) 144/77 (BP Location: Right arm, Patient Position: Sitting, Cuff Size: Adult Large)   Pulse 71   Ht 1.676 m (5' 6\")   Wt 75.3 kg (166 lb)   LMP  (LMP Unknown)   SpO2 97%   BMI 26.79 kg/m      Do you need any medication refills at today's visit? Needs Novolog     Nell Pena, MILVIA  Adult Endocrinology   [facility]      "

## 2025-01-27 NOTE — PROGRESS NOTES
Chief complaint:  Latoya is a 74 year old female seen in follow up of type 1 Diabetes.  HISTORY OF PRESENT ILLNESS  Latoya is a 74-year-old retiree with a history of type 1 diabetes who has a visit today for a follow-up type 1 diabetes.  She was diagnosed with type 1 diabetes 2016 during routine physical where she was found to have high blood glucose levels.  At that time she was diagnosed and treated as a type II diabetic but it was later determined that she had positive TORRES antibodies and low C-peptide.      Interval history  Was diagnosed with Myasthenia gravis May 2024. Has been on treatment including pyridostigmine and prednisone since.    She is now on prednisone 10 mg daily and also pyridostigmine 60 mg 4 times a day. She follows up with neurologist regularly. Neurologist was trying to reduce prednisone but she developed a flare.    Regarding type 1 diabetes, A1c was 9.0%    Current regimen   Lantus 30 units in the morning   Novolog 1:15 g with breakfast, 1 unit per 15 gram at lunch, and 1 unit per 15 gram at dinner  Novolog correction 1:50 if BG is >150 at meal time, Novolog correction 1:50 if BG is >200 at bedtime    She is currently using Dexcom G6 to track glucose levels. Reviewed BG in the past 4 weeks, sensor usage 98%.  Average glucose 287 (SD 87).  Time in range 13%.  Time above range 87%, time below range 0%.  She has high BG all day with high peak after meals.     Patient has high risk of fracture, her mother had history of osteoporosis. DXA 9/6/2022 showed the T score -2 at the lumbar spine, T score -1.6 at the left femoral neck, -1.2 at the right femoral neck and -1.1 at the wrist.  Started on Fosamax 70 mg weekly since April 2023 due to high FRAX. She is due for DXA.    Past Medical History  Type 1 diabetes, diagnosed 6/2016  Hyperlipidemia  Osteoporosis     Medications  Current Outpatient Medications   Medication Sig Dispense Refill    alendronate (FOSAMAX) 70 MG tablet Take 1 tablet (70 mg) by  mouth every 7 days 12 tablet 3    amoxicillin (AMOXIL) 500 MG capsule TAKE 4 CAPS 30-60 MIN BEFORE DENTAL PROCEDURE 4 capsule 2    apixaban ANTICOAGULANT (ELIQUIS) 5 MG tablet Take 1 tablet (5 mg) by mouth 2 times daily. 180 tablet 3    atorvastatin (LIPITOR) 40 MG tablet Take 1 tablet (40 mg) by mouth daily. 90 tablet 3    blood glucose (NO BRAND SPECIFIED) test strip Use to test blood sugar 4 times daily or as directed. Accu Chek Guide test strips. 150 strip 3    calcium carbonate-vitamin D 500-400 MG-UNIT TABS tablt Take 1 tablet by mouth 2 times daily 180 tablet 3    Continuous Blood Gluc Sensor (DEXCOM G7 SENSOR) MISC 1 each every 10 days 9 each 3    estradiol (ESTRACE) 0.1 MG/GM vaginal cream Place 2 g vaginally twice a week. 42.5 g 11    estradiol (ESTRACE) 0.1 MG/GM vaginal cream INSERT 2G VAGINALLY TWICE PER WEEK 42.5 g 11    insulin aspart (NOVOLOG FLEXPEN) 100 UNIT/ML pen 1 unit per 18 gram of carb for breakfast, 1 unit per 15 grams of carbs for lunch and  1 unit per 13 gram for dinner. Plus 1 unit per 50 mg/dl above 150. Total daily dose approx 30 units. 30 mL 3    insulin glargine (LANTUS PEN) 100 UNIT/ML pen Inject 30 Units subcutaneously every morning. 30 mL 1    insulin pen needle (B-D U/F) 31G X 8 MM miscellaneous USE 5 TIMES DAILY  WITH NOVOLOG AND LANTUS 500 each 1    melatonin 3 MG tablet Take 1 tablet (3 mg) by mouth nightly as needed for sleep 30 tablet 0    Multiple Vitamins-Minerals (MULTIVITAMIN ADULT PO) Take 1 tablet by mouth every morning       predniSONE (DELTASONE) 20 MG tablet Take 0.5 tablets (10 mg) by mouth daily.      pyRIDostigmine (MESTINON) 60 MG tablet Take 30 mg by mouth every 6 hours      Urine Glucose-Ketones Test (KETO-DIASTIX) STRP 1 strip by In Vitro route as needed 1 each 11     Allergies  No Known Allergies    Family History  family history includes Brain Cancer in her maternal uncle; Breast Cancer in her mother; Coronary Artery Disease in her maternal uncle and  maternal uncle; Hyperlipidemia in her mother; Leukemia in her father; Other Cancer in her father; Skin Cancer in her father; Stomach Cancer in her maternal aunt.  No thyroid disease or type 1 diabetes in the family    Social History  Social History     Socioeconomic History    Marital status: Single     Spouse name: Not on file    Number of children: Not on file    Years of education: Not on file    Highest education level: Not on file   Occupational History    Not on file   Tobacco Use    Smoking status: Never     Passive exposure: Never    Smokeless tobacco: Never   Vaping Use    Vaping status: Never Used   Substance and Sexual Activity    Alcohol use: Yes     Comment: 1 to 2 beers or glasses of wine 1 to 2 times per month    Drug use: No    Sexual activity: Not Currently     Partners: Female     Birth control/protection: Post-menopausal   Other Topics Concern    Parent/sibling w/ CABG, MI or angioplasty before 65F 55M? No   Social History Narrative    Not on file     Social Drivers of Health     Financial Resource Strain: Low Risk  (11/17/2024)    Financial Resource Strain     Within the past 12 months, have you or your family members you live with been unable to get utilities (heat, electricity) when it was really needed?: No   Food Insecurity: Low Risk  (11/17/2024)    Food Insecurity     Within the past 12 months, did you worry that your food would run out before you got money to buy more?: No     Within the past 12 months, did the food you bought just not last and you didn t have money to get more?: No   Transportation Needs: Low Risk  (11/17/2024)    Transportation Needs     Within the past 12 months, has lack of transportation kept you from medical appointments, getting your medicines, non-medical meetings or appointments, work, or from getting things that you need?: No   Physical Activity: Sufficiently Active (11/17/2024)    Exercise Vital Sign     Days of Exercise per Week: 7 days     Minutes of Exercise  "per Session: 90 min   Stress: No Stress Concern Present (11/17/2024)    [employer] - Occupational Stress Questionnaire     Feeling of Stress : Not at all   Social Connections: Unknown (11/17/2024)    Social Connection and Isolation Panel [NHANES]     Frequency of Communication with Friends and Family: Not on file     Frequency of Social Gatherings with Friends and Family: Once a week     Attends Sikh Services: Not on file     Active Member of Clubs or Organizations: Not on file     Attends Club or Organization Meetings: Not on file     Marital Status: Not on file   Interpersonal Safety: Low Risk  (11/21/2024)    Interpersonal Safety     Do you feel physically and emotionally safe where you currently live?: Yes     Within the past 12 months, have you been hit, slapped, kicked or otherwise physically hurt by someone?: No     Within the past 12 months, have you been humiliated or emotionally abused in other ways by your partner or ex-partner?: No   Housing Stability: Low Risk  (11/17/2024)    Housing Stability     Do you have housing? : Yes     Are you worried about losing your housing?: No       REVIEW OF SYSTEMS  10 points ROS were negative otherwise mentioned in HPI    Physical Exam  Vital signs:   BP (!) 144/77 (BP Location: Right arm, Patient Position: Sitting, Cuff Size: Adult Large)   Pulse 71   Ht 1.676 m (5' 6\")   Wt 75.3 kg (166 lb)   LMP  (LMP Unknown)   SpO2 97%   BMI 26.79 kg/m    Estimated body mass index is 26.79 kg/m  as calculated from the following:    Height as of this encounter: 1.676 m (5' 6\").    Weight as of this encounter: 75.3 kg (166 lb).  Constitutional: no distress, comfortable, pleasant   Eyes: anicteric  Musculoskeletal:+2 edema after a fall  Skin:  no jaundice   Neurological: normal gait, intact monofilament test bilaterally 1/7/2025, no tremor on outstretched hands bilaterally  Psychological: appropriate mood     DATA REVIEW  Lab Results "   Component Value Date    A1C 9.0 11/21/2024    A1C 9.1 09/26/2024    A1C 7.6 05/21/2024    A1C 7.8 11/24/2023    A1C 8.6 06/19/2023    A1C 6.9 12/23/2022    A1C 8.0 09/06/2022    A1C 7.9 05/02/2022    A1C 7.6 11/15/2021    A1C 6.7 07/01/2021    A1C 8.4 02/10/2021      Latest Ref Rng 11/21/2024  9:01 AM   ENDO DIABETES     ALT 0 - 50 U/L 27    AST 0 - 45 U/L 30    Cholesterol <200 mg/dL 232 (H)    LDL Cholesterol Calculated <100 mg/dL 132 (H)    HDL Cholesterol >=50 mg/dL 87    Non HDL Cholesterol <130 mg/dL 145 (H)    Triglycerides <150 mg/dL 67    TRIG (EXT) <150 mg/dL 67    Creatinine 0.51 - 0.95 mg/dL 0.63    Hematocrit 35.0 - 47.0 % 39.9    Hemoglobin 11.7 - 15.7 g/dL 13.2    Albumin Urine mg/L mg/L    Albumin Urine mg/L mg/L <12.0       DXA 8/28/2020  Lumbar spine T-score in region of L2-L4 = -2.4   L1-4 percent change: Not significant%      HIPS:  Mean total hip T-score: -1  Mean total hip percent change: Not significant%      Left femoral neck T-score = -1.9  Right femoral neck T-score = -1.6      Radius 33% T-score = -1.6     FRAX:  10 year probability of major osteoporotic fracture: 11.3%  10 year probability of hip fracture: 2.1%  The 10 year probability of fracture may be lower than reported if the  patient has received treatment. FRAX data should be disregarded in patient's taking bisphosphonates.                                                                 IMPRESSION:    Low bone mass (osteopenia).    DXA 9/6/2022  Lumbar spine T-score in region of L1-L4 = -2   L1-4 percent change: 3.7%      HIPS:  Mean total hip T-score: -1.1  Mean total hip percent change: Not significant%      Left femoral neck T-score = -1.6  Right femoral neck T-score = -1.2      Radius 33% T-score = -1.1  Radius 33% percent change: Not significant%      FRAX:  10 year probability of major osteoporotic fracture: 17.2%  10 year probability of hip fracture: 5.4%  The 10 year probability of fracture may be lower than reported if  the patient has received treatment. FRAX data should be disregarded in patient's taking bisphosphonates.                                                                    IMPRESSION:    Low bone mass (osteopenia).     ASSESSMENT/PLAN:   Latoya is a 73-year-old retiree with a history of type 1 diabetes who is here for T1 diabetes management.    1.  Type 1 diabetes: A1c is 9.0% today. Latoya's blood glucose readings has been very high due to prednisone. Pattern showed postprandial hyperglycemia. No hypoglycemia.     Recommendations:  -Increase Lantus 32 units at bedtime  -Novolog 1 unit per 10 gram at breakfast  -Novolog 1 unit per 10 gram at lunch  -Novolog 1 unit per 10 gram at dinner  - Novolog correction 2 units per 50 if BG is >150 at meal time, Novolog correction 2 units per 50 if BG is >200 at bedtime    2) DM complications:  Retinopathy: normal exam - no DR July 2024   Nephropathy: negative urine microalbumin 11/2024  Neuropathy: none per exam 1/2025     3) Hyperlipidemia: , HDL 75, TG 56, total cholesterol 189 on 11/2023. Continue Lipitor 10 mg daily.    4) Osteopenia: DXA 9/6/2022 showed the T score -2 at the lumbar spine, T score -1.6 at the left femoral neck, -1.2 at the right femoral neck and -1.1 at the wrist.    10 year probability of major osteoporotic fracture: 17.2%  10 year probability of hip fracture: 5.4%    Has been on on Fosamax 70 mg weekly since April 2023.  Continue calcium and vitamin D  Follow up with PCP for osteoporosis.   Should have repeat DXA this year    Plan:  -Follow-up with CDE 2 weeks  -Follow-up in 3 months with PA or MD Edward Aguero MD  Division of Diabetes and Endocrinology  Department of Medicine

## 2025-01-27 NOTE — PATIENT INSTRUCTIONS
Lantus 32 units in the morning   Novolog 1 unit per 10 g with breakfast, 1 unit per 10 gram at lunch, and 1 unit per 10 gram at dinner  Novolog correction 2 units per 50 if BG is >150 at meal time, Novolog correction 2 units per 50 if BG is >200 at bedtime    See CDE in 2 week and every few week if possible  See MD or PA in 3 months        Welcome to the [facility]     Our Endocrinology Clinics are here to provide you with a team-based, collaborative approach in the diagnosis and treatment of patients with diabetes and endocrine disorders. The team is made up of Physicians, Physician Assistants, Certified Diabetes Educators, Registered Nurses, Medical Assistants, Emergency Medical Technicians, and many others, all of whom have the unified goal of providing our patients with high quality care.     Please see below for some helpful tips to best navigate and use the [facility]:     Independence Respect: At [facility], we are committed to a respectful and safe space for all patients, visitors, and staff.  We believe that mutual respect between patients and their care team is the foundation of quality care.  It is our expectation that you will be treated with respect by your care team.  In turn, we ask that all communication with the care team (written and verbal) be respectful and free from profanity, threatening, or abusive language.  Disrespectful communication undermines our therapeutic relationship with you and may result in us being unable to continue to provide your care.    Refills: A provider must see you at least annually to prescribe and refill medications. This is to ensure your safety as well as meet insurance and compliance regulations.    Scheduling: Many of our Providers offer both in-person or video visits. Please call to schedule any needed follow ups as soon as possible because our provider schedules fill up very quickly. Our care  team has the right to require an in-person visit when they believe that it is medically necessary. Please remember that for any virtual visits, you must be in the state of [place] at the time of the visit, otherwise we are unable to see you and you will need to be rescheduled.    Missed Appointments: If you need to cancel or miss your scheduled appointment, please call the clinic at [phone] to reschedule.  Please note if you repeatedly miss appointments or repeatedly miss appointments without calling to inform us ahead of time (no-show), the clinic may elect to not allow you to reschedule without speaking to a manager, may require a Partnership In Care Agreement prior to rescheduling, or could result in you no longer being able to receive care from the clinic. Providing the clinic with timely notification if you have to miss an appointment, allows us to better serve the needs of all of our patients.    Primary Care Provider: Our Endocrinologists are Specialists in their field. We expect you to have a Primary Care Provider established to handle any needs outside of your diabetes and endocrine care.  We would be happy to assist you find a Primary Care Provider, if you do not have one.    Runtastic: [employer] is a wonderful resource that allows you access to your Care Team via online or the daniel. Please ask a member of the team if you would like help creating an account. Please note that it may take up to 2 business days for a response. [employer] messages are not reviewed on weekends or after business hours.  Emergent or urgent care needs should never be communicated via [employer].  If you experience a medical emergency call 911 or go to the nearest emergency room.    Labs: It is recommended that you stay within the [facility] for labs but you are welcome to obtain ordered labs (with some exceptions) from any location of your choice as long as they are able to complete and process the needed labs. If you need  us to fax orders to your preferred lab, please provide us the name and fax number of the lab you would like to go to so we can fax the orders. If your labs are drawn outside of the [facility], please have them fax the results to [phone] ([place]) or [phone] ([place]) or via [facility]. It is your responsibility to ensure that outside lab results are sent to us.    We look forward to working with you. Please do not hesitate to reach out with any questions.    Thank you,    The Endocrine Team    [facility] Address:   [place] Address:     [address] [place] [address]    Phone: [phone]  Fax: [phone]   [address] [place] [address]    Phone: [phone]  Fax: [phone]     [facility] Cost Estimate Phone Number: [phone]    General Lab and Imaging Scheduling Phone Number: [phone]      If you are interested in exploring research opportunities in the Division of Diabetes, Endocrinology and Metabolism and within the [facility] you are welcome to view the following QR codes by scanning them using your phone's camera to access a link to more information.  Participating in a research study is voluntary. Your decision whether or not to participate will not affect your current or future relations with the [facility] or [facility]. Current available studies are:     Type 1 Diabetes  Adults     Pediatrics     Type 2 Diabetes  Weight Loss - People Treated with Diet or Metformin Only     Pediatrics and Young Adults

## 2025-01-27 NOTE — LETTER
1/27/2025      Latoya Rodríguez  [address] [place] [address]      Dear Colleague,    Thank you for referring your patient, Latoya Rodríguez, to the [facility]. Please see a copy of my visit note below.          Chief complaint:  Latoya is a 74 year old female seen in follow up of type 1 Diabetes.  HISTORY OF PRESENT ILLNESS  Latoya is a 74-year-old retiree with a history of type 1 diabetes who has a visit today for a follow-up type 1 diabetes.  She was diagnosed with type 1 diabetes 2016 during routine physical where she was found to have high blood glucose levels.  At that time she was diagnosed and treated as a type II diabetic but it was later determined that she had positive TORRES antibodies and low C-peptide.      Interval history  Was diagnosed with Myasthenia gravis May 2024. Has been on treatment including pyridostigmine and prednisone since.    She is now on prednisone 10 mg daily and also pyridostigmine 60 mg 4 times a day. She follows up with neurologist regularly. Neurologist was trying to reduce prednisone but she developed a flare.    Regarding type 1 diabetes, A1c was 9.0%    Current regimen   Lantus 30 units in the morning   Novolog 1:15 g with breakfast, 1 unit per 15 gram at lunch, and 1 unit per 15 gram at dinner  Novolog correction 1:50 if BG is >150 at meal time, Novolog correction 1:50 if BG is >200 at bedtime    She is currently using Dexcom G6 to track glucose levels. Reviewed BG in the past 4 weeks, sensor usage 98%.  Average glucose 287 (SD 87).  Time in range 13%.  Time above range 87%, time below range 0%.  She has high BG all day with high peak after meals.     Patient has high risk of fracture, her mother had history of osteoporosis. DXA 9/6/2022 showed the T score -2 at the lumbar spine, T score -1.6 at the left femoral neck, -1.2 at the right femoral neck and -1.1 at the wrist.  Started on Fosamax 70 mg weekly since April 2023 due to high FRAX.  She is due for DXA.    Past Medical History  Type 1 diabetes, diagnosed 6/2016  Hyperlipidemia  Osteoporosis     Medications  Current Outpatient Medications   Medication Sig Dispense Refill    alendronate (FOSAMAX) 70 MG tablet Take 1 tablet (70 mg) by mouth every 7 days 12 tablet 3    amoxicillin (AMOXIL) 500 MG capsule TAKE 4 CAPS 30-60 MIN BEFORE DENTAL PROCEDURE 4 capsule 2    apixaban ANTICOAGULANT (ELIQUIS) 5 MG tablet Take 1 tablet (5 mg) by mouth 2 times daily. 180 tablet 3    atorvastatin (LIPITOR) 40 MG tablet Take 1 tablet (40 mg) by mouth daily. 90 tablet 3    blood glucose (NO BRAND SPECIFIED) test strip Use to test blood sugar 4 times daily or as directed. Accu Chek Guide test strips. 150 strip 3    calcium carbonate-vitamin D 500-400 MG-UNIT TABS tablt Take 1 tablet by mouth 2 times daily 180 tablet 3    Continuous Blood Gluc Sensor (DEXCOM G7 SENSOR) MISC 1 each every 10 days 9 each 3    estradiol (ESTRACE) 0.1 MG/GM vaginal cream Place 2 g vaginally twice a week. 42.5 g 11    estradiol (ESTRACE) 0.1 MG/GM vaginal cream INSERT 2G VAGINALLY TWICE PER WEEK 42.5 g 11    insulin aspart (NOVOLOG FLEXPEN) 100 UNIT/ML pen 1 unit per 18 gram of carb for breakfast, 1 unit per 15 grams of carbs for lunch and  1 unit per 13 gram for dinner. Plus 1 unit per 50 mg/dl above 150. Total daily dose approx 30 units. 30 mL 3    insulin glargine (LANTUS PEN) 100 UNIT/ML pen Inject 30 Units subcutaneously every morning. 30 mL 1    insulin pen needle (B-D U/F) 31G X 8 MM miscellaneous USE 5 TIMES DAILY  WITH NOVOLOG AND LANTUS 500 each 1    melatonin 3 MG tablet Take 1 tablet (3 mg) by mouth nightly as needed for sleep 30 tablet 0    Multiple Vitamins-Minerals (MULTIVITAMIN ADULT PO) Take 1 tablet by mouth every morning       predniSONE (DELTASONE) 20 MG tablet Take 0.5 tablets (10 mg) by mouth daily.      pyRIDostigmine (MESTINON) 60 MG tablet Take 30 mg by mouth every 6 hours      Urine Glucose-Ketones Test  (KETO-DIASTIX) STRP 1 strip by In Vitro route as needed 1 each 11     Allergies  No Known Allergies    Family History  family history includes Brain Cancer in her maternal uncle; Breast Cancer in her mother; Coronary Artery Disease in her maternal uncle and maternal uncle; Hyperlipidemia in her mother; Leukemia in her father; Other Cancer in her father; Skin Cancer in her father; Stomach Cancer in her maternal aunt.  No thyroid disease or type 1 diabetes in the family    Social History  Social History     Socioeconomic History    Marital status: Single     Spouse name: Not on file    Number of children: Not on file    Years of education: Not on file    Highest education level: Not on file   Occupational History    Not on file   Tobacco Use    Smoking status: Never     Passive exposure: Never    Smokeless tobacco: Never   Vaping Use    Vaping status: Never Used   Substance and Sexual Activity    Alcohol use: Yes     Comment: 1 to 2 beers or glasses of wine 1 to 2 times per month    Drug use: No    Sexual activity: Not Currently     Partners: Female     Birth control/protection: Post-menopausal   Other Topics Concern    Parent/sibling w/ CABG, MI or angioplasty before 65F 55M? No   Social History Narrative    Not on file     Social Drivers of Health     Financial Resource Strain: Low Risk  (11/17/2024)    Financial Resource Strain     Within the past 12 months, have you or your family members you live with been unable to get utilities (heat, electricity) when it was really needed?: No   Food Insecurity: Low Risk  (11/17/2024)    Food Insecurity     Within the past 12 months, did you worry that your food would run out before you got money to buy more?: No     Within the past 12 months, did the food you bought just not last and you didn t have money to get more?: No   Transportation Needs: Low Risk  (11/17/2024)    Transportation Needs     Within the past 12 months, has lack of transportation kept you from medical  "appointments, getting your medicines, non-medical meetings or appointments, work, or from getting things that you need?: No   Physical Activity: Sufficiently Active (11/17/2024)    Exercise Vital Sign     Days of Exercise per Week: 7 days     Minutes of Exercise per Session: 90 min   Stress: No Stress Concern Present (11/17/2024)    [employer] - Occupational Stress Questionnaire     Feeling of Stress : Not at all   Social Connections: Unknown (11/17/2024)    Social Connection and Isolation Panel [NHANES]     Frequency of Communication with Friends and Family: Not on file     Frequency of Social Gatherings with Friends and Family: Once a week     Attends [employer] Services: Not on file     Active Member of Clubs or Organizations: Not on file     Attends Club or Organization Meetings: Not on file     Marital Status: Not on file   Interpersonal Safety: Low Risk  (11/21/2024)    Interpersonal Safety     Do you feel physically and emotionally safe where you currently live?: Yes     Within the past 12 months, have you been hit, slapped, kicked or otherwise physically hurt by someone?: No     Within the past 12 months, have you been humiliated or emotionally abused in other ways by your partner or ex-partner?: No   Housing Stability: Low Risk  (11/17/2024)    Housing Stability     Do you have housing? : Yes     Are you worried about losing your housing?: No       REVIEW OF SYSTEMS  10 points ROS were negative otherwise mentioned in HPI    Physical Exam  Vital signs:   BP (!) 144/77 (BP Location: Right arm, Patient Position: Sitting, Cuff Size: Adult Large)   Pulse 71   Ht 1.676 m (5' 6\")   Wt 75.3 kg (166 lb)   LMP  (LMP Unknown)   SpO2 97%   BMI 26.79 kg/m    Estimated body mass index is 26.79 kg/m  as calculated from the following:    Height as of this encounter: 1.676 m (5' 6\").    Weight as of this encounter: 75.3 kg (166 lb).  Constitutional: no distress, comfortable, pleasant "   Eyes: anicteric  Musculoskeletal:+2 edema after a fall  Skin:  no jaundice   Neurological: normal gait, intact monofilament test bilaterally 1/7/2025, no tremor on outstretched hands bilaterally  Psychological: appropriate mood     DATA REVIEW  Lab Results   Component Value Date    A1C 9.0 11/21/2024    A1C 9.1 09/26/2024    A1C 7.6 05/21/2024    A1C 7.8 11/24/2023    A1C 8.6 06/19/2023    A1C 6.9 12/23/2022    A1C 8.0 09/06/2022    A1C 7.9 05/02/2022    A1C 7.6 11/15/2021    A1C 6.7 07/01/2021    A1C 8.4 02/10/2021      Latest Ref Rng 11/21/2024  9:01 AM   ENDO DIABETES     ALT 0 - 50 U/L 27    AST 0 - 45 U/L 30    Cholesterol <200 mg/dL 232 (H)    LDL Cholesterol Calculated <100 mg/dL 132 (H)    HDL Cholesterol >=50 mg/dL 87    Non HDL Cholesterol <130 mg/dL 145 (H)    Triglycerides <150 mg/dL 67    TRIG (EXT) <150 mg/dL 67    Creatinine 0.51 - 0.95 mg/dL 0.63    Hematocrit 35.0 - 47.0 % 39.9    Hemoglobin 11.7 - 15.7 g/dL 13.2    Albumin Urine mg/L mg/L    Albumin Urine mg/L mg/L <12.0       DXA 8/28/2020  Lumbar spine T-score in region of L2-L4 = -2.4   L1-4 percent change: Not significant%      HIPS:  Mean total hip T-score: -1  Mean total hip percent change: Not significant%      Left femoral neck T-score = -1.9  Right femoral neck T-score = -1.6      Radius 33% T-score = -1.6     FRAX:  10 year probability of major osteoporotic fracture: 11.3%  10 year probability of hip fracture: 2.1%  The 10 year probability of fracture may be lower than reported if the  patient has received treatment. FRAX data should be disregarded in patient's taking bisphosphonates.                                                                 IMPRESSION:    Low bone mass (osteopenia).    DXA 9/6/2022  Lumbar spine T-score in region of L1-L4 = -2   L1-4 percent change: 3.7%      HIPS:  Mean total hip T-score: -1.1  Mean total hip percent change: Not significant%      Left femoral neck T-score = -1.6  Right femoral neck T-score =  -1.2      Radius 33% T-score = -1.1  Radius 33% percent change: Not significant%      FRAX:  10 year probability of major osteoporotic fracture: 17.2%  10 year probability of hip fracture: 5.4%  The 10 year probability of fracture may be lower than reported if the patient has received treatment. FRAX data should be disregarded in patient's taking bisphosphonates.                                                                    IMPRESSION:    Low bone mass (osteopenia).     ASSESSMENT/PLAN:   Latoya is a 73-year-old retiree with a history of type 1 diabetes who is here for T1 diabetes management.    1.  Type 1 diabetes: A1c is 9.0% today. Latoya's blood glucose readings has been very high due to prednisone. Pattern showed postprandial hyperglycemia. No hypoglycemia.     Recommendations:  -Increase Lantus 32 units at bedtime  -Novolog 1 unit per 10 gram at breakfast  -Novolog 1 unit per 10 gram at lunch  -Novolog 1 unit per 10 gram at dinner  - Novolog correction 2 units per 50 if BG is >150 at meal time, Novolog correction 2 units per 50 if BG is >200 at bedtime    2) DM complications:  Retinopathy: normal exam - no DR July 2024   Nephropathy: negative urine microalbumin 11/2024  Neuropathy: none per exam 1/2025     3) Hyperlipidemia: , HDL 75, TG 56, total cholesterol 189 on 11/2023. Continue Lipitor 10 mg daily.    4) Osteopenia: DXA 9/6/2022 showed the T score -2 at the lumbar spine, T score -1.6 at the left femoral neck, -1.2 at the right femoral neck and -1.1 at the wrist.    10 year probability of major osteoporotic fracture: 17.2%  10 year probability of hip fracture: 5.4%    Has been on on Fosamax 70 mg weekly since April 2023.  Continue calcium and vitamin D  Follow up with PCP for osteoporosis.   Should have repeat DXA this year    Plan:  -Follow-up with CDE 2 weeks  -Follow-up in 3 months with PA or MD          Again, thank you for allowing me to participate in the care of your patient.       Sincerely,    Edward Aguero MD    Electronically signed

## 2025-01-28 ENCOUNTER — DOCUMENTATION ONLY (OUTPATIENT)
Dept: ENDOCRINOLOGY | Facility: CLINIC | Age: 75
End: 2025-01-28
Payer: COMMERCIAL

## 2025-01-28 NOTE — PROGRESS NOTES
Type of form:  Patient Assistance Re-Enrollment  From:   Sheree Nordisk  Fax:  [phone]  Supplies requested on form:  Novolog insulin  Provider: Edward Aguero   Date provider will be in clinic to sign:  01/27/2025  Label form and completed to the best of this writers ability.      Given to provider in office.     Due to lateness of day encounter completed next day.     Nell Pena CMA  Adult Endocrinology   [facility]

## 2025-01-28 NOTE — PROGRESS NOTES
Provider reviewed and signed  Faxed back to: [phone]  See image below for Timestamp confirmation of successful transmission.           Placed in Sheree PAP file.      Nell Pena CMA  Adult Endocrinology   [facility]

## 2025-01-30 ENCOUNTER — MYC MEDICAL ADVICE (OUTPATIENT)
Dept: FAMILY MEDICINE | Facility: CLINIC | Age: 75
End: 2025-01-30

## 2025-01-30 NOTE — TELEPHONE ENCOUNTER
Recommended pt be seen within 2 weeks, per protocol. Gave instructions via [facility] for scheduling either virtual or E-visit. Advised to call if any other questions or concerns.    Sarah Wiley RN, BSN  [facility] Triage

## 2025-02-07 ENCOUNTER — LAB (OUTPATIENT)
Dept: LAB | Facility: CLINIC | Age: 75
End: 2025-02-07
Payer: COMMERCIAL

## 2025-02-07 ENCOUNTER — VIRTUAL VISIT (OUTPATIENT)
Dept: FAMILY MEDICINE | Facility: CLINIC | Age: 75
End: 2025-02-07
Payer: COMMERCIAL

## 2025-02-07 DIAGNOSIS — R19.7 DIARRHEA, UNSPECIFIED TYPE: ICD-10-CM

## 2025-02-07 DIAGNOSIS — E10.65 TYPE 1 DIABETES MELLITUS WITH HYPERGLYCEMIA (H): ICD-10-CM

## 2025-02-07 DIAGNOSIS — R19.7 DIARRHEA, UNSPECIFIED TYPE: Primary | ICD-10-CM

## 2025-02-07 PROCEDURE — 98013 SYNCH AUDIO-ONLY EST LOW 20: CPT | Performed by: NURSE PRACTITIONER

## 2025-02-07 NOTE — PATIENT INSTRUCTIONS
PLAN:   1.   Symptomatic therapy suggested: will start on metamucil 1 scoop or 2 capsules a day.  2.  Orders Placed This Encounter   Procedures    C. difficile Toxin B PCR with reflex to C. difficile EIA    Enteric Bacteria and Virus Panel PCR     3.  Will follow up and/or notify patient of  results via My Chart to determine further need for followup    Patient needs to follow up in if no improvement,or sooner if worsening of symptoms or other symptoms develop.

## 2025-02-07 NOTE — PROGRESS NOTES
"  If patient has telephone visit, have they been educated on video visit as preferred visit method and offered to change to video visit? NOT APPLICABLE        Instructions Relayed to Patient by Virtual Roomer:     Patient is active on kooldiner:   Relayed following to patient: \"It looks like you are active on kooldiner, are you able to join the visit this way? If not, do you need us to send you a link now or would you like your provider to send a link via text or email when they are ready to initiate the visit?\"      Patient Confirmed they will join visit via: FlockTAG  Reminded patient to ensure they were logged on to virtual visit by arrival time listed.   Asked if patient has flexibility to initiate visit sooner than arrival time: patient stated yes, documented in appointment notes availability to initiate visit earlier than arrival time     If pediatric virtual visit, ensured pediatric patient along with parent/guardian will be present for video visit.     Patient offered the website [email or web address] and/or phone number to kooldiner Help line: [phone]      Answers submitted by the patient for this visit:  General Questionnaire (Submitted on 2/5/2025)  Chief Complaint: Chronic problems general questions HPI Form  General Concern (Submitted on 2/5/2025)  Chief Complaint: Chronic problems general questions HPI Form  What is the reason for your visit today?: bouts of diarrhea  How would you describe these symptoms?: Severe  Are your symptoms:: Worsening  Have you had these symptoms before?: Yes  Have you tried or received treatment for these symptoms before?: No  Is there anything that makes you feel worse?: Walking my dog  Questionnaire about: Chronic problems general questions HPI Form (Submitted on 2/5/2025)  Chief Complaint: Chronic problems general questions HPI Form  Latoya is a 74 year old who is being evaluated via a billable telephone visit.    What phone number would you like to be " contacted at? [phone]   How would you like to obtain your AVS? L[employer]hart  Originating Location (pt. Location): Home    Distant Location (provider location):  On-site  Telephone visit completed due to the patient did not consent to a video visit.        Pablito Klein is a 74 year old, presenting for the following health issues:  Diarrhea        2/7/2025     1:52 PM   Additional Questions   Roomed by Barberia   Accompanied by self     History of Present Illness       Reason for visit:  Bouts of diarrhea  Symptom intensity:  Severe  Symptom progression:  Worsening  Had these symptoms before:  Yes  Has tried/received treatment for these symptoms:  No  What makes it worse:  Walking my dog        Diarrhea  Onset/Duration: for going on for a long time   Has gotten increasingly worse over the last 2 months   Worse when she walks the dog and then has to walk an hour back with stuff where is running down her leg   Had a colonoscopy which was normal   Not taking anything     Description:       Consistency of stool: pasty       Blood in stool: No       Number of loose stools past 24 hours: once a day   Progression of Symptoms: worsening  Accompanying signs and symptoms:       Fever: No       Nausea/Vomiting: No       Abdominal pain: No       Weight loss: No       Episodes of constipation: No  History   Ill contacts: No  Recent use of antibiotics: No  Recent travels: No  Recent medication-new or changes(Rx or OTC): No  Precipitating or alleviating factors: None  Therapies tried and outcome: nothing     Lab work is in process  Labs reviewed in EPIC  BP Readings from Last 3 Encounters:   01/27/25 (!) 144/77   11/21/24 139/81   11/13/24 115/75    Wt Readings from Last 3 Encounters:   01/27/25 75.3 kg (166 lb)   11/21/24 76.5 kg (168 lb 11.2 oz)   11/13/24 72.6 kg (160 lb)                  Patient Active Problem List   Diagnosis    Urgency of urination    Urinary frequency    Urge incontinence    Hyperlipidemia with target LDL  less than 100    Osteoporosis    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)    Type 1 diabetes mellitus with hyperglycemia (H)    Age-related osteoporosis without current pathological fracture    Stress incontinence    Atrophic vaginitis    Intrinsic sphincter deficiency    Overactive bladder    Carpal tunnel syndrome of right wrist    Chronic atrial fibrillation (H)    Osteoarthritis of carpometacarpal (CMC) joint of both thumbs    Thumb pain    Post-operative nausea and vomiting    Type 1 diabetes mellitus without complication (H)    Osteoarthritis of right hip, unspecified osteoarthritis type    Aneurysm of artery of lower extremity    DM type 1 with diabetic peripheral neuropathy (H)     Past Surgical History:   Procedure Laterality Date    ANESTHESIA CARDIOVERSION N/A 9/23/2021    Procedure: ANESTHESIA, FOR CARDIOVERSION@1100;  Surgeon: GENERIC ANESTHESIA PROVIDER;  Location: [facility]    ANESTHESIA OUT OF OR MRI N/A 9/14/2022    Procedure: ANESTHESIA OUT OF OR Magnetic resonance imaging right hip and lumbar @0800;  Surgeon: GENERIC ANESTHESIA PROVIDER;  Location: [facility]    ARTHROPLASTY HIP Right 1/18/2023    Procedure: ARTHROPLASTY, HIP, TOTAL RIGHT;  Surgeon: Arnaud Ward MD;  [facility]    COLONOSCOPY N/A 11/6/2024    Procedure: COLONOSCOPY, WITH POLYPECTOMY AND BIOPSY;  Surgeon: Gilda García MD;  Location: [facility]    COLONOSCOPY WITH CO2 INSUFFLATION N/A 11/6/2024    Procedure: Colonoscopy with CO2 insufflation;  Surgeon: Gilda García MD;  Location: [facility]    CYSTOSCOPY, INJECT COLLAGEN, COMBINED N/A 5/11/2021    Procedure: CYSTOSCOPY, WITH PERIURETHRAL BULKING AGENT INJECTION;  Surgeon: Alberto Zhang MD;  Location: [facility] OR    CYSTOSCOPY, INJECT COLLAGEN, COMBINED N/A 9/12/2022    Procedure: CYSTOSCOPY, WITH PERIURETHRAL BULKING AGENT INJECTION (look in the bladder and urethra and inject filler into the urethra);  Surgeon: Alberto Zhang MD;  Location: [facility]     EYE SURGERY  9/04, 5/11    Retinal detachment, Cataract (both eyes)    IMPLANT STIMULATOR AND LEADS SACRAL NERVE (STAGE ONE AND TWO) Right 7/5/2022    Procedure: INSERTION, SACRAL NERVE STIMULATOR, STAGE 1 & 2 (permanent implant on the RIGHT side with [employer]on[employer]);  Surgeon: Alberto Zhang MD;  Location: [facility] OR    IMPLANT STIMULATOR SACRAL NERVE PERCUTANEOUS TRIAL N/A 6/14/2022    Procedure: INSERTION, NEUROSTIMULATOR, SACRAL, PERCUTANEOUS, FOR TRIAL;  Surgeon: Alberto Zhang MD;  Location: [facility] OR    RELEASE CARPAL TUNNEL Right 8/6/2021    Procedure: RELEASE, CARPAL TUNNEL, Right;  Surgeon: RADHA Sandoval MD;  [facility]    RELEASE TRIGGER FINGER Bilateral 5/10/2019    Procedure: RELEASE TRIGGER FINGER, BILATERAL LONG AND RING FINGERS;  Surgeon: RADHA Sandoval MD;  Location: [facility]    VASCULAR SURGERY      Varicose Veins       Social History     Tobacco Use    Smoking status: Never     Passive exposure: Never    Smokeless tobacco: Never   Substance Use Topics    Alcohol use: Yes     Comment: 1 to 2 beers or glasses of wine 1 to 2 times per month     Family History   Problem Relation Age of Onset    Hyperlipidemia Mother     Breast Cancer Mother     Other Cancer Father     Leukemia Father     Skin Cancer Father     Coronary Artery Disease Maternal Uncle     Brain Cancer Maternal Uncle     Coronary Artery Disease Maternal Uncle     Stomach Cancer Maternal Aunt     Diabetes No family hx of     Hypertension No family hx of     Cerebrovascular Disease No family hx of     Colon Cancer No family hx of     Thyroid Disease No family hx of     Depression No family hx of     Anxiety Disorder No family hx of          Current Outpatient Medications   Medication Sig Dispense Refill    alendronate (FOSAMAX) 70 MG tablet Take 1 tablet (70 mg) by mouth every 7 days 12 tablet 3    amoxicillin (AMOXIL) 500 MG capsule TAKE 4 CAPS 30-60 MIN BEFORE DENTAL PROCEDURE 4 capsule 2    apixaban ANTICOAGULANT (ELIQUIS)  5 MG tablet Take 1 tablet (5 mg) by mouth 2 times daily. 180 tablet 3    atorvastatin (LIPITOR) 40 MG tablet Take 1 tablet (40 mg) by mouth daily. 90 tablet 3    blood glucose (NO BRAND SPECIFIED) test strip Use to test blood sugar 4 times daily or as directed. Accu Chek Guide test strips. 150 strip 3    calcium carbonate-vitamin D 500-400 MG-UNIT TABS tablt Take 1 tablet by mouth 2 times daily 180 tablet 3    Continuous Blood Gluc Sensor (DEXCOM G7 SENSOR) MISC 1 each every 10 days 9 each 3    estradiol (ESTRACE) 0.1 MG/GM vaginal cream Place 2 g vaginally twice a week. 42.5 g 11    estradiol (ESTRACE) 0.1 MG/GM vaginal cream INSERT 2G VAGINALLY TWICE PER WEEK 42.5 g 11    insulin aspart (NOVOLOG FLEXPEN) 100 UNIT/ML pen 1 unit per 10 gram of carb for breakfast, 1 unit per 10 grams of carbs for lunch and  1 unit per 10 gram for dinner. Plus 2 unit per 50 mg/dl above 150 at meal and above 200 before bedtime Total daily dose approx 30 units. 45 mL 3    insulin glargine (LANTUS PEN) 100 UNIT/ML pen Inject 30 Units subcutaneously every morning. 30 mL 1    insulin pen needle (B-D U/F) 31G X 8 MM miscellaneous USE 5 TIMES DAILY  WITH NOVOLOG AND LANTUS 500 each 1    melatonin 3 MG tablet Take 1 tablet (3 mg) by mouth nightly as needed for sleep 30 tablet 0    Multiple Vitamins-Minerals (MULTIVITAMIN ADULT PO) Take 1 tablet by mouth every morning       predniSONE (DELTASONE) 20 MG tablet Take 0.5 tablets (10 mg) by mouth daily.      pyRIDostigmine (MESTINON) 60 MG tablet Take 60 mg by mouth 4 times daily.      Urine Glucose-Ketones Test (KETO-DIASTIX) STRP 1 strip by In Vitro route as needed 1 each 11     No Known Allergies          Review of Systems  Constitutional, HEENT, cardiovascular, pulmonary, gi and gu systems are negative, except as otherwise noted.      Objective           Vitals:  No vitals were obtained today due to virtual visit.    Physical Exam   General: Alert and no distress //Respiratory: No audible  wheeze, cough, or shortness of breath // Psychiatric:  Appropriate affect, tone, and pace of words      Pending orders and results     Assessment & Plan     Diarrhea, unspecified type  ***  - C. difficile Toxin B PCR with reflex to C. difficile EIA  - Enteric Bacteria and Virus Panel PCR        See Patient Instructions  Patient Instructions   PLAN:   1.   Symptomatic therapy suggested: will start on metamucil 1 scoop or 2 capsules a day.  2.  Orders Placed This Encounter   Procedures    C. difficile Toxin B PCR with reflex to C. difficile EIA    Enteric Bacteria and Virus Panel PCR     3.  Will follow up and/or notify patient of  results via My Chart to determine further need for followup    Patient needs to follow up in if no improvement,or sooner if worsening of symptoms or other symptoms develop.              Phone call duration: 18 minutes  Signed Electronically by: URVASHI Hunter CNP  {Email feedback regarding this note to [email or web address]   :[id number]}   g/dL Final    Hematocrit 11/21/2024 39.9  35.0 - 47.0 % Final    MCV 11/21/2024 97  78 - 100 fL Final    MCH 11/21/2024 32.0  26.5 - 33.0 pg Final    MCHC 11/21/2024 33.1  31.5 - 36.5 g/dL Final    RDW 11/21/2024 12.5  10.0 - 15.0 % Final    Platelet Count 11/21/2024 194  150 - 450 10e3/uL Final    Sodium 11/21/2024 142  135 - 145 mmol/L Final    Potassium 11/21/2024 4.1  3.4 - 5.3 mmol/L Final    Carbon Dioxide (CO2) 11/21/2024 29  22 - 29 mmol/L Final    Anion Gap 11/21/2024 8  7 - 15 mmol/L Final    Urea Nitrogen 11/21/2024 15.5  8.0 - 23.0 mg/dL Final    Creatinine 11/21/2024 0.63  0.51 - 0.95 mg/dL Final    GFR Estimate 11/21/2024 >90  >60 mL/min/1.73m2 Final    eGFR calculated using 2021 CKD-EPI equation.    Calcium 11/21/2024 9.5  8.8 - 10.4 mg/dL Final    Reference intervals for this test were updated on 7/16/2024 to reflect our healthy population more accurately. There may be differences in the flagging of prior results with similar values performed with this method. Those prior results can be interpreted in the context of the updated reference intervals.    Chloride 11/21/2024 105  98 - 107 mmol/L Final    Glucose 11/21/2024 138 (H)  70 - 99 mg/dL Final    Alkaline Phosphatase 11/21/2024 97  40 - 150 U/L Final    AST 11/21/2024 30  0 - 45 U/L Final    ALT 11/21/2024 27  0 - 50 U/L Final    Protein Total 11/21/2024 6.1 (L)  6.4 - 8.3 g/dL Final    Albumin 11/21/2024 4.0  3.5 - 5.2 g/dL Final    Bilirubin Total 11/21/2024 0.5  <=1.2 mg/dL Final    Patient Fasting > 8hrs? 11/21/2024 Yes   Final    Estimated Average Glucose 11/21/2024 212 (H)  <117 mg/dL Final    Hemoglobin A1C 11/21/2024 9.0 (H)  0.0 - 5.6 % Final    Normal <5.7%   Prediabetes 5.7-6.4%    Diabetes 6.5% or higher     Note: Adopted from ADA consensus guidelines.    TSH 11/21/2024 1.56  0.30 - 4.20 uIU/mL Final       Pending orders and results     Assessment & Plan     Diarrhea, unspecified type  Will follow up and/or notify patient of  results  via My Chart to determine further need for followup   Will try increase fiber daily as concern possible related constipation   Consider colonoscopy if labs are negative   - C. difficile Toxin B PCR with reflex to C. difficile EIA  - Enteric Bacteria and Virus Panel PCR    Type 1 diabetes mellitus with hyperglycemia (H)  FOLLOW UP WITH SPECIALIST :Endocrinology      See Patient Instructions  Patient Instructions   PLAN:   1.   Symptomatic therapy suggested: will start on metamucil 1 scoop or 2 capsules a day.  2.  Orders Placed This Encounter   Procedures    C. difficile Toxin B PCR with reflex to C. difficile EIA    Enteric Bacteria and Virus Panel PCR     3.  Will follow up and/or notify patient of  results via My Chart to determine further need for followup    Patient needs to follow up in if no improvement,or sooner if worsening of symptoms or other symptoms develop.              Phone call duration: 18 minutes  Signed Electronically by: URVASHI Hunter CNP

## 2025-02-08 PROCEDURE — 87507 IADNA-DNA/RNA PROBE TQ 12-25: CPT | Mod: GZ

## 2025-02-09 LAB
ADV 40+41 DNA STL QL NAA+NON-PROBE: NEGATIVE
ASTRO TYP 1-8 RNA STL QL NAA+NON-PROBE: NEGATIVE
C CAYETANENSIS DNA STL QL NAA+NON-PROBE: NEGATIVE
CAMPYLOBACTER DNA SPEC NAA+PROBE: NEGATIVE
CRYPTOSP DNA STL QL NAA+NON-PROBE: NEGATIVE
E COLI O157 DNA STL QL NAA+NON-PROBE: NORMAL
E HISTOLYT DNA STL QL NAA+NON-PROBE: NEGATIVE
EAEC ASTA GENE ISLT QL NAA+PROBE: NEGATIVE
EC STX1+STX2 GENES STL QL NAA+NON-PROBE: NEGATIVE
EPEC EAE GENE STL QL NAA+NON-PROBE: NEGATIVE
ETEC LTA+ST1A+ST1B TOX ST NAA+NON-PROBE: NEGATIVE
G LAMBLIA DNA STL QL NAA+NON-PROBE: NEGATIVE
NOROVIRUS GI+II RNA STL QL NAA+NON-PROBE: NEGATIVE
P SHIGELLOIDES DNA STL QL NAA+NON-PROBE: NEGATIVE
RVA RNA STL QL NAA+NON-PROBE: NEGATIVE
SALMONELLA SP RPOD STL QL NAA+PROBE: NEGATIVE
SAPO I+II+IV+V RNA STL QL NAA+NON-PROBE: NEGATIVE
SHIGELLA SP+EIEC IPAH ST NAA+NON-PROBE: NEGATIVE
V CHOLERAE DNA SPEC QL NAA+PROBE: NEGATIVE
VIBRIO DNA SPEC NAA+PROBE: NEGATIVE
Y ENTEROCOL DNA STL QL NAA+PROBE: NEGATIVE

## 2025-02-10 NOTE — RESULT ENCOUNTER NOTE
Concetta Rodríguez,    Attached are your test results.  -Stool cultures and tests are all normal.   I can not see he stool for C diff however    Please contact us if you have any questions.    Pricila Avila, CNP

## 2025-02-11 DIAGNOSIS — M85.80 OSTEOPENIA, UNSPECIFIED LOCATION: ICD-10-CM

## 2025-02-11 RX ORDER — ALENDRONATE SODIUM 70 MG/1
70 TABLET ORAL
Qty: 12 TABLET | Refills: 3 | Status: SHIPPED | OUTPATIENT
Start: 2025-02-11

## 2025-02-12 ENCOUNTER — TELEPHONE (OUTPATIENT)
Dept: FAMILY MEDICINE | Facility: CLINIC | Age: 75
End: 2025-02-12
Payer: COMMERCIAL

## 2025-02-12 NOTE — TELEPHONE ENCOUNTER
Latoya's section of the Novolog, Tresiba [employer] needles assistance application has been submitted to the [employer] assistance program.    We will note Epic when we receive a decision.    Jennifer Suarez  Prescription Assistance Supervisor  Pharmacy Assistance   Pool: [facility]

## 2025-02-15 PROBLEM — Z79.4 TYPE 2 DIABETES MELLITUS WITH DIABETIC NEUROPATHY, WITH LONG-TERM CURRENT USE OF INSULIN (H): Status: ACTIVE | Noted: 2025-02-15

## 2025-02-15 PROBLEM — E11.40 TYPE 2 DIABETES MELLITUS WITH DIABETIC NEUROPATHY, WITH LONG-TERM CURRENT USE OF INSULIN (H): Status: ACTIVE | Noted: 2025-02-15

## 2025-02-15 PROBLEM — E10.42 DM TYPE 1 WITH DIABETIC PERIPHERAL NEUROPATHY (H): Status: RESOLVED | Noted: 2024-07-03 | Resolved: 2025-02-15

## 2025-02-15 PROBLEM — I48.20 CHRONIC ATRIAL FIBRILLATION (H): Status: RESOLVED | Noted: 2022-06-08 | Resolved: 2025-02-15

## 2025-02-17 ENCOUNTER — ALLIED HEALTH/NURSE VISIT (OUTPATIENT)
Dept: EDUCATION SERVICES | Facility: CLINIC | Age: 75
End: 2025-02-17
Payer: COMMERCIAL

## 2025-02-17 DIAGNOSIS — E13.9 LADA (LATENT AUTOIMMUNE DIABETES MELLITUS IN ADULTS) (H): ICD-10-CM

## 2025-02-17 DIAGNOSIS — E11.65 TYPE 2 DIABETES MELLITUS WITH HYPERGLYCEMIA, WITH LONG-TERM CURRENT USE OF INSULIN (H): ICD-10-CM

## 2025-02-17 DIAGNOSIS — Z79.4 TYPE 2 DIABETES MELLITUS WITH HYPERGLYCEMIA, WITH LONG-TERM CURRENT USE OF INSULIN (H): ICD-10-CM

## 2025-02-17 PROCEDURE — G0108 DIAB MANAGE TRN  PER INDIV: HCPCS | Mod: AE | Performed by: DIETITIAN, REGISTERED

## 2025-02-17 RX ORDER — INSULIN ASPART 100 [IU]/ML
INJECTION, SOLUTION INTRAVENOUS; SUBCUTANEOUS
Qty: 45 ML | Refills: 3 | Status: SHIPPED | OUTPATIENT
Start: 2025-02-17

## 2025-02-17 NOTE — PATIENT INSTRUCTIONS
Mealtime:  Breakfast: 1u:15 grams of carbohydrates  Lunch 1u:10 grams of carbohydrates  Dinner: 1u:10 grams of carbohydrates    Correction:  Breakfast: 1 unit:50>150 mg/dl  Lunch and dinner: 2u:50>150 mg/dl  Bedtime: 2u:50>200 mg/dl    Lantus: Increase to 34 units     Evening snack (peanut butter): Try a few nights without and a few nights taking 1-2 units of insulin before     GENEVIEVE Day [facility]  Diabetes Education Department  [facility], [facility] [place]  Phone: [phone]  [phone]

## 2025-02-17 NOTE — PROGRESS NOTES
Diabetes Self-Management Education & Support    Presents for: Individual review    Type of Service: In Person Visit      Assessment  Latoya was diagnosed with type 1 diabetes in 2016.She was diagnosed with myasthenia gravis in May of 2024 and has been on prednisone since then. She saw Dr. Leon on 1/27 and blood sugars have been lower, but fluctuating more.  She has had more low blood sugars which have been hard to bring back up, and she often ends up overtreating.     Latoya is having a period of high blood sugars overnight.  She does have a spoonful of peanut butter around 9:00 pm.  It seems her BG are spiking with that and remaining elevated through the night.  We discussed a plan to cut out the peanut butter, or take 1-2 units of insulin with it. She will play around with it and report back.     She takes her prednisone in the morning. We discussed a plan to increase the Lantus dose and adjust her Novolog dose with breakfast.     See CGM Reports in Monitoring section below.      Glucose Patterns & Trends:  Fluctuating    Care Plan and Education Provided:  Monitoring: Frequency of monitoring, Individual glucose targets, and Log and interpret results, Taking Medication: Action of prescribed medication(s) and When to take medication(s), and Problem Solving: High glucose - causes, signs/symptoms, treatment and prevention and Low glucose - causes, signs/symptoms, treatment and prevention    Patient verbalized understanding of diabetes self-management education concepts discussed, opportunities for ongoing education and support, and recommendations provided today.    Plan    Mealtime:  Breakfast: 1u: 10 grams --> 1u:15 grams of carbohydrates  Lunch 1u:10 grams of carbohydrates  Dinner: 1u:10 grams of carbohydrates    Correction:  Breakfast: 2u:50>150 --> 1 unit:50>150 mg/dl  Lunch and dinner: 2u:50>150 mg/dl  Bedtime: 2u:50>200 mg/dl    Lantus: Increase to 34 units     Evening snack (peanut butter): Try a few nights  "without and a few nights taking 1-2 units of insulin before       Follow-up:  Upcoming Diabetes Ed Appointments     Visit Type Date Time Department    DIABETES ED 2/17/2025  7:30 AM [facility]    DIABETES ED 3/6/2025  7:30 AM [facility]        See Care Plan for co-developed, patient-state behavior change goals.    Education Materials Provided:  No new materials provided today      Subjective/Objective  Latoya is an 74 year old year old, presenting for the following diabetes education related to: Presents for: Individual review  Accompanied by: Self  Diabetes education in the past 24mo: Yes  Focus of Visit: Taking Medication, Problem Solving  Diabetes type: Type 1  Date of diagnosis: 2016  Disease course: Worsening  Diabetes management related comments/concerns: having big fluctuations  Difficulty affording diabetes medication?: Sometimes  Difficulty affording diabetes testing supplies?: No  Other concerns:: None  Cultural Influences/Ethnic Background:  Not  or     Diabetes Symptoms & Complications:  Disease course: Worsening  Complications assessed today?: No    Patient Problem List and Family Medical History reviewed for relevant medical history, current medical status, and diabetes risk factors.    Vitals:  LMP  (LMP Unknown)   Estimated body mass index is 26.79 kg/m  as calculated from the following:    Height as of 1/27/25: 1.676 m (5' 6\").    Weight as of 1/27/25: 75.3 kg (166 lb).   Last 3 BP:   BP Readings from Last 3 Encounters:   01/27/25 (!) 144/77   11/21/24 139/81   11/13/24 115/75       History   Smoking Status    Never   Smokeless Tobacco    Never       Labs:  Lab Results   Component Value Date    A1C 9.0 11/21/2024    A1C 8.6 06/19/2023    HEMOGLOBINA1 8.0 11/15/2019     Lab Results   Component Value Date     11/21/2024    GLC 74 11/06/2024     01/23/2023     08/02/2019     Lab Results   Component Value Date     11/21/2024    LDL 90 07/01/2021     HDL " "Cholesterol   Date Value Ref Range Status   07/01/2021 70 >49 mg/dL Final     Direct Measure HDL   Date Value Ref Range Status   11/21/2024 87 >=50 mg/dL Final   ]  GFR Estimate   Date Value Ref Range Status   11/21/2024 >90 >60 mL/min/1.73m2 Final     Comment:     eGFR calculated using 2021 CKD-EPI equation.   07/01/2021 82 >60 mL/min/[1.73_m2] Final     Comment:     Non  GFR Calc  Starting 12/18/2018, serum creatinine based estimated GFR (eGFR) will be   calculated using the Chronic Kidney Disease Epidemiology Collaboration   (CKD-EPI) equation.       GFR, ESTIMATED POCT   Date Value Ref Range Status   11/17/2021 >60 >60 mL/min/1.73m2 Final     GFR Estimate If Black   Date Value Ref Range Status   07/01/2021 >90 >60 mL/min/[1.73_m2] Final     Comment:      GFR Calc  Starting 12/18/2018, serum creatinine based estimated GFR (eGFR) will be   calculated using the Chronic Kidney Disease Epidemiology Collaboration   (CKD-EPI) equation.       Lab Results   Component Value Date    CR 0.63 11/21/2024    CR 0.74 07/01/2021     No results found for: \"MICROALBUMIN\"    Healthy Eating:  Healthy Eating Assessed Today: Yes  Do you have any food allergies or intolerances?: No  Meal planning/habits: Carb counting  Who cooks/prepares meals for you?: Self  Who purchases food in  your home?: Self  Breakfast: 7:00: a couple of eggs OR 1 bowl of cereal with blueberries  Lunch: 11- 12:00: Soup- barley kale vegetable with tangerine  Dinner: 6-6:30: protein with raw vegetables or cauliflower - sometimes with dip with yazzo frozen yogurt bar, sometimes with toast  Snacks: not snacking, might have tangerine or spoonful of peanut butter  Other: crystal light decaf iced tea, 1 decaf diet pepsi  Beverages: Diet soda  Has patient met with a dietitian in the past?: No    Being Active:  Being Active Assessed Today: Yes  Exercise:: Yes (walking dog -3ish miles. usually between 10-12 or " 1-3)    Monitoring:  Monitoring Assessed Today: Yes  Did patient bring glucose meter to appointment? : Yes                          Taking Medications:  Diabetes Medication(s)       Insulin       insulin glargine (LANTUS PEN) 100 UNIT/ML pen Inject 34 Units subcutaneously every morning.     insulin aspart (NOVOLOG FLEXPEN) 100 UNIT/ML pen 1 unit per 10 gram of carb for breakfast, 1 unit per 10 grams of carbs for lunch and  1 unit per 10 gram for dinner. Plus 2 unit per 50 mg/dl above 150 at meal and above 200 before bedtime Total daily dose approx 30 units.          Taking Medication Assessed Today: Yes  Current Treatments: Insulin Injections  Dose schedule: Pre-breakfast, Pre-lunch, Pre-dinner, At bedtime  Given by: Patient    Anjana Simon RD  Time Spent: 40 minutes  Encounter Type: Individual    Any diabetes medication dose changes were made via the CDCES Standing Orders under the patient's referring provider.

## 2025-03-05 DIAGNOSIS — E10.65 TYPE 1 DIABETES MELLITUS WITH HYPERGLYCEMIA (H): ICD-10-CM

## 2025-03-05 RX ORDER — ACYCLOVIR 400 MG/1
1 TABLET ORAL
Qty: 9 EACH | Refills: 3 | Status: SHIPPED | OUTPATIENT
Start: 2025-03-05

## 2025-03-06 ENCOUNTER — ALLIED HEALTH/NURSE VISIT (OUTPATIENT)
Dept: EDUCATION SERVICES | Facility: CLINIC | Age: 75
End: 2025-03-06
Payer: COMMERCIAL

## 2025-03-06 ENCOUNTER — TELEPHONE (OUTPATIENT)
Dept: EDUCATION SERVICES | Facility: CLINIC | Age: 75
End: 2025-03-06

## 2025-03-06 DIAGNOSIS — Z79.4 TYPE 2 DIABETES MELLITUS WITH HYPERGLYCEMIA, WITH LONG-TERM CURRENT USE OF INSULIN (H): ICD-10-CM

## 2025-03-06 DIAGNOSIS — E13.9 LADA (LATENT AUTOIMMUNE DIABETES MELLITUS IN ADULTS) (H): ICD-10-CM

## 2025-03-06 DIAGNOSIS — E11.65 TYPE 2 DIABETES MELLITUS WITH HYPERGLYCEMIA, WITH LONG-TERM CURRENT USE OF INSULIN (H): ICD-10-CM

## 2025-03-06 RX ORDER — INSULIN ASPART 100 [IU]/ML
INJECTION, SOLUTION INTRAVENOUS; SUBCUTANEOUS
Qty: 45 ML | Refills: 3 | Status: SHIPPED | OUTPATIENT
Start: 2025-03-06

## 2025-03-06 NOTE — TELEPHONE ENCOUNTER
[facility] Call [facility]    Phone Message    May a detailed message be left on voicemail: yes     Reason for Call: Other: Patient states that she forgot to discuss free health programs to support her diabetes at he appt with Anjana this morning.  Patient would like to be contact to discuss.  Also states that she can be contacted on [employer].      Action Taken: Other: cde    Travel Screening: Not Applicable     Date of Service:

## 2025-03-06 NOTE — TELEPHONE ENCOUNTER
Singularu message sent to patient    GENEVIEVE Day [facility]  Diabetes Education Department  [facility], [facility] [place]  Phone: [phone]  [phone]

## 2025-03-06 NOTE — PROGRESS NOTES
Diabetes Self-Management Education & Support    Presents for: Follow-up    Type of Service: In Person Visit      Assessment  Latoya's BG has improved, but still fluctuating quite a bit. She is also just getting over a cold which seems to have caused her blood sugars to increase.     See CGM Reports in Monitoring section below.      Glucose Patterns & Trends:  Time in range of  mg/dL, 39% of the time. Patient not meeting ADA Time in Range Targets.    Care Plan and Education Provided:  Monitoring: Blood glucose versus Continuous Glucose Monitoring, Frequency of monitoring, and Individual glucose targets, Taking Medication: Action of prescribed medication(s) and When to take medication(s), and Problem Solving: High glucose - causes, signs/symptoms, treatment and prevention, Low glucose - causes, signs/symptoms, treatment and prevention, and Rule of 15 and carrying a carbohydrate source at all times in case of low glucose    Patient verbalized understanding of diabetes self-management education concepts discussed, opportunities for ongoing education and support, and recommendations provided today.    Plan    Mealtime:  Breakfast: 1u:15 grams of carbohydrates  Lunch 1u:10 grams of carbohydrates  Dinner: 1u:10 grams of carbohydrates     Correction:  Breakfast: 1 unit:50>150 mg/dl  Lunch and dinner: 2u:50>150 mg/dl --> 1u:50>150  Bedtime: 2u:50>200 mg/dl --> 1u:50>200     Lantus: Increase to 34 units --> 36 units      Topics to cover at upcoming visits: Healthy Eating, Being Active, Monitoring, Taking Medication, Problem Solving, Reducing Risks, and Healthy Coping    Follow-up:  Upcoming Diabetes Ed Appointments     Visit Type Date Time Department    DIABETES ED 3/6/2025  7:30 AM  DIABETES ED    DIABETES ED SHORT 3/13/2025 11:00 AM [facility]        See Care Plan for co-developed, patient-state behavior change goals.    Education Materials Provided:  No new materials provided  "today      Subjective/Objective  Latoya is an 74 year old year old, presenting for the following diabetes education related to: Presents for: Follow-up  Accompanied by: Self  Diabetes education in the past 24mo: Yes  Focus of Visit: Taking Medication, Problem Solving  Diabetes type: Type 1  Date of diagnosis: 2016  Disease course: Worsening  Diabetes management related comments/concerns: having big fluctuations  Difficulty affording diabetes medication?: Sometimes  Difficulty affording diabetes testing supplies?: No  Other concerns:: None  Cultural Influences/Ethnic Background:  Not  or     Diabetes Symptoms & Complications:  Disease course: Worsening  Complications assessed today?: No    Patient Problem List and Family Medical History reviewed for relevant medical history, current medical status, and diabetes risk factors.    Vitals:  LMP  (LMP Unknown)   Estimated body mass index is 26.79 kg/m  as calculated from the following:    Height as of 1/27/25: 1.676 m (5' 6\").    Weight as of 1/27/25: 75.3 kg (166 lb).   Last 3 BP:   BP Readings from Last 3 Encounters:   01/27/25 (!) 144/77   11/21/24 139/81   11/13/24 115/75       History   Smoking Status    Never   Smokeless Tobacco    Never       Labs:  Lab Results   Component Value Date    A1C 9.0 11/21/2024    A1C 8.6 06/19/2023    HEMOGLOBINA1 8.0 11/15/2019     Lab Results   Component Value Date     11/21/2024    GLC 74 11/06/2024     01/23/2023     08/02/2019     Lab Results   Component Value Date     11/21/2024    LDL 90 07/01/2021     HDL Cholesterol   Date Value Ref Range Status   07/01/2021 70 >49 mg/dL Final     Direct Measure HDL   Date Value Ref Range Status   11/21/2024 87 >=50 mg/dL Final   ]  GFR Estimate   Date Value Ref Range Status   11/21/2024 >90 >60 mL/min/1.73m2 Final     Comment:     eGFR calculated using 2021 CKD-EPI equation.   07/01/2021 82 >60 mL/min/[1.73_m2] Final     Comment:     Non  " "American GFR Calc  Starting 12/18/2018, serum creatinine based estimated GFR (eGFR) will be   calculated using the Chronic Kidney Disease Epidemiology Collaboration   (CKD-EPI) equation.       GFR, ESTIMATED POCT   Date Value Ref Range Status   11/17/2021 >60 >60 mL/min/1.73m2 Final     GFR Estimate If Black   Date Value Ref Range Status   07/01/2021 >90 >60 mL/min/[1.73_m2] Final     Comment:      GFR Calc  Starting 12/18/2018, serum creatinine based estimated GFR (eGFR) will be   calculated using the Chronic Kidney Disease Epidemiology Collaboration   (CKD-EPI) equation.       Lab Results   Component Value Date    CR 0.63 11/21/2024    CR 0.74 07/01/2021     No results found for: \"MICROALBUMIN\"    Healthy Eating:  Healthy Eating Assessed Today: Yes  Do you have any food allergies or intolerances?: No  Meal planning/habits: Carb counting  Who cooks/prepares meals for you?: Self  Who purchases food in  your home?: Self  Breakfast: 7:00: a couple of eggs OR 1 bowl of cereal with blueberries  Lunch: 11- 12:00: Soup- barley kale vegetable with tangerine  Dinner: 6-6:30: protein with raw vegetables or cauliflower - sometimes with dip with yazzo frozen yogurt bar, sometimes with toast  Snacks: not snacking, might have tangerine or spoonful of peanut butter  Other: crystal light decaf iced tea, 1 decaf diet pepsi  Beverages: Diet soda  Has patient met with a dietitian in the past?: No    Being Active:  Being Active Assessed Today: Yes  Exercise:: Yes (walking dog -3ish miles. usually between 10-12 or 1-3)    Monitoring:  Monitoring Assessed Today: Yes  Did patient bring glucose meter to appointment? : Yes      Taking Medications:  Diabetes Medication(s)       Insulin       insulin aspart (NOVOLOG FLEXPEN) 100 UNIT/ML pen 1 unit per 15 gram of carb for breakfast, 1 unit per 10 grams of carbs for lunch and  1 unit per 10 gram for dinner. Plus 1u per 50>150 at breakfast, lunch and dinner and above 200 before " bedtime Total daily dose approx 30 units.     insulin glargine (LANTUS PEN) 100 UNIT/ML pen Inject 36 Units subcutaneously every morning.          Taking Medication Assessed Today: Yes  Current Treatments: Insulin Injections  Dose schedule: Pre-breakfast, Pre-lunch, Pre-dinner, At bedtime  Given by: Patient    Problem Solving:  Problem Solving Assessed Today: Yes  Is the patient at risk for hypoglycemia?: Yes  Hypoglycemia Frequency: Daily  Hypoglycemia Treatment: Other food (raisins or granola bar or applesauce)          Anjana Simon RD  Time Spent: 20 minutes  Encounter Type: Individual    Any diabetes medication dose changes were made [facility] the [facility] under the patient's referring provider.   22-Sep-2022 20:52

## 2025-03-06 NOTE — PATIENT INSTRUCTIONS
Signs/symptoms of low blood sugar include (but are not limited to): sweating, shaking, feeling dizzy or weak, extreme hunger, headache, feeling crabby or confused, numbness or tingling of mouth/lips.  If you have these symptoms check your blood sugar if you can and then consume carbohydrate (sugar)  This is a helpful reminder on how to treat a blood sugar if you are low:  If your blood sugar is < 70 mg/dL, consume 15 grams of fast-acting carbohydrate (4 glucose tabs OR 4 oz juice or non-diet pop OR 2 Tbsp dried fruit OR 5-7 lifesavers OR 1 Tbsp sugar) and wait 15 minutes. Check blood sugar again and if not rising, repeat.  If your blood sugar is < 50 mg/dL, consume 30 grams of fast-acting carbohydrate (8 glucose tabs OR 8 oz juice or non-diet pop OR 4 Tbsp dried fruit OR 10-14 lifesavers OR 2 Tbsp sugar) and wait 15 minutes. Check blood sugar again and if not rising, repeat.

## 2025-03-13 ENCOUNTER — VIRTUAL VISIT (OUTPATIENT)
Dept: EDUCATION SERVICES | Facility: CLINIC | Age: 75
End: 2025-03-13
Payer: COMMERCIAL

## 2025-03-13 DIAGNOSIS — E10.9 TYPE 1 DIABETES MELLITUS WITHOUT COMPLICATION (H): Primary | ICD-10-CM

## 2025-03-13 ASSESSMENT — PAIN SCALES - GENERAL: PAINLEVEL_OUTOF10: NO PAIN (0)

## 2025-03-13 NOTE — NURSING NOTE
Current patient location: [address] [place] [address]    Is the patient currently in the state of [place]? YES    Visit mode: TELEPHONE    If the visit is dropped, the patient can be reconnected by:TELEPHONE VISIT: Phone number:   Telephone Information:   Mobile [phone]       Will anyone else be joining the visit? NO  (If patient encounters technical issues they should call [phone]    Are changes needed to the allergy or medication list? No    Are refills needed on medications prescribed by this physician? NO    Rooming Documentation:  Questionnaire(s) completed    Reason for visit: Diabetes Education    Letha DIOP

## 2025-03-13 NOTE — LETTER
3/13/2025      Latoya Rodríguez  [address] [place] [address]      Dear Colleague,    Thank you for referring your patient, Latoya Rodríguez, to the [facility]. Please see a copy of my visit note below.    Virtual Visit Details    Type of service:  Telephone Visit   Phone call duration: 10 minutes   Originating Location (pt. Location): Home    Distant Location (provider location):  On-site  Telephone visit completed due to the patient did not consent to a video visit.      Diabetes Education Follow-up    Subjective/Objective:    Latoya Rodríguez sent in blood glucose log for review. Last date of communication was: 3/6.    Diabetes is being managed with Diabetes Medications   Diabetes Medication(s)       Insulin       insulin aspart (NOVOLOG FLEXPEN) 100 UNIT/ML pen 1 unit per 15 gram of carb for breakfast, 1 unit per 10 grams of carbs for lunch and  1 unit per 10 gram for dinner. Plus 1u per 50>150 at breakfast, lunch and dinner and above 200 before bedtime Total daily dose approx 30 units.     insulin glargine (LANTUS PEN) 100 UNIT/ML pen Inject 36 Units subcutaneously every morning.            BG/Food Log:                 Breakfast: eggs this morning, with 1 piece of toast and banana -- took 8 units this morning     Assessment:    BG average lower this week, compared to last week (212 mg/dl). Having some more low blood sugars, Latoya feels that the lows are a result of her rounding up her boluses.  Today for breakfast she ate eggs, cheese, 1 piece of toast and 1 banana (about 45 grams) and she took 8 units of insulin vs. The 3 units that would have been warranted with a 1u:15 grams of carbohydrates. She will work on accurate carb counting and taking recommended doses.     Plan/Response:  See Patient Instructions for co-developed, patient-stated behavior change goals.    Mealtime:  Breakfast: 1u:15 grams of carbohydrates  Lunch 1u:10 grams of carbohydrates  Dinner: 1u:10  grams of carbohydrates     Correction:  Breakfast: 1 unit:50>150 mg/dl  Lunch and dinner:  1u:50>150  Bedtime: 1u:50>200     Lantus: 36 units     Anjana Simon RD [facility]  Diabetes Education Department  [facility], [place]  Phone: [phone]  [phone]      Any diabetes medication dose changes were made via the CDE Protocol and Collaborative Practice Agreement with the patient's referring provider. A copy of this encounter was shared with the provider.      Again, thank you for allowing me to participate in the care of your patient.        Sincerely,        Anjana Simon RD    Electronically signed

## 2025-03-13 NOTE — PROGRESS NOTES
Virtual Visit Details    Type of service:  Telephone Visit   Phone call duration: 10 minutes   Originating Location (pt. Location): Home    Distant Location (provider location):  On-site  Telephone visit completed due to the patient did not consent to a video visit.      Diabetes Education Follow-up    Subjective/Objective:    Latoya Rodríguez sent in blood glucose log for review. Last date of communication was: 3/6.    Diabetes is being managed with Diabetes Medications   Diabetes Medication(s)       Insulin       insulin aspart (NOVOLOG FLEXPEN) 100 UNIT/ML pen 1 unit per 15 gram of carb for breakfast, 1 unit per 10 grams of carbs for lunch and  1 unit per 10 gram for dinner. Plus 1u per 50>150 at breakfast, lunch and dinner and above 200 before bedtime Total daily dose approx 30 units.     insulin glargine (LANTUS PEN) 100 UNIT/ML pen Inject 36 Units subcutaneously every morning.            BG/Food Log:                 Breakfast: eggs this morning, with 1 piece of toast and banana -- took 8 units this morning     Assessment:    BG average lower this week, compared to last week (212 mg/dl). Having some more low blood sugars, Latoya feels that the lows are a result of her rounding up her boluses.  Today for breakfast she ate eggs, cheese, 1 piece of toast and 1 banana (about 45 grams) and she took 8 units of insulin vs. The 3 units that would have been warranted with a 1u:15 grams of carbohydrates. She will work on accurate carb counting and taking recommended doses.     Plan/Response:  See Patient Instructions for co-developed, patient-stated behavior change goals.    Mealtime:  Breakfast: 1u:15 grams of carbohydrates  Lunch 1u:10 grams of carbohydrates  Dinner: 1u:10 grams of carbohydrates     Correction:  Breakfast: 1 unit:50>150 mg/dl  Lunch and dinner:  1u:50>150  Bedtime: 1u:50>200     Lantus: 36 units     GENEVIEVE Day [facility]  Diabetes Education Department  [facility], [place]  Phone: [phone]  [phone]      Any diabetes medication dose changes were made via the CDE Protocol and Collaborative Practice Agreement with the patient's referring provider. A copy of this encounter was shared with the provider.

## 2025-03-20 NOTE — PROGRESS NOTES
Patient calls the clinic to discuss skin problem and possible UTI:    S-(situation): yeast like symptoms under abdominal fold and urinary frequency    B-(background): 2 days ago    A-(assessment): Patient calls the clinic to discuss possible yeast infection under her abdominal fold where she has history of past multiple surgeries. She says it was very painful and oozing foul odorous thin slimy like yellow substance 2 days ago and today, says she thought she had some small amount of blood of the left side under the abdominal fold covering her 10 inch incision line. Says it was painful and very itchy. Patient thought she noticed blisters along the abdominal folds. Yesterday she thought she had some pelvic pain but not today, she does report she is urinating more frequently, thought she may have been warm yesterday, she will check her temp today. Patient has not checked her blood sugars recently. She reports she went to the bathroom 5 times last night.     R-(recommendations): advised to be seen, appointment scheduled for tomorrow to rule out possible UTI/yeast infection. Patient is told to wash under the abdominal folds, pat dry, allow to air dry, can use Nystatin prescription patient has already to see if this helps until she is seen tomorrow. Patient verbally agrees with plan.      JETHRO Casas                   Reason for visit: F/u on therapy for urgency and frequency   Clinical Data: Ms. Rodríguez is a 69 y/o female with the above. She was started on oxybutynin 15 mg xl as well as estrace cream and referred to pelvic floor PT. She did better on the anticholinergic however continued to have a lot of urgency and some urge incontinence. She was then switched to Detrol which did not work, then Sanctura which did but was too expensive and tried Vesicare but then Toviaz was less expensive and using Myrbetriq and that was working better than anything else but then it was not.  She would go for a walk with her dog and after an hour she would start to leak and after 1.5 hours she would just gush.  Another time that she will gush is when she sits up from a seated position.    HgA1 C is 7.2 but on a daily basis her sugars fluctuate quite a bit.  Of note she also has not been using her estrogen cream.    ASSESSMENT and PLAN: This is a 70 year old female with urinary urgency, frequency and urge incontinence as well as a possible stress incontinence component.  She also has DM which is poorly controlled. We discussed resuming the estrogen cream and given the severity of her symptoms and duration we will proceed with further w/u.  -resume estrogen cream (she needs a new RX)  -may stay off the Toviaz and Myrbetriq since stopped helping  -schedule UDS for further evaluation  -schedule apt with me virtually to discuss results and further treatment.    Thank you for allowing me to participate in the care of Ms. Latoya Rodríguez and I will keep you updated on her progress.   Alberto Zhang MD   14 min spent with patient on the phone

## 2025-03-31 ENCOUNTER — VIRTUAL VISIT (OUTPATIENT)
Dept: EDUCATION SERVICES | Facility: CLINIC | Age: 75
End: 2025-03-31
Payer: COMMERCIAL

## 2025-03-31 DIAGNOSIS — E10.9 TYPE 1 DIABETES MELLITUS WITHOUT COMPLICATION (H): Primary | ICD-10-CM

## 2025-03-31 PROCEDURE — 98967 PH1 ASSMT&MGMT NQHP 11-20: CPT | Mod: 93 | Performed by: DIETITIAN, REGISTERED

## 2025-03-31 PROCEDURE — 1126F AMNT PAIN NOTED NONE PRSNT: CPT | Mod: 93 | Performed by: DIETITIAN, REGISTERED

## 2025-03-31 ASSESSMENT — PAIN SCALES - GENERAL: PAINLEVEL_OUTOF10: NO PAIN (0)

## 2025-03-31 NOTE — PROGRESS NOTES
Virtual Visit Details    Type of service:  Telephone Visit   Phone call duration: 15 minutes   Originating Location (pt. Location): Home    Distant Location (provider location):  Off-site  Telephone visit completed due to the patient did not consent to a video visit.    Diabetes Education Follow-up    Subjective/Objective:    Latoya Rodríguez was called for BG review.  Last date of communication was: 3/13.    Diabetes is being managed with Diabetes Medications   Diabetes Medication(s)       Insulin       insulin aspart (NOVOLOG FLEXPEN) 100 UNIT/ML pen 1 unit per 15 gram of carb for breakfast, 1 unit per 10 grams of carbs for lunch and  1 unit per 10 gram for dinner. Plus 1u per 50>150 at breakfast, lunch and dinner and above 200 before bedtime Total daily dose approx 30 units.     insulin glargine (LANTUS PEN) 100 UNIT/ML pen Inject 36 Units subcutaneously every morning.            BG/Food Log:                   Assessment:  BG averages are about the same as our last visit, Latoya feels she has had more low blood sugars this last week. Latoya has been doing better with accurate carbohydrate counting. She does report that she has her dinner around 6:30 and will have a greek yogurt ice cream bar at 7:30. She has been bolusing for these together before dinner.  Recommended that she separate the boluses.  She also mentions that she has a snack around 9:30 pm to prevent low blood sugars overnight.      Plan/Response:  Plan:   - Count the carbohydrates from your greek yogurt bar separately from your dinner carbohydrates (Take the insulin for your dinner carbohydrates 5-10 units before your dinner and take the insulin for your greek yogurt bar 5-10 minutes before the greek yogurt bar).  - Try skipping the bedtime snack, if you are having low blood sugars overnight, decrease your Lantus dose to 34 units.     Insulin Doses:  Mealtime:  Breakfast: 1u:15 grams of carbohydrates  Lunch 1u:10 grams of carbohydrates  Dinner: 1u:10  grams of carbohydrates     Correction:  Breakfast: 1 unit:50>150 mg/dl  Lunch and dinner:  1u:50>150  Bedtime: 1u:50>200     Lantus: 36 units    Time spent: 15 minutes    GENEVIEVE Day [facility]  Diabetes Education Department  [facility], [place]  Phone: [phone]  [phone]      Any diabetes medication dose changes were made via the CDE Protocol and Collaborative Practice Agreement with the patient's referring provider. A copy of this encounter was shared with the provider.

## 2025-03-31 NOTE — NURSING NOTE
Current patient location: [address] [place] [id number]    Is the patient currently in the state of [place]? YES    Visit mode: TELEPHONE    If the visit is dropped, the patient can be reconnected by:TELEPHONE VISIT: Phone number:   Telephone Information:   Mobile [phone]       Will anyone else be joining the visit? NO  (If patient encounters technical issues they should call [phone]    Are changes needed to the allergy or medication list? No    Are refills needed on medications prescribed by this physician? NO    Rooming Documentation:  Questionnaire(s) completed    Reason for visit: Diabetes Education    Letha DIOP

## 2025-03-31 NOTE — LETTER
3/31/2025      Latoya Rodríguez  [address] [place] [address]      Dear Colleague,    Thank you for referring your patient, Latoya Rodríguez, to the [facility]. Please see a copy of my visit note below.    Virtual Visit Details    Type of service:  Telephone Visit   Phone call duration: 15 minutes   Originating Location (pt. Location): Home    Distant Location (provider location):  Off-site  Telephone visit completed due to the patient did not consent to a video visit.    Diabetes Education Follow-up    Subjective/Objective:    Latoya Rodríguez was called for BG review.  Last date of communication was: 3/13.    Diabetes is being managed with Diabetes Medications   Diabetes Medication(s)       Insulin       insulin aspart (NOVOLOG FLEXPEN) 100 UNIT/ML pen 1 unit per 15 gram of carb for breakfast, 1 unit per 10 grams of carbs for lunch and  1 unit per 10 gram for dinner. Plus 1u per 50>150 at breakfast, lunch and dinner and above 200 before bedtime Total daily dose approx 30 units.     insulin glargine (LANTUS PEN) 100 UNIT/ML pen Inject 36 Units subcutaneously every morning.            BG/Food Log:                   Assessment:  BG averages are about the same as our last visit, Latoya feels she has had more low blood sugars this last week. Latoya has been doing better with accurate carbohydrate counting. She does report that she has her dinner around 6:30 and will have a greek yogurt ice cream bar at 7:30. She has been bolusing for these together before dinner.  Recommended that she separate the boluses.  She also mentions that she has a snack around 9:30 pm to prevent low blood sugars overnight.      Plan/Response:  Plan:   - Count the carbohydrates from your greek yogurt bar separately from your dinner carbohydrates (Take the insulin for your dinner carbohydrates 5-10 units before your dinner and take the insulin for your greek yogurt bar 5-10 minutes before the greek yogurt  bar).  - Try skipping the bedtime snack, if you are having low blood sugars overnight, decrease your Lantus dose to 34 units.     Insulin Doses:  Mealtime:  Breakfast: 1u:15 grams of carbohydrates  Lunch 1u:10 grams of carbohydrates  Dinner: 1u:10 grams of carbohydrates     Correction:  Breakfast: 1 unit:50>150 mg/dl  Lunch and dinner:  1u:50>150  Bedtime: 1u:50>200     Lantus: 36 units    Time spent: 15 minutes    Anjana Simon RD [facility]  Diabetes Education Department  [facility]  Phone: [phone]  [phone]      Any diabetes medication dose changes were made via the CDE Protocol and Collaborative Practice Agreement with the patient's referring provider. A copy of this encounter was shared with the provider.      Again, thank you for allowing me to participate in the care of your patient.        Sincerely,        Anjana Simon RD    Electronically signed

## 2025-03-31 NOTE — PATIENT INSTRUCTIONS
Plan:   - Count the carbohydrates from your greek yogurt bar separately from your dinner carbohydrates (Take the insulin for your dinner carbohydrates 5-10 units before your dinner and take the insulin for your greek yogurt bar 5-10 minutes before the greek yogurt bar).  - Try skipping the bedtime snack, if you are having low blood sugars overnight, decrease your Lantus dose to 34 units.     Insulin Doses:  Mealtime:  Breakfast: 1u:15 grams of carbohydrates  Lunch 1u:10 grams of carbohydrates  Dinner: 1u:10 grams of carbohydrates     Correction:  Breakfast: 1 unit:50>150 mg/dl  Lunch and dinner:  1u:50>150  Bedtime: 1u:50>200     Lantus: 36 units    GENEVIEVE Day [facility]  Diabetes Education Department  [facility], [place]  Phone: [phone]  [phone]

## 2025-04-03 ENCOUNTER — NURSE TRIAGE (OUTPATIENT)
Dept: FAMILY MEDICINE | Facility: CLINIC | Age: 75
End: 2025-04-03

## 2025-04-03 DIAGNOSIS — R19.7 DIARRHEA, UNSPECIFIED TYPE: Primary | ICD-10-CM

## 2025-04-03 NOTE — TELEPHONE ENCOUNTER
We are going to refer her to Gastroenterology   I  have placed a referral for her   Please fax referral and give her number to call for appointment     [place] [facility] [place] - (REF'L)   [address]   [facility] [address]   Phone: [phone]   Fax: [phone]

## 2025-04-03 NOTE — TELEPHONE ENCOUNTER
Faxed referral to [facility] to fax: [phone]      Cassi KOHLI Visit Facilitator

## 2025-04-03 NOTE — TELEPHONE ENCOUNTER
Called patient and relayed provider's message below and provided address and number to [facility]. Patient denies further questions at this time and verbalized understanding.     TC: Please assist with faxing referral to [facility]. Fax number [phone].    NESTOR Pitts  [facility]

## 2025-04-03 NOTE — TELEPHONE ENCOUNTER
Nurse Triage SBAR    Is this a 2nd Level Triage? YES, LICENSED PRACTITIONER REVIEW IS REQUIRED    Situation: patient has been experiencing intermittent episodes of explosive diarrhea.     Background: patient has been dealing with 1-2 episodes of explosive diarrhea every week for quite some time now. Patient had a visit with Pricila Avila NP in February for this concern, patient was advised to try increasing daily fiber intake. Patient reports that her symptoms have not gotten better, does not have any other symptoms leading up to episodes of diarrhea, seems to happen at random and will be uncontrollable.     Assessment: 1-2 episodes of explosive, loose diarrhea per week. Happens when walking dog or just doing mundane routine activities. No additional symptoms noted, no symptoms leading up to episodes.     Protocol Recommended Disposition:   Home Care    Recommendation: patient is asking if there is a daily medication she can use. Patient is unable to really use over the counter anti-diarrheals as she doesn't know when it is going to happen. Please advise     Routed to provider    Does the patient meet one of the following criteria for ADS visit consideration? 16+ years old, with an [facility] PCP     TIP  Providers, please consider if this condition is appropriate for management at one of our Acute and Diagnostic Services sites.     If patient is a good candidate, please use dotphrase <dot>triageresponse and select Refer to ADS to document.      Reason for Disposition   MILD-MODERATE diarrhea (e.g., 1-6 times / day more than normal)    Additional Information   Negative: Shock suspected (e.g., cold/pale/clammy skin, too weak to stand, low BP, rapid pulse)   Negative: Difficult to awaken or acting confused (e.g., disoriented, slurred speech)   Negative: Sounds like a life-threatening emergency to the triager   Negative: SEVERE abdominal pain (e.g., excruciating) and present > 1 hour   Negative: SEVERE abdominal pain and  age > 60 years   Negative: Bloody, black, or tarry bowel movements  (Exception: Chronic-unchanged black-grey bowel movements and is taking iron pills or Pepto-Bismol.)   Negative: SEVERE diarrhea (e.g., 7 or more times / day more than normal) and age > 60 years   Negative: Constant abdominal pain lasting > 2 hours   Negative: Drinking very little and dehydration suspected (e.g., no urine > 12 hours, very dry mouth, very lightheaded)   Negative: Patient sounds very sick or weak to the triager   Negative: SEVERE diarrhea (e.g., 7 or more times / day more than normal) and present > 24 hours (1 day)   Negative: MODERATE diarrhea (e.g., 4-6 times / day more than normal) and present > 48 hours (2 days)   Negative: MODERATE diarrhea (e.g., 4-6 times / day more than normal) and age > 70 years   Negative: Abdominal pain  (Exceptions: Pain clears completely with each passage of diarrhea stool,  or symptoms similar to previously diagnosed irritable bowel syndrome.)   Negative: Fever > 101 F (38.3 C)   Negative: Blood in the stool  (Exception: Only on toilet paper. Reason: Diarrhea can cause rectal irritation with blood on wiping.)   Negative: Mucus or pus in stool has been present > 2 days and diarrhea is more than mild   Negative: Weak immune system (e.g., HIV positive, cancer chemo, splenectomy, organ transplant, chronic steroids)   Negative: Travel to a foreign country in past month   Negative: Recent antibiotic therapy (i.e., within last 2 months) and diarrhea present > 3 days since antibiotic was stopped   Negative: Recent hospitalization and diarrhea present > 3 days   Negative: Tube feedings (e.g., nasogastric, g-tube, j-tube)   Negative: MILD diarrhea (e.g., 1-3 or more stools than normal in past 24 hours) diarrhea and present > 7 days  (Exception: Chronic diarrhea that is not worse.)   Negative: Patient wants to be seen   Negative: Diarrhea is a chronic symptom (recurrent or ongoing AND lasting > 4 weeks)   Negative:  "SEVERE diarrhea (e.g., 7 or more times / day more than normal)    Answer Assessment - Initial Assessment Questions  1. DIARRHEA SEVERITY: \"How bad is the diarrhea?\" \"How many more stools have you had in the past 24 hours than normal?\"       Is having 1-2 episodes a week. Explosive, larger amount. Will have one episode and then softer throughout the day. Almost always happens when walking dog.     2. ONSET: \"When did the diarrhea begin?\"       Has been an ongoing issue. Had a visit in February    3. STOOL DESCRIPTION:  \"How loose or watery is the diarrhea?\" \"What is the stool color?\" \"Is there any blood or mucous in the stool?\"      Not watery, very loose, cottage cheese like consistency    4. VOMITING: \"Are you also vomiting?\" If Yes, ask: \"How many times in the past 24 hours?\"       No    5. ABDOMEN PAIN: \"Are you having any abdomen pain?\" If Yes, ask: \"What does it feel like?\" (e.g., crampy, dull, intermittent, constant)       No    6. ABDOMEN PAIN SEVERITY: If present, ask: \"How bad is the pain?\"  (e.g., Scale 1-10; mild, moderate, or severe)      None    7. ORAL INTAKE: If vomiting, \"Have you been able to drink liquids?\" \"How much liquids have you had in the past 24 hours?\"      Still drinking fluids like normal.     8. HYDRATION: \"Any signs of dehydration?\" (e.g., dry mouth [not just dry lips], too weak to stand, dizziness, new weight loss) \"When did you last urinate?\"      None    9. EXPOSURE: \"Have you traveled to a foreign country recently?\" \"Have you been exposed to anyone with diarrhea?\" \"Could you have eaten any food that was spoiled?\"      None    10. ANTIBIOTIC USE: \"Are you taking antibiotics now or have you taken antibiotics in the past 2 months?\"        None    11. OTHER SYMPTOMS: \"Do you have any other symptoms?\" (e.g., fever, blood in stool)        None    12. PREGNANCY: \"Is there any chance you are pregnant?\" \"When was your last menstrual period?\"        Not applicable    Protocols used: " Diarrhea-A-[place]

## 2025-04-08 ENCOUNTER — VIRTUAL VISIT (OUTPATIENT)
Dept: EDUCATION SERVICES | Facility: CLINIC | Age: 75
End: 2025-04-08
Payer: COMMERCIAL

## 2025-04-08 DIAGNOSIS — E10.9 TYPE 1 DIABETES MELLITUS WITHOUT COMPLICATION (H): Primary | ICD-10-CM

## 2025-04-08 PROCEDURE — 98967 PH1 ASSMT&MGMT NQHP 11-20: CPT | Mod: 93 | Performed by: DIETITIAN, REGISTERED

## 2025-04-08 PROCEDURE — 1126F AMNT PAIN NOTED NONE PRSNT: CPT | Mod: 93 | Performed by: DIETITIAN, REGISTERED

## 2025-04-08 NOTE — PROGRESS NOTES
Virtual Visit Details    Type of service:  Telephone Visit   Phone call duration: 14 minutes   Originating Location (pt. Location): Home    Distant Location (provider location):  Off-site  Telephone visit completed due to the patient did not consent to a video visit.    Diabetes Education Follow-up    Subjective/Objective:    Latoya Rodríguez sent in blood glucose log for review. Last date of communication was: 3/31.    Diabetes is being managed with Diabetes Medications   Diabetes Medication(s)       Insulin       insulin aspart (NOVOLOG FLEXPEN) 100 UNIT/ML pen 1 unit per 15 gram of carb for breakfast, 1 unit per 10 grams of carbs for lunch and  1 unit per 10 gram for dinner. Plus 1u per 50>150 at breakfast, lunch and dinner and above 200 before bedtime Total daily dose approx 30 units.     insulin glargine (LANTUS PEN) 100 UNIT/ML pen Inject 36 Units subcutaneously every morning.            Having a decaf iced coffee (sweetened with artificial sweetener and coffee mate creamer) - maybe with cookie    BG/Food Log:                               Assessment:    BG remains elevated overnight.  Latoya reports that she switched from a greek yogurt bar for a bedtime snack to a iced decaf coffee (sugar free) and sometimes a cookie. She will only bolus for the cookie if her blood sugar is low. Having lows during the day.    Plan/Response:  See Patient Instructions for co-developed, patient-stated behavior change goals.  Adjust insulin:Insulin Doses:  Mealtime:  Breakfast: 1u:15 grams of carbohydrates --> 1u:20 grams  Lunch 1u:10 grams of carbohydrates --> 1u:15 grams  Dinner: 1u:10 grams of carbohydrates --> 1u:8 grams     Correction:  Breakfast: 1 unit:50>150 mg/dl --> 1unit: 75>150  Lunch and dinner:  1u:50>150 --> 1u:75>150  Bedtime: 1u:50>200 - no change     Lantus: 36 units    Asked Latoya to track her insulin doses and carb amounts in her Dexcom daniel.      GENEVIEVE Day [facility] [place][note 2]ES  Diabetes Education  Department  [facility], [facility] [place]  Phone: [phone]  [phone]      Any diabetes medication dose changes were made via the CDE Protocol and Collaborative Practice Agreement with the patient's referring provider. A copy of this encounter was shared with the provider.

## 2025-04-08 NOTE — LETTER
4/8/2025      Latoya Rodríguez  [address] [place] [address]      Dear Colleague,    Thank you for referring your patient, Latoya Rodríguez, to the [facility]. Please see a copy of my visit note below.    Virtual Visit Details    Type of service:  Telephone Visit   Phone call duration: 14 minutes   Originating Location (pt. Location): Home    Distant Location (provider location):  Off-site  Telephone visit completed due to the patient did not consent to a video visit.    Diabetes Education Follow-up    Subjective/Objective:    Latoya Rodríguez sent in blood glucose log for review. Last date of communication was: 3/31.    Diabetes is being managed with Diabetes Medications   Diabetes Medication(s)       Insulin       insulin aspart (NOVOLOG FLEXPEN) 100 UNIT/ML pen 1 unit per 15 gram of carb for breakfast, 1 unit per 10 grams of carbs for lunch and  1 unit per 10 gram for dinner. Plus 1u per 50>150 at breakfast, lunch and dinner and above 200 before bedtime Total daily dose approx 30 units.     insulin glargine (LANTUS PEN) 100 UNIT/ML pen Inject 36 Units subcutaneously every morning.            Having a decaf iced coffee (sweetened with artificial sweetener and coffee mate creamer) - maybe with cookie    BG/Food Log:                               Assessment:    BG remains elevated overnight.  Latoya reports that she switched from a greek yogurt bar for a bedtime snack to a iced decaf coffee (sugar free) and sometimes a cookie. She will only bolus for the cookie if her blood sugar is low. Having lows during the day.    Plan/Response:  See Patient Instructions for co-developed, patient-stated behavior change goals.  Adjust insulin:Insulin Doses:  Mealtime:  Breakfast: 1u:15 grams of carbohydrates --> 1u:20 grams  Lunch 1u:10 grams of carbohydrates --> 1u:15 grams  Dinner: 1u:10 grams of carbohydrates --> 1u:8 grams     Correction:  Breakfast: 1 unit:50>150 mg/dl --> 1unit:  75>150  Lunch and dinner:  1u:50>150 --> 1u:75>150  Bedtime: 1u:50>200 - no change     Lantus: 36 units    Asked Latoya to track her insulin doses and carb amounts in her Dexcom daniel.      Anjana Simon RD [facility]  Diabetes Education Department  [facility], [place]  Phone: [phone]  [phone]      Any diabetes medication dose changes were made via the CDE Protocol and Collaborative Practice Agreement with the patient's referring provider. A copy of this encounter was shared with the provider.      Again, thank you for allowing me to participate in the care of your patient.        Sincerely,        Anjana Simon RD    Electronically signed

## 2025-04-08 NOTE — NURSING NOTE
Current patient location: [address] [place] [address]    Is the patient currently in the state of [place]? YES    Visit mode: VIDEO    If the visit is dropped, the patient can be reconnected by:VIDEO VISIT: Text to cell phone:   Telephone Information:   Mobile [phone]       Will anyone else be joining the visit? NO  (If patient encounters technical issues they should call [phone]    Are changes needed to the allergy or medication list? No and Pt stated no med changes    Are refills needed on medications prescribed by this physician? NO    Rooming Documentation:  Not applicable    Reason for visit: Diabetes Education    Erika DIOP

## 2025-04-09 DIAGNOSIS — Z47.89 ORTHOPEDIC AFTERCARE: ICD-10-CM

## 2025-04-09 NOTE — TELEPHONE ENCOUNTER
Medication Question or Refill    Contacts       Contact Date/Time Type Contact Phone/Fax    04/09/2025 10:17 AM CDT Phone (Incoming) Latoya Rodríguez (Self) [phone] (M)            What medication are you calling about (include dose and sig)?: ANTIBIOTIC    Preferred Pharmacy:   -[id number] - [facility] [address]  [address] [facility] [address]  Phone: [phone] Fax: [phone]    [facility] - [place], [place] - [address]  [address] [facility] [address]  Phone: [phone] Fax: [phone]    [facility] [place], [place] - [address]  [address]  [facility] [address]  Phone: [phone] Fax: [phone] Alternate Fax: [phone], [phone]    [id number] - [place], [place] - [facility] [address]  Phone: [phone] Fax: [phone]    [facility] - [place], [place] - [address]  [address] [facility] [address]  Phone: [phone] Fax: [phone]      Controlled Substance Agreement on file:   CSA -- Patient Level:    CSA: None found at the patient level.       Who prescribed the medication?: DR.LORETTA DHALIWAL     Do you need a refill? Yes    When did you use the medication last? 3-4 MONTH AGO    Patient offered an appointment? No    Do you have any questions or concerns?  Yes: PT CALLING TO REQUEST REFILL ON ANTIBIOTIC USED BEFORE ATTENDING A DENTISIT APPT. SHE HAS AND APPT ON MONDAY 04/14/2025 AND WOULD LIKE THAT MED REFILL BEFORE THEN.       Could we send this information to you in [facility] or would you prefer to receive a phone call?:   No preference   Okay to leave a detailed message?: Yes at Cell number on file:    Telephone Information:   Mobile [phone]

## 2025-04-10 RX ORDER — AMOXICILLIN 500 MG/1
CAPSULE ORAL
Qty: 4 CAPSULE | Refills: 2 | Status: SHIPPED | OUTPATIENT
Start: 2025-04-10

## 2025-04-10 NOTE — TELEPHONE ENCOUNTER
This needs to be routed to orthopedic surgery as that was where it was filled before and not indicated in Primary care   Ordering Department:  ORTHO SURG  Authorized By: Sandra Cormier PA-C

## 2025-04-13 ENCOUNTER — HEALTH MAINTENANCE LETTER (OUTPATIENT)
Age: 75
End: 2025-04-13

## 2025-04-15 ENCOUNTER — MYC MEDICAL ADVICE (OUTPATIENT)
Dept: FAMILY MEDICINE | Facility: CLINIC | Age: 75
End: 2025-04-15

## 2025-04-15 ENCOUNTER — VIRTUAL VISIT (OUTPATIENT)
Dept: EDUCATION SERVICES | Facility: CLINIC | Age: 75
End: 2025-04-15
Payer: COMMERCIAL

## 2025-04-15 DIAGNOSIS — E10.9 TYPE 1 DIABETES MELLITUS WITHOUT COMPLICATION (H): Primary | ICD-10-CM

## 2025-04-15 PROCEDURE — 1126F AMNT PAIN NOTED NONE PRSNT: CPT | Mod: 93 | Performed by: DIETITIAN, REGISTERED

## 2025-04-15 PROCEDURE — 98967 PH1 ASSMT&MGMT NQHP 11-20: CPT | Mod: 93 | Performed by: DIETITIAN, REGISTERED

## 2025-04-15 NOTE — PROGRESS NOTES
Virtual Visit Details    Type of service:  Telephone Visit   Phone call duration: 14 minutes   Originating Location (pt. Location): Home    Distant Location (provider location):  Off-site  Telephone visit completed due to the patient did not consent to a video visit.    Diabetes Education Follow-up    Subjective/Objective:    Latoya Rodríguez sent in blood glucose log for review. Last date of communication was: .    Diabetes is being managed with Diabetes Medications   Diabetes Medication(s)       Insulin       insulin aspart (NOVOLOG FLEXPEN) 100 UNIT/ML pen 1 unit per 15 gram of carb for breakfast, 1 unit per 10 grams of carbs for lunch and  1 unit per 10 gram for dinner. Plus 1u per 50>150 at breakfast, lunch and dinner and above 200 before bedtime Total daily dose approx 30 units.     insulin glargine (LANTUS PEN) 100 UNIT/ML pen Inject 36 Units subcutaneously every morning.            BG/Food Log:           These comparisons below are from the last 8 days since our last insulin adjustment, and the 8 days prior to that.            Assessment:    Latoya's BG has been more elevated this last week. She reports that Thursday night she went to her daughters house for dinner and had lasagna and cheese bread and her BG spiked too high and stayed high through Friday and Saturday. Her BG pattern is very different this last week, compared to previous weeks.  She doesn't feel that anything has been different this last week. She isn't missing any doses. We discussed insulin pumping, Latoya is not interested. Recommended that Latoya track her insulin dose and carb amounts for the next week and we can compare to her Dexcom.     Plan/Response:  No changes to insulin doses  Latoya to track her insulin doses and carb amounts for the next week    GENEVIEVE Day [facility]  Diabetes Education Department  [facility] [place]  Phone: [phone]  [phone]      Any diabetes medication dose  changes were made via the CDE Protocol and Collaborative Practice Agreement with the patient's referring provider. A copy of this encounter was shared with the provider.

## 2025-04-15 NOTE — NURSING NOTE
Current [facility] location[facility] [address]  [place] [address]    Is the patient currently in the state of [place]? YES    Visit mode: TELEPHONE    If the visit is dropped, the patient can be reconnected by:TELEPHONE VISIT: Phone number:   Telephone Information:   Mobile [phone]       Will anyone else be joining the visit? NO  (If patient encounters technical issues they should call [phone]    Are changes needed to the allergy or medication list? No    Are refills needed on medications prescribed by this physician? NO    Rooming Documentation:  Questionnaire(s) not pre-assigned    Reason for visit: Diabetes Education    Ryan DIOP

## 2025-04-15 NOTE — LETTER
4/15/2025      Latoya Rodríguez  [address] [place] [address]      Dear Colleague,    Thank you for referring your patient, Latoya Rodríguez, to the [facility]. Please see a copy of my visit note below.    Virtual Visit Details    Type of service:  Telephone Visit   Phone call duration: 14 minutes   Originating Location (pt. Location): Home    Distant Location (provider location):  Off-site  Telephone visit completed due to the patient did not consent to a video visit.    Diabetes Education Follow-up    Subjective/Objective:    Latoya Rodríguez sent in blood glucose log for review. Last date of communication was: 4/8.    Diabetes is being managed with Diabetes Medications   Diabetes Medication(s)       Insulin       insulin aspart (NOVOLOG FLEXPEN) 100 UNIT/ML pen 1 unit per 15 gram of carb for breakfast, 1 unit per 10 grams of carbs for lunch and  1 unit per 10 gram for dinner. Plus 1u per 50>150 at breakfast, lunch and dinner and above 200 before bedtime Total daily dose approx 30 units.     insulin glargine (LANTUS PEN) 100 UNIT/ML pen Inject 36 Units subcutaneously every morning.            BG/Food Log:           These comparisons below are from the last 8 days since our last insulin adjustment, and the 8 days prior to that.            Assessment:    Latoya's BG has been more elevated this last week. She reports that Thursday night she went to her daughters house for dinner and had lasagna and cheese bread and her BG spiked too high and stayed high through Friday and Saturday. Her BG pattern is very different this last week, compared to previous weeks.  She doesn't feel that anything has been different this last week. She isn't missing any doses. We discussed insulin pumping, Latoya is not interested. Recommended that Latoya track her insulin dose and carb amounts for the next week and we can compare to her Dexcom.     Plan/Response:  No changes to insulin doses  Latoya to  track her insulin doses and carb amounts for the next week    Anjana Simon RD [facility]  Diabetes Education Department  [facility], [facility] [place]  Phone: [phone]  [phone]      Any diabetes medication dose changes were made via the CDE Protocol and Collaborative Practice Agreement with the patient's referring provider. A copy of this encounter was shared with the provider.      Again, thank you for allowing me to participate in the care of your patient.        Sincerely,        Anjana Simon RD    Electronically signed

## 2025-04-21 ENCOUNTER — ALLIED HEALTH/NURSE VISIT (OUTPATIENT)
Dept: EDUCATION SERVICES | Facility: CLINIC | Age: 75
End: 2025-04-21
Payer: COMMERCIAL

## 2025-04-21 DIAGNOSIS — E10.9 TYPE 1 DIABETES MELLITUS WITHOUT COMPLICATION (H): Primary | ICD-10-CM

## 2025-04-21 PROCEDURE — G0108 DIAB MANAGE TRN  PER INDIV: HCPCS | Mod: AE | Performed by: DIETITIAN, REGISTERED

## 2025-04-21 NOTE — PROGRESS NOTES
Diabetes Self-Management Education & Support    Presents for: Follow-up    Type of Service: In Person Visit      Assessment  Latoya reports some new stress in her life, as she sold her house and hasn't been able to find a new house.  She closes on her house in June and needs to find a new house by then. We discuss the stress as a possible culprit for the higher and more unpredictable blood sugars. Latoya also had an appointment with her neurologist on Friday and her prednisone dose was decreased from 10 mg to 7.5 mg.  Her BG has come down slightly since our last visit (last week), but still seeing higher BG, and difficult to determine pattern.  Latoya's meals and exercise are mostly consistent day to day.     We discussed InPen today to help with calculating doses, Latoya is interested and would like to order the Blue InPen.  Will get plan approved by Dr. Edward Aguero MD. Latoya receives her insulin from the [facility] through her PCP, so the medication change form will need to go through her PCP. Will fax over once approved by Dr. Leon.       See CGM Reports in Monitoring section below.      Care Plan and Education Provided:  Healthy Eating: Carbohydrate Counting and Consistency in amount and timing of carbohydrate intake  Monitoring: Frequency of monitoring and Individual glucose targets  Taking Medication: Action of prescribed medication(s) and When to take medication(s)  Problem Solving: High glucose - causes, signs/symptoms, treatment and prevention and Low glucose - causes, signs/symptoms, treatment and prevention    Patient verbalized understanding of diabetes self-management education concepts discussed, opportunities for ongoing education and support, and recommendations provided today.    Plan    Plan to start InPen    Insulin Plan:  Lantus: 36 units     Novolog:   Insulin:Carbohydrate  Breakfast: 1 unit:20 grams of carbohydrates  Lunch: 1 unit: 15 --> 12 grams of carbohydrates  Dinner: 1 unit: 8 --> 7  "grams of carbohydrates    Correction:  Breakfast: 1 unit: 50 > 150 mg/dl  Lunch: 1 unit: 75 > 150 mg/dl  Dinner: 1 unit: 75 > 150 mg/dl  Bedtime: 1 unit:50 >200 mg/dl      Topics to cover at upcoming visits: Healthy Eating, Being Active, Monitoring, Taking Medication, Problem Solving, Reducing Risks, and Healthy Coping    Follow-up:  Upcoming Diabetes Ed Appointments     Visit Type Date Time Department    DIABETES ED 4/21/2025  9:30 AM [facility]    DIABETES ED 5/15/2025  9:30 AM [facility]        See Care Plan for co-developed, patient-state behavior change goals.    Education Materials Provided:  No new materials provided today      Subjective/Objective  Latoya is an 74 year old, presenting for the following diabetes education related to: Follow-up  Cultural Influences/Ethnic Background:  Not  or     Diabetes Symptoms & Complications:  Diabetes Related Symptoms: None  Weight trend: Stable  Symptom course: Worsening  Disease course: Getting harder to manage  Complications assessed today?: No  Autonomic neuropathy: No  CVA: No  Heart disease: No  Nephropathy: No  Peripheral neuropathy: No  Peripheral Vascular Disease: No  Retinopathy: No  Sexual dysfunction: No    Patient Problem List and Family Medical History reviewed for relevant medical history, current medical status, and diabetes risk factors.    Vitals:  LMP  (LMP Unknown)   Estimated body mass index is 26.79 kg/m  as calculated from the following:    Height as of 1/27/25: 1.676 m (5' 6\").    Weight as of 1/27/25: 75.3 kg (166 lb).   Last 3 BP:   BP Readings from Last 3 Encounters:   01/27/25 (!) 144/77   11/21/24 139/81   11/13/24 115/75       History   Smoking Status    Never   Smokeless Tobacco    Never       Labs:  Lab Results   Component Value Date    A1C 9.0 11/21/2024    A1C 8.6 06/19/2023    HEMOGLOBINA1 8.0 11/15/2019     Lab Results   Component Value Date     11/21/2024    GLC 74 11/06/2024     01/23/2023    GLC " 289 08/02/2019     Lab Results   Component Value Date     11/21/2024    LDL 90 07/01/2021     HDL Cholesterol   Date Value Ref Range Status   07/01/2021 70 >49 mg/dL Final     Direct Measure HDL   Date Value Ref Range Status   11/21/2024 87 >=50 mg/dL Final   ]  GFR Estimate   Date Value Ref Range Status   11/21/2024 >90 >60 mL/min/1.73m2 Final     Comment:     eGFR calculated using 2021 CKD-EPI equation.   07/01/2021 82 >60 mL/min/[1.73_m2] Final     Comment:     Non  GFR Calc  Starting 12/18/2018, serum creatinine based estimated GFR (eGFR) will be   calculated using the Chronic Kidney Disease Epidemiology Collaboration   (CKD-EPI) equation.       GFR, ESTIMATED POCT   Date Value Ref Range Status   11/17/2021 >60 >60 mL/min/1.73m2 Final     GFR Estimate If Black   Date Value Ref Range Status   07/01/2021 >90 >60 mL/min/[1.73_m2] Final     Comment:      GFR Calc  Starting 12/18/2018, serum creatinine based estimated GFR (eGFR) will be   calculated using the Chronic Kidney Disease Epidemiology Collaboration   (CKD-EPI) equation.       Lab Results   Component Value Date    CR 0.63 11/21/2024    CR 0.74 07/01/2021     Lab Results   Component Value Date    MICROL <12.0 11/21/2024    UMALCR  11/21/2024      Comment:      Unable to calculate, urine albumin and/or urine creatinine is outside detectable limits.  Microalbuminuria is defined as an albumin:creatinine ratio of 17 to 299 for males and 25 to 299 for females. A ratio of albumin:creatinine of 300 or higher is indicative of overt proteinuria.  Due to biologic variability, positive results should be confirmed by a second, first-morning random or 24-hour timed urine specimen. If there is discrepancy, a third specimen is recommended. When 2 out of 3 results are in the microalbuminuria range, this is evidence for incipient nephropathy and warrants increased efforts at glucose control, blood pressure control, and institution of  therapy with an angiotensin-converting-enzyme (ACE) inhibitor (if the patient can tolerate it).      UCRR 24.8 11/21/2024 4/21/2025   Healthy Eating   Healthy Eating Assessed Today Yes   Do you have any food allergies or intolerances? No   Meal planning/habits Carb counting   Who cooks/prepares meals for you? Self   Who purchases food in  your home? Self   Meals include Breakfast;Lunch;Dinner   Breakfast 7:00: a couple of eggs OR 1 bowl of cereal with blueberries   Lunch 11- 12:00: Soup- barley kale vegetable with tangerine   Dinner 6-6:30: protein with raw vegetables or cauliflower - sometimes with dip with yazzo frozen yogurt bar, sometimes with toast   Snacks not snacking, might have tangerine or spoonful of peanut butter   Other crystal light decaf iced tea, 1 decaf diet pepsi   Beverages Diet soda   Has patient met with a dietitian in the past? No         4/21/2025   Monitoring   Monitoring Assessed Today Yes   Did patient bring glucose meter to appointment?  Yes   Blood Glucose Meter CGM   Blood glucose trend Fluctuating                       Diabetes Medication(s)       Insulin       insulin aspart (NOVOLOG FLEXPEN) 100 UNIT/ML pen 1 unit per 15 gram of carb for breakfast, 1 unit per 10 grams of carbs for lunch and  1 unit per 10 gram for dinner. Plus 1u per 50>150 at breakfast, lunch and dinner and above 200 before bedtime Total daily dose approx 30 units.     insulin glargine (LANTUS PEN) 100 UNIT/ML pen Inject 36 Units subcutaneously every morning.              4/21/2025   Taking Medications   Taking Medication Assessed Today Yes   Current Treatments Insulin Injections   Dose schedule Pre-breakfast;Pre-lunch;Pre-dinner;At bedtime   Given by Patient         4/21/2025   Problem Solving   Problem Solving Assessed Today Yes   Is the patient at risk for hypoglycemia? Yes   Hypoglycemia Frequency Daily   Hypoglycemia Treatment Other food       Anjana Simon RD LD [facility]  Time Spent: 45  minutes  Encounter Type: Individual    Any diabetes medication dose changes were made via the [facility] under the patient's referring provider.

## 2025-04-21 NOTE — PATIENT INSTRUCTIONS
Insulin Plan:  Lantus: 36 units     Novolog:   Insulin:Carbohydrate  Breakfast: 1 unit:20 grams of carbohydrates  Lunch: 1 unit: 12 grams of carbohydrates  Dinner: 1 unit: 7 grams of carbohydrates    Correction:  Breakfast: 1 unit: 50 > 150 mg/dl  Lunch: 1 unit: 75 > 150 mg/dl  Dinner: 1 unit: 75 > 150 mg/dl  Bedtime: 1 unit:50 >200 mg/dl    I will send the InPen to your pharmacy once I run it by Dr. Leon. I will check with Jennifer at the [place] Patient [employer] program to see what we need to do to switch from the Novolog prefilled pens to the cartridges that would fit in the InPen. I'll keep you posted!    GENEVIEVE Day [facility]  Diabetes Education Department  [facility], [facility] [place]  Phone: [phone]  [phone]

## 2025-04-22 ENCOUNTER — TELEPHONE (OUTPATIENT)
Dept: EDUCATION SERVICES | Facility: CLINIC | Age: 75
End: 2025-04-22
Payer: COMMERCIAL

## 2025-04-22 DIAGNOSIS — E10.9 TYPE 1 DIABETES MELLITUS WITHOUT COMPLICATION (H): Primary | ICD-10-CM

## 2025-04-22 RX ORDER — INSULIN PEN,REUSABLE,BT LISPRO
1 INSULIN PEN (EA) SUBCUTANEOUS ONCE
Qty: 1 EACH | Refills: 0 | Status: SHIPPED | OUTPATIENT
Start: 2025-04-22 | End: 2025-04-22

## 2025-04-22 NOTE — TELEPHONE ENCOUNTER
Diabetes Education:    See diabetes education note from yesterday for more details.  Latoya will be switching from pre-filled Novolog pens to the InPen with Novolog cartridges. This plan was approved by Dr. Edward Aguero MD who is the endocrinologist that is managing her diabetes.  However, since Latoya gets her diabetes medications through the [facility] with her PCP, the PCP team will need to make the change with [facility].  Will send a message to the RN pool to see what needs to be done.     To be done by PCP Team:  [facility]: Switch from Novolog Penfill to Novolog Cartridges (no change in dose)      GENEVIEVE Day [facility]  Diabetes Education Department  [facility], [facility] [place]  Phone: [phone]  [phone]

## 2025-04-24 NOTE — TELEPHONE ENCOUNTER
Filled out the [facility] [employer] [facility] Medication Change request to switch from Novolog Flexpen to Novolog cartridges and faxed to Pricila Avila to complete and fax to [facility].    GENEVIEVE Day [facility]  Diabetes Education Department  [facility], [place]  Phone: [phone]  [phone]

## 2025-04-25 NOTE — TELEPHONE ENCOUNTER
The form in my box does not have Novolog cartridges on the form I received   Is there another one I should have received?

## 2025-04-27 ENCOUNTER — MYC REFILL (OUTPATIENT)
Dept: FAMILY MEDICINE | Facility: CLINIC | Age: 75
End: 2025-04-27
Payer: COMMERCIAL

## 2025-04-27 DIAGNOSIS — I48.11 LONGSTANDING PERSISTENT ATRIAL FIBRILLATION (H): ICD-10-CM

## 2025-04-27 DIAGNOSIS — Z47.89 ORTHOPEDIC AFTERCARE: ICD-10-CM

## 2025-04-28 ENCOUNTER — OFFICE VISIT (OUTPATIENT)
Dept: ENDOCRINOLOGY | Facility: CLINIC | Age: 75
End: 2025-04-28
Payer: COMMERCIAL

## 2025-04-28 VITALS
SYSTOLIC BLOOD PRESSURE: 139 MMHG | WEIGHT: 167 LBS | RESPIRATION RATE: 14 BRPM | HEART RATE: 72 BPM | DIASTOLIC BLOOD PRESSURE: 74 MMHG | OXYGEN SATURATION: 97 % | BODY MASS INDEX: 26.95 KG/M2

## 2025-04-28 DIAGNOSIS — E10.65 TYPE 1 DIABETES MELLITUS WITH HYPERGLYCEMIA (H): Primary | ICD-10-CM

## 2025-04-28 PROCEDURE — 99214 OFFICE O/P EST MOD 30 MIN: CPT | Mod: 25 | Performed by: INTERNAL MEDICINE

## 2025-04-28 PROCEDURE — 3078F DIAST BP <80 MM HG: CPT | Performed by: INTERNAL MEDICINE

## 2025-04-28 PROCEDURE — 95251 CONT GLUC MNTR ANALYSIS I&R: CPT | Performed by: INTERNAL MEDICINE

## 2025-04-28 PROCEDURE — 3075F SYST BP GE 130 - 139MM HG: CPT | Performed by: INTERNAL MEDICINE

## 2025-04-28 RX ORDER — AMOXICILLIN 500 MG/1
CAPSULE ORAL
Qty: 4 CAPSULE | Refills: 2 | Status: SHIPPED | OUTPATIENT
Start: 2025-04-28

## 2025-04-28 NOTE — PROGRESS NOTES
Chief complaint:  Latoya is a 74 year old female seen in follow up of type 1 Diabetes.  HISTORY OF PRESENT ILLNESS  Latoya is a 74-year-old retiree with a history of type 1 diabetes who has a visit today for a follow-up type 1 diabetes.  She was diagnosed with type 1 diabetes 2016 during routine physical where she was found to have high blood glucose levels.  At that time she was diagnosed and treated as a type II diabetic but it was later determined that she had positive TORRES antibodies and low C-peptide.      Interval history  Was diagnosed with Myasthenia gravis May 2024. Has been on treatment including pyridostigmine and prednisone since. She is now on prednisone 7 mg daily and also pyridostigmine 60 mg 4 times a day. She follows up with neurologist regularly.     1) Type 1 diabetes, A1c is 8.8% today. She is currently using Dexcom G7 to track glucose levels. Reviewed BG in the past 4 weeks, sensor usage 97%.  Average glucose 216 (SD 93).  Time in range 2%.  Time above range 98%, time below range 1%.  She has high BG all day but mostly after midnight with high peak after meals.     Current regimen   Lantus 36 units at 7 PM  Novolog 1:17 g with breakfast, 1 unit per 12 gram at lunch, and 1 unit per 7 gram at dinner  Novolog correction 1:50 if BG is >150 at meal time, Novolog correction 1:50 if BG is >200 at bedtime    2) Bone health: Patient has high risk of fracture, her mother had history of osteoporosis. DXA 9/6/2022 showed the T score -2 at the lumbar spine, T score -1.6 at the left femoral neck, -1.2 at the right femoral neck and -1.1 at the wrist.  Started on Fosamax 70 mg weekly since April 2023 due to high FRAX. She is due for DXA.    Past Medical History  Type 1 diabetes, diagnosed 6/2016  Hyperlipidemia  Osteoporosis     Medications  Current Outpatient Medications   Medication Sig Dispense Refill    alendronate (FOSAMAX) 70 MG tablet Take 1 tablet (70 mg) by mouth every 7 days. 12 tablet 3    amoxicillin  (AMOXIL) 500 MG capsule TAKE 4 CAPS 30-60 MIN BEFORE DENTAL PROCEDURE 4 capsule 2    apixaban ANTICOAGULANT (ELIQUIS) 5 MG tablet Take 1 tablet (5 mg) by mouth 2 times daily. 180 tablet 3    atorvastatin (LIPITOR) 40 MG tablet Take 1 tablet (40 mg) by mouth daily. 90 tablet 3    blood glucose (NO BRAND SPECIFIED) test strip Use to test blood sugar 4 times daily or as directed. Accu Chek Guide test strips. 150 strip 3    calcium carbonate-vitamin D 500-400 MG-UNIT TABS tablt Take 1 tablet by mouth 2 times daily 180 tablet 3    Continuous Glucose Sensor (DEXCOM G7 SENSOR) MISC 1 each every 10 days. 9 each 3    estradiol (ESTRACE) 0.1 MG/GM vaginal cream Place 2 g vaginally twice a week. 42.5 g 11    estradiol (ESTRACE) 0.1 MG/GM vaginal cream INSERT 2G VAGINALLY TWICE PER WEEK 42.5 g 11    insulin aspart (NOVOLOG FLEXPEN) 100 UNIT/ML pen 1 unit per 15 gram of carb for breakfast, 1 unit per 10 grams of carbs for lunch and  1 unit per 10 gram for dinner. Plus 1u per 50>150 at breakfast, lunch and dinner and above 200 before bedtime Total daily dose approx 30 units. 45 mL 3    insulin aspart (NOVOPEN ECHO) 100 UNIT/ML cartridge 1 unit per 20 gram of carb for breakfast, 1 unit per 12 grams of carbs for lunch and 1 unit per 7 gram for dinner. Plus 1u per 50>150 at breakfast, lunch and dinner and above 200 before bedtime Total daily dose approx 30 units.      insulin glargine (LANTUS PEN) 100 UNIT/ML pen Inject 36 Units subcutaneously every morning. 30 mL 1    insulin pen needle (B-D U/F) 31G X 8 MM miscellaneous USE 5 TIMES DAILY  WITH NOVOLOG AND LANTUS 500 each 1    melatonin 3 MG tablet Take 1 tablet (3 mg) by mouth nightly as needed for sleep 30 tablet 0    Multiple Vitamins-Minerals (MULTIVITAMIN ADULT PO) Take 1 tablet by mouth every morning       predniSONE (DELTASONE) 20 MG tablet Take 0.5 tablets (10 mg) by mouth daily.      pyRIDostigmine (MESTINON) 60 MG tablet Take 60 mg by mouth 4 times daily.      Urine  Glucose-Ketones Test (KETO-DIASTIX) STRP 1 strip by In Vitro route as needed 1 each 11     Allergies  No Known Allergies    Family History  family history includes Brain Cancer in her maternal uncle; Breast Cancer in her mother; Coronary Artery Disease in her maternal uncle and maternal uncle; Hyperlipidemia in her mother; Leukemia in her father; Other Cancer in her father; Skin Cancer in her father; Stomach Cancer in her maternal aunt.  No thyroid disease or type 1 diabetes in the family    Social History  Social History     Socioeconomic History    Marital status: Single     Spouse name: Not on file    Number of children: Not on file    Years of education: Not on file    Highest education level: Not on file   Occupational History    Not on file   Tobacco Use    Smoking status: Never     Passive exposure: Never    Smokeless tobacco: Never   Vaping Use    Vaping status: Never Used   Substance and Sexual Activity    Alcohol use: Yes     Comment: 1 to 2 beers or glasses of wine 1 to 2 times per month    Drug use: No    Sexual activity: Not Currently     Partners: Female     Birth control/protection: Post-menopausal   Other Topics Concern    Parent/sibling w/ CABG, MI or angioplasty before 65F 55M? No   Social History Narrative    Not on file     Social Drivers of Health     Financial Resource Strain: Low Risk  (11/17/2024)    Financial Resource Strain     Within the past 12 months, have you or your family members you live with been unable to get utilities (heat, electricity) when it was really needed?: No   Food Insecurity: Low Risk  (11/17/2024)    Food Insecurity     Within the past 12 months, did you worry that your food would run out before you got money to buy more?: No     Within the past 12 months, did the food you bought just not last and you didn t have money to get more?: No   Transportation Needs: Low Risk  (11/17/2024)    Transportation Needs     Within the past 12 months, has lack of transportation kept  "you from medical appointments, getting your medicines, non-medical meetings or appointments, work, or from getting things that you need?: No   Physical Activity: Sufficiently Active (11/17/2024)    Exercise Vital Sign     Days of Exercise per Week: 7 days     Minutes of Exercise per Session: 90 min   Stress: No Stress Concern Present (11/17/2024)    [place] [employer] - Occupational Stress Questionnaire     Feeling of Stress : Not at all   Social Connections: Unknown (11/17/2024)    Social Connection and Isolation Panel [NHANES]     Frequency of Communication with Friends and Family: Not on file     Frequency of Social Gatherings with Friends and Family: Once a week     Attends [employer] Services: Not on file     Active Member of Clubs or Organizations: Not on file     Attends Club or Organization Meetings: Not on file     Marital Status: Not on file   Interpersonal Safety: Low Risk  (11/21/2024)    Interpersonal Safety     Do you feel physically and emotionally safe where you currently live?: Yes     Within the past 12 months, have you been hit, slapped, kicked or otherwise physically hurt by someone?: No     Within the past 12 months, have you been humiliated or emotionally abused in other ways by your partner or ex-partner?: No   Housing Stability: Low Risk  (11/17/2024)    Housing Stability     Do you have housing? : Yes     Are you worried about losing your housing?: No       REVIEW OF SYSTEMS  10 points ROS were negative otherwise mentioned in HPI    Physical Exam  Vital signs:   LMP  (LMP Unknown)   Estimated body mass index is 26.79 kg/m  as calculated from the following:    Height as of 1/27/25: 1.676 m (5' 6\").    Weight as of 1/27/25: 75.3 kg (166 lb).  Constitutional: no distress, comfortable, pleasant   Eyes: anicteric  Musculoskeletal:+2 edema after a fall  Skin:  no jaundice   Neurological: normal gait, intact monofilament test bilaterally 1/7/2025, no tremor on outstretched " hands bilaterally  Psychological: appropriate mood     DATA REVIEW  Lab Results   Component Value Date    A1C 8.8 04/28/2025    A1C 9.0 11/21/2024    A1C 9.1 09/26/2024    A1C 7.6 05/21/2024    A1C 7.8 11/24/2023    A1C 8.6 06/19/2023    A1C 6.9 12/23/2022    A1C 8.0 09/06/2022    A1C 7.9 05/02/2022    A1C 7.6 11/15/2021    A1C 6.7 07/01/2021    A1C 8.4 02/10/2021      Latest Ref Rng 11/21/2024  9:01 AM   ENDO DIABETES     ALT 0 - 50 U/L 27    AST 0 - 45 U/L 30    Cholesterol <200 mg/dL 232 (H)    LDL Cholesterol Calculated <100 mg/dL 132 (H)    HDL Cholesterol >=50 mg/dL 87    Non HDL Cholesterol <130 mg/dL 145 (H)    Triglycerides <150 mg/dL 67    TRIG (EXT) <150 mg/dL 67    Creatinine 0.51 - 0.95 mg/dL 0.63    Hematocrit 35.0 - 47.0 % 39.9    Hemoglobin 11.7 - 15.7 g/dL 13.2    Albumin Urine mg/L mg/L    Albumin Urine mg/L mg/L <12.0       DXA 8/28/2020  Lumbar spine T-score in region of L2-L4 = -2.4   L1-4 percent change: Not significant%      HIPS:  Mean total hip T-score: -1  Mean total hip percent change: Not significant%      Left femoral neck T-score = -1.9  Right femoral neck T-score = -1.6      Radius 33% T-score = -1.6     FRAX:  10 year probability of major osteoporotic fracture: 11.3%  10 year probability of hip fracture: 2.1%  The 10 year probability of fracture may be lower than reported if the  patient has received treatment. FRAX data should be disregarded in patient's taking bisphosphonates.                                                                 IMPRESSION:    Low bone mass (osteopenia).    DXA 9/6/2022  Lumbar spine T-score in region of L1-L4 = -2   L1-4 percent change: 3.7%      HIPS:  Mean total hip T-score: -1.1  Mean total hip percent change: Not significant%      Left femoral neck T-score = -1.6  Right femoral neck T-score = -1.2      Radius 33% T-score = -1.1  Radius 33% percent change: Not significant%      FRAX:  10 year probability of major osteoporotic fracture: 17.2%  10 year  probability of hip fracture: 5.4%  The 10 year probability of fracture may be lower than reported if the patient has received treatment. FRAX data should be disregarded in patient's taking bisphosphonates.                                                                    IMPRESSION:    Low bone mass (osteopenia).     ASSESSMENT/PLAN:   Latoya is a 74-year-old retiree with a history of type 1 diabetes who is here for T1 diabetes management.    1.  Type 1 diabetes: A1c is 8.8% today. Latoya's blood glucose readings has been very high due to prednisone. Pattern showed fasting and postprandial hyperglycemia. No hypoglycemia.     Recommendations:  -Increase Lantus 38 units and moved to morning time  -Novolog 1 unit per 17 gram at breakfast  -Novolog 1 unit per 12 gram at lunch  -Novolog 1 unit per 7 gram at dinner  - Novolog correction 1 units per 50 if BG is >150 at meal time and bedtime    2) DM complications:  Retinopathy: normal exam - no DR July 2024   Nephropathy: negative urine microalbumin 11/2024  Neuropathy: none per exam 1/2025     3) Hyperlipidemia:  11/2024.. Continue Lipitor 10 mg daily.    4) Osteopenia: DXA 9/6/2022 showed the T score -2 at the lumbar spine, T score -1.6 at the left femoral neck, -1.2 at the right femoral neck and -1.1 at the wrist.    10 year probability of major osteoporotic fracture: 17.2%  10 year probability of hip fracture: 5.4%    Has been on on Fosamax 70 mg weekly since April 2023.  Continue calcium and vitamin D  Follow up with PCP for osteoporosis.   Should have repeat DXA this year    Plan:  -Follow-up with CDE 2 weeks  -Follow-up in 3 months with PA or 6 months with MD    I spent a total of 30 minutes on the date of encounter reviewing medical records, evaluating the patient, coordinating care and documenting in the EHR, as detailed above, exclusive of CGM time.    Edward Aguero MD  Division of Diabetes and Endocrinology  Department of Medicine

## 2025-04-28 NOTE — PATIENT INSTRUCTIONS
Lantus 38 units in the morning  Novolog 1 unit per 17 gram with breakfast  Novolog 1 unit per 12 g with lunch  Novolog 1 unit per 7 g with dinner    Correction factor  1 unit per 50 mg/dl above 150 mg/dl of glucose for all meal          Welcome to the [facility] Endocrinology and Diabetes Clinics     Our Endocrinology Clinics are here to provide you with a team-based, collaborative approach in the diagnosis and treatment of patients with diabetes and endocrine disorders. The team is made up of Physicians, Physician Assistants, Certified Diabetes Educators, Registered Nurses, Medical Assistants, Emergency Medical Technicians, and many others, all of whom have the unified goal of providing our patients with high quality care.     Please see below for some helpful tips to best navigate and use the [facility] Endocrinology clinic:     [place] Respect: At [facility], we are committed to a respectful and safe space for all patients, visitors, and staff.  We believe that mutual respect between patients and their care team is the foundation of quality care.  It is our expectation that you will be treated with respect by your care team.  In turn, we ask that all communication with the care team (written and verbal) be respectful and free from profanity, threatening, or abusive language.  Disrespectful communication undermines our therapeutic relationship with you and may result in us being unable to continue to provide your care.    Refills: A provider must see you at least annually to prescribe and refill medications. This is to ensure your safety as well as meet insurance and compliance regulations.    Scheduling: Many of our Providers offer both in-person or video visits. Please call to schedule any needed follow ups as soon as possible because our provider schedules fill up very quickly. Our care team has the right to require an in-person visit when they believe that it is medically necessary.  Please remember that for any virtual visits, you must be in the state [facility] at the time of the visit, otherwise we are unable to see you and you will need to be rescheduled.    Missed Appointments: If you need to cancel or miss your scheduled appointment, please call the clinic at [phone] to reschedule.  Please note if you repeatedly miss appointments or repeatedly miss appointments without calling to inform us ahead of time (no-show), the clinic may elect to not allow you to reschedule without speaking to a manager, may require a Partnership In Care Agreement prior to rescheduling, or could result in you no longer being able to receive care from the clinic. Providing the clinic with timely notification if you have to miss an appointment, allows us to better serve the needs of all of our patients.    Primary Care Provider: Our Endocrinologists are Specialists in their field. We expect you to have a Primary Care Provider established to handle any needs outside of your diabetes and endocrine care.  We would be happy to assist you find a Primary Care Provider, if you do not have one.    Ablynx: Ablynx is a wonderful resource that allows you access to your Care Team via online or the daniel. Please ask a member of the team if you would like help creating an account. Please note that it may take up to 2 business days for a response. Ablynx messages are not reviewed on weekends or after business hours.  Emergent or urgent care needs should never be communicated via Ablynx.  If you experience a medical emergency call 911 or go to the nearest emergency room.    Labs: It is recommended that you stay within the [facility] for labs but you are welcome to obtain ordered labs (with some exceptions) from any location of your choice as long as they are able to complete and process the needed labs. If you need us to fax orders to your preferred lab, please provide us the name and fax number of the lab you  would like to go to so we can fax the orders. If your labs are drawn outside of the [facility], please have them fax the results to [phone] ([place]) or [phone] ([place]) or via [facility]. It is your responsibility to ensure that outside lab results are sent to us.    We look forward to working with you. Please do not hesitate to reach out with any questions.    Thank you,    The Endocrine Team    [facility] Address:   [place] Address:     [address] [place] [address]    Phone: [phone]  Fax: [phone]   [address] [place] [address]    Phone: [phone]  Fax: [phone]     Good Prema Cost Estimate Phone Number: [phone]    General Lab and Imaging Scheduling Phone Number: [phone]      If you are interested in exploring research opportunities in the Division of Diabetes, Endocrinology and Metabolism and within the [facility] you are welcome to view the following QR codes by scanning them using your phone's camera to access a link to more information.  Participating in a research study is voluntary. Your decision whether or not to participate will not affect your current or future relations with the [facility] or [facility]. Current available studies are:     Type 1 Diabetes  Adults     Pediatrics     Type 2 Diabetes  Weight Loss - People Treated with Diet or Metformin Only     Pediatrics and Young Adults

## 2025-04-28 NOTE — LETTER
4/28/2025      Latoya Rodríguez  [address] [facility] [address]      Dear Colleague,    Thank you for referring your patient, Latoya Rodríguez, to the [facility]. Please see a copy of my visit note below.    Chief complaint:  Latoya is a 74 year old female seen in follow up of type 1 Diabetes.  HISTORY OF PRESENT ILLNESS  Latoya is a 74-year-old retiree with a history of type 1 diabetes who has a visit today for a follow-up type 1 diabetes.  She was diagnosed with type 1 diabetes 2016 during routine physical where she was found to have high blood glucose levels.  At that time she was diagnosed and treated as a type II diabetic but it was later determined that she had positive TORRES antibodies and low C-peptide.      Interval history  Was diagnosed with Myasthenia gravis May 2024. Has been on treatment including pyridostigmine and prednisone since. She is now on prednisone 7 mg daily and also pyridostigmine 60 mg 4 times a day. She follows up with neurologist regularly.     1) Type 1 diabetes, A1c is 8.8% today. She is currently using Dexcom G7 to track glucose levels. Reviewed BG in the past 4 weeks, sensor usage 97%.  Average glucose 216 (SD 93).  Time in range 2%.  Time above range 98%, time below range 1%.  She has high BG all day but mostly after midnight with high peak after meals.     Current regimen   Lantus 36 units at 7 PM  Novolog 1:17 g with breakfast, 1 unit per 12 gram at lunch, and 1 unit per 7 gram at dinner  Novolog correction 1:50 if BG is >150 at meal time, Novolog correction 1:50 if BG is >200 at bedtime    2) Bone health: Patient has high risk of fracture, her mother had history of osteoporosis. DXA 9/6/2022 showed the T score -2 at the lumbar spine, T score -1.6 at the left femoral neck, -1.2 at the right femoral neck and -1.1 at the wrist.  Started on Fosamax 70 mg weekly since April 2023 due to high FRAX. She is due for DXA.    Past Medical History  Type 1  diabetes, diagnosed 6/2016  Hyperlipidemia  Osteoporosis     Medications  Current Outpatient Medications   Medication Sig Dispense Refill     alendronate (FOSAMAX) 70 MG tablet Take 1 tablet (70 mg) by mouth every 7 days. 12 tablet 3     amoxicillin (AMOXIL) 500 MG capsule TAKE 4 CAPS 30-60 MIN BEFORE DENTAL PROCEDURE 4 capsule 2     apixaban ANTICOAGULANT (ELIQUIS) 5 MG tablet Take 1 tablet (5 mg) by mouth 2 times daily. 180 tablet 3     atorvastatin (LIPITOR) 40 MG tablet Take 1 tablet (40 mg) by mouth daily. 90 tablet 3     blood glucose (NO BRAND SPECIFIED) test strip Use to test blood sugar 4 times daily or as directed. Accu Chek Guide test strips. 150 strip 3     calcium carbonate-vitamin D 500-400 MG-UNIT TABS tablt Take 1 tablet by mouth 2 times daily 180 tablet 3     Continuous Glucose Sensor (DEXCOM G7 SENSOR) MISC 1 each every 10 days. 9 each 3     estradiol (ESTRACE) 0.1 MG/GM vaginal cream Place 2 g vaginally twice a week. 42.5 g 11     estradiol (ESTRACE) 0.1 MG/GM vaginal cream INSERT 2G VAGINALLY TWICE PER WEEK 42.5 g 11     insulin aspart (NOVOLOG FLEXPEN) 100 UNIT/ML pen 1 unit per 15 gram of carb for breakfast, 1 unit per 10 grams of carbs for lunch and  1 unit per 10 gram for dinner. Plus 1u per 50>150 at breakfast, lunch and dinner and above 200 before bedtime Total daily dose approx 30 units. 45 mL 3     insulin aspart (NOVOPEN ECHO) 100 UNIT/ML cartridge 1 unit per 20 gram of carb for breakfast, 1 unit per 12 grams of carbs for lunch and 1 unit per 7 gram for dinner. Plus 1u per 50>150 at breakfast, lunch and dinner and above 200 before bedtime Total daily dose approx 30 units.       insulin glargine (LANTUS PEN) 100 UNIT/ML pen Inject 36 Units subcutaneously every morning. 30 mL 1     insulin pen needle (B-D U/F) 31G X 8 MM miscellaneous USE 5 TIMES DAILY  WITH NOVOLOG AND LANTUS 500 each 1     melatonin 3 MG tablet Take 1 tablet (3 mg) by mouth nightly as needed for sleep 30 tablet 0      Multiple Vitamins-Minerals (MULTIVITAMIN ADULT PO) Take 1 tablet by mouth every morning        predniSONE (DELTASONE) 20 MG tablet Take 0.5 tablets (10 mg) by mouth daily.       pyRIDostigmine (MESTINON) 60 MG tablet Take 60 mg by mouth 4 times daily.       Urine Glucose-Ketones Test (KETO-DIASTIX) STRP 1 strip by In Vitro route as needed 1 each 11     Allergies  No Known Allergies    Family History  family history includes Brain Cancer in her maternal uncle; Breast Cancer in her mother; Coronary Artery Disease in her maternal uncle and maternal uncle; Hyperlipidemia in her mother; Leukemia in her father; Other Cancer in her father; Skin Cancer in her father; Stomach Cancer in her maternal aunt.  No thyroid disease or type 1 diabetes in the family    Social History  Social History     Socioeconomic History     Marital status: Single     Spouse name: Not on file     Number of children: Not on file     Years of education: Not on file     Highest education level: Not on file   Occupational History     Not on file   Tobacco Use     Smoking status: Never     Passive exposure: Never     Smokeless tobacco: Never   Vaping Use     Vaping status: Never Used   Substance and Sexual Activity     Alcohol use: Yes     Comment: 1 to 2 beers or glasses of wine 1 to 2 times per month     Drug use: No     Sexual activity: Not Currently     Partners: Female     Birth control/protection: Post-menopausal   Other Topics Concern     Parent/sibling w/ CABG, MI or angioplasty before 65F 55M? No   Social History Narrative     Not on file     Social Drivers of Health     Financial Resource Strain: Low Risk  (11/17/2024)    Financial Resource Strain      Within the past 12 months, have you or your family members you live with been unable to get utilities (heat, electricity) when it was really needed?: No   Food Insecurity: Low Risk  (11/17/2024)    Food Insecurity      Within the past 12 months, did you worry that your food would run out before  you got money to buy more?: No      Within the past 12 months, did the food you bought just not last and you didn t have money to get more?: No   Transportation Needs: Low Risk  (11/17/2024)    Transportation Needs      Within the past 12 months, has lack of transportation kept you from medical appointments, getting your medicines, non-medical meetings or appointments, work, or from getting things that you need?: No   Physical Activity: Sufficiently Active (11/17/2024)    Exercise Vital Sign      Days of Exercise per Week: 7 days      Minutes of Exercise per Session: 90 min   Stress: No Stress Concern Present (11/17/2024)    [employer] - Occupational Stress Questionnaire      Feeling of Stress : Not at all   Social Connections: Unknown (11/17/2024)    Social Connection and Isolation Panel [NHANES]      Frequency of Communication with Friends and Family: Not on file      Frequency of Social Gatherings with Friends and Family: Once a week      Attends [employer] Services: Not on file      Active Member of Clubs or Organizations: Not on file      Attends Club or Organization Meetings: Not on file      Marital Status: Not on file   Interpersonal Safety: Low Risk  (11/21/2024)    Interpersonal Safety      Do you feel physically and emotionally safe where you currently live?: Yes      Within the past 12 months, have you been hit, slapped, kicked or otherwise physically hurt by someone?: No      Within the past 12 months, have you been humiliated or emotionally abused in other ways by your partner or ex-partner?: No   Housing Stability: Low Risk  (11/17/2024)    Housing Stability      Do you have housing? : Yes      Are you worried about losing your housing?: No       REVIEW OF SYSTEMS  10 points ROS were negative otherwise mentioned in HPI    Physical Exam  Vital signs:   LMP  (LMP Unknown)   Estimated body mass index is 26.79 kg/m  as calculated from the following:    Height as of 1/27/25:  "1.676 m (5' 6\").    Weight as of 1/27/25: 75.3 kg (166 lb).  Constitutional: no distress, comfortable, pleasant   Eyes: anicteric  Musculoskeletal:+2 edema after a fall  Skin:  no jaundice   Neurological: normal gait, intact monofilament test bilaterally 1/7/2025, no tremor on outstretched hands bilaterally  Psychological: appropriate mood     DATA REVIEW  Lab Results   Component Value Date    A1C 8.8 04/28/2025    A1C 9.0 11/21/2024    A1C 9.1 09/26/2024    A1C 7.6 05/21/2024    A1C 7.8 11/24/2023    A1C 8.6 06/19/2023    A1C 6.9 12/23/2022    A1C 8.0 09/06/2022    A1C 7.9 05/02/2022    A1C 7.6 11/15/2021    A1C 6.7 07/01/2021    A1C 8.4 02/10/2021      Latest Ref Rng 11/21/2024  9:01 AM   ENDO DIABETES     ALT 0 - 50 U/L 27    AST 0 - 45 U/L 30    Cholesterol <200 mg/dL 232 (H)    LDL Cholesterol Calculated <100 mg/dL 132 (H)    HDL Cholesterol >=50 mg/dL 87    Non HDL Cholesterol <130 mg/dL 145 (H)    Triglycerides <150 mg/dL 67    TRIG (EXT) <150 mg/dL 67    Creatinine 0.51 - 0.95 mg/dL 0.63    Hematocrit 35.0 - 47.0 % 39.9    Hemoglobin 11.7 - 15.7 g/dL 13.2    Albumin Urine mg/L mg/L    Albumin Urine mg/L mg/L <12.0       DXA 8/28/2020  Lumbar spine T-score in region of L2-L4 = -2.4   L1-4 percent change: Not significant%      HIPS:  Mean total hip T-score: -1  Mean total hip percent change: Not significant%      Left femoral neck T-score = -1.9  Right femoral neck T-score = -1.6      Radius 33% T-score = -1.6     FRAX:  10 year probability of major osteoporotic fracture: 11.3%  10 year probability of hip fracture: 2.1%  The 10 year probability of fracture may be lower than reported if the  patient has received treatment. FRAX data should be disregarded in patient's taking bisphosphonates.                                                                 IMPRESSION:    Low bone mass (osteopenia).    DXA 9/6/2022  Lumbar spine T-score in region of L1-L4 = -2   L1-4 percent change: 3.7%      HIPS:  Mean total hip " T-score: -1.1  Mean total hip percent change: Not significant%      Left femoral neck T-score = -1.6  Right femoral neck T-score = -1.2      Radius 33% T-score = -1.1  Radius 33% percent change: Not significant%      FRAX:  10 year probability of major osteoporotic fracture: 17.2%  10 year probability of hip fracture: 5.4%  The 10 year probability of fracture may be lower than reported if the patient has received treatment. FRAX data should be disregarded in patient's taking bisphosphonates.                                                                    IMPRESSION:    Low bone mass (osteopenia).     ASSESSMENT/PLAN:   Latoya is a 74-year-old retiree with a history of type 1 diabetes who is here for T1 diabetes management.    1.  Type 1 diabetes: A1c is 8.8% today. Latoya's blood glucose readings has been very high due to prednisone. Pattern showed fasting and postprandial hyperglycemia. No hypoglycemia.     Recommendations:  -Increase Lantus 38 units and moved to morning time  -Novolog 1 unit per 17 gram at breakfast  -Novolog 1 unit per 12 gram at lunch  -Novolog 1 unit per 7 gram at dinner  - Novolog correction 1 units per 50 if BG is >150 at meal time and bedtime    2) DM complications:  Retinopathy: normal exam - no DR July 2024   Nephropathy: negative urine microalbumin 11/2024  Neuropathy: none per exam 1/2025     3) Hyperlipidemia:  11/2024.. Continue Lipitor 10 mg daily.    4) Osteopenia: DXA 9/6/2022 showed the T score -2 at the lumbar spine, T score -1.6 at the left femoral neck, -1.2 at the right femoral neck and -1.1 at the wrist.    10 year probability of major osteoporotic fracture: 17.2%  10 year probability of hip fracture: 5.4%    Has been on on Fosamax 70 mg weekly since April 2023.  Continue calcium and vitamin D  Follow up with PCP for osteoporosis.   Should have repeat DXA this year    Plan:  -Follow-up with CDE 2 weeks  -Follow-up in 3 months with PA or 6 months with MD PEREZ spent a  total of 30 minutes on the date of encounter reviewing medical records, evaluating the patient, coordinating care and documenting in the EHR, as detailed above, exclusive of CGM time.    Edward Aguero MD  Division of Diabetes and Endocrinology  Department of Medicine        Again, thank you for allowing me to participate in the care of your patient.        Sincerely,    Edward Aguero MD  Electronically signed

## 2025-04-28 NOTE — NURSING NOTE
Latoya Rodríguez's goals for this visit include:   Chief Complaint   Patient presents with    Follow Up    Diabetes       She requests these members of her care team be copied on today's visit information: yes    PCP: Pricila Avila    Referring Provider:  Referred Self, MD  No address on file    /74   Pulse 72   Resp 14   Wt 75.8 kg (167 lb)   LMP  (LMP Unknown)   SpO2 97%   BMI 26.95 kg/m      Do you need any medication refills at today's visit? No    Jaun Dickey, EMT

## 2025-05-01 NOTE — TELEPHONE ENCOUNTER
Completed From Faxed To:[phone] [facility]  Right-Fax reviewed to confirm form was sent without error.

## 2025-05-01 NOTE — TELEPHONE ENCOUNTER
Requested another form be faxed over. Form received and placed in PCP in basket to review.  Maria Esther Osobrne, CMA

## 2025-05-06 ENCOUNTER — TELEPHONE (OUTPATIENT)
Dept: FAMILY MEDICINE | Facility: CLINIC | Age: 75
End: 2025-05-06
Payer: COMMERCIAL

## 2025-05-06 NOTE — TELEPHONE ENCOUNTER
May 6, 2025,  I interoffice mailed to Pricila Avila CNP, the Sheree Nordisk refill/change form for Ozempic.    Please allow a few days for this to reach the [facility].    Please review, complete, sign and return to us in the enclosed, addressed interoffice envelope.    Thanks so much for your help!    Jennifer Suarez  Prescription Assistance Supervisor  Pharmacy Assistance   Pool: [facility]

## 2025-05-15 ENCOUNTER — ALLIED HEALTH/NURSE VISIT (OUTPATIENT)
Dept: EDUCATION SERVICES | Facility: CLINIC | Age: 75
End: 2025-05-15
Payer: COMMERCIAL

## 2025-05-15 DIAGNOSIS — E10.9 TYPE 1 DIABETES MELLITUS WITHOUT COMPLICATION (H): Primary | ICD-10-CM

## 2025-05-15 NOTE — PROGRESS NOTES
Diabetes Self-Management Education & Support    Presents for: CGM Review    Type of Service: In Person Visit      Assessment  Latoya reports that she has not found a house yet, she is planning to move in with her daughter (and family) for now until she finds an apartment to live in while she looks for a new house.  This has caused significant stress, which could be the cause of some of the BG fluctuations. She reports being very consistent with her insulin doses, she reports that she almost never misses a dose.  There have been a few occasions where she will take her mealtime insulin later, but she doesn't miss it. She has not been correcting at bedtime, she is worried about a low blood sugar overnight.  Writer recommended that she start correcting for highs at bedtime since we are seeing high blood sugars most nights.  She will keep some carbohydrates on her night  case of a low BG. We discussed adjusting her I:C with dinner, as she has been going low at that time and often over corrects, leading to a high BG. Latoya has been treating her lows with 1/2 an apple, then she will become frustrated that it isn't coming up quick enough, so she will eat raisins and the other 1/2 apple, etc, and it will go up too high.  We reviewed hypoglycemia treatment, recommended that she use a faster acting carbohydrate, she will go back to juice.  Writer recommended 1/2 cup of juice, and set alarm to check 15 minutes later, find something to keep busy while waiting the 15 minutes to avoid getting impatient.     She has not been able to get the InPen. We called pharmacy during our appointment today and they were able to place an order for the InPen and it will be ready for Latoya tomorrow morning.  On 5/5 the medication change form to change from the Novolog pens to the Cartidges was faxed to [employer]. Latoya has not heard any update on this from [employer]. Writer sent Brady Suarez a message to see if she could give us an ETA of  when she might be able to get the cartridges.  We will schedule an appoitnment for InPen training once she has all of the supplies.     See CGM Reports in Monitoring section below.      Glucose Patterns & Trends:  BG fluctuating quite a bit     Care Plan and Education Provided:  Monitoring: Blood glucose versus Continuous Glucose Monitoring, Frequency of monitoring, Individual glucose targets, and Log and interpret results  Taking Medication: Action of prescribed medication(s) and When to take medication(s)  Problem Solving: Low glucose - causes, signs/symptoms, treatment and prevention and Rule of 15 and carrying a carbohydrate source at all times in case of low glucose    Patient verbalized understanding of diabetes self-management education concepts discussed, opportunities for ongoing education and support, and recommendations provided today.    Plan    - I will check with Brady Suarez ([facility] prescription assistance person) on the status of the Novolog cartridges that will go in the InPen.  Continue with 38 units of Lantus in the morning  Novolog:  Breakfast: 1 unit: 17 grams   Lunch: 1u: 12 grams  Dinners: 1 unit: 9 grams  Correction: 1 unit: 50> 150 mg/dl  Before meals AND and bedtime      Topics to cover at upcoming visits: Healthy Eating, Being Active, Monitoring, Taking Medication, Problem Solving, Reducing Risks, and Healthy Coping    See Care Plan for co-developed, patient-state behavior change goals.    Education Materials Provided:  No new materials provided today      Subjective/Objective  Latoya is an 74 year old, presenting for the following diabetes education related to: CGM Review  Accompanied by: Self  Diabetes education in the past 24mo: Yes  Focus of Visit: Taking Medication, Problem Solving  Diabetes type: Type 1  Date of diagnosis: 2016  Disease course: Getting harder to manage  How confident are you filling out medical forms by yourself:: Extremely  Diabetes management related  "comments/concerns: having big fluctuations  Transportation concerns: No  Difficulty affording diabetes medication?: Sometimes  Difficulty affording diabetes testing supplies?: No  Other concerns: None  Cultural Influences/Ethnic Background:  Not  or     Diabetes Symptoms & Complications:  Diabetes Related Symptoms: None  Weight trend: Stable  Symptom course: Worsening  Disease course: Getting harder to manage  Complications assessed today?: No  Autonomic neuropathy: No  CVA: No  Heart disease: No  Nephropathy: No  Peripheral neuropathy: No  Peripheral Vascular Disease: No  Retinopathy: No  Sexual dysfunction: No    Patient Problem List and Family Medical History reviewed for relevant medical history, current medical status, and diabetes risk factors.    Vitals:  LMP  (LMP Unknown)   Estimated body mass index is 26.95 kg/m  as calculated from the following:    Height as of 1/27/25: 1.676 m (5' 6\").    Weight as of 4/28/25: 75.8 kg (167 lb).   Last 3 BP:   BP Readings from Last 3 Encounters:   04/28/25 139/74   01/27/25 (!) 144/77   11/21/24 139/81       History   Smoking Status    Never   Smokeless Tobacco    Never       Labs:  Lab Results   Component Value Date    A1C 8.8 04/28/2025    A1C 9.0 11/21/2024    A1C 8.6 06/19/2023    HEMOGLOBINA1 8.0 11/15/2019     Lab Results   Component Value Date     11/21/2024    GLC 74 11/06/2024     01/23/2023     08/02/2019     Lab Results   Component Value Date     11/21/2024    LDL 90 07/01/2021     HDL Cholesterol   Date Value Ref Range Status   07/01/2021 70 >49 mg/dL Final     Direct Measure HDL   Date Value Ref Range Status   11/21/2024 87 >=50 mg/dL Final     GFR Estimate   Date Value Ref Range Status   11/21/2024 >90 >60 mL/min/1.73m2 Final     Comment:     eGFR calculated using 2021 CKD-EPI equation.   07/01/2021 82 >60 mL/min/[1.73_m2] Final     Comment:     Non  GFR Calc  Starting 12/18/2018, serum creatinine " based estimated GFR (eGFR) will be   calculated using the Chronic Kidney Disease Epidemiology Collaboration   (CKD-EPI) equation.       GFR, ESTIMATED POCT   Date Value Ref Range Status   11/17/2021 >60 >60 mL/min/1.73m2 Final     GFR Estimate If Black   Date Value Ref Range Status   07/01/2021 >90 >60 mL/min/[1.73_m2] Final     Comment:      GFR Calc  Starting 12/18/2018, serum creatinine based estimated GFR (eGFR) will be   calculated using the Chronic Kidney Disease Epidemiology Collaboration   (CKD-EPI) equation.       Lab Results   Component Value Date    CR 0.63 11/21/2024    CR 0.74 07/01/2021     Lab Results   Component Value Date    MICROL <12.0 11/21/2024    UMALCR  11/21/2024      Comment:      Unable to calculate, urine albumin and/or urine creatinine is outside detectable limits.  Microalbuminuria is defined as an albumin:creatinine ratio of 17 to 299 for males and 25 to 299 for females. A ratio of albumin:creatinine of 300 or higher is indicative of overt proteinuria.  Due to biologic variability, positive results should be confirmed by a second, first-morning random or 24-hour timed urine specimen. If there is discrepancy, a third specimen is recommended. When 2 out of 3 results are in the microalbuminuria range, this is evidence for incipient nephropathy and warrants increased efforts at glucose control, blood pressure control, and institution of therapy with an angiotensin-converting-enzyme (ACE) inhibitor (if the patient can tolerate it).      UCRR 24.8 11/21/2024           5/15/2025   Healthy Eating   Healthy Eating Assessed Today Yes   Do you have any food allergies or intolerances? No   Meal planning/habits Carb counting   Who cooks/prepares meals for you? Self   Who purchases food in  your home? Self   Meals include Breakfast;Lunch;Dinner   Breakfast 7:00: a couple of eggs OR 1 bowl of cereal with blueberries   Lunch 11- 12:00: Soup- barley kale vegetable with tangerine   Dinner  6-6:30: protein with raw vegetables or cauliflower - sometimes with dip with yazzo frozen yogurt bar, sometimes with toast   Snacks not snacking, might have tangerine or spoonful of peanut butter   Other crystal light decaf iced tea, 1 decaf diet pepsi   Beverages Diet soda   Has patient met with a dietitian in the past? No         5/15/2025   Being Active   Being Active Assessed Today Yes   Exercise: Yes       walking dog -3ish miles. usually between 10-12 or 1-3   Days per week of moderate to strenuous exercise (like a brisk walk) 6   On average, minutes per day of exercise at this level 60   How intense was your typical exercise?  Moderate (like brisk walking)   Exercise Minutes per Week 360         5/15/2025   Monitoring   Monitoring Assessed Today Yes   Did patient bring glucose meter to appointment?  Yes   Blood Glucose Meter CGM   Blood glucose trend Fluctuating                             Diabetes Medication(s)       Insulin       insulin aspart (NOVOLOG FLEXPEN) 100 UNIT/ML pen 1 unit per 15 gram of carb for breakfast, 1 unit per 10 grams of carbs for lunch and  1 unit per 10 gram for dinner. Plus 1u per 50>150 at breakfast, lunch and dinner and above 200 before bedtime Total daily dose approx 30 units.     insulin aspart (NOVOPEN ECHO) 100 UNIT/ML cartridge 1 unit per 20 gram of carb for breakfast, 1 unit per 12 grams of carbs for lunch and 1 unit per 7 gram for dinner. Plus 1u per 50>150 at breakfast, lunch and dinner and above 200 before bedtime Total daily dose approx 30 units.     insulin glargine (LANTUS PEN) 100 UNIT/ML pen Inject 36 Units subcutaneously every morning.              5/15/2025   Taking Medications   Taking Medication Assessed Today Yes   Current Treatments Insulin Injections   Dose schedule Pre-breakfast;Pre-lunch;Pre-dinner;At bedtime   Given by Patient   Problems taking diabetes medications regularly? No   Diabetes medication side effects? No       Anjana Simon RD LD  [facility]  Diabetes Education Department  [facility], [place]  Phone: [phone]  [phone]    Time Spent: 45 minutes  Encounter Type: Individual    Any diabetes medication dose changes were made via the [facility] Standing Orders under the patient's referring provider.

## 2025-05-15 NOTE — PATIENT INSTRUCTIONS
- I will check with Brady Suarez ([facility] prescription assistance person) on the status of the Novolog cartridges that will go in the InPen.  Continue with 38 units of Lantus in the morning  Novolog:  Breakfast: 1 unit: 17 grams   Lunch: 1u: 12 grams  Dinners: 1 unit: 9 grams  Correction: 1 unit: 50> 150 mg/dl  Before meals AND and bedtime  Signs/symptoms of low blood sugar include (but are not limited to): sweating, shaking, feeling dizzy or weak, extreme hunger, headache, feeling crabby or confused, numbness or tingling of mouth/lips.  If you have these symptoms check your blood sugar if you can and then consume carbohydrate (sugar)  This is a helpful reminder on how to treat a blood sugar if you are low:  If your blood sugar is < 70 mg/dL, consume 15 grams of fast-acting carbohydrate (4 glucose tabs OR 4 oz juice or non-diet pop OR 2 Tbsp dried fruit OR 5-7 lifesavers OR 1 Tbsp sugar) and wait 15 minutes. Check blood sugar again and if not rising, repeat.  If your blood sugar is < 50 mg/dL, consume 30 grams of fast-acting carbohydrate (8 glucose tabs OR 8 oz juice or non-diet pop OR 4 Tbsp dried fruit OR 10-14 lifesavers OR 2 Tbsp sugar) and wait 15 minutes. Check blood sugar again and if not rising, repeat.  GENEVIEVE Day [facility]  Diabetes Education Department  [facility]  Phone: [phone]  [phone]

## 2025-05-20 NOTE — PROGRESS NOTES
Medication Therapy Management (MTM) Encounter    ASSESSMENT:                            Medication Adherence/Access: No issues identified.    1. DAHLIA  A1C is not at goal of <7% but patient is working very closely with diabetes ed and endo.     2. Hyperlipidemia  Stable on statin    3. Atrial fibrillation   Controlled with Eliquis for stroke prevention    4. Urge incontinence/vaginal atrophy  Not controlled but she has follow-up with urology to discuss alternative options.    5. Osteoporosis  Stable on alendronate. She is getting recommended amount of calcium and vitamin D. Last vitamin D at goal.    6. Myasthenia gravis  Stable     7. Supplements  Patient is experiencing diarrhea which may be exacerbated by magnesium. Recommend holding for a few weeks to evaluate. She is also experiencing insomnia which could be worsened by taking B complex at night. Recommend moving to morning or holding.    PLAN:                            1. Magnesium can cause diarrhea - you may want to hold it for a while and monitor your symptoms  2. Move B complex to morning or stop taking. It can cause insomnia    Follow-up: yearly or sooner if needed    SUBJECTIVE/OBJECTIVE:                          Latoya Rodríguez is a 74 year old female seen for an initial visit. She was referred to me from her insurance.      Reason for visit: comprehensive med review    Allergies/ADRs: Reviewed in chart  Past Medical History: Reviewed in chart  Tobacco: She reports that she has never smoked. She has never been exposed to tobacco smoke. She has never used smokeless tobacco.  Alcohol: none    Medication Adherence/Access: Patient uses pill box(es).  Patient takes medications 4 time(s) per day.   Diabetes   Type 1 Diabetes/DAHLIA  Lantus 38 units each morning  Novolog 1u:19g carbs for breakfast, 1u:12g carbs for lunch, and 1:9 for dinner    Follows with endo  Blood sugars have been uncontrolled lately  She is working closely with diabetes ed on insulin  adjustments  Recently adjusted her carb correction  Denies concerns with hypoglycemia  Current diabetes symptoms: none    Blood sugar monitoring: Dexcom G7 - see below   Eye exam is up to date  Foot exam is up to date  Hyperlipidemia   Atorvastatin 40 mg daily    Patient reports no significant myalgias or other side effects     Afib   Eliquis 5 mg twice daily     Tolerating well  Denies abnormal bleeding/bruising    UI/Vaginal atrophy  Estradiol 0.01% cream - 2g vaginally twice weekly    Isn't sure estradiol is helping  Uses when she remembers  Has implant that is managed by urology  She has a follow-up coming up with them  Plans to discuss other medication options    Osteoporosis  Alendronate (Fosamax) 70mg weekly (has been on current therapy 1-2 years)  Calcium carbonate 600 mg + vit D 400 units once daily     Patient is not experiencing side effects.  Following with endo for this  DEXA History: 2022 and 2020  Patient is getting 2-3 serving(s)/day of calcium in their diet.  Risk factors: post-menopausal    Myasthenia Gravis  Prednisone 7.5 mg daily   Pyridostigmine 60 mg four times daily     Recent concerns with drooping eyes and difficulty swallowing  Diagnosed with MG in 2024  Follows with [place] [facility]  Feels symptoms are stable right now  Doing Vyvgart infusions 4 weeks on then 4 weeks off  Has follow-up with neurology in 2 weeks for more infusions  Denies concerns with current regimen    Supplements  Multivitamin daily   Melatonin 3 mg at bedtime as needed  Vitamin B complex  Magnesium 500 mg daily  Metamucil 1 tsp each morning    Endorses diarrhea and difficulty sleeping  Has been taking magnesium and B complex for a while  Doesn't remember why she started them  Says they were self-prescribed  ----------------  I spent 37 minutes with this patient today. All changes were made via collaborative practice agreement with URVASHI Hunter CNP. A copy of the visit note was provided to the  patient's provider(s).    A summary of these recommendations was sent via abusix.    Brandon Piper, PharmD, BCACP  Medication Therapy Management Provider  [phone]    Telemedicine Visit Details  The patient's medications can be safely assessed via a telemedicine encounter.  Type of service:  Telephone visit  Originating Location (pt. Location): Home    Distant Location (provider location):  On-site  Start Time: 8:07 AM  End Time: 8:44 AM     Medication Therapy Recommendations  Takes dietary supplements   1 Rationale: Undesirable effect - Adverse medication event - Safety   Recommendation: Discontinue Medication - Magnesium 400 MG Tabs   Status: Accepted - no CPA Needed   Identified Date: 5/21/2025 Completed Date: 5/22/2025         2 Rationale: Undesirable effect - Adverse medication event - Safety   Recommendation: Change Administration Time - Vitamin B Complex-C Caps   Status: Patient Agreed - Adherence/Education   Identified Date: 5/21/2025 Completed Date: 5/22/2025

## 2025-05-21 ENCOUNTER — VIRTUAL VISIT (OUTPATIENT)
Dept: PHARMACY | Facility: CLINIC | Age: 75
End: 2025-05-21
Payer: COMMERCIAL

## 2025-05-21 DIAGNOSIS — E13.9 LADA (LATENT AUTOIMMUNE DIABETES MELLITUS IN ADULTS) (H): Primary | ICD-10-CM

## 2025-05-21 DIAGNOSIS — G70.01 MYASTHENIA GRAVIS WITH EXACERBATION (H): ICD-10-CM

## 2025-05-21 DIAGNOSIS — Z78.9 TAKES DIETARY SUPPLEMENTS: ICD-10-CM

## 2025-05-21 DIAGNOSIS — N39.41 URGE INCONTINENCE: ICD-10-CM

## 2025-05-21 DIAGNOSIS — I48.0 PAROXYSMAL ATRIAL FIBRILLATION (H): ICD-10-CM

## 2025-05-21 DIAGNOSIS — E78.5 HYPERLIPIDEMIA WITH TARGET LDL LESS THAN 100: ICD-10-CM

## 2025-05-21 DIAGNOSIS — M81.0 OSTEOPOROSIS WITHOUT CURRENT PATHOLOGICAL FRACTURE, UNSPECIFIED OSTEOPOROSIS TYPE: ICD-10-CM

## 2025-05-21 PROCEDURE — 99605 MTMS BY PHARM NP 15 MIN: CPT | Mod: 93 | Performed by: PHARMACIST

## 2025-05-21 PROCEDURE — 99607 MTMS BY PHARM ADDL 15 MIN: CPT | Mod: 93 | Performed by: PHARMACIST

## 2025-05-21 RX ORDER — CALCIUM CARBONATE/VITAMIN D3 600 MG-10
1 TABLET ORAL DAILY
COMMUNITY

## 2025-05-21 NOTE — LETTER
May 22, 2025  Latoya STEVO Rodríguez  [address]  [place] [facility] [id number]    Dear Ms. Rodríguez, RADHA [facility]     Thank you for talking with me on May 21, 2025 about your health and medications. As a follow-up to our conversation, I have included two documents:      Your Recommended To-Do List has steps you should take to get the best results from your medications.  Your Medication List will help you keep track of your medications and how to take them.    If you want to talk about these documents, please call Brandon Piper RPH at phone: [phone], Monday-Friday 8-4:30pm.    I look forward to working with you and your doctors to make sure your medications work well for you.    Sincerely,  Brandon Piper RPH  [facility] Pharmacist, [facility]

## 2025-05-21 NOTE — Clinical Note
Concetta Klein for annual med review. Only made recommendations regarding her supplements today. No other concerns noted.

## 2025-05-21 NOTE — LETTER
"Recommended To-Do List      Prepared on: May 21, 2025       You can get the best results from your medications by completing the items on this \"To-Do List.\"      Bring your To-Do List when you go to your doctor. And, share it with your family or caregivers.    My To-Do List:  What we talked about: What I should do:   Magnesium supplement    This can cause diarrhea. May want to hold it for a few weeks to see if diarrhea improves          What we talked about: What I should do:   B complex    Either move this to morning or stop taking. It can cause insomnia          What we talked about: What I should do:                     "

## 2025-05-21 NOTE — LETTER
_  Medication List        Prepared on: May 21, 2025     Bring your Medication List when you go to the doctor, hospital, or   emergency room. And, share it with your family or caregivers.     Note any changes to how you take your medications.  Cross out medications when you no longer use them.    Medication How I take it Why I use it Prescriber   alendronate (FOSAMAX) 70 MG tablet Take 1 tablet (70 mg) by mouth every 7 days. Osteopenia Edward Aguero MD   amoxicillin (AMOXIL) 500 MG capsule TAKE 4 CAPS 30-60 MIN BEFORE DENTAL PROCEDURE [facility] Sandra Cormier PA-C   apixaban ANTICOAGULANT (ELIQUIS) 5 MG tablet Take 1 tablet (5 mg) by mouth 2 times daily. Longstanding persistent atrial fibrillation (H) URVASHI Hunter CNP   atorvastatin (LIPITOR) 40 MG tablet Take 1 tablet (40 mg) by mouth daily. Hyperlipidemia with Target LDL Less than 100 URVASHI Hunter CNP   calcium carbonate-vitamin D (CALTRATE) 600-10 MG-MCG per tablet Take 1 tablet by mouth daily. Bone health Patient Reported   Continuous Glucose Sensor (DEXCOM G7 SENSOR) MISC 1 each every 10 days. Type 1 diabetes mellitus with hyperglycemia (H) Edward Aguero MD   estradiol (ESTRACE) 0.1 MG/GM vaginal cream Place 2 g vaginally twice a week. Genitourinary syndrome of menopause Alberto Zhang MD   insulin aspart (NOVOPEN ECHO) 100 UNIT/ML cartridge 1 unit per 19 gram of carb for breakfast, 1 unit per 12 grams of carbs for lunch and 1 unit per 9 gram for dinner. Plus 1u per 50>150 at breakfast, lunch and dinner and above 200 before bedtime Total daily dose approx 30 units. Type 1 Diabetes Mellitus without Complication (H) URVASHI Hunter CNP   insulin glargine (LANTUS PEN) 100 UNIT/ML pen Inject 38 Units subcutaneously every morning. 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 Myasthenia Gravis Sarah Palm PA-C   pyRIDostigmine (MESTINON) 60 MG tablet Take 60 mg by mouth 4 times daily. Myasthenia Gravis  DEA Walker   Urine Glucose-Ketones Test (KETO-DIASTIX) STRP 1 strip by In Vitro route as needed Type 2 diabetes mellitus with hyperglycemia (H) URVASHI Hunter CNP         Add new medications, over-the-counter drugs, herbals, vitamins, or  minerals in the blank rows below.    Medication How I take it Why I use it Prescriber                                      Allergies:      No Known Allergies        Side effects I have had:      No Known Side Effects        Other Information:              My notes and questions:

## 2025-05-22 NOTE — PATIENT INSTRUCTIONS
"Recommendations from today's MTM visit:                                                    MTM (medication therapy management) is a service provided by a clinical pharmacist designed to help you get the most of out of your medicines.   Today we reviewed what your medicines are for, how to know if they are working, that your medicines are safe and how to make your medicine regimen as easy as possible.      1. Magnesium can cause diarrhea - you may want to hold it for a while and monitor your symptoms  2. Move B complex to morning or stop taking. It can cause insomnia    Follow-up: yearly or sooner if needed    It was great speaking with you today.  I value your experience and would be very thankful for your time in providing feedback in our clinic survey. In the next few days, you may receive an email or text message from [employer] with a link to a survey related to your  clinical pharmacist.\"     To schedule another MTM appointment, please call the clinic directly or you may call the MTM scheduling line at [phone].    My Clinical Pharmacist's contact information:                                                      Please feel free to contact me with any questions or concerns you have.      Brandon Piper, PharmD, BCACP  [facility]  Phone: [phone]     "

## 2025-06-04 ENCOUNTER — TELEPHONE (OUTPATIENT)
Dept: ENDOCRINOLOGY | Facility: CLINIC | Age: 75
End: 2025-06-04
Payer: COMMERCIAL

## 2025-06-04 NOTE — TELEPHONE ENCOUNTER
{Screenshot of Product, Insurance, and Cardholder ID)      Please route determinations to the Pharm Diabetes pool ([id number]).      Thank you!    Diabetes Care Services Team   [facility]  [address] [place] [address]

## 2025-06-05 NOTE — TELEPHONE ENCOUNTER
Retail Pharmacy Prior Authorization Team   Phone: [phone]    PA Initiation    Medication: DEXCOM G7 SENSOR MISC  Insurance Company: [employer] - Phone [phone] Fax [phone]  Pharmacy Filling the Rx: [facility] - [place], [place] - [address]  Filling Pharmacy Phone:    Filling Pharmacy Fax:    Start Date: 6/5/2025    [id number]

## 2025-06-09 ENCOUNTER — TRANSFERRED RECORDS (OUTPATIENT)
Dept: HEALTH INFORMATION MANAGEMENT | Facility: CLINIC | Age: 75
End: 2025-06-09
Payer: COMMERCIAL

## 2025-06-16 ENCOUNTER — ALLIED HEALTH/NURSE VISIT (OUTPATIENT)
Dept: EDUCATION SERVICES | Facility: CLINIC | Age: 75
End: 2025-06-16
Payer: COMMERCIAL

## 2025-06-16 DIAGNOSIS — E13.9 LADA (LATENT AUTOIMMUNE DIABETES MELLITUS IN ADULTS) (H): ICD-10-CM

## 2025-06-16 PROCEDURE — G0108 DIAB MANAGE TRN  PER INDIV: HCPCS | Mod: AE | Performed by: DIETITIAN, REGISTERED

## 2025-06-16 NOTE — PROGRESS NOTES
Diabetes Self-Management Education & Support    Presents for: CGM Review    Type of Service: In Person Visit      Assessment  Latoya's BG have been elevated. She reports having quite a bit of stress in her life right now- currently living with her daughters family, will be moving into her new house in mid July. She is also eating foods that she normally wouldn't eat while living with her daughters family. Her prednisone dose was also increased from 7.5 mg to 10 mg. We discussed a plan to increase her Lantus dose from 32 units --> 38 units and if average BG is still >200 mg/dl in 3-4 days, then increase to 45 units.     Latoya brought her InPen with, but hasn't received the cartridges yet. We set up her InPen daniel with her insuline dosing and she will need to pair her Dexcom and install the cartridge once they are received.  Writer showed patient how to do this, and sent a link to a video tutorial via [email or web address], but Latoya agrees to reach out if she needs more help.     See CGM Reports in Monitoring section below.      Glucose Patterns & Trends:  Hyperglycemia    Care Plan and Education Provided:  Monitoring: Frequency of monitoring and Individual glucose targets  Taking Medication: Action of prescribed medication(s), Administering and storing injectable diabetes medications, and When to take medication(s)  Problem Solving: High glucose - causes, signs/symptoms, treatment and prevention and Low glucose - causes, signs/symptoms, treatment and prevention    Patient verbalized understanding of diabetes self-management education concepts discussed, opportunities for ongoing education and support, and recommendations provided today.    Plan    Increase Lantus dose: 32 units --> 38 units --> then increase to 45 units in 4-5 days if BG is still averaging >200 mg/dl  Start using InPen once cartridges are received.   Continue with Novolog dosing:  Novolog:  Breakfast: 1 unit: 19 grams   Lunch: 1u: 12 grams  Dinners: 1 unit: 9  "grams  Correction: 1 unit: 50> 150 mg/dl  Before meals AND and bedtime    Topics to cover at upcoming visits: Healthy Eating, Being Active, Monitoring, Taking Medication, Problem Solving, Reducing Risks, and Healthy Coping    See Care Plan for co-developed, patient-state behavior change goals.    Education Materials Provided:  No new materials provided today      Subjective/Objective  Latoya is an 74 year old, presenting for the following diabetes education related to: CGM Review  Accompanied by: Self  Diabetes education in the past 24mo: Yes  Focus of Visit: Taking Medication, Problem Solving  Diabetes type: Type 1  Date of diagnosis: 2016  Disease course: Getting harder to manage  How confident are you filling out medical forms by yourself:: Extremely  Diabetes management related comments/concerns: having big fluctuations  Transportation concerns: No  Difficulty affording diabetes medication?: Sometimes  Difficulty affording diabetes testing supplies?: No  Other concerns: None  Cultural Influences/Ethnic Background:  Not  or     Diabetes Symptoms & Complications:  Diabetes Related Symptoms: None  Weight trend: Stable  Symptom course: Worsening  Disease course: Getting harder to manage  Complications assessed today?: No  Autonomic neuropathy: No  CVA: No  Heart disease: No  Nephropathy: No  Peripheral neuropathy: No  Peripheral Vascular Disease: No  Retinopathy: No  Sexual dysfunction: No    Patient Problem List and Family Medical History reviewed for relevant medical history, current medical status, and diabetes risk factors.    Vitals:  LMP  (LMP Unknown)   Estimated body mass index is 26.95 kg/m  as calculated from the following:    Height as of 1/27/25: 1.676 m (5' 6\").    Weight as of 4/28/25: 75.8 kg (167 lb).   Last 3 BP:   BP Readings from Last 3 Encounters:   04/28/25 139/74   01/27/25 (!) 144/77   11/21/24 139/81       History   Smoking Status    Never   Smokeless Tobacco    Never "       Labs:  Lab Results   Component Value Date    A1C 8.8 04/28/2025    A1C 9.0 11/21/2024    A1C 8.6 06/19/2023    HEMOGLOBINA1 8.0 11/15/2019     Lab Results   Component Value Date     11/21/2024    GLC 74 11/06/2024     01/23/2023     08/02/2019     Lab Results   Component Value Date     11/21/2024    LDL 90 07/01/2021     HDL Cholesterol   Date Value Ref Range Status   07/01/2021 70 >49 mg/dL Final     Direct Measure HDL   Date Value Ref Range Status   11/21/2024 87 >=50 mg/dL Final     GFR Estimate   Date Value Ref Range Status   11/21/2024 >90 >60 mL/min/1.73m2 Final     Comment:     eGFR calculated using 2021 CKD-EPI equation.   07/01/2021 82 >60 mL/min/[1.73_m2] Final     Comment:     Non  GFR Calc  Starting 12/18/2018, serum creatinine based estimated GFR (eGFR) will be   calculated using the Chronic Kidney Disease Epidemiology Collaboration   (CKD-EPI) equation.       GFR, ESTIMATED POCT   Date Value Ref Range Status   11/17/2021 >60 >60 mL/min/1.73m2 Final     GFR Estimate If Black   Date Value Ref Range Status   07/01/2021 >90 >60 mL/min/[1.73_m2] Final     Comment:      GFR Calc  Starting 12/18/2018, serum creatinine based estimated GFR (eGFR) will be   calculated using the Chronic Kidney Disease Epidemiology Collaboration   (CKD-EPI) equation.       Lab Results   Component Value Date    CR 0.63 11/21/2024    CR 0.74 07/01/2021     Lab Results   Component Value Date    MICROL <12.0 11/21/2024    UMALCR  11/21/2024      Comment:      Unable to calculate, urine albumin and/or urine creatinine is outside detectable limits.  Microalbuminuria is defined as an albumin:creatinine ratio of 17 to 299 for males and 25 to 299 for females. A ratio of albumin:creatinine of 300 or higher is indicative of overt proteinuria.  Due to biologic variability, positive results should be confirmed by a second, first-morning random or 24-hour timed urine specimen. If  there is discrepancy, a third specimen is recommended. When 2 out of 3 results are in the microalbuminuria range, this is evidence for incipient nephropathy and warrants increased efforts at glucose control, blood pressure control, and institution of therapy with an angiotensin-converting-enzyme (ACE) inhibitor (if the patient can tolerate it).      RR 24.8 11/21/2024 6/16/2025   Healthy Eating   Healthy Eating Assessed Today Yes   Do you have any food allergies or intolerances? No   Meal planning/habits Carb counting   Who cooks/prepares meals for you? Self   Who purchases food in  your home? Self   Meals include Breakfast;Lunch;Dinner   Breakfast 7:00: a couple of eggs OR 1 bowl of cereal with blueberries   Lunch 11- 12:00: Soup- barley kale vegetable with tangerine   Dinner 6-6:30: protein with raw vegetables or cauliflower - sometimes with dip with yazzo frozen yogurt bar, sometimes with toast   Snacks not snacking, might have tangerine or spoonful of peanut butter   Other crystal light decaf iced tea, 1 decaf diet pepsi   Beverages Diet soda   Has patient met with a dietitian in the past? No         6/16/2025   Being Active   Being Active Assessed Today Yes   Exercise: Yes       walking dog -3ish miles. usually between 10-12 or 1-3   Days per week of moderate to strenuous exercise (like a brisk walk) 6   On average, minutes per day of exercise at this level 60   How intense was your typical exercise?  Moderate (like brisk walking)   Exercise Minutes per Week 360         6/16/2025   Monitoring   Monitoring Assessed Today Yes   Did patient bring glucose meter to appointment?  Yes   Blood Glucose Meter CGM   Blood glucose trend Fluctuating       Diabetes Medication(s)       Insulin       insulin aspart (NOVOPEN ECHO) 100 UNIT/ML cartridge 1 unit per 20 gram of carb for breakfast, 1 unit per 12 grams of carbs for lunch and 1 unit per 7 gram for dinner. Plus 1u per 50>150 at breakfast, lunch and dinner  and above 200 before bedtime Total daily dose approx 30 units.     Patient taking differently: 1 unit per 19 gram of carb for breakfast, 1 unit per 12 grams of carbs for lunch and 1 unit per 9 gram for dinner. Plus 1u per 50>150 at breakfast, lunch and dinner and above 200 before bedtime Total daily dose approx 30 units.     insulin glargine (LANTUS PEN) 100 UNIT/ML pen Inject 36 Units subcutaneously every morning.     Patient taking differently: Inject 38 Units subcutaneously every morning.              2025   Taking Medications   Taking Medication Assessed Today Yes   Current Treatments Insulin Injections   Dose schedule Pre-breakfast;Pre-lunch;Pre-dinner;At bedtime   Given by Patient   Problems taking diabetes medications regularly? No   Diabetes medication side effects? No         2025   Problem Solving   Problem Solving Assessed Today Yes   Is the patient at risk for hypoglycemia? Yes   Hypoglycemia Frequency Daily   Hypoglycemia Treatment Other food       raisins or granola bar or applesauce   Patient carries a carbohydrate source Yes   Does patient have glucagon emergency kit? No   Is the patient at risk for DKA? Yes       GENEVIEVE Day [facility]  Diabetes Education Department  [facility], [place]  Phone: [phone]  [phone]    Time Spent: 45 minutes  Encounter Type: Individual    Any diabetes medication dose changes were made via the [facility] Standing Orders under the patient's referring provider.

## 2025-06-16 NOTE — TELEPHONE ENCOUNTER
Prior Authorization Approval    Authorization Effective Date: 6/6/2025  Authorization Expiration Date: 6/6/2028  Medication: Dexcom G7 sensor-PA APPROVED   Approved Dose/Quantity:   Reference #:     Insurance Company: [employer] - Phone [phone] Fax [phone]  Expected CoPay:       CoPay Card Available:      Foundation Assistance Needed:    Which Pharmacy is filling the prescription (Not needed for infusion/clinic administered): [facility] - [place], [place]  [facility] [address]  Pharmacy Notified:  Yes  Patient Notified:  No

## 2025-06-19 ENCOUNTER — MYC REFILL (OUTPATIENT)
Dept: EDUCATION SERVICES | Facility: CLINIC | Age: 75
End: 2025-06-19
Payer: COMMERCIAL

## 2025-06-19 DIAGNOSIS — E10.9 TYPE 1 DIABETES MELLITUS WITHOUT COMPLICATION (H): ICD-10-CM

## 2025-06-19 NOTE — TELEPHONE ENCOUNTER
Clinic RN: Please investigate patient's chart or contact patient if the information cannot be found because the medication is listed as historical or discontinued. Confirm patient is taking this medication. Document findings and route refill encounter to provider for approval or denial.    Cassi Diaz RN on 6/19/2025 at 11:45 AM

## 2025-06-19 NOTE — TELEPHONE ENCOUNTER
Per PCP's request, routing refill request to endocrinology to review and advise.     NESTOR Pitts  [facility]

## 2025-06-23 ENCOUNTER — TELEPHONE (OUTPATIENT)
Dept: FAMILY MEDICINE | Facility: CLINIC | Age: 75
End: 2025-06-23
Payer: COMMERCIAL

## 2025-06-23 NOTE — TELEPHONE ENCOUNTER
Called patient about insulin refill request. Patient reported per packaging she needs a refill on Novolog Flex pen, prefilled syringe 100 unit/ml with sig to inject up to 30 units everyday. Patient reported she uses a little more than 30 unit daily.     Writer did not see prescription for above and ask if this is the same as NOVOPEN ECHO that is listed in chart? Patient stated she does not see anywhere on the box that says Novopen Echo.         Patient stated she has been trying to get this refilled for about 1 month. Stated she is a part of the drug assistance program and it has been very confusion as there are a lot of people involved with program.     Routing to PCP to review and advise if refill can be provided.     NESTOR Pitts  [facility]

## 2025-06-23 NOTE — TELEPHONE ENCOUNTER
Patient calling stating   Needs to be changed to insulin aspart (NOVOLOG FLEXPEN) 100 UNIT/ML soln, as she does not have the equipment needed for the cartridge.    Routing to provider to advise. Patient needs this ASAP.    Please call patient when this medication is sent or with any questions.    Marcie Estrada

## 2025-06-23 NOTE — TELEPHONE ENCOUNTER
Medication Question or Refill    Contacts       Contact Date/Time Type Contact Phone/Fax    06/23/2025 09:13 AM CDT Phone (Incoming) Latoya Rodríguez (Self) [phone] (M)            What medication are you calling about (include dose and sig)?: Insulin     Preferred Pharmacy:       [id number] - [place], [place] - [id number] [place] [address] [address] [place] [address]. [address]  [facility] [address]  Phone: [phone] Fax: [phone]      Controlled Substance Agreement on file:   CSA -- Patient Level:    CSA: None found at the patient level.       Who prescribed the medication?: Pricila Avila     Do you need a refill? Yes    When did you use the medication last? Today    Patient offered an appointment? No    Do you have any questions or concerns?  Yes: Only has one day left of insulin and is need of a refill       Could we send this information to you in [facility] or would you prefer to receive a phone call?:   Patient would prefer a phone call   Okay to leave a detailed message?: Yes at Home number on file [phone] (home)

## 2025-06-25 NOTE — TELEPHONE ENCOUNTER
I have been out of office and returned today 6/25 a day or so.    I did receive this form, (from last week), however I have interoffice mailed a clean new form for you to complete.    The form you had done will not be accepted by [facility] because your crossed-out the Novolog units.    Please complete with the updated dose information for Novolog pens and Tresiba u200 pens. NOT the Echo, Latoya wants to wait on that.    [employer] will not accept any cross-outs, white out or anything written over, initials will not be accepted either.    I included a addressed interoffice envelope for you to use.    Thanks so much for your help!    Jennifer Suarez  Prescription Assistance Supervisor  Pharmacy Assistance   Pool: [facility]

## 2025-06-25 NOTE — TELEPHONE ENCOUNTER
Gonzales Rodriguez   I know we just sent this form to you last week I believe   I think we already changed this to the Flex pen on the form   She is say she uses more than 30 in a month is it possible to amend that amount in a month to 40 or has it gone out already   Thanks   Pricila Avila, NP, APRN CNP

## 2025-07-07 ENCOUNTER — TELEPHONE (OUTPATIENT)
Dept: FAMILY MEDICINE | Facility: CLINIC | Age: 75
End: 2025-07-07

## 2025-07-07 ENCOUNTER — ALLIED HEALTH/NURSE VISIT (OUTPATIENT)
Dept: EDUCATION SERVICES | Facility: CLINIC | Age: 75
End: 2025-07-07
Payer: COMMERCIAL

## 2025-07-07 DIAGNOSIS — E13.9 LADA (LATENT AUTOIMMUNE DIABETES MELLITUS IN ADULTS) (H): ICD-10-CM

## 2025-07-07 PROCEDURE — G0108 DIAB MANAGE TRN  PER INDIV: HCPCS | Mod: AE | Performed by: DIETITIAN, REGISTERED

## 2025-07-07 NOTE — PATIENT INSTRUCTIONS
Adjust Insulin:  Lantus: 38 units --> 45 units   Novolog:  Breakfast: 1u:20 grams   Lunch: 1u:10 grams  Dinner: 1u:10 grams     Sliding scale for correction before meals and at bedtime: 1u:50>150 mg/dl     GENEVIEVE Day [facility]  Diabetes Education Department  [facility], [place]  Phone: [phone]  [phone]

## 2025-07-07 NOTE — PROGRESS NOTES
Diabetes Self-Management Education & Support    Presents for: CGM Review    Type of Service: In Person Visit      Assessment  Latoya's BG remain elevated. She reports that she had a sensor that was faulty which was on from June 25th - June 30th. She didn't realize that it was giving inaccurate readings until it stopped working on June 30th.  She continues to take Prednisone (10 mg) around 7 am. She is still living with her daughter and family, which has been challenging for many reasons, but the biggest is that they eat a lot of carbohydrates and have snacks that Latoya wouldn't normally eat.  She will be moving into her new house next week, which she is very stressed about.     Latoya was planning to start the InPen, writer had sent in the [employer] Patient Assistance Program paperwork to switch from Novolog pens to Novolog cartridges. Latoya received the InPen from the pharmacy and we had an appointment on 6/16 where we starting setting up the daniel. She was set to receive the Novolog Cartidges and then she received a call from Brady Suarez who told her that the Novolog Cartidges are not available and she will have to wait until next year to add them to her [employer] P[employer] application. Brady then sent a new form to [employer] to request the Novolog pens.  Latoya is very confused about what is happening with her Novolog pen/cartridge prescriptions. It seems that the prescription for Novolog pens is in the works to [employer] for a 3 months supply, so at this time, Latoya will likely have to wait until September/October to get started on the InPen. We decided on a plan to wait until September to send the updated form to receive Novolog Cartridges through the [employer].  Writer sent Brady a message to clarify her conversation with Latoya.     We reviewed Latoya's Dexcom report today. She continues to have a pattern of high blood sugars, especially overnight. We are also seeing a pattern of elevated BG after lunch. We discussed a plan to  raiseLantus dose to 45 units and increase her insulin to carbohydrate ratio with lunch from 1u:12 grams --> 1u:10 grams. Latoya feels that she is not doing a good job with carbohydrate counting, we did a refresher today and she is hopeful that when she is able to get into her own house and do her own cooking that she will be able to count the carbohydrate more accurately. Provided carb counting booklet.       See CGM Reports in Monitoring section below.      Care Plan and Education Provided:  Diabetes Pathophysiology  Healthy Eating: Balanced meals, Carbohydrate Counting, and Consistency in amount and timing of carbohydrate intake  Monitoring: Blood glucose versus Continuous Glucose Monitoring, Frequency of monitoring, Individual glucose targets, and Log and interpret results  Taking Medication: Action of prescribed medication(s), Administering and storing injectable diabetes medications, and When to take medication(s)  Problem Solving: High glucose - causes, signs/symptoms, treatment and prevention and Low glucose - causes, signs/symptoms, treatment and prevention    Patient verbalized understanding of diabetes self-management education concepts discussed, opportunities for ongoing education and support, and recommendations provided today.    Plan    Adjust Insulin:  Lantus: 38 units --> 45 units   Novolog:  Breakfast: 1u:20 grams   Lunch: 1u:10 grams  Dinner: 1u:10 grams     Sliding scale for correction before meals and at bedtime: 1u:50>150 mg/dl     Topics to cover at upcoming visits: Healthy Eating, Being Active, Monitoring, Taking Medication, Problem Solving, Reducing Risks, and Healthy Coping    See Care Plan for co-developed, patient-state behavior change goals.    Education Materials Provided:  -- Carbohydrate Counting      Subjective/Objective  Latoya is an 74 year old, presenting for the following diabetes education related to: CGM Review  Accompanied by: Self  Diabetes education in the past 24mo:  "Yes  Focus of Visit: Taking Medication, Problem Solving  Diabetes type: Type 1  Date of diagnosis: 2016  Disease course: Getting harder to manage  How confident are you filling out medical forms by yourself:: Extremely  Diabetes management related comments/concerns: having big fluctuations  Transportation concerns: No  Difficulty affording diabetes medication?: Sometimes  Difficulty affording diabetes testing supplies?: No  Other concerns: None  Cultural Influences/Ethnic Background:  Not  or     Diabetes Symptoms & Complications:  Diabetes Related Symptoms: None  Weight trend: Stable  Symptom course: Worsening  Disease course: Getting harder to manage  Complications assessed today?: No  Autonomic neuropathy: No  CVA: No  Heart disease: No  Nephropathy: No  Peripheral neuropathy: No  Peripheral Vascular Disease: No  Retinopathy: No  Sexual dysfunction: No    Patient Problem List and Family Medical History reviewed for relevant medical history, current medical status, and diabetes risk factors.    Vitals:  LMP  (LMP Unknown)   Estimated body mass index is 26.95 kg/m  as calculated from the following:    Height as of 1/27/25: 1.676 m (5' 6\").    Weight as of 4/28/25: 75.8 kg (167 lb).   Last 3 BP:   BP Readings from Last 3 Encounters:   04/28/25 139/74   01/27/25 (!) 144/77   11/21/24 139/81       History   Smoking Status    Never   Smokeless Tobacco    Never       Labs:  Lab Results   Component Value Date    A1C 8.8 04/28/2025    A1C 9.0 11/21/2024    A1C 8.6 06/19/2023    HEMOGLOBINA1 8.0 11/15/2019     Lab Results   Component Value Date     11/21/2024    GLC 74 11/06/2024     01/23/2023     08/02/2019     Lab Results   Component Value Date     11/21/2024    LDL 90 07/01/2021     HDL Cholesterol   Date Value Ref Range Status   07/01/2021 70 >49 mg/dL Final     Direct Measure HDL   Date Value Ref Range Status   11/21/2024 87 >=50 mg/dL Final     GFR Estimate   Date Value Ref " Range Status   11/21/2024 >90 >60 mL/min/1.73m2 Final     Comment:     eGFR calculated using 2021 CKD-EPI equation.   07/01/2021 82 >60 mL/min/[1.73_m2] Final     Comment:     Non  GFR Calc  Starting 12/18/2018, serum creatinine based estimated GFR (eGFR) will be   calculated using the Chronic Kidney Disease Epidemiology Collaboration   (CKD-EPI) equation.       GFR, ESTIMATED POCT   Date Value Ref Range Status   11/17/2021 >60 >60 mL/min/1.73m2 Final     GFR Estimate If Black   Date Value Ref Range Status   07/01/2021 >90 >60 mL/min/[1.73_m2] Final     Comment:      GFR Calc  Starting 12/18/2018, serum creatinine based estimated GFR (eGFR) will be   calculated using the Chronic Kidney Disease Epidemiology Collaboration   (CKD-EPI) equation.       Lab Results   Component Value Date    CR 0.63 11/21/2024    CR 0.74 07/01/2021     Lab Results   Component Value Date    MICROL <12.0 11/21/2024    UMALCR  11/21/2024      Comment:      Unable to calculate, urine albumin and/or urine creatinine is outside detectable limits.  Microalbuminuria is defined as an albumin:creatinine ratio of 17 to 299 for males and 25 to 299 for females. A ratio of albumin:creatinine of 300 or higher is indicative of overt proteinuria.  Due to biologic variability, positive results should be confirmed by a second, first-morning random or 24-hour timed urine specimen. If there is discrepancy, a third specimen is recommended. When 2 out of 3 results are in the microalbuminuria range, this is evidence for incipient nephropathy and warrants increased efforts at glucose control, blood pressure control, and institution of therapy with an angiotensin-converting-enzyme (ACE) inhibitor (if the patient can tolerate it).      UCRR 24.8 11/21/2024 7/7/2025   Healthy Eating   Healthy Eating Assessed Today Yes   Do you have any food allergies or intolerances? No   Meal planning/habits Carb counting   Who  cooks/prepares meals for you? Self   Who purchases food in  your home? Self   Meals include Breakfast;Lunch;Dinner   Breakfast 7:00: a couple of eggs OR 1 bowl of cereal with blueberries   Lunch 11- 12:00: Soup- barley kale vegetable with tangerine   Dinner 6-6:30: protein with raw vegetables or cauliflower - sometimes with dip with yazzo frozen yogurt bar, sometimes with toast   Snacks not snacking, might have tangerine or spoonful of peanut butter   Other crystal light decaf iced tea, 1 decaf diet pepsi   Beverages Diet soda   Has patient met with a dietitian in the past? No         7/7/2025   Being Active   Being Active Assessed Today Yes   Exercise: Yes       walking dog -3ish miles. usually between 10-12 or 1-3   Days per week of moderate to strenuous exercise (like a brisk walk) 6   On average, minutes per day of exercise at this level 60   How intense was your typical exercise?  Moderate (like brisk walking)   Exercise Minutes per Week 360         7/7/2025   Monitoring   Monitoring Assessed Today Yes   Did patient bring glucose meter to appointment?  Yes   Blood Glucose Meter CGM   Blood glucose trend Fluctuating       **Latoya feels that from 6/25-6/30 her sensor was faulty and that date should be ignored                                      Diabetes Medication(s)       Insulin       insulin aspart (NOVOPEN ECHO) 100 UNIT/ML cartridge 1 unit per 19 gram of carb for breakfast, 1 unit per 12 grams of carbs for lunch and 1 unit per 9 gram for dinner. Plus 1u per 50>150 at breakfast, lunch and dinner and above 200 before bedtime Total daily dose approx 30 units.     insulin glargine (LANTUS PEN) 100 UNIT/ML pen Inject 45 Units subcutaneously every morning.              7/7/2025   Taking Medications   Taking Medication Assessed Today Yes   Current Treatments Insulin Injections   Dose schedule Pre-breakfast;Pre-lunch;Pre-dinner;At bedtime   Given by Patient   Problems taking diabetes medications regularly? No    Diabetes medication side effects? No         2025   Problem Solving   Problem Solving Assessed Today Yes   Is the patient at risk for hypoglycemia? Yes   Hypoglycemia Frequency Daily   Hypoglycemia Treatment Other food       raisins or granola bar or applesauce   Patient carries a carbohydrate source Yes   Does patient have glucagon emergency kit? No   Is the patient at risk for DKA? Yes       GENEVIEVE Day [facility]  Diabetes Education Department  [facility], [place]  Phone: [phone]  [phone]    Time Spent: 50 minutes  Encounter Type: Individual    Any diabetes medication dose changes were made via the [facility] Standing Orders under the patient's referring provider.

## 2025-07-07 NOTE — TELEPHONE ENCOUNTER
Patient Quality Outreach    Patient is due for the following:   Diabetes -  A1C    Action(s) Taken:   No follow up needed at this time.    Type of outreach:    Did not contact    Questions for provider review:    None         Diane L. Schoenherr, RN  Chart routed to None.

## 2025-07-12 ENCOUNTER — TRANSFERRED RECORDS (OUTPATIENT)
Dept: HEALTH INFORMATION MANAGEMENT | Facility: CLINIC | Age: 75
End: 2025-07-12
Payer: COMMERCIAL

## 2025-07-14 ENCOUNTER — TELEPHONE (OUTPATIENT)
Dept: FAMILY MEDICINE | Facility: CLINIC | Age: 75
End: 2025-07-14
Payer: COMMERCIAL

## 2025-07-14 NOTE — TELEPHONE ENCOUNTER
I agree if endocrinology is managing her diabetes medications would make most sense for Endo to complete the forms moving forward

## 2025-07-14 NOTE — TELEPHONE ENCOUNTER
I am very confused now. Did Latoya change providers for her Sheree products?    WHO should or would you prefer I send this NovoPen Echo refill/change for to?    Ultimately, this will be delivered to Latoya's home address, however, I would like to send this to the prescriber for signature.    Thanks,    Jennifer Suarez  Prescription Assistance Supervisor  Pharmacy Assistance   Pool: [facility]

## 2025-07-15 ENCOUNTER — TELEPHONE (OUTPATIENT)
Dept: FAMILY MEDICINE | Facility: CLINIC | Age: 75
End: 2025-07-15
Payer: COMMERCIAL

## 2025-07-15 NOTE — TELEPHONE ENCOUNTER
Anjana Simon suggested I send this refill/change for to you for refills and changes for the rest of 2025. Only because this was started with your Clinic during 2025.    Latoya's Diabetes applications would then be handled by the Endo Liaison Team for 2026.     I will enter an Encounter in [employer] later today when I send via interoffice mail.    Thanks,    Jennifer Suarez  Prescription Assistance Supervisor  Pharmacy Assistance   Pool: [facility]

## 2025-07-15 NOTE — TELEPHONE ENCOUNTER
July 15, 2025 I interoffice mailed to Pricila Avila CNP, the [employer] refill/change form for Tresiba & NovoPen Echo.    Please allow a few days for this to reach the [facility].    Please review, complete, sign and return to us in the enclosed, addressed interoffice envelope.    Please read the letter enclosed with the form.    Thanks so much for your help!    Jennifer Suarez  Prescription Assistance Supervisor  Pharmacy Assistance   Pool: [facility]

## 2025-07-17 ENCOUNTER — TRANSFERRED RECORDS (OUTPATIENT)
Dept: HEALTH INFORMATION MANAGEMENT | Facility: CLINIC | Age: 75
End: 2025-07-17
Payer: COMMERCIAL

## 2025-07-28 ENCOUNTER — TRANSFERRED RECORDS (OUTPATIENT)
Dept: HEALTH INFORMATION MANAGEMENT | Facility: CLINIC | Age: 75
End: 2025-07-28
Payer: COMMERCIAL

## 2025-07-29 DIAGNOSIS — I48.11 LONGSTANDING PERSISTENT ATRIAL FIBRILLATION (H): ICD-10-CM

## 2025-07-29 RX ORDER — APIXABAN 5 MG/1
5 TABLET, FILM COATED ORAL 2 TIMES DAILY
Qty: 180 TABLET | Refills: 3 | OUTPATIENT
Start: 2025-07-29

## 2025-07-29 NOTE — TELEPHONE ENCOUNTER
Thank you - I see that you have received and have been interoffice mailed back to us.    Jennifer Suarez  Prescription Assistance Supervisor  Pharmacy Assistance   Please Respond to Pool: [facility]

## 2025-08-18 ENCOUNTER — TRANSFERRED RECORDS (OUTPATIENT)
Dept: HEALTH INFORMATION MANAGEMENT | Facility: CLINIC | Age: 75
End: 2025-08-18

## 2025-08-18 ENCOUNTER — OFFICE VISIT (OUTPATIENT)
Dept: ENDOCRINOLOGY | Facility: CLINIC | Age: 75
End: 2025-08-18
Payer: COMMERCIAL

## 2025-08-18 VITALS
OXYGEN SATURATION: 98 % | HEART RATE: 62 BPM | WEIGHT: 163 LBS | SYSTOLIC BLOOD PRESSURE: 136 MMHG | RESPIRATION RATE: 16 BRPM | DIASTOLIC BLOOD PRESSURE: 84 MMHG | BODY MASS INDEX: 26.31 KG/M2

## 2025-08-18 DIAGNOSIS — M81.6 LOCALIZED OSTEOPOROSIS (LEQUESNE): ICD-10-CM

## 2025-08-18 DIAGNOSIS — M85.80 OSTEOPENIA, UNSPECIFIED LOCATION: Primary | ICD-10-CM

## 2025-08-18 LAB
EST. AVERAGE GLUCOSE BLD GHB EST-MCNC: 209 MG/DL
HBA1C MFR BLD: 8.9 %

## 2025-08-18 PROCEDURE — 99214 OFFICE O/P EST MOD 30 MIN: CPT | Performed by: INTERNAL MEDICINE

## 2025-08-18 PROCEDURE — G2211 COMPLEX E/M VISIT ADD ON: HCPCS | Performed by: INTERNAL MEDICINE

## 2025-08-18 PROCEDURE — 3079F DIAST BP 80-89 MM HG: CPT | Performed by: INTERNAL MEDICINE

## 2025-08-18 PROCEDURE — 3075F SYST BP GE 130 - 139MM HG: CPT | Performed by: INTERNAL MEDICINE

## 2025-08-18 PROCEDURE — 83036 HEMOGLOBIN GLYCOSYLATED A1C: CPT | Performed by: INTERNAL MEDICINE

## (undated) DEVICE — GLOVE BIOGEL PI MICRO INDICATOR UNDERGLOVE SZ 8.0 48980

## (undated) DEVICE — SUCTION MANIFOLD NEPTUNE 2 SYS 1 PORT 702-025-000

## (undated) DEVICE — SOL NACL 0.9% IRRIG 1000ML BOTTLE 07138-09

## (undated) DEVICE — BONE CEMENT MIXEVAC HI VAC W/CARTRIDGE 0306-563-000

## (undated) DEVICE — GLOVE RADIATION RESISTANT SZ 7.5  95-395

## (undated) DEVICE — SOL WATER IRRIG 3000ML BAG 2B7117

## (undated) DEVICE — SOL NACL 0.9% INJ 1000ML BAG 2B1324X

## (undated) DEVICE — SU ETHILON 4-0 FS-2 18" 662H

## (undated) DEVICE — STRAP KNEE/BODY 31143004

## (undated) DEVICE — NDL 19GA 1.5"

## (undated) DEVICE — DRAPE IOBAN INCISE 23X17" 6650EZ

## (undated) DEVICE — DRAPE LAP TRANSVERSE 29421

## (undated) DEVICE — SOL BENZOIN 0.5OZ

## (undated) DEVICE — AXONICS TRIAL STIMULATOR

## (undated) DEVICE — SUCTION MANIFOLD NEPTUNE 2 SYS 4 PORT 0702-020-000

## (undated) DEVICE — GLOVE PROTEXIS BLUE W/NEU-THERA 7.0  2D73EB70

## (undated) DEVICE — PACK HAND WRIST SOP15HWFSP

## (undated) DEVICE — DRSG STERI STRIP 1/2X4" R1547

## (undated) DEVICE — Device

## (undated) DEVICE — BASIN EMESIS STERILE  SSK9005A

## (undated) DEVICE — GLOVE PROTEXIS W/NEU-THERA 7.0  2D73TE70

## (undated) DEVICE — CEMENT PRESSURIZER FEMORAL CANAL MED 0206-546-000

## (undated) DEVICE — DRSG GAUZE 4X4" TOPPER

## (undated) DEVICE — SU VICRYL 0 CT 36" J358H

## (undated) DEVICE — SUCTION CANISTER MEDIVAC LINER 1500ML W/LID 65651-515

## (undated) DEVICE — PREP CHLORAPREP 26ML TINTED ORANGE  260815

## (undated) DEVICE — BNDG KLING 2" 2231

## (undated) DEVICE — GLOVE PROTEXIS POWDER FREE 7.0 ORTHOPEDIC 2D73ET70

## (undated) DEVICE — GLOVE PROTEXIS MICRO 7.0  2D73PM70

## (undated) DEVICE — SYR 10ML FINGER CONTROL W/O NDL 309695

## (undated) DEVICE — GOWN LG DISP 9515

## (undated) DEVICE — ESU GROUND PAD ADULT W/CORD E7507

## (undated) DEVICE — KIT ENDO FIRST STEP DISINFECTANT 200ML W/POUCH EP-4

## (undated) DEVICE — POSITIONER ABDUCTION PILLOW FOAM MED FP-ABDUCTM

## (undated) DEVICE — DRSG XEROFORM 1X8"

## (undated) DEVICE — PREP CHLORAPREP 26ML TINTED HI-LITE ORANGE 930815

## (undated) DEVICE — SOL WATER IRRIG 500ML BOTTLE 2F7113

## (undated) DEVICE — GOWN XLG DISP 9545

## (undated) DEVICE — SOL WATER IRRIG 1000ML BOTTLE 07139-09

## (undated) DEVICE — R3 0 DEGREE XLPE ACETABULAR LINER                                    32MM ID X OD 48MM
Type: IMPLANTABLE DEVICE | Site: HIP | Status: NON-FUNCTIONAL
Brand: R3
Removed: 2023-01-18

## (undated) DEVICE — DRAPE UNDER BUTTOCK 8483

## (undated) DEVICE — SU VICRYL 3-0 SH 27" UND J416H

## (undated) DEVICE — NDL 18GA 1.5" 305196

## (undated) DEVICE — SOL WATER INJ 1000ML BAG 07990-09

## (undated) DEVICE — DRSG KERLIX 4 1/2"X4YDS ROLL 6730

## (undated) DEVICE — SU VICRYL 3-0 RB-1 27" UND J215H

## (undated) DEVICE — SU STRATAFIX MONOCRYL 3-0 SPIRAL PS-2 45CM SXMP1B107

## (undated) DEVICE — LINEN TOWEL PACK X5 5464

## (undated) DEVICE — SYR 10ML LL W/O NDL

## (undated) DEVICE — BLADE SAW RECIP STRK 70X6X0.025MM 0277-096-250S5

## (undated) DEVICE — DECANTER VIAL 2006S

## (undated) DEVICE — GOWN IMPERVIOUS SPECIALTY XLG/XLONG 32474

## (undated) DEVICE — BLADE KNIFE SURG 20 371120

## (undated) DEVICE — DRAPE BACK TABLE  44X90" 8377

## (undated) DEVICE — DEVICE RETRIEVER HEWSON 71111579

## (undated) DEVICE — GLOVE PROTEXIS BLUE W/NEU-THERA 7.5  2D73EB75

## (undated) DEVICE — PACK TOTAL HIP W/POUCH RIVERSIDE LATEX FREE

## (undated) DEVICE — SYR EAR BULB 3OZ 0035830

## (undated) DEVICE — DRAPE STERI TOWEL SM 1000

## (undated) DEVICE — NDL DEFLUX 3.7FRX350MM 23GA 241328

## (undated) DEVICE — PEN MARKING SKIN W/LABELS 31145884

## (undated) DEVICE — GLOVE BIOGEL PI SZ 7.5 40875

## (undated) DEVICE — SOL NACL 0.9% IRRIG 1000ML BOTTLE 2F7124

## (undated) DEVICE — CONNECTOR WATER VALVE PERFUSION PACK STR 020272801

## (undated) DEVICE — LINEN ORTHO PACK 5446

## (undated) DEVICE — PAD CHUX UNDERPAD 23X24" 7136

## (undated) DEVICE — SOL WATER IRRIG 1000ML BOTTLE 2F7114

## (undated) DEVICE — SU ETHIBOND 5 V-37 4X30" MB66G

## (undated) DEVICE — ADH SKIN CLOSURE PREMIERPRO EXOFIN 1.0ML 3470

## (undated) DEVICE — DRSG PRIMAPORE 02X3" 7133

## (undated) DEVICE — DRSG GAUZE 4X4" TRAY 6939

## (undated) DEVICE — DRILL BIT FLEXIBLE REFLECTION 35MM 71362935

## (undated) DEVICE — GLOVE PROTEXIS W/NEU-THERA 8.0  2D73TE80

## (undated) DEVICE — DRSG TEGADERM 4X10" 1627

## (undated) DEVICE — PACK MINOR CUSTOM ASC

## (undated) DEVICE — SYR 30ML LL W/O NDL

## (undated) DEVICE — NDL 25GA 1.5" 305127

## (undated) DEVICE — PREP CHLORAPREP CLEAR 3ML 260400

## (undated) DEVICE — DRSG TEGADERM ALGINATE AG 4X5" 90303

## (undated) DEVICE — PAD CHUX UNDERPAD 30X30"

## (undated) DEVICE — SUCTION IRR SYSTEM W/O TIP INTERPULSE HANDPIECE 0210-100-000

## (undated) DEVICE — SUCTION TIP YANKAUER STR K87

## (undated) DEVICE — AXONICS LEAD IMPLANT KIT

## (undated) DEVICE — KIT BN CMNT ACM BIOPREP MED PRS STRL DISP

## (undated) DEVICE — DRAPE STERI TOWEL LG 1010

## (undated) DEVICE — DRAPE IOBAN INCISE 36X23" 6651EZ

## (undated) DEVICE — NDL 27GA 1.25" 305136

## (undated) DEVICE — BNDG ELASTIC 4"X5YDS UNSTERILE 6611-40

## (undated) DEVICE — SOL NACL 0.9% IRRIG 500ML BOTTLE 2F7123

## (undated) DEVICE — SU ETHILON 3-0 PS-2 18" 1669H

## (undated) DEVICE — HOOD FLYTE W/PEELAWAY 408-800-100

## (undated) DEVICE — PREP CHLORAPREP W/ORANGE TINT 10.5ML 260715

## (undated) DEVICE — SU MONOCRYL 3-0 PS-1 27" Y936H

## (undated) DEVICE — PACK CYSTO CUSTOM ASC

## (undated) DEVICE — BONE CLEANING TIP INTERPULSE FEMORAL CANAL 0210-008-000

## (undated) DEVICE — SPONGE PACK VAGINAL 2"X9

## (undated) DEVICE — ESU PENCIL W/SMOKE EVAC NEPTUNE STRYKER 0703-046-000

## (undated) DEVICE — LINEN MAYO STAND COVER OVERSIZE PACK 5458

## (undated) DEVICE — BONE CLEANING TIP INTERPULSE  0210-010-000

## (undated) DEVICE — SU VICRYL 2-0 CT-1 27" UND J259H

## (undated) DEVICE — STRAP STIRRUP W/SLIP 30187-030

## (undated) DEVICE — NDL 16GA 1.5" 305198

## (undated) DEVICE — SPONGE LAP 18X18" X8435

## (undated) RX ORDER — EPHEDRINE SULFATE 50 MG/ML
INJECTION, SOLUTION INTRAMUSCULAR; INTRAVENOUS; SUBCUTANEOUS
Status: DISPENSED
Start: 2023-01-18

## (undated) RX ORDER — CEFAZOLIN SODIUM 1 G/3ML
INJECTION, POWDER, FOR SOLUTION INTRAMUSCULAR; INTRAVENOUS
Status: DISPENSED
Start: 2022-07-05

## (undated) RX ORDER — HALOPERIDOL 5 MG/ML
INJECTION INTRAMUSCULAR
Status: DISPENSED
Start: 2023-01-18

## (undated) RX ORDER — METOPROLOL TARTRATE 1 MG/ML
INJECTION, SOLUTION INTRAVENOUS
Status: DISPENSED
Start: 2021-11-17

## (undated) RX ORDER — FENTANYL CITRATE 50 UG/ML
INJECTION, SOLUTION INTRAMUSCULAR; INTRAVENOUS
Status: DISPENSED
Start: 2022-07-05

## (undated) RX ORDER — PROPOFOL 10 MG/ML
INJECTION, EMULSION INTRAVENOUS
Status: DISPENSED
Start: 2022-07-05

## (undated) RX ORDER — HYDROMORPHONE HYDROCHLORIDE 1 MG/ML
INJECTION, SOLUTION INTRAMUSCULAR; INTRAVENOUS; SUBCUTANEOUS
Status: DISPENSED
Start: 2023-01-18

## (undated) RX ORDER — ONDANSETRON 2 MG/ML
INJECTION INTRAMUSCULAR; INTRAVENOUS
Status: DISPENSED
Start: 2023-01-18

## (undated) RX ORDER — FENTANYL CITRATE 50 UG/ML
INJECTION, SOLUTION INTRAMUSCULAR; INTRAVENOUS
Status: DISPENSED
Start: 2023-01-18

## (undated) RX ORDER — CEFAZOLIN SODIUM 2 G/50ML
SOLUTION INTRAVENOUS
Status: DISPENSED
Start: 2021-05-11

## (undated) RX ORDER — METOPROLOL TARTRATE 1 MG/ML
INJECTION, SOLUTION INTRAVENOUS
Status: DISPENSED
Start: 2022-07-05

## (undated) RX ORDER — ACETAMINOPHEN 325 MG/1
TABLET ORAL
Status: DISPENSED
Start: 2021-05-11

## (undated) RX ORDER — PROPOFOL 10 MG/ML
INJECTION, EMULSION INTRAVENOUS
Status: DISPENSED
Start: 2021-05-11

## (undated) RX ORDER — CEFAZOLIN SODIUM 2 G/50ML
SOLUTION INTRAVENOUS
Status: DISPENSED
Start: 2022-07-05

## (undated) RX ORDER — ONDANSETRON 2 MG/ML
INJECTION INTRAMUSCULAR; INTRAVENOUS
Status: DISPENSED
Start: 2022-07-05

## (undated) RX ORDER — OXYCODONE HYDROCHLORIDE 5 MG/1
TABLET ORAL
Status: DISPENSED
Start: 2023-01-18

## (undated) RX ORDER — CEFAZOLIN SODIUM/WATER 2 G/20 ML
SYRINGE (ML) INTRAVENOUS
Status: DISPENSED
Start: 2023-01-18

## (undated) RX ORDER — FENTANYL CITRATE-0.9 % NACL/PF 10 MCG/ML
PLASTIC BAG, INJECTION (ML) INTRAVENOUS
Status: DISPENSED
Start: 2022-07-05

## (undated) RX ORDER — NITROGLYCERIN 0.4 MG/1
TABLET SUBLINGUAL
Status: DISPENSED
Start: 2021-11-17

## (undated) RX ORDER — FENTANYL CITRATE 50 UG/ML
INJECTION, SOLUTION INTRAMUSCULAR; INTRAVENOUS
Status: DISPENSED
Start: 2024-11-06

## (undated) RX ORDER — TRANEXAMIC ACID 650 MG/1
TABLET ORAL
Status: DISPENSED
Start: 2023-01-18

## (undated) RX ORDER — ONDANSETRON 2 MG/ML
INJECTION INTRAMUSCULAR; INTRAVENOUS
Status: DISPENSED
Start: 2021-05-11

## (undated) RX ORDER — ACETAMINOPHEN 325 MG/1
TABLET ORAL
Status: DISPENSED
Start: 2023-01-18

## (undated) RX ORDER — SULFAMETHOXAZOLE/TRIMETHOPRIM 800-160 MG
TABLET ORAL
Status: DISPENSED
Start: 2021-02-24

## (undated) RX ORDER — FENTANYL CITRATE 50 UG/ML
INJECTION, SOLUTION INTRAMUSCULAR; INTRAVENOUS
Status: DISPENSED
Start: 2021-05-11

## (undated) RX ORDER — PROPOFOL 10 MG/ML
INJECTION, EMULSION INTRAVENOUS
Status: DISPENSED
Start: 2023-01-18

## (undated) RX ORDER — LIDOCAINE HCL/PF 100 MG/5ML
SYRINGE (ML) INJECTION
Status: DISPENSED
Start: 2022-07-05

## (undated) RX ORDER — SODIUM CHLORIDE, SODIUM LACTATE, POTASSIUM CHLORIDE, CALCIUM CHLORIDE 600; 310; 30; 20 MG/100ML; MG/100ML; MG/100ML; MG/100ML
INJECTION, SOLUTION INTRAVENOUS
Status: DISPENSED
Start: 2023-01-18

## (undated) RX ORDER — DIAZEPAM 10 MG/2ML
INJECTION, SOLUTION INTRAMUSCULAR; INTRAVENOUS
Status: DISPENSED
Start: 2023-01-18

## (undated) RX ORDER — DEXAMETHASONE SODIUM PHOSPHATE 4 MG/ML
INJECTION, SOLUTION INTRA-ARTICULAR; INTRALESIONAL; INTRAMUSCULAR; INTRAVENOUS; SOFT TISSUE
Status: DISPENSED
Start: 2021-05-11

## (undated) RX ORDER — METOPROLOL TARTRATE 100 MG
TABLET ORAL
Status: DISPENSED
Start: 2021-11-17

## (undated) RX ORDER — FENTANYL CITRATE 50 UG/ML
INJECTION, SOLUTION INTRAMUSCULAR; INTRAVENOUS
Status: DISPENSED
Start: 2022-09-12

## (undated) RX ORDER — FENTANYL CITRATE-0.9 % NACL/PF 10 MCG/ML
PLASTIC BAG, INJECTION (ML) INTRAVENOUS
Status: DISPENSED
Start: 2023-01-18

## (undated) RX ORDER — LIDOCAINE HYDROCHLORIDE 20 MG/ML
INJECTION, SOLUTION EPIDURAL; INFILTRATION; INTRACAUDAL; PERINEURAL
Status: DISPENSED
Start: 2021-05-11

## (undated) RX ORDER — METOPROLOL TARTRATE 50 MG
TABLET ORAL
Status: DISPENSED
Start: 2021-11-17

## (undated) RX ORDER — IVABRADINE 5 MG/1
TABLET, FILM COATED ORAL
Status: DISPENSED
Start: 2021-11-17

## (undated) RX ORDER — EPINEPHRINE 1 MG/ML
INJECTION, SOLUTION INTRAMUSCULAR; SUBCUTANEOUS
Status: DISPENSED
Start: 2023-01-18

## (undated) RX ORDER — ACETAMINOPHEN 325 MG/1
TABLET ORAL
Status: DISPENSED
Start: 2022-09-12

## (undated) RX ORDER — CEFAZOLIN SODIUM 1 G/3ML
INJECTION, POWDER, FOR SOLUTION INTRAMUSCULAR; INTRAVENOUS
Status: DISPENSED
Start: 2022-09-12

## (undated) RX ORDER — DEXAMETHASONE SODIUM PHOSPHATE 4 MG/ML
INJECTION, SOLUTION INTRA-ARTICULAR; INTRALESIONAL; INTRAMUSCULAR; INTRAVENOUS; SOFT TISSUE
Status: DISPENSED
Start: 2022-07-05